# Patient Record
Sex: MALE | Race: WHITE | NOT HISPANIC OR LATINO | URBAN - METROPOLITAN AREA
[De-identification: names, ages, dates, MRNs, and addresses within clinical notes are randomized per-mention and may not be internally consistent; named-entity substitution may affect disease eponyms.]

---

## 2016-10-25 RX ORDER — ACETAMINOPHEN 500 MG
2 TABLET ORAL
Qty: 0 | Refills: 0 | COMMUNITY
Start: 2016-10-25

## 2016-11-30 RX ORDER — WARFARIN SODIUM 2.5 MG/1
1 TABLET ORAL
Qty: 0 | Refills: 0 | COMMUNITY
Start: 2016-11-30

## 2017-01-29 ENCOUNTER — INPATIENT (INPATIENT)
Facility: HOSPITAL | Age: 82
LOS: 3 days | Discharge: HOME | DRG: 190 | End: 2017-02-02
Attending: INTERNAL MEDICINE | Admitting: INTERNAL MEDICINE
Payer: MEDICARE

## 2017-01-29 VITALS
OXYGEN SATURATION: 95 % | TEMPERATURE: 98 F | RESPIRATION RATE: 23 BRPM | SYSTOLIC BLOOD PRESSURE: 120 MMHG | DIASTOLIC BLOOD PRESSURE: 55 MMHG | HEART RATE: 90 BPM

## 2017-01-29 DIAGNOSIS — I48.91 UNSPECIFIED ATRIAL FIBRILLATION: ICD-10-CM

## 2017-01-29 DIAGNOSIS — B97.4 RESPIRATORY SYNCYTIAL VIRUS AS THE CAUSE OF DISEASES CLASSIFIED ELSEWHERE: ICD-10-CM

## 2017-01-29 DIAGNOSIS — E11.8 TYPE 2 DIABETES MELLITUS WITH UNSPECIFIED COMPLICATIONS: ICD-10-CM

## 2017-01-29 DIAGNOSIS — J44.1 CHRONIC OBSTRUCTIVE PULMONARY DISEASE WITH (ACUTE) EXACERBATION: ICD-10-CM

## 2017-01-29 DIAGNOSIS — Z96.641 PRESENCE OF RIGHT ARTIFICIAL HIP JOINT: Chronic | ICD-10-CM

## 2017-01-29 LAB
ALBUMIN SERPL ELPH-MCNC: 3.9 G/DL — SIGNIFICANT CHANGE UP (ref 3.3–5)
ALP SERPL-CCNC: 123 U/L — HIGH (ref 40–120)
ALT FLD-CCNC: 30 U/L RC — SIGNIFICANT CHANGE UP (ref 10–45)
ANION GAP SERPL CALC-SCNC: 19 MMOL/L — HIGH (ref 5–17)
APPEARANCE UR: ABNORMAL
APTT BLD: 64.1 SEC — HIGH (ref 27.5–37.4)
AST SERPL-CCNC: 55 U/L — HIGH (ref 10–40)
BASE EXCESS BLDA CALC-SCNC: 4.4 MMOL/L — HIGH (ref -2–2)
BASOPHILS # BLD AUTO: 0 K/UL — SIGNIFICANT CHANGE UP (ref 0–0.2)
BASOPHILS NFR BLD AUTO: 0 % — SIGNIFICANT CHANGE UP (ref 0–2)
BILIRUB SERPL-MCNC: 0.1 MG/DL — LOW (ref 0.2–1.2)
BILIRUB UR-MCNC: NEGATIVE — SIGNIFICANT CHANGE UP
BUN SERPL-MCNC: 25 MG/DL — HIGH (ref 7–23)
CALCIUM SERPL-MCNC: 9.8 MG/DL — SIGNIFICANT CHANGE UP (ref 8.4–10.5)
CHLORIDE SERPL-SCNC: 97 MMOL/L — SIGNIFICANT CHANGE UP (ref 96–108)
CK MB CFR SERPL CALC: 2.3 NG/ML — SIGNIFICANT CHANGE UP (ref 0–6.7)
CK SERPL-CCNC: 43 U/L — SIGNIFICANT CHANGE UP (ref 30–200)
CO2 BLDA-SCNC: 30 MMOL/L — SIGNIFICANT CHANGE UP (ref 22–30)
CO2 SERPL-SCNC: 22 MMOL/L — SIGNIFICANT CHANGE UP (ref 22–31)
COLOR SPEC: YELLOW — SIGNIFICANT CHANGE UP
CREAT SERPL-MCNC: 1.14 MG/DL — SIGNIFICANT CHANGE UP (ref 0.5–1.3)
DIFF PNL FLD: NEGATIVE — SIGNIFICANT CHANGE UP
EOSINOPHIL # BLD AUTO: 0 K/UL — SIGNIFICANT CHANGE UP (ref 0–0.5)
EOSINOPHIL NFR BLD AUTO: 0 % — SIGNIFICANT CHANGE UP (ref 0–6)
GAS PNL BLDA: SIGNIFICANT CHANGE UP
GAS PNL BLDV: SIGNIFICANT CHANGE UP
GLUCOSE SERPL-MCNC: 202 MG/DL — HIGH (ref 70–99)
GLUCOSE UR QL: 150
HCO3 BLDA-SCNC: 29 MMOL/L — SIGNIFICANT CHANGE UP (ref 21–29)
HCT VFR BLD CALC: 35.7 % — LOW (ref 39–50)
HGB BLD-MCNC: 11.5 G/DL — LOW (ref 13–17)
HOROWITZ INDEX BLDA+IHG-RTO: SIGNIFICANT CHANGE UP
INR BLD: 2.4 RATIO — HIGH (ref 0.88–1.16)
KETONES UR-MCNC: NEGATIVE — SIGNIFICANT CHANGE UP
LEUKOCYTE ESTERASE UR-ACNC: NEGATIVE — SIGNIFICANT CHANGE UP
LYMPHOCYTES # BLD AUTO: 0.7 K/UL — LOW (ref 1–3.3)
LYMPHOCYTES # BLD AUTO: 5.7 % — LOW (ref 13–44)
MCHC RBC-ENTMCNC: 29.9 PG — SIGNIFICANT CHANGE UP (ref 27–34)
MCHC RBC-ENTMCNC: 32.2 GM/DL — SIGNIFICANT CHANGE UP (ref 32–36)
MCV RBC AUTO: 92.8 FL — SIGNIFICANT CHANGE UP (ref 80–100)
MONOCYTES # BLD AUTO: 1 K/UL — HIGH (ref 0–0.9)
MONOCYTES NFR BLD AUTO: 8.1 % — SIGNIFICANT CHANGE UP (ref 2–14)
NEUTROPHILS # BLD AUTO: 10.6 K/UL — HIGH (ref 1.8–7.4)
NEUTROPHILS NFR BLD AUTO: 86.2 % — HIGH (ref 43–77)
NITRITE UR-MCNC: NEGATIVE — SIGNIFICANT CHANGE UP
PCO2 BLDA: 44 MMHG — SIGNIFICANT CHANGE UP (ref 32–46)
PH BLDA: 7.43 — SIGNIFICANT CHANGE UP (ref 7.35–7.45)
PH UR: 6 — SIGNIFICANT CHANGE UP (ref 4.8–8)
PLATELET # BLD AUTO: 450 K/UL — HIGH (ref 150–400)
PO2 BLDA: 91 MMHG — SIGNIFICANT CHANGE UP (ref 74–108)
POTASSIUM SERPL-MCNC: 4.7 MMOL/L — SIGNIFICANT CHANGE UP (ref 3.5–5.3)
POTASSIUM SERPL-MCNC: 6.8 MMOL/L — CRITICAL HIGH (ref 3.5–5.3)
POTASSIUM SERPL-SCNC: 4.7 MMOL/L — SIGNIFICANT CHANGE UP (ref 3.5–5.3)
POTASSIUM SERPL-SCNC: 6.8 MMOL/L — CRITICAL HIGH (ref 3.5–5.3)
PROT SERPL-MCNC: 8.3 G/DL — SIGNIFICANT CHANGE UP (ref 6–8.3)
PROT UR-MCNC: 300 MG/DL
PROTHROM AB SERPL-ACNC: 26.1 SEC — HIGH (ref 10–13.1)
RAPID RVP RESULT: DETECTED
RBC # BLD: 3.84 M/UL — LOW (ref 4.2–5.8)
RBC # FLD: 14.3 % — SIGNIFICANT CHANGE UP (ref 10.3–14.5)
RSV RNA SPEC QL NAA+PROBE: DETECTED
SAO2 % BLDA: 97 % — HIGH (ref 92–96)
SODIUM SERPL-SCNC: 138 MMOL/L — SIGNIFICANT CHANGE UP (ref 135–145)
SP GR SPEC: 1.02 — SIGNIFICANT CHANGE UP (ref 1.01–1.02)
TROPONIN T SERPL-MCNC: <0.01 NG/ML — SIGNIFICANT CHANGE UP (ref 0–0.06)
UROBILINOGEN FLD QL: NEGATIVE — SIGNIFICANT CHANGE UP
WBC # BLD: 12.3 K/UL — HIGH (ref 3.8–10.5)
WBC # FLD AUTO: 12.3 K/UL — HIGH (ref 3.8–10.5)

## 2017-01-29 PROCEDURE — 99285 EMERGENCY DEPT VISIT HI MDM: CPT | Mod: 25,GC

## 2017-01-29 PROCEDURE — 93010 ELECTROCARDIOGRAM REPORT: CPT | Mod: GC

## 2017-01-29 PROCEDURE — 71010: CPT | Mod: 26

## 2017-01-29 RX ORDER — POLYETHYLENE GLYCOL 3350 17 G/17G
17 POWDER, FOR SOLUTION ORAL DAILY
Qty: 0 | Refills: 0 | Status: DISCONTINUED | OUTPATIENT
Start: 2017-01-29 | End: 2017-02-02

## 2017-01-29 RX ORDER — DEXTROSE 50 % IN WATER 50 %
25 SYRINGE (ML) INTRAVENOUS ONCE
Qty: 0 | Refills: 0 | Status: DISCONTINUED | OUTPATIENT
Start: 2017-01-29 | End: 2017-02-02

## 2017-01-29 RX ORDER — CEFTRIAXONE 500 MG/1
1 INJECTION, POWDER, FOR SOLUTION INTRAMUSCULAR; INTRAVENOUS EVERY 24 HOURS
Qty: 0 | Refills: 0 | Status: DISCONTINUED | OUTPATIENT
Start: 2017-01-29 | End: 2017-01-30

## 2017-01-29 RX ORDER — DILTIAZEM HCL 120 MG
180 CAPSULE, EXT RELEASE 24 HR ORAL DAILY
Qty: 0 | Refills: 0 | Status: DISCONTINUED | OUTPATIENT
Start: 2017-01-29 | End: 2017-02-02

## 2017-01-29 RX ORDER — DEXTROSE 50 % IN WATER 50 %
1 SYRINGE (ML) INTRAVENOUS ONCE
Qty: 0 | Refills: 0 | Status: DISCONTINUED | OUTPATIENT
Start: 2017-01-29 | End: 2017-02-02

## 2017-01-29 RX ORDER — ACETAMINOPHEN 500 MG
650 TABLET ORAL EVERY 6 HOURS
Qty: 0 | Refills: 0 | Status: DISCONTINUED | OUTPATIENT
Start: 2017-01-29 | End: 2017-02-02

## 2017-01-29 RX ORDER — DOCUSATE SODIUM 100 MG
100 CAPSULE ORAL THREE TIMES A DAY
Qty: 0 | Refills: 0 | Status: DISCONTINUED | OUTPATIENT
Start: 2017-01-29 | End: 2017-02-02

## 2017-01-29 RX ORDER — IPRATROPIUM/ALBUTEROL SULFATE 18-103MCG
3 AEROSOL WITH ADAPTER (GRAM) INHALATION ONCE
Qty: 0 | Refills: 0 | Status: COMPLETED | OUTPATIENT
Start: 2017-01-29 | End: 2017-01-29

## 2017-01-29 RX ORDER — SODIUM CHLORIDE 9 MG/ML
3 INJECTION INTRAMUSCULAR; INTRAVENOUS; SUBCUTANEOUS ONCE
Qty: 0 | Refills: 0 | Status: COMPLETED | OUTPATIENT
Start: 2017-01-29 | End: 2017-01-29

## 2017-01-29 RX ORDER — FUROSEMIDE 40 MG
20 TABLET ORAL ONCE
Qty: 0 | Refills: 0 | Status: COMPLETED | OUTPATIENT
Start: 2017-01-29 | End: 2017-01-29

## 2017-01-29 RX ORDER — ALBUTEROL 90 UG/1
2 AEROSOL, METERED ORAL EVERY 6 HOURS
Qty: 0 | Refills: 0 | Status: DISCONTINUED | OUTPATIENT
Start: 2017-01-29 | End: 2017-02-01

## 2017-01-29 RX ORDER — SUCRALFATE 1 G
1 TABLET ORAL EVERY 6 HOURS
Qty: 0 | Refills: 0 | Status: DISCONTINUED | OUTPATIENT
Start: 2017-01-29 | End: 2017-02-02

## 2017-01-29 RX ORDER — PANTOPRAZOLE SODIUM 20 MG/1
40 TABLET, DELAYED RELEASE ORAL
Qty: 0 | Refills: 0 | Status: DISCONTINUED | OUTPATIENT
Start: 2017-01-29 | End: 2017-02-02

## 2017-01-29 RX ORDER — ATORVASTATIN CALCIUM 80 MG/1
20 TABLET, FILM COATED ORAL AT BEDTIME
Qty: 0 | Refills: 0 | Status: DISCONTINUED | OUTPATIENT
Start: 2017-01-29 | End: 2017-02-02

## 2017-01-29 RX ORDER — GLUCAGON INJECTION, SOLUTION 0.5 MG/.1ML
1 INJECTION, SOLUTION SUBCUTANEOUS ONCE
Qty: 0 | Refills: 0 | Status: DISCONTINUED | OUTPATIENT
Start: 2017-01-29 | End: 2017-02-02

## 2017-01-29 RX ORDER — FERROUS SULFATE 325(65) MG
325 TABLET ORAL DAILY
Qty: 0 | Refills: 0 | Status: DISCONTINUED | OUTPATIENT
Start: 2017-01-29 | End: 2017-02-02

## 2017-01-29 RX ORDER — LAMOTRIGINE 25 MG/1
25 TABLET, ORALLY DISINTEGRATING ORAL DAILY
Qty: 0 | Refills: 0 | Status: DISCONTINUED | OUTPATIENT
Start: 2017-01-29 | End: 2017-02-02

## 2017-01-29 RX ORDER — OXYCODONE HYDROCHLORIDE 5 MG/1
5 TABLET ORAL EVERY 4 HOURS
Qty: 0 | Refills: 0 | Status: DISCONTINUED | OUTPATIENT
Start: 2017-01-29 | End: 2017-02-02

## 2017-01-29 RX ORDER — DEXTROSE 50 % IN WATER 50 %
12.5 SYRINGE (ML) INTRAVENOUS ONCE
Qty: 0 | Refills: 0 | Status: DISCONTINUED | OUTPATIENT
Start: 2017-01-29 | End: 2017-02-02

## 2017-01-29 RX ORDER — INSULIN LISPRO 100/ML
VIAL (ML) SUBCUTANEOUS
Qty: 0 | Refills: 0 | Status: DISCONTINUED | OUTPATIENT
Start: 2017-01-29 | End: 2017-02-02

## 2017-01-29 RX ORDER — ALPRAZOLAM 0.25 MG
0.5 TABLET ORAL
Qty: 0 | Refills: 0 | Status: DISCONTINUED | OUTPATIENT
Start: 2017-01-29 | End: 2017-02-02

## 2017-01-29 RX ORDER — METOPROLOL TARTRATE 50 MG
25 TABLET ORAL
Qty: 0 | Refills: 0 | Status: DISCONTINUED | OUTPATIENT
Start: 2017-01-29 | End: 2017-02-02

## 2017-01-29 RX ORDER — ERGOCALCIFEROL 1.25 MG/1
50000 CAPSULE ORAL
Qty: 0 | Refills: 0 | Status: DISCONTINUED | OUTPATIENT
Start: 2017-01-29 | End: 2017-02-02

## 2017-01-29 RX ORDER — SODIUM CHLORIDE 9 MG/ML
500 INJECTION INTRAMUSCULAR; INTRAVENOUS; SUBCUTANEOUS ONCE
Qty: 0 | Refills: 0 | Status: COMPLETED | OUTPATIENT
Start: 2017-01-29 | End: 2017-01-29

## 2017-01-29 RX ORDER — SODIUM CHLORIDE 9 MG/ML
1000 INJECTION, SOLUTION INTRAVENOUS
Qty: 0 | Refills: 0 | Status: DISCONTINUED | OUTPATIENT
Start: 2017-01-29 | End: 2017-02-02

## 2017-01-29 RX ORDER — TIOTROPIUM BROMIDE 18 UG/1
1 CAPSULE ORAL; RESPIRATORY (INHALATION) DAILY
Qty: 0 | Refills: 0 | Status: DISCONTINUED | OUTPATIENT
Start: 2017-01-29 | End: 2017-02-02

## 2017-01-29 RX ORDER — TRAMADOL HYDROCHLORIDE 50 MG/1
50 TABLET ORAL EVERY 6 HOURS
Qty: 0 | Refills: 0 | Status: DISCONTINUED | OUTPATIENT
Start: 2017-01-29 | End: 2017-02-02

## 2017-01-29 RX ADMIN — Medication 0.5 MILLIGRAM(S): at 22:51

## 2017-01-29 RX ADMIN — SODIUM CHLORIDE 500 MILLILITER(S): 9 INJECTION INTRAMUSCULAR; INTRAVENOUS; SUBCUTANEOUS at 17:54

## 2017-01-29 RX ADMIN — Medication 3 MILLILITER(S): at 16:52

## 2017-01-29 RX ADMIN — Medication 20 MILLIGRAM(S): at 22:06

## 2017-01-29 RX ADMIN — Medication 100 MILLIGRAM(S): at 22:51

## 2017-01-29 RX ADMIN — Medication 25 MILLIGRAM(S): at 22:51

## 2017-01-29 RX ADMIN — Medication 3 MILLILITER(S): at 17:54

## 2017-01-29 RX ADMIN — SODIUM CHLORIDE 3 MILLILITER(S): 9 INJECTION INTRAMUSCULAR; INTRAVENOUS; SUBCUTANEOUS at 16:37

## 2017-01-29 RX ADMIN — ATORVASTATIN CALCIUM 20 MILLIGRAM(S): 80 TABLET, FILM COATED ORAL at 22:51

## 2017-01-29 NOTE — ED PROVIDER NOTE - ATTENDING CONTRIBUTION TO CARE
Patient with positive sick contact in one [facility] who saw his pulmonologist this week on the 26th was diagnosed with bronchitis, stated it was viral, given medrol dose pack and no antibiotics. Patient was not sleeping well or feeling well today and reported to urgent care who forwarded patient to ED today. Patient has been mildly shortness of breath. Patient coughing wet cough but not producing much sputum. Patient with positive sick contact in one [facility] who saw his pulmonologist this week on the 26th was diagnosed with bronchitis, stated it was viral, given medrol dose pack and no antibiotics. Patient was not sleeping well or feeling well today and reported to urgent care who forwarded patient to ED today. Patient has been mildly shortness of breath. Patient coughing wet cough but not producing much sputum.  mild distress secondary to shortness of breath, left rhonchi and rales, + wheezing throughout, no edema, non-tachycardic, mildly tachypneic, soft, ntnd, no rebound/guarding, no rashes  will get iv, labs, 30cc/kg, blood culture x 2, ua, urine culture, will treat with albuterol nebs, will reassess and likely antibiotics and admit for COPD exacerbation.

## 2017-01-29 NOTE — ED PROVIDER NOTE - OBJECTIVE STATEMENT
88y Male PMH COPD, Afib, DM2 complaining of shortness of breath. Was here 6 months ago for orthopedic surgery. Started having a cough about one week ago, may have gotten it from a caretaker. Had increased work of breathing. Went to pulmonologist, started prednisone. Went to urgent care, for persistent symptoms, referred here for more thorough evaluation. 88y Male PMH COPD, Afib, DM2 complaining of shortness of breath. Was here 6 months ago for orthopedic surgery. Started having a cough about one week ago,, wet but nonproductive, may have gotten it from a caretaker. Had increased work of breathing. Went to pulmonologist, started prednisone. Went to urgent care, for persistent symptoms, referred here for more thorough evaluation. Denies fevers, chills, nausea, vomiting, diarrhea, constipation, chest pain.     PCP: Dr. Virgil Falk 88y Male PMH COPD, Afib, DM2 complaining of shortness of breath. Was here 6 months ago for orthopedic surgery. Started having a cough about one week ago, wet but nonproductive, may have gotten it from a caretaker. Had increased work of breathing. Went to pulmonologist, started prednisone. Went to urgent care, for persistent symptoms, referred here for more thorough evaluation. Denies fevers, chills, nausea, vomiting, diarrhea, constipation, chest pain.     PCP: Dr. Virgil Falk

## 2017-01-29 NOTE — H&P ADULT. - PROBLEM SELECTOR PLAN 2
will start a course of abx   recently hemodialysis a course of steroids will hold further steroid for now  continue nebs

## 2017-01-29 NOTE — H&P ADULT. - HISTORY OF PRESENT ILLNESS
Patient is an 87 yo male present to ED with complaint of SOB. As per Patient his home health aid had a cough last week. about 4-5 days agog Patient begai  to experience SOB and a mild cough. was seen by his pulmonologist and started on a course of steroids. Patient syttates he had no improvement with the steroid and was seen my a first med doc today who suggested thje Patient go to the ED for further eval. Patient seen now resting comfortably with O2 on.

## 2017-01-29 NOTE — ED PROVIDER NOTE - PHYSICAL EXAMINATION
ROS: GENERAL: no fevers, no chills HEENT: no epistaxis, no eye pain, no ear, no throat pain CARDIAC: no chest pain, + shortness of breath PULM: + cough, + shortness of breath GI: no nausea, no vomiting, no diarrhea, no abdominal pain, no hematemesis, no bright red blood per rectum : no dysuria, no hematuria EXTREMITIES: no arm pain, no leg pain, no back pain SKIN: no purpura, no petechiae NEURO: no headache, no neck pain, no loss of strength/sensation HEME: no easy bruising, no easy bleeding     PE: CONSTITUTIONAL: Well appearing, well nourished, in no apparent distress. ENMT: Airway patent, nasal mucosa clear, mouth with normal mucosa. HEAD: NCAT EYES: PERRL, EOMI CARDIAC: RRR, no m/r/g, no pedal edema RESPIRATORY: Diffuse expiratory wheezing bilaterally with rhonchi GI: Abdomen non-distended, non-tender MSK: Spine appears normal, range of motion is not limited, no muscle/joint tenderness NEURO: CNII-XII grossly intact, 5/5 strength, full sensation all extremities, gait intact SKIN: Skin tone normal in color, warm and dry. No evidence of rash.

## 2017-01-29 NOTE — ED PROVIDER NOTE - CARE PLAN
Principal Discharge DX:	COPD exacerbation Principal Discharge DX:	COPD exacerbation  Secondary Diagnosis:	RSV infection

## 2017-01-29 NOTE — ED ADULT NURSE NOTE - OBJECTIVE STATEMENT
87 Y/O M via walkin presenting with sob, labored breathing, hypoxia and unproductive cough as per pt. Pt states he has been feeling unwell for the past few days. He states he has been diagnosed with bronchitis in which they had prescribed him steroids. Pt states his cough is unproductive and wet. Pt states he feels better when he has oxygen on. Pt denies fever, chills, n,v, abd pain, dizziness, chest pain, palpitations. Pt is aaox4. Gross neuro intact. Lungs have wheezes with rhonci bilat. Pt is not in resp distress. Pt is able to speak in full sentences without difficulty. O2 sat is 97 ra. Pt placed on 2 L nc for comfort measures. Pt states he feels better with oxygen. S1 and S2 heard. Abd soft, nontender, nondistended. + bs in all 4 quadrants. Denies urinary s&s. Skin w.d.i Pt has flaky skin. Safety and comfort measures maintained. Family at bedside.

## 2017-01-29 NOTE — ED PROVIDER NOTE - PROGRESS NOTE DETAILS
Patient stable, but likely benefit from admission for continued wheezing, O2 requirement, and RSV infection. Patient agreed to admission.  - Yo Chavis MD

## 2017-01-30 LAB
ANION GAP SERPL CALC-SCNC: 15 MMOL/L — SIGNIFICANT CHANGE UP (ref 5–17)
BUN SERPL-MCNC: 23 MG/DL — SIGNIFICANT CHANGE UP (ref 7–23)
CALCIUM SERPL-MCNC: 9.4 MG/DL — SIGNIFICANT CHANGE UP (ref 8.4–10.5)
CHLORIDE SERPL-SCNC: 98 MMOL/L — SIGNIFICANT CHANGE UP (ref 96–108)
CO2 SERPL-SCNC: 26 MMOL/L — SIGNIFICANT CHANGE UP (ref 22–31)
CREAT SERPL-MCNC: 1.01 MG/DL — SIGNIFICANT CHANGE UP (ref 0.5–1.3)
CULTURE RESULTS: NO GROWTH — SIGNIFICANT CHANGE UP
GLUCOSE SERPL-MCNC: 175 MG/DL — HIGH (ref 70–99)
HCT VFR BLD CALC: 30.7 % — LOW (ref 39–50)
HGB BLD-MCNC: 9.5 G/DL — LOW (ref 13–17)
INR BLD: 2.49 RATIO — HIGH (ref 0.88–1.16)
MCHC RBC-ENTMCNC: 28.4 PG — SIGNIFICANT CHANGE UP (ref 27–34)
MCHC RBC-ENTMCNC: 30.9 GM/DL — LOW (ref 32–36)
MCV RBC AUTO: 91.9 FL — SIGNIFICANT CHANGE UP (ref 80–100)
NT-PROBNP SERPL-SCNC: 741 PG/ML — HIGH (ref 0–300)
PLATELET # BLD AUTO: 438 K/UL — HIGH (ref 150–400)
POTASSIUM SERPL-MCNC: 4.1 MMOL/L — SIGNIFICANT CHANGE UP (ref 3.5–5.3)
POTASSIUM SERPL-SCNC: 4.1 MMOL/L — SIGNIFICANT CHANGE UP (ref 3.5–5.3)
PROTHROM AB SERPL-ACNC: 28.4 SEC — HIGH (ref 10–13.1)
RBC # BLD: 3.34 M/UL — LOW (ref 4.2–5.8)
RBC # FLD: 14.7 % — HIGH (ref 10.3–14.5)
SODIUM SERPL-SCNC: 139 MMOL/L — SIGNIFICANT CHANGE UP (ref 135–145)
SPECIMEN SOURCE: SIGNIFICANT CHANGE UP
WBC # BLD: 13.11 K/UL — HIGH (ref 3.8–10.5)
WBC # FLD AUTO: 13.11 K/UL — HIGH (ref 3.8–10.5)

## 2017-01-30 RX ORDER — IPRATROPIUM/ALBUTEROL SULFATE 18-103MCG
3 AEROSOL WITH ADAPTER (GRAM) INHALATION EVERY 6 HOURS
Qty: 0 | Refills: 0 | Status: DISCONTINUED | OUTPATIENT
Start: 2017-01-30 | End: 2017-02-02

## 2017-01-30 RX ORDER — WARFARIN SODIUM 2.5 MG/1
1 TABLET ORAL ONCE
Qty: 0 | Refills: 0 | Status: COMPLETED | OUTPATIENT
Start: 2017-01-30 | End: 2017-01-30

## 2017-01-30 RX ORDER — SODIUM CHLORIDE 9 MG/ML
1000 INJECTION INTRAMUSCULAR; INTRAVENOUS; SUBCUTANEOUS
Qty: 0 | Refills: 0 | Status: DISCONTINUED | OUTPATIENT
Start: 2017-01-30 | End: 2017-02-02

## 2017-01-30 RX ADMIN — Medication: at 09:06

## 2017-01-30 RX ADMIN — Medication 1 GRAM(S): at 01:38

## 2017-01-30 RX ADMIN — Medication 40 MILLIGRAM(S): at 11:49

## 2017-01-30 RX ADMIN — Medication 1 TABLET(S): at 11:50

## 2017-01-30 RX ADMIN — ALBUTEROL 2 PUFF(S): 90 AEROSOL, METERED ORAL at 01:38

## 2017-01-30 RX ADMIN — PANTOPRAZOLE SODIUM 40 MILLIGRAM(S): 20 TABLET, DELAYED RELEASE ORAL at 06:23

## 2017-01-30 RX ADMIN — Medication 2: at 14:05

## 2017-01-30 RX ADMIN — CEFTRIAXONE 100 GRAM(S): 500 INJECTION, POWDER, FOR SOLUTION INTRAMUSCULAR; INTRAVENOUS at 01:38

## 2017-01-30 RX ADMIN — Medication 3 MILLILITER(S): at 23:36

## 2017-01-30 RX ADMIN — Medication 325 MILLIGRAM(S): at 11:49

## 2017-01-30 RX ADMIN — Medication 0.5 MILLIGRAM(S): at 11:49

## 2017-01-30 RX ADMIN — Medication 25 MILLIGRAM(S): at 18:18

## 2017-01-30 RX ADMIN — Medication 180 MILLIGRAM(S): at 06:23

## 2017-01-30 RX ADMIN — TIOTROPIUM BROMIDE 1 CAPSULE(S): 18 CAPSULE ORAL; RESPIRATORY (INHALATION) at 11:50

## 2017-01-30 RX ADMIN — POLYETHYLENE GLYCOL 3350 17 GRAM(S): 17 POWDER, FOR SOLUTION ORAL at 11:51

## 2017-01-30 RX ADMIN — Medication 1 GRAM(S): at 14:06

## 2017-01-30 RX ADMIN — ALBUTEROL 2 PUFF(S): 90 AEROSOL, METERED ORAL at 06:24

## 2017-01-30 RX ADMIN — LAMOTRIGINE 25 MILLIGRAM(S): 25 TABLET, ORALLY DISINTEGRATING ORAL at 11:50

## 2017-01-30 RX ADMIN — WARFARIN SODIUM 1 MILLIGRAM(S): 2.5 TABLET ORAL at 21:53

## 2017-01-30 RX ADMIN — ALBUTEROL 2 PUFF(S): 90 AEROSOL, METERED ORAL at 18:19

## 2017-01-30 RX ADMIN — Medication 1 GRAM(S): at 18:19

## 2017-01-30 RX ADMIN — Medication 3 MILLILITER(S): at 18:21

## 2017-01-30 RX ADMIN — Medication 100 MILLIGRAM(S): at 21:53

## 2017-01-30 RX ADMIN — ATORVASTATIN CALCIUM 20 MILLIGRAM(S): 80 TABLET, FILM COATED ORAL at 21:59

## 2017-01-30 RX ADMIN — ALBUTEROL 2 PUFF(S): 90 AEROSOL, METERED ORAL at 11:50

## 2017-01-30 RX ADMIN — Medication 0.5 MILLIGRAM(S): at 03:42

## 2017-01-30 RX ADMIN — ALBUTEROL 2 PUFF(S): 90 AEROSOL, METERED ORAL at 23:34

## 2017-01-30 RX ADMIN — Medication 1 GRAM(S): at 23:34

## 2017-01-30 RX ADMIN — SODIUM CHLORIDE 50 MILLILITER(S): 9 INJECTION INTRAMUSCULAR; INTRAVENOUS; SUBCUTANEOUS at 16:15

## 2017-01-30 RX ADMIN — Medication 100 MILLIGRAM(S): at 11:50

## 2017-01-30 RX ADMIN — Medication 100 MILLIGRAM(S): at 06:23

## 2017-01-30 RX ADMIN — Medication 1 GRAM(S): at 06:23

## 2017-01-30 RX ADMIN — Medication 25 MILLIGRAM(S): at 06:23

## 2017-01-31 ENCOUNTER — TRANSCRIPTION ENCOUNTER (OUTPATIENT)
Age: 82
End: 2017-01-31

## 2017-01-31 LAB
HCT VFR BLD CALC: 31.8 % — LOW (ref 39–50)
HGB BLD-MCNC: 10 G/DL — LOW (ref 13–17)
INR BLD: 2.29 RATIO — HIGH (ref 0.88–1.16)
MCHC RBC-ENTMCNC: 28.8 PG — SIGNIFICANT CHANGE UP (ref 27–34)
MCHC RBC-ENTMCNC: 31.4 GM/DL — LOW (ref 32–36)
MCV RBC AUTO: 91.6 FL — SIGNIFICANT CHANGE UP (ref 80–100)
PLATELET # BLD AUTO: 428 K/UL — HIGH (ref 150–400)
PROTHROM AB SERPL-ACNC: 26.1 SEC — HIGH (ref 10–13.1)
RBC # BLD: 3.47 M/UL — LOW (ref 4.2–5.8)
RBC # FLD: 14.6 % — HIGH (ref 10.3–14.5)
WBC # BLD: 11.88 K/UL — HIGH (ref 3.8–10.5)
WBC # FLD AUTO: 11.88 K/UL — HIGH (ref 3.8–10.5)

## 2017-01-31 RX ORDER — WARFARIN SODIUM 2.5 MG/1
1 TABLET ORAL ONCE
Qty: 0 | Refills: 0 | Status: COMPLETED | OUTPATIENT
Start: 2017-01-31 | End: 2017-01-31

## 2017-01-31 RX ADMIN — TIOTROPIUM BROMIDE 1 CAPSULE(S): 18 CAPSULE ORAL; RESPIRATORY (INHALATION) at 12:19

## 2017-01-31 RX ADMIN — Medication 180 MILLIGRAM(S): at 06:51

## 2017-01-31 RX ADMIN — Medication 1 GRAM(S): at 12:21

## 2017-01-31 RX ADMIN — Medication 100 MILLIGRAM(S): at 20:31

## 2017-01-31 RX ADMIN — Medication 325 MILLIGRAM(S): at 12:19

## 2017-01-31 RX ADMIN — ATORVASTATIN CALCIUM 20 MILLIGRAM(S): 80 TABLET, FILM COATED ORAL at 20:30

## 2017-01-31 RX ADMIN — PANTOPRAZOLE SODIUM 40 MILLIGRAM(S): 20 TABLET, DELAYED RELEASE ORAL at 06:51

## 2017-01-31 RX ADMIN — ALBUTEROL 2 PUFF(S): 90 AEROSOL, METERED ORAL at 16:52

## 2017-01-31 RX ADMIN — ALBUTEROL 2 PUFF(S): 90 AEROSOL, METERED ORAL at 06:52

## 2017-01-31 RX ADMIN — Medication 25 MILLIGRAM(S): at 16:51

## 2017-01-31 RX ADMIN — Medication 3 MILLILITER(S): at 06:52

## 2017-01-31 RX ADMIN — Medication 40 MILLIGRAM(S): at 12:19

## 2017-01-31 RX ADMIN — LAMOTRIGINE 25 MILLIGRAM(S): 25 TABLET, ORALLY DISINTEGRATING ORAL at 12:19

## 2017-01-31 RX ADMIN — Medication 100 MILLIGRAM(S): at 12:19

## 2017-01-31 RX ADMIN — WARFARIN SODIUM 1 MILLIGRAM(S): 2.5 TABLET ORAL at 20:30

## 2017-01-31 RX ADMIN — ALBUTEROL 2 PUFF(S): 90 AEROSOL, METERED ORAL at 12:20

## 2017-01-31 RX ADMIN — Medication 100 MILLIGRAM(S): at 06:52

## 2017-01-31 RX ADMIN — Medication 10 MILLIGRAM(S): at 09:58

## 2017-01-31 RX ADMIN — POLYETHYLENE GLYCOL 3350 17 GRAM(S): 17 POWDER, FOR SOLUTION ORAL at 12:20

## 2017-01-31 RX ADMIN — Medication 1 TABLET(S): at 12:20

## 2017-01-31 RX ADMIN — Medication 2: at 12:26

## 2017-01-31 RX ADMIN — Medication 25 MILLIGRAM(S): at 06:52

## 2017-01-31 RX ADMIN — Medication 1 GRAM(S): at 16:51

## 2017-01-31 RX ADMIN — Medication 1 GRAM(S): at 06:51

## 2017-01-31 RX ADMIN — Medication 3 MILLILITER(S): at 16:52

## 2017-01-31 RX ADMIN — Medication 3 MILLILITER(S): at 12:20

## 2017-01-31 NOTE — DISCHARGE NOTE ADULT - CARE PLAN
Principal Discharge DX:	COPD exacerbation  Goal:	Resolution.  Instructions for follow-up, activity and diet:	Likely secondary to RSV infection  Secondary Diagnosis:	RSV infection  Secondary Diagnosis:	Atrial fibrillation, unspecified type  Instructions for follow-up, activity and diet:	Atrial fibrillation is the most common heart rhythm problem & has the risk of stroke & heart attack  It helps if you control your blood pressure, not drink more than 1-2 alcohol drinks per day, cut down on caffeine, getting treatment for over active thyroid gland, & getting exercise  Call your doctor if you feel your heart racing or beating unusually, chest tightness or pain, lightheaded, faint, shortness of breath especially with exercise  It is important to take your heart medication as prescribed  You may be on anticoagulation which is very important to take as directed - you may need blood work to monitor drug levels  Secondary Diagnosis:	Diabetes type 2, controlled  Instructions for follow-up, activity and diet:	HgA1C this admission.  Make sure you get your HgA1c checked every three months.  If you take oral diabetes medications, check your blood glucose two times a day.  If you take insulin, check your blood glucose before meals and at bedtime.  It's important not to skip any meals.  Keep a log of your blood glucose results and always take it with you to your doctor appointments.  Keep a list of your current medications including injectables and over the counter medications and bring this medication list with you to all your doctor appointments.  If you have not seen your ophthalmologist this year call for appointment.  Check your feet daily for redness, sores, or openings. Do not self treat. If no improvement in two days call your primary care physician for an appointment.  Low blood sugar (hypoglycemia) is a blood sugar below 70mg/dl. Check your blood sugar if you feel signs/symptoms of hypoglycemia. If your blood sugar is below 70 take 15 grams of carbohydrates (ex 4 oz of apple juice, 3-4 glucose tablets, or 4-6 oz of regular soda) wait 15 minutes and repeat blood sugar to make sure it comes up above 70.  If your blood sugar is above 70 and you are due for a meal, have a meal.  If you are not due for a meal have a snack.  This snack helps keeps your blood sugar at a safe range. Principal Discharge DX:	COPD exacerbation  Goal:	Resolution.  Instructions for follow-up, activity and diet:	Likely secondary to RSV infection.  Call your Health Care provider upon arrival home to make a follow up appointment within one week.  Take all inhalers as prescribed by your Health Care Provider.  Take steroids as prescribed by your Health Care Provider.  If your cough increases infrequency and severity and/or you have shortness of breath or increased shortness of breath call your Health Care Provider.  If you develop fever, chills, night sweats, malaise, and/or change in mental status call your Health care Provider.  Nutrition is very important.  Eat small frequent meals.  Increase your activity as tolerated.  Do not stay in bed all day  DC home on home O2  Secondary Diagnosis:	RSV infection  Instructions for follow-up, activity and diet:	Respiratory status improved. Continue Nebulizer treatment  Secondary Diagnosis:	Atrial fibrillation, unspecified type  Instructions for follow-up, activity and diet:	Atrial fibrillation is the most common heart rhythm problem & has the risk of stroke & heart attack  It helps if you control your blood pressure, not drink more than 1-2 alcohol drinks per day, cut down on caffeine, getting treatment for over active thyroid gland, & getting exercise  Call your doctor if you feel your heart racing or beating unusually, chest tightness or pain, lightheaded, faint, shortness of breath especially with exercise  It is important to take your heart medication as prescribed  You may be on anticoagulation which is very important to take as directed - you may need blood work to monitor drug levels  Secondary Diagnosis:	Diabetes type 2, controlled  Instructions for follow-up, activity and diet:	HgA1C this admission 6.6  Make sure you get your HgA1c checked every three months.  If you take oral diabetes medications, check your blood glucose two times a day.  If you take insulin, check your blood glucose before meals and at bedtime.  It's important not to skip any meals.  Keep a log of your blood glucose results and always take it with you to your doctor appointments.  Keep a list of your current medications including injectables and over the counter medications and bring this medication list with you to all your doctor appointments.  If you have not seen your ophthalmologist this year call for appointment.  Check your feet daily for redness, sores, or openings. Do not self treat. If no improvement in two days call your primary care physician for an appointment.  Low blood sugar (hypoglycemia) is a blood sugar below 70mg/dl. Check your blood sugar if you feel signs/symptoms of hypoglycemia. If your blood sugar is below 70 take 15 grams of carbohydrates (ex 4 oz of apple juice, 3-4 glucose tablets, or 4-6 oz of regular soda) wait 15 minutes and repeat blood sugar to make sure it comes up above 70.  If your blood sugar is above 70 and you are due for a meal, have a meal.  If you are not due for a meal have a snack.  This snack helps keeps your blood sugar at a safe range.

## 2017-01-31 NOTE — DISCHARGE NOTE ADULT - PLAN OF CARE
Resolution. Likely secondary to RSV infection Atrial fibrillation is the most common heart rhythm problem & has the risk of stroke & heart attack  It helps if you control your blood pressure, not drink more than 1-2 alcohol drinks per day, cut down on caffeine, getting treatment for over active thyroid gland, & getting exercise  Call your doctor if you feel your heart racing or beating unusually, chest tightness or pain, lightheaded, faint, shortness of breath especially with exercise  It is important to take your heart medication as prescribed  You may be on anticoagulation which is very important to take as directed - you may need blood work to monitor drug levels HgA1C this admission.  Make sure you get your HgA1c checked every three months.  If you take oral diabetes medications, check your blood glucose two times a day.  If you take insulin, check your blood glucose before meals and at bedtime.  It's important not to skip any meals.  Keep a log of your blood glucose results and always take it with you to your doctor appointments.  Keep a list of your current medications including injectables and over the counter medications and bring this medication list with you to all your doctor appointments.  If you have not seen your ophthalmologist this year call for appointment.  Check your feet daily for redness, sores, or openings. Do not self treat. If no improvement in two days call your primary care physician for an appointment.  Low blood sugar (hypoglycemia) is a blood sugar below 70mg/dl. Check your blood sugar if you feel signs/symptoms of hypoglycemia. If your blood sugar is below 70 take 15 grams of carbohydrates (ex 4 oz of apple juice, 3-4 glucose tablets, or 4-6 oz of regular soda) wait 15 minutes and repeat blood sugar to make sure it comes up above 70.  If your blood sugar is above 70 and you are due for a meal, have a meal.  If you are not due for a meal have a snack.  This snack helps keeps your blood sugar at a safe range. Respiratory status improved. Continue Nebulizer treatment Likely secondary to RSV infection.  Call your Health Care provider upon arrival home to make a follow up appointment within one week.  Take all inhalers as prescribed by your Health Care Provider.  Take steroids as prescribed by your Health Care Provider.  If your cough increases infrequency and severity and/or you have shortness of breath or increased shortness of breath call your Health Care Provider.  If you develop fever, chills, night sweats, malaise, and/or change in mental status call your Health care Provider.  Nutrition is very important.  Eat small frequent meals.  Increase your activity as tolerated.  Do not stay in bed all day  DC home on home O2 HgA1C this admission 6.6  Make sure you get your HgA1c checked every three months.  If you take oral diabetes medications, check your blood glucose two times a day.  If you take insulin, check your blood glucose before meals and at bedtime.  It's important not to skip any meals.  Keep a log of your blood glucose results and always take it with you to your doctor appointments.  Keep a list of your current medications including injectables and over the counter medications and bring this medication list with you to all your doctor appointments.  If you have not seen your ophthalmologist this year call for appointment.  Check your feet daily for redness, sores, or openings. Do not self treat. If no improvement in two days call your primary care physician for an appointment.  Low blood sugar (hypoglycemia) is a blood sugar below 70mg/dl. Check your blood sugar if you feel signs/symptoms of hypoglycemia. If your blood sugar is below 70 take 15 grams of carbohydrates (ex 4 oz of apple juice, 3-4 glucose tablets, or 4-6 oz of regular soda) wait 15 minutes and repeat blood sugar to make sure it comes up above 70.  If your blood sugar is above 70 and you are due for a meal, have a meal.  If you are not due for a meal have a snack.  This snack helps keeps your blood sugar at a safe range.

## 2017-01-31 NOTE — DISCHARGE NOTE ADULT - MEDICATION SUMMARY - MEDICATIONS TO TAKE
I will START or STAY ON the medications listed below when I get home from the hospital:    acetaminophen 325 mg oral tablet  -- 3 tab(s) by mouth every 8 hours, As needed, Mild Pain (1 - 3)  -- Indication: For pain    oxyCODONE 5 mg oral tablet  -- 1 tab(s) by mouth every 4 hours, As needed, Severe Pain (7 - 10)  -- Indication: For pain    traMADol 50 mg oral tablet  -- 0.5 tab(s) by mouth every 6 hours, As needed, Moderate Pain (4 - 6)  -- Indication: For pain    aluminum hydroxide-magnesium hydroxide 200 mg-200 mg/5 mL oral suspension  -- 30 milliliter(s) by mouth every 6 hours, As needed, Dyspepsia  -- Indication: For stomach    diltiaZEM 180 mg/24 hours oral tablet, extended release  -- 1 tab(s) by mouth once a day  -- Indication: For blood pressure    warfarin 1 mg oral tablet  -- 1 milligram(s) by mouth once a day (at bedtime)    -- Indication: For Atrial fibrillation, unspecified type    lamoTRIgine 25 mg oral tablet  -- 1 tab(s) by mouth once a day  -- Indication: For bipolar    PARoxetine 40 mg oral tablet  -- 1 tab(s) by mouth once a day  -- Indication: For Depression    metFORMIN 500 mg oral tablet  -- 1 tab(s) by mouth 2 times a day  -- Indication: For Diabetes type 2, controlled    glimepiride 1 mg oral tablet  -- 1 tab(s) by mouth once a day  -- Indication: For Controlled diabetes mellitus type 2 with complications, unspecified long term insulin use status    rosuvastatin 10 mg oral tablet  -- 1 tab(s) by mouth once a day (at bedtime)  -- Indication: For Cholesterol    methIMAzole 5 mg oral tablet  -- 0.5 tab(s) by mouth once a day  -- Indication: For hyperthyroid    ALPRAZolam 0.5 mg oral tablet  -- 1 tab(s) by mouth 2 times a day, As Needed  -- Indication: For Anxiety    metoprolol tartrate 25 mg oral tablet  -- 1 tab(s) by mouth 2 times a day  -- Indication: For blood pressure    tiotropium 18 mcg inhalation capsule  -- 1 cap(s) inhaled once a day  -- Indication: For COPD exacerbation    DuoNeb 0.5 mg-2.5 mg/3 mL inhalation solution  -- 3 milliliter(s) inhaled every 6 hours  -- Indication: For COPD exacerbation    ferrous sulfate 324 mg (65 mg elemental iron) oral tablet  -- 1 tab(s) by mouth once a day  -- Indication: For Anemia    docusate sodium 100 mg oral capsule  -- 1 cap(s) by mouth 3 times a day  -- Indication: For Constipation    polyethylene glycol 3350 oral powder for reconstitution  -- 17 gram(s) by mouth once a day  -- Indication: For Constipation    sucralfate 1 g oral tablet  -- 1 tab(s) by mouth every 6 hours  -- Indication: For stomach    pantoprazole 40 mg oral delayed release tablet  -- 1 tab(s) by mouth once a day (before a meal)  -- Indication: For stomach    Multiple Vitamins oral tablet  -- 1 tab(s) by mouth once a day  -- Indication: For supplement    ergocalciferol 50,000 intl units (1.25 mg) oral capsule  -- 1 cap(s) by mouth once a week  -- Indication: For supplement

## 2017-01-31 NOTE — DISCHARGE NOTE ADULT - CARE PROVIDER_API CALL
Virgil Falk (DO), Medicine  [address] [place] [id number]  Phone: [phone]  Fax: [phone]

## 2017-01-31 NOTE — DISCHARGE NOTE ADULT - NS AS DC VTE INSTRUCTION
Coumadin/Warfarin - Dietary Advice.../Coumadin/Warfarin - Follow-up monitoring.../Coumadin/Warfarin - Compliance.../Coumadin/Warfarin - Potential for adverse drug reactions and interactions

## 2017-01-31 NOTE — DISCHARGE NOTE ADULT - HOME CARE AGENCY
[facility]  [phone] for visiting nurse with start of care 2/3/2017.   The nurse will call before coming.

## 2017-01-31 NOTE — DISCHARGE NOTE ADULT - HOSPITAL COURSE
present with cough and shortness of breath  found to be positive for RSV  continue home meds  activity as tolerated   follow up in office  continue current diet  Patient and daughter aware of plan

## 2017-01-31 NOTE — DISCHARGE NOTE ADULT - PATIENT PORTAL LINK FT
“You can access the [facility] Patient Portal, offered by [facility], by registering with the following website: [email or web address]”

## 2017-02-01 LAB
HCT VFR BLD CALC: 32.8 % — LOW (ref 39–50)
HGB BLD-MCNC: 10.7 G/DL — LOW (ref 13–17)
INR BLD: 2.51 RATIO — HIGH (ref 0.88–1.16)
MCHC RBC-ENTMCNC: 30 PG — SIGNIFICANT CHANGE UP (ref 27–34)
MCHC RBC-ENTMCNC: 32.6 GM/DL — SIGNIFICANT CHANGE UP (ref 32–36)
MCV RBC AUTO: 92 FL — SIGNIFICANT CHANGE UP (ref 80–100)
PLATELET # BLD AUTO: 456 K/UL — HIGH (ref 150–400)
PROTHROM AB SERPL-ACNC: 27.4 SEC — HIGH (ref 10–13.1)
RBC # BLD: 3.56 M/UL — LOW (ref 4.2–5.8)
RBC # FLD: 13.7 % — SIGNIFICANT CHANGE UP (ref 10.3–14.5)
WBC # BLD: 10.1 K/UL — SIGNIFICANT CHANGE UP (ref 3.8–10.5)
WBC # FLD AUTO: 10.1 K/UL — SIGNIFICANT CHANGE UP (ref 3.8–10.5)

## 2017-02-01 RX ORDER — WARFARIN SODIUM 2.5 MG/1
1 TABLET ORAL ONCE
Qty: 0 | Refills: 0 | Status: COMPLETED | OUTPATIENT
Start: 2017-02-01 | End: 2017-02-01

## 2017-02-01 RX ADMIN — Medication 180 MILLIGRAM(S): at 06:31

## 2017-02-01 RX ADMIN — PANTOPRAZOLE SODIUM 40 MILLIGRAM(S): 20 TABLET, DELAYED RELEASE ORAL at 06:31

## 2017-02-01 RX ADMIN — Medication 1 GRAM(S): at 18:41

## 2017-02-01 RX ADMIN — Medication 100 MILLIGRAM(S): at 12:34

## 2017-02-01 RX ADMIN — ATORVASTATIN CALCIUM 20 MILLIGRAM(S): 80 TABLET, FILM COATED ORAL at 20:16

## 2017-02-01 RX ADMIN — Medication 1 TABLET(S): at 12:33

## 2017-02-01 RX ADMIN — Medication 40 MILLIGRAM(S): at 12:33

## 2017-02-01 RX ADMIN — Medication 1: at 08:28

## 2017-02-01 RX ADMIN — Medication 1 GRAM(S): at 06:32

## 2017-02-01 RX ADMIN — Medication 2: at 12:34

## 2017-02-01 RX ADMIN — Medication 0.5 MILLIGRAM(S): at 20:16

## 2017-02-01 RX ADMIN — Medication 1 GRAM(S): at 12:33

## 2017-02-01 RX ADMIN — Medication 100 MILLIGRAM(S): at 20:16

## 2017-02-01 RX ADMIN — ALBUTEROL 2 PUFF(S): 90 AEROSOL, METERED ORAL at 06:32

## 2017-02-01 RX ADMIN — ALBUTEROL 2 PUFF(S): 90 AEROSOL, METERED ORAL at 12:33

## 2017-02-01 RX ADMIN — POLYETHYLENE GLYCOL 3350 17 GRAM(S): 17 POWDER, FOR SOLUTION ORAL at 12:33

## 2017-02-01 RX ADMIN — Medication 3 MILLILITER(S): at 06:31

## 2017-02-01 RX ADMIN — WARFARIN SODIUM 1 MILLIGRAM(S): 2.5 TABLET ORAL at 20:16

## 2017-02-01 RX ADMIN — Medication 100 MILLIGRAM(S): at 06:31

## 2017-02-01 RX ADMIN — LAMOTRIGINE 25 MILLIGRAM(S): 25 TABLET, ORALLY DISINTEGRATING ORAL at 12:33

## 2017-02-01 RX ADMIN — Medication 650 MILLIGRAM(S): at 18:41

## 2017-02-01 RX ADMIN — Medication 3 MILLILITER(S): at 18:41

## 2017-02-01 RX ADMIN — Medication 25 MILLIGRAM(S): at 06:31

## 2017-02-01 RX ADMIN — Medication 25 MILLIGRAM(S): at 18:41

## 2017-02-01 RX ADMIN — TIOTROPIUM BROMIDE 1 CAPSULE(S): 18 CAPSULE ORAL; RESPIRATORY (INHALATION) at 12:33

## 2017-02-01 RX ADMIN — Medication 325 MILLIGRAM(S): at 12:33

## 2017-02-02 VITALS
SYSTOLIC BLOOD PRESSURE: 124 MMHG | RESPIRATION RATE: 18 BRPM | DIASTOLIC BLOOD PRESSURE: 73 MMHG | OXYGEN SATURATION: 94 % | TEMPERATURE: 97 F | HEART RATE: 76 BPM

## 2017-02-02 LAB
INR BLD: 2.18 RATIO — HIGH (ref 0.88–1.16)
PROTHROM AB SERPL-ACNC: 23.7 SEC — HIGH (ref 10–13.1)

## 2017-02-02 PROCEDURE — 83880 ASSAY OF NATRIURETIC PEPTIDE: CPT

## 2017-02-02 PROCEDURE — 99285 EMERGENCY DEPT VISIT HI MDM: CPT | Mod: 25

## 2017-02-02 PROCEDURE — 82435 ASSAY OF BLOOD CHLORIDE: CPT

## 2017-02-02 PROCEDURE — 82947 ASSAY GLUCOSE BLOOD QUANT: CPT

## 2017-02-02 PROCEDURE — 97162 PT EVAL MOD COMPLEX 30 MIN: CPT

## 2017-02-02 PROCEDURE — 82553 CREATINE MB FRACTION: CPT

## 2017-02-02 PROCEDURE — 94640 AIRWAY INHALATION TREATMENT: CPT

## 2017-02-02 PROCEDURE — 80048 BASIC METABOLIC PNL TOTAL CA: CPT

## 2017-02-02 PROCEDURE — 85610 PROTHROMBIN TIME: CPT

## 2017-02-02 PROCEDURE — 84132 ASSAY OF SERUM POTASSIUM: CPT

## 2017-02-02 PROCEDURE — 93005 ELECTROCARDIOGRAM TRACING: CPT

## 2017-02-02 PROCEDURE — 87633 RESP VIRUS 12-25 TARGETS: CPT

## 2017-02-02 PROCEDURE — 81001 URINALYSIS AUTO W/SCOPE: CPT

## 2017-02-02 PROCEDURE — 82330 ASSAY OF CALCIUM: CPT

## 2017-02-02 PROCEDURE — 85014 HEMATOCRIT: CPT

## 2017-02-02 PROCEDURE — 71045 X-RAY EXAM CHEST 1 VIEW: CPT

## 2017-02-02 PROCEDURE — 87086 URINE CULTURE/COLONY COUNT: CPT

## 2017-02-02 PROCEDURE — 80053 COMPREHEN METABOLIC PANEL: CPT

## 2017-02-02 PROCEDURE — 85730 THROMBOPLASTIN TIME PARTIAL: CPT

## 2017-02-02 PROCEDURE — 84484 ASSAY OF TROPONIN QUANT: CPT

## 2017-02-02 PROCEDURE — 85027 COMPLETE CBC AUTOMATED: CPT

## 2017-02-02 PROCEDURE — 87798 DETECT AGENT NOS DNA AMP: CPT

## 2017-02-02 PROCEDURE — 82550 ASSAY OF CK (CPK): CPT

## 2017-02-02 PROCEDURE — 87486 CHLMYD PNEUM DNA AMP PROBE: CPT

## 2017-02-02 PROCEDURE — 87581 M.PNEUMON DNA AMP PROBE: CPT

## 2017-02-02 PROCEDURE — 82803 BLOOD GASES ANY COMBINATION: CPT

## 2017-02-02 PROCEDURE — 87040 BLOOD CULTURE FOR BACTERIA: CPT

## 2017-02-02 PROCEDURE — 84295 ASSAY OF SERUM SODIUM: CPT

## 2017-02-02 PROCEDURE — 83605 ASSAY OF LACTIC ACID: CPT

## 2017-02-02 RX ADMIN — LAMOTRIGINE 25 MILLIGRAM(S): 25 TABLET, ORALLY DISINTEGRATING ORAL at 12:25

## 2017-02-02 RX ADMIN — Medication 1 TABLET(S): at 12:24

## 2017-02-02 RX ADMIN — Medication 2: at 12:39

## 2017-02-02 RX ADMIN — Medication 25 MILLIGRAM(S): at 06:22

## 2017-02-02 RX ADMIN — Medication 40 MILLIGRAM(S): at 12:25

## 2017-02-02 RX ADMIN — Medication 180 MILLIGRAM(S): at 06:21

## 2017-02-02 RX ADMIN — Medication 100 MILLIGRAM(S): at 12:25

## 2017-02-02 RX ADMIN — Medication 325 MILLIGRAM(S): at 12:25

## 2017-02-02 RX ADMIN — Medication 1: at 08:28

## 2017-02-02 RX ADMIN — Medication 1 GRAM(S): at 12:25

## 2017-02-02 RX ADMIN — TIOTROPIUM BROMIDE 1 CAPSULE(S): 18 CAPSULE ORAL; RESPIRATORY (INHALATION) at 12:25

## 2017-02-02 RX ADMIN — POLYETHYLENE GLYCOL 3350 17 GRAM(S): 17 POWDER, FOR SOLUTION ORAL at 12:25

## 2017-02-02 RX ADMIN — PANTOPRAZOLE SODIUM 40 MILLIGRAM(S): 20 TABLET, DELAYED RELEASE ORAL at 06:22

## 2017-02-02 RX ADMIN — Medication 3 MILLILITER(S): at 06:22

## 2017-02-02 RX ADMIN — Medication 100 MILLIGRAM(S): at 06:21

## 2017-02-02 RX ADMIN — Medication 1 GRAM(S): at 06:22

## 2017-02-02 RX ADMIN — Medication 3 MILLILITER(S): at 12:25

## 2017-02-02 NOTE — PHYSICAL THERAPY INITIAL EVALUATION ADULT - PERTINENT HX OF CURRENT PROBLEM, REHAB EVAL
89 yo male present to ED with complaint of SOB. Pt states no improvement with the steroid. +RSV. CT(chest) No pneumonia. Right-sided calcified pleural plaques. Emphysema. Gallstones. Mild L1 compression deformity of indeterminate age.

## 2017-02-02 NOTE — PHYSICAL THERAPY INITIAL EVALUATION ADULT - ADDITIONAL COMMENTS
pt lives in 2nd floor apartment w/ elevator.  Pt report has 24/7 home health aides.  Pt is a community ambulator

## 2017-02-03 LAB
CULTURE RESULTS: SIGNIFICANT CHANGE UP
CULTURE RESULTS: SIGNIFICANT CHANGE UP
SPECIMEN SOURCE: SIGNIFICANT CHANGE UP
SPECIMEN SOURCE: SIGNIFICANT CHANGE UP

## 2017-12-24 ENCOUNTER — INPATIENT (INPATIENT)
Facility: HOSPITAL | Age: 82
LOS: 8 days | Discharge: HOME | DRG: 153 | End: 2018-01-02
Attending: GENERAL ACUTE CARE HOSPITAL | Admitting: GENERAL ACUTE CARE HOSPITAL
Payer: MEDICARE

## 2017-12-24 VITALS
SYSTOLIC BLOOD PRESSURE: 155 MMHG | DIASTOLIC BLOOD PRESSURE: 107 MMHG | TEMPERATURE: 98 F | HEART RATE: 150 BPM | OXYGEN SATURATION: 95 % | RESPIRATION RATE: 36 BRPM | WEIGHT: 143.3 LBS

## 2017-12-24 DIAGNOSIS — J11.1 INFLUENZA DUE TO UNIDENTIFIED INFLUENZA VIRUS WITH OTHER RESPIRATORY MANIFESTATIONS: ICD-10-CM

## 2017-12-24 DIAGNOSIS — Z96.641 PRESENCE OF RIGHT ARTIFICIAL HIP JOINT: Chronic | ICD-10-CM

## 2017-12-24 LAB
ALBUMIN SERPL ELPH-MCNC: 4.3 G/DL — SIGNIFICANT CHANGE UP (ref 3.3–5)
ALP SERPL-CCNC: 96 U/L — SIGNIFICANT CHANGE UP (ref 40–120)
ALT FLD-CCNC: 18 U/L RC — SIGNIFICANT CHANGE UP (ref 10–45)
ANION GAP SERPL CALC-SCNC: 21 MMOL/L — HIGH (ref 5–17)
APPEARANCE UR: ABNORMAL
APTT BLD: 78.9 SEC — HIGH (ref 27.5–37.4)
AST SERPL-CCNC: 32 U/L — SIGNIFICANT CHANGE UP (ref 10–40)
BACTERIA # UR AUTO: ABNORMAL /HPF
BASE EXCESS BLDV CALC-SCNC: -0.2 MMOL/L — SIGNIFICANT CHANGE UP (ref -2–2)
BASOPHILS # BLD AUTO: 0 K/UL — SIGNIFICANT CHANGE UP (ref 0–0.2)
BASOPHILS NFR BLD AUTO: 0 % — SIGNIFICANT CHANGE UP (ref 0–2)
BILIRUB SERPL-MCNC: 0.3 MG/DL — SIGNIFICANT CHANGE UP (ref 0.2–1.2)
BILIRUB UR-MCNC: NEGATIVE — SIGNIFICANT CHANGE UP
BUN SERPL-MCNC: 24 MG/DL — HIGH (ref 7–23)
CA-I SERPL-SCNC: 1.18 MMOL/L — SIGNIFICANT CHANGE UP (ref 1.12–1.3)
CALCIUM SERPL-MCNC: 9.4 MG/DL — SIGNIFICANT CHANGE UP (ref 8.4–10.5)
CHLORIDE BLDV-SCNC: 99 MMOL/L — SIGNIFICANT CHANGE UP (ref 96–108)
CHLORIDE SERPL-SCNC: 94 MMOL/L — LOW (ref 96–108)
CO2 BLDV-SCNC: 27 MMOL/L — SIGNIFICANT CHANGE UP (ref 22–30)
CO2 SERPL-SCNC: 21 MMOL/L — LOW (ref 22–31)
COLOR SPEC: YELLOW — SIGNIFICANT CHANGE UP
COMMENT - URINE: SIGNIFICANT CHANGE UP
CREAT SERPL-MCNC: 1.32 MG/DL — HIGH (ref 0.5–1.3)
DIFF PNL FLD: ABNORMAL
EOSINOPHIL # BLD AUTO: 0 K/UL — SIGNIFICANT CHANGE UP (ref 0–0.5)
EOSINOPHIL NFR BLD AUTO: 0.1 % — SIGNIFICANT CHANGE UP (ref 0–6)
EPI CELLS # UR: SIGNIFICANT CHANGE UP /HPF
FLUAV H1 2009 PAND RNA SPEC QL NAA+PROBE: DETECTED
GAS PNL BLDV: 134 MMOL/L — LOW (ref 136–145)
GAS PNL BLDV: SIGNIFICANT CHANGE UP
GLUCOSE BLDC GLUCOMTR-MCNC: 262 MG/DL — HIGH (ref 70–99)
GLUCOSE BLDV-MCNC: 266 MG/DL — HIGH (ref 70–99)
GLUCOSE SERPL-MCNC: 261 MG/DL — HIGH (ref 70–99)
GLUCOSE UR QL: 50
GRAN CASTS # UR COMP ASSIST: ABNORMAL
HCO3 BLDV-SCNC: 26 MMOL/L — SIGNIFICANT CHANGE UP (ref 21–29)
HCT VFR BLD CALC: 37.1 % — LOW (ref 39–50)
HCT VFR BLDA CALC: 33 % — LOW (ref 39–50)
HGB BLD CALC-MCNC: 10.7 G/DL — LOW (ref 13–17)
HGB BLD-MCNC: 12.1 G/DL — LOW (ref 13–17)
INR BLD: 2.61 RATIO — HIGH (ref 0.88–1.16)
KETONES UR-MCNC: NEGATIVE — SIGNIFICANT CHANGE UP
LACTATE BLDV-MCNC: 2.5 MMOL/L — HIGH (ref 0.7–2)
LEUKOCYTE ESTERASE UR-ACNC: NEGATIVE — SIGNIFICANT CHANGE UP
LYMPHOCYTES # BLD AUTO: 0.5 K/UL — LOW (ref 1–3.3)
LYMPHOCYTES # BLD AUTO: 5.5 % — LOW (ref 13–44)
MCHC RBC-ENTMCNC: 30.4 PG — SIGNIFICANT CHANGE UP (ref 27–34)
MCHC RBC-ENTMCNC: 32.5 GM/DL — SIGNIFICANT CHANGE UP (ref 32–36)
MCV RBC AUTO: 93.6 FL — SIGNIFICANT CHANGE UP (ref 80–100)
MONOCYTES # BLD AUTO: 1 K/UL — HIGH (ref 0–0.9)
MONOCYTES NFR BLD AUTO: 10.9 % — SIGNIFICANT CHANGE UP (ref 2–14)
NEUTROPHILS # BLD AUTO: 7.8 K/UL — HIGH (ref 1.8–7.4)
NEUTROPHILS NFR BLD AUTO: 83.4 % — HIGH (ref 43–77)
NITRITE UR-MCNC: NEGATIVE — SIGNIFICANT CHANGE UP
PCO2 BLDV: 51 MMHG — HIGH (ref 35–50)
PH BLDV: 7.32 — LOW (ref 7.35–7.45)
PH UR: 6 — SIGNIFICANT CHANGE UP (ref 5–8)
PLATELET # BLD AUTO: 436 K/UL — HIGH (ref 150–400)
PO2 BLDV: 38 MMHG — SIGNIFICANT CHANGE UP (ref 25–45)
POTASSIUM BLDV-SCNC: 4.2 MMOL/L — SIGNIFICANT CHANGE UP (ref 3.5–5)
POTASSIUM SERPL-MCNC: 4.5 MMOL/L — SIGNIFICANT CHANGE UP (ref 3.5–5.3)
POTASSIUM SERPL-SCNC: 4.5 MMOL/L — SIGNIFICANT CHANGE UP (ref 3.5–5.3)
PROT SERPL-MCNC: 8.7 G/DL — HIGH (ref 6–8.3)
PROT UR-MCNC: 300 MG/DL
PROTHROM AB SERPL-ACNC: 29 SEC — HIGH (ref 9.8–12.7)
RAPID RVP RESULT: DETECTED
RBC # BLD: 3.97 M/UL — LOW (ref 4.2–5.8)
RBC # FLD: 13.7 % — SIGNIFICANT CHANGE UP (ref 10.3–14.5)
RBC CASTS # UR COMP ASSIST: SIGNIFICANT CHANGE UP /HPF (ref 0–2)
SAO2 % BLDV: 63 % — LOW (ref 67–88)
SODIUM SERPL-SCNC: 136 MMOL/L — SIGNIFICANT CHANGE UP (ref 135–145)
SP GR SPEC: 1.02 — SIGNIFICANT CHANGE UP (ref 1.01–1.02)
UROBILINOGEN FLD QL: NEGATIVE — SIGNIFICANT CHANGE UP
WBC # BLD: 9.4 K/UL — SIGNIFICANT CHANGE UP (ref 3.8–10.5)
WBC # FLD AUTO: 9.4 K/UL — SIGNIFICANT CHANGE UP (ref 3.8–10.5)
WBC UR QL: SIGNIFICANT CHANGE UP /HPF (ref 0–5)

## 2017-12-24 PROCEDURE — 99285 EMERGENCY DEPT VISIT HI MDM: CPT | Mod: 25,GC

## 2017-12-24 PROCEDURE — 93010 ELECTROCARDIOGRAM REPORT: CPT | Mod: GC

## 2017-12-24 PROCEDURE — 71010: CPT | Mod: 26

## 2017-12-24 RX ORDER — DEXTROSE 50 % IN WATER 50 %
25 SYRINGE (ML) INTRAVENOUS ONCE
Qty: 0 | Refills: 0 | Status: DISCONTINUED | OUTPATIENT
Start: 2017-12-24 | End: 2018-01-02

## 2017-12-24 RX ORDER — SODIUM CHLORIDE 9 MG/ML
1000 INJECTION, SOLUTION INTRAVENOUS
Qty: 0 | Refills: 0 | Status: DISCONTINUED | OUTPATIENT
Start: 2017-12-24 | End: 2018-01-02

## 2017-12-24 RX ORDER — INSULIN LISPRO 100/ML
VIAL (ML) SUBCUTANEOUS AT BEDTIME
Qty: 0 | Refills: 0 | Status: DISCONTINUED | OUTPATIENT
Start: 2017-12-24 | End: 2018-01-02

## 2017-12-24 RX ORDER — ATORVASTATIN CALCIUM 80 MG/1
40 TABLET, FILM COATED ORAL AT BEDTIME
Qty: 0 | Refills: 0 | Status: DISCONTINUED | OUTPATIENT
Start: 2017-12-24 | End: 2018-01-02

## 2017-12-24 RX ORDER — SODIUM CHLORIDE 9 MG/ML
3 INJECTION INTRAMUSCULAR; INTRAVENOUS; SUBCUTANEOUS ONCE
Qty: 0 | Refills: 0 | Status: COMPLETED | OUTPATIENT
Start: 2017-12-24 | End: 2017-12-24

## 2017-12-24 RX ORDER — PANTOPRAZOLE SODIUM 20 MG/1
40 TABLET, DELAYED RELEASE ORAL
Qty: 0 | Refills: 0 | Status: DISCONTINUED | OUTPATIENT
Start: 2017-12-24 | End: 2018-01-02

## 2017-12-24 RX ORDER — METOPROLOL TARTRATE 50 MG
25 TABLET ORAL
Qty: 0 | Refills: 0 | Status: DISCONTINUED | OUTPATIENT
Start: 2017-12-24 | End: 2017-12-24

## 2017-12-24 RX ORDER — IPRATROPIUM/ALBUTEROL SULFATE 18-103MCG
3 AEROSOL WITH ADAPTER (GRAM) INHALATION EVERY 6 HOURS
Qty: 0 | Refills: 0 | Status: DISCONTINUED | OUTPATIENT
Start: 2017-12-24 | End: 2017-12-25

## 2017-12-24 RX ORDER — GLUCAGON INJECTION, SOLUTION 0.5 MG/.1ML
1 INJECTION, SOLUTION SUBCUTANEOUS ONCE
Qty: 0 | Refills: 0 | Status: DISCONTINUED | OUTPATIENT
Start: 2017-12-24 | End: 2018-01-02

## 2017-12-24 RX ORDER — SODIUM CHLORIDE 9 MG/ML
500 INJECTION INTRAMUSCULAR; INTRAVENOUS; SUBCUTANEOUS
Qty: 0 | Refills: 0 | Status: COMPLETED | OUTPATIENT
Start: 2017-12-24 | End: 2017-12-24

## 2017-12-24 RX ORDER — DEXTROSE 50 % IN WATER 50 %
1 SYRINGE (ML) INTRAVENOUS ONCE
Qty: 0 | Refills: 0 | Status: DISCONTINUED | OUTPATIENT
Start: 2017-12-24 | End: 2018-01-02

## 2017-12-24 RX ORDER — WARFARIN SODIUM 2.5 MG/1
2 TABLET ORAL ONCE
Qty: 0 | Refills: 0 | Status: COMPLETED | OUTPATIENT
Start: 2017-12-24 | End: 2017-12-24

## 2017-12-24 RX ORDER — INSULIN LISPRO 100/ML
VIAL (ML) SUBCUTANEOUS
Qty: 0 | Refills: 0 | Status: DISCONTINUED | OUTPATIENT
Start: 2017-12-24 | End: 2018-01-02

## 2017-12-24 RX ORDER — LAMOTRIGINE 25 MG/1
25 TABLET, ORALLY DISINTEGRATING ORAL DAILY
Qty: 0 | Refills: 0 | Status: DISCONTINUED | OUTPATIENT
Start: 2017-12-24 | End: 2018-01-02

## 2017-12-24 RX ORDER — DILTIAZEM HCL 120 MG
180 CAPSULE, EXT RELEASE 24 HR ORAL DAILY
Qty: 0 | Refills: 0 | Status: DISCONTINUED | OUTPATIENT
Start: 2017-12-24 | End: 2017-12-28

## 2017-12-24 RX ORDER — ALPRAZOLAM 0.25 MG
0.5 TABLET ORAL DAILY
Qty: 0 | Refills: 0 | Status: DISCONTINUED | OUTPATIENT
Start: 2017-12-24 | End: 2017-12-26

## 2017-12-24 RX ORDER — ACETAMINOPHEN 500 MG
1000 TABLET ORAL ONCE
Qty: 0 | Refills: 0 | Status: COMPLETED | OUTPATIENT
Start: 2017-12-24 | End: 2017-12-24

## 2017-12-24 RX ORDER — DEXTROSE 50 % IN WATER 50 %
12.5 SYRINGE (ML) INTRAVENOUS ONCE
Qty: 0 | Refills: 0 | Status: DISCONTINUED | OUTPATIENT
Start: 2017-12-24 | End: 2018-01-02

## 2017-12-24 RX ADMIN — WARFARIN SODIUM 2 MILLIGRAM(S): 2.5 TABLET ORAL at 22:33

## 2017-12-24 RX ADMIN — SODIUM CHLORIDE 3 MILLILITER(S): 9 INJECTION INTRAMUSCULAR; INTRAVENOUS; SUBCUTANEOUS at 15:43

## 2017-12-24 RX ADMIN — SODIUM CHLORIDE 2000 MILLILITER(S): 9 INJECTION INTRAMUSCULAR; INTRAVENOUS; SUBCUTANEOUS at 15:52

## 2017-12-24 RX ADMIN — Medication 1: at 23:01

## 2017-12-24 RX ADMIN — Medication 25 MILLIGRAM(S): at 15:52

## 2017-12-24 RX ADMIN — Medication 400 MILLIGRAM(S): at 15:17

## 2017-12-24 RX ADMIN — SODIUM CHLORIDE 2000 MILLILITER(S): 9 INJECTION INTRAMUSCULAR; INTRAVENOUS; SUBCUTANEOUS at 16:00

## 2017-12-24 RX ADMIN — SODIUM CHLORIDE 2000 MILLILITER(S): 9 INJECTION INTRAMUSCULAR; INTRAVENOUS; SUBCUTANEOUS at 14:56

## 2017-12-24 RX ADMIN — Medication 1000 MILLIGRAM(S): at 15:10

## 2017-12-24 RX ADMIN — Medication 3 MILLILITER(S): at 18:10

## 2017-12-24 RX ADMIN — Medication 75 MILLIGRAM(S): at 18:29

## 2017-12-24 RX ADMIN — Medication 180 MILLIGRAM(S): at 15:52

## 2017-12-24 RX ADMIN — Medication 125 MILLIGRAM(S): at 18:31

## 2017-12-24 RX ADMIN — SODIUM CHLORIDE 2000 MILLILITER(S): 9 INJECTION INTRAMUSCULAR; INTRAVENOUS; SUBCUTANEOUS at 14:41

## 2017-12-24 RX ADMIN — SODIUM CHLORIDE 2000 MILLILITER(S): 9 INJECTION INTRAMUSCULAR; INTRAVENOUS; SUBCUTANEOUS at 14:26

## 2017-12-24 RX ADMIN — Medication 3 MILLILITER(S): at 23:34

## 2017-12-24 NOTE — ED PROVIDER NOTE - MEDICAL DECISION MAKING DETAILS
Eliz Sunshine MD - Attending Physician: Pt with +Flu today at Urgent care. Sent here for tachycardia. Persistently at 150, Aflutter. Does have prior history. Due to sepsis, will IVF, labs, initially. Will not bolus rate suppression at this time

## 2017-12-24 NOTE — H&P ADULT - HISTORY OF PRESENT ILLNESS
89 year old, copd, atrial fibrillation, diagnosed with influenza A today at urgent care for  symptoms starting 3 days ago. Presenting form urgent care with tachycardia to 150s.   Pt has an element of dementia and is not able to give full hx however reports that he has been feeling weak , and reports palpitations / sob on and off otherwise no CP .  reports cough , no N/V/D   no  sx   no GI complaints   EKG done at urgent pt was seen and examined on   care rendered at that time seems to be sinus tachy  ( background noises noted )

## 2017-12-24 NOTE — ED PROVIDER NOTE - OBJECTIVE STATEMENT
89 year old, copd, atrial fibrillation, diagnosed with influenza A today at urgent care with symptoms starting 3 days ago. Presenting form urgent care with tachycardia to 150s. symptoms include cough, fever. Cardiac meds include cardizem and metoprolol.     PMD: Rissa Link 89 year old, copd, atrial fibrillation, diagnosed with influenza A today at urgent care with symptoms starting 3 days ago. Presenting form urgent care with tachycardia to 150s. symptoms include cough, fever. Cardiac meds include cardizem and metoprolol - did not take these this morning.     PMD: Rissa Link 89 year old, copd, atrial fibrillation, diagnosed with influenza A today at urgent care with symptoms starting 3 days ago. Presenting form urgent care with tachycardia to 150s. symptoms include cough, fever. Cardiac meds include cardizem and metoprolol - did not take these this morning. No formal history of dementia but family stating his mental status has been waxing and waning for the past few days. denies falls. Patient has nursing assistance at home of daytime hours and is alone at night.     PMD: Rissa Link

## 2017-12-24 NOTE — ED PROVIDER NOTE - PHYSICAL EXAMINATION
Vargas: A & O x 3, NAD, HEENT with poor dentition, and no facial asymmetry; lungs with expiratory rhonchi diffusely, heart with tachycardic irreg rhythm; abdomen soft NTND; extremities with no edema; skin with no rashes, neuro exam non focal with no motor or sensory deficits

## 2017-12-24 NOTE — ED PROVIDER NOTE - ATTENDING CONTRIBUTION TO CARE
Eliz Sunshine MD - Attending Physician: I have personally seen and examined this patient with the resident/fellow.  I have fully participated in the care of this patient. I have reviewed all pertinent clinical information, including history, physical exam, plan and the Resident/Fellow’s note and agree except as noted. See MDM

## 2017-12-24 NOTE — H&P ADULT - ASSESSMENT
89 year old, copd, atrial fibrillation, diagnosed with influenza A today at urgent care for  symptoms starting 3 days ago. Presenting form urgent care with tachycardia to 150s.   Pt has an element of dementia and is not able to give full hx however reports that he has been feeling weak , and reports palpitations / sob on and off otherwise no CP .  reports cough , no N/V/D   no  sx   no GI complaints   EKG done at urgent pt was seen and examined on   care rendered at that time seems to be sinus tachy  ( background noises noted )   1- SOB : sec to COPD exacerbation in setting of fluy ..  start nebs and steroids   add tamiflu   frequent suctioning   check pro bnp   2- Hx of afib: not in afib now .. cont rate control and AC   3- HTN cont meds   4- DM: hold po meds and monitor FS   5- HARJEET /met acidosis: likley sec to decreased po /prerenal   .. monitor for now   consider gentle hydration if probnp not elevated

## 2017-12-24 NOTE — ED ADULT NURSE NOTE - OBJECTIVE STATEMENT
88 yo m AAOX4 arrived from triage. c/.o of Tachycardia, SOB Fever, cough, PMH afibb on coumadin. HR at urgent care 150. sent by PCP to ED.  on arrival to ED. reporting cough x1 wk with "white" phlegm. +flu shot this yr. Reports subjective fever and chills. Rectal temp 101.3. Labs and cultures drawn. EKG completed. MD at bedside.

## 2017-12-25 DIAGNOSIS — E05.20 THYROTOXICOSIS WITH TOXIC MULTINODULAR GOITER WITHOUT THYROTOXIC CRISIS OR STORM: ICD-10-CM

## 2017-12-25 DIAGNOSIS — E05.90 THYROTOXICOSIS, UNSPECIFIED WITHOUT THYROTOXIC CRISIS OR STORM: ICD-10-CM

## 2017-12-25 DIAGNOSIS — J44.9 CHRONIC OBSTRUCTIVE PULMONARY DISEASE, UNSPECIFIED: ICD-10-CM

## 2017-12-25 DIAGNOSIS — E11.9 TYPE 2 DIABETES MELLITUS WITHOUT COMPLICATIONS: ICD-10-CM

## 2017-12-25 DIAGNOSIS — J11.1 INFLUENZA DUE TO UNIDENTIFIED INFLUENZA VIRUS WITH OTHER RESPIRATORY MANIFESTATIONS: ICD-10-CM

## 2017-12-25 DIAGNOSIS — I48.91 UNSPECIFIED ATRIAL FIBRILLATION: ICD-10-CM

## 2017-12-25 DIAGNOSIS — E11.65 TYPE 2 DIABETES MELLITUS WITH HYPERGLYCEMIA: ICD-10-CM

## 2017-12-25 LAB
ALBUMIN SERPL ELPH-MCNC: 3.4 G/DL — SIGNIFICANT CHANGE UP (ref 3.3–5)
ALP SERPL-CCNC: 79 U/L — SIGNIFICANT CHANGE UP (ref 40–120)
ALT FLD-CCNC: 15 U/L — SIGNIFICANT CHANGE UP (ref 10–45)
ANION GAP SERPL CALC-SCNC: 14 MMOL/L — SIGNIFICANT CHANGE UP (ref 5–17)
ANION GAP SERPL CALC-SCNC: 14 MMOL/L — SIGNIFICANT CHANGE UP (ref 5–17)
APTT BLD: 66.2 SEC — HIGH (ref 27.5–37.4)
AST SERPL-CCNC: 29 U/L — SIGNIFICANT CHANGE UP (ref 10–40)
BASOPHILS # BLD AUTO: 0 K/UL — SIGNIFICANT CHANGE UP (ref 0–0.2)
BASOPHILS NFR BLD AUTO: 0 % — SIGNIFICANT CHANGE UP (ref 0–2)
BILIRUB SERPL-MCNC: 0.2 MG/DL — SIGNIFICANT CHANGE UP (ref 0.2–1.2)
BUN SERPL-MCNC: 27 MG/DL — HIGH (ref 7–23)
BUN SERPL-MCNC: 33 MG/DL — HIGH (ref 7–23)
CALCIUM SERPL-MCNC: 8.9 MG/DL — SIGNIFICANT CHANGE UP (ref 8.4–10.5)
CALCIUM SERPL-MCNC: 9.2 MG/DL — SIGNIFICANT CHANGE UP (ref 8.4–10.5)
CHLORIDE SERPL-SCNC: 102 MMOL/L — SIGNIFICANT CHANGE UP (ref 96–108)
CHLORIDE SERPL-SCNC: 98 MMOL/L — SIGNIFICANT CHANGE UP (ref 96–108)
CO2 SERPL-SCNC: 24 MMOL/L — SIGNIFICANT CHANGE UP (ref 22–31)
CO2 SERPL-SCNC: 24 MMOL/L — SIGNIFICANT CHANGE UP (ref 22–31)
CREAT SERPL-MCNC: 1.19 MG/DL — SIGNIFICANT CHANGE UP (ref 0.5–1.3)
CREAT SERPL-MCNC: 1.24 MG/DL — SIGNIFICANT CHANGE UP (ref 0.5–1.3)
CULTURE RESULTS: NO GROWTH — SIGNIFICANT CHANGE UP
EOSINOPHIL # BLD AUTO: 0 K/UL — SIGNIFICANT CHANGE UP (ref 0–0.5)
EOSINOPHIL NFR BLD AUTO: 0 % — SIGNIFICANT CHANGE UP (ref 0–6)
GLUCOSE BLDC GLUCOMTR-MCNC: 213 MG/DL — HIGH (ref 70–99)
GLUCOSE BLDC GLUCOMTR-MCNC: 228 MG/DL — HIGH (ref 70–99)
GLUCOSE BLDC GLUCOMTR-MCNC: 355 MG/DL — HIGH (ref 70–99)
GLUCOSE BLDC GLUCOMTR-MCNC: 368 MG/DL — HIGH (ref 70–99)
GLUCOSE SERPL-MCNC: 220 MG/DL — HIGH (ref 70–99)
GLUCOSE SERPL-MCNC: 226 MG/DL — HIGH (ref 70–99)
HBA1C BLD-MCNC: 6.5 % — HIGH (ref 4–5.6)
HCT VFR BLD CALC: 31.5 % — LOW (ref 39–50)
HGB BLD-MCNC: 10 G/DL — LOW (ref 13–17)
IMM GRANULOCYTES NFR BLD AUTO: 0 % — SIGNIFICANT CHANGE UP (ref 0–1.5)
INR BLD: 2.71 RATIO — HIGH (ref 0.88–1.16)
LYMPHOCYTES # BLD AUTO: 0.35 K/UL — LOW (ref 1–3.3)
LYMPHOCYTES # BLD AUTO: 8.3 % — LOW (ref 13–44)
MAGNESIUM SERPL-MCNC: 2 MG/DL — SIGNIFICANT CHANGE UP (ref 1.6–2.6)
MCHC RBC-ENTMCNC: 28.9 PG — SIGNIFICANT CHANGE UP (ref 27–34)
MCHC RBC-ENTMCNC: 31.7 GM/DL — LOW (ref 32–36)
MCV RBC AUTO: 91 FL — SIGNIFICANT CHANGE UP (ref 80–100)
MONOCYTES # BLD AUTO: 0.2 K/UL — SIGNIFICANT CHANGE UP (ref 0–0.9)
MONOCYTES NFR BLD AUTO: 4.8 % — SIGNIFICANT CHANGE UP (ref 2–14)
NEUTROPHILS # BLD AUTO: 3.66 K/UL — SIGNIFICANT CHANGE UP (ref 1.8–7.4)
NEUTROPHILS NFR BLD AUTO: 86.9 % — HIGH (ref 43–77)
PHOSPHATE SERPL-MCNC: 2.6 MG/DL — SIGNIFICANT CHANGE UP (ref 2.5–4.5)
PLATELET # BLD AUTO: 368 K/UL — SIGNIFICANT CHANGE UP (ref 150–400)
POTASSIUM SERPL-MCNC: 4.3 MMOL/L — SIGNIFICANT CHANGE UP (ref 3.5–5.3)
POTASSIUM SERPL-MCNC: 4.4 MMOL/L — SIGNIFICANT CHANGE UP (ref 3.5–5.3)
POTASSIUM SERPL-SCNC: 4.3 MMOL/L — SIGNIFICANT CHANGE UP (ref 3.5–5.3)
POTASSIUM SERPL-SCNC: 4.4 MMOL/L — SIGNIFICANT CHANGE UP (ref 3.5–5.3)
PROT SERPL-MCNC: 7.2 G/DL — SIGNIFICANT CHANGE UP (ref 6–8.3)
PROTHROM AB SERPL-ACNC: 31.3 SEC — HIGH (ref 10–13.1)
RBC # BLD: 3.46 M/UL — LOW (ref 4.2–5.8)
RBC # FLD: 15.1 % — HIGH (ref 10.3–14.5)
SODIUM SERPL-SCNC: 136 MMOL/L — SIGNIFICANT CHANGE UP (ref 135–145)
SODIUM SERPL-SCNC: 140 MMOL/L — SIGNIFICANT CHANGE UP (ref 135–145)
SPECIMEN SOURCE: SIGNIFICANT CHANGE UP
T4 FREE SERPL-MCNC: 1 NG/DL — SIGNIFICANT CHANGE UP (ref 0.9–1.8)
TSH SERPL-MCNC: 0.69 UIU/ML — SIGNIFICANT CHANGE UP (ref 0.27–4.2)
WBC # BLD: 4.21 K/UL — SIGNIFICANT CHANGE UP (ref 3.8–10.5)
WBC # FLD AUTO: 4.21 K/UL — SIGNIFICANT CHANGE UP (ref 3.8–10.5)

## 2017-12-25 PROCEDURE — 93010 ELECTROCARDIOGRAM REPORT: CPT

## 2017-12-25 RX ORDER — WARFARIN SODIUM 2.5 MG/1
2 TABLET ORAL ONCE
Qty: 0 | Refills: 0 | Status: COMPLETED | OUTPATIENT
Start: 2017-12-25 | End: 2017-12-25

## 2017-12-25 RX ORDER — LEVALBUTEROL 1.25 MG/.5ML
0.63 SOLUTION, CONCENTRATE RESPIRATORY (INHALATION) EVERY 6 HOURS
Qty: 0 | Refills: 0 | Status: DISCONTINUED | OUTPATIENT
Start: 2017-12-25 | End: 2018-01-01

## 2017-12-25 RX ORDER — INSULIN LISPRO 100/ML
5 VIAL (ML) SUBCUTANEOUS
Qty: 0 | Refills: 0 | Status: DISCONTINUED | OUTPATIENT
Start: 2017-12-25 | End: 2017-12-26

## 2017-12-25 RX ORDER — SENNA PLUS 8.6 MG/1
2 TABLET ORAL AT BEDTIME
Qty: 0 | Refills: 0 | Status: DISCONTINUED | OUTPATIENT
Start: 2017-12-25 | End: 2018-01-02

## 2017-12-25 RX ORDER — AMIODARONE HYDROCHLORIDE 400 MG/1
150 TABLET ORAL ONCE
Qty: 0 | Refills: 0 | Status: DISCONTINUED | OUTPATIENT
Start: 2017-12-25 | End: 2017-12-25

## 2017-12-25 RX ORDER — IPRATROPIUM BROMIDE 0.2 MG/ML
500 SOLUTION, NON-ORAL INHALATION EVERY 6 HOURS
Qty: 0 | Refills: 0 | Status: DISCONTINUED | OUTPATIENT
Start: 2017-12-25 | End: 2018-01-01

## 2017-12-25 RX ORDER — DOCUSATE SODIUM 100 MG
100 CAPSULE ORAL
Qty: 0 | Refills: 0 | Status: DISCONTINUED | OUTPATIENT
Start: 2017-12-25 | End: 2018-01-02

## 2017-12-25 RX ORDER — INSULIN GLARGINE 100 [IU]/ML
15 INJECTION, SOLUTION SUBCUTANEOUS AT BEDTIME
Qty: 0 | Refills: 0 | Status: DISCONTINUED | OUTPATIENT
Start: 2017-12-25 | End: 2017-12-26

## 2017-12-25 RX ADMIN — Medication 500 MICROGRAM(S): at 17:03

## 2017-12-25 RX ADMIN — Medication 3 MILLILITER(S): at 05:12

## 2017-12-25 RX ADMIN — Medication 500 MICROGRAM(S): at 13:47

## 2017-12-25 RX ADMIN — Medication 3 MILLILITER(S): at 10:50

## 2017-12-25 RX ADMIN — INSULIN GLARGINE 15 UNIT(S): 100 INJECTION, SOLUTION SUBCUTANEOUS at 22:19

## 2017-12-25 RX ADMIN — Medication 2: at 08:45

## 2017-12-25 RX ADMIN — Medication 5: at 13:39

## 2017-12-25 RX ADMIN — Medication 180 MILLIGRAM(S): at 05:12

## 2017-12-25 RX ADMIN — Medication 30 MILLIGRAM(S): at 05:13

## 2017-12-25 RX ADMIN — Medication 40 MILLIGRAM(S): at 05:13

## 2017-12-25 RX ADMIN — Medication 5: at 17:03

## 2017-12-25 RX ADMIN — Medication 30 MILLIGRAM(S): at 17:03

## 2017-12-25 RX ADMIN — Medication 40 MILLIGRAM(S): at 22:19

## 2017-12-25 RX ADMIN — ATORVASTATIN CALCIUM 40 MILLIGRAM(S): 80 TABLET, FILM COATED ORAL at 22:12

## 2017-12-25 RX ADMIN — Medication 500 MICROGRAM(S): at 23:45

## 2017-12-25 RX ADMIN — WARFARIN SODIUM 2 MILLIGRAM(S): 2.5 TABLET ORAL at 22:12

## 2017-12-25 RX ADMIN — LAMOTRIGINE 25 MILLIGRAM(S): 25 TABLET, ORALLY DISINTEGRATING ORAL at 13:40

## 2017-12-25 RX ADMIN — Medication 40 MILLIGRAM(S): at 13:40

## 2017-12-25 RX ADMIN — PANTOPRAZOLE SODIUM 40 MILLIGRAM(S): 20 TABLET, DELAYED RELEASE ORAL at 07:02

## 2017-12-25 RX ADMIN — LEVALBUTEROL 0.63 MILLIGRAM(S): 1.25 SOLUTION, CONCENTRATE RESPIRATORY (INHALATION) at 18:59

## 2017-12-25 RX ADMIN — Medication 100 MILLIGRAM(S): at 17:03

## 2017-12-25 NOTE — CONSULT NOTE ADULT - SUBJECTIVE AND OBJECTIVE BOX
HPI:  89 year old, copd, atrial fibrillation, diagnosed with influenza A today at urgent care for  symptoms starting 3 days ago. Presenting form urgent care with tachycardia to 150s.   Pt has an element of dementia and is not able to give full hx however reports that he has been feeling weak , and reports palpitations / sob on and off otherwise no CP .  reports cough , no N/V/D   no  sx   no GI complaints   EKG done at urgent pt was seen and examined on   care rendered at that time seems to be sinus tachy  ( background noises noted ) (24 Dec 2017 22:19)      PAST MEDICAL & SURGICAL HISTORY:  Diabetes type 2, controlled  COPD (chronic obstructive pulmonary disease)  Atrial fibrillation  History of total hip arthroplasty, right      FAMILY HISTORY:  No pertinent family history in first degree relatives      Social History:    Marital Status:  (   )    (   ) Single    (   )    (  )   Occupation:   Lives with: (  ) alone  (  ) children   (  ) spouse   (  ) parents  (  ) other    Substance Use (street drugs): (  ) never used  (  ) other:  Tobacco Usage:  (   ) never smoked   (   ) former smoker   (   ) current smoker  (     ) pack year  (        ) last cigarette date  Alcohol Usage:      Allergies    No Known Allergies    Intolerances        MEDICATIONS  (STANDING):  atorvastatin 40 milliGRAM(s) Oral at bedtime  dextrose 5%. 1000 milliLiter(s) (50 mL/Hr) IV Continuous <Continuous>  dextrose 50% Injectable 12.5 Gram(s) IV Push once  dextrose 50% Injectable 25 Gram(s) IV Push once  dextrose 50% Injectable 25 Gram(s) IV Push once  diltiazem    milliGRAM(s) Oral daily  docusate sodium 100 milliGRAM(s) Oral two times a day  insulin lispro (HumaLOG) corrective regimen sliding scale   SubCutaneous three times a day before meals  insulin lispro (HumaLOG) corrective regimen sliding scale   SubCutaneous at bedtime  ipratropium    for Nebulization 500 MICROGram(s) Nebulizer every 6 hours  lamoTRIgine 25 milliGRAM(s) Oral daily  levalbuterol Inhalation 0.63 milliGRAM(s) Inhalation every 6 hours  methimazole 5 milliGRAM(s) Oral daily  methylPREDNISolone sodium succinate Injectable 40 milliGRAM(s) IV Push every 8 hours  oseltamivir 30 milliGRAM(s) Oral two times a day  pantoprazole    Tablet 40 milliGRAM(s) Oral before breakfast  PARoxetine 40 milliGRAM(s) Oral daily  senna 2 Tablet(s) Oral at bedtime  warfarin 2 milliGRAM(s) Oral once    MEDICATIONS  (PRN):  ALPRAZolam 0.5 milliGRAM(s) Oral daily PRN anxiety  dextrose Gel 1 Dose(s) Oral once PRN Blood Glucose LESS THAN 70 milliGRAM(s)/deciliter  glucagon  Injectable 1 milliGRAM(s) IntraMuscular once PRN Glucose LESS THAN 70 milligrams/deciliter        LABS:    CBC Full  -  ( 25 Dec 2017 08:22 )  WBC Count : 4.21 K/uL  Hemoglobin : 10.0 g/dL  Hematocrit : 31.5 %  Platelet Count - Automated : 368 K/uL  Mean Cell Volume : 91.0 fl  Mean Cell Hemoglobin : 28.9 pg  Mean Cell Hemoglobin Concentration : 31.7 gm/dL  Auto Neutrophil # : 3.66 K/uL  Auto Lymphocyte # : 0.35 K/uL  Auto Monocyte # : 0.20 K/uL  Auto Eosinophil # : 0.00 K/uL  Auto Basophil # : 0.00 K/uL  Auto Neutrophil % : 86.9 %  Auto Lymphocyte % : 8.3 %  Auto Monocyte % : 4.8 %  Auto Eosinophil % : 0.0 %  Auto Basophil % : 0.0 %        140  |  102  |  27<H>  ----------------------------<  220<H>  4.4   |  24  |  1.24    Ca    8.9      25 Dec 2017 08:16    TPro  7.2  /  Alb  3.4  /  TBili  0.2  /  DBili  x   /  AST  29  /  ALT  15  /  AlkPhos  79  12    PT/INR - ( 25 Dec 2017 08:23 )   PT: 31.3 sec;   INR: 2.71 ratio         PTT - ( 25 Dec 2017 08:23 )  PTT:66.2 sec    Urinalysis Basic - ( 24 Dec 2017 19:55 )    Color: Yellow / Appearance: SL Turbid / S.023 / pH: x  Gluc: x / Ketone: Negative  / Bili: Negative / Urobili: Negative   Blood: x / Protein: 300 mg/dL / Nitrite: Negative   Leuk Esterase: Negative / RBC: 3-5 /HPF / WBC 3-5 /HPF   Sq Epi: x / Non Sq Epi: OCC /HPF / Bacteria: Few /HPF      Blood Gas Venous - Lactate: 2.5 mmoL/L ( @ 14:42)              Cultures:  RECENT CULTURES:          Imaging Studies:    Vital Signs:   Vital Signs Last 24 Hrs  T(C): 36.9 (17 @ 14:44), Max: 36.9 (17 @ 14:44)  T(F): 98.4 (17 @ 14:44), Max: 98.4 (17 @ 14:44)  HR: 82 (17 @ 14:44) (70 - 82)  BP: 145/60 (17 @ 14:44) (122/76 - 152/71)  BP(mean): --  RR: 16 (17 @ 14:44) (16 - 22)  SpO2: 96% (17 @ 14:44) (94% - 100%) HPI:  89 year old male with copd, atrial fibrillation, diagnosed with influenza A at urgent care for  symptoms starting 3 days ago. Presenting form urgent care with tachycardia to 150s.   Pt has mild dementia and is not able to give full hx however reports that he has been feeling weak , and reports palpitations, sob on and off, no CP .  Reports cough , no N/V/D   RVP confirmed Influenza A.  with lactate 2.5.  No PNA on CXR. Given Tamiflu.   BC, UC NTD.      PAST MEDICAL & SURGICAL HISTORY:  Diabetes type 2, controlled  COPD (chronic obstructive pulmonary disease)  Atrial fibrillation  History of total hip arthroplasty, right      Family Hx: Non-contributory review     Social Hx: No smoking, no ETOH, from home.      Allergies    No Known Allergies      MEDICATIONS  (STANDING):  atorvastatin 40 milliGRAM(s) Oral at bedtime  dextrose 5%. 1000 milliLiter(s) (50 mL/Hr) IV Continuous <Continuous>  dextrose 50% Injectable 12.5 Gram(s) IV Push once  dextrose 50% Injectable 25 Gram(s) IV Push once  dextrose 50% Injectable 25 Gram(s) IV Push once  diltiazem    milliGRAM(s) Oral daily  docusate sodium 100 milliGRAM(s) Oral two times a day  insulin lispro (HumaLOG) corrective regimen sliding scale   SubCutaneous three times a day before meals  insulin lispro (HumaLOG) corrective regimen sliding scale   SubCutaneous at bedtime  ipratropium    for Nebulization 500 MICROGram(s) Nebulizer every 6 hours  lamoTRIgine 25 milliGRAM(s) Oral daily  levalbuterol Inhalation 0.63 milliGRAM(s) Inhalation every 6 hours  methimazole 5 milliGRAM(s) Oral daily  methylPREDNISolone sodium succinate Injectable 40 milliGRAM(s) IV Push every 8 hours  oseltamivir 30 milliGRAM(s) Oral two times a day  pantoprazole    Tablet 40 milliGRAM(s) Oral before breakfast  PARoxetine 40 milliGRAM(s) Oral daily  senna 2 Tablet(s) Oral at bedtime  warfarin 2 milliGRAM(s) Oral once    MEDICATIONS  (PRN):  ALPRAZolam 0.5 milliGRAM(s) Oral daily PRN anxiety  dextrose Gel 1 Dose(s) Oral once PRN Blood Glucose LESS THAN 70 milliGRAM(s)/deciliter  glucagon  Injectable 1 milliGRAM(s) IntraMuscular once PRN Glucose LESS THAN 70 milligrams/deciliter        LABS:    CBC Full  -  ( 25 Dec 2017 08:22 )  WBC Count : 4.21 K/uL  Hemoglobin : 10.0 g/dL  Hematocrit : 31.5 %  Platelet Count - Automated : 368 K/uL  Mean Cell Volume : 91.0 fl  Mean Cell Hemoglobin : 28.9 pg  Mean Cell Hemoglobin Concentration : 31.7 gm/dL  Auto Neutrophil # : 3.66 K/uL  Auto Lymphocyte # : 0.35 K/uL  Auto Monocyte # : 0.20 K/uL  Auto Eosinophil # : 0.00 K/uL  Auto Basophil # : 0.00 K/uL  Auto Neutrophil % : 86.9 %  Auto Lymphocyte % : 8.3 %  Auto Monocyte % : 4.8 %  Auto Eosinophil % : 0.0 %  Auto Basophil % : 0.0 %        140  |  102  |  27<H>  ----------------------------<  220<H>  4.4   |  24  |  1.24    Ca    8.9      25 Dec 2017 08:16    TPro  7.2  /  Alb  3.4  /  TBili  0.2  /  DBili  x   /  AST  29  /  ALT  15  /  AlkPhos  79      PT/INR - ( 25 Dec 2017 08:23 )   PT: 31.3 sec;   INR: 2.71 ratio         PTT - ( 25 Dec 2017 08:23 )  PTT:66.2 sec    Urinalysis Basic - ( 24 Dec 2017 19:55 )    Color: Yellow / Appearance: SL Turbid / S.023 / pH: x  Gluc: x / Ketone: Negative  / Bili: Negative / Urobili: Negative   Blood: x / Protein: 300 mg/dL / Nitrite: Negative   Leuk Esterase: Negative / RBC: 3-5 /HPF / WBC 3-5 /HPF   Sq Epi: x / Non Sq Epi: OCC /HPF / Bacteria: Few /HPF    BC, UC NTD.   Blood Gas Venous - Lactate: 2.5 mmoL/L ( @ 14:42)    Imaging Studies: CXR:   No focal consolidations. Trace right pleural effusion.  COPD.    Vital Signs:   Vital Signs Last 24 Hrs  T(C): 36.9 (17 @ 14:44), Max: 36.9 (17 @ 14:44)  T(F): 98.4 (12-25-17 @ 14:44), Max: 98.4 (12-25-17 @ 14:44)  HR: 82 (12-25-17 @ 14:44) (70 - 82)  BP: 145/60 (12-25-17 @ 14:44) (122/76 - 152/71)  RR: 16 (-25-17 @ 14:44) (16 - 22)  SpO2: 96% (-25-17 @ 14:44) (94% - 100%)

## 2017-12-25 NOTE — CONSULT NOTE ADULT - SUBJECTIVE AND OBJECTIVE BOX
Chart reviewed and patient seen by undersigned    Asked to consult for hallucinations    Historians include chart, the pt. (a poor historian), and the pt.'s daughter Jackei ([phone])    History of Present Illness  The patient is a 89 year old WM with history of Bipolar Disorder and Anxiety NOS. He was admitted here yesterday with the flu, dyspnea due to flu and COPD, tachycardia (heart rate in 150s).  Two days ago he had a change of mental status ("hallucinated" per the daughter) in which he felt one third of the bed was missing. He has insight into this now. No further MS change. Found to have HARJEET here. Daughter feels his memory is "slipping a little bit recently." He has aid at home and needs assistance with bill paying.     Past Psychiatric History  Bipolar Disorder. On Paxil 40mg/day, Lamictal 25mg/day, and Xanax 0.50mg prn (not every day) for years. Sees a psychiatrist at the [facility] but pt. and daughter cannot recall the name of that psychiatrist. He has had suicidal thoughts in the past but no attempts. Chronic anxiety. Has a history of delirium after hip surgery and UTI.    Psychosocial History  Retired . Has aid 7 days a week. No cigarettes/ETOH/illicit drugs.     PAST MEDICAL & SURGICAL HISTORY:  Diabetes type 2, controlled  COPD (chronic obstructive pulmonary disease)  Atrial fibrillation  History of total hip arthroplasty, right      FAMILY HISTORY:  No pertinent family history in first degree relatives      Vital Signs Last 24 Hrs  T(C): 36.6 (25 Dec 2017 11:18), Max: 38.5 (24 Dec 2017 13:52)  T(F): 97.9 (25 Dec 2017 11:18), Max: 101.3 (24 Dec 2017 13:52)  HR: 74 (25 Dec 2017 11:18) (70 - 150)  BP: 152/71 (25 Dec 2017 11:18) (113/79 - 155/107)  BP(mean): --  RR: 18 (25 Dec 2017 11:18) (18 - 36)  SpO2: 97% (25 Dec 2017 11:18) (94% - 100%)                          10.0   4.21  )-----------( 368      ( 25 Dec 2017 08:22 )             31.5       12-25    140  |  102  |  27<H>  ----------------------------<  220<H>  4.4   |  24  |  1.24    Ca    8.9      25 Dec 2017 08:16    TPro  7.2  /  Alb  3.4  /  TBili  0.2  /  DBili  x   /  AST  29  /  ALT  15  /  AlkPhos  79  12-25      Urinalysis + Microscopic Examination (12.24.17 @ 19:55)    Granular Cast: 2-5    Glucose Qualitative, Urine: 50    Blood, Urine: Trace    Urine Appearance: SL Turbid    Bilirubin: Negative    Color: Yellow    Urobilinogen: Negative    Specific Gravity: 1.023    Protein, Urine: 300 mg/dL    pH Urine: 6.0    Leukocyte Esterase Concentration: Negative    Nitrite: Negative    Ketone - Urine: Negative    Red Blood Cell - Urine: 3-5 /HPF    White Blood Cell - Urine: 3-5 /HPF    Epithelial Cells: OCC /HPF    Bacteria: Few /HPF    Comment - Urine: FEW MUCOUS THREADS          MEDICATIONS  (STANDING):  atorvastatin 40 milliGRAM(s) Oral at bedtime  dextrose 5%. 1000 milliLiter(s) (50 mL/Hr) IV Continuous <Continuous>  dextrose 50% Injectable 12.5 Gram(s) IV Push once  dextrose 50% Injectable 25 Gram(s) IV Push once  dextrose 50% Injectable 25 Gram(s) IV Push once  diltiazem    milliGRAM(s) Oral daily  docusate sodium 100 milliGRAM(s) Oral two times a day  insulin lispro (HumaLOG) corrective regimen sliding scale   SubCutaneous three times a day before meals  insulin lispro (HumaLOG) corrective regimen sliding scale   SubCutaneous at bedtime  ipratropium    for Nebulization 500 MICROGram(s) Nebulizer every 6 hours  lamoTRIgine 25 milliGRAM(s) Oral daily  levalbuterol Inhalation 0.63 milliGRAM(s) Inhalation every 6 hours  methimazole 5 milliGRAM(s) Oral daily  methylPREDNISolone sodium succinate Injectable 40 milliGRAM(s) IV Push every 8 hours  oseltamivir 30 milliGRAM(s) Oral two times a day  pantoprazole    Tablet 40 milliGRAM(s) Oral before breakfast  PARoxetine 40 milliGRAM(s) Oral daily  senna 2 Tablet(s) Oral at bedtime  warfarin 2 milliGRAM(s) Oral once    MEDICATIONS  (PRN):  ALPRAZolam 0.5 milliGRAM(s) Oral daily PRN anxiety  dextrose Gel 1 Dose(s) Oral once PRN Blood Glucose LESS THAN 70 milliGRAM(s)/deciliter  glucagon  Injectable 1 milliGRAM(s) IntraMuscular once PRN Glucose LESS THAN 70 milligrams/deciliter      Elderly WM in bed, calm, cooperative, alert and oriented x 2-3 (not to hospital name) .  No psychomotor abnormalities. Insight and judgment are fair. Speech is vague at times with normal rate and volume. No hallucinations nor delusions. The patient denied suicidal and homicidal ideation and plan. Mood is euthymic and affect constricted. Attention and concentration wnl but reduced short term memory and long term memory. He cannot recall his psychiatrist's name, the reason for taking Lamotrigine, nor his hip surgery. He can name the President of the [place].     Suicidal risk assessment  Risk factors include being an elderly WM with confusion and mood disorder  Protective factors include no plan, no past attempts, no guns, no substance abuse, good social supports

## 2017-12-25 NOTE — CONSULT NOTE ADULT - SUBJECTIVE AND OBJECTIVE BOX
HPI:  89 year old, copd, atrial fibrillation, diagnosed with influenza A today at urgent care for  symptoms starting 3 days ago. Presenting form urgent care with tachycardia to 150s.   Pt has an element of dementia and is not able to give full hx however reports that he has been feeling weak , and reports palpitations / sob on and off otherwise no CP .  reports cough , no N/V/D   no  sx   no GI complaints   EKG done at urgent pt was seen and examined on   care rendered at that time seems to be sinus tachy  ( background noises noted ) (24 Dec 2017 22:19)      Admit Diagnosis  Influenza due to unidentified influenza virus with other respiratory manifestation      ENDOCRINE HPI: 88 y/o Type 2 diabetes COPD admitted with Influenza A, started on high dose steroids.  Type 2 diabetes treated with glimepiride 1 mg daily. FS randomly at home, however HbA1c 6.7% on admit.  Patient started on high dose steroids with hyperglycemia.  Patient afebrile, good PO diet.    Patient treated with tapazol 5 mg daily for hyperthyroidism Compliant with treatment, but complaints that he could not gain weight after hip replacement last year. No palpitations or tremor. No anxiety.    PAST MEDICAL & SURGICAL HISTORY:  Diabetes type 2, controlled  COPD (chronic obstructive pulmonary disease)  Atrial fibrillation  History of total hip arthroplasty, right      FAMILY HISTORY:  No pertinent family history in first degree relatives      Social History:    Outpatient Medications:    MEDICATIONS  (STANDING):  atorvastatin 40 milliGRAM(s) Oral at bedtime  dextrose 5%. 1000 milliLiter(s) (50 mL/Hr) IV Continuous <Continuous>  dextrose 50% Injectable 12.5 Gram(s) IV Push once  dextrose 50% Injectable 25 Gram(s) IV Push once  dextrose 50% Injectable 25 Gram(s) IV Push once  diltiazem    milliGRAM(s) Oral daily  docusate sodium 100 milliGRAM(s) Oral two times a day  insulin lispro (HumaLOG) corrective regimen sliding scale   SubCutaneous three times a day before meals  insulin lispro (HumaLOG) corrective regimen sliding scale   SubCutaneous at bedtime  ipratropium    for Nebulization 500 MICROGram(s) Nebulizer every 6 hours  lamoTRIgine 25 milliGRAM(s) Oral daily  levalbuterol Inhalation 0.63 milliGRAM(s) Inhalation every 6 hours  methimazole 5 milliGRAM(s) Oral daily  methylPREDNISolone sodium succinate Injectable 40 milliGRAM(s) IV Push every 8 hours  oseltamivir 30 milliGRAM(s) Oral two times a day  pantoprazole    Tablet 40 milliGRAM(s) Oral before breakfast  PARoxetine 40 milliGRAM(s) Oral daily  senna 2 Tablet(s) Oral at bedtime  warfarin 2 milliGRAM(s) Oral once    MEDICATIONS  (PRN):  ALPRAZolam 0.5 milliGRAM(s) Oral daily PRN anxiety  dextrose Gel 1 Dose(s) Oral once PRN Blood Glucose LESS THAN 70 milliGRAM(s)/deciliter  glucagon  Injectable 1 milliGRAM(s) IntraMuscular once PRN Glucose LESS THAN 70 milligrams/deciliter      Allergies    No Known Allergies    Intolerances        Review of Systems:  Constitutional: fever, no chills, SOB  Eyes: No blurry vision  Neuro: No tremors  HEENT: No pain  Cardiovascular: No chest pain, palpitations  Respiratory: SOB, cough, wheezing  GI: No nausea, vomiting, abdominal pain  : No dysuria  Skin: no rash  Psych: no depression  Endocrine: no tremor, no anxiety    ALL OTHER SYSTEMS REVIEWED AND NEGATIVE    PHYSICAL EXAM:  VITALS: T(C): 36.7 (12-25-17 @ 19:31)  T(F): 98 (12-25-17 @ 19:31), Max: 98.4 (12-25-17 @ 14:44)  HR: 104 (12-25-17 @ 19:31) (70 - 104)  BP: 144/62 (12-25-17 @ 19:31) (122/76 - 152/71)  RR:  (16 - 19)  SpO2:  (94% - 100%)  Wt(lbs): --150  GENERAL: NAD, well-developed  EYES: No proptosis, no lid lag, anicteric  HEENT:  Atraumatic, Normocephalic, moist mucous membranes  THYROID: bilateral nodules, no cervical adenopathy.  RESPIRATORY: Clear to auscultation bilaterally; wheezing, mild rales  CARDIOVASCULAR: Regular rate and rhythm; No murmurs; no peripheral edema  GI: Soft, nontender, non distended, normal bowel sounds  SKIN: Dry, intact, No rashes or lesions  MUSCULOSKELETAL: Full range of motion, normal strength  NEURO: sensation intact, extraocular movements intact, no tremor  PSYCH: Alert and oriented x 3, normal affect, normal mood    POCT Blood Glucose.: 368 mg/dL (12-25-17 @ 16:34)  POCT Blood Glucose.: 355 mg/dL (12-25-17 @ 12:25)  POCT Blood Glucose.: 228 mg/dL (12-25-17 @ 08:01)  POCT Blood Glucose.: 262 mg/dL (12-24-17 @ 22:51)                            10.0   4.21  )-----------( 368      ( 25 Dec 2017 08:22 )             31.5       12-25    140  |  102  |  27<H>  ----------------------------<  220<H>  4.4   |  24  |  1.24    Ca    8.9      25 Dec 2017 08:16    TPro  7.2  /  Alb  3.4  /  TBili  0.2  /  DBili  x   /  AST  29  /  ALT  15  /  AlkPhos  79  12-25      Thyroid Function Tests:  12-25 @ 08:16 TSH 0.69 FreeT4 1.0 T3 -- Anti TPO -- Anti Thyroglobulin Ab -- TSI --      Hemoglobin A1C, Whole Blood: 6.5 % <H> [4.0 - 5.6] (12-25-17 @ 08:22)          Radiology:

## 2017-12-25 NOTE — CONSULT NOTE ADULT - SUBJECTIVE AND OBJECTIVE BOX
CHIEF COMPLAINT:    HISTORY OF PRESENT ILLNESS:    PAST MEDICAL & SURGICAL HISTORY:  Diabetes type 2, controlled  COPD (chronic obstructive pulmonary disease)  Atrial fibrillation  History of total hip arthroplasty, right          MEDICATIONS:  diltiazem    milliGRAM(s) Oral daily    oseltamivir 30 milliGRAM(s) Oral two times a day    ALBUTerol/ipratropium for Nebulization 3 milliLiter(s) Nebulizer every 6 hours    ALPRAZolam 0.5 milliGRAM(s) Oral daily PRN  lamoTRIgine 25 milliGRAM(s) Oral daily  PARoxetine 40 milliGRAM(s) Oral daily    docusate sodium 100 milliGRAM(s) Oral two times a day  pantoprazole    Tablet 40 milliGRAM(s) Oral before breakfast  senna 2 Tablet(s) Oral at bedtime    atorvastatin 40 milliGRAM(s) Oral at bedtime  dextrose 50% Injectable 12.5 Gram(s) IV Push once  dextrose 50% Injectable 25 Gram(s) IV Push once  dextrose 50% Injectable 25 Gram(s) IV Push once  dextrose Gel 1 Dose(s) Oral once PRN  glucagon  Injectable 1 milliGRAM(s) IntraMuscular once PRN  insulin lispro (HumaLOG) corrective regimen sliding scale   SubCutaneous three times a day before meals  insulin lispro (HumaLOG) corrective regimen sliding scale   SubCutaneous at bedtime  methimazole 5 milliGRAM(s) Oral daily  methylPREDNISolone sodium succinate Injectable 40 milliGRAM(s) IV Push every 8 hours    dextrose 5%. 1000 milliLiter(s) IV Continuous <Continuous>      FAMILY HISTORY:  No pertinent family history in first degree relatives      SOCIAL HISTORY:    [ ] Non-smoker  [ ] Smoker  [ ] Alcohol    Allergies    No Known Allergies    Intolerances    	    REVIEW OF SYSTEMS:  CONSTITUTIONAL: No fever, weight loss, or fatigue  EYES: No eye pain, visual disturbances, or discharge  ENMT:  No difficulty hearing, tinnitus, vertigo; No sinus or throat pain  NECK: No pain or stiffness  RESPIRATORY: No cough, wheezing, chills or hemoptysis; No Shortness of Breath  CARDIOVASCULAR: No chest pain, palpitations, passing out, dizziness, or leg swelling  GASTROINTESTINAL: No abdominal or epigastric pain. No nausea, vomiting, or hematemesis; No diarrhea or constipation. No melena or hematochezia.  GENITOURINARY: No dysuria, frequency, hematuria, or incontinence  NEUROLOGICAL: No headaches, memory loss, loss of strength, numbness, or tremors  SKIN: No itching, burning, rashes, or lesions   LYMPH Nodes: No enlarged glands  ENDOCRINE: No heat or cold intolerance; No hair loss  MUSCULOSKELETAL: No joint pain or swelling; No muscle, back, or extremity pain  PSYCHIATRIC: No depression, anxiety, mood swings, or difficulty sleeping  HEME/LYMPH: No easy bruising, or bleeding gums  ALLERY AND IMMUNOLOGIC: No hives or eczema	    [ ] All others negative	  [ ] Unable to obtain    PHYSICAL EXAM:  T(C): 36.4 (12-25-17 @ 05:22), Max: 38.5 (12-24-17 @ 13:52)  HR: 72 (12-25-17 @ 05:22) (70 - 150)  BP: 142/84 (12-25-17 @ 05:22) (113/79 - 155/107)  RR: 19 (12-25-17 @ 05:22) (18 - 36)  SpO2: 94% (12-25-17 @ 05:22) (94% - 100%)  Wt(kg): --  I&O's Summary    24 Dec 2017 07:01  -  25 Dec 2017 07:00  --------------------------------------------------------  IN: 50 mL / OUT: 200 mL / NET: -150 mL        Appearance: Normal	  HEENT:   Normal oral mucosa, PERRL, EOMI	  Lymphatic: No lymphadenopathy  Cardiovascular: Normal S1 S2, No JVD, No murmurs, No edema  Respiratory: Lungs clear to auscultation	  Psychiatry: A & O x 3, Mood & affect appropriate  Gastrointestinal:  Soft, Non-tender, + BS	  Skin: No rashes, No ecchymoses, No cyanosis	  Neurologic: Non-focal  Extremities: Normal range of motion, No clubbing, cyanosis or edema  Vascular: Peripheral pulses palpable 2+ bilaterally    TELEMETRY: 	    ECG:  	  RADIOLOGY:  OTHER: 	  	  LABS:	 	    CARDIAC MARKERS:                                  10.0   4.21  )-----------( 368      ( 25 Dec 2017 08:22 )             31.5     12-25    140  |  102  |  27<H>  ----------------------------<  220<H>  4.4   |  24  |  1.24    Ca    8.9      25 Dec 2017 08:16    TPro  7.2  /  Alb  3.4  /  TBili  0.2  /  DBili  x   /  AST  29  /  ALT  15  /  AlkPhos  79  12-25    proBNP:   Lipid Profile:   HgA1c:   TSH: Thyroid Stimulating Hormone, Serum: 0.69 uIU/mL (12-25 @ 08:16) CHIEF COMPLAINT:  Tachycardia     HISTORY OF PRESENT ILLNESS:   89 year old male diagnosed with influenza A today at urgent care for  symptoms starting 3 days ago. Presenting form urgent care with tachycardia to 150s.   Pt has an element of dementia and is not able to give full hx however reports that he has been feeling weak , and reports palpitations / sob on and off. Also reports episodes of substernal chest tightness with no exacerbating factors.   No recent stress chest.   Daughter at bedside confirms the above information.           PAST MEDICAL & SURGICAL HISTORY:  Diabetes type 2, controlled  COPD (chronic obstructive pulmonary disease)  Atrial fibrillation  History of total hip arthroplasty, right          MEDICATIONS:  diltiazem    milliGRAM(s) Oral daily    oseltamivir 30 milliGRAM(s) Oral two times a day    ALBUTerol/ipratropium for Nebulization 3 milliLiter(s) Nebulizer every 6 hours    ALPRAZolam 0.5 milliGRAM(s) Oral daily PRN  lamoTRIgine 25 milliGRAM(s) Oral daily  PARoxetine 40 milliGRAM(s) Oral daily    docusate sodium 100 milliGRAM(s) Oral two times a day  pantoprazole    Tablet 40 milliGRAM(s) Oral before breakfast  senna 2 Tablet(s) Oral at bedtime    atorvastatin 40 milliGRAM(s) Oral at bedtime  dextrose 50% Injectable 12.5 Gram(s) IV Push once  dextrose 50% Injectable 25 Gram(s) IV Push once  dextrose 50% Injectable 25 Gram(s) IV Push once  dextrose Gel 1 Dose(s) Oral once PRN  glucagon  Injectable 1 milliGRAM(s) IntraMuscular once PRN  insulin lispro (HumaLOG) corrective regimen sliding scale   SubCutaneous three times a day before meals  insulin lispro (HumaLOG) corrective regimen sliding scale   SubCutaneous at bedtime  methimazole 5 milliGRAM(s) Oral daily  methylPREDNISolone sodium succinate Injectable 40 milliGRAM(s) IV Push every 8 hours    dextrose 5%. 1000 milliLiter(s) IV Continuous <Continuous>      FAMILY HISTORY:  No pertinent family history in first degree relatives      SOCIAL HISTORY:    [ ] Non-smoker  [ ] Smoker  [ ] Alcohol    Allergies    No Known Allergies    Intolerances    	    REVIEW OF SYSTEMS:  CONSTITUTIONAL: No fever, weight loss, + fatigue  EYES: No eye pain, visual disturbances, or discharge  ENMT:  No difficulty hearing, tinnitus, vertigo; No sinus or throat pain  NECK: No pain or stiffness  RESPIRATORY: +cough, wheezing, no chills or hemoptysis; + Shortness of Breath  CARDIOVASCULAR: +chest pain, palpitations, no passing out, dizziness, or leg swelling  GASTROINTESTINAL: No abdominal or epigastric pain. No nausea, vomiting, or hematemesis; No diarrhea or constipation. No melena or hematochezia.  GENITOURINARY: No dysuria, frequency, hematuria, or incontinence  NEUROLOGICAL: No headaches, memory loss, loss of strength, numbness, or tremors  SKIN: No itching, burning, rashes, or lesions   LYMPH Nodes: No enlarged glands  ENDOCRINE: No heat or cold intolerance; No hair loss  MUSCULOSKELETAL: No joint pain or swelling; No muscle, back, or extremity pain  PSYCHIATRIC: No depression, anxiety, mood swings, or difficulty sleeping  HEME/LYMPH: No easy bruising, or bleeding gums  ALLERY AND IMMUNOLOGIC: No hives or eczema	    [ ] All others negative	  [ ] Unable to obtain    PHYSICAL EXAM:  T(C): 36.4 (12-25-17 @ 05:22), Max: 38.5 (12-24-17 @ 13:52)  HR: 72 (12-25-17 @ 05:22) (70 - 150)  BP: 142/84 (12-25-17 @ 05:22) (113/79 - 155/107)  RR: 19 (12-25-17 @ 05:22) (18 - 36)  SpO2: 94% (12-25-17 @ 05:22) (94% - 100%)  Wt(kg): --  I&O's Summary    24 Dec 2017 07:01  -  25 Dec 2017 07:00  --------------------------------------------------------  IN: 50 mL / OUT: 200 mL / NET: -150 mL        Appearance: Normal	  HEENT:   Normal oral mucosa, PERRL, EOMI	  Lymphatic: No lymphadenopathy  Cardiovascular: tachycardic  S1 S2, No JVD, No murmurs, No edema  Respiratory: +bronchospasm   Psychiatry: A & O x 3, Mood & affect appropriate  Gastrointestinal:  Soft, Non-tender, + BS	  Skin: No rashes, No ecchymoses, No cyanosis	  Neurologic: Non-focal  Extremities: Normal range of motion, No clubbing, cyanosis or edema  Vascular: Peripheral pulses palpable 2+ bilaterally    TELEMETRY: 	    ECG:  	  RADIOLOGY:  < from: Xray Chest 1 View AP/PA (12.24.17 @ 15:50) >          INTERPRETATION:  CLINICAL INFORMATION: Fever.    TECHNIQUE: AP view of the chest.    COMPARISON: Chest radiograph January 29, 2017. CT chest November 18,2016.    FINDINGS:     Emphysematous lungs with flattening of the diaphragms. No focal   consolidations. Trace right pleural effusions. Redemonstration of the   right pleural plaque which is unchanged.    The cardiomediastinal silhouette is unremarkable. Atherosclerotic changes   of the aortic arch.    Degenerative changes of the visualized spine.    IMPRESSION:    No focal consolidations. Trace right pleural effusion.    COPD.      < end of copied text >    OTHER: 	  	  LABS:	 	    CARDIAC MARKERS:                                  10.0   4.21  )-----------( 368      ( 25 Dec 2017 08:22 )             31.5     12-25    140  |  102  |  27<H>  ----------------------------<  220<H>  4.4   |  24  |  1.24    Ca    8.9      25 Dec 2017 08:16    TPro  7.2  /  Alb  3.4  /  TBili  0.2  /  DBili  x   /  AST  29  /  ALT  15  /  AlkPhos  79  12-25    proBNP:   Lipid Profile:   HgA1c:   TSH: Thyroid Stimulating Hormone, Serum: 0.69 uIU/mL (12-25 @ 08:16)

## 2017-12-25 NOTE — PROGRESS NOTE ADULT - SUBJECTIVE AND OBJECTIVE BOX
Patient is a 89y old  Male who presents with a chief complaint of sent for tachycardia (24 Dec 2017 22:19)                                                                 REVIEW OF SYSTEMS:     CONSTITUTIONAL: No weakness, fevers or chills  RESPIRATORY: still with cough, imprvoing  shortness of breath  CARDIOVASCULAR: No chest pain or palpitations  GASTROINTESTINAL: No abdominal pain  . No nausea, vomiting  GENITOURINARY: No dysuria, frequency  NEUROLOGICAL: No numbness or weakness                                                                                                                                                                                                                                                                               Medications:  MEDICATIONS  (STANDING):  atorvastatin 40 milliGRAM(s) Oral at bedtime  dextrose 5%. 1000 milliLiter(s) (50 mL/Hr) IV Continuous <Continuous>  dextrose 50% Injectable 12.5 Gram(s) IV Push once  dextrose 50% Injectable 25 Gram(s) IV Push once  dextrose 50% Injectable 25 Gram(s) IV Push once  diltiazem    milliGRAM(s) Oral daily  docusate sodium 100 milliGRAM(s) Oral two times a day  insulin lispro (HumaLOG) corrective regimen sliding scale   SubCutaneous three times a day before meals  insulin lispro (HumaLOG) corrective regimen sliding scale   SubCutaneous at bedtime  ipratropium    for Nebulization 500 MICROGram(s) Nebulizer every 6 hours  lamoTRIgine 25 milliGRAM(s) Oral daily  levalbuterol Inhalation 0.63 milliGRAM(s) Inhalation every 6 hours  methimazole 5 milliGRAM(s) Oral daily  methylPREDNISolone sodium succinate Injectable 40 milliGRAM(s) IV Push every 8 hours  oseltamivir 30 milliGRAM(s) Oral two times a day  pantoprazole    Tablet 40 milliGRAM(s) Oral before breakfast  PARoxetine 40 milliGRAM(s) Oral daily  senna 2 Tablet(s) Oral at bedtime  warfarin 2 milliGRAM(s) Oral once    MEDICATIONS  (PRN):  ALPRAZolam 0.5 milliGRAM(s) Oral daily PRN anxiety  dextrose Gel 1 Dose(s) Oral once PRN Blood Glucose LESS THAN 70 milliGRAM(s)/deciliter  glucagon  Injectable 1 milliGRAM(s) IntraMuscular once PRN Glucose LESS THAN 70 milligrams/deciliter       Allergies    No Known Allergies    Intolerances      Vital Signs Last 24 Hrs  T(C): 36.9 (25 Dec 2017 14:44), Max: 36.9 (25 Dec 2017 14:44)  T(F): 98.4 (25 Dec 2017 14:44), Max: 98.4 (25 Dec 2017 14:44)  HR: 82 (25 Dec 2017 14:44) (70 - 82)  BP: 145/60 (25 Dec 2017 14:44) (122/76 - 152/71)  BP(mean): --  RR: 16 (25 Dec 2017 14:44) (16 - 22)  SpO2: 96% (25 Dec 2017 14:44) (94% - 100%)  CAPILLARY BLOOD GLUCOSE      POCT Blood Glucose.: 368 mg/dL (25 Dec 2017 16:34)  POCT Blood Glucose.: 355 mg/dL (25 Dec 2017 12:25)  POCT Blood Glucose.: 228 mg/dL (25 Dec 2017 08:01)  POCT Blood Glucose.: 262 mg/dL (24 Dec 2017 22:51)      12-24 @ 07:01  -  12-25 @ 07:00  --------------------------------------------------------  IN: 50 mL / OUT: 200 mL / NET: -150 mL    12-25 @ 07:01  -  12-25 @ 18:18  --------------------------------------------------------  IN: 680 mL / OUT: 300 mL / NET: 380 mL      Physical Exam:    Daily Height in cm: 182.88 (24 Dec 2017 21:49)    General:  Elderly NAD   HEENT:  Nonicteric, PERRLA  CV:  irreg irreg S1S2   Lungs:  B ronchi /wheezing   Abdomen:  Soft, non-tender, no distended, positive BS  Extremities:  2+ pulses, no c/c, no edema  Skin:  Warm and dry, no rashes  :  No greene  Neuro:  AAOx3, non-focal, grossly intact                                                                                                                                                                                                                                                                                                LABS:                               10.0   4.21  )-----------( 368      ( 25 Dec 2017 08:22 )             31.5                      12-25    140  |  102  |  27<H>  ----------------------------<  220<H>  4.4   |  24  |  1.24    Ca    8.9      25 Dec 2017 08:16    TPro  7.2  /  Alb  3.4  /  TBili  0.2  /  DBili  x   /  AST  29  /  ALT  15  /  AlkPhos  79  12-25

## 2017-12-25 NOTE — PROGRESS NOTE ADULT - ASSESSMENT
89 year old, copd, atrial fibrillation, diagnosed with influenza A today at urgent care for  symptoms starting 3 days ago. Presenting form urgent care with tachycardia to 150s.   Pt has an element of dementia and is not able to give full hx however reports that he has been feeling weak , and reports palpitations / sob on and off otherwise no CP .  reports cough , no N/V/D   no  sx   no GI complaints   EKG done at urgent pt was seen and examined on   care rendered at that time seems to be sinus tachy  ( background noises noted )   1- SOB : sec to COPD exacerbation in setting of flu ..  start nebs and steroids   add tamiflu   frequent suctioning   check pro bnp/procalcitonin    2- Hx of afib: not in afib now .. cont rate control and AC ..d/w   .. awaiting transfer to [facility]   3- HTN cont meds   4- DM: hold po meds and monitor FS   5- HARJEET /met acidosis: likley sec to decreased po /prerenal   .. monitor for now   consider gentle hydration if probnp not elevated   6- anemia : at baseline

## 2017-12-26 LAB
ANION GAP SERPL CALC-SCNC: 15 MMOL/L — SIGNIFICANT CHANGE UP (ref 5–17)
BUN SERPL-MCNC: 28 MG/DL — HIGH (ref 7–23)
CALCIUM SERPL-MCNC: 9.4 MG/DL — SIGNIFICANT CHANGE UP (ref 8.4–10.5)
CHLORIDE SERPL-SCNC: 101 MMOL/L — SIGNIFICANT CHANGE UP (ref 96–108)
CO2 SERPL-SCNC: 21 MMOL/L — LOW (ref 22–31)
CREAT SERPL-MCNC: 1.08 MG/DL — SIGNIFICANT CHANGE UP (ref 0.5–1.3)
GLUCOSE BLDC GLUCOMTR-MCNC: 159 MG/DL — HIGH (ref 70–99)
GLUCOSE BLDC GLUCOMTR-MCNC: 185 MG/DL — HIGH (ref 70–99)
GLUCOSE BLDC GLUCOMTR-MCNC: 221 MG/DL — HIGH (ref 70–99)
GLUCOSE BLDC GLUCOMTR-MCNC: 248 MG/DL — HIGH (ref 70–99)
GLUCOSE SERPL-MCNC: 223 MG/DL — HIGH (ref 70–99)
HCT VFR BLD CALC: 33.7 % — LOW (ref 39–50)
HGB BLD-MCNC: 10.8 G/DL — LOW (ref 13–17)
INR BLD: 3.63 RATIO — HIGH (ref 0.88–1.16)
MCHC RBC-ENTMCNC: 28.9 PG — SIGNIFICANT CHANGE UP (ref 27–34)
MCHC RBC-ENTMCNC: 32 GM/DL — SIGNIFICANT CHANGE UP (ref 32–36)
MCV RBC AUTO: 90.1 FL — SIGNIFICANT CHANGE UP (ref 80–100)
NT-PROBNP SERPL-SCNC: 2341 PG/ML — HIGH (ref 0–300)
PLATELET # BLD AUTO: 360 K/UL — SIGNIFICANT CHANGE UP (ref 150–400)
POTASSIUM SERPL-MCNC: 4.7 MMOL/L — SIGNIFICANT CHANGE UP (ref 3.5–5.3)
POTASSIUM SERPL-SCNC: 4.7 MMOL/L — SIGNIFICANT CHANGE UP (ref 3.5–5.3)
PROCALCITONIN SERPL-MCNC: 0.19 NG/ML — HIGH (ref 0–0.04)
PROTHROM AB SERPL-ACNC: 42.1 SEC — HIGH (ref 10–13.1)
RBC # BLD: 3.74 M/UL — LOW (ref 4.2–5.8)
RBC # FLD: 14.8 % — HIGH (ref 10.3–14.5)
SODIUM SERPL-SCNC: 137 MMOL/L — SIGNIFICANT CHANGE UP (ref 135–145)
T3 SERPL-MCNC: 46 NG/DL — LOW (ref 80–200)
WBC # BLD: 12.32 K/UL — HIGH (ref 3.8–10.5)
WBC # FLD AUTO: 12.32 K/UL — HIGH (ref 3.8–10.5)

## 2017-12-26 RX ORDER — ALPRAZOLAM 0.25 MG
0.5 TABLET ORAL AT BEDTIME
Qty: 0 | Refills: 0 | Status: DISCONTINUED | OUTPATIENT
Start: 2017-12-26 | End: 2017-12-26

## 2017-12-26 RX ORDER — AMIODARONE HYDROCHLORIDE 400 MG/1
400 TABLET ORAL EVERY 8 HOURS
Qty: 0 | Refills: 0 | Status: DISCONTINUED | OUTPATIENT
Start: 2017-12-26 | End: 2017-12-28

## 2017-12-26 RX ORDER — INSULIN LISPRO 100/ML
7 VIAL (ML) SUBCUTANEOUS
Qty: 0 | Refills: 0 | Status: DISCONTINUED | OUTPATIENT
Start: 2017-12-26 | End: 2017-12-27

## 2017-12-26 RX ORDER — ALBUTEROL 90 UG/1
2.5 AEROSOL, METERED ORAL ONCE
Qty: 0 | Refills: 0 | Status: DISCONTINUED | OUTPATIENT
Start: 2017-12-26 | End: 2017-12-26

## 2017-12-26 RX ORDER — ALPRAZOLAM 0.25 MG
0.5 TABLET ORAL AT BEDTIME
Qty: 0 | Refills: 0 | Status: COMPLETED | OUTPATIENT
Start: 2017-12-26 | End: 2018-01-02

## 2017-12-26 RX ORDER — AMIODARONE HYDROCHLORIDE 400 MG/1
150 TABLET ORAL ONCE
Qty: 0 | Refills: 0 | Status: COMPLETED | OUTPATIENT
Start: 2017-12-26 | End: 2017-12-26

## 2017-12-26 RX ORDER — ALPRAZOLAM 0.25 MG
0.5 TABLET ORAL EVERY 12 HOURS
Qty: 0 | Refills: 0 | Status: DISCONTINUED | OUTPATIENT
Start: 2017-12-26 | End: 2017-12-26

## 2017-12-26 RX ORDER — INSULIN GLARGINE 100 [IU]/ML
18 INJECTION, SOLUTION SUBCUTANEOUS AT BEDTIME
Qty: 0 | Refills: 0 | Status: DISCONTINUED | OUTPATIENT
Start: 2017-12-26 | End: 2017-12-27

## 2017-12-26 RX ADMIN — Medication 40 MILLIGRAM(S): at 14:06

## 2017-12-26 RX ADMIN — AMIODARONE HYDROCHLORIDE 600 MILLIGRAM(S): 400 TABLET ORAL at 06:23

## 2017-12-26 RX ADMIN — Medication 500 MICROGRAM(S): at 12:11

## 2017-12-26 RX ADMIN — Medication 2: at 08:32

## 2017-12-26 RX ADMIN — LEVALBUTEROL 0.63 MILLIGRAM(S): 1.25 SOLUTION, CONCENTRATE RESPIRATORY (INHALATION) at 00:10

## 2017-12-26 RX ADMIN — Medication 30 MILLIGRAM(S): at 05:10

## 2017-12-26 RX ADMIN — Medication 500 MICROGRAM(S): at 17:45

## 2017-12-26 RX ADMIN — INSULIN GLARGINE 18 UNIT(S): 100 INJECTION, SOLUTION SUBCUTANEOUS at 23:27

## 2017-12-26 RX ADMIN — LEVALBUTEROL 0.63 MILLIGRAM(S): 1.25 SOLUTION, CONCENTRATE RESPIRATORY (INHALATION) at 12:10

## 2017-12-26 RX ADMIN — Medication 5 UNIT(S): at 17:45

## 2017-12-26 RX ADMIN — LEVALBUTEROL 0.63 MILLIGRAM(S): 1.25 SOLUTION, CONCENTRATE RESPIRATORY (INHALATION) at 05:10

## 2017-12-26 RX ADMIN — Medication 1: at 12:08

## 2017-12-26 RX ADMIN — Medication 100 MILLIGRAM(S): at 17:42

## 2017-12-26 RX ADMIN — Medication 40 MILLIGRAM(S): at 12:11

## 2017-12-26 RX ADMIN — Medication 30 MILLIGRAM(S): at 17:42

## 2017-12-26 RX ADMIN — Medication 2: at 17:45

## 2017-12-26 RX ADMIN — LEVALBUTEROL 0.63 MILLIGRAM(S): 1.25 SOLUTION, CONCENTRATE RESPIRATORY (INHALATION) at 23:04

## 2017-12-26 RX ADMIN — LEVALBUTEROL 0.63 MILLIGRAM(S): 1.25 SOLUTION, CONCENTRATE RESPIRATORY (INHALATION) at 17:47

## 2017-12-26 RX ADMIN — PANTOPRAZOLE SODIUM 40 MILLIGRAM(S): 20 TABLET, DELAYED RELEASE ORAL at 05:10

## 2017-12-26 RX ADMIN — LAMOTRIGINE 25 MILLIGRAM(S): 25 TABLET, ORALLY DISINTEGRATING ORAL at 12:11

## 2017-12-26 RX ADMIN — Medication 0.5 MILLIGRAM(S): at 22:08

## 2017-12-26 RX ADMIN — Medication 40 MILLIGRAM(S): at 05:09

## 2017-12-26 RX ADMIN — AMIODARONE HYDROCHLORIDE 400 MILLIGRAM(S): 400 TABLET ORAL at 14:06

## 2017-12-26 RX ADMIN — ATORVASTATIN CALCIUM 40 MILLIGRAM(S): 80 TABLET, FILM COATED ORAL at 22:08

## 2017-12-26 RX ADMIN — Medication 180 MILLIGRAM(S): at 05:10

## 2017-12-26 RX ADMIN — Medication 5 UNIT(S): at 12:08

## 2017-12-26 RX ADMIN — Medication 5 UNIT(S): at 08:32

## 2017-12-26 RX ADMIN — SENNA PLUS 2 TABLET(S): 8.6 TABLET ORAL at 22:08

## 2017-12-26 RX ADMIN — Medication 500 MICROGRAM(S): at 23:04

## 2017-12-26 RX ADMIN — Medication 100 MILLIGRAM(S): at 05:10

## 2017-12-26 RX ADMIN — AMIODARONE HYDROCHLORIDE 400 MILLIGRAM(S): 400 TABLET ORAL at 22:08

## 2017-12-26 RX ADMIN — Medication 500 MICROGRAM(S): at 06:35

## 2017-12-26 RX ADMIN — Medication 40 MILLIGRAM(S): at 22:08

## 2017-12-26 NOTE — PROGRESS NOTE ADULT - PROBLEM SELECTOR PLAN 1
now in sinus s/p amiodarone last night   Check TTE   on coumadin  will need inpatient vs. outpatient stress test

## 2017-12-26 NOTE — PROGRESS NOTE ADULT - SUBJECTIVE AND OBJECTIVE BOX
Patient is a 89y old  Male who presents with a chief complaint of sent for tachycardia (24 Dec 2017 22:19)                                                               INTERVAL HPI/OVERNIGHT EVENTS:    REVIEW OF SYSTEMS:     CONSTITUTIONAL: No weakness, fevers or chills  RESPIRATORY: improving  cough, wheezing,  improved  shortness of breath  CARDIOVASCULAR: No chest pain or palpitations  GASTROINTESTINAL: No abdominal pain  . No nausea, vomiting, or hematemesis; No diarrhea or constipation. No melena or hematochezia.  GENITOURINARY: No dysuria, frequency or hematuria  NEUROLOGICAL: No numbness or weakness                                                                                                                                                                                                                                                                                Medications:  MEDICATIONS  (STANDING):  ALPRAZolam 0.5 milliGRAM(s) Oral at bedtime  amiodarone    Tablet 400 milliGRAM(s) Oral every 8 hours  atorvastatin 40 milliGRAM(s) Oral at bedtime  dextrose 5%. 1000 milliLiter(s) (50 mL/Hr) IV Continuous <Continuous>  dextrose 50% Injectable 12.5 Gram(s) IV Push once  dextrose 50% Injectable 25 Gram(s) IV Push once  dextrose 50% Injectable 25 Gram(s) IV Push once  diltiazem    milliGRAM(s) Oral daily  docusate sodium 100 milliGRAM(s) Oral two times a day  insulin glargine Injectable (LANTUS) 18 Unit(s) SubCutaneous at bedtime  insulin lispro (HumaLOG) corrective regimen sliding scale   SubCutaneous three times a day before meals  insulin lispro (HumaLOG) corrective regimen sliding scale   SubCutaneous at bedtime  insulin lispro Injectable (HumaLOG) 7 Unit(s) SubCutaneous three times a day with meals  ipratropium    for Nebulization 500 MICROGram(s) Nebulizer every 6 hours  lamoTRIgine 25 milliGRAM(s) Oral daily  levalbuterol Inhalation 0.63 milliGRAM(s) Inhalation every 6 hours  methimazole 5 milliGRAM(s) Oral daily  methylPREDNISolone sodium succinate Injectable 40 milliGRAM(s) IV Push every 8 hours  oseltamivir 30 milliGRAM(s) Oral two times a day  pantoprazole    Tablet 40 milliGRAM(s) Oral before breakfast  PARoxetine 40 milliGRAM(s) Oral daily  senna 2 Tablet(s) Oral at bedtime    MEDICATIONS  (PRN):  ALPRAZolam 0.5 milliGRAM(s) Oral every 12 hours PRN Anxiety  dextrose Gel 1 Dose(s) Oral once PRN Blood Glucose LESS THAN 70 milliGRAM(s)/deciliter  glucagon  Injectable 1 milliGRAM(s) IntraMuscular once PRN Glucose LESS THAN 70 milligrams/deciliter       Allergies    No Known Allergies    Intolerances      Vital Signs Last 24 Hrs  T(C): 36.8 (26 Dec 2017 20:31), Max: 36.8 (26 Dec 2017 20:31)  T(F): 98.2 (26 Dec 2017 20:31), Max: 98.2 (26 Dec 2017 20:31)  HR: 88 (26 Dec 2017 20:31) (8 - 175)  BP: 136/71 (26 Dec 2017 20:31) (120/73 - 175/76)  BP(mean): --  RR: 18 (26 Dec 2017 20:31) (18 - 18)  SpO2: 95% (26 Dec 2017 20:31) (95% - 99%)  CAPILLARY BLOOD GLUCOSE      POCT Blood Glucose.: 159 mg/dL (26 Dec 2017 22:07)  POCT Blood Glucose.: 221 mg/dL (26 Dec 2017 16:35)  POCT Blood Glucose.: 185 mg/dL (26 Dec 2017 11:55)  POCT Blood Glucose.: 248 mg/dL (26 Dec 2017 07:35)      12-25 @ 07:01  -  12-26 @ 07:00  --------------------------------------------------------  IN: 800 mL / OUT: 650 mL / NET: 150 mL    12-26 @ 07:01  -  12-26 @ 23:02  --------------------------------------------------------  IN: 240 mL / OUT: 100 mL / NET: 140 mL      Physical Exam:    General:  Well appearing, NAD, not cachetic  HEENT:  Nonicteric, PERRLA  CV:  irreg irreg  S1S2   Lungs:  B ronchi /wheezing   Abdomen:  Soft, non-tender, no distended, positive BS  Extremities:  2+ pulses, no c/c, no edema  Skin:  Warm and dry, no rashes  :  No greene  Neuro:  AAOx3, non-focal, grossly intact                                                                                                                                                                                                                                                                                                LABS:                               10.8   12.32 )-----------( 360      ( 26 Dec 2017 07:35 )             33.7                      12-26    137  |  101  |  28<H>  ----------------------------<  223<H>  4.7   |  21<L>  |  1.08    Ca    9.4      26 Dec 2017 07:27  Phos  2.6     12-25  Mg     2.0     12-25    TPro  7.2  /  Alb  3.4  /  TBili  0.2  /  DBili  x   /  AST  29  /  ALT  15  /  AlkPhos  79  12-25

## 2017-12-26 NOTE — PROGRESS NOTE ADULT - SUBJECTIVE AND OBJECTIVE BOX
Events noted. Pt. offers no complaints. No psychosis over the past 24 hours. Sleep and appetite wnl. .       Vital Signs Last 24 Hrs  T(C): 36.6 (26 Dec 2017 11:54), Max: 36.9 (25 Dec 2017 21:31)  T(F): 97.8 (26 Dec 2017 11:54), Max: 98.5 (25 Dec 2017 21:31)  HR: 8 (26 Dec 2017 11:54) (8 - 175)  BP: 148/72 (26 Dec 2017 11:54) (120/73 - 168/76)  BP(mean): --  RR: 18 (26 Dec 2017 11:54) (17 - 18)  SpO2: 95% (26 Dec 2017 11:54) (94% - 99%)                          10.8   12.32 )-----------( 360      ( 26 Dec 2017 07:35 )             33.7       12-26    137  |  101  |  28<H>  ----------------------------<  223<H>  4.7   |  21<L>  |  1.08    Ca    9.4      26 Dec 2017 07:27  Phos  2.6     12-25  Mg     2.0     12-25    TPro  7.2  /  Alb  3.4  /  TBili  0.2  /  DBili  x   /  AST  29  /  ALT  15  /  AlkPhos  79  12-25              MEDICATIONS  (STANDING):  amiodarone    Tablet 400 milliGRAM(s) Oral every 8 hours  atorvastatin 40 milliGRAM(s) Oral at bedtime  dextrose 5%. 1000 milliLiter(s) (50 mL/Hr) IV Continuous <Continuous>  dextrose 50% Injectable 12.5 Gram(s) IV Push once  dextrose 50% Injectable 25 Gram(s) IV Push once  dextrose 50% Injectable 25 Gram(s) IV Push once  diltiazem    milliGRAM(s) Oral daily  docusate sodium 100 milliGRAM(s) Oral two times a day  insulin glargine Injectable (LANTUS) 15 Unit(s) SubCutaneous at bedtime  insulin lispro (HumaLOG) corrective regimen sliding scale   SubCutaneous three times a day before meals  insulin lispro (HumaLOG) corrective regimen sliding scale   SubCutaneous at bedtime  insulin lispro Injectable (HumaLOG) 5 Unit(s) SubCutaneous three times a day with meals  ipratropium    for Nebulization 500 MICROGram(s) Nebulizer every 6 hours  lamoTRIgine 25 milliGRAM(s) Oral daily  levalbuterol Inhalation 0.63 milliGRAM(s) Inhalation every 6 hours  methimazole 5 milliGRAM(s) Oral daily  methylPREDNISolone sodium succinate Injectable 40 milliGRAM(s) IV Push every 8 hours  oseltamivir 30 milliGRAM(s) Oral two times a day  pantoprazole    Tablet 40 milliGRAM(s) Oral before breakfast  PARoxetine 40 milliGRAM(s) Oral daily  senna 2 Tablet(s) Oral at bedtime    MEDICATIONS  (PRN):  ALPRAZolam 0.5 milliGRAM(s) Oral daily PRN anxiety  dextrose Gel 1 Dose(s) Oral once PRN Blood Glucose LESS THAN 70 milliGRAM(s)/deciliter  glucagon  Injectable 1 milliGRAM(s) IntraMuscular once PRN Glucose LESS THAN 70 milligrams/deciliter      Elderly WM in bed, calm, cooperative, alert and oriented x 2-3 (cannot recall the name of the hospital) .  No psychomotor abnormalities. No tremors nor diaphoresis.  Insight and judgment are fair. Speech is coherent with normal rate and volume. No hallucinations nor delusions. The patient denied suicidal and homicidal ideation and plan. Mood is euthymic and affect full range and appropriate. Attention and concentration, short term memory, and long term memory within normal limits.

## 2017-12-26 NOTE — PROVIDER CONTACT NOTE (OTHER) - ACTION/TREATMENT ORDERED:
Cardizem 10mg ivp x1 dose given. Telemetry reported patient in NSR [id number]'s-9100's.  /66, pulse ox 97%. Will continue to monitor.

## 2017-12-26 NOTE — PROGRESS NOTE ADULT - ASSESSMENT
89 year old, copd, atrial fibrillation, diagnosed with influenza A today at urgent care for  symptoms starting 3 days ago. Presenting form urgent care with tachycardia to 150s.   Pt has an element of dementia and is not able to give full hx however reports that he has been feeling weak , and reports palpitations / sob on and off otherwise no CP .  reports cough , no N/V/D   no  sx   no GI complaints   EKG done at urgent pt was seen and examined on   care rendered at that time seems to be sinus tachy  ( background noises noted )   1- SOB : sec to COPD exacerbation in setting of flu ..  start nebs and cont steroids   cont  tamiflu   frequent suctioning   elevted  pro bnp..conisder small dose of lasix     check o2 sat on ra /procalcitonin  mild elevation   2- Hx of afib:  was in afib then in sinus and had a run of SVT    cont rate control and AC ..d/w   ..: will limit amio   consider digoxin instead   3- HTN cont meds   4- DM: hold po meds and monitor FS ..cont insulin per    5- HARJEET /met acidosis: likley sec to decreased po /prerenal   .. monitor for now   improving CR  6- anemia : at baseline    7- multinodular goiter : cont tapazole and f/u with

## 2017-12-26 NOTE — PROGRESS NOTE ADULT - SUBJECTIVE AND OBJECTIVE BOX
Subjective: Patient seen and examined. No new events except as noted.   no cp or palpitations   breathing stable   REVIEW OF SYSTEMS:    CONSTITUTIONAL: + weakness, no fevers or chills  EYES/ENT: No visual changes;  No vertigo or throat pain   NECK: No pain or stiffness  RESPIRATORY: No cough, wheezing, hemoptysis; No shortness of breath  CARDIOVASCULAR: No chest pain or palpitations  GASTROINTESTINAL: No abdominal or epigastric pain. No nausea, vomiting, or hematemesis; No diarrhea or constipation. No melena or hematochezia.  GENITOURINARY: No dysuria, frequency or hematuria  NEUROLOGICAL: No numbness or weakness  SKIN: No itching, burning, rashes, or lesions   All other review of systems is negative unless indicated above.    MEDICATIONS:  MEDICATIONS  (STANDING):  atorvastatin 40 milliGRAM(s) Oral at bedtime  dextrose 5%. 1000 milliLiter(s) (50 mL/Hr) IV Continuous <Continuous>  dextrose 50% Injectable 12.5 Gram(s) IV Push once  dextrose 50% Injectable 25 Gram(s) IV Push once  dextrose 50% Injectable 25 Gram(s) IV Push once  diltiazem    milliGRAM(s) Oral daily  docusate sodium 100 milliGRAM(s) Oral two times a day  insulin glargine Injectable (LANTUS) 15 Unit(s) SubCutaneous at bedtime  insulin lispro (HumaLOG) corrective regimen sliding scale   SubCutaneous three times a day before meals  insulin lispro (HumaLOG) corrective regimen sliding scale   SubCutaneous at bedtime  insulin lispro Injectable (HumaLOG) 5 Unit(s) SubCutaneous three times a day with meals  ipratropium    for Nebulization 500 MICROGram(s) Nebulizer every 6 hours  lamoTRIgine 25 milliGRAM(s) Oral daily  levalbuterol Inhalation 0.63 milliGRAM(s) Inhalation every 6 hours  methimazole 5 milliGRAM(s) Oral daily  methylPREDNISolone sodium succinate Injectable 40 milliGRAM(s) IV Push every 8 hours  oseltamivir 30 milliGRAM(s) Oral two times a day  pantoprazole    Tablet 40 milliGRAM(s) Oral before breakfast  PARoxetine 40 milliGRAM(s) Oral daily  senna 2 Tablet(s) Oral at bedtime      PHYSICAL EXAM:  T(C): 36.7 (12-26-17 @ 05:45), Max: 36.9 (12-25-17 @ 14:44)  HR: 175 (12-26-17 @ 05:45) (74 - 175)  BP: 120/73 (12-26-17 @ 05:45) (120/73 - 168/76)  RR: 18 (12-26-17 @ 05:45) (16 - 18)  SpO2: 99% (12-26-17 @ 05:45) (94% - 99%)  Wt(kg): --  I&O's Summary    25 Dec 2017 07:01  -  26 Dec 2017 07:00  --------------------------------------------------------  IN: 800 mL / OUT: 650 mL / NET: 150 mL          Appearance: Normal	  HEENT:   Normal oral mucosa, PERRL, EOMI	  Lymphatic: No lymphadenopathy , no edema  Cardiovascular: Normal S1 S2, No JVD, No murmurs , Peripheral pulses palpable 2+ bilaterally  Respiratory: Lungs clear to auscultation, normal effort 	  Gastrointestinal:  Soft, Non-tender, + BS	  Skin: No rashes, No ecchymoses, No cyanosis, warm to touch  Musculoskeletal: Normal range of motion, normal strength  Psychiatry:  Mood & affect appropriate  Ext: No edema      LABS:    CARDIAC MARKERS:                                10.8   12.32 )-----------( 360      ( 26 Dec 2017 07:35 )             33.7     12-26    137  |  101  |  28<H>  ----------------------------<  223<H>  4.7   |  21<L>  |  1.08    Ca    9.4      26 Dec 2017 07:27  Phos  2.6     12-25  Mg     2.0     12-25    TPro  7.2  /  Alb  3.4  /  TBili  0.2  /  DBili  x   /  AST  29  /  ALT  15  /  AlkPhos  79  12-25    proBNP: Serum Pro-Brain Natriuretic Peptide: 2341 pg/mL (12-26 @ 05:26)    Lipid Profile:   HgA1c:   TSH:     Negative          TELEMETRY: 	AF/SR with PACs     ECG:  	  RADIOLOGY:     DIAGNOSTIC TESTING:  [ ] Echocardiogram:  [ ]  Catheterization:  [ ] Stress Test:    OTHER:

## 2017-12-26 NOTE — PROGRESS NOTE ADULT - SUBJECTIVE AND OBJECTIVE BOX
Chief Complaint: 88 y/o Type 2 diabetes COPD admitted with Influenza A, started on high dose steroids.    Patient still on high dose steroids, FS better on basal/bolus insulin regiment, but still high.  Amiodarone started for paroxismal AF  Afebrile PO intake good.    MEDICATIONS  (STANDING):  ALPRAZolam 0.5 milliGRAM(s) Oral at bedtime  amiodarone    Tablet 400 milliGRAM(s) Oral every 8 hours  atorvastatin 40 milliGRAM(s) Oral at bedtime  dextrose 5%. 1000 milliLiter(s) (50 mL/Hr) IV Continuous <Continuous>  dextrose 50% Injectable 12.5 Gram(s) IV Push once  dextrose 50% Injectable 25 Gram(s) IV Push once  dextrose 50% Injectable 25 Gram(s) IV Push once  diltiazem    milliGRAM(s) Oral daily  docusate sodium 100 milliGRAM(s) Oral two times a day  insulin glargine Injectable (LANTUS) 15 Unit(s) SubCutaneous at bedtime  insulin lispro (HumaLOG) corrective regimen sliding scale   SubCutaneous three times a day before meals  insulin lispro (HumaLOG) corrective regimen sliding scale   SubCutaneous at bedtime  insulin lispro Injectable (HumaLOG) 5 Unit(s) SubCutaneous three times a day with meals  ipratropium    for Nebulization 500 MICROGram(s) Nebulizer every 6 hours  lamoTRIgine 25 milliGRAM(s) Oral daily  levalbuterol Inhalation 0.63 milliGRAM(s) Inhalation every 6 hours  methimazole 5 milliGRAM(s) Oral daily  methylPREDNISolone sodium succinate Injectable 40 milliGRAM(s) IV Push every 8 hours  oseltamivir 30 milliGRAM(s) Oral two times a day  pantoprazole    Tablet 40 milliGRAM(s) Oral before breakfast  PARoxetine 40 milliGRAM(s) Oral daily  senna 2 Tablet(s) Oral at bedtime    MEDICATIONS  (PRN):  ALPRAZolam 0.5 milliGRAM(s) Oral every 12 hours PRN Anxiety  dextrose Gel 1 Dose(s) Oral once PRN Blood Glucose LESS THAN 70 milliGRAM(s)/deciliter  glucagon  Injectable 1 milliGRAM(s) IntraMuscular once PRN Glucose LESS THAN 70 milligrams/deciliter      Allergies    No Known Allergies    Intolerances        PHYSICAL EXAM:  VITALS: T(C): 36.6 (12-26-17 @ 11:54)  T(F): 97.8 (12-26-17 @ 11:54), Max: 98.5 (12-25-17 @ 21:31)  HR: 95 (12-26-17 @ 17:48) (8 - 175)  BP: 175/76 (12-26-17 @ 17:48) (120/73 - 175/76)  RR:  (18 - 18)  SpO2:  (95% - 99%)  GENERAL: NAD,   HEENT:  Atraumatic, Normocephalic,   THYROID: bilateral nodular goiter  RESPIRATORY: Clear to auscultation mild wheezing bilaterally  CARDIOVASCULAR: Regular rhythm; No murmurs; no peripheral edema  GI: Soft, nontender, non distended, normal bowel sounds  SKIN: Dry, intact, No rashes or lesions  MUSCULOSKELETAL: normal strength  NEURO: extraocular movements intact, no tremor, normal reflexes  PSYCH: Alert and oriented x 3, normal affect, normal mood      POCT Blood Glucose.: 221 mg/dL (12-26-17 @ 16:35)  POCT Blood Glucose.: 185 mg/dL (12-26-17 @ 11:55)  POCT Blood Glucose.: 248 mg/dL (12-26-17 @ 07:35)  POCT Blood Glucose.: 213 mg/dL (12-25-17 @ 20:39)  POCT Blood Glucose.: 368 mg/dL (12-25-17 @ 16:34)  POCT Blood Glucose.: 355 mg/dL (12-25-17 @ 12:25)  POCT Blood Glucose.: 228 mg/dL (12-25-17 @ 08:01)  POCT Blood Glucose.: 262 mg/dL (12-24-17 @ 22:51)                            10.8   12.32 )-----------( 360      ( 26 Dec 2017 07:35 )             33.7       12-26    137  |  101  |  28<H>  ----------------------------<  223<H>  4.7   |  21<L>  |  1.08    EGFR if : 70  EGFR if non : 61    Ca    9.4      12-26  Mg     2.0     12-25  Phos  2.6     12-25    TPro  7.2  /  Alb  3.4  /  TBili  0.2  /  DBili  x   /  AST  29  /  ALT  15  /  AlkPhos  79  12-25      Thyroid Function Tests:  12-26 @ 07:27 TSH -- FreeT4 -- T3 46 Anti TPO -- Anti Thyroglobulin Ab -- TSI --  12-25 @ 08:16 TSH 0.69 FreeT4 1.0 T3 -- Anti TPO -- Anti Thyroglobulin Ab -- TSI --  Hemoglobin A1C, Whole Blood: 6.5 % <H> [4.0 - 5.6] (12-25-17 @ 08:22)

## 2017-12-26 NOTE — PROGRESS NOTE ADULT - ASSESSMENT
89 y/o Type 2 diabetes COPD admitted with Influenza A, started on high dose steroids.  Tapazol treatment for hyperthyroidism.    Diabetes oral controlled at home, started on high dose steroid, FS high.   Started basal/bolus insulin, no steroid taper.  FS still high set- will increase insulin dose.    Hyperthyroidism- with MNG on exam. 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   T3-46, Free T4-1.1 both mid to low range,     *** However as he has autonomous thyroid activity treatment with the iodine rich amiodarone places him at risk of hyperthyroidism in the future, which will even increase risk of tachiarrhithmias- please reconsider amiodarone. ***

## 2017-12-26 NOTE — CHART NOTE - NSCHARTNOTEFT_GEN_A_CORE
Notified by RN for pt with SVT, pt seen and examined, he denies complaints lasted aprox 6 min, gave Cardizem 10 mg IV x 1, rate now controlled. D/W Card Dr Ying, will add amiodarone load 400 mg po tid X 12 doses. Continue to monitor

## 2017-12-26 NOTE — PHYSICAL THERAPY INITIAL EVALUATION ADULT - PERTINENT HX OF CURRENT PROBLEM, REHAB EVAL
89 year old, copd, atrial fibrillation, diagnosed with influenza A today at urgent care for  symptoms starting 3 days ago. Presenting form urgent care with tachycardia to 150s.Pt has an element of dementia and is not able to give full hx however reports that he has been feeling weak , and reports palpitations / sob on and off otherwise no CP .

## 2017-12-26 NOTE — PROGRESS NOTE ADULT - ASSESSMENT
Baseline MCI or early SDAT. Delirium. Does not take Xanax daily at home so doubt withdrawal.     Recommend  Xanax 0.50mg p.o. qhs and hold if sedated.   Xanax 0.50mg bid prn.  Continue Paxil and Lamictal.  Following.     Dimitri Vazquez M.D.  Psychiatry  [phone]

## 2017-12-26 NOTE — PHYSICAL THERAPY INITIAL EVALUATION ADULT - CRITERIA FOR SKILLED THERAPEUTIC INTERVENTIONS
impairments found/therapy frequency/rehab potential/functional limitations in following categories/risk reduction/prevention/anticipated discharge recommendation/predicted duration of therapy intervention/anticipated equipment needs at discharge

## 2017-12-26 NOTE — CHART NOTE - NSCHARTNOTEFT_GEN_A_CORE
Called by RN that pt  converted from Sinus Rhythm to A. Flutter 150-180's  for 6 minutes. c/o Palpitations.  Denies CP/ SOB/ Diaphoresis, HA/ Dizziness/ Blurry vision/ syncope, fever/ chills, Abdominal Pain/ N/V//D/C. orthopnea /PND.     Vital Signs Last 24 Hrs  T(C): 36.7 (26 Dec 2017 05:45), Max: 36.9 (25 Dec 2017 14:44)  T(F): 98 (26 Dec 2017 05:45), Max: 98.5 (25 Dec 2017 21:31)  HR: 175 (26 Dec 2017 05:45) (74 - 175)  BP: 120/73 (26 Dec 2017 05:45) (120/73 - 168/76)  BP(mean): --  RR: 18 (26 Dec 2017 05:45) (16 - 18)  SpO2: 99% (26 Dec 2017 05:45) (94% - 99%)                          10.0   4.21  )-----------( 368      ( 25 Dec 2017 08:22 )             31.5   12-25    136  |  98  |  33<H>  ----------------------------<  226<H>  4.3   |  24  |  1.19    Ca    9.2      25 Dec 2017 20:28  Phos  2.6     12-25  Mg     2.0     12-25    TPro  7.2  /  Alb  3.4  /  TBili  0.2  /  DBili  x   /  AST  29  /  ALT  15  /  AlkPhos  79  12-25    A/P:   89 year old, copd, atrial fibrillation, diagnosed with influenza A today at urgent care for  symptoms starting 3 days ago. Presenting form urgent care with tachycardia to 150s was noted to be A. Flutter upto 180's on Tele.     A. Flutter with RVR.   - Monitor on Tele.   - Keep K/Mg>4/2.   - HR control: Cardizem 180mg po qd.   - AC: Warfarin.   - K/Mg/PO3: 4./2.0/2.6.   - Spoke to Dr. Ying and aware.   - Recommends Amiodarone 150mg IVPB .   - TSH: WNL.   - f/u Cardio recommendations.   - Will endorse to primary team in am.     LUISA. NIURKA WREN.   # [phone]  Medicine PA.

## 2017-12-26 NOTE — PHYSICAL THERAPY INITIAL EVALUATION ADULT - ADDITIONAL COMMENTS
this is a 89 year old pt who lives alone in an elevator assist apartment, ambulates with a cane and  has a walker also, and needs assistance with ADL. Patient has 10hrs x 7 days, indpendent with all functional mobility requires most assist from IADLs. was going to [facility] outpt PT prior.

## 2017-12-27 LAB
ANION GAP SERPL CALC-SCNC: 13 MMOL/L — SIGNIFICANT CHANGE UP (ref 5–17)
BUN SERPL-MCNC: 29 MG/DL — HIGH (ref 7–23)
CALCIUM SERPL-MCNC: 8.6 MG/DL — SIGNIFICANT CHANGE UP (ref 8.4–10.5)
CHLORIDE SERPL-SCNC: 102 MMOL/L — SIGNIFICANT CHANGE UP (ref 96–108)
CO2 SERPL-SCNC: 27 MMOL/L — SIGNIFICANT CHANGE UP (ref 22–31)
CREAT SERPL-MCNC: 1.07 MG/DL — SIGNIFICANT CHANGE UP (ref 0.5–1.3)
GLUCOSE BLDC GLUCOMTR-MCNC: 165 MG/DL — HIGH (ref 70–99)
GLUCOSE BLDC GLUCOMTR-MCNC: 221 MG/DL — HIGH (ref 70–99)
GLUCOSE BLDC GLUCOMTR-MCNC: 312 MG/DL — HIGH (ref 70–99)
GLUCOSE BLDC GLUCOMTR-MCNC: 340 MG/DL — HIGH (ref 70–99)
GLUCOSE SERPL-MCNC: 217 MG/DL — HIGH (ref 70–99)
HCT VFR BLD CALC: 29.9 % — LOW (ref 39–50)
HGB BLD-MCNC: 9.7 G/DL — LOW (ref 13–17)
INR BLD: 4.5 RATIO — HIGH (ref 0.88–1.16)
MCHC RBC-ENTMCNC: 29 PG — SIGNIFICANT CHANGE UP (ref 27–34)
MCHC RBC-ENTMCNC: 32.4 GM/DL — SIGNIFICANT CHANGE UP (ref 32–36)
MCV RBC AUTO: 89.3 FL — SIGNIFICANT CHANGE UP (ref 80–100)
PLATELET # BLD AUTO: 311 K/UL — SIGNIFICANT CHANGE UP (ref 150–400)
POTASSIUM SERPL-MCNC: 4.2 MMOL/L — SIGNIFICANT CHANGE UP (ref 3.5–5.3)
POTASSIUM SERPL-SCNC: 4.2 MMOL/L — SIGNIFICANT CHANGE UP (ref 3.5–5.3)
PROTHROM AB SERPL-ACNC: 52.4 SEC — HIGH (ref 10–13.1)
RBC # BLD: 3.35 M/UL — LOW (ref 4.2–5.8)
RBC # FLD: 14.8 % — HIGH (ref 10.3–14.5)
SODIUM SERPL-SCNC: 142 MMOL/L — SIGNIFICANT CHANGE UP (ref 135–145)
WBC # BLD: 11.4 K/UL — HIGH (ref 3.8–10.5)
WBC # FLD AUTO: 11.4 K/UL — HIGH (ref 3.8–10.5)

## 2017-12-27 RX ORDER — INSULIN LISPRO 100/ML
8 VIAL (ML) SUBCUTANEOUS
Qty: 0 | Refills: 0 | Status: DISCONTINUED | OUTPATIENT
Start: 2017-12-27 | End: 2017-12-29

## 2017-12-27 RX ORDER — INSULIN GLARGINE 100 [IU]/ML
22 INJECTION, SOLUTION SUBCUTANEOUS AT BEDTIME
Qty: 0 | Refills: 0 | Status: DISCONTINUED | OUTPATIENT
Start: 2017-12-27 | End: 2017-12-29

## 2017-12-27 RX ORDER — BENZOCAINE AND MENTHOL 5; 1 G/100ML; G/100ML
1 LIQUID ORAL THREE TIMES A DAY
Qty: 0 | Refills: 0 | Status: DISCONTINUED | OUTPATIENT
Start: 2017-12-27 | End: 2018-01-02

## 2017-12-27 RX ADMIN — LEVALBUTEROL 0.63 MILLIGRAM(S): 1.25 SOLUTION, CONCENTRATE RESPIRATORY (INHALATION) at 06:07

## 2017-12-27 RX ADMIN — Medication 500 MICROGRAM(S): at 11:40

## 2017-12-27 RX ADMIN — Medication 30 MILLIGRAM(S): at 15:28

## 2017-12-27 RX ADMIN — LEVALBUTEROL 0.63 MILLIGRAM(S): 1.25 SOLUTION, CONCENTRATE RESPIRATORY (INHALATION) at 18:17

## 2017-12-27 RX ADMIN — Medication 100 MILLIGRAM(S): at 06:07

## 2017-12-27 RX ADMIN — Medication 30 MILLIGRAM(S): at 22:45

## 2017-12-27 RX ADMIN — Medication 1: at 18:18

## 2017-12-27 RX ADMIN — PANTOPRAZOLE SODIUM 40 MILLIGRAM(S): 20 TABLET, DELAYED RELEASE ORAL at 06:07

## 2017-12-27 RX ADMIN — Medication 30 MILLIGRAM(S): at 18:17

## 2017-12-27 RX ADMIN — Medication 500 MICROGRAM(S): at 18:17

## 2017-12-27 RX ADMIN — LEVALBUTEROL 0.63 MILLIGRAM(S): 1.25 SOLUTION, CONCENTRATE RESPIRATORY (INHALATION) at 11:40

## 2017-12-27 RX ADMIN — Medication 40 MILLIGRAM(S): at 22:40

## 2017-12-27 RX ADMIN — Medication 500 MICROGRAM(S): at 23:10

## 2017-12-27 RX ADMIN — Medication 4: at 11:35

## 2017-12-27 RX ADMIN — Medication 2: at 22:46

## 2017-12-27 RX ADMIN — Medication 30 MILLIGRAM(S): at 06:07

## 2017-12-27 RX ADMIN — AMIODARONE HYDROCHLORIDE 400 MILLIGRAM(S): 400 TABLET ORAL at 06:07

## 2017-12-27 RX ADMIN — INSULIN GLARGINE 22 UNIT(S): 100 INJECTION, SOLUTION SUBCUTANEOUS at 22:30

## 2017-12-27 RX ADMIN — Medication 500 MICROGRAM(S): at 06:06

## 2017-12-27 RX ADMIN — AMIODARONE HYDROCHLORIDE 400 MILLIGRAM(S): 400 TABLET ORAL at 22:40

## 2017-12-27 RX ADMIN — Medication 180 MILLIGRAM(S): at 06:10

## 2017-12-27 RX ADMIN — Medication 0.5 MILLIGRAM(S): at 22:40

## 2017-12-27 RX ADMIN — AMIODARONE HYDROCHLORIDE 400 MILLIGRAM(S): 400 TABLET ORAL at 15:27

## 2017-12-27 RX ADMIN — Medication 30 MILLIGRAM(S): at 06:06

## 2017-12-27 RX ADMIN — Medication 2: at 08:40

## 2017-12-27 RX ADMIN — Medication 7 UNIT(S): at 08:41

## 2017-12-27 RX ADMIN — Medication 7 UNIT(S): at 11:34

## 2017-12-27 RX ADMIN — Medication 100 MILLIGRAM(S): at 18:17

## 2017-12-27 RX ADMIN — LAMOTRIGINE 25 MILLIGRAM(S): 25 TABLET, ORALLY DISINTEGRATING ORAL at 11:37

## 2017-12-27 RX ADMIN — LEVALBUTEROL 0.63 MILLIGRAM(S): 1.25 SOLUTION, CONCENTRATE RESPIRATORY (INHALATION) at 22:42

## 2017-12-27 RX ADMIN — ATORVASTATIN CALCIUM 40 MILLIGRAM(S): 80 TABLET, FILM COATED ORAL at 22:40

## 2017-12-27 RX ADMIN — BENZOCAINE AND MENTHOL 1 LOZENGE: 5; 1 LIQUID ORAL at 22:40

## 2017-12-27 RX ADMIN — Medication 7 UNIT(S): at 18:17

## 2017-12-27 RX ADMIN — SENNA PLUS 2 TABLET(S): 8.6 TABLET ORAL at 22:40

## 2017-12-27 NOTE — PROGRESS NOTE ADULT - PROBLEM SELECTOR PLAN 1
Started Amiodarone for short term  Check TTE   on coumadin  will need inpatient vs. outpatient stress test

## 2017-12-27 NOTE — PROGRESS NOTE ADULT - ASSESSMENT
88 y/o Type 2 diabetes COPD admitted with Influenza A, started on high dose steroids.  Tapazol treatment for hyperthyroidism.    Diabetes oral controlled at home, started on high dose steroid, FS high.   Started basal/bolus insulin, mild steroid taper today.  FS still high set- will increase insulin dose.    Hyperthyroidism- with MNG on exam. 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   T3-46, Free T4-1.1 both mid to low range,   Still on amiodarone, rate controlled. Plans for very short term use.

## 2017-12-27 NOTE — PROGRESS NOTE ADULT - ASSESSMENT
89 year old, copd, atrial fibrillation, diagnosed with influenza A today at urgent care for  symptoms starting 3 days ago. Presenting form urgent care with tachycardia to 150s.   Pt has an element of dementia and is not able to give full hx however reports that he has been feeling weak , and reports palpitations / sob on and off otherwise no CP .  reports cough , no N/V/D   no  sx   no GI complaints   EKG done at urgent pt was seen and examined on   care rendered at that time seems to be sinus tachy  ( background noises noted )   1- SOB : sec to COPD exacerbation in setting of flu ..  cont nebs and cont steroids .. will start taper in 24 hours iif cont to improve   cont  tamiflu ..5 day course   frequent suctioning   elevted  pro bnp..conisder small dose of lasix     check o2 sat on ra /procalcitonin  mild elevation   2- Hx of afib:  was in afib then in sinus and had a run of SVT    cont rate control and AC ..d/w   ..: will limit amio   consider digoxin instead   3- HTN cont meds   4- DM: hold po meds and monitor FS ..cont insulin per    5- HARJEET /met acidosis: likley sec to decreased po /prerenal   ..imprvoed   6- anemia : at baseline    7- multinodular goiter : cont tapazole and f/u with

## 2017-12-27 NOTE — PROGRESS NOTE ADULT - SUBJECTIVE AND OBJECTIVE BOX
Subjective: Patient seen and examined. No new events except as noted.   feels ok   no cp or sob   cough improved     REVIEW OF SYSTEMS:    CONSTITUTIONAL: + weakness, no fevers or chills  EYES/ENT: No visual changes;  No vertigo or throat pain   NECK: No pain or stiffness  RESPIRATORY: No cough, wheezing, hemoptysis; No shortness of breath  CARDIOVASCULAR: No chest pain or palpitations  GASTROINTESTINAL: No abdominal or epigastric pain. No nausea, vomiting, or hematemesis; No diarrhea or constipation. No melena or hematochezia.  GENITOURINARY: No dysuria, frequency or hematuria  NEUROLOGICAL: No numbness or weakness  SKIN: No itching, burning, rashes, or lesions   All other review of systems is negative unless indicated above.    MEDICATIONS:  MEDICATIONS  (STANDING):  ALPRAZolam 0.5 milliGRAM(s) Oral at bedtime  amiodarone    Tablet 400 milliGRAM(s) Oral every 8 hours  atorvastatin 40 milliGRAM(s) Oral at bedtime  dextrose 5%. 1000 milliLiter(s) (50 mL/Hr) IV Continuous <Continuous>  dextrose 50% Injectable 12.5 Gram(s) IV Push once  dextrose 50% Injectable 25 Gram(s) IV Push once  dextrose 50% Injectable 25 Gram(s) IV Push once  diltiazem    milliGRAM(s) Oral daily  docusate sodium 100 milliGRAM(s) Oral two times a day  insulin glargine Injectable (LANTUS) 18 Unit(s) SubCutaneous at bedtime  insulin lispro (HumaLOG) corrective regimen sliding scale   SubCutaneous three times a day before meals  insulin lispro (HumaLOG) corrective regimen sliding scale   SubCutaneous at bedtime  insulin lispro Injectable (HumaLOG) 7 Unit(s) SubCutaneous three times a day with meals  ipratropium    for Nebulization 500 MICROGram(s) Nebulizer every 6 hours  lamoTRIgine 25 milliGRAM(s) Oral daily  levalbuterol Inhalation 0.63 milliGRAM(s) Inhalation every 6 hours  methimazole 5 milliGRAM(s) Oral daily  methylPREDNISolone sodium succinate Injectable 30 milliGRAM(s) IV Push every 8 hours  oseltamivir 30 milliGRAM(s) Oral two times a day  pantoprazole    Tablet 40 milliGRAM(s) Oral before breakfast  PARoxetine 40 milliGRAM(s) Oral daily  senna 2 Tablet(s) Oral at bedtime      PHYSICAL EXAM:  T(C): 36.7 (12-27-17 @ 11:30), Max: 36.8 (12-26-17 @ 20:31)  HR: 91 (12-27-17 @ 11:30) (8 - 95)  BP: 130/75 (12-27-17 @ 11:30) (130/75 - 175/76)  RR: 174 (12-27-17 @ 11:30) (18 - 174)  SpO2: 99% (12-27-17 @ 11:30) (95% - 99%)  Wt(kg): --  I&O's Summary    26 Dec 2017 07:01  -  27 Dec 2017 07:00  --------------------------------------------------------  IN: 240 mL / OUT: 100 mL / NET: 140 mL    27 Dec 2017 07:01  -  27 Dec 2017 11:35  --------------------------------------------------------  IN: 360 mL / OUT: 0 mL / NET: 360 mL          Appearance: Normal	  HEENT:   Normal oral mucosa, PERRL, EOMI	  Lymphatic: No lymphadenopathy , no edema  Cardiovascular: Normal S1 S2, No JVD, No murmurs , Peripheral pulses palpable 2+ bilaterally  Respiratory: Lungs clear to auscultation, normal effort 	  Gastrointestinal:  Soft, Non-tender, + BS	  Skin: No rashes, No ecchymoses, No cyanosis, warm to touch  Musculoskeletal: Normal range of motion, normal strength  Psychiatry:  Mood & affect appropriate  Ext: No edema      LABS:    CARDIAC MARKERS:                                9.7    11.40 )-----------( 311      ( 27 Dec 2017 07:20 )             29.9     12-27    142  |  102  |  29<H>  ----------------------------<  217<H>  4.2   |  27  |  1.07    Ca    8.6      27 Dec 2017 07:23  Phos  2.6     12-25  Mg     2.0     12-25      proBNP:   Lipid Profile:   HgA1c:   TSH:     Negative          TELEMETRY: 	SR, PAT     ECG:  	  RADIOLOGY:   DIAGNOSTIC TESTING:  [ ] Echocardiogram:  [ ]  Catheterization:  [ ] Stress Test:    OTHER:

## 2017-12-27 NOTE — PROGRESS NOTE ADULT - SUBJECTIVE AND OBJECTIVE BOX
Patient is a 89y old  Male who presents with a chief complaint of sent for tachycardia (24 Dec 2017 22:19)                                                               INTERVAL HPI/OVERNIGHT EVENTS:    REVIEW OF SYSTEMS:     CONSTITUTIONAL:overall improved   RESPIRATORY: improving  cough and  shortness of breath  CARDIOVASCULAR: No chest pain or palpitations  GASTROINTESTINAL: No abdominal pain  . No nausea, vomiting, or hematemesis  GENITOURINARY: No dysuria, frequency   NEUROLOGICAL: No numbness or weakness                                                                                                                                                                                                                                                                                  Medications:  MEDICATIONS  (STANDING):  ALPRAZolam 0.5 milliGRAM(s) Oral at bedtime  amiodarone    Tablet 400 milliGRAM(s) Oral every 8 hours  atorvastatin 40 milliGRAM(s) Oral at bedtime  dextrose 5%. 1000 milliLiter(s) (50 mL/Hr) IV Continuous <Continuous>  dextrose 50% Injectable 12.5 Gram(s) IV Push once  dextrose 50% Injectable 25 Gram(s) IV Push once  dextrose 50% Injectable 25 Gram(s) IV Push once  diltiazem    milliGRAM(s) Oral daily  docusate sodium 100 milliGRAM(s) Oral two times a day  insulin glargine Injectable (LANTUS) 22 Unit(s) SubCutaneous at bedtime  insulin lispro (HumaLOG) corrective regimen sliding scale   SubCutaneous three times a day before meals  insulin lispro (HumaLOG) corrective regimen sliding scale   SubCutaneous at bedtime  insulin lispro Injectable (HumaLOG) 8 Unit(s) SubCutaneous three times a day with meals  ipratropium    for Nebulization 500 MICROGram(s) Nebulizer every 6 hours  lamoTRIgine 25 milliGRAM(s) Oral daily  levalbuterol Inhalation 0.63 milliGRAM(s) Inhalation every 6 hours  methimazole 5 milliGRAM(s) Oral daily  methylPREDNISolone sodium succinate Injectable 30 milliGRAM(s) IV Push every 8 hours  oseltamivir 30 milliGRAM(s) Oral two times a day  pantoprazole    Tablet 40 milliGRAM(s) Oral before breakfast  PARoxetine 40 milliGRAM(s) Oral daily  senna 2 Tablet(s) Oral at bedtime    MEDICATIONS  (PRN):  ALPRAZolam 0.5 milliGRAM(s) Oral every 12 hours PRN Anxiety  benzocaine 15 mG/menthol 3.6 mG Lozenge 1 Lozenge Oral three times a day PRN Sore Throat  dextrose Gel 1 Dose(s) Oral once PRN Blood Glucose LESS THAN 70 milliGRAM(s)/deciliter  glucagon  Injectable 1 milliGRAM(s) IntraMuscular once PRN Glucose LESS THAN 70 milligrams/deciliter       Allergies    No Known Allergies    Intolerances  Vitals :  afeb ,  130/75   HR  now in sinus     90s      sat 98%        Physical Exam:      Daily Weight in k.5 (27 Dec 2017 07:03)  General:  elderly in NAD   HEENT:  Nonicteric, PERRLA  CV:  RRR, S1S2   Lungs:  ronchi /wheezing B ( decreawssed compared to yesterday)  Abdomen:  Soft, non-tender, no distended, positive BS  Extremities:  2+ pulses, no c/c, no edema  Neuro:  AAOx3, non-focal, grossly intact                                                                                                                                                                                                                                                                                                LABS:                               9.7    11.40 )-----------( 311      ( 27 Dec 2017 07:20 )             29.9                      12-    142  |  102  |  29<H>  ----------------------------<  217<H>  4.2   |  27  |  1.07    Ca    8.6      27 Dec 2017 07:23

## 2017-12-28 LAB
ANION GAP SERPL CALC-SCNC: 12 MMOL/L — SIGNIFICANT CHANGE UP (ref 5–17)
BUN SERPL-MCNC: 30 MG/DL — HIGH (ref 7–23)
CALCIUM SERPL-MCNC: 8.9 MG/DL — SIGNIFICANT CHANGE UP (ref 8.4–10.5)
CHLORIDE SERPL-SCNC: 98 MMOL/L — SIGNIFICANT CHANGE UP (ref 96–108)
CO2 SERPL-SCNC: 28 MMOL/L — SIGNIFICANT CHANGE UP (ref 22–31)
CREAT SERPL-MCNC: 1.05 MG/DL — SIGNIFICANT CHANGE UP (ref 0.5–1.3)
GLUCOSE BLDC GLUCOMTR-MCNC: 126 MG/DL — HIGH (ref 70–99)
GLUCOSE BLDC GLUCOMTR-MCNC: 153 MG/DL — HIGH (ref 70–99)
GLUCOSE BLDC GLUCOMTR-MCNC: 204 MG/DL — HIGH (ref 70–99)
GLUCOSE BLDC GLUCOMTR-MCNC: 224 MG/DL — HIGH (ref 70–99)
GLUCOSE SERPL-MCNC: 259 MG/DL — HIGH (ref 70–99)
HCT VFR BLD CALC: 32.6 % — LOW (ref 39–50)
HGB BLD-MCNC: 10.4 G/DL — LOW (ref 13–17)
INR BLD: 4.68 RATIO — HIGH (ref 0.88–1.16)
MCHC RBC-ENTMCNC: 28.7 PG — SIGNIFICANT CHANGE UP (ref 27–34)
MCHC RBC-ENTMCNC: 31.9 GM/DL — LOW (ref 32–36)
MCV RBC AUTO: 89.8 FL — SIGNIFICANT CHANGE UP (ref 80–100)
PLATELET # BLD AUTO: 340 K/UL — SIGNIFICANT CHANGE UP (ref 150–400)
POTASSIUM SERPL-MCNC: 4.2 MMOL/L — SIGNIFICANT CHANGE UP (ref 3.5–5.3)
POTASSIUM SERPL-SCNC: 4.2 MMOL/L — SIGNIFICANT CHANGE UP (ref 3.5–5.3)
PROTHROM AB SERPL-ACNC: 52.6 SEC — HIGH (ref 9.8–12.7)
RBC # BLD: 3.63 M/UL — LOW (ref 4.2–5.8)
RBC # FLD: 14.9 % — HIGH (ref 10.3–14.5)
SODIUM SERPL-SCNC: 138 MMOL/L — SIGNIFICANT CHANGE UP (ref 135–145)
WBC # BLD: 12.86 K/UL — HIGH (ref 3.8–10.5)
WBC # FLD AUTO: 12.86 K/UL — HIGH (ref 3.8–10.5)

## 2017-12-28 RX ORDER — BUDESONIDE AND FORMOTEROL FUMARATE DIHYDRATE 160; 4.5 UG/1; UG/1
2 AEROSOL RESPIRATORY (INHALATION)
Qty: 0 | Refills: 0 | Status: DISCONTINUED | OUTPATIENT
Start: 2017-12-28 | End: 2018-01-01

## 2017-12-28 RX ORDER — DILTIAZEM HCL 120 MG
240 CAPSULE, EXT RELEASE 24 HR ORAL DAILY
Qty: 0 | Refills: 0 | Status: DISCONTINUED | OUTPATIENT
Start: 2017-12-28 | End: 2018-01-02

## 2017-12-28 RX ADMIN — Medication 2: at 12:55

## 2017-12-28 RX ADMIN — Medication 8 UNIT(S): at 08:28

## 2017-12-28 RX ADMIN — Medication 500 MICROGRAM(S): at 17:25

## 2017-12-28 RX ADMIN — Medication 500 MICROGRAM(S): at 12:53

## 2017-12-28 RX ADMIN — BUDESONIDE AND FORMOTEROL FUMARATE DIHYDRATE 2 PUFF(S): 160; 4.5 AEROSOL RESPIRATORY (INHALATION) at 22:07

## 2017-12-28 RX ADMIN — Medication 30 MILLIGRAM(S): at 05:49

## 2017-12-28 RX ADMIN — Medication 40 MILLIGRAM(S): at 12:53

## 2017-12-28 RX ADMIN — LEVALBUTEROL 0.63 MILLIGRAM(S): 1.25 SOLUTION, CONCENTRATE RESPIRATORY (INHALATION) at 05:49

## 2017-12-28 RX ADMIN — Medication 500 MICROGRAM(S): at 23:36

## 2017-12-28 RX ADMIN — Medication 500 MICROGRAM(S): at 05:49

## 2017-12-28 RX ADMIN — Medication 30 MILLIGRAM(S): at 17:26

## 2017-12-28 RX ADMIN — PANTOPRAZOLE SODIUM 40 MILLIGRAM(S): 20 TABLET, DELAYED RELEASE ORAL at 05:49

## 2017-12-28 RX ADMIN — Medication 8 UNIT(S): at 12:54

## 2017-12-28 RX ADMIN — LAMOTRIGINE 25 MILLIGRAM(S): 25 TABLET, ORALLY DISINTEGRATING ORAL at 12:53

## 2017-12-28 RX ADMIN — INSULIN GLARGINE 22 UNIT(S): 100 INJECTION, SOLUTION SUBCUTANEOUS at 22:07

## 2017-12-28 RX ADMIN — Medication 8 UNIT(S): at 17:25

## 2017-12-28 RX ADMIN — Medication 30 MILLIGRAM(S): at 13:41

## 2017-12-28 RX ADMIN — Medication 180 MILLIGRAM(S): at 05:49

## 2017-12-28 RX ADMIN — LEVALBUTEROL 0.63 MILLIGRAM(S): 1.25 SOLUTION, CONCENTRATE RESPIRATORY (INHALATION) at 17:25

## 2017-12-28 RX ADMIN — Medication 100 MILLIGRAM(S): at 17:26

## 2017-12-28 RX ADMIN — LEVALBUTEROL 0.63 MILLIGRAM(S): 1.25 SOLUTION, CONCENTRATE RESPIRATORY (INHALATION) at 23:36

## 2017-12-28 RX ADMIN — Medication 100 MILLIGRAM(S): at 05:49

## 2017-12-28 RX ADMIN — LEVALBUTEROL 0.63 MILLIGRAM(S): 1.25 SOLUTION, CONCENTRATE RESPIRATORY (INHALATION) at 12:53

## 2017-12-28 RX ADMIN — AMIODARONE HYDROCHLORIDE 400 MILLIGRAM(S): 400 TABLET ORAL at 05:49

## 2017-12-28 RX ADMIN — Medication 240 MILLIGRAM(S): at 12:53

## 2017-12-28 RX ADMIN — Medication 2: at 08:28

## 2017-12-28 RX ADMIN — ATORVASTATIN CALCIUM 40 MILLIGRAM(S): 80 TABLET, FILM COATED ORAL at 22:06

## 2017-12-28 RX ADMIN — Medication 0.5 MILLIGRAM(S): at 22:06

## 2017-12-28 RX ADMIN — Medication 30 MILLIGRAM(S): at 22:06

## 2017-12-28 NOTE — PROGRESS NOTE ADULT - ASSESSMENT
A/P: 89 year old male with copd, atrial fibrillation, diagnosed with influenza A at urgent care for  symptoms starting 3 days ago. Presenting form urgent care with tachycardia to 150s.   Pt has mild dementia and is not able to give full hx however reports that he has been feeling weak , and reports palpitations, sob on and off, no CP .  Reports cough , no N/V/D   RVP confirmed Influenza A.  with lactate 2.5.  No PNA on CXR. Given Tamiflu.   BC, UC NTD.      -Continue Tamiflu for influenza (renally dosed) x 5 day course     - No need for broad spectrum Abs at present time.  Chest xray without consolidations.  Pt afebrile.    Almita Lozai  [phone]    (Covering Dr. Roth)

## 2017-12-28 NOTE — PROGRESS NOTE ADULT - SUBJECTIVE AND OBJECTIVE BOX
Infectious Diseases progress note:    Subjective: No fevers, sob, cp.  NAD    ROS:  CONSTITUTIONAL:  No fever, chills, rigors  CARDIOVASCULAR:  No chest pain or palpitations  RESPIRATORY:   No SOB, cough, dyspnea on exertion.  No wheezing  GASTROINTESTINAL:  No abd pain, N/V, diarrhea/constipation  EXTREMITIES:  No swelling or joint pain  GENITOURINARY:  No burning on urination, increased frequency or urgency.  No flank pain  NEUROLOGIC:  No HA, visual disturbances  SKIN: No rashes    Allergies    No Known Allergies    Intolerances        ANTIBIOTICS/RELEVANT:  antimicrobials  oseltamivir 30 milliGRAM(s) Oral two times a day    immunologic:    OTHER:  ALPRAZolam 0.5 milliGRAM(s) Oral at bedtime  ALPRAZolam 0.5 milliGRAM(s) Oral every 12 hours PRN  atorvastatin 40 milliGRAM(s) Oral at bedtime  benzocaine 15 mG/menthol 3.6 mG Lozenge 1 Lozenge Oral three times a day PRN  buDESOnide 160 MICROgram(s)/formoterol 4.5 MICROgram(s) Inhaler 2 Puff(s) Inhalation two times a day  dextrose 5%. 1000 milliLiter(s) IV Continuous <Continuous>  dextrose 50% Injectable 12.5 Gram(s) IV Push once  dextrose 50% Injectable 25 Gram(s) IV Push once  dextrose 50% Injectable 25 Gram(s) IV Push once  dextrose Gel 1 Dose(s) Oral once PRN  diltiazem    milliGRAM(s) Oral daily  docusate sodium 100 milliGRAM(s) Oral two times a day  glucagon  Injectable 1 milliGRAM(s) IntraMuscular once PRN  insulin glargine Injectable (LANTUS) 22 Unit(s) SubCutaneous at bedtime  insulin lispro (HumaLOG) corrective regimen sliding scale   SubCutaneous three times a day before meals  insulin lispro (HumaLOG) corrective regimen sliding scale   SubCutaneous at bedtime  insulin lispro Injectable (HumaLOG) 8 Unit(s) SubCutaneous three times a day with meals  ipratropium    for Nebulization 500 MICROGram(s) Nebulizer every 6 hours  lamoTRIgine 25 milliGRAM(s) Oral daily  levalbuterol Inhalation 0.63 milliGRAM(s) Inhalation every 6 hours  methimazole 5 milliGRAM(s) Oral daily  methylPREDNISolone sodium succinate Injectable 30 milliGRAM(s) IV Push every 8 hours  pantoprazole    Tablet 40 milliGRAM(s) Oral before breakfast  PARoxetine 40 milliGRAM(s) Oral daily  senna 2 Tablet(s) Oral at bedtime      Objective:  Vital Signs Last 24 Hrs  T(C): 36.4 (28 Dec 2017 11:42), Max: 37.2 (27 Dec 2017 21:52)  T(F): 97.5 (28 Dec 2017 11:42), Max: 98.9 (27 Dec 2017 21:52)  HR: 77 (28 Dec 2017 11:42) (76 - 78)  BP: 145/69 (28 Dec 2017 11:42) (127/54 - 147/72)  BP(mean): --  RR: 17 (28 Dec 2017 11:42) (17 - 18)  SpO2: 97% (28 Dec 2017 11:42) (94% - 97%)    PHYSICAL EXAM:  Constitutional:NAD  Eyes:ROJELIO, EOMI  Ear/Nose/Throat: no thrush, mucositis.  Moist mucous membranes	  Neck:no JVD, no lymphadenopathy, supple  Respiratory: CTA wilfredo  Cardiovascular: S1S2 RRR, no murmurs  Gastrointestinal:soft, nontender,  nondistended (+) BS  Extremities:no e/e/c  Skin:  no rashes, open wounds or ulcerations        LABS:                        10.4   12.86 )-----------( 340      ( 28 Dec 2017 07:36 )             32.6     12-28    138  |  98  |  30<H>  ----------------------------<  259<H>  4.2   |  28  |  1.05    Ca    8.9      28 Dec 2017 07:37      PT/INR - ( 28 Dec 2017 18:42 )   PT: 52.6 sec;   INR: 4.68 ratio               Procalcitonin, Serum: 0.19 (12-26 @ 05:26)            Rapid RVP Result: Detected          MICROBIOLOGY:    Culture - Urine (12.24.17 @ 22:13)    Specimen Source: .Urine Clean Catch (Midstream)    Culture Results:   No growth    Culture - Blood (12.24.17 @ 18:15)    Specimen Source: .Blood Blood-Peripheral    Culture Results:   No growth to date.    Culture - Blood (12.24.17 @ 18:15)    Specimen Source: .Blood Blood-Peripheral    Culture Results:   No growth to date.          RADIOLOGY & ADDITIONAL STUDIES:    < from: Xray Chest 1 View AP/PA (12.24.17 @ 15:50) >  FINDINGS:     Emphysematous lungs with flattening of the diaphragms. No focal   consolidations. Trace right pleural effusions. Redemonstration of the   right pleural plaque which is unchanged.    The cardiomediastinal silhouette is unremarkable. Atherosclerotic changes   of the aortic arch.    Degenerative changes of the visualized spine.    IMPRESSION:    No focal consolidations. Trace right pleural effusion.    COPD.    < end of copied text >

## 2017-12-28 NOTE — PROGRESS NOTE ADULT - PROBLEM SELECTOR PLAN 1
Maintaining sinus   DC amio now and increase Cardizem 240 mg daily  Check TTE   on coumadin  will need inpatient vs. outpatient stress test

## 2017-12-28 NOTE — PROGRESS NOTE ADULT - SUBJECTIVE AND OBJECTIVE BOX
Subjective: Patient seen and examined. No new events except as noted.   resting comfortably in bed   no cp or sob     REVIEW OF SYSTEMS:    CONSTITUTIONAL: + weakness, no  fevers or chills  EYES/ENT: No visual changes;  No vertigo or throat pain   NECK: No pain or stiffness  RESPIRATORY: No cough, wheezing, hemoptysis; No shortness of breath  CARDIOVASCULAR: No chest pain or palpitations  GASTROINTESTINAL: No abdominal or epigastric pain. No nausea, vomiting, or hematemesis; No diarrhea or constipation. No melena or hematochezia.  GENITOURINARY: No dysuria, frequency or hematuria  NEUROLOGICAL: No numbness or weakness  SKIN: No itching, burning, rashes, or lesions   All other review of systems is negative unless indicated above.    MEDICATIONS:  MEDICATIONS  (STANDING):  ALPRAZolam 0.5 milliGRAM(s) Oral at bedtime  atorvastatin 40 milliGRAM(s) Oral at bedtime  dextrose 5%. 1000 milliLiter(s) (50 mL/Hr) IV Continuous <Continuous>  dextrose 50% Injectable 12.5 Gram(s) IV Push once  dextrose 50% Injectable 25 Gram(s) IV Push once  dextrose 50% Injectable 25 Gram(s) IV Push once  diltiazem    milliGRAM(s) Oral daily  docusate sodium 100 milliGRAM(s) Oral two times a day  insulin glargine Injectable (LANTUS) 22 Unit(s) SubCutaneous at bedtime  insulin lispro (HumaLOG) corrective regimen sliding scale   SubCutaneous three times a day before meals  insulin lispro (HumaLOG) corrective regimen sliding scale   SubCutaneous at bedtime  insulin lispro Injectable (HumaLOG) 8 Unit(s) SubCutaneous three times a day with meals  ipratropium    for Nebulization 500 MICROGram(s) Nebulizer every 6 hours  lamoTRIgine 25 milliGRAM(s) Oral daily  levalbuterol Inhalation 0.63 milliGRAM(s) Inhalation every 6 hours  methimazole 5 milliGRAM(s) Oral daily  methylPREDNISolone sodium succinate Injectable 30 milliGRAM(s) IV Push every 8 hours  oseltamivir 30 milliGRAM(s) Oral two times a day  pantoprazole    Tablet 40 milliGRAM(s) Oral before breakfast  PARoxetine 40 milliGRAM(s) Oral daily  senna 2 Tablet(s) Oral at bedtime      PHYSICAL EXAM:  T(C): 36.4 (12-28-17 @ 04:20), Max: 37.2 (12-27-17 @ 21:52)  HR: 78 (12-28-17 @ 04:20) (76 - 91)  BP: 147/72 (12-28-17 @ 04:20) (127/54 - 147/72)  RR: 18 (12-28-17 @ 09:18) (18 - 174)  SpO2: 96% (12-28-17 @ 09:18) (94% - 99%)  Wt(kg): --  I&O's Summary    27 Dec 2017 07:01  -  28 Dec 2017 07:00  --------------------------------------------------------  IN: 660 mL / OUT: 300 mL / NET: 360 mL    28 Dec 2017 07:01  -  28 Dec 2017 11:02  --------------------------------------------------------  IN: 300 mL / OUT: 200 mL / NET: 100 mL          Appearance: Normal	  HEENT:   Normal oral mucosa, PERRL, EOMI	  Lymphatic: No lymphadenopathy , no edema  Cardiovascular: Normal S1 S2, No JVD, No murmurs , Peripheral pulses palpable 2+ bilaterally  Respiratory: Lungs clear to auscultation, normal effort 	  Gastrointestinal:  Soft, Non-tender, + BS	  Skin: No rashes, No ecchymoses, No cyanosis, warm to touch  Musculoskeletal: Normal range of motion, normal strength  Psychiatry:  Mood & affect appropriate  Ext: No edema      LABS:    CARDIAC MARKERS:                                10.4   12.86 )-----------( 340      ( 28 Dec 2017 07:36 )             32.6     12-28    138  |  98  |  30<H>  ----------------------------<  259<H>  4.2   |  28  |  1.05    Ca    8.9      28 Dec 2017 07:37      proBNP:   Lipid Profile:   HgA1c:   TSH:             TELEMETRY: 	SR    ECG:  	  RADIOLOGY:   DIAGNOSTIC TESTING:  [ ] Echocardiogram:    [ ]  Catheterization:  [ ] Stress Test:    OTHER:

## 2017-12-28 NOTE — PROGRESS NOTE ADULT - ASSESSMENT
89 year old, copd, atrial fibrillation, diagnosed with influenza A today at urgent care for  symptoms starting 3 days ago. Presenting form urgent care with tachycardia to 150s.   Pt has an element of dementia and is not able to give full hx however reports that he has been feeling weak , and reports palpitations / sob on and off otherwise no CP .  reports cough , no N/V/D   no  sx   no GI complaints   EKG done at urgent pt was seen and examined on   care rendered at that time seems to be sinus tachy  ( background noises noted )   1- SOB : sec to COPD exacerbation in setting of flu ..  cont nebs and cont steroids ..overall improving but still with significant wheezing .. will cont same steroids    cont  tamiflu ..5 day course   frequent suctioning   elevted  pro bnp..conisder small dose of lasix     check o2 sat on ra /procalcitonin  mild elevation   2- Hx of afib:  was in afib then in sinus and had a run of SVT    cont rate control and AC ..  d/w  : pt in sinus now and afib likely /percipitated worsened by acute illness.. will d/c amio     consider digoxin instead   hold coumadin for supratheraputic INR   3- HTN cont meds   4- DM: hold po meds and monitor FS ..cont insulin per    5- HARJEET /met acidosis: likley sec to decreased po /prerenal   ..imprvoed   6- anemia : at baseline    7- multinodular goiter : cont tapazole and f/u with

## 2017-12-28 NOTE — PROGRESS NOTE ADULT - SUBJECTIVE AND OBJECTIVE BOX
Patient is a 89y old  Male who presents with a chief complaint of sent for tachycardia (24 Dec 2017 22:19)                                                               INTERVAL HPI/OVERNIGHT EVENTS:    REVIEW OF SYSTEMS:     CONSTITUTIONAL: No weakness, fevers or chills  RESPIRATORY: still with  cough, improving  shortness of breath  CARDIOVASCULAR: No chest pain or palpitations  GASTROINTESTINAL: No abdominal pain  . No nausea, vomiting  GENITOURINARY: No dysuria, frequency   NEUROLOGICAL: No numbness or weakness                                                                                                                                                                                                                                                                                  Medications:  MEDICATIONS  (STANDING):  ALPRAZolam 0.5 milliGRAM(s) Oral at bedtime  atorvastatin 40 milliGRAM(s) Oral at bedtime  dextrose 5%. 1000 milliLiter(s) (50 mL/Hr) IV Continuous <Continuous>  dextrose 50% Injectable 12.5 Gram(s) IV Push once  dextrose 50% Injectable 25 Gram(s) IV Push once  dextrose 50% Injectable 25 Gram(s) IV Push once  diltiazem    milliGRAM(s) Oral daily  docusate sodium 100 milliGRAM(s) Oral two times a day  insulin glargine Injectable (LANTUS) 22 Unit(s) SubCutaneous at bedtime  insulin lispro (HumaLOG) corrective regimen sliding scale   SubCutaneous three times a day before meals  insulin lispro (HumaLOG) corrective regimen sliding scale   SubCutaneous at bedtime  insulin lispro Injectable (HumaLOG) 8 Unit(s) SubCutaneous three times a day with meals  ipratropium    for Nebulization 500 MICROGram(s) Nebulizer every 6 hours  lamoTRIgine 25 milliGRAM(s) Oral daily  levalbuterol Inhalation 0.63 milliGRAM(s) Inhalation every 6 hours  methimazole 5 milliGRAM(s) Oral daily  methylPREDNISolone sodium succinate Injectable 30 milliGRAM(s) IV Push every 8 hours  oseltamivir 30 milliGRAM(s) Oral two times a day  pantoprazole    Tablet 40 milliGRAM(s) Oral before breakfast  PARoxetine 40 milliGRAM(s) Oral daily  senna 2 Tablet(s) Oral at bedtime    MEDICATIONS  (PRN):  ALPRAZolam 0.5 milliGRAM(s) Oral every 12 hours PRN Anxiety  benzocaine 15 mG/menthol 3.6 mG Lozenge 1 Lozenge Oral three times a day PRN Sore Throat  dextrose Gel 1 Dose(s) Oral once PRN Blood Glucose LESS THAN 70 milliGRAM(s)/deciliter  glucagon  Injectable 1 milliGRAM(s) IntraMuscular once PRN Glucose LESS THAN 70 milligrams/deciliter       Allergies    No Known Allergies    Intolerances      Vital Signs Last 24 Hrs  T(C): 36.4 (28 Dec 2017 11:42), Max: 37.2 (27 Dec 2017 21:52)  T(F): 97.5 (28 Dec 2017 11:42), Max: 98.9 (27 Dec 2017 21:52)  HR: 77 (28 Dec 2017 11:42) (76 - 78)  BP: 145/69 (28 Dec 2017 11:42) (127/54 - 147/72)  BP(mean): --  RR: 17 (28 Dec 2017 11:42) (17 - 18)  SpO2: 97% (28 Dec 2017 11:42) (94% - 97%)  CAPILLARY BLOOD GLUCOSE      POCT Blood Glucose.: 126 mg/dL (28 Dec 2017 17:00)  POCT Blood Glucose.: 204 mg/dL (28 Dec 2017 11:41)  POCT Blood Glucose.: 224 mg/dL (28 Dec 2017 07:47)  POCT Blood Glucose.: 340 mg/dL (27 Dec 2017 21:55)       @ 07: @ 07:00  --------------------------------------------------------  IN: 660 mL / OUT: 300 mL / NET: 360 mL     @ 07: @ 20:39  --------------------------------------------------------  IN: 900 mL / OUT: 200 mL / NET: 700 mL      Physical Exam:      Daily Weight in k.8 (28 Dec 2017 07:10)  General:  elderly inNAD   HEENT:  Nonicteric, PERRLA  CV:  RRR, S1S2   Lungs: wheezing B /ronchi   Abdomen:  Soft, non-tender, no distended, positive BS  Extremities:  no c/c, no edema  Skin:  Warm and dry, no rashes  :  No greene  Neuro:  AAOx3, non-focal, grossly intact                                                                                                                                                                                                                                                                                                LABS:                               10.4   12.86 )-----------( 340      ( 28 Dec 2017 07:36 )             32.6                      12-    138  |  98  |  30<H>  ----------------------------<  259<H>  4.2   |  28  |  1.05    Ca    8.9      28 Dec 2017 07:37

## 2017-12-29 LAB
ANION GAP SERPL CALC-SCNC: 12 MMOL/L — SIGNIFICANT CHANGE UP (ref 5–17)
BUN SERPL-MCNC: 25 MG/DL — HIGH (ref 7–23)
CALCIUM SERPL-MCNC: 8.6 MG/DL — SIGNIFICANT CHANGE UP (ref 8.4–10.5)
CHLORIDE SERPL-SCNC: 102 MMOL/L — SIGNIFICANT CHANGE UP (ref 96–108)
CO2 SERPL-SCNC: 29 MMOL/L — SIGNIFICANT CHANGE UP (ref 22–31)
CREAT SERPL-MCNC: 0.98 MG/DL — SIGNIFICANT CHANGE UP (ref 0.5–1.3)
CULTURE RESULTS: SIGNIFICANT CHANGE UP
CULTURE RESULTS: SIGNIFICANT CHANGE UP
GLUCOSE BLDC GLUCOMTR-MCNC: 143 MG/DL — HIGH (ref 70–99)
GLUCOSE BLDC GLUCOMTR-MCNC: 197 MG/DL — HIGH (ref 70–99)
GLUCOSE BLDC GLUCOMTR-MCNC: 266 MG/DL — HIGH (ref 70–99)
GLUCOSE BLDC GLUCOMTR-MCNC: 275 MG/DL — HIGH (ref 70–99)
GLUCOSE SERPL-MCNC: 110 MG/DL — HIGH (ref 70–99)
HCT VFR BLD CALC: 32.7 % — LOW (ref 39–50)
HGB BLD-MCNC: 10.4 G/DL — LOW (ref 13–17)
INR BLD: 3.77 RATIO — HIGH (ref 0.88–1.16)
MCHC RBC-ENTMCNC: 28.9 PG — SIGNIFICANT CHANGE UP (ref 27–34)
MCHC RBC-ENTMCNC: 31.8 GM/DL — LOW (ref 32–36)
MCV RBC AUTO: 90.8 FL — SIGNIFICANT CHANGE UP (ref 80–100)
PLATELET # BLD AUTO: 358 K/UL — SIGNIFICANT CHANGE UP (ref 150–400)
POTASSIUM SERPL-MCNC: 4.3 MMOL/L — SIGNIFICANT CHANGE UP (ref 3.5–5.3)
POTASSIUM SERPL-SCNC: 4.3 MMOL/L — SIGNIFICANT CHANGE UP (ref 3.5–5.3)
PROTHROM AB SERPL-ACNC: 43.8 SEC — HIGH (ref 10–13.1)
RBC # BLD: 3.6 M/UL — LOW (ref 4.2–5.8)
RBC # FLD: 14.6 % — HIGH (ref 10.3–14.5)
SODIUM SERPL-SCNC: 143 MMOL/L — SIGNIFICANT CHANGE UP (ref 135–145)
SPECIMEN SOURCE: SIGNIFICANT CHANGE UP
SPECIMEN SOURCE: SIGNIFICANT CHANGE UP
WBC # BLD: 15.34 K/UL — HIGH (ref 3.8–10.5)
WBC # FLD AUTO: 15.34 K/UL — HIGH (ref 3.8–10.5)

## 2017-12-29 PROCEDURE — 93306 TTE W/DOPPLER COMPLETE: CPT | Mod: 26

## 2017-12-29 RX ORDER — INSULIN LISPRO 100/ML
6 VIAL (ML) SUBCUTANEOUS
Qty: 0 | Refills: 0 | Status: DISCONTINUED | OUTPATIENT
Start: 2017-12-29 | End: 2017-12-31

## 2017-12-29 RX ORDER — AZITHROMYCIN 500 MG/1
500 TABLET, FILM COATED ORAL DAILY
Qty: 0 | Refills: 0 | Status: DISCONTINUED | OUTPATIENT
Start: 2017-12-29 | End: 2018-01-02

## 2017-12-29 RX ORDER — INSULIN GLARGINE 100 [IU]/ML
18 INJECTION, SOLUTION SUBCUTANEOUS AT BEDTIME
Qty: 0 | Refills: 0 | Status: DISCONTINUED | OUTPATIENT
Start: 2017-12-29 | End: 2017-12-31

## 2017-12-29 RX ADMIN — INSULIN GLARGINE 18 UNIT(S): 100 INJECTION, SOLUTION SUBCUTANEOUS at 22:15

## 2017-12-29 RX ADMIN — Medication 100 MILLIGRAM(S): at 13:10

## 2017-12-29 RX ADMIN — Medication 500 MICROGRAM(S): at 17:11

## 2017-12-29 RX ADMIN — BUDESONIDE AND FORMOTEROL FUMARATE DIHYDRATE 2 PUFF(S): 160; 4.5 AEROSOL RESPIRATORY (INHALATION) at 09:26

## 2017-12-29 RX ADMIN — Medication 100 MILLIGRAM(S): at 06:06

## 2017-12-29 RX ADMIN — Medication 0.5 MILLIGRAM(S): at 22:14

## 2017-12-29 RX ADMIN — Medication 500 MICROGRAM(S): at 12:28

## 2017-12-29 RX ADMIN — Medication 1: at 22:15

## 2017-12-29 RX ADMIN — BUDESONIDE AND FORMOTEROL FUMARATE DIHYDRATE 2 PUFF(S): 160; 4.5 AEROSOL RESPIRATORY (INHALATION) at 22:15

## 2017-12-29 RX ADMIN — Medication 240 MILLIGRAM(S): at 06:06

## 2017-12-29 RX ADMIN — Medication 8 UNIT(S): at 17:11

## 2017-12-29 RX ADMIN — Medication 20 MILLIGRAM(S): at 13:10

## 2017-12-29 RX ADMIN — LEVALBUTEROL 0.63 MILLIGRAM(S): 1.25 SOLUTION, CONCENTRATE RESPIRATORY (INHALATION) at 17:11

## 2017-12-29 RX ADMIN — Medication 30 MILLIGRAM(S): at 06:06

## 2017-12-29 RX ADMIN — Medication 3: at 12:28

## 2017-12-29 RX ADMIN — Medication 100 MILLIGRAM(S): at 17:11

## 2017-12-29 RX ADMIN — Medication 8 UNIT(S): at 08:34

## 2017-12-29 RX ADMIN — Medication 500 MICROGRAM(S): at 06:07

## 2017-12-29 RX ADMIN — LEVALBUTEROL 0.63 MILLIGRAM(S): 1.25 SOLUTION, CONCENTRATE RESPIRATORY (INHALATION) at 12:28

## 2017-12-29 RX ADMIN — LAMOTRIGINE 25 MILLIGRAM(S): 25 TABLET, ORALLY DISINTEGRATING ORAL at 12:28

## 2017-12-29 RX ADMIN — Medication 8 UNIT(S): at 12:28

## 2017-12-29 RX ADMIN — Medication 100 MILLIGRAM(S): at 22:14

## 2017-12-29 RX ADMIN — Medication 40 MILLIGRAM(S): at 12:28

## 2017-12-29 RX ADMIN — ATORVASTATIN CALCIUM 40 MILLIGRAM(S): 80 TABLET, FILM COATED ORAL at 22:14

## 2017-12-29 RX ADMIN — Medication 30 MILLIGRAM(S): at 17:11

## 2017-12-29 RX ADMIN — LEVALBUTEROL 0.63 MILLIGRAM(S): 1.25 SOLUTION, CONCENTRATE RESPIRATORY (INHALATION) at 06:06

## 2017-12-29 RX ADMIN — Medication 20 MILLIGRAM(S): at 22:14

## 2017-12-29 RX ADMIN — PANTOPRAZOLE SODIUM 40 MILLIGRAM(S): 20 TABLET, DELAYED RELEASE ORAL at 06:06

## 2017-12-29 RX ADMIN — AZITHROMYCIN 500 MILLIGRAM(S): 500 TABLET, FILM COATED ORAL at 17:11

## 2017-12-29 RX ADMIN — Medication 1: at 17:11

## 2017-12-29 NOTE — PROGRESS NOTE ADULT - ASSESSMENT
No sx. of benzodiazepine withdrawal. Improved HR. No psychosis.     Recommend  Continue current Paroxetine and alprazolam.    Dimitri Vazquez M.D.  Psychiatry  [phone]

## 2017-12-29 NOTE — PROGRESS NOTE ADULT - SUBJECTIVE AND OBJECTIVE BOX
Patient is a 89y old  Male who presents with a chief complaint of sent for tachycardia (24 Dec 2017 22:19)                                                               INTERVAL HPI/OVERNIGHT EVENTS:    REVIEW OF SYSTEMS:     CONSTITUTIONAL: No weakness, fevers or chills  RESPIRATORY: No cough, wheezing,  No shortness of breath  CARDIOVASCULAR: No chest pain or palpitations  GASTROINTESTINAL: No abdominal pain  . No nausea, vomiting,  GENITOURINARY: No dysuria, frequency or hematuria  NEUROLOGICAL: No numbness or weakness                                                                                                                                                                                                                                                                                   Medications:  MEDICATIONS  (STANDING):  ALPRAZolam 0.5 milliGRAM(s) Oral at bedtime  atorvastatin 40 milliGRAM(s) Oral at bedtime  benzonatate 100 milliGRAM(s) Oral three times a day  buDESOnide 160 MICROgram(s)/formoterol 4.5 MICROgram(s) Inhaler 2 Puff(s) Inhalation two times a day  dextrose 5%. 1000 milliLiter(s) (50 mL/Hr) IV Continuous <Continuous>  dextrose 50% Injectable 12.5 Gram(s) IV Push once  dextrose 50% Injectable 25 Gram(s) IV Push once  dextrose 50% Injectable 25 Gram(s) IV Push once  diltiazem    milliGRAM(s) Oral daily  docusate sodium 100 milliGRAM(s) Oral two times a day  insulin glargine Injectable (LANTUS) 22 Unit(s) SubCutaneous at bedtime  insulin lispro (HumaLOG) corrective regimen sliding scale   SubCutaneous three times a day before meals  insulin lispro (HumaLOG) corrective regimen sliding scale   SubCutaneous at bedtime  insulin lispro Injectable (HumaLOG) 8 Unit(s) SubCutaneous three times a day with meals  ipratropium    for Nebulization 500 MICROGram(s) Nebulizer every 6 hours  lamoTRIgine 25 milliGRAM(s) Oral daily  levalbuterol Inhalation 0.63 milliGRAM(s) Inhalation every 6 hours  methimazole 5 milliGRAM(s) Oral daily  methylPREDNISolone sodium succinate Injectable 20 milliGRAM(s) IV Push every 8 hours  oseltamivir 30 milliGRAM(s) Oral two times a day  pantoprazole    Tablet 40 milliGRAM(s) Oral before breakfast  PARoxetine 40 milliGRAM(s) Oral daily  senna 2 Tablet(s) Oral at bedtime    MEDICATIONS  (PRN):  ALPRAZolam 0.5 milliGRAM(s) Oral every 12 hours PRN Anxiety  benzocaine 15 mG/menthol 3.6 mG Lozenge 1 Lozenge Oral three times a day PRN Sore Throat  dextrose Gel 1 Dose(s) Oral once PRN Blood Glucose LESS THAN 70 milliGRAM(s)/deciliter  glucagon  Injectable 1 milliGRAM(s) IntraMuscular once PRN Glucose LESS THAN 70 milligrams/deciliter       Allergies    No Known Allergies    Intolerances      Vital Signs Last 24 Hrs  T(C): 36.4 (29 Dec 2017 11:23), Max: 36.6 (28 Dec 2017 22:07)  T(F): 97.5 (29 Dec 2017 11:23), Max: 97.8 (28 Dec 2017 22:07)  HR: 68 (29 Dec 2017 11:23) (68 - 74)  BP: 124/54 (29 Dec 2017 11:23) (117/63 - 125/71)  BP(mean): --  RR: 17 (29 Dec 2017 11:23) (17 - 18)  SpO2: 95% (29 Dec 2017 11:23) (81% - 97%)  CAPILLARY BLOOD GLUCOSE      POCT Blood Glucose.: 275 mg/dL (29 Dec 2017 11:22)  POCT Blood Glucose.: 143 mg/dL (29 Dec 2017 07:25)  POCT Blood Glucose.: 153 mg/dL (28 Dec 2017 22:06)  POCT Blood Glucose.: 126 mg/dL (28 Dec 2017 17:00)       @ 07:01  -   @ 07:00  --------------------------------------------------------  IN: 900 mL / OUT: 600 mL / NET: 300 mL      Physical Exam:    Daily Weight in k.6 (29 Dec 2017 07:26)  General: NAD   HEENT:  Nonicteric, PERRLA  CV:  RRR, S1S2   Lungs:B wheezing   ronchi    improving   Abdomen:  Soft, non-tender, no distended, positive BS  Extremities:  no edema  Skin:  Warm and dry, no rashes  :  No greene  Neuro:  AAOx3, non-focal, grossly intact                                                                                                                                                                                                                                                                                                LABS:                               10.4   15.34 )-----------( 358      ( 29 Dec 2017 07:19 )             32.7                      12-    143  |  102  |  25<H>  ----------------------------<  110<H>  4.3   |  29  |  0.98    Ca    8.6      29 Dec 2017 07:28

## 2017-12-29 NOTE — PROGRESS NOTE ADULT - SUBJECTIVE AND OBJECTIVE BOX
Chief Complaint: 88 y/o Type 2 diabetes COPD admitted with Influenza A, started on high dose steroids.    Steroids on taper, solumedrol to 20 mg Q8H.  PO intake moderate.  Amiodarone D/Avelino (thank You).  FS better except lunch.    MEDICATIONS  (STANDING):  ALPRAZolam 0.5 milliGRAM(s) Oral at bedtime  atorvastatin 40 milliGRAM(s) Oral at bedtime  azithromycin   Tablet 500 milliGRAM(s) Oral daily  benzonatate 100 milliGRAM(s) Oral three times a day  buDESOnide 160 MICROgram(s)/formoterol 4.5 MICROgram(s) Inhaler 2 Puff(s) Inhalation two times a day  dextrose 5%. 1000 milliLiter(s) (50 mL/Hr) IV Continuous <Continuous>  dextrose 50% Injectable 12.5 Gram(s) IV Push once  dextrose 50% Injectable 25 Gram(s) IV Push once  dextrose 50% Injectable 25 Gram(s) IV Push once  diltiazem    milliGRAM(s) Oral daily  docusate sodium 100 milliGRAM(s) Oral two times a day  insulin glargine Injectable (LANTUS) 22 Unit(s) SubCutaneous at bedtime  insulin lispro (HumaLOG) corrective regimen sliding scale   SubCutaneous three times a day before meals  insulin lispro (HumaLOG) corrective regimen sliding scale   SubCutaneous at bedtime  insulin lispro Injectable (HumaLOG) 8 Unit(s) SubCutaneous three times a day with meals  ipratropium    for Nebulization 500 MICROGram(s) Nebulizer every 6 hours  lamoTRIgine 25 milliGRAM(s) Oral daily  levalbuterol Inhalation 0.63 milliGRAM(s) Inhalation every 6 hours  methimazole 5 milliGRAM(s) Oral daily  methylPREDNISolone sodium succinate Injectable 20 milliGRAM(s) IV Push every 8 hours  oseltamivir 30 milliGRAM(s) Oral two times a day  pantoprazole    Tablet 40 milliGRAM(s) Oral before breakfast  PARoxetine 40 milliGRAM(s) Oral daily  senna 2 Tablet(s) Oral at bedtime    MEDICATIONS  (PRN):  ALPRAZolam 0.5 milliGRAM(s) Oral every 12 hours PRN Anxiety  benzocaine 15 mG/menthol 3.6 mG Lozenge 1 Lozenge Oral three times a day PRN Sore Throat  dextrose Gel 1 Dose(s) Oral once PRN Blood Glucose LESS THAN 70 milliGRAM(s)/deciliter  glucagon  Injectable 1 milliGRAM(s) IntraMuscular once PRN Glucose LESS THAN 70 milligrams/deciliter      Allergies    No Known Allergies    Intolerances        PHYSICAL EXAM:  VITALS: T(C): 36.4 (12-29-17 @ 11:23)  T(F): 97.5 (12-29-17 @ 11:23), Max: 97.8 (12-28-17 @ 22:07)  HR: 68 (12-29-17 @ 11:23) (68 - 74)  BP: 124/54 (12-29-17 @ 11:23) (117/63 - 125/71)  RR:  (17 - 18)  SpO2:  (81% - 97%)  GENERAL: NAD,   HEENT:  Atraumatic, Normocephalic,   THYROID: Bilateral Nodular goiter.  RESPIRATORY: Bilateral wheezing, better.  CARDIOVASCULAR: Regular rhythm; No murmurs; no peripheral edema  GI: Soft, nontender, non distended, normal bowel sounds  SKIN: Dry, intact, No rashes or lesions  MUSCULOSKELETAL: normal strength  NEURO: extraocular movements intact, no tremor, normal reflexes  PSYCH: Alert and oriented but confused, normal affect, normal mood      POCT Blood Glucose.: 197 mg/dL (12-29-17 @ 17:02)  POCT Blood Glucose.: 275 mg/dL (12-29-17 @ 11:22)  POCT Blood Glucose.: 143 mg/dL (12-29-17 @ 07:25)  POCT Blood Glucose.: 153 mg/dL (12-28-17 @ 22:06)  POCT Blood Glucose.: 126 mg/dL (12-28-17 @ 17:00)  POCT Blood Glucose.: 204 mg/dL (12-28-17 @ 11:41)  POCT Blood Glucose.: 224 mg/dL (12-28-17 @ 07:47)  POCT Blood Glucose.: 340 mg/dL (12-27-17 @ 21:55)  POCT Blood Glucose.: 165 mg/dL (12-27-17 @ 16:35)  POCT Blood Glucose.: 312 mg/dL (12-27-17 @ 11:29)  POCT Blood Glucose.: 221 mg/dL (12-27-17 @ 07:40)  POCT Blood Glucose.: 159 mg/dL (12-26-17 @ 22:07)                            10.4   15.34 )-----------( 358      ( 29 Dec 2017 07:19 )             32.7       12-29    143  |  102  |  25<H>  ----------------------------<  110<H>  4.3   |  29  |  0.98    EGFR if : 79  EGFR if non : 68    Ca    8.6      12-29        Thyroid Function Tests:  12-26 @ 07:27 TSH -- FreeT4 -- T3 46 Anti TPO -- Anti Thyroglobulin Ab -- TSI --  12-25 @ 08:16 TSH 0.69 FreeT4 1.0 T3 -- Anti TPO -- Anti Thyroglobulin Ab -- TSI --      Hemoglobin A1C, Whole Blood: 6.5 % <H> [4.0 - 5.6] (12-25-17 @ 08:22)

## 2017-12-29 NOTE — CONSULT NOTE ADULT - PROBLEM SELECTOR RECOMMENDATION 2
Check t3  Continue tapazol 5 mg/d.
has COPD exacerbation: on steroids and started on empiric Azithromycin for copd exacerbation: seems to have productive cough!!
On Tamiflu

## 2017-12-29 NOTE — CONSULT NOTE ADULT - PROBLEM SELECTOR PROBLEM 1
Controlled type 2 diabetes mellitus with hyperglycemia, without long-term current use of insulin
Influenza
Atrial fibrillation

## 2017-12-29 NOTE — CONSULT NOTE ADULT - PROBLEM SELECTOR PROBLEM 3
Controlled type 2 diabetes mellitus with hyperglycemia, without long-term current use of insulin
Diabetes type 2, controlled

## 2017-12-29 NOTE — CONSULT NOTE ADULT - PROBLEM SELECTOR PROBLEM 2
Adenomatous goiter, toxic or with hyperthyroidism
COPD (chronic obstructive pulmonary disease)
Influenza

## 2017-12-29 NOTE — PROGRESS NOTE ADULT - SUBJECTIVE AND OBJECTIVE BOX
Events noted. Offers no complaints. Appetite good. No recent psychosis. HR better. On Xanax 0.50mg qhs.       Vital Signs Last 24 Hrs  T(C): 36.4 (29 Dec 2017 11:23), Max: 36.6 (28 Dec 2017 22:07)  T(F): 97.5 (29 Dec 2017 11:23), Max: 97.8 (28 Dec 2017 22:07)  HR: 68 (29 Dec 2017 11:23) (68 - 74)  BP: 124/54 (29 Dec 2017 11:23) (117/63 - 125/71)  BP(mean): --  RR: 17 (29 Dec 2017 11:23) (17 - 18)  SpO2: 95% (29 Dec 2017 11:23) (81% - 97%)                          10.4   15.34 )-----------( 358      ( 29 Dec 2017 07:19 )             32.7       12-29    143  |  102  |  25<H>  ----------------------------<  110<H>  4.3   |  29  |  0.98    Ca    8.6      29 Dec 2017 07:28                MEDICATIONS  (STANDING):  ALPRAZolam 0.5 milliGRAM(s) Oral at bedtime  atorvastatin 40 milliGRAM(s) Oral at bedtime  azithromycin   Tablet 500 milliGRAM(s) Oral daily  benzonatate 100 milliGRAM(s) Oral three times a day  buDESOnide 160 MICROgram(s)/formoterol 4.5 MICROgram(s) Inhaler 2 Puff(s) Inhalation two times a day  dextrose 5%. 1000 milliLiter(s) (50 mL/Hr) IV Continuous <Continuous>  dextrose 50% Injectable 12.5 Gram(s) IV Push once  dextrose 50% Injectable 25 Gram(s) IV Push once  dextrose 50% Injectable 25 Gram(s) IV Push once  diltiazem    milliGRAM(s) Oral daily  docusate sodium 100 milliGRAM(s) Oral two times a day  insulin glargine Injectable (LANTUS) 22 Unit(s) SubCutaneous at bedtime  insulin lispro (HumaLOG) corrective regimen sliding scale   SubCutaneous three times a day before meals  insulin lispro (HumaLOG) corrective regimen sliding scale   SubCutaneous at bedtime  insulin lispro Injectable (HumaLOG) 8 Unit(s) SubCutaneous three times a day with meals  ipratropium    for Nebulization 500 MICROGram(s) Nebulizer every 6 hours  lamoTRIgine 25 milliGRAM(s) Oral daily  levalbuterol Inhalation 0.63 milliGRAM(s) Inhalation every 6 hours  methimazole 5 milliGRAM(s) Oral daily  methylPREDNISolone sodium succinate Injectable 20 milliGRAM(s) IV Push every 8 hours  oseltamivir 30 milliGRAM(s) Oral two times a day  pantoprazole    Tablet 40 milliGRAM(s) Oral before breakfast  PARoxetine 40 milliGRAM(s) Oral daily  senna 2 Tablet(s) Oral at bedtime    MEDICATIONS  (PRN):  ALPRAZolam 0.5 milliGRAM(s) Oral every 12 hours PRN Anxiety  benzocaine 15 mG/menthol 3.6 mG Lozenge 1 Lozenge Oral three times a day PRN Sore Throat  dextrose Gel 1 Dose(s) Oral once PRN Blood Glucose LESS THAN 70 milliGRAM(s)/deciliter  glucagon  Injectable 1 milliGRAM(s) IntraMuscular once PRN Glucose LESS THAN 70 milligrams/deciliter      Elderly WM in bed, calm, cooperative, alert and oriented x 3 . No tremors nor diaphoresis.  No psychomotor abnormalities. Insight and judgment are fair. Speech is coherent with normal rate and volume. No hallucinations nor delusions. The patient denied suicidal and homicidal ideation and plan. Mood is euthymic and affect full range and appropriate. Attention and concentration, short term memory, and long term memory within normal limits.

## 2017-12-29 NOTE — PROGRESS NOTE ADULT - PROBLEM SELECTOR PLAN 1
Maintaining sinus   Off  amio now and  Cardizem 240 mg daily. HR stable   Awaiting  TTE   on coumadin  will need inpatient vs. outpatient stress test

## 2017-12-29 NOTE — CONSULT NOTE ADULT - ASSESSMENT
88 y/o Type 2 diabetes COPD admitted with Influenza A, started on high dose steroids.  Tapazol treatment for hyperthyroidism.    Diabetes oral controlled at home, started on high dose steroid, FS high.   Will start basal/bolus insulin.    Hyperthyroidism- with MNG on exam. 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   Will get full TFT's. Continue tapazol 5 daily for now (WBC low but with high ANC, LFT's normal).
89 year old, copd, atrial fibrillation, diagnosed with influenza A today at urgent care for  symptoms starting 3 days ago. Presenting form urgent care with tachycardia to 150s.   Pt has an element of dementia and is not able to give full hx however reports that he has been feeling weak , and reports palpitations / sob on and off otherwise no CP .  reports cough , no N/V/D
Delirium (unclear etiology: HARJEET?). He is at risk for MS change from Solumedrol and Tamiflu (case reports of psychosis with latter).  Suspect baseline mild dementia.    Recommend  Continue with Paxil 40mg p.o. qd, Lamotrigine 25mg qd (hold if any sign of a rash), and Xanax 0.50mg qd prn severe anxiety. Would avoid discontinuing these meds as he has taken them for years and could undergo a withdrawal reaction.  Check QTc and if under 500ms start Haldol 0.25mg IV q12h prn agitation or psychosis  Check B12, folate, head CT  Will follow with you here. [facility] upon [facility]. Thank you.    Dimitri Vazquez M.D.  Psychiatry  [phone]
88 yo male admitted with Flu and tachycardia
A/P: 89 year old male with copd, atrial fibrillation, diagnosed with influenza A at urgent care for  symptoms starting 3 days ago. Presenting form urgent care with tachycardia to 150s.   Pt has mild dementia and is not able to give full hx however reports that he has been feeling weak , and reports palpitations, sob on and off, no CP .  Reports cough , no N/V/D   RVP confirmed Influenza A.  with lactate 2.5.  No PNA on CXR. Given Tamiflu.   BC, UC NTD.  Continue Tamiflu for influenza.   No need for broad spectrum Abs at present time.

## 2017-12-29 NOTE — PROGRESS NOTE ADULT - SUBJECTIVE AND OBJECTIVE BOX
Subjective: Patient seen and examined. No new events except as noted.   no cp or sob     REVIEW OF SYSTEMS:    CONSTITUTIONAL: + weakness, no fevers or chills  EYES/ENT: No visual changes;  No vertigo or throat pain   NECK: No pain or stiffness  RESPIRATORY: No cough, wheezing, hemoptysis; No shortness of breath  CARDIOVASCULAR: No chest pain or palpitations  GASTROINTESTINAL: No abdominal or epigastric pain. No nausea, vomiting, or hematemesis; No diarrhea or constipation. No melena or hematochezia.  GENITOURINARY: No dysuria, frequency or hematuria  NEUROLOGICAL: No numbness or weakness  SKIN: No itching, burning, rashes, or lesions   All other review of systems is negative unless indicated above.    MEDICATIONS:  MEDICATIONS  (STANDING):  ALPRAZolam 0.5 milliGRAM(s) Oral at bedtime  atorvastatin 40 milliGRAM(s) Oral at bedtime  benzonatate 100 milliGRAM(s) Oral three times a day  buDESOnide 160 MICROgram(s)/formoterol 4.5 MICROgram(s) Inhaler 2 Puff(s) Inhalation two times a day  dextrose 5%. 1000 milliLiter(s) (50 mL/Hr) IV Continuous <Continuous>  dextrose 50% Injectable 12.5 Gram(s) IV Push once  dextrose 50% Injectable 25 Gram(s) IV Push once  dextrose 50% Injectable 25 Gram(s) IV Push once  diltiazem    milliGRAM(s) Oral daily  docusate sodium 100 milliGRAM(s) Oral two times a day  insulin glargine Injectable (LANTUS) 22 Unit(s) SubCutaneous at bedtime  insulin lispro (HumaLOG) corrective regimen sliding scale   SubCutaneous three times a day before meals  insulin lispro (HumaLOG) corrective regimen sliding scale   SubCutaneous at bedtime  insulin lispro Injectable (HumaLOG) 8 Unit(s) SubCutaneous three times a day with meals  ipratropium    for Nebulization 500 MICROGram(s) Nebulizer every 6 hours  lamoTRIgine 25 milliGRAM(s) Oral daily  levalbuterol Inhalation 0.63 milliGRAM(s) Inhalation every 6 hours  methimazole 5 milliGRAM(s) Oral daily  methylPREDNISolone sodium succinate Injectable 20 milliGRAM(s) IV Push every 8 hours  oseltamivir 30 milliGRAM(s) Oral two times a day  pantoprazole    Tablet 40 milliGRAM(s) Oral before breakfast  PARoxetine 40 milliGRAM(s) Oral daily  senna 2 Tablet(s) Oral at bedtime      PHYSICAL EXAM:  T(C): 36.4 (12-29-17 @ 04:09), Max: 36.6 (12-28-17 @ 22:07)  HR: 72 (12-29-17 @ 04:09) (72 - 77)  BP: 117/63 (12-29-17 @ 04:09) (117/63 - 145/69)  RR: 18 (12-29-17 @ 09:35) (17 - 18)  SpO2: 81% (12-29-17 @ 09:36) (81% - 97%)  Wt(kg): --  I&O's Summary    28 Dec 2017 07:01  -  29 Dec 2017 07:00  --------------------------------------------------------  IN: 900 mL / OUT: 600 mL / NET: 300 mL          Appearance: Normal	  HEENT:   Normal oral mucosa, PERRL, EOMI	  Lymphatic: No lymphadenopathy , no edema  Cardiovascular: Normal S1 S2, No JVD, No murmurs , Peripheral pulses palpable 2+ bilaterally  Respiratory: Lungs clear to auscultation, normal effort 	  Gastrointestinal:  Soft, Non-tender, + BS	  Skin: No rashes, No ecchymoses, No cyanosis, warm to touch  Musculoskeletal: decreased  range of motion and strength  Psychiatry:  Mood & affect appropriate  Ext: No edema      LABS:    CARDIAC MARKERS:                                10.4   15.34 )-----------( 358      ( 29 Dec 2017 07:19 )             32.7     12-29    143  |  102  |  25<H>  ----------------------------<  110<H>  4.3   |  29  |  0.98    Ca    8.6      29 Dec 2017 07:28      proBNP:   Lipid Profile:   HgA1c:   TSH:             TELEMETRY: 	 SR   ECG:  	  RADIOLOGY:   DIAGNOSTIC TESTING:  [ ] Echocardiogram:  [ ]  Catheterization:  [ ] Stress Test:    OTHER:

## 2017-12-29 NOTE — CONSULT NOTE ADULT - SUBJECTIVE AND OBJECTIVE BOX
I have seen and examined the patient and reviewed the history. Pt has hx of copd and used to smoke , has had 60 pk years history of smoking: Currently he says he has been wheezing and have been coughing!  Patient is a 89y old  Male who presents with a chief complaint of sent for tachycardia (24 Dec 2017 22:19)      HPI:  89 year old, copd, atrial fibrillation, diagnosed with influenza A today at urgent care for  symptoms starting 3 days ago. Presenting form urgent care with tachycardia to 150s.   Pt has an element of dementia and is not able to give full hx however reports that he has been feeling weak , and reports palpitations / sob on and off otherwise no CP .  reports cough , no N/V/D   no  sx   no GI complaints   EKG done at urgent pt was seen and examined on   care rendered at that time seems to be sinus tachy  ( background noises noted ) (24 Dec 2017 22:19)      ?FOLLOWING PRESENT  [x ] Hx of PE/DVT, [y ] Hx COPD, [x ] Hx of Asthma, [x ] Hx of Hospitalization, [x ]  Hx of BiPAP/CPAP use, [x ] Hx of TOMMY    Allergies    No Known Allergies    Intolerances        PAST MEDICAL & SURGICAL HISTORY:  Diabetes type 2, controlled  COPD (chronic obstructive pulmonary disease)  Atrial fibrillation  History of total hip arthroplasty, right      FAMILY HISTORY:  No pertinent family history in first degree relatives      Social History: [  60 pk years] TOBACCO                  [  x] ETOH                                 [x  ] IVDA/DRUGS    REVIEW OF SYSTEMS      General:	x    Skin/Breast:x  	  Ophthalmologic:x  	  ENMT:	x    Respiratory and Thorax: cough , phlegm DEAN   	  Cardiovascular:	x    Gastrointestinal:	x    Genitourinary:	x    Musculoskeletal:	x    Neurological:	x    Psychiatric:	x    Hematology/Lymphatics:	x    Endocrine:	x    Allergic/Immunologic:	x    MEDICATIONS  (STANDING):  ALPRAZolam 0.5 milliGRAM(s) Oral at bedtime  atorvastatin 40 milliGRAM(s) Oral at bedtime  benzonatate 100 milliGRAM(s) Oral three times a day  buDESOnide 160 MICROgram(s)/formoterol 4.5 MICROgram(s) Inhaler 2 Puff(s) Inhalation two times a day  dextrose 5%. 1000 milliLiter(s) (50 mL/Hr) IV Continuous <Continuous>  dextrose 50% Injectable 12.5 Gram(s) IV Push once  dextrose 50% Injectable 25 Gram(s) IV Push once  dextrose 50% Injectable 25 Gram(s) IV Push once  diltiazem    milliGRAM(s) Oral daily  docusate sodium 100 milliGRAM(s) Oral two times a day  insulin glargine Injectable (LANTUS) 22 Unit(s) SubCutaneous at bedtime  insulin lispro (HumaLOG) corrective regimen sliding scale   SubCutaneous three times a day before meals  insulin lispro (HumaLOG) corrective regimen sliding scale   SubCutaneous at bedtime  insulin lispro Injectable (HumaLOG) 8 Unit(s) SubCutaneous three times a day with meals  ipratropium    for Nebulization 500 MICROGram(s) Nebulizer every 6 hours  lamoTRIgine 25 milliGRAM(s) Oral daily  levalbuterol Inhalation 0.63 milliGRAM(s) Inhalation every 6 hours  methimazole 5 milliGRAM(s) Oral daily  methylPREDNISolone sodium succinate Injectable 20 milliGRAM(s) IV Push every 8 hours  oseltamivir 30 milliGRAM(s) Oral two times a day  pantoprazole    Tablet 40 milliGRAM(s) Oral before breakfast  PARoxetine 40 milliGRAM(s) Oral daily  senna 2 Tablet(s) Oral at bedtime    MEDICATIONS  (PRN):  ALPRAZolam 0.5 milliGRAM(s) Oral every 12 hours PRN Anxiety  benzocaine 15 mG/menthol 3.6 mG Lozenge 1 Lozenge Oral three times a day PRN Sore Throat  dextrose Gel 1 Dose(s) Oral once PRN Blood Glucose LESS THAN 70 milliGRAM(s)/deciliter  glucagon  Injectable 1 milliGRAM(s) IntraMuscular once PRN Glucose LESS THAN 70 milligrams/deciliter       Vital Signs Last 24 Hrs  T(C): 36.4 (29 Dec 2017 11:23), Max: 36.6 (28 Dec 2017 22:07)  T(F): 97.5 (29 Dec 2017 11:23), Max: 97.8 (28 Dec 2017 22:07)  HR: 68 (29 Dec 2017 11:23) (68 - 74)  BP: 124/54 (29 Dec 2017 11:23) (117/63 - 125/71)  BP(mean): --  RR: 17 (29 Dec 2017 11:23) (17 - 18)  SpO2: 95% (29 Dec 2017 11:23) (81% - 97%)        I&O's Summary    28 Dec 2017 07:01  -  29 Dec 2017 07:00  --------------------------------------------------------  IN: 900 mL / OUT: 600 mL / NET: 300 mL    29 Dec 2017 07:01  -  29 Dec 2017 14:18  --------------------------------------------------------  IN: 360 mL / OUT: 0 mL / NET: 360 mL        Physical Exam:   GENERAL: NAD, well-groomed, well-developed  HEENT: ROJELIO/   Atraumatic, Normocephalic  ENMT: No tonsillar erythema, exudates, or enlargement; Moist mucous membranes, Good dentition, No lesions  NECK: Supple, No JVD, Normal thyroid  CHEST/LUNG: Coarse rhonchi with coarse crackles and with wheezing::   CVS: Regular rate and rhythm; No murmurs, rubs, or gallops  GI: : Soft, Nontender, Nondistended; Bowel sounds present  NERVOUS SYSTEM:  Alert & Oriented X3  EXTREMITIES:  2+ Peripheral Pulses, No clubbing, cyanosis, or edema  LYMPH: No lymphadenopathy noted  SKIN: No rashes or lesions  ENDOCRINOLOGY: No Thyromegaly  PSYCH: Appropriate    Labs:  -0.2<51<4>>38<<7.325>>-0.2<<3><<4><<5<<389>>                            10.4   15.34 )-----------( 358      ( 29 Dec 2017 07:19 )             32.7                         10.4   12.86 )-----------( 340      ( 28 Dec 2017 07:36 )             32.6                         9.7    11.40 )-----------( 311      ( 27 Dec 2017 07:20 )             29.9                         10.8   12.32 )-----------( 360      ( 26 Dec 2017 07:35 )             33.7     12-29    143  |  102  |  25<H>  ----------------------------<  110<H>  4.3   |  29  |  0.98  12-28    138  |  98  |  30<H>  ----------------------------<  259<H>  4.2   |  28  |  1.05  12-27    142  |  102  |  29<H>  ----------------------------<  217<H>  4.2   |  27  |  1.07  12-26    137  |  101  |  28<H>  ----------------------------<  223<H>  4.7   |  21<L>  |  1.08  12-25    136  |  98  |  33<H>  ----------------------------<  226<H>  4.3   |  24  |  1.19    Ca    8.6      29 Dec 2017 07:28  Ca    8.9      28 Dec 2017 07:37      CAPILLARY BLOOD GLUCOSE      POCT Blood Glucose.: 275 mg/dL (29 Dec 2017 11:22)  POCT Blood Glucose.: 143 mg/dL (29 Dec 2017 07:25)  POCT Blood Glucose.: 153 mg/dL (28 Dec 2017 22:06)  POCT Blood Glucose.: 126 mg/dL (28 Dec 2017 17:00)      PT/INR - ( 29 Dec 2017 07:19 )   PT: 43.8 sec;   INR: 3.77 ratio             D DImer  Procalcitonin, Serum: 0.19 ng/mL (12-26 @ 05:26)    Cultures:               Rapid Respiratory Viral Panel Result        12-24 @ 15:51  Rapid RVP Detected  Coronovirus --  Adenovirus --  Bordetella Pertussis --  Chlamydia Pneumonia --  Entero/Rhinovirus--  HKU1 Coronovirus --  HMPV Coronovirus --  Influenza A --  Influenza AH1 --  Influenza AH1 2009 --  Influenza AH3 Detected  Influenza B --  Mycoplasma Pneumoniae --  NL63 Coronovirus --  OC43 Coronovirus --  Parainfluenza 1 --  Parainfluenza 2 --  Parainfluenza 3 --  Parainfluenza 4 --  Resp Syncytial Virus --        Studies  Chest X-RAY  CT SCAN Chest   CT Abdomen  Venous Dopplers: LE:   Others  < from: Xray Chest 1 View AP/PA (12.24.17 @ 15:50) >  RETATION:  CLINICAL INFORMATION: Fever.    TECHNIQUE: AP view of the chest.    COMPARISON: Chest radiograph January 29, 2017. CT chest November 18,2016.    FINDINGS:     Emphysematous lungs with flattening of the diaphragms. No focal   consolidations. Trace right pleural effusions. Redemonstration of the   right pleural plaque which is unchanged.    The cardiomediastinal silhouette is unremarkable. Atherosclerotic changes   of the aortic arch.    Degenerative changes of the visualized spine.    IMPRESSION:    No focal consolidations. Trace right pleural effusion.    COPD.                LUIS MARTINEZ M.D., RADIOLOGY RESIDENT  This document has been electronically signed.  REDDY ISSA M.D., ATTENDING RADIOLOGIST  This document has been electronically signed. Dec 25 2017  8:14AM        < end of copied text >

## 2017-12-29 NOTE — PROGRESS NOTE ADULT - ASSESSMENT
89 y/o Type 2 diabetes COPD admitted with Influenza A, started on high dose steroids.  Tapazol treatment for hyperthyroidism.    Diabetes oral controlled at home, started on high dose steroid, FS high.   Started basal/bolus insulin, further steroid taper today.  FS controled. Will start to decrease dose.    Hyperthyroidism- with MNG on exam. 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   T3-46, Free T4-1.1 both mid to low range,   Amiodarone D/Avelino. Will F/U TFT's when steroid dose reduced.

## 2017-12-29 NOTE — PROGRESS NOTE ADULT - ASSESSMENT
89 year old, copd, atrial fibrillation, diagnosed with influenza A today at urgent care for  symptoms starting 3 days ago. Presenting form urgent care with tachycardia to 150s.   Pt has an element of dementia and is not able to give full hx however reports that he has been feeling weak , and reports palpitations / sob on and off otherwise no CP .  reports cough , no N/V/D   no  sx   no GI complaints   EKG done at urgent pt was seen and examined on   care rendered at that time seems to be sinus tachy  ( background noises noted )   1- SOB : sec to COPD exacerbation in setting of flu .. slowly improving cont nebs and start tapering steroids .   complete  tamiflu  today    vest/ resp assisst device   elevted  pro bnp..conisder small dose of lasix     check o2 sat on ra /procalcitonin  mild elevation   2- Hx of afib:  was in afib then in sinus and had a run of SVT    cont rate control and AC ..  d/w  : pt in sinus now and afib likely precipitated worsened by acute illness..off amio     consider digoxin instead   hold coumadin for supratheraputic INR   3- HTN cont meds   4- DM: hold po meds and monitor FS ..cont insulin per    5- HARJEET /met acidosis: resolved   6- anemia : at baseline    7- multinodular goiter : cont tapazole and f/u with

## 2017-12-29 NOTE — CONSULT NOTE ADULT - PROBLEM SELECTOR RECOMMENDATION 9
Start lantus 16 units at HS  Homalog 5 units per meal.  Scale Humalog
on tamiflu
Check EKG and Thyroid panel   on exam he continues to be tachycardic  Cardizem 10 mg IV x 1

## 2017-12-30 LAB
ANION GAP SERPL CALC-SCNC: 12 MMOL/L — SIGNIFICANT CHANGE UP (ref 5–17)
BUN SERPL-MCNC: 24 MG/DL — HIGH (ref 7–23)
CALCIUM SERPL-MCNC: 9.2 MG/DL — SIGNIFICANT CHANGE UP (ref 8.4–10.5)
CHLORIDE SERPL-SCNC: 100 MMOL/L — SIGNIFICANT CHANGE UP (ref 96–108)
CO2 SERPL-SCNC: 29 MMOL/L — SIGNIFICANT CHANGE UP (ref 22–31)
CREAT SERPL-MCNC: 0.97 MG/DL — SIGNIFICANT CHANGE UP (ref 0.5–1.3)
GLUCOSE BLDC GLUCOMTR-MCNC: 186 MG/DL — HIGH (ref 70–99)
GLUCOSE BLDC GLUCOMTR-MCNC: 191 MG/DL — HIGH (ref 70–99)
GLUCOSE BLDC GLUCOMTR-MCNC: 242 MG/DL — HIGH (ref 70–99)
GLUCOSE BLDC GLUCOMTR-MCNC: 266 MG/DL — HIGH (ref 70–99)
GLUCOSE SERPL-MCNC: 192 MG/DL — HIGH (ref 70–99)
HCT VFR BLD CALC: 32.8 % — LOW (ref 39–50)
HGB BLD-MCNC: 10.5 G/DL — LOW (ref 13–17)
INR BLD: 3.05 RATIO — HIGH (ref 0.88–1.16)
MCHC RBC-ENTMCNC: 29 PG — SIGNIFICANT CHANGE UP (ref 27–34)
MCHC RBC-ENTMCNC: 32 GM/DL — SIGNIFICANT CHANGE UP (ref 32–36)
MCV RBC AUTO: 90.6 FL — SIGNIFICANT CHANGE UP (ref 80–100)
PLATELET # BLD AUTO: 374 K/UL — SIGNIFICANT CHANGE UP (ref 150–400)
POTASSIUM SERPL-MCNC: 4.3 MMOL/L — SIGNIFICANT CHANGE UP (ref 3.5–5.3)
POTASSIUM SERPL-SCNC: 4.3 MMOL/L — SIGNIFICANT CHANGE UP (ref 3.5–5.3)
PROTHROM AB SERPL-ACNC: 35.2 SEC — HIGH (ref 10–13.1)
RBC # BLD: 3.62 M/UL — LOW (ref 4.2–5.8)
RBC # FLD: 14.8 % — HIGH (ref 10.3–14.5)
SODIUM SERPL-SCNC: 141 MMOL/L — SIGNIFICANT CHANGE UP (ref 135–145)
WBC # BLD: 17.2 K/UL — HIGH (ref 3.8–10.5)
WBC # FLD AUTO: 17.2 K/UL — HIGH (ref 3.8–10.5)

## 2017-12-30 RX ORDER — WARFARIN SODIUM 2.5 MG/1
1 TABLET ORAL ONCE
Qty: 0 | Refills: 0 | Status: COMPLETED | OUTPATIENT
Start: 2017-12-30 | End: 2017-12-30

## 2017-12-30 RX ADMIN — LEVALBUTEROL 0.63 MILLIGRAM(S): 1.25 SOLUTION, CONCENTRATE RESPIRATORY (INHALATION) at 23:43

## 2017-12-30 RX ADMIN — BUDESONIDE AND FORMOTEROL FUMARATE DIHYDRATE 2 PUFF(S): 160; 4.5 AEROSOL RESPIRATORY (INHALATION) at 22:13

## 2017-12-30 RX ADMIN — Medication 20 MILLIGRAM(S): at 05:58

## 2017-12-30 RX ADMIN — Medication: at 09:57

## 2017-12-30 RX ADMIN — Medication 100 MILLIGRAM(S): at 05:58

## 2017-12-30 RX ADMIN — LEVALBUTEROL 0.63 MILLIGRAM(S): 1.25 SOLUTION, CONCENTRATE RESPIRATORY (INHALATION) at 12:56

## 2017-12-30 RX ADMIN — Medication 40 MILLIGRAM(S): at 13:21

## 2017-12-30 RX ADMIN — Medication 0.5 MILLIGRAM(S): at 22:13

## 2017-12-30 RX ADMIN — SENNA PLUS 2 TABLET(S): 8.6 TABLET ORAL at 22:13

## 2017-12-30 RX ADMIN — Medication 2: at 12:55

## 2017-12-30 RX ADMIN — LEVALBUTEROL 0.63 MILLIGRAM(S): 1.25 SOLUTION, CONCENTRATE RESPIRATORY (INHALATION) at 00:29

## 2017-12-30 RX ADMIN — LEVALBUTEROL 0.63 MILLIGRAM(S): 1.25 SOLUTION, CONCENTRATE RESPIRATORY (INHALATION) at 05:58

## 2017-12-30 RX ADMIN — Medication 6 UNIT(S): at 12:54

## 2017-12-30 RX ADMIN — Medication 6 UNIT(S): at 08:57

## 2017-12-30 RX ADMIN — Medication 500 MICROGRAM(S): at 13:57

## 2017-12-30 RX ADMIN — BUDESONIDE AND FORMOTEROL FUMARATE DIHYDRATE 2 PUFF(S): 160; 4.5 AEROSOL RESPIRATORY (INHALATION) at 10:57

## 2017-12-30 RX ADMIN — Medication 500 MICROGRAM(S): at 23:43

## 2017-12-30 RX ADMIN — Medication 20 MILLIGRAM(S): at 22:14

## 2017-12-30 RX ADMIN — Medication 20 MILLIGRAM(S): at 13:57

## 2017-12-30 RX ADMIN — Medication 500 MICROGRAM(S): at 00:28

## 2017-12-30 RX ADMIN — ATORVASTATIN CALCIUM 40 MILLIGRAM(S): 80 TABLET, FILM COATED ORAL at 22:13

## 2017-12-30 RX ADMIN — Medication 500 MICROGRAM(S): at 17:44

## 2017-12-30 RX ADMIN — Medication 500 MICROGRAM(S): at 05:58

## 2017-12-30 RX ADMIN — Medication 3: at 17:51

## 2017-12-30 RX ADMIN — WARFARIN SODIUM 1 MILLIGRAM(S): 2.5 TABLET ORAL at 22:15

## 2017-12-30 RX ADMIN — AZITHROMYCIN 500 MILLIGRAM(S): 500 TABLET, FILM COATED ORAL at 12:56

## 2017-12-30 RX ADMIN — INSULIN GLARGINE 18 UNIT(S): 100 INJECTION, SOLUTION SUBCUTANEOUS at 22:14

## 2017-12-30 RX ADMIN — Medication 100 MILLIGRAM(S): at 22:13

## 2017-12-30 RX ADMIN — LEVALBUTEROL 0.63 MILLIGRAM(S): 1.25 SOLUTION, CONCENTRATE RESPIRATORY (INHALATION) at 17:52

## 2017-12-30 RX ADMIN — LAMOTRIGINE 25 MILLIGRAM(S): 25 TABLET, ORALLY DISINTEGRATING ORAL at 12:57

## 2017-12-30 RX ADMIN — Medication 100 MILLIGRAM(S): at 17:52

## 2017-12-30 RX ADMIN — PANTOPRAZOLE SODIUM 40 MILLIGRAM(S): 20 TABLET, DELAYED RELEASE ORAL at 05:58

## 2017-12-30 RX ADMIN — Medication 6 UNIT(S): at 17:51

## 2017-12-30 RX ADMIN — Medication 100 MILLIGRAM(S): at 13:21

## 2017-12-30 RX ADMIN — Medication 240 MILLIGRAM(S): at 05:58

## 2017-12-30 NOTE — PROGRESS NOTE ADULT - ASSESSMENT
89 year old, copd, atrial fibrillation, diagnosed with influenza A today at urgent care for  symptoms starting 3 days ago. Presenting form urgent care with tachycardia to 150s.   Pt has an element of dementia and is not able to give full hx however reports that he has been feeling weak , and reports palpitations / sob on and off otherwise no CP .  reports cough , no N/V/D

## 2017-12-30 NOTE — PROGRESS NOTE ADULT - SUBJECTIVE AND OBJECTIVE BOX
Patient is a 89y old  Male who presents with a chief complaint of sent for tachycardia (24 Dec 2017 22:19)                                                               INTERVAL HPI/OVERNIGHT EVENTS:    REVIEW OF SYSTEMS:     CONSTITUTIONAL: No weakness, fevers or chills  RESPIRATORY: improving cough,  No shortness of breath  CARDIOVASCULAR: No chest pain or palpitations  GASTROINTESTINAL: No abdominal pain  . No nausea, vomiting  GENITOURINARY: No dysuria, frequency   NEUROLOGICAL: No numbness or weakness                                                                                                                                                                                                                                                                        Medications:  MEDICATIONS  (STANDING):  ALPRAZolam 0.5 milliGRAM(s) Oral at bedtime  atorvastatin 40 milliGRAM(s) Oral at bedtime  azithromycin   Tablet 500 milliGRAM(s) Oral daily  benzonatate 100 milliGRAM(s) Oral three times a day  buDESOnide 160 MICROgram(s)/formoterol 4.5 MICROgram(s) Inhaler 2 Puff(s) Inhalation two times a day  dextrose 5%. 1000 milliLiter(s) (50 mL/Hr) IV Continuous <Continuous>  dextrose 50% Injectable 12.5 Gram(s) IV Push once  dextrose 50% Injectable 25 Gram(s) IV Push once  dextrose 50% Injectable 25 Gram(s) IV Push once  diltiazem    milliGRAM(s) Oral daily  docusate sodium 100 milliGRAM(s) Oral two times a day  insulin glargine Injectable (LANTUS) 18 Unit(s) SubCutaneous at bedtime  insulin lispro (HumaLOG) corrective regimen sliding scale   SubCutaneous three times a day before meals  insulin lispro (HumaLOG) corrective regimen sliding scale   SubCutaneous at bedtime  insulin lispro Injectable (HumaLOG) 6 Unit(s) SubCutaneous three times a day with meals  ipratropium    for Nebulization 500 MICROGram(s) Nebulizer every 6 hours  lamoTRIgine 25 milliGRAM(s) Oral daily  levalbuterol Inhalation 0.63 milliGRAM(s) Inhalation every 6 hours  methimazole 5 milliGRAM(s) Oral daily  methylPREDNISolone sodium succinate Injectable 20 milliGRAM(s) IV Push every 8 hours  pantoprazole    Tablet 40 milliGRAM(s) Oral before breakfast  PARoxetine 40 milliGRAM(s) Oral daily  senna 2 Tablet(s) Oral at bedtime    MEDICATIONS  (PRN):  ALPRAZolam 0.5 milliGRAM(s) Oral every 12 hours PRN Anxiety  benzocaine 15 mG/menthol 3.6 mG Lozenge 1 Lozenge Oral three times a day PRN Sore Throat  dextrose Gel 1 Dose(s) Oral once PRN Blood Glucose LESS THAN 70 milliGRAM(s)/deciliter  glucagon  Injectable 1 milliGRAM(s) IntraMuscular once PRN Glucose LESS THAN 70 milligrams/deciliter       Allergies    No Known Allergies    Intolerances      Vital Signs Last 24 Hrs  T(C): 36.4 (30 Dec 2017 21:29), Max: 37.1 (30 Dec 2017 12:19)  T(F): 97.6 (30 Dec 2017 21:29), Max: 98.8 (30 Dec 2017 12:19)  HR: 83 (30 Dec 2017 21:29) (72 - 83)  BP: 154/76 (30 Dec 2017 21:29) (154/76 - 159/69)  BP(mean): --  RR: 18 (30 Dec 2017 21:29) (18 - 18)  SpO2: 98% (30 Dec 2017 21:29) (96% - 98%)  CAPILLARY BLOOD GLUCOSE      POCT Blood Glucose.: 191 mg/dL (30 Dec 2017 21:26)  POCT Blood Glucose.: 266 mg/dL (30 Dec 2017 16:35)  POCT Blood Glucose.: 242 mg/dL (30 Dec 2017 11:47)  POCT Blood Glucose.: 186 mg/dL (30 Dec 2017 07:58)      12-29 @ 07:01  -  12-30 @ 07:00  --------------------------------------------------------  IN: 480 mL / OUT: 500 mL / NET: -20 mL    12-30 @ 07:01  -  12-30 @ 23:17  --------------------------------------------------------  IN: 0 mL / OUT: 600 mL / NET: -600 mL      Physical Exam:   General: elderly NAD   HEENT:  Nonicteric, PERRLA  CV:  RRR, S1S2   Lungs: ronchi and wheezing improved compared to the last 24 hr s  Abdomen:  Soft, non-tender, no distended, positive BS  Extremities:  2+ pulses, no c/c, no edema  Skin:  Warm and dry, no rashes  :  No greene  Neuro:  AAOx3, non-focal, grossly intact                                                                                                                                                                                                                                                                                                LABS:                               10.5   17.20 )-----------( 374      ( 30 Dec 2017 08:12 )             32.8                      12-30    141  |  100  |  24<H>  ----------------------------<  192<H>  4.3   |  29  |  0.97    Ca    9.2      30 Dec 2017 08:13

## 2017-12-30 NOTE — PROGRESS NOTE ADULT - ASSESSMENT
89 year old, copd, atrial fibrillation, diagnosed with influenza A today at urgent care for  symptoms starting 3 days ago. Presenting form urgent care with tachycardia to 150s.   Pt has an element of dementia and is not able to give full hx however reports that he has been feeling weak , and reports palpitations / sob on and off otherwise no CP .  reports cough , no N/V/D   no  sx   no GI complaints   EKG done at urgent pt was seen and examined on   care rendered at that time seems to be sinus tachy  ( background noises noted )   1- SOB : sec to COPD exacerbation in setting of flu .. slowly improving cont nebs and will change steroids to po and observe   completed  tamiflu    vest/ resp assisst device   elevted  pro bnp..conisder small dose of lasix     check o2 sat on ra /procalcitonin  mild elevation   2- Hx of afib:  was in afib then in sinus and had a run of SVT    cont rate control and AC ..  would opt for o/p stress test when pul status improves  restart coumadin at lower dose   3- HTN cont meds   4- DM: hold po meds and monitor FS ..cont insulin per  .. will f/u re : recommendations upon dc on steroids   5- HARJEET /met acidosis: resolved   6- anemia : at baseline    7- multinodular goiter : cont tapazole and f/u with  ..repeat TFT in one months

## 2017-12-30 NOTE — PROGRESS NOTE ADULT - SUBJECTIVE AND OBJECTIVE BOX
Patient is a 89y old  Male who presents with a chief complaint of sent for tachycardia (24 Dec 2017 22:19)    Pertinent ROS: resting without acute distress. cough (+), no sputum, no SOB while resting     MEDICATIONS  (STANDING):  ALPRAZolam 0.5 milliGRAM(s) Oral at bedtime  atorvastatin 40 milliGRAM(s) Oral at bedtime  azithromycin   Tablet 500 milliGRAM(s) Oral daily  benzonatate 100 milliGRAM(s) Oral three times a day  buDESOnide 160 MICROgram(s)/formoterol 4.5 MICROgram(s) Inhaler 2 Puff(s) Inhalation two times a day  dextrose 5%. 1000 milliLiter(s) (50 mL/Hr) IV Continuous <Continuous>  dextrose 50% Injectable 12.5 Gram(s) IV Push once  dextrose 50% Injectable 25 Gram(s) IV Push once  dextrose 50% Injectable 25 Gram(s) IV Push once  diltiazem    milliGRAM(s) Oral daily  docusate sodium 100 milliGRAM(s) Oral two times a day  insulin glargine Injectable (LANTUS) 18 Unit(s) SubCutaneous at bedtime  insulin lispro (HumaLOG) corrective regimen sliding scale   SubCutaneous three times a day before meals  insulin lispro (HumaLOG) corrective regimen sliding scale   SubCutaneous at bedtime  insulin lispro Injectable (HumaLOG) 6 Unit(s) SubCutaneous three times a day with meals  ipratropium    for Nebulization 500 MICROGram(s) Nebulizer every 6 hours  lamoTRIgine 25 milliGRAM(s) Oral daily  levalbuterol Inhalation 0.63 milliGRAM(s) Inhalation every 6 hours  methimazole 5 milliGRAM(s) Oral daily  methylPREDNISolone sodium succinate Injectable 20 milliGRAM(s) IV Push every 8 hours  pantoprazole    Tablet 40 milliGRAM(s) Oral before breakfast  PARoxetine 40 milliGRAM(s) Oral daily  senna 2 Tablet(s) Oral at bedtime    MEDICATIONS  (PRN):  ALPRAZolam 0.5 milliGRAM(s) Oral every 12 hours PRN Anxiety  benzocaine 15 mG/menthol 3.6 mG Lozenge 1 Lozenge Oral three times a day PRN Sore Throat  dextrose Gel 1 Dose(s) Oral once PRN Blood Glucose LESS THAN 70 milliGRAM(s)/deciliter  glucagon  Injectable 1 milliGRAM(s) IntraMuscular once PRN Glucose LESS THAN 70 milligrams/deciliter    Vital Signs Last 24 Hrs  T(C): 36.3 (30 Dec 2017 04:20), Max: 36.4 (29 Dec 2017 11:23)  T(F): 97.4 (30 Dec 2017 04:20), Max: 97.6 (29 Dec 2017 21:32)  HR: 76 (30 Dec 2017 04:20) (68 - 80)  BP: 159/69 (30 Dec 2017 04:20) (124/54 - 159/69)  BP(mean): --  RR: 18 (30 Dec 2017 04:20) (17 - 18)  SpO2: 96% (30 Dec 2017 04:20) (81% - 97%)    I&O's Summary    29 Dec 2017 07:01  -  30 Dec 2017 07:00  --------------------------------------------------------  IN: 480 mL / OUT: 500 mL / NET: -20 mL      Physical Exam:   GENERAL: NAD, well-groomed, well-developed  HEENT: ROJELIO, Atraumatic, Normocephalic  NECK: Supple, No JVD, Normal thyroid  CHEST/LUNG: Coarse crackles   CVS: Regular rate and rhythm, no murmur  GI: : Soft, Nontender, Nondistended; Bowel sounds present  NERVOUS SYSTEM:  Alert & Oriented X3  EXTREMITIES:  2+ Peripheral Pulses, No clubbing, cyanosis, or edema  SKIN: Normal color, normal turgor, dry  ENDOCRINOLOGY: No Thyromegaly  PSYCH: Appropriate  Labs:                              10.5   17.20 )-----------( 374      ( 30 Dec 2017 08:12 )             32.8     12-30    141  |  100  |  24<H>  ----------------------------<  192<H>  4.3   |  29  |  0.97    Ca    9.2      30 Dec 2017 08:13      CAPILLARY BLOOD GLUCOSE      POCT Blood Glucose.: 186 mg/dL (30 Dec 2017 07:58)  POCT Blood Glucose.: 266 mg/dL (29 Dec 2017 21:35)  POCT Blood Glucose.: 197 mg/dL (29 Dec 2017 17:02)  POCT Blood Glucose.: 275 mg/dL (29 Dec 2017 11:22)      PT/INR - ( 30 Dec 2017 08:12 )   PT: 35.2 sec;   INR: 3.05 ratio             D DImer  Cultures:           Wound culture:                12-24 @ 22:13  Organism --  Culture w/ gram stain --  Specimen Source .Urine Clean Catch (Midstream)    Wound culture:                12-24 @ 18:15  Organism --  Culture w/ gram stain --  Specimen Source .Blood Blood-Peripheral      Abscess culture:             12-24 @ 22:13  Organism --  Gram Stain --  Specimen Source .Urine Clean Catch (Midstream)    Abscess culture:             12-24 @ 18:15  Organism --  Gram Stain --  Specimen Source .Blood Blood-Peripheral        Tissue culture:           12-24 @ 22:13  Organism --  Gram Stain --  Specimen Source .Urine Clean Catch (Midstream)    Tissue culture:           12-24 @ 18:15  Organism --  Gram Stain --  Specimen Source .Blood Blood-Peripheral      Body Fluid Smear & Culture:                        12-24 @ 22:13  AFB Smear  --  Culture Acid Fast Body Fluid w/ Smear  --  Culture Acid Fast Smear Concentrated   --    Culture Results:       No growth  Specimen Source .Urine Clean Catch (Midstream)    Body Fluid Smear & Culture:                        12-24 @ 18:15  AFB Smear  --  Culture Acid Fast Body Fluid w/ Smear  --  Culture Acid Fast Smear Concentrated   --    Culture Results:       No growth at 5 days.  Specimen Source .Blood Blood-Peripheral        Rapid Respiratory Viral Panel Result        12-24 @ 15:51  Rapid RVP Detected  Coronovirus --  Adenovirus --  Bordetella Pertussis --  Chlamydia Pneumonia --  Entero/Rhinovirus--  HKU1 Coronovirus --  HMPV Coronovirus --  Influenza A --  Influenza AH1 --  Influenza AH1 2009 --  Influenza AH3 Detected  Influenza B --  Mycoplasma Pneumoniae --  NL63 Coronovirus --  OC43 Coronovirus --  Parainfluenza 1 --  Parainfluenza 2 --  Parainfluenza 3 --  Parainfluenza 4 --  Resp Syncytial Virus --        Studies  Chest X-RAY < from: Xray Chest 1 View AP/PA (12.24.17 @ 15:50) >  EXAM:  CHEST SINGLE AP OR PA                            PROCEDURE DATE:  12/24/2017            INTERPRETATION:  CLINICAL INFORMATION: Fever.    TECHNIQUE: AP view of the chest.    COMPARISON: Chest radiograph January 29, 2017. CT chest November 18,2016.    FINDINGS:     Emphysematous lungs with flattening of the diaphragms. No focal   consolidations. Trace right pleural effusions. Redemonstration of the   right pleural plaque which is unchanged.    The cardiomediastinal silhouette is unremarkable. Atherosclerotic changes   of the aortic arch.    Degenerative changes of the visualized spine.    IMPRESSION:    No focal consolidations. Trace right pleural effusion.    COPD.    < end of copied text >    CT SCAN Chest < from: CT Chest No Cont (11.18.16 @ 18:01) >    EXAM:  CT CHEST                            PROCEDURE DATE:  11/18/2016        INTERPRETATION:  CLINICAL INFORMATION: Short of breath. History of COPD.   Evaluate for pneumonia.    COMPARISON: Correlated with the chest plain film from earlier the same   day.    PROCEDURE:   CT of the Chest was performed without intravenous contrast.  Sagittal and coronal reformats were performed.      FINDINGS:    CHEST:     LUNGS AND LARGE AIRWAYS: Patent central airways. No pulmonary nodules.   Moderate emphysema. Mild right base subsegmental atelectasis.  PLEURA: Right-sided calcified pleural plaques.  VESSELS: Coronary artery calcifications.  HEART: Heart size is normal.Trace pericardial effusion.  MEDIASTINUM AND ALFRED: No lymphadenopathy.  CHEST WALL AND LOWER NECK: Within normal limits.  VISUALIZED UPPER ABDOMEN: Gallstones.  BONES: Mild L1 compression deformity of indeterminate age.    IMPRESSION: No pneumonia. Right-sided calcified pleural plaques.   Emphysema. Gallstones. Mild L1 compression deformity of indeterminate age.    < end of copied text >    CT Abdomen  Venous Dopplers: LE:   Others  < from: Transthoracic Echocardiogram (12.29.17 @ 15:01) >  Patient name: BASHIR BECKER  YOB: 1928   Age: 89 (M)   MR#: [id number]  Study Date: 12/29/2017  Location: [address] [place], [place] [id number]: Laly Portillo RDCS  Study quality: Technically difficult  Referring Physician: Delilah Katz MD  Blood Pressure: 124/54 mmHg  Height: 183 cm  Weight: 70 kg  BSA: 1.9 m2  ------------------------------------------------------------------------  PROCEDURE: Transthoracic echocardiogram with 2-D, M-Mode  and complete spectral and color flow Doppler.  INDICATION: Unspecified atrial fibrillation (I48.91)  ------------------------------------------------------------------------  Dimensions:    Normal Values:  LA:     3.9    2.0 - 4.0 cm  Ao:     3.6    2.0 - 3.8 cm  SEPTUM: 1.1    0.6 - 1.2 cm  PWT:    1.0   0.6 - 1.1 cm  LVIDd:  4.7    3.0 - 5.6 cm  LVIDs:  3.1    1.8 - 4.0 cm  Derived variables:  LVMI: 93 g/m2  RWT: 0.42  Fractional short: 34 %  EF (Teicholtz): 75 %  Doppler Peak Velocity (m/sec): AoV=2.0  ------------------------------------------------------------------------  Observations:  Mitral Valve: Mitral annular calcification and calcified  mitral leaflets with normal diastolic opening.  Mild-moderate mitral regurgitation.  Aortic Valve/Aorta: Calcified trileaflet aortic valve with  normal opening. Peak transaortic valve gradient equals 17  mm Hg, mean transaortic valve gradient equals 9 mm Hg,  estimated aortic valve area equals 1.6 sqcm (by continuity  equation), aortic valve velocity time integral equals 40  cm, consistent with mild aortic stenosis. Peak left  ventricular outflow tract gradient equals 4 mm Hg, mean  gradient is equal to 2 mm Hg, LVOT velocity time integral  equals 21 cm.  Aortic Root: 3.6 cm.  Left Atrium: Mildly dilated left atrium.  LA volume index =  36 cc/m2.  Left Ventricle: Hyperdynamic left ventricular systolic  function. Increased relative wall thickness with normal  left ventricular mass index, consistent with concentric  left ventricular remodeling. Indeterminate diastolic  function.  Right Heart: Normal right atrium. Normal right ventricular  size and function. Normal tricuspid valve. Mild tricuspid  regurgitation. Normal pulmonic valve.  Pericardium/Pleura: Normal pericardium with trace  pericardial effusion.  Hemodynamic: Estimated rightatrial pressure is 8 mm Hg.  Estimated right ventricular systolic pressure equals 38 mm  Hg, assuming right atrial pressure equals 8 mm Hg,  consistent with borderline pulmonary hypertension.  ------------------------------------------------------------------------  Conclusions:  1. Mitral annular calcification and calcified mitral  leaflets with normal diastolic opening.  2. Calcified trileaflet aortic valve with normal opening.  Peak transaortic valve gradient equals 17 mm Hg, mean  transaorticvalve gradient equals 9 mm Hg, estimated aortic  valve area equals 1.6 sqcm (by continuity equation), aortic  valve velocity time integral equals 40 cm, consistent with  mild aortic stenosis.  3. Mildly dilated left atrium.  LA volume index = 36 cc/m2.  4. Increased relative wall thickness with normal left  ventricular mass index, consistent with concentric left  ventricular remodeling.  5. Hyperdynamic left ventricular systolic function.  6. Normal right ventricular size and function.  7. Estimated right ventricular systolic pressure equals 38  mm Hg, assuming right atrial pressure equals 8 mm Hg,  consistent with borderline pulmonary hypertension.  ------------------------------------------------------------------------    < end of copied text >

## 2017-12-30 NOTE — PROGRESS NOTE ADULT - SUBJECTIVE AND OBJECTIVE BOX
Subjective: Patient seen and examined. No new events except as noted.     REVIEW OF SYSTEMS:    CONSTITUTIONAL: No weakness, fevers or chills  EYES/ENT: No visual changes;  No vertigo or throat pain   NECK: No pain or stiffness  RESPIRATORY: No cough, wheezing, hemoptysis; No shortness of breath  CARDIOVASCULAR: No chest pain or palpitations  GASTROINTESTINAL: No abdominal or epigastric pain. No nausea, vomiting, or hematemesis; No diarrhea or constipation. No melena or hematochezia.  GENITOURINARY: No dysuria, frequency or hematuria  NEUROLOGICAL: No numbness or weakness  SKIN: No itching, burning, rashes, or lesions   All other review of systems is negative unless indicated above.    MEDICATIONS:  MEDICATIONS  (STANDING):  ALPRAZolam 0.5 milliGRAM(s) Oral at bedtime  atorvastatin 40 milliGRAM(s) Oral at bedtime  azithromycin   Tablet 500 milliGRAM(s) Oral daily  benzonatate 100 milliGRAM(s) Oral three times a day  buDESOnide 160 MICROgram(s)/formoterol 4.5 MICROgram(s) Inhaler 2 Puff(s) Inhalation two times a day  dextrose 5%. 1000 milliLiter(s) (50 mL/Hr) IV Continuous <Continuous>  dextrose 50% Injectable 12.5 Gram(s) IV Push once  dextrose 50% Injectable 25 Gram(s) IV Push once  dextrose 50% Injectable 25 Gram(s) IV Push once  diltiazem    milliGRAM(s) Oral daily  docusate sodium 100 milliGRAM(s) Oral two times a day  insulin glargine Injectable (LANTUS) 18 Unit(s) SubCutaneous at bedtime  insulin lispro (HumaLOG) corrective regimen sliding scale   SubCutaneous three times a day before meals  insulin lispro (HumaLOG) corrective regimen sliding scale   SubCutaneous at bedtime  insulin lispro Injectable (HumaLOG) 6 Unit(s) SubCutaneous three times a day with meals  ipratropium    for Nebulization 500 MICROGram(s) Nebulizer every 6 hours  lamoTRIgine 25 milliGRAM(s) Oral daily  levalbuterol Inhalation 0.63 milliGRAM(s) Inhalation every 6 hours  methimazole 5 milliGRAM(s) Oral daily  methylPREDNISolone sodium succinate Injectable 20 milliGRAM(s) IV Push every 8 hours  pantoprazole    Tablet 40 milliGRAM(s) Oral before breakfast  PARoxetine 40 milliGRAM(s) Oral daily  senna 2 Tablet(s) Oral at bedtime      PHYSICAL EXAM:  T(C): 37.1 (12-30-17 @ 12:19), Max: 37.1 (12-30-17 @ 12:19)  HR: 72 (12-30-17 @ 12:19) (72 - 80)  BP: 157/64 (12-30-17 @ 12:19) (147/56 - 159/69)  RR: 18 (12-30-17 @ 12:19) (18 - 18)  SpO2: 97% (12-30-17 @ 12:19) (94% - 97%)  Wt(kg): --  I&O's Summary    29 Dec 2017 07:01  -  30 Dec 2017 07:00  --------------------------------------------------------  IN: 480 mL / OUT: 500 mL / NET: -20 mL    30 Dec 2017 07:01  -  30 Dec 2017 19:46  --------------------------------------------------------  IN: 0 mL / OUT: 0 mL / NET: 0 mL          Appearance: Normal	  HEENT:   Normal oral mucosa, PERRL, EOMI	  Lymphatic: No lymphadenopathy , no edema  Cardiovascular: Normal S1 S2, No JVD, No murmurs , Peripheral pulses palpable 2+ bilaterally  Respiratory: Lungs clear to auscultation, normal effort 	  Gastrointestinal:  Soft, Non-tender, + BS	  Skin: No rashes, No ecchymoses, No cyanosis, warm to touch  Musculoskeletal: Normal range of motion, normal strength  Psychiatry:  Mood & affect appropriate  Ext: No edema      LABS:    CARDIAC MARKERS:                                10.5   17.20 )-----------( 374      ( 30 Dec 2017 08:12 )             32.8     12-30    141  |  100  |  24<H>  ----------------------------<  192<H>  4.3   |  29  |  0.97    Ca    9.2      30 Dec 2017 08:13      proBNP:   Lipid Profile:   HgA1c:   TSH:             TELEMETRY: 	SR    ECG:  	  RADIOLOGY:   DIAGNOSTIC TESTING:  [ ] Echocardiogram:  < from: Transthoracic Echocardiogram (12.29.17 @ 15:01) >    Patient name: BASHIR BECKER  YOB: 1928   Age: 89 (M)   MR#: [id number]  Study Date: 12/29/2017  Location: [address] [place], [place] [id number]: [facility]  Study quality: Technically difficult  Referring Physician: Delilah Katz MD  Blood Pressure: 124/54 mmHg  Height: 183 cm  Weight: 70 kg  BSA: 1.9 m2  ------------------------------------------------------------------------  PROCEDURE: Transthoracic echocardiogram with 2-D, M-Mode  and complete spectral and color flow Doppler.  INDICATION: Unspecified atrial fibrillation (I48.91)  ------------------------------------------------------------------------  Dimensions:    Normal Values:  LA:     3.9    2.0 - 4.0 cm  Ao:     3.6    2.0 - 3.8 cm  SEPTUM: 1.1    0.6 - 1.2 cm  PWT:    1.0   0.6 - 1.1 cm  LVIDd:  4.7    3.0 - 5.6 cm  LVIDs:  3.1    1.8 - 4.0 cm  Derived variables:  LVMI: 93 g/m2  RWT: 0.42  Fractional short: 34 %  EF (Teicholtz): 75 %  Doppler Peak Velocity (m/sec): AoV=2.0  ------------------------------------------------------------------------  Observations:  Mitral Valve: Mitral annular calcification and calcified  mitral leaflets with normal diastolic opening.  Mild-moderate mitral regurgitation.  Aortic Valve/Aorta: Calcified trileaflet aortic valve with  normal opening. Peak transaortic valve gradient equals 17  mm Hg, mean transaortic valve gradient equals 9 mm Hg,  estimated aortic valve area equals 1.6 sqcm (by continuity  equation), aortic valve velocity time integral equals 40  cm, consistent with mild aortic stenosis. Peak left  ventricular outflow tract gradient equals 4 mm Hg, mean  gradient is equal to 2 mm Hg, LVOT velocity time integral  equals 21 cm.  Aortic Root: 3.6 cm.  Left Atrium: Mildly dilated left atrium.  LA volume index =  36 cc/m2.  Left Ventricle: Hyperdynamic left ventricular systolic  function. Increased relative wall thickness with normal  left ventricular mass index, consistent with concentric  left ventricular remodeling. Indeterminate diastolic  function.  Right Heart: Normal right atrium. Normal right ventricular  size and function. Normal tricuspid valve. Mild tricuspid  regurgitation. Normal pulmonic valve.  Pericardium/Pleura: Normal pericardium with trace  pericardial effusion.  Hemodynamic: Estimated rightatrial pressure is 8 mm Hg.  Estimated right ventricular systolic pressure equals 38 mm  Hg, assuming right atrial pressure equals 8 mm Hg,  consistent with borderline pulmonary hypertension.  ------------------------------------------------------------------------  Conclusions:  1. Mitral annular calcification and calcified mitral  leaflets with normal diastolic opening.  2. Calcified trileaflet aortic valve with normal opening.  Peak transaortic valve gradient equals 17 mm Hg, mean  transaorticvalve gradient equals 9 mm Hg, estimated aortic  valve area equals 1.6 sqcm (by continuity equation), aortic  valve velocity time integral equals 40 cm, consistent with  mild aortic stenosis.  3. Mildly dilated left atrium.  LA volume index = 36 cc/m2.  4. Increased relative wall thickness with normal left  ventricular mass index, consistent with concentric left  ventricular remodeling.  5. Hyperdynamic left ventricular systolic function.  6. Normal right ventricular size and function.  7. Estimated right ventricular systolic pressure equals 38  mm Hg, assuming right atrial pressure equals 8 mm Hg,  consistent with borderline pulmonary hypertension.  ------------------------------------------------------------------------  Confirmed on  12/29/2017 - 16:56:34 by Gunjan Perez M.D.        [ ]  Catheterization:  [ ] Stress Test:    OTHER:

## 2017-12-31 LAB
ANION GAP SERPL CALC-SCNC: 8 MMOL/L — SIGNIFICANT CHANGE UP (ref 5–17)
BUN SERPL-MCNC: 18 MG/DL — SIGNIFICANT CHANGE UP (ref 7–23)
CALCIUM SERPL-MCNC: 8.8 MG/DL — SIGNIFICANT CHANGE UP (ref 8.4–10.5)
CHLORIDE SERPL-SCNC: 100 MMOL/L — SIGNIFICANT CHANGE UP (ref 96–108)
CO2 SERPL-SCNC: 33 MMOL/L — HIGH (ref 22–31)
CREAT SERPL-MCNC: 0.87 MG/DL — SIGNIFICANT CHANGE UP (ref 0.5–1.3)
GLUCOSE BLDC GLUCOMTR-MCNC: 101 MG/DL — HIGH (ref 70–99)
GLUCOSE BLDC GLUCOMTR-MCNC: 168 MG/DL — HIGH (ref 70–99)
GLUCOSE BLDC GLUCOMTR-MCNC: 192 MG/DL — HIGH (ref 70–99)
GLUCOSE BLDC GLUCOMTR-MCNC: 296 MG/DL — HIGH (ref 70–99)
GLUCOSE SERPL-MCNC: 93 MG/DL — SIGNIFICANT CHANGE UP (ref 70–99)
HCT VFR BLD CALC: 35.6 % — LOW (ref 39–50)
HGB BLD-MCNC: 11.5 G/DL — LOW (ref 13–17)
INR BLD: 2.65 RATIO — HIGH (ref 0.88–1.16)
MCHC RBC-ENTMCNC: 30.5 PG — SIGNIFICANT CHANGE UP (ref 27–34)
MCHC RBC-ENTMCNC: 32.4 GM/DL — SIGNIFICANT CHANGE UP (ref 32–36)
MCV RBC AUTO: 94.2 FL — SIGNIFICANT CHANGE UP (ref 80–100)
PLATELET # BLD AUTO: 485 K/UL — HIGH (ref 150–400)
POTASSIUM SERPL-MCNC: 3.5 MMOL/L — SIGNIFICANT CHANGE UP (ref 3.5–5.3)
POTASSIUM SERPL-SCNC: 3.5 MMOL/L — SIGNIFICANT CHANGE UP (ref 3.5–5.3)
PROTHROM AB SERPL-ACNC: 29.2 SEC — HIGH (ref 9.8–12.7)
RBC # BLD: 3.78 M/UL — LOW (ref 4.2–5.8)
RBC # FLD: 13.4 % — SIGNIFICANT CHANGE UP (ref 10.3–14.5)
SODIUM SERPL-SCNC: 141 MMOL/L — SIGNIFICANT CHANGE UP (ref 135–145)
WBC # BLD: 19 K/UL — HIGH (ref 3.8–10.5)
WBC # FLD AUTO: 19 K/UL — HIGH (ref 3.8–10.5)

## 2017-12-31 RX ORDER — INSULIN GLARGINE 100 [IU]/ML
14 INJECTION, SOLUTION SUBCUTANEOUS AT BEDTIME
Qty: 0 | Refills: 0 | Status: DISCONTINUED | OUTPATIENT
Start: 2017-12-31 | End: 2018-01-02

## 2017-12-31 RX ORDER — WARFARIN SODIUM 2.5 MG/1
1 TABLET ORAL ONCE
Qty: 0 | Refills: 0 | Status: COMPLETED | OUTPATIENT
Start: 2017-12-31 | End: 2017-12-31

## 2017-12-31 RX ORDER — INSULIN LISPRO 100/ML
7 VIAL (ML) SUBCUTANEOUS
Qty: 0 | Refills: 0 | Status: DISCONTINUED | OUTPATIENT
Start: 2017-12-31 | End: 2018-01-01

## 2017-12-31 RX ORDER — POLYETHYLENE GLYCOL 3350 17 G/17G
17 POWDER, FOR SOLUTION ORAL DAILY
Qty: 0 | Refills: 0 | Status: DISCONTINUED | OUTPATIENT
Start: 2017-12-31 | End: 2018-01-02

## 2017-12-31 RX ADMIN — SENNA PLUS 2 TABLET(S): 8.6 TABLET ORAL at 22:07

## 2017-12-31 RX ADMIN — LEVALBUTEROL 0.63 MILLIGRAM(S): 1.25 SOLUTION, CONCENTRATE RESPIRATORY (INHALATION) at 06:49

## 2017-12-31 RX ADMIN — BUDESONIDE AND FORMOTEROL FUMARATE DIHYDRATE 2 PUFF(S): 160; 4.5 AEROSOL RESPIRATORY (INHALATION) at 08:36

## 2017-12-31 RX ADMIN — LEVALBUTEROL 0.63 MILLIGRAM(S): 1.25 SOLUTION, CONCENTRATE RESPIRATORY (INHALATION) at 17:33

## 2017-12-31 RX ADMIN — AZITHROMYCIN 500 MILLIGRAM(S): 500 TABLET, FILM COATED ORAL at 13:38

## 2017-12-31 RX ADMIN — Medication 240 MILLIGRAM(S): at 06:49

## 2017-12-31 RX ADMIN — LAMOTRIGINE 25 MILLIGRAM(S): 25 TABLET, ORALLY DISINTEGRATING ORAL at 13:38

## 2017-12-31 RX ADMIN — INSULIN GLARGINE 14 UNIT(S): 100 INJECTION, SOLUTION SUBCUTANEOUS at 22:12

## 2017-12-31 RX ADMIN — Medication 500 MICROGRAM(S): at 13:38

## 2017-12-31 RX ADMIN — Medication 3: at 17:33

## 2017-12-31 RX ADMIN — LEVALBUTEROL 0.63 MILLIGRAM(S): 1.25 SOLUTION, CONCENTRATE RESPIRATORY (INHALATION) at 13:37

## 2017-12-31 RX ADMIN — Medication 30 MILLIGRAM(S): at 17:33

## 2017-12-31 RX ADMIN — ATORVASTATIN CALCIUM 40 MILLIGRAM(S): 80 TABLET, FILM COATED ORAL at 22:07

## 2017-12-31 RX ADMIN — Medication 100 MILLIGRAM(S): at 06:49

## 2017-12-31 RX ADMIN — POLYETHYLENE GLYCOL 3350 17 GRAM(S): 17 POWDER, FOR SOLUTION ORAL at 13:38

## 2017-12-31 RX ADMIN — Medication 7 UNIT(S): at 17:33

## 2017-12-31 RX ADMIN — Medication 6 UNIT(S): at 08:40

## 2017-12-31 RX ADMIN — Medication 1: at 13:38

## 2017-12-31 RX ADMIN — Medication 100 MILLIGRAM(S): at 17:33

## 2017-12-31 RX ADMIN — BUDESONIDE AND FORMOTEROL FUMARATE DIHYDRATE 2 PUFF(S): 160; 4.5 AEROSOL RESPIRATORY (INHALATION) at 22:07

## 2017-12-31 RX ADMIN — Medication 500 MICROGRAM(S): at 06:49

## 2017-12-31 RX ADMIN — PANTOPRAZOLE SODIUM 40 MILLIGRAM(S): 20 TABLET, DELAYED RELEASE ORAL at 06:49

## 2017-12-31 RX ADMIN — Medication 6 UNIT(S): at 13:38

## 2017-12-31 RX ADMIN — Medication 500 MICROGRAM(S): at 17:34

## 2017-12-31 RX ADMIN — Medication 30 MILLIGRAM(S): at 06:48

## 2017-12-31 RX ADMIN — Medication 100 MILLIGRAM(S): at 22:07

## 2017-12-31 RX ADMIN — Medication 40 MILLIGRAM(S): at 13:38

## 2017-12-31 RX ADMIN — Medication 0.5 MILLIGRAM(S): at 22:12

## 2017-12-31 RX ADMIN — WARFARIN SODIUM 1 MILLIGRAM(S): 2.5 TABLET ORAL at 22:07

## 2017-12-31 RX ADMIN — Medication 100 MILLIGRAM(S): at 13:38

## 2017-12-31 NOTE — PROGRESS NOTE ADULT - SUBJECTIVE AND OBJECTIVE BOX
Subjective: Patient seen and examined. No new events except as noted.   no cp or sob     REVIEW OF SYSTEMS:    CONSTITUTIONAL: + weakness, no fevers or chills  EYES/ENT: No visual changes;  No vertigo or throat pain   NECK: No pain or stiffness  RESPIRATORY: No cough, wheezing, hemoptysis; No shortness of breath  CARDIOVASCULAR: No chest pain or palpitations  GASTROINTESTINAL: No abdominal or epigastric pain. No nausea, vomiting, or hematemesis; No diarrhea or constipation. No melena or hematochezia.  GENITOURINARY: No dysuria, frequency or hematuria  NEUROLOGICAL: No numbness or weakness  SKIN: No itching, burning, rashes, or lesions   All other review of systems is negative unless indicated above.    MEDICATIONS:  MEDICATIONS  (STANDING):  ALPRAZolam 0.5 milliGRAM(s) Oral at bedtime  atorvastatin 40 milliGRAM(s) Oral at bedtime  azithromycin   Tablet 500 milliGRAM(s) Oral daily  benzonatate 100 milliGRAM(s) Oral three times a day  buDESOnide 160 MICROgram(s)/formoterol 4.5 MICROgram(s) Inhaler 2 Puff(s) Inhalation two times a day  dextrose 5%. 1000 milliLiter(s) (50 mL/Hr) IV Continuous <Continuous>  dextrose 50% Injectable 12.5 Gram(s) IV Push once  dextrose 50% Injectable 25 Gram(s) IV Push once  dextrose 50% Injectable 25 Gram(s) IV Push once  diltiazem    milliGRAM(s) Oral daily  docusate sodium 100 milliGRAM(s) Oral two times a day  insulin glargine Injectable (LANTUS) 18 Unit(s) SubCutaneous at bedtime  insulin lispro (HumaLOG) corrective regimen sliding scale   SubCutaneous three times a day before meals  insulin lispro (HumaLOG) corrective regimen sliding scale   SubCutaneous at bedtime  insulin lispro Injectable (HumaLOG) 6 Unit(s) SubCutaneous three times a day with meals  ipratropium    for Nebulization 500 MICROGram(s) Nebulizer every 6 hours  lamoTRIgine 25 milliGRAM(s) Oral daily  levalbuterol Inhalation 0.63 milliGRAM(s) Inhalation every 6 hours  methimazole 5 milliGRAM(s) Oral daily  pantoprazole    Tablet 40 milliGRAM(s) Oral before breakfast  PARoxetine 40 milliGRAM(s) Oral daily  predniSONE   Tablet 30 milliGRAM(s) Oral two times a day  senna 2 Tablet(s) Oral at bedtime      PHYSICAL EXAM:  T(C): 36.8 (12-31-17 @ 03:49), Max: 37.1 (12-30-17 @ 12:19)  HR: 90 (12-31-17 @ 03:49) (72 - 90)  BP: 141/69 (12-31-17 @ 03:49) (141/69 - 157/64)  RR: 18 (12-31-17 @ 03:49) (18 - 18)  SpO2: 93% (12-31-17 @ 03:49) (93% - 98%)  Wt(kg): --  I&O's Summary    30 Dec 2017 07:01  -  31 Dec 2017 07:00  --------------------------------------------------------  IN: 150 mL / OUT: 900 mL / NET: -750 mL          Appearance: lethargic 	  HEENT:   Normal oral mucosa, PERRL, EOMI	  Lymphatic: No lymphadenopathy , no edema  Cardiovascular: Normal S1 S2, No JVD, 3/6 systolic ejection murmurs , Peripheral pulses palpable 2+ bilaterally  Respiratory: Lungs clear to auscultation, normal effort 	  Gastrointestinal:  Soft, Non-tender, + BS	  Skin: No rashes, No ecchymoses, No cyanosis, warm to touch  Musculoskeletal: Normal range of motion, normal strength  Psychiatry:  Mood & affect appropriate  Ext: No edema      LABS:    CARDIAC MARKERS:                                11.5   19.0  )-----------( 485      ( 31 Dec 2017 08:53 )             35.6     12-31    141  |  100  |  18  ----------------------------<  93  3.5   |  33<H>  |  0.87    Ca    8.8      31 Dec 2017 08:53      proBNP:   Lipid Profile:   HgA1c:   TSH:             TELEMETRY: 	SR (70-90s)    ECG:  	  RADIOLOGY:   DIAGNOSTIC TESTING:    [ ] Echocardiogram:   [ ]  Catheterization:  [ ] Stress Test:    OTHER:

## 2017-12-31 NOTE — PROGRESS NOTE ADULT - PROBLEM SELECTOR PLAN 1
Maintaining sinus   Cardizem 240 mg daily. HR stable   TTE reviewed  and stable   on coumadin  will need inpatient vs. outpatient stress test

## 2017-12-31 NOTE — PROGRESS NOTE ADULT - PROBLEM SELECTOR PLAN 1
Teach patient to use insulin.   Decrease lantus 14 units daily.  Increase Humalog 7 units per meal.  Low scale humalog.

## 2017-12-31 NOTE — PROGRESS NOTE ADULT - ASSESSMENT
89 year old, copd, atrial fibrillation, diagnosed with influenza A today at urgent care for  symptoms starting 3 days ago. Presenting form urgent care with tachycardia to 150s.   Pt has an element of dementia and is not able to give full hx however reports that he has been feeling weak , and reports palpitations / sob on and off otherwise no CP .  reports cough , no N/V/D   no  sx   no GI complaints   EKG done at urgent pt was seen and examined on   care rendered at that time seems to be sinus tachy  ( background noises noted )   1- SOB : sec to COPD exacerbation in setting of flu .. slowly improving cont nebs and will change steroids to po and observe   completed  tamiflu    vest/ resp assisst device   elevted  pro bnp..conisder small dose of lasix     check o2 sat on ra /procalcitonin  mild elevation   2- Hx of afib:  was in afib then in sinus and had a run of SVT    cont rate control and AC ..  would opt for o/p stress test when pul status improves  restart coumadin at lower dose   3- HTN cont meds   4- DM: hold po meds and monitor FS ..cont insulin per  .. will f/u re : recommendations upon dc on steroids   5- HARJEET /met acidosis: resolved   6- anemia : at baseline    7- multinodular goiter : cont tapazole and f/u with  ..repeat TFT in one months 89 year old, copd, atrial fibrillation, diagnosed with influenza A today at urgent care for  symptoms starting 3 days ago. Presenting form urgent care with tachycardia to 150s.   Pt has an element of dementia and is not able to give full hx however reports that he has been feeling weak , and reports palpitations / sob on and off otherwise no CP .  reports cough , no N/V/D   no  sx   no GI complaints   EKG done at urgent pt was seen and examined on   care rendered at that time seems to be sinus tachy  ( background noises noted )   1- SOB : sec to COPD exacerbation in setting of flu .. slowly improving cont nebs and cont  steroids to po ..discussed with    completed  tamiflu    vest/ resp assisst device   check o2 sat on ra /procalcitonin  mild elevation   2- Hx of afib:  was in afib then in sinus and had a run of SVT    cont rate control and AC ..  would opt for o/p stress test when pul status improves  restart coumadin at lower dose   3- HTN cont meds   4- DM: hold po meds and monitor FS .d/w Ross : plan for dc on insulin given elevated FS   5- HARJEET /met acidosis: resolved   6- anemia : at baseline    7- multinodular goiter : cont tapazole and f/u with  ..repeat TFT in one months

## 2017-12-31 NOTE — PROGRESS NOTE ADULT - SUBJECTIVE AND OBJECTIVE BOX
Follow-up Pulm Progress Note  Kai Onofre MD  [phone]    No new respiratory events overnight.  Denies SOB/CP.   Afebrile on azithromycin  O2 sat 92% on RA    Medications:  Vital Signs Last 24 Hrs  T(C): 36.8 (31 Dec 2017 03:49), Max: 36.8 (31 Dec 2017 03:49)  T(F): 98.2 (31 Dec 2017 03:49), Max: 98.2 (31 Dec 2017 03:49)  HR: 90 (31 Dec 2017 03:49) (83 - 90)  BP: 141/69 (31 Dec 2017 03:49) (141/69 - 154/76)  BP(mean): --  RR: 18 (31 Dec 2017 03:49) (18 - 18)  SpO2: 93% (31 Dec 2017 03:49) (93% - 98%)      12-30 @ 07:01  -  12-31 @ 07:00  --------------------------------------------------------  IN: 150 mL / OUT: 900 mL / NET: -750 mL    LABS:                        11.5   19.0  )-----------( 485      ( 31 Dec 2017 08:53 )             35.6     12-31    141  |  100  |  18  ----------------------------<  93  3.5   |  33<H>  |  0.87    Ca    8.8      31 Dec 2017 08:53        PT/INR - ( 31 Dec 2017 08:53 )   PT: 29.2 sec;   INR: 2.65 ratio    CULTURES:  Culture Results:   No growth (12-24 @ 22:13)  Culture Results:   No growth at 5 days. (12-24 @ 18:15)  Culture Results:   No growth at 5 days. (12-24 @ 18:15)        Physical Examination:  PULM: scattered exp rhonchi, rare wheeze  CVS: Regular rate and rhythm, no murmurs, rubs, or gallops  ABD: Soft, non-tender  EXT:  No clubbing, cyanosis, or edema    RADIOLOGY REVIEWED  CXR:    CT chest:    TTE:

## 2017-12-31 NOTE — PROGRESS NOTE ADULT - ASSESSMENT
88 y/o Type 2 diabetes COPD admitted with Influenza A, started on high dose steroids.  Tapazol treatment for hyperthyroidism.    Diabetes oral controlled at home, started on high dose steroid, FS high.   Started basal/bolus insulin, further steroid taper today.  IV steroids changed to PO prednisone, still high amounts with slow taper, will need insulin treatment for discharge!    Hyperthyroidism- with MNG on exam. 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   T3-46, Free T4-1.1 both mid to low range,   Amiodarone D/Avelino. Will F/U TFT's as out patient once steroid dose reduced (high dose steroids suppress TFT's).

## 2017-12-31 NOTE — PROGRESS NOTE ADULT - ASSESSMENT
COPD exacerbation  Influenza A viral infection  Atrial Fibrillation  DM    REC    Continue pred 30 bid - begin taper 1/1/18  Continue current bronchodilator regimen  Complete 5 days azithromycin  DC planning 1-2 days per primary team

## 2017-12-31 NOTE — PROGRESS NOTE ADULT - SUBJECTIVE AND OBJECTIVE BOX
Chief Complaint: 89 y/o Type 2 diabetes COPD admitted with Influenza A, started on high dose steroids.    No complaints, IV steroids tapered to PO prednisone still high dose- 60 mg BID  PO intake good.  FS trending low at am, high after meals.    MEDICATIONS  (STANDING):  ALPRAZolam 0.5 milliGRAM(s) Oral at bedtime  atorvastatin 40 milliGRAM(s) Oral at bedtime  azithromycin   Tablet 500 milliGRAM(s) Oral daily  benzonatate 100 milliGRAM(s) Oral three times a day  buDESOnide 160 MICROgram(s)/formoterol 4.5 MICROgram(s) Inhaler 2 Puff(s) Inhalation two times a day  dextrose 5%. 1000 milliLiter(s) (50 mL/Hr) IV Continuous <Continuous>  dextrose 50% Injectable 12.5 Gram(s) IV Push once  dextrose 50% Injectable 25 Gram(s) IV Push once  dextrose 50% Injectable 25 Gram(s) IV Push once  diltiazem    milliGRAM(s) Oral daily  docusate sodium 100 milliGRAM(s) Oral two times a day  insulin glargine Injectable (LANTUS) 18 Unit(s) SubCutaneous at bedtime  insulin lispro (HumaLOG) corrective regimen sliding scale   SubCutaneous three times a day before meals  insulin lispro (HumaLOG) corrective regimen sliding scale   SubCutaneous at bedtime  insulin lispro Injectable (HumaLOG) 6 Unit(s) SubCutaneous three times a day with meals  ipratropium    for Nebulization 500 MICROGram(s) Nebulizer every 6 hours  lamoTRIgine 25 milliGRAM(s) Oral daily  levalbuterol Inhalation 0.63 milliGRAM(s) Inhalation every 6 hours  methimazole 5 milliGRAM(s) Oral daily  pantoprazole    Tablet 40 milliGRAM(s) Oral before breakfast  PARoxetine 40 milliGRAM(s) Oral daily  predniSONE   Tablet 30 milliGRAM(s) Oral two times a day  senna 2 Tablet(s) Oral at bedtime    MEDICATIONS  (PRN):  ALPRAZolam 0.5 milliGRAM(s) Oral every 12 hours PRN Anxiety  benzocaine 15 mG/menthol 3.6 mG Lozenge 1 Lozenge Oral three times a day PRN Sore Throat  dextrose Gel 1 Dose(s) Oral once PRN Blood Glucose LESS THAN 70 milliGRAM(s)/deciliter  glucagon  Injectable 1 milliGRAM(s) IntraMuscular once PRN Glucose LESS THAN 70 milligrams/deciliter  polyethylene glycol 3350 17 Gram(s) Oral daily PRN Constipation      Allergies    No Known Allergies    Intolerances        PHYSICAL EXAM:  VITALS: T(C): 36.8 (12-31-17 @ 03:49)  T(F): 98.2 (12-31-17 @ 03:49), Max: 98.2 (12-31-17 @ 03:49)  HR: 90 (12-31-17 @ 03:49) (83 - 90)  BP: 141/69 (12-31-17 @ 03:49) (141/69 - 154/76)  RR:  (18 - 18)  SpO2:  (93% - 98%)  GENERAL: NAD,   HEENT:  Atraumatic, Normocephalic,   THYROID: bilateral nodular goiter, no LN.  RESPIRATORY: Bilateral wheezing.  CARDIOVASCULAR: Regular rhythm; No murmurs; no peripheral edema  GI: Soft, nontender, non distended, normal bowel sounds  SKIN: Dry, intact, No rashes or lesions  MUSCULOSKELETAL: normal strength  NEURO: extraocular movements intact, no tremor, normal reflexes  PSYCH: Alert and oriented x 3, normal affect, normal mood      POCT Blood Glucose.: 168 mg/dL (12-31-17 @ 12:54)  POCT Blood Glucose.: 101 mg/dL (12-31-17 @ 07:28)  POCT Blood Glucose.: 191 mg/dL (12-30-17 @ 21:26)  POCT Blood Glucose.: 266 mg/dL (12-30-17 @ 16:35)  POCT Blood Glucose.: 242 mg/dL (12-30-17 @ 11:47)  POCT Blood Glucose.: 186 mg/dL (12-30-17 @ 07:58)  POCT Blood Glucose.: 266 mg/dL (12-29-17 @ 21:35)  POCT Blood Glucose.: 197 mg/dL (12-29-17 @ 17:02)  POCT Blood Glucose.: 275 mg/dL (12-29-17 @ 11:22)  POCT Blood Glucose.: 143 mg/dL (12-29-17 @ 07:25)  POCT Blood Glucose.: 153 mg/dL (12-28-17 @ 22:06)  POCT Blood Glucose.: 126 mg/dL (12-28-17 @ 17:00)                            11.5   19.0  )-----------( 485      ( 31 Dec 2017 08:53 )             35.6       12-31    141  |  100  |  18  ----------------------------<  93  3.5   |  33<H>  |  0.87    EGFR if : 89  EGFR if non : 77    Ca    8.8      12-31        Thyroid Function Tests:  12-26 @ 07:27 TSH -- FreeT4 -- T3 46 Anti TPO -- Anti Thyroglobulin Ab -- TSI --  12-25 @ 08:16 TSH 0.69 FreeT4 1.0 T3 -- Anti TPO -- Anti Thyroglobulin Ab -- TSI --      Hemoglobin A1C, Whole Blood: 6.5 % <H> [4.0 - 5.6] (12-25-17 @ 08:22)

## 2017-12-31 NOTE — PROGRESS NOTE ADULT - SUBJECTIVE AND OBJECTIVE BOX
Patient is a 89y old  Male who presents with a chief complaint of sent for tachycardia (24 Dec 2017 22:19)                                                               INTERVAL HPI/OVERNIGHT EVENTS:    REVIEW OF SYSTEMS:     CONSTITUTIONAL: No weakness, fevers or chills  EYES/ENT: No visual changes , no ear ache   NECK: No pain or stiffness  RESPIRATORY: No cough, wheezing,  No shortness of breath  CARDIOVASCULAR: No chest pain or palpitations  GASTROINTESTINAL: No abdominal pain  . No nausea, vomiting, or hematemesis; No diarrhea or constipation. No melena or hematochezia.  GENITOURINARY: No dysuria, frequency or hematuria  NEUROLOGICAL: No numbness or weakness  SKIN: No itching, burning, rashes, or lesions                                                                                                                                                                                                                                                                                 Medications:  MEDICATIONS  (STANDING):  ALPRAZolam 0.5 milliGRAM(s) Oral at bedtime  atorvastatin 40 milliGRAM(s) Oral at bedtime  azithromycin   Tablet 500 milliGRAM(s) Oral daily  benzonatate 100 milliGRAM(s) Oral three times a day  buDESOnide 160 MICROgram(s)/formoterol 4.5 MICROgram(s) Inhaler 2 Puff(s) Inhalation two times a day  dextrose 5%. 1000 milliLiter(s) (50 mL/Hr) IV Continuous <Continuous>  dextrose 50% Injectable 12.5 Gram(s) IV Push once  dextrose 50% Injectable 25 Gram(s) IV Push once  dextrose 50% Injectable 25 Gram(s) IV Push once  diltiazem    milliGRAM(s) Oral daily  docusate sodium 100 milliGRAM(s) Oral two times a day  insulin glargine Injectable (LANTUS) 14 Unit(s) SubCutaneous at bedtime  insulin lispro (HumaLOG) corrective regimen sliding scale   SubCutaneous three times a day before meals  insulin lispro (HumaLOG) corrective regimen sliding scale   SubCutaneous at bedtime  insulin lispro Injectable (HumaLOG) 7 Unit(s) SubCutaneous three times a day with meals  ipratropium    for Nebulization 500 MICROGram(s) Nebulizer every 6 hours  lamoTRIgine 25 milliGRAM(s) Oral daily  levalbuterol Inhalation 0.63 milliGRAM(s) Inhalation every 6 hours  methimazole 5 milliGRAM(s) Oral daily  pantoprazole    Tablet 40 milliGRAM(s) Oral before breakfast  PARoxetine 40 milliGRAM(s) Oral daily  predniSONE   Tablet 30 milliGRAM(s) Oral two times a day  senna 2 Tablet(s) Oral at bedtime    MEDICATIONS  (PRN):  ALPRAZolam 0.5 milliGRAM(s) Oral every 12 hours PRN Anxiety  benzocaine 15 mG/menthol 3.6 mG Lozenge 1 Lozenge Oral three times a day PRN Sore Throat  dextrose Gel 1 Dose(s) Oral once PRN Blood Glucose LESS THAN 70 milliGRAM(s)/deciliter  glucagon  Injectable 1 milliGRAM(s) IntraMuscular once PRN Glucose LESS THAN 70 milligrams/deciliter  polyethylene glycol 3350 17 Gram(s) Oral daily PRN Constipation       Allergies    No Known Allergies    Intolerances      Vital Signs Last 24 Hrs  T(C): 36.9 (31 Dec 2017 21:26), Max: 36.9 (31 Dec 2017 21:26)  T(F): 98.4 (31 Dec 2017 21:26), Max: 98.4 (31 Dec 2017 21:26)  HR: 75 (31 Dec 2017 21:) (75 - 90)  BP: 150/67 (31 Dec 2017 21:26) (132/66 - 150/67)  BP(mean): --  RR: 18 (31 Dec 2017 21:26) (18 - 19)  SpO2: 95% (31 Dec 2017 21:26) (92% - 95%)  CAPILLARY BLOOD GLUCOSE      POCT Blood Glucose.: 192 mg/dL (31 Dec 2017 21:28)  POCT Blood Glucose.: 296 mg/dL (31 Dec 2017 17:00)  POCT Blood Glucose.: 168 mg/dL (31 Dec 2017 12:54)  POCT Blood Glucose.: 101 mg/dL (31 Dec 2017 07:28)       @ : @ 07:00  --------------------------------------------------------  IN: 150 mL / OUT: 900 mL / NET: -750 mL     @ 07: @ 23:39  --------------------------------------------------------  IN: 300 mL / OUT: 250 mL / NET: 50 mL      Physical Exam:    Daily     Daily Weight in k (31 Dec 2017 07:45)  General:  Well appearing, NAD, not cachetic  HEENT:  Nonicteric, PERRLA  CV:  RRR, S1S2   Lungs:  CTA B/L, no wheezes, rales, rhonchi  Abdomen:  Soft, non-tender, no distended, positive BS  Extremities:  2+ pulses, no c/c, no edema  Skin:  Warm and dry, no rashes  :  No greene  Neuro:  AAOx3, non-focal, grossly intact                                                                                                                                                                                                                                                                                                LABS:                               11.5   19.0  )-----------( 485      ( 31 Dec 2017 08:53 )             35.6                      12-    141  |  100  |  18  ----------------------------<  93  3.5   |  33<H>  |  0.87    Ca    8.8      31 Dec 2017 08:53                         RADIOLOGY & ADDITIONAL TESTS         I personally reviewed: [  ]EKG   [  ]CXR    [  ] CT      A/P:         Discussed with :     Katerina consultants' Notes   Time spent : Patient is a 89y old  Male who presents with a chief complaint of sent for tachycardia (24 Dec 2017 22:19)                                                               INTERVAL HPI/OVERNIGHT EVENTS:    REVIEW OF SYSTEMS:     CONSTITUTIONAL: No weakness, fevers or chills  RESPIRATORY: No cough, wheezing,  No shortness of breath  CARDIOVASCULAR: No chest pain or palpitations  GASTROINTESTINAL: No abdominal pain  . No nausea, vomiting  GENITOURINARY: No dysuria, frequency or hematuria  NEUROLOGICAL: No numbness or weakness                                                                                                                                                                                                                                                                                Medications:  MEDICATIONS  (STANDING):  ALPRAZolam 0.5 milliGRAM(s) Oral at bedtime  atorvastatin 40 milliGRAM(s) Oral at bedtime  azithromycin   Tablet 500 milliGRAM(s) Oral daily  benzonatate 100 milliGRAM(s) Oral three times a day  buDESOnide 160 MICROgram(s)/formoterol 4.5 MICROgram(s) Inhaler 2 Puff(s) Inhalation two times a day  dextrose 5%. 1000 milliLiter(s) (50 mL/Hr) IV Continuous <Continuous>  dextrose 50% Injectable 12.5 Gram(s) IV Push once  dextrose 50% Injectable 25 Gram(s) IV Push once  dextrose 50% Injectable 25 Gram(s) IV Push once  diltiazem    milliGRAM(s) Oral daily  docusate sodium 100 milliGRAM(s) Oral two times a day  insulin glargine Injectable (LANTUS) 14 Unit(s) SubCutaneous at bedtime  insulin lispro (HumaLOG) corrective regimen sliding scale   SubCutaneous three times a day before meals  insulin lispro (HumaLOG) corrective regimen sliding scale   SubCutaneous at bedtime  insulin lispro Injectable (HumaLOG) 7 Unit(s) SubCutaneous three times a day with meals  ipratropium    for Nebulization 500 MICROGram(s) Nebulizer every 6 hours  lamoTRIgine 25 milliGRAM(s) Oral daily  levalbuterol Inhalation 0.63 milliGRAM(s) Inhalation every 6 hours  methimazole 5 milliGRAM(s) Oral daily  pantoprazole    Tablet 40 milliGRAM(s) Oral before breakfast  PARoxetine 40 milliGRAM(s) Oral daily  predniSONE   Tablet 30 milliGRAM(s) Oral two times a day  senna 2 Tablet(s) Oral at bedtime    MEDICATIONS  (PRN):  ALPRAZolam 0.5 milliGRAM(s) Oral every 12 hours PRN Anxiety  benzocaine 15 mG/menthol 3.6 mG Lozenge 1 Lozenge Oral three times a day PRN Sore Throat  dextrose Gel 1 Dose(s) Oral once PRN Blood Glucose LESS THAN 70 milliGRAM(s)/deciliter  glucagon  Injectable 1 milliGRAM(s) IntraMuscular once PRN Glucose LESS THAN 70 milligrams/deciliter  polyethylene glycol 3350 17 Gram(s) Oral daily PRN Constipation       Allergies    No Known Allergies    Intolerances      Vital Signs Last 24 Hrs  T(C): 36.9 (31 Dec 2017 21:26), Max: 36.9 (31 Dec 2017 21:26)  T(F): 98.4 (31 Dec 2017 21:26), Max: 98.4 (31 Dec 2017 21:26)  HR: 75 (31 Dec 2017 21:) (75 - 90)  BP: 150/67 (31 Dec 2017 21:26) (132/66 - 150/67)  BP(mean): --  RR: 18 (31 Dec 2017 21:) (18 - 19)  SpO2: 95% (31 Dec 2017 21:) (92% - 95%)  CAPILLARY BLOOD GLUCOSE      POCT Blood Glucose.: 192 mg/dL (31 Dec 2017 21:28)  POCT Blood Glucose.: 296 mg/dL (31 Dec 2017 17:00)  POCT Blood Glucose.: 168 mg/dL (31 Dec 2017 12:54)  POCT Blood Glucose.: 101 mg/dL (31 Dec 2017 07:28)       @ : @ 07:00  --------------------------------------------------------  IN: 150 mL / OUT: 900 mL / NET: -750 mL     @ 07: @ 23:39  --------------------------------------------------------  IN: 300 mL / OUT: 250 mL / NET: 50 mL      Physical Exam:    Daily     Daily Weight in k (31 Dec 2017 07:45)  General:  Well appearing, NAD, not cachetic  HEENT:  Nonicteric, PERRLA  CV:  RRR, S1S2   Lungs:  CTA B/L, no wheezes, rales, rhonchi  Abdomen:  Soft, non-tender, no distended, positive BS  Extremities:  2+ pulses, no c/c, no edema  Skin:  Warm and dry, no rashes  :  No greene  Neuro:  AAOx3, non-focal, grossly intact                                                                                                                                                                                                                                                                                                LABS:                               11.5   19.0  )-----------( 485      ( 31 Dec 2017 08:53 )             35.6                      12-    141  |  100  |  18  ----------------------------<  93  3.5   |  33<H>  |  0.87    Ca    8.8      31 Dec 2017 08:53                         RADIOLOGY & ADDITIONAL TESTS         I personally reviewed: [  ]EKG   [  ]CXR    [  ] CT

## 2018-01-01 LAB
GLUCOSE BLDC GLUCOMTR-MCNC: 198 MG/DL — HIGH (ref 70–99)
GLUCOSE BLDC GLUCOMTR-MCNC: 251 MG/DL — HIGH (ref 70–99)
GLUCOSE BLDC GLUCOMTR-MCNC: 278 MG/DL — HIGH (ref 70–99)
GLUCOSE BLDC GLUCOMTR-MCNC: 281 MG/DL — HIGH (ref 70–99)
GLUCOSE BLDC GLUCOMTR-MCNC: 282 MG/DL — HIGH (ref 70–99)
GLUCOSE BLDC GLUCOMTR-MCNC: 286 MG/DL — HIGH (ref 70–99)
HCT VFR BLD CALC: 34.1 % — LOW (ref 39–50)
HGB BLD-MCNC: 10.8 G/DL — LOW (ref 13–17)
INR BLD: 2.07 RATIO — HIGH (ref 0.88–1.16)
MCHC RBC-ENTMCNC: 28.6 PG — SIGNIFICANT CHANGE UP (ref 27–34)
MCHC RBC-ENTMCNC: 31.7 GM/DL — LOW (ref 32–36)
MCV RBC AUTO: 90.5 FL — SIGNIFICANT CHANGE UP (ref 80–100)
PLATELET # BLD AUTO: 492 K/UL — HIGH (ref 150–400)
PROTHROM AB SERPL-ACNC: 23.7 SEC — HIGH (ref 10–13.1)
RBC # BLD: 3.77 M/UL — LOW (ref 4.2–5.8)
RBC # FLD: 15.3 % — HIGH (ref 10.3–14.5)
WBC # BLD: 15.77 K/UL — HIGH (ref 3.8–10.5)
WBC # FLD AUTO: 15.77 K/UL — HIGH (ref 3.8–10.5)

## 2018-01-01 RX ORDER — WARFARIN SODIUM 2.5 MG/1
2 TABLET ORAL ONCE
Qty: 0 | Refills: 0 | Status: COMPLETED | OUTPATIENT
Start: 2018-01-01 | End: 2018-01-01

## 2018-01-01 RX ORDER — INSULIN LISPRO 100/ML
8 VIAL (ML) SUBCUTANEOUS
Qty: 0 | Refills: 0 | Status: DISCONTINUED | OUTPATIENT
Start: 2018-01-01 | End: 2018-01-02

## 2018-01-01 RX ORDER — TIOTROPIUM BROMIDE 18 UG/1
1 CAPSULE ORAL; RESPIRATORY (INHALATION) DAILY
Qty: 0 | Refills: 0 | Status: DISCONTINUED | OUTPATIENT
Start: 2018-01-01 | End: 2018-01-02

## 2018-01-01 RX ORDER — BUDESONIDE AND FORMOTEROL FUMARATE DIHYDRATE 160; 4.5 UG/1; UG/1
2 AEROSOL RESPIRATORY (INHALATION)
Qty: 0 | Refills: 0 | Status: DISCONTINUED | OUTPATIENT
Start: 2018-01-01 | End: 2018-01-02

## 2018-01-01 RX ADMIN — TIOTROPIUM BROMIDE 1 CAPSULE(S): 18 CAPSULE ORAL; RESPIRATORY (INHALATION) at 18:14

## 2018-01-01 RX ADMIN — Medication 30 MILLIGRAM(S): at 18:09

## 2018-01-01 RX ADMIN — POLYETHYLENE GLYCOL 3350 17 GRAM(S): 17 POWDER, FOR SOLUTION ORAL at 13:00

## 2018-01-01 RX ADMIN — Medication 7 UNIT(S): at 18:12

## 2018-01-01 RX ADMIN — Medication 40 MILLIGRAM(S): at 12:57

## 2018-01-01 RX ADMIN — BUDESONIDE AND FORMOTEROL FUMARATE DIHYDRATE 2 PUFF(S): 160; 4.5 AEROSOL RESPIRATORY (INHALATION) at 21:48

## 2018-01-01 RX ADMIN — SENNA PLUS 2 TABLET(S): 8.6 TABLET ORAL at 21:20

## 2018-01-01 RX ADMIN — Medication 3: at 09:22

## 2018-01-01 RX ADMIN — Medication 3: at 12:57

## 2018-01-01 RX ADMIN — Medication 100 MILLIGRAM(S): at 18:09

## 2018-01-01 RX ADMIN — Medication 0.5 MILLIGRAM(S): at 21:18

## 2018-01-01 RX ADMIN — AZITHROMYCIN 500 MILLIGRAM(S): 500 TABLET, FILM COATED ORAL at 12:57

## 2018-01-01 RX ADMIN — Medication 1: at 21:22

## 2018-01-01 RX ADMIN — WARFARIN SODIUM 2 MILLIGRAM(S): 2.5 TABLET ORAL at 21:19

## 2018-01-01 RX ADMIN — LEVALBUTEROL 0.63 MILLIGRAM(S): 1.25 SOLUTION, CONCENTRATE RESPIRATORY (INHALATION) at 05:41

## 2018-01-01 RX ADMIN — Medication 7 UNIT(S): at 09:22

## 2018-01-01 RX ADMIN — Medication 3: at 18:12

## 2018-01-01 RX ADMIN — BUDESONIDE AND FORMOTEROL FUMARATE DIHYDRATE 2 PUFF(S): 160; 4.5 AEROSOL RESPIRATORY (INHALATION) at 09:25

## 2018-01-01 RX ADMIN — Medication 500 MICROGRAM(S): at 12:57

## 2018-01-01 RX ADMIN — ATORVASTATIN CALCIUM 40 MILLIGRAM(S): 80 TABLET, FILM COATED ORAL at 21:18

## 2018-01-01 RX ADMIN — Medication 500 MICROGRAM(S): at 05:37

## 2018-01-01 RX ADMIN — Medication 100 MILLIGRAM(S): at 05:38

## 2018-01-01 RX ADMIN — Medication 240 MILLIGRAM(S): at 05:38

## 2018-01-01 RX ADMIN — LEVALBUTEROL 0.63 MILLIGRAM(S): 1.25 SOLUTION, CONCENTRATE RESPIRATORY (INHALATION) at 01:14

## 2018-01-01 RX ADMIN — Medication 500 MICROGRAM(S): at 01:14

## 2018-01-01 RX ADMIN — LAMOTRIGINE 25 MILLIGRAM(S): 25 TABLET, ORALLY DISINTEGRATING ORAL at 12:57

## 2018-01-01 RX ADMIN — LEVALBUTEROL 0.63 MILLIGRAM(S): 1.25 SOLUTION, CONCENTRATE RESPIRATORY (INHALATION) at 12:57

## 2018-01-01 RX ADMIN — INSULIN GLARGINE 14 UNIT(S): 100 INJECTION, SOLUTION SUBCUTANEOUS at 21:19

## 2018-01-01 RX ADMIN — PANTOPRAZOLE SODIUM 40 MILLIGRAM(S): 20 TABLET, DELAYED RELEASE ORAL at 08:19

## 2018-01-01 RX ADMIN — Medication 30 MILLIGRAM(S): at 05:38

## 2018-01-01 RX ADMIN — Medication 7 UNIT(S): at 12:56

## 2018-01-01 NOTE — PROGRESS NOTE ADULT - SUBJECTIVE AND OBJECTIVE BOX
Patient is a 89y old  Male who presents with a chief complaint of sent for tachycardia (24 Dec 2017 22:19)                                                               INTERVAL HPI/OVERNIGHT EVENTS:    REVIEW OF SYSTEMS:     CONSTITUTIONAL: No weakness, fevers or chills  RESPIRATORY: No cough, wheezing,  No shortness of breath  CARDIOVASCULAR: No chest pain or palpitations  GASTROINTESTINAL: No abdominal pain  . No nausea, vomiting  GENITOURINARY: No dysuria, frequency   NEUROLOGICAL: No numbness or weakness                                                                                                                                                                                                                                                                                Medications:  MEDICATIONS  (STANDING):  ALPRAZolam 0.5 milliGRAM(s) Oral at bedtime  atorvastatin 40 milliGRAM(s) Oral at bedtime  azithromycin   Tablet 500 milliGRAM(s) Oral daily  buDESOnide  80 MICROgram(s)/formoterol 4.5 MICROgram(s) Inhaler 2 Puff(s) Inhalation two times a day  dextrose 5%. 1000 milliLiter(s) (50 mL/Hr) IV Continuous <Continuous>  dextrose 50% Injectable 12.5 Gram(s) IV Push once  dextrose 50% Injectable 25 Gram(s) IV Push once  dextrose 50% Injectable 25 Gram(s) IV Push once  diltiazem    milliGRAM(s) Oral daily  docusate sodium 100 milliGRAM(s) Oral two times a day  insulin glargine Injectable (LANTUS) 14 Unit(s) SubCutaneous at bedtime  insulin lispro (HumaLOG) corrective regimen sliding scale   SubCutaneous three times a day before meals  insulin lispro (HumaLOG) corrective regimen sliding scale   SubCutaneous at bedtime  insulin lispro Injectable (HumaLOG) 8 Unit(s) SubCutaneous three times a day with meals  lamoTRIgine 25 milliGRAM(s) Oral daily  methimazole 5 milliGRAM(s) Oral daily  pantoprazole    Tablet 40 milliGRAM(s) Oral before breakfast  PARoxetine 40 milliGRAM(s) Oral daily  senna 2 Tablet(s) Oral at bedtime  tiotropium 18 MICROgram(s) Capsule 1 Capsule(s) Inhalation daily  warfarin 2 milliGRAM(s) Oral once    MEDICATIONS  (PRN):  ALPRAZolam 0.5 milliGRAM(s) Oral every 12 hours PRN Anxiety  benzocaine 15 mG/menthol 3.6 mG Lozenge 1 Lozenge Oral three times a day PRN Sore Throat  dextrose Gel 1 Dose(s) Oral once PRN Blood Glucose LESS THAN 70 milliGRAM(s)/deciliter  glucagon  Injectable 1 milliGRAM(s) IntraMuscular once PRN Glucose LESS THAN 70 milligrams/deciliter  polyethylene glycol 3350 17 Gram(s) Oral daily PRN Constipation       Allergies    No Known Allergies    Intolerances      Vital Signs Last 24 Hrs  T(C): 36.3 (01 Jan 2018 11:43), Max: 36.9 (31 Dec 2017 21:26)  T(F): 97.3 (01 Jan 2018 11:43), Max: 98.4 (31 Dec 2017 21:26)  HR: 78 (01 Jan 2018 11:43) (75 - 87)  BP: 140/60 (01 Jan 2018 11:43) (140/60 - 150/67)  BP(mean): --  RR: 18 (01 Jan 2018 11:43) (18 - 18)  SpO2: 96% (01 Jan 2018 11:43) (92% - 96%)  CAPILLARY BLOOD GLUCOSE      POCT Blood Glucose.: 282 mg/dL (01 Jan 2018 16:48)  POCT Blood Glucose.: 251 mg/dL (01 Jan 2018 12:50)  POCT Blood Glucose.: 281 mg/dL (01 Jan 2018 11:41)  POCT Blood Glucose.: 286 mg/dL (01 Jan 2018 09:20)  POCT Blood Glucose.: 198 mg/dL (01 Jan 2018 07:54)  POCT Blood Glucose.: 192 mg/dL (31 Dec 2017 21:28)      12-31 @ 07:01 - 01-01 @ 07:00  --------------------------------------------------------  IN: 420 mL / OUT: 550 mL / NET: -130 mL    01-01 @ 07:01 - 01-01 @ 19:58  --------------------------------------------------------  IN: 240 mL / OUT: 0 mL / NET: 240 mL      Physical Exam:    General:  NAD  HEENT:  Nonicteric, PERRLA  CV:  RRR, S1S2   Lungs:  scattered ronchi /wheezing   Abdomen:  Soft, non-tender, no distended, positive BS  Extremities: no edema  :  No greene  Neuro:  AAOx3, non-focal, grossly intact                                                                                                                                                                                                                                                                                                LABS:                               10.8   15.77 )-----------( 492      ( 01 Jan 2018 08:22 )             34.1                      12-31    141  |  100  |  18  ----------------------------<  93  3.5   |  33<H>  |  0.87    Ca    8.8      31 Dec 2017 08:53     :

## 2018-01-01 NOTE — PROGRESS NOTE ADULT - SUBJECTIVE AND OBJECTIVE BOX
Follow-up Pulm Progress Note  Kai Onofre MD  [phone]    No new respiratory events overnight.  Denies SOB/CP.   Afebrile on azithromycin  O2 sat 94% on RA    Medications:  Vital Signs Last 24 Hrs  T(C): 36.8 (31 Dec 2017 03:49), Max: 36.8 (31 Dec 2017 03:49)  T(F): 98.2 (31 Dec 2017 03:49), Max: 98.2 (31 Dec 2017 03:49)  HR: 90 (31 Dec 2017 03:49) (83 - 90)  BP: 141/69 (31 Dec 2017 03:49) (141/69 - 154/76)  BP(mean): --  RR: 18 (31 Dec 2017 03:49) (18 - 18)  SpO2: 93% (31 Dec 2017 03:49) (93% - 98%)      12-30 @ 07:01  -  12-31 @ 07:00  --------------------------------------------------------  IN: 150 mL / OUT: 900 mL / NET: -750 mL    LABS:                        11.5   19.0  )-----------( 485      ( 31 Dec 2017 08:53 )             35.6     12-31    141  |  100  |  18  ----------------------------<  93  3.5   |  33<H>  |  0.87    Ca    8.8      31 Dec 2017 08:53        PT/INR - ( 31 Dec 2017 08:53 )   PT: 29.2 sec;   INR: 2.65 ratio    CULTURES:  Culture Results:   No growth (12-24 @ 22:13)  Culture Results:   No growth at 5 days. (12-24 @ 18:15)  Culture Results:   No growth at 5 days. (12-24 @ 18:15)        Physical Examination:  PULM: few rhonchi, no wheeze  CVS: Regular rate and rhythm, no murmurs, rubs, or gallops  ABD: Soft, non-tender  EXT:  No clubbing, cyanosis, or edema    RADIOLOGY REVIEWED  CXR:    CT chest:    TTE:

## 2018-01-01 NOTE — PROGRESS NOTE ADULT - PROBLEM SELECTOR PLAN 1
Teach patient to use insulin.   Keep lantus 14 units daily.  Increase Humalog 8 units per meal, as per taper plans.  Low scale humalog.

## 2018-01-01 NOTE — PROGRESS NOTE ADULT - ASSESSMENT
89 year old, copd, atrial fibrillation, diagnosed with influenza A today at urgent care for  symptoms starting 3 days ago. Presenting form urgent care with tachycardia to 150s.   Pt has an element of dementia and is not able to give full hx however reports that he has been feeling weak , and reports palpitations / sob on and off otherwise no CP .  reports cough , no N/V/D   no  sx   no GI complaints   EKG done at urgent pt was seen and examined on   care rendered at that time seems to be sinus tachy  ( background noises noted )   1- SOB : sec to COPD exacerbation in setting of flu .. slowly improving cont nebs and cont  steroids taper per    completed  tamiflu    vest/ resp assisst device   check o2 sat on ra /procalcitonin  mild elevation   2- Hx of afib:  was in afib then in sinus and had a run of SVT    cont rate control and AC ..  would opt for o/p stress test when pul status improves  restart coumadin at lower dose   3- HTN cont meds   4- DM: hold po meds and monitor FS . plan for dc on insulin given elevated FS   5- HARJEET /met acidosis: resolved   6- anemia : at baseline    7- multinodular goiter : cont tapazole and f/u with  ..repeat TFT in one months

## 2018-01-01 NOTE — PROGRESS NOTE ADULT - ASSESSMENT
89 y/o Type 2 diabetes COPD admitted with Influenza A, started on high dose steroids.  Tapazol treatment for hyperthyroidism.    Diabetes oral controlled at home, started on high dose steroid, FS high.   Started basal/bolus insulin, plans for steroid taper noted.  Will most probably require insulin as long as he is on steroids above prednisone 10 mg daily.    Hyperthyroidism- with MNG on exam. 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   T3-46, Free T4-1.1 both mid to low range,   Amiodarone D/Avelino. Will F/U TFT's as out patient once steroid dose reduced (high dose steroids suppress TFT's).

## 2018-01-01 NOTE — PROGRESS NOTE ADULT - SUBJECTIVE AND OBJECTIVE BOX
Subjective: Patient seen and examined. No new events except as noted.     REVIEW OF SYSTEMS:    CONSTITUTIONAL: No weakness, fevers or chills  EYES/ENT: No visual changes;  No vertigo or throat pain   NECK: No pain or stiffness  RESPIRATORY: No cough, wheezing, hemoptysis; No shortness of breath  CARDIOVASCULAR: No chest pain or palpitations  GASTROINTESTINAL: No abdominal or epigastric pain. No nausea, vomiting, or hematemesis; No diarrhea or constipation. No melena or hematochezia.  GENITOURINARY: No dysuria, frequency or hematuria  NEUROLOGICAL: No numbness or weakness  SKIN: No itching, burning, rashes, or lesions   All other review of systems is negative unless indicated above.    MEDICATIONS:  MEDICATIONS  (STANDING):  ALPRAZolam 0.5 milliGRAM(s) Oral at bedtime  atorvastatin 40 milliGRAM(s) Oral at bedtime  azithromycin   Tablet 500 milliGRAM(s) Oral daily  buDESOnide 160 MICROgram(s)/formoterol 4.5 MICROgram(s) Inhaler 2 Puff(s) Inhalation two times a day  dextrose 5%. 1000 milliLiter(s) (50 mL/Hr) IV Continuous <Continuous>  dextrose 50% Injectable 12.5 Gram(s) IV Push once  dextrose 50% Injectable 25 Gram(s) IV Push once  dextrose 50% Injectable 25 Gram(s) IV Push once  diltiazem    milliGRAM(s) Oral daily  docusate sodium 100 milliGRAM(s) Oral two times a day  insulin glargine Injectable (LANTUS) 14 Unit(s) SubCutaneous at bedtime  insulin lispro (HumaLOG) corrective regimen sliding scale   SubCutaneous three times a day before meals  insulin lispro (HumaLOG) corrective regimen sliding scale   SubCutaneous at bedtime  insulin lispro Injectable (HumaLOG) 7 Unit(s) SubCutaneous three times a day with meals  ipratropium    for Nebulization 500 MICROGram(s) Nebulizer every 6 hours  lamoTRIgine 25 milliGRAM(s) Oral daily  levalbuterol Inhalation 0.63 milliGRAM(s) Inhalation every 6 hours  methimazole 5 milliGRAM(s) Oral daily  pantoprazole    Tablet 40 milliGRAM(s) Oral before breakfast  PARoxetine 40 milliGRAM(s) Oral daily  predniSONE   Tablet 30 milliGRAM(s) Oral two times a day  senna 2 Tablet(s) Oral at bedtime      PHYSICAL EXAM:  T(C): 36.4 (01-01-18 @ 04:23), Max: 36.9 (12-31-17 @ 21:26)  HR: 87 (01-01-18 @ 04:23) (75 - 87)  BP: 149/70 (01-01-18 @ 04:23) (132/66 - 150/67)  RR: 18 (01-01-18 @ 04:23) (18 - 19)  SpO2: 92% (01-01-18 @ 04:23) (92% - 95%)  Wt(kg): --  I&O's Summary    30 Dec 2017 07:01  -  31 Dec 2017 07:00  --------------------------------------------------------  IN: 150 mL / OUT: 900 mL / NET: -750 mL    31 Dec 2017 07:01  -  01 Jan 2018 06:36  --------------------------------------------------------  IN: 300 mL / OUT: 550 mL / NET: -250 mL          Appearance: Normal	  HEENT:   Normal oral mucosa, PERRL, EOMI	  Lymphatic: No lymphadenopathy , no edema  Cardiovascular: Normal S1 S2, No JVD,3/6 systolic murmurs , Peripheral pulses palpable 2+ bilaterally  Respiratory: Lungs clear to auscultation, normal effort 	  Gastrointestinal:  Soft, Non-tender, + BS	  Skin: No rashes, No ecchymoses, No cyanosis, warm to touch  Musculoskeletal: Normal range of motion, normal strength  Psychiatry:  Mood & affect appropriate  Ext: No edema      LABS:    CARDIAC MARKERS:                                11.5   19.0  )-----------( 485      ( 31 Dec 2017 08:53 )             35.6     12-31    141  |  100  |  18  ----------------------------<  93  3.5   |  33<H>  |  0.87    Ca    8.8      31 Dec 2017 08:53      proBNP:   Lipid Profile:   HgA1c:   TSH:             TELEMETRY: 	SR    ECG:  	  RADIOLOGY:   DIAGNOSTIC TESTING:  [ ] Echocardiogram:  [ ]  Catheterization:  [ ] Stress Test:    OTHER:

## 2018-01-01 NOTE — PROGRESS NOTE ADULT - SUBJECTIVE AND OBJECTIVE BOX
Infectious Diseases progress note:    Subjective:  Comfortable.  cough improved.  No complaints.  Afebrile    ROS:  CONSTITUTIONAL:  No fever, chills, rigors  CARDIOVASCULAR:  No chest pain or palpitations  RESPIRATORY:   No SOB, cough, dyspnea on exertion.  No wheezing  GASTROINTESTINAL:  No abd pain, N/V, diarrhea/constipation  EXTREMITIES:  No swelling or joint pain  GENITOURINARY:  No burning on urination, increased frequency or urgency.  No flank pain  NEUROLOGIC:  No HA, visual disturbances  SKIN: No rashes    Allergies    No Known Allergies    Intolerances        ANTIBIOTICS/RELEVANT:  antimicrobials  azithromycin   Tablet 500 milliGRAM(s) Oral daily    immunologic:    OTHER:  ALPRAZolam 0.5 milliGRAM(s) Oral at bedtime  ALPRAZolam 0.5 milliGRAM(s) Oral every 12 hours PRN  atorvastatin 40 milliGRAM(s) Oral at bedtime  benzocaine 15 mG/menthol 3.6 mG Lozenge 1 Lozenge Oral three times a day PRN  buDESOnide  80 MICROgram(s)/formoterol 4.5 MICROgram(s) Inhaler 2 Puff(s) Inhalation two times a day  dextrose 5%. 1000 milliLiter(s) IV Continuous <Continuous>  dextrose 50% Injectable 12.5 Gram(s) IV Push once  dextrose 50% Injectable 25 Gram(s) IV Push once  dextrose 50% Injectable 25 Gram(s) IV Push once  dextrose Gel 1 Dose(s) Oral once PRN  diltiazem    milliGRAM(s) Oral daily  docusate sodium 100 milliGRAM(s) Oral two times a day  glucagon  Injectable 1 milliGRAM(s) IntraMuscular once PRN  insulin glargine Injectable (LANTUS) 14 Unit(s) SubCutaneous at bedtime  insulin lispro (HumaLOG) corrective regimen sliding scale   SubCutaneous three times a day before meals  insulin lispro (HumaLOG) corrective regimen sliding scale   SubCutaneous at bedtime  insulin lispro Injectable (HumaLOG) 8 Unit(s) SubCutaneous three times a day with meals  lamoTRIgine 25 milliGRAM(s) Oral daily  methimazole 5 milliGRAM(s) Oral daily  pantoprazole    Tablet 40 milliGRAM(s) Oral before breakfast  PARoxetine 40 milliGRAM(s) Oral daily  polyethylene glycol 3350 17 Gram(s) Oral daily PRN  senna 2 Tablet(s) Oral at bedtime  tiotropium 18 MICROgram(s) Capsule 1 Capsule(s) Inhalation daily  warfarin 2 milliGRAM(s) Oral once      Objective:  Vital Signs Last 24 Hrs  T(C): 36.3 (01 Jan 2018 11:43), Max: 36.9 (31 Dec 2017 21:26)  T(F): 97.3 (01 Jan 2018 11:43), Max: 98.4 (31 Dec 2017 21:26)  HR: 78 (01 Jan 2018 11:43) (75 - 87)  BP: 140/60 (01 Jan 2018 11:43) (140/60 - 150/67)  BP(mean): --  RR: 18 (01 Jan 2018 11:43) (18 - 18)  SpO2: 96% (01 Jan 2018 11:43) (92% - 96%)    PHYSICAL EXAM:  Constitutional:NAD  Eyes:ROJELIO, EOMI  Ear/Nose/Throat: no thrush, mucositis.  Moist mucous membranes	  Neck:no JVD, no lymphadenopathy, supple  Respiratory: CTA wilfredo  Cardiovascular: S1S2 RRR, no murmurs  Gastrointestinal:soft, nontender,  nondistended (+) BS  Extremities:no e/e/c  Skin:  no rashes, open wounds or ulcerations        LABS:                        10.8   15.77 )-----------( 492      ( 01 Jan 2018 08:22 )             34.1     12-31    141  |  100  |  18  ----------------------------<  93  3.5   |  33<H>  |  0.87    Ca    8.8      31 Dec 2017 08:53      PT/INR - ( 01 Jan 2018 08:22 )   PT: 23.7 sec;   INR: 2.07 ratio               Procalcitonin, Serum: 0.19 (12-26 @ 05:26)            Rapid RVP Result: Detected          MICROBIOLOGY:          RADIOLOGY & ADDITIONAL STUDIES:

## 2018-01-01 NOTE — PROGRESS NOTE ADULT - ASSESSMENT
A/P: 89 year old male with copd, atrial fibrillation, diagnosed with influenza A at urgent care for  symptoms starting 3 days ago. Presenting form urgent care with tachycardia to 150s.   Pt has mild dementia and is not able to give full hx however reports that he has been feeling weak , and reports palpitations, sob on and off, no CP .  Reports cough , no N/V/D   RVP confirmed Influenza A.  with lactate 2.5.  No PNA on CXR. Given Tamiflu.   BC, UC NTD.      -Continue Tamiflu for influenza (renally dosed) x 5 day course     - No need for broad spectrum Abs at present time.  Chest xray without consolidations.  Pt afebrile.    - Stable from ID standpoint.  No further workup    Almita Coon  [phone]    (Covering Dr. Roth)

## 2018-01-01 NOTE — PROGRESS NOTE ADULT - ASSESSMENT
COPD exacerbation - clinically resolving  Influenza A viral infection  Atrial Fibrillation  DM    REC    Begin prednisone taper  Covert to symbicort and spiriva  Complete 5 days azithromycin  DC planning 1-2 days per primary team

## 2018-01-01 NOTE — PROGRESS NOTE ADULT - SUBJECTIVE AND OBJECTIVE BOX
Chief Complaint: 88 y/o Type 2 diabetes COPD admitted with Influenza A, started on high dose steroids.    Patient steroid taper clarified, will drop to 40 mg at am.  PO intake good, afebrile.   FS running high set last 24 hours, on the lower pre-meal insulin dose mostly in the 200th.  No palpitations, no recurrance of Rapid AF off amiodarone.    MEDICATIONS  (STANDING):  ALPRAZolam 0.5 milliGRAM(s) Oral at bedtime  atorvastatin 40 milliGRAM(s) Oral at bedtime  azithromycin   Tablet 500 milliGRAM(s) Oral daily  buDESOnide  80 MICROgram(s)/formoterol 4.5 MICROgram(s) Inhaler 2 Puff(s) Inhalation two times a day  dextrose 5%. 1000 milliLiter(s) (50 mL/Hr) IV Continuous <Continuous>  dextrose 50% Injectable 12.5 Gram(s) IV Push once  dextrose 50% Injectable 25 Gram(s) IV Push once  dextrose 50% Injectable 25 Gram(s) IV Push once  diltiazem    milliGRAM(s) Oral daily  docusate sodium 100 milliGRAM(s) Oral two times a day  insulin glargine Injectable (LANTUS) 14 Unit(s) SubCutaneous at bedtime  insulin lispro (HumaLOG) corrective regimen sliding scale   SubCutaneous three times a day before meals  insulin lispro (HumaLOG) corrective regimen sliding scale   SubCutaneous at bedtime  insulin lispro Injectable (HumaLOG) 7 Unit(s) SubCutaneous three times a day with meals  lamoTRIgine 25 milliGRAM(s) Oral daily  methimazole 5 milliGRAM(s) Oral daily  pantoprazole    Tablet 40 milliGRAM(s) Oral before breakfast  PARoxetine 40 milliGRAM(s) Oral daily  senna 2 Tablet(s) Oral at bedtime  tiotropium 18 MICROgram(s) Capsule 1 Capsule(s) Inhalation daily  warfarin 2 milliGRAM(s) Oral once    MEDICATIONS  (PRN):  ALPRAZolam 0.5 milliGRAM(s) Oral every 12 hours PRN Anxiety  benzocaine 15 mG/menthol 3.6 mG Lozenge 1 Lozenge Oral three times a day PRN Sore Throat  dextrose Gel 1 Dose(s) Oral once PRN Blood Glucose LESS THAN 70 milliGRAM(s)/deciliter  glucagon  Injectable 1 milliGRAM(s) IntraMuscular once PRN Glucose LESS THAN 70 milligrams/deciliter  polyethylene glycol 3350 17 Gram(s) Oral daily PRN Constipation      Allergies    No Known Allergies    Intolerances        PHYSICAL EXAM:  VITALS: T(C): 36.3 (01-01-18 @ 11:43)  T(F): 97.3 (01-01-18 @ 11:43), Max: 98.4 (12-31-17 @ 21:26)  HR: 78 (01-01-18 @ 11:43) (75 - 87)  BP: 140/60 (01-01-18 @ 11:43) (140/60 - 150/67)  RR:  (18 - 18)  SpO2:  (92% - 96%)  GENERAL: NAD,   EYES: No proptosis,  HEENT:  Atraumatic, Normocephalic,   THYROID: bilateral nodular goiter  RESPIRATORY: bilateral wheezing, decreased breath sounds bases.  CARDIOVASCULAR: Regular rhythm; No murmurs; no peripheral edema  GI: Soft, nontender, non distended, normal bowel sounds  SKIN: Dry, intact, No rashes or lesions  MUSCULOSKELETAL: decreased strength  NEURO: extraocular movements intact, no tremor, reduced reflexes  PSYCH: Alert and oriented x 3, normal affect, normal mood      POCT Blood Glucose.: 282 mg/dL (01-01-18 @ 16:48)  POCT Blood Glucose.: 251 mg/dL (01-01-18 @ 12:50)  POCT Blood Glucose.: 281 mg/dL (01-01-18 @ 11:41)  POCT Blood Glucose.: 286 mg/dL (01-01-18 @ 09:20)  POCT Blood Glucose.: 198 mg/dL (01-01-18 @ 07:54)  POCT Blood Glucose.: 192 mg/dL (12-31-17 @ 21:28)  POCT Blood Glucose.: 296 mg/dL (12-31-17 @ 17:00)  POCT Blood Glucose.: 168 mg/dL (12-31-17 @ 12:54)  POCT Blood Glucose.: 101 mg/dL (12-31-17 @ 07:28)  POCT Blood Glucose.: 191 mg/dL (12-30-17 @ 21:26)  POCT Blood Glucose.: 266 mg/dL (12-30-17 @ 16:35)  POCT Blood Glucose.: 242 mg/dL (12-30-17 @ 11:47)  POCT Blood Glucose.: 186 mg/dL (12-30-17 @ 07:58)  POCT Blood Glucose.: 266 mg/dL (12-29-17 @ 21:35)                            10.8   15.77 )-----------( 492      ( 01 Jan 2018 08:22 )             34.1       12-31    141  |  100  |  18  ----------------------------<  93  3.5   |  33<H>  |  0.87    EGFR if : 89  EGFR if non : 77    Ca    8.8      12-31        Thyroid Function Tests:  12-26 @ 07:27 TSH -- FreeT4 -- T3 46 Anti TPO -- Anti Thyroglobulin Ab -- TSI --  12-25 @ 08:16 TSH 0.69 FreeT4 1.0 T3 -- Anti TPO -- Anti Thyroglobulin Ab -- TSI --      Hemoglobin A1C, Whole Blood: 6.5 % <H> [4.0 - 5.6] (12-25-17 @ 08:22)

## 2018-01-02 ENCOUNTER — TRANSCRIPTION ENCOUNTER (OUTPATIENT)
Age: 83
End: 2018-01-02

## 2018-01-02 VITALS
RESPIRATION RATE: 17 BRPM | TEMPERATURE: 98 F | OXYGEN SATURATION: 96 % | HEART RATE: 80 BPM | SYSTOLIC BLOOD PRESSURE: 137 MMHG | DIASTOLIC BLOOD PRESSURE: 68 MMHG

## 2018-01-02 LAB
APTT BLD: 57 SEC — HIGH (ref 27.5–37.4)
GLUCOSE BLDC GLUCOMTR-MCNC: 187 MG/DL — HIGH (ref 70–99)
GLUCOSE BLDC GLUCOMTR-MCNC: 216 MG/DL — HIGH (ref 70–99)
GLUCOSE BLDC GLUCOMTR-MCNC: 395 MG/DL — HIGH (ref 70–99)
HCT VFR BLD CALC: 37.6 % — LOW (ref 39–50)
HGB BLD-MCNC: 11.9 G/DL — LOW (ref 13–17)
INR BLD: 2.41 RATIO — HIGH (ref 0.88–1.16)
MCHC RBC-ENTMCNC: 28.8 PG — SIGNIFICANT CHANGE UP (ref 27–34)
MCHC RBC-ENTMCNC: 31.6 GM/DL — LOW (ref 32–36)
MCV RBC AUTO: 91 FL — SIGNIFICANT CHANGE UP (ref 80–100)
PLATELET # BLD AUTO: 480 K/UL — HIGH (ref 150–400)
PROTHROM AB SERPL-ACNC: 26.8 SEC — HIGH (ref 9.8–12.7)
RBC # BLD: 4.13 M/UL — LOW (ref 4.2–5.8)
RBC # FLD: 14.8 % — HIGH (ref 10.3–14.5)
WBC # BLD: 15.02 K/UL — HIGH (ref 3.8–10.5)
WBC # FLD AUTO: 15.02 K/UL — HIGH (ref 3.8–10.5)

## 2018-01-02 PROCEDURE — 80048 BASIC METABOLIC PNL TOTAL CA: CPT

## 2018-01-02 PROCEDURE — 85610 PROTHROMBIN TIME: CPT

## 2018-01-02 PROCEDURE — 97161 PT EVAL LOW COMPLEX 20 MIN: CPT

## 2018-01-02 PROCEDURE — 84132 ASSAY OF SERUM POTASSIUM: CPT

## 2018-01-02 PROCEDURE — 83880 ASSAY OF NATRIURETIC PEPTIDE: CPT

## 2018-01-02 PROCEDURE — 85730 THROMBOPLASTIN TIME PARTIAL: CPT

## 2018-01-02 PROCEDURE — 87040 BLOOD CULTURE FOR BACTERIA: CPT

## 2018-01-02 PROCEDURE — 84480 ASSAY TRIIODOTHYRONINE (T3): CPT

## 2018-01-02 PROCEDURE — 83735 ASSAY OF MAGNESIUM: CPT

## 2018-01-02 PROCEDURE — 85014 HEMATOCRIT: CPT

## 2018-01-02 PROCEDURE — 82009 KETONE BODYS QUAL: CPT

## 2018-01-02 PROCEDURE — 87798 DETECT AGENT NOS DNA AMP: CPT

## 2018-01-02 PROCEDURE — 93306 TTE W/DOPPLER COMPLETE: CPT

## 2018-01-02 PROCEDURE — 84145 PROCALCITONIN (PCT): CPT

## 2018-01-02 PROCEDURE — 83036 HEMOGLOBIN GLYCOSYLATED A1C: CPT

## 2018-01-02 PROCEDURE — 84295 ASSAY OF SERUM SODIUM: CPT

## 2018-01-02 PROCEDURE — 82435 ASSAY OF BLOOD CHLORIDE: CPT

## 2018-01-02 PROCEDURE — 80053 COMPREHEN METABOLIC PANEL: CPT

## 2018-01-02 PROCEDURE — 87633 RESP VIRUS 12-25 TARGETS: CPT

## 2018-01-02 PROCEDURE — 96374 THER/PROPH/DIAG INJ IV PUSH: CPT

## 2018-01-02 PROCEDURE — 82010 KETONE BODYS QUAN: CPT

## 2018-01-02 PROCEDURE — 83605 ASSAY OF LACTIC ACID: CPT

## 2018-01-02 PROCEDURE — 81001 URINALYSIS AUTO W/SCOPE: CPT

## 2018-01-02 PROCEDURE — 93005 ELECTROCARDIOGRAM TRACING: CPT

## 2018-01-02 PROCEDURE — 87086 URINE CULTURE/COLONY COUNT: CPT

## 2018-01-02 PROCEDURE — 84100 ASSAY OF PHOSPHORUS: CPT

## 2018-01-02 PROCEDURE — 82803 BLOOD GASES ANY COMBINATION: CPT

## 2018-01-02 PROCEDURE — 84443 ASSAY THYROID STIM HORMONE: CPT

## 2018-01-02 PROCEDURE — 82962 GLUCOSE BLOOD TEST: CPT

## 2018-01-02 PROCEDURE — 85027 COMPLETE CBC AUTOMATED: CPT

## 2018-01-02 PROCEDURE — 82330 ASSAY OF CALCIUM: CPT

## 2018-01-02 PROCEDURE — 94640 AIRWAY INHALATION TREATMENT: CPT

## 2018-01-02 PROCEDURE — 82947 ASSAY GLUCOSE BLOOD QUANT: CPT

## 2018-01-02 PROCEDURE — 99285 EMERGENCY DEPT VISIT HI MDM: CPT | Mod: 25

## 2018-01-02 PROCEDURE — 87581 M.PNEUMON DNA AMP PROBE: CPT

## 2018-01-02 PROCEDURE — 71045 X-RAY EXAM CHEST 1 VIEW: CPT

## 2018-01-02 PROCEDURE — 84439 ASSAY OF FREE THYROXINE: CPT

## 2018-01-02 PROCEDURE — 87486 CHLMYD PNEUM DNA AMP PROBE: CPT

## 2018-01-02 RX ORDER — METFORMIN HYDROCHLORIDE 850 MG/1
1 TABLET ORAL
Qty: 60 | Refills: 0 | OUTPATIENT
Start: 2018-01-02 | End: 2018-01-31

## 2018-01-02 RX ORDER — GLIMEPIRIDE 1 MG
1 TABLET ORAL
Qty: 60 | Refills: 0 | OUTPATIENT
Start: 2018-01-02 | End: 2018-01-31

## 2018-01-02 RX ORDER — GLIMEPIRIDE 1 MG
1 TABLET ORAL
Qty: 0 | Refills: 0 | COMMUNITY
Start: 2018-01-02 | End: 2018-03-02

## 2018-01-02 RX ORDER — IPRATROPIUM/ALBUTEROL SULFATE 18-103MCG
3 AEROSOL WITH ADAPTER (GRAM) INHALATION
Qty: 0 | Refills: 0 | COMMUNITY

## 2018-01-02 RX ORDER — WARFARIN SODIUM 2.5 MG/1
2 TABLET ORAL ONCE
Qty: 0 | Refills: 0 | Status: DISCONTINUED | OUTPATIENT
Start: 2018-01-02 | End: 2018-01-02

## 2018-01-02 RX ORDER — TIOTROPIUM BROMIDE 18 UG/1
1 CAPSULE ORAL; RESPIRATORY (INHALATION)
Qty: 1 | Refills: 0 | OUTPATIENT
Start: 2018-01-02 | End: 2018-01-31

## 2018-01-02 RX ORDER — WARFARIN SODIUM 2.5 MG/1
1 TABLET ORAL
Qty: 0 | Refills: 0 | COMMUNITY

## 2018-01-02 RX ORDER — WARFARIN SODIUM 2.5 MG/1
1 TABLET ORAL
Qty: 30 | Refills: 0 | OUTPATIENT
Start: 2018-01-02 | End: 2018-01-31

## 2018-01-02 RX ORDER — TIOTROPIUM BROMIDE 18 UG/1
1 CAPSULE ORAL; RESPIRATORY (INHALATION)
Qty: 0 | Refills: 0 | COMMUNITY
Start: 2018-01-02

## 2018-01-02 RX ORDER — GLIMEPIRIDE 1 MG
1 TABLET ORAL
Qty: 120 | Refills: 0 | OUTPATIENT
Start: 2018-01-02 | End: 2018-03-02

## 2018-01-02 RX ORDER — GLIMEPIRIDE 1 MG
1 TABLET ORAL
Qty: 0 | Refills: 0 | COMMUNITY

## 2018-01-02 RX ORDER — DILTIAZEM HCL 120 MG
1 CAPSULE, EXT RELEASE 24 HR ORAL
Qty: 0 | Refills: 0 | COMMUNITY
Start: 2018-01-02

## 2018-01-02 RX ORDER — ISOPROPYL ALCOHOL, BENZOCAINE .7; .06 ML/ML; ML/ML
0 SWAB TOPICAL
Qty: 100 | Refills: 0 | OUTPATIENT
Start: 2018-01-02

## 2018-01-02 RX ORDER — DILTIAZEM HCL 120 MG
1 CAPSULE, EXT RELEASE 24 HR ORAL
Qty: 30 | Refills: 0 | OUTPATIENT
Start: 2018-01-02 | End: 2018-01-31

## 2018-01-02 RX ADMIN — AZITHROMYCIN 500 MILLIGRAM(S): 500 TABLET, FILM COATED ORAL at 12:02

## 2018-01-02 RX ADMIN — Medication 8 UNIT(S): at 17:20

## 2018-01-02 RX ADMIN — Medication 8 UNIT(S): at 08:25

## 2018-01-02 RX ADMIN — Medication 1: at 08:25

## 2018-01-02 RX ADMIN — Medication 5: at 17:20

## 2018-01-02 RX ADMIN — TIOTROPIUM BROMIDE 1 CAPSULE(S): 18 CAPSULE ORAL; RESPIRATORY (INHALATION) at 12:02

## 2018-01-02 RX ADMIN — Medication 100 MILLIGRAM(S): at 06:47

## 2018-01-02 RX ADMIN — Medication 100 MILLIGRAM(S): at 17:21

## 2018-01-02 RX ADMIN — Medication 8 UNIT(S): at 12:03

## 2018-01-02 RX ADMIN — BUDESONIDE AND FORMOTEROL FUMARATE DIHYDRATE 2 PUFF(S): 160; 4.5 AEROSOL RESPIRATORY (INHALATION) at 11:59

## 2018-01-02 RX ADMIN — PANTOPRAZOLE SODIUM 40 MILLIGRAM(S): 20 TABLET, DELAYED RELEASE ORAL at 06:47

## 2018-01-02 RX ADMIN — Medication 40 MILLIGRAM(S): at 06:47

## 2018-01-02 RX ADMIN — LAMOTRIGINE 25 MILLIGRAM(S): 25 TABLET, ORALLY DISINTEGRATING ORAL at 12:02

## 2018-01-02 RX ADMIN — Medication 240 MILLIGRAM(S): at 06:47

## 2018-01-02 RX ADMIN — Medication 2: at 12:03

## 2018-01-02 RX ADMIN — Medication 40 MILLIGRAM(S): at 12:02

## 2018-01-02 NOTE — DISCHARGE NOTE ADULT - CARE PROVIDER_API CALL
Bonilla Kowalski), Cardiovascular Disease; Internal Medicine  [address], [place] [id number]  Phone: [phone]  Fax: [phone]    Virgil Ying), Cardiology; Internal Medicine  [address] [place] [address]  [address]  [place] [address]  Phone: [phone]  Fax: [phone]

## 2018-01-02 NOTE — PROGRESS NOTE ADULT - PROBLEM SELECTOR PLAN 1
Maintaining sinus   Increase Cardizem 360 mg daily.  TTE reviewed  and stable   on coumadin  will need inpatient vs. outpatient stress test Maintaining sinus   Increase Cardizem 300 mg daily.  TTE reviewed  and stable   on coumadin  will need inpatient vs. outpatient stress test

## 2018-01-02 NOTE — PROGRESS NOTE ADULT - SUBJECTIVE AND OBJECTIVE BOX
Patient is a 89y old  Male who presents with a chief complaint of sent for tachycardia (24 Dec 2017 22:19)                                                               INTERVAL HPI/OVERNIGHT EVENTS:    REVIEW OF SYSTEMS:     CONSTITUTIONAL: No weakness, fevers or chills  EYES/ENT: No visual changes , no ear ache   NECK: No pain or stiffness  RESPIRATORY: No cough, wheezing,  No shortness of breath  CARDIOVASCULAR: No chest pain or palpitations  GASTROINTESTINAL: No abdominal pain  . No nausea, vomiting, or hematemesis; No diarrhea or constipation. No melena or hematochezia.  GENITOURINARY: No dysuria, frequency or hematuria  NEUROLOGICAL: No numbness or weakness  SKIN: No itching, burning, rashes, or lesions                                                                                                                                                                                                                                                                                 Medications:  MEDICATIONS  (STANDING):  ALPRAZolam 0.5 milliGRAM(s) Oral at bedtime  atorvastatin 40 milliGRAM(s) Oral at bedtime  azithromycin   Tablet 500 milliGRAM(s) Oral daily  buDESOnide  80 MICROgram(s)/formoterol 4.5 MICROgram(s) Inhaler 2 Puff(s) Inhalation two times a day  dextrose 5%. 1000 milliLiter(s) (50 mL/Hr) IV Continuous <Continuous>  dextrose 50% Injectable 12.5 Gram(s) IV Push once  dextrose 50% Injectable 25 Gram(s) IV Push once  dextrose 50% Injectable 25 Gram(s) IV Push once  diltiazem    milliGRAM(s) Oral daily  docusate sodium 100 milliGRAM(s) Oral two times a day  insulin glargine Injectable (LANTUS) 14 Unit(s) SubCutaneous at bedtime  insulin lispro (HumaLOG) corrective regimen sliding scale   SubCutaneous three times a day before meals  insulin lispro (HumaLOG) corrective regimen sliding scale   SubCutaneous at bedtime  insulin lispro Injectable (HumaLOG) 8 Unit(s) SubCutaneous three times a day with meals  lamoTRIgine 25 milliGRAM(s) Oral daily  methimazole 5 milliGRAM(s) Oral daily  pantoprazole    Tablet 40 milliGRAM(s) Oral before breakfast  PARoxetine 40 milliGRAM(s) Oral daily  predniSONE   Tablet 40 milliGRAM(s) Oral daily  senna 2 Tablet(s) Oral at bedtime  tiotropium 18 MICROgram(s) Capsule 1 Capsule(s) Inhalation daily  warfarin 2 milliGRAM(s) Oral once    MEDICATIONS  (PRN):  ALPRAZolam 0.5 milliGRAM(s) Oral every 12 hours PRN Anxiety  benzocaine 15 mG/menthol 3.6 mG Lozenge 1 Lozenge Oral three times a day PRN Sore Throat  dextrose Gel 1 Dose(s) Oral once PRN Blood Glucose LESS THAN 70 milliGRAM(s)/deciliter  glucagon  Injectable 1 milliGRAM(s) IntraMuscular once PRN Glucose LESS THAN 70 milligrams/deciliter  polyethylene glycol 3350 17 Gram(s) Oral daily PRN Constipation       Allergies    No Known Allergies    Intolerances      Vital Signs Last 24 Hrs  T(C): 36.6 (2018 11:30), Max: 36.7 (2018 21:23)  T(F): 97.8 (2018 11:30), Max: 98 (2018 21:23)  HR: 80 (2018 11:30) (80 - 88)  BP: 137/68 (2018 11:30) (137/68 - 160/75)  BP(mean): --  RR: 17 (2018 11:30) (17 - 18)  SpO2: 96% (2018 11:30) (96% - 99%)  CAPILLARY BLOOD GLUCOSE      POCT Blood Glucose.: 216 mg/dL (2018 11:29)  POCT Blood Glucose.: 187 mg/dL (2018 07:49)  POCT Blood Glucose.: 278 mg/dL (2018 21:22)  POCT Blood Glucose.: 282 mg/dL (2018 16:48)       @ 07: @ 07:00  --------------------------------------------------------  IN: 290 mL / OUT: 300 mL / NET: -10 mL     @ 07: @ 15:17  --------------------------------------------------------  IN: 540 mL / OUT: 500 mL / NET: 40 mL      Physical Exam:    Daily     Daily Weight in k.6 (2018 07:26)  General:  Well appearing, NAD, not cachetic  HEENT:  Nonicteric, PERRLA  CV:  RRR, S1S2   Lungs:  CTA B/L, no wheezes, rales, rhonchi  Abdomen:  Soft, non-tender, no distended, positive BS  Extremities:  2+ pulses, no c/c, no edema  Skin:  Warm and dry, no rashes  :  No greene  Neuro:  AAOx3, non-focal, grossly intact                                                                                                                                                                                                                                                                                                LABS:                               11.9   15.02 )-----------( 480      ( 2018 08:23 )             37.6                                               RADIOLOGY & ADDITIONAL TESTS         I personally reviewed: [  ]EKG   [  ]CXR    [  ] CT      A/P:         Discussed with :     Katerina consultants' Notes   Time spent : Patient is a 89y old  Male who presents with a chief complaint of sent for tachycardia (24 Dec 2017 22:19)                                                               INTERVAL HPI/OVERNIGHT EVENTS:    REVIEW OF SYSTEMS:     CONSTITUTIONAL: No weakness, fevers or chills  RESPIRATORY: No cough, wheezing,  No shortness of breath  CARDIOVASCULAR: No chest pain or palpitations  GASTROINTESTINAL: No abdominal pain  . No nausea, vomiting  GENITOURINARY: No dysuria, frequency or hematuria  NEUROLOGICAL: No numbness or weakness                                                                                                                                                                                                                                                                                  Medications:  MEDICATIONS  (STANDING):  ALPRAZolam 0.5 milliGRAM(s) Oral at bedtime  atorvastatin 40 milliGRAM(s) Oral at bedtime  azithromycin   Tablet 500 milliGRAM(s) Oral daily  buDESOnide  80 MICROgram(s)/formoterol 4.5 MICROgram(s) Inhaler 2 Puff(s) Inhalation two times a day  dextrose 5%. 1000 milliLiter(s) (50 mL/Hr) IV Continuous <Continuous>  dextrose 50% Injectable 12.5 Gram(s) IV Push once  dextrose 50% Injectable 25 Gram(s) IV Push once  dextrose 50% Injectable 25 Gram(s) IV Push once  diltiazem    milliGRAM(s) Oral daily  docusate sodium 100 milliGRAM(s) Oral two times a day  insulin glargine Injectable (LANTUS) 14 Unit(s) SubCutaneous at bedtime  insulin lispro (HumaLOG) corrective regimen sliding scale   SubCutaneous three times a day before meals  insulin lispro (HumaLOG) corrective regimen sliding scale   SubCutaneous at bedtime  insulin lispro Injectable (HumaLOG) 8 Unit(s) SubCutaneous three times a day with meals  lamoTRIgine 25 milliGRAM(s) Oral daily  methimazole 5 milliGRAM(s) Oral daily  pantoprazole    Tablet 40 milliGRAM(s) Oral before breakfast  PARoxetine 40 milliGRAM(s) Oral daily  predniSONE   Tablet 40 milliGRAM(s) Oral daily  senna 2 Tablet(s) Oral at bedtime  tiotropium 18 MICROgram(s) Capsule 1 Capsule(s) Inhalation daily  warfarin 2 milliGRAM(s) Oral once    MEDICATIONS  (PRN):  ALPRAZolam 0.5 milliGRAM(s) Oral every 12 hours PRN Anxiety  benzocaine 15 mG/menthol 3.6 mG Lozenge 1 Lozenge Oral three times a day PRN Sore Throat  dextrose Gel 1 Dose(s) Oral once PRN Blood Glucose LESS THAN 70 milliGRAM(s)/deciliter  glucagon  Injectable 1 milliGRAM(s) IntraMuscular once PRN Glucose LESS THAN 70 milligrams/deciliter  polyethylene glycol 3350 17 Gram(s) Oral daily PRN Constipation       Allergies    No Known Allergies    Intolerances      Vital Signs Last 24 Hrs  T(C): 36.6 (2018 11:30), Max: 36.7 (2018 21:23)  T(F): 97.8 (2018 11:30), Max: 98 (2018 21:23)  HR: 80 (2018 11:30) (80 - 88)  BP: 137/68 (2018 11:30) (137/68 - 160/75)  BP(mean): --  RR: 17 (2018 11:30) (17 - 18)  SpO2: 96% (2018 11:30) (96% - 99%)  CAPILLARY BLOOD GLUCOSE      POCT Blood Glucose.: 216 mg/dL (2018 11:29)  POCT Blood Glucose.: 187 mg/dL (2018 07:49)  POCT Blood Glucose.: 278 mg/dL (2018 21:22)  POCT Blood Glucose.: 282 mg/dL (2018 16:48)       @ 07: @ 07:00  --------------------------------------------------------  IN: 290 mL / OUT: 300 mL / NET: -10 mL     @ 07: @ 15:17  --------------------------------------------------------  IN: 540 mL / OUT: 500 mL / NET: 40 mL      Physical Exam:      Daily Weight in k.6 (2018 07:26)  General:  eldery in NAD   HEENT:  Nonicteric, PERRLA  CV:  RRR, S1S2   Lungs: B ronchi and Wheezing   Abdomen:  Soft, non-tender, no distended, positive BS  Extremities:  2+ pulses, no c/c, no edema  Skin:  Warm and dry, no rashes  :  No greene  Neuro:  AAOx3, non-focal, grossly intact                                                                                                                                                                                                                                                                                                LABS:                               11.9   15.02 )-----------( 480      ( 2018 08:23 )             37.6

## 2018-01-02 NOTE — DISCHARGE NOTE ADULT - CARE PLAN
Principal Discharge DX:	Influenza  Goal:	resolved  Instructions for follow-up, activity and diet:	follow up with your physicians  Secondary Diagnosis:	COPD (chronic obstructive pulmonary disease)  Goal:	symptom management  Instructions for follow-up, activity and diet:	Call your Health Care provider upon arrival home to make a follow up appointment within one week.  Take all inhalers as prescribed by your Health Care Provider.  Take steroids as prescribed by your Health Care Provider.  If your cough increases infrequency and severity and/or you have shortness of breath or increased shortness of breath call your Health Care Provider.  If you develop fever, chills, night sweats, malaise, and/or change in mental status call your Health care Provider.  Nutrition is very important.  Eat small frequent meals.  Increase your activity as tolerated.  Do not stay in bed all day  Secondary Diagnosis:	Atrial fibrillation  Goal:	rate control  Instructions for follow-up, activity and diet:	Atrial fibrillation is the most common heart rhythm problem & has the risk of stroke & heart attack  It helps if you control your blood pressure, not drink more than 1-2 alcohol drinks per day, cut down on caffeine, getting treatment for over active thyroid gland, & getting exercise  Call your doctor if you feel your heart racing or beating unusually, chest tightness or pain, lightheaded, faint, shortness of breath especially with exercise  It is important to take your heart medication as prescribed  You may be on anticoagulation which is very important to take as directed - you may need blood work to monitor drug levels  Secondary Diagnosis:	Diabetes type 2, controlled  Goal:	disease management  Instructions for follow-up, activity and diet:	HgA1C this admission.  Make sure you get your HgA1c checked every three months.  If you take oral diabetes medications, check your blood glucose two times a day.  If you take insulin, check your blood glucose before meals and at bedtime.  It's important not to skip any meals.  Keep a log of your blood glucose results and always take it with you to your doctor appointments.  Keep a list of your current medications including injectables and over the counter medications and bring this medication list with you to all your doctor appointments.  If you have not seen your ophthalmologist this year call for appointment.  Check your feet daily for redness, sores, or openings. Do not self treat. If no improvement in two days call your primary care physician for an appointment.  Low blood sugar (hypoglycemia) is a blood sugar below 70mg/dl. Check your blood sugar if you feel signs/symptoms of hypoglycemia. If your blood sugar is below 70 take 15 grams of carbohydrates (ex 4 oz of apple juice, 3-4 glucose tablets, or 4-6 oz of regular soda) wait 15 minutes and repeat blood sugar to make sure it comes up above 70.  If your blood sugar is above 70 and you are due for a meal, have a meal.  If you are not due for a meal have a snack.  This snack helps keeps your blood sugar at a safe range.

## 2018-01-02 NOTE — DISCHARGE NOTE ADULT - COMMUNITY RESOURCES
[facility]  [facility]  [facility] [id number].

## 2018-01-02 NOTE — PROGRESS NOTE ADULT - PROBLEM SELECTOR PLAN 3
blood glucose is under control.  12/30 FS with sliding scale. -275 for last 24 hrs. Most likely due to steroid
as per MD

## 2018-01-02 NOTE — DISCHARGE NOTE ADULT - PATIENT PORTAL LINK FT
“You can access the Follow[facility] Patient Portal, offered by [facility], by registering with the following website: [email or web address]”

## 2018-01-02 NOTE — PROGRESS NOTE ADULT - PROVIDER SPECIALTY LIST ADULT
Cardiology
Endocrinology
Infectious Disease
Internal Medicine
Psychiatry
Psychiatry
Pulmonology
Cardiology
Cardiology
Infectious Disease
Pulmonology
Endocrinology

## 2018-01-02 NOTE — PROGRESS NOTE ADULT - NSHPATTENDINGPLANDISCUSS_GEN_ALL_CORE
NP
pt
pt , 
pt and NP
pt,  and NP
pt, NP , CM
pt, daughter ,  and NP
daughter on two different occasions on phone today  .. NP, , CM and pt
guzman, RENETTA , NP  and Dr.Kaplan Kae

## 2018-01-02 NOTE — PROGRESS NOTE ADULT - PROBLEM SELECTOR PLAN 1
D/W patient need for a true diabetic diet while on steroids.  Increase fluid hydration.  upon discharge:  Metformin 1,000 mg BID  Amaryl 2 mg BID, reduce to 1 mg BID when prednisone 20 mg daily and 1 mg daily, when prednisone under 10 mg daily.  Low scale humalog.

## 2018-01-02 NOTE — DISCHARGE NOTE ADULT - MEDICATION SUMMARY - MEDICATIONS TO TAKE
I will START or STAY ON the medications listed below when I get home from the hospital:    glucometer  -- Indication: For Diabetes type 2    glucometer test strips  -- Indication: For Diabetes type 2    lancets  -- Indication: For Diabetes type 2    alcohol swabs  -- Indication: For Diabetes type 2    predniSONE 10 mg oral tablet  -- 1 dose(s) by mouth once a day  Taper:   40mg (4 tabs) x 3 days then:  30mg (3 tabs) x 3 days then:  20mg (2 tabs) x 2 days then:  10mg (1 tab) x 2 days then discontinue  -- Indication: For COPD (chronic obstructive pulmonary disease)    acetaminophen 325 mg oral tablet  -- 3 tab(s) by mouth every 8 hours, As needed, Mild Pain (1 - 3)  -- Indication: For Discomfort    aluminum hydroxide-magnesium hydroxide 200 mg-200 mg/5 mL oral suspension  -- 30 milliliter(s) by mouth every 6 hours, As needed, Dyspepsia  -- Indication: For Antacid    dilTIAZem 300 mg/24 hours oral capsule, extended release  -- 1 cap(s) by mouth once a day  -- Indication: For Atrial fibrillation    warfarin 2 mg oral tablet  -- 1 tab(s) by mouth once a day  -- Indication: For Atrial fibrillation    lamoTRIgine 25 mg oral tablet  -- 1 tab(s) by mouth once a day  -- Indication: For prophylactic    PARoxetine 40 mg oral tablet  -- 1 tab(s) by mouth once a day  -- Indication: For Depression    glimepiride 1 mg oral tablet  -- 1 tab(s) by mouth 2 times a day for 10 days THEN CHANGE TO 1 TABLET ONCE A DAY (to coincide with Prednisone decreasing to 10mg )  -- Indication: For Diabetes type 2    metFORMIN 500 mg oral tablet  -- 1 tab(s) by mouth 2 times a day  -- Indication: For Diabetes type 2    rosuvastatin 10 mg oral tablet  -- 1 tab(s) by mouth once a day (at bedtime)  -- Indication: For Cholesterol management    methIMAzole 5 mg oral tablet  -- 1 tab(s) by mouth once a day  -- Indication: For Adenomatous goiter, toxic or with hyperthyroidism    ALPRAZolam 0.5 mg oral tablet  -- 1 tab(s) by mouth once a day, As Needed  -- Indication: For Anxiety    tiotropium 18 mcg inhalation capsule  -- 1 cap(s) inhaled once a day  -- Indication: For COPD (chronic obstructive pulmonary disease)    polyethylene glycol 3350 oral powder for reconstitution  -- 17 gram(s) by mouth once a day, As Needed  -- Indication: For laxative I will START or STAY ON the medications listed below when I get home from the hospital:    glucometer  -- Indication: For Diabetes type 2    glucometer test strips  -- Indication: For Diabetes type 2    lancets  -- Indication: For Diabetes type 2    alcohol swabs  -- Indication: For Diabetes type 2    predniSONE 10 mg oral tablet  -- 1 dose(s) by mouth once a day  Taper:   40mg (4 tabs) x 3 days then:  30mg (3 tabs) x 3 days then:  20mg (2 tabs) x 2 days then:  10mg (1 tab) x 2 days then discontinue  -- Indication: For COPD (chronic obstructive pulmonary disease)    acetaminophen 325 mg oral tablet  -- 3 tab(s) by mouth every 8 hours, As needed, Mild Pain (1 - 3)  -- Indication: For Discomfort    aluminum hydroxide-magnesium hydroxide 200 mg-200 mg/5 mL oral suspension  -- 30 milliliter(s) by mouth every 6 hours, As needed, Dyspepsia  -- Indication: For Antacid    dilTIAZem 300 mg/24 hours oral capsule, extended release  -- 1 cap(s) by mouth once a day  -- Indication: For Atrial fibrillation    warfarin 2 mg oral tablet  -- 1 tab(s) by mouth once a day  -- Indication: For Atrial fibrillation    lamoTRIgine 25 mg oral tablet  -- 1 tab(s) by mouth once a day  -- Indication: For prophylactic    PARoxetine 40 mg oral tablet  -- 1 tab(s) by mouth once a day  -- Indication: For Depression    metFORMIN 1000 mg oral tablet  -- 1 tab(s) by mouth 2 times a day  -- Indication: For Diabetes type 2    Amaryl 1 mg oral tablet  -- Take 2 tablets twice a day until   january 8th (when prednisone is 20mg)   THEN take 1 tablet twice a day until january 12th. (when prednisone is 10mg)  THEN take one tablet daily.    follow up with PMD.   -- Indication: For Diabetes type 2    rosuvastatin 10 mg oral tablet  -- 1 tab(s) by mouth once a day (at bedtime)  -- Indication: For Cholesterol management    methIMAzole 5 mg oral tablet  -- 1 tab(s) by mouth once a day  -- Indication: For Adenomatous goiter, toxic or with hyperthyroidism    ALPRAZolam 0.5 mg oral tablet  -- 1 tab(s) by mouth once a day, As Needed  -- Indication: For Anxiety    tiotropium 18 mcg inhalation capsule  -- 1 cap(s) inhaled once a day  -- Indication: For COPD (chronic obstructive pulmonary disease)    polyethylene glycol 3350 oral powder for reconstitution  -- 17 gram(s) by mouth once a day, As Needed  -- Indication: For laxative

## 2018-01-02 NOTE — PROGRESS NOTE ADULT - PROBLEM SELECTOR PLAN 2
Keep tapazol 5 mg/d.  Repeat TFT's once steroids tapered.
Keep tapazol 5 mg/d.  Please reconsider amiodarone !!
Keep tapazol 5 mg/d.  Please reconsider amiodarone !!
Keep tapazol 5 mg/d.  Repeat TFT's next week.
Keep tapazol 5 mg/d.  Repeat TFT's once steroids tapered.
Keep tapazol 5 mg/d.  Repeat TFT's once steroids tapered.
on tamiflu
tamiflu completed
tamiflu completed
on tamiflu
tamiflu completed
has COPD exacerbation: on steroids and started on empiric Azithromycin for copd exacerbation: seems to have productive cough!!  12/30 on IV steroid, Azithromycin, Symbicort, bronchodilator. keep O2 sat greater than 885

## 2018-01-02 NOTE — PROGRESS NOTE ADULT - PROBLEM SELECTOR PLAN 5
thyroid panel stable   on methimazole  Endocrine follow up
thyroid panel stable   on methimazole
thyroid panel stable   on methimazole  Amio is only short term while in hospital to better control frequency of AFIB RVR
thyroid panel stable   on methimazole  Endocrine follow up

## 2018-01-02 NOTE — DISCHARGE NOTE ADULT - MEDICATION SUMMARY - MEDICATIONS TO CHANGE
I will SWITCH the dose or number of times a day I take the medications listed below when I get home from the hospital:    glimepiride 1 mg oral tablet  -- 1 tab(s) by mouth once a day    diltiaZEM 180 mg/24 hours oral tablet, extended release  -- 1 tab(s) by mouth once a day I will SWITCH the dose or number of times a day I take the medications listed below when I get home from the hospital:    diltiaZEM 180 mg/24 hours oral tablet, extended release  -- 1 tab(s) by mouth once a day    metFORMIN 500 mg oral tablet  -- 1 tab(s) by mouth 2 times a day

## 2018-01-02 NOTE — PROGRESS NOTE ADULT - SUBJECTIVE AND OBJECTIVE BOX
Subjective: Patient seen and examined. No new events except as noted.   no cp or sob   REVIEW OF SYSTEMS:    CONSTITUTIONAL: + weakness, no fevers or chills  EYES/ENT: No visual changes;  No vertigo or throat pain   NECK: No pain or stiffness  RESPIRATORY: No cough, wheezing, hemoptysis; No shortness of breath  CARDIOVASCULAR: No chest pain or palpitations  GASTROINTESTINAL: No abdominal or epigastric pain. No nausea, vomiting, or hematemesis; No diarrhea or constipation. No melena or hematochezia.  GENITOURINARY: No dysuria, frequency or hematuria  NEUROLOGICAL: No numbness or weakness  SKIN: No itching, burning, rashes, or lesions   All other review of systems is negative unless indicated above.    MEDICATIONS:  MEDICATIONS  (STANDING):  ALPRAZolam 0.5 milliGRAM(s) Oral at bedtime  atorvastatin 40 milliGRAM(s) Oral at bedtime  azithromycin   Tablet 500 milliGRAM(s) Oral daily  buDESOnide  80 MICROgram(s)/formoterol 4.5 MICROgram(s) Inhaler 2 Puff(s) Inhalation two times a day  dextrose 5%. 1000 milliLiter(s) (50 mL/Hr) IV Continuous <Continuous>  dextrose 50% Injectable 12.5 Gram(s) IV Push once  dextrose 50% Injectable 25 Gram(s) IV Push once  dextrose 50% Injectable 25 Gram(s) IV Push once  diltiazem    milliGRAM(s) Oral daily  docusate sodium 100 milliGRAM(s) Oral two times a day  insulin glargine Injectable (LANTUS) 14 Unit(s) SubCutaneous at bedtime  insulin lispro (HumaLOG) corrective regimen sliding scale   SubCutaneous three times a day before meals  insulin lispro (HumaLOG) corrective regimen sliding scale   SubCutaneous at bedtime  insulin lispro Injectable (HumaLOG) 8 Unit(s) SubCutaneous three times a day with meals  lamoTRIgine 25 milliGRAM(s) Oral daily  methimazole 5 milliGRAM(s) Oral daily  pantoprazole    Tablet 40 milliGRAM(s) Oral before breakfast  PARoxetine 40 milliGRAM(s) Oral daily  predniSONE   Tablet 40 milliGRAM(s) Oral daily  senna 2 Tablet(s) Oral at bedtime  tiotropium 18 MICROgram(s) Capsule 1 Capsule(s) Inhalation daily      PHYSICAL EXAM:  T(C): 36.4 (01-02-18 @ 03:53), Max: 36.7 (01-01-18 @ 21:23)  HR: 80 (01-02-18 @ 03:53) (78 - 88)  BP: 160/75 (01-02-18 @ 03:53) (140/60 - 160/75)  RR: 18 (01-02-18 @ 03:53) (18 - 18)  SpO2: 98% (01-02-18 @ 03:53) (96% - 99%)  Wt(kg): --  I&O's Summary    01 Jan 2018 07:01  -  02 Jan 2018 07:00  --------------------------------------------------------  IN: 290 mL / OUT: 300 mL / NET: -10 mL    02 Jan 2018 07:01  -  02 Jan 2018 10:51  --------------------------------------------------------  IN: 240 mL / OUT: 300 mL / NET: -60 mL          Appearance: Normal	  HEENT:   Normal oral mucosa, PERRL, EOMI	  Lymphatic: No lymphadenopathy , no edema  Cardiovascular: Normal S1 S2, No JVD, 3/6 systolic  murmurs , Peripheral pulses palpable 2+ bilaterally  Respiratory: Lungs clear to auscultation, normal effort 	  Gastrointestinal:  Soft, Non-tender, + BS	  Skin: No rashes, No ecchymoses, No cyanosis, warm to touch  Musculoskeletal: Normal range of motion, normal strength  Psychiatry:  Mood & affect appropriate  Ext: No edema      LABS:    CARDIAC MARKERS:                                11.9   15.02 )-----------( 480      ( 02 Jan 2018 08:23 )             37.6           proBNP:   Lipid Profile:   HgA1c:   TSH:           S  TELEMETRY: SR	    ECG:  	  RADIOLOGY:   DIAGNOSTIC TESTING:  [ ] Echocardiogram:  [ ]  Catheterization:  [ ] Stress Test:    OTHER:

## 2018-01-02 NOTE — PROGRESS NOTE ADULT - ASSESSMENT
89 year old, copd, atrial fibrillation, diagnosed with influenza A today at urgent care for  symptoms starting 3 days ago. Presenting form urgent care with tachycardia to 150s.   Pt has an element of dementia and is not able to give full hx however reports that he has been feeling weak , and reports palpitations / sob on and off otherwise no CP .  reports cough , no N/V/D   no  sx   no GI complaints   EKG done at urgent pt was seen and examined on   care rendered at that time seems to be sinus tachy  ( background noises noted )   1- SOB : sec to COPD exacerbation in setting of flu .. slowly improving cont nebs and cont  steroids taper per    completed  tamiflu    vest/ resp assisst device   check o2 sat on ra /procalcitonin  mild elevation   2- Hx of afib:  was in afib then in sinus and had a run of SVT    cont rate control and AC ..  would opt for o/p stress test when pul status improves  restart coumadin at lower dose   3- HTN cont meds   4- DM: hold po meds and monitor FS . plan for dc on insulin given elevated FS   5- HARJEET /met acidosis: resolved   6- anemia : at baseline    7- multinodular goiter : cont tapazole and f/u with  ..repeat TFT in one months 89 year old, copd, atrial fibrillation, diagnosed with influenza A today at urgent care for  symptoms starting 3 days ago. Presenting form urgent care with tachycardia to 150s.   Pt has an element of dementia and is not able to give full hx however reports that he has been feeling weak , and reports palpitations / sob on and off otherwise no CP .  reports cough , no N/V/D   no  sx   no GI complaints   EKG done at urgent pt was seen and examined on   care rendered at that time seems to be sinus tachy  ( background noises noted )   1- SOB : sec to COPD exacerbation in setting of flu .. slowly improving cont nebs and cont slow  steroids taper per    completed  tamiflu    vest/ resp assisst device    /procalcitonin  mild elevation   2- Hx of afib:  was in afib then in sinus and had a run of SVT    cont rate control and AC ..  would opt for o/p stress test when pul status improves  restart coumadin at lower dose   3- HTN cont meds   4- DM: hold po meds and monitor FS . plan was  for dc on insulin given elevated FS..however pt lives by self with some help however not going to be able to inject himself and will likely have difficulty due to his vision and hx of hand problems  .... d/w  : will increase glimperide to bid while on prednisone great than 10 mg and then decreased to o/p dose when on 10    5- HARJEET /met acidosis: resolved   6- anemia : at baseline    7- multinodular goiter : cont tapazole and f/u with  ..repeat TFT in one months ..f/u with

## 2018-01-02 NOTE — DISCHARGE NOTE ADULT - PLAN OF CARE
resolved follow up with your physicians symptom management Call your Health Care provider upon arrival home to make a follow up appointment within one week.  Take all inhalers as prescribed by your Health Care Provider.  Take steroids as prescribed by your Health Care Provider.  If your cough increases infrequency and severity and/or you have shortness of breath or increased shortness of breath call your Health Care Provider.  If you develop fever, chills, night sweats, malaise, and/or change in mental status call your Health care Provider.  Nutrition is very important.  Eat small frequent meals.  Increase your activity as tolerated.  Do not stay in bed all day rate control Atrial fibrillation is the most common heart rhythm problem & has the risk of stroke & heart attack  It helps if you control your blood pressure, not drink more than 1-2 alcohol drinks per day, cut down on caffeine, getting treatment for over active thyroid gland, & getting exercise  Call your doctor if you feel your heart racing or beating unusually, chest tightness or pain, lightheaded, faint, shortness of breath especially with exercise  It is important to take your heart medication as prescribed  You may be on anticoagulation which is very important to take as directed - you may need blood work to monitor drug levels disease management HgA1C this admission.  Make sure you get your HgA1c checked every three months.  If you take oral diabetes medications, check your blood glucose two times a day.  If you take insulin, check your blood glucose before meals and at bedtime.  It's important not to skip any meals.  Keep a log of your blood glucose results and always take it with you to your doctor appointments.  Keep a list of your current medications including injectables and over the counter medications and bring this medication list with you to all your doctor appointments.  If you have not seen your ophthalmologist this year call for appointment.  Check your feet daily for redness, sores, or openings. Do not self treat. If no improvement in two days call your primary care physician for an appointment.  Low blood sugar (hypoglycemia) is a blood sugar below 70mg/dl. Check your blood sugar if you feel signs/symptoms of hypoglycemia. If your blood sugar is below 70 take 15 grams of carbohydrates (ex 4 oz of apple juice, 3-4 glucose tablets, or 4-6 oz of regular soda) wait 15 minutes and repeat blood sugar to make sure it comes up above 70.  If your blood sugar is above 70 and you are due for a meal, have a meal.  If you are not due for a meal have a snack.  This snack helps keeps your blood sugar at a safe range.

## 2018-01-02 NOTE — PROGRESS NOTE ADULT - PROBLEM SELECTOR PROBLEM 2
Adenomatous goiter, toxic or with hyperthyroidism
Influenza
COPD (chronic obstructive pulmonary disease)

## 2018-01-02 NOTE — DISCHARGE NOTE ADULT - MEDICATION SUMMARY - MEDICATIONS TO STOP TAKING
I will STOP taking the medications listed below when I get home from the hospital:  None I will STOP taking the medications listed below when I get home from the hospital:    glimepiride 1 mg oral tablet  -- 1 tab(s) by mouth once a day

## 2018-01-02 NOTE — DISCHARGE NOTE ADULT - ADDITIONAL INSTRUCTIONS
Discharge home with home care (to resume 1/3/18)  Take Glimepiride 2 times a day UNTIL YOU ARE ALMOST DONE WITH THE PREDNISONE - (when you get to 1 tablet a day) - THEN DECREASE to glimepiride 1 dose a day  Make appointments to follow up with your physicians - bring all discharge paperwork including discharge medication list to follow up appointments

## 2018-01-02 NOTE — PROGRESS NOTE ADULT - SUBJECTIVE AND OBJECTIVE BOX
Follow-up Pulm Progress Note  Kai Onofre MD  [phone]    No new respiratory events overnight.  Denies SOB/CP.   Afebrile day # 5/5 azithromycin  O2 sat 94% on RA    Vital Signs Last 24 Hrs  T(C): 36.6 (02 Jan 2018 11:30), Max: 36.7 (01 Jan 2018 21:23)  T(F): 97.8 (02 Jan 2018 11:30), Max: 98 (01 Jan 2018 21:23)  HR: 80 (02 Jan 2018 11:30) (80 - 88)  BP: 137/68 (02 Jan 2018 11:30) (137/68 - 160/75)  BP(mean): --  RR: 17 (02 Jan 2018 11:30) (17 - 18)  SpO2: 96% (02 Jan 2018 11:30) (96% - 99%)                        11.9   15.02 )-----------( 480      ( 02 Jan 2018 08:23 )             37.6       PT/INR - ( 31 Dec 2017 08:53 )   PT: 29.2 sec;   INR: 2.65 ratio    CULTURES:  Culture Results:   No growth (12-24 @ 22:13)  Culture Results:   No growth at 5 days. (12-24 @ 18:15)  Culture Results:   No growth at 5 days. (12-24 @ 18:15)        Physical Examination:  PULM: few rhonchi, no wheeze  CVS: Regular rate and rhythm, no murmurs, rubs, or gallops  ABD: Soft, non-tender  EXT:  No clubbing, cyanosis, or edema    RADIOLOGY REVIEWED  CXR:    CT chest:    TTE:

## 2018-01-02 NOTE — DISCHARGE NOTE ADULT - FINDINGS/TREATMENT
12/29/17: Transthoracic Echocardiogram: Conclusions:  1. Mitral annular calcification and calcified mitral leaflets with normal diastolic opening.  2. Calcified trileaflet aortic valve with normal opening. Peak transaortic valve gradient equals 17 mm Hg, mean transaortic valve gradient equals 9 mm Hg, estimated aortic valve area equals 1.6 sqcm (by continuity equation), aortic valve velocity time integral equals 40 cm, consistent with mild aortic stenosis.  3. Mildly dilated left atrium.  LA volume index = 36 cc/m2.  4. Increased relative wall thickness with normal left ventricular mass index, consistent with concentric left  ventricular remodeling.  5. Hyperdynamic left ventricular systolic function.  6. Normal right ventricular size and function.  7. Estimated right ventricular systolic pressure equals 38 mm Hg, assuming right atrial pressure equals 8 mm Hg, consistent with borderline pulmonary hypertension.

## 2018-01-02 NOTE — PROGRESS NOTE ADULT - PROBLEM SELECTOR PROBLEM 4
Atrial fibrillation
COPD (chronic obstructive pulmonary disease)

## 2018-01-02 NOTE — PROGRESS NOTE ADULT - ASSESSMENT
89 y/o Type 2 diabetes COPD admitted with Influenza A, started on high dose steroids.  Tapazol treatment for hyperthyroidism.    Diabetes oral controlled at home, started on high dose steroid, FS high.   Started basal/bolus insulin, plans for steroid taper noted.  Patient to be D/Avelino off insulin- can not use meter or insulin pen can not be tought.    Hyperthyroidism- with MNG on exam. 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   T3-46, Free T4-1.1 both mid to low range,   Amiodarone D/Avelino. Will F/U TFT's as out patient once steroid dose reduced (high dose steroids suppress TFT's).

## 2018-01-02 NOTE — CHART NOTE - NSCHARTNOTEFT_GEN_A_CORE
Discharge completed as per Dr. Katz.  Diabetic medication adjusted (after initial discharge medications completed as per Dr Katz's orders)  Diabetic home/discharge medication re-ordered as recommended by Dr Haney (Endocrinology)  Patient's pharmacy contacted and I discussed medication changes with the pharmacist ([facility], [place] [phone])  Unable to "e-prescribe" updated medications - called in directly to pharmacy

## 2018-01-02 NOTE — PROGRESS NOTE ADULT - PROBLEM SELECTOR PLAN 4
HR seems to be controlled:  12/30 rate controlled. on Coumadin with INR of 3.05 today
nebulizers
nebulizers  on Zithromax
nebulizers  on Zithromax
nebulizers
nebulizers  on Zithromax

## 2018-01-02 NOTE — PROGRESS NOTE ADULT - ASSESSMENT
COPD exacerbation - clinically resolving  Influenza A viral infection  Atrial Fibrillation  DM    REC    Begin prednisone taper  Covert to symbicort and spiriva  DC azithromycin  DC planning

## 2018-01-02 NOTE — PROGRESS NOTE ADULT - PROBLEM SELECTOR PROBLEM 1
Controlled type 2 diabetes mellitus with hyperglycemia, without long-term current use of insulin
Atrial fibrillation
Controlled type 2 diabetes mellitus with hyperglycemia, without long-term current use of insulin
Atrial fibrillation
Influenza

## 2018-01-02 NOTE — DISCHARGE NOTE ADULT - HOSPITAL COURSE
89 year old male with copd, atrial fibrillation, diagnosed with influenza A at urgent care for symptoms starting 3 days prior to admission (on12/24/17). Presented form urgent care with tachycardia to 150s.    RVP confirmed Influenza A.  with lactate 2.5. No PNA on CXR. Given Tamiflu.   COPD Exacerbation - placed on Prednisone dose taper - Diabetic regimen adjusted to reflect steroids 89 year old, copd, atrial fibrillation, diagnosed with influenza A today at urgent care for  symptoms starting 3 days ago. Presenting form urgent care with tachycardia to 150s.   Pt has an element of dementia and is not able to give full hx however reports that he has been feeling weak , and reports palpitations / sob on and off otherwise no CP .  reports cough , no N/V/D   no  sx   no GI complaints   EKG done at urgent pt was seen and examined on   care rendered at that time seems to be sinus tachy  ( background noises noted )   1- SOB : sec to COPD exacerbation in setting of flu .. slowly improving cont nebs and cont slow  steroids taper per    completed  tamiflu    vest/ resp assisst device    /procalcitonin  mild elevation   2- Hx of afib:  was in afib recieved amio for few days and  now in sinus .  had a run of SVT  : brief and resolved    cont rate control and AC ..  would opt for o/p stress test when pul status improves  restart coumadin at lower dose   3- HTN cont meds   4- DM: hold po meds and monitor FS . plan was  for dc on insulin given elevated FS..however pt lives by self with some help however not going to be able to inject himself and will likely have difficulty due to his vision and hx of hand problems  .... d/w  : will increase glimperide to bid while on prednisone great than 10 mg and then decreased to o/p dose when on 10    5- HARJEET /met acidosis: resolved   6- anemia : at baseline    7- multinodular goiter : cont tapazole ..repeat TFT in one months ..f/u with

## 2018-01-02 NOTE — PROGRESS NOTE ADULT - SUBJECTIVE AND OBJECTIVE BOX
Chief Complaint: 89 y/o Type 2 diabetes COPD admitted with Influenza A, started on high dose steroids.    Patient to be D/Avelino on steroid taper.  Does not know how to use insulin no meter.  Steroid taper to 10 mg prednisone over 1 week.  Here on prednisone 40 mg -250 mg/dL mostly.  PO intake good, afebrile.    MEDICATIONS  (STANDING):  ALPRAZolam 0.5 milliGRAM(s) Oral at bedtime  atorvastatin 40 milliGRAM(s) Oral at bedtime  azithromycin   Tablet 500 milliGRAM(s) Oral daily  buDESOnide  80 MICROgram(s)/formoterol 4.5 MICROgram(s) Inhaler 2 Puff(s) Inhalation two times a day  dextrose 5%. 1000 milliLiter(s) (50 mL/Hr) IV Continuous <Continuous>  dextrose 50% Injectable 12.5 Gram(s) IV Push once  dextrose 50% Injectable 25 Gram(s) IV Push once  dextrose 50% Injectable 25 Gram(s) IV Push once  diltiazem    milliGRAM(s) Oral daily  docusate sodium 100 milliGRAM(s) Oral two times a day  insulin glargine Injectable (LANTUS) 14 Unit(s) SubCutaneous at bedtime  insulin lispro (HumaLOG) corrective regimen sliding scale   SubCutaneous three times a day before meals  insulin lispro (HumaLOG) corrective regimen sliding scale   SubCutaneous at bedtime  insulin lispro Injectable (HumaLOG) 8 Unit(s) SubCutaneous three times a day with meals  lamoTRIgine 25 milliGRAM(s) Oral daily  methimazole 5 milliGRAM(s) Oral daily  pantoprazole    Tablet 40 milliGRAM(s) Oral before breakfast  PARoxetine 40 milliGRAM(s) Oral daily  predniSONE   Tablet 40 milliGRAM(s) Oral daily  senna 2 Tablet(s) Oral at bedtime  tiotropium 18 MICROgram(s) Capsule 1 Capsule(s) Inhalation daily  warfarin 2 milliGRAM(s) Oral once    MEDICATIONS  (PRN):  ALPRAZolam 0.5 milliGRAM(s) Oral every 12 hours PRN Anxiety  benzocaine 15 mG/menthol 3.6 mG Lozenge 1 Lozenge Oral three times a day PRN Sore Throat  dextrose Gel 1 Dose(s) Oral once PRN Blood Glucose LESS THAN 70 milliGRAM(s)/deciliter  glucagon  Injectable 1 milliGRAM(s) IntraMuscular once PRN Glucose LESS THAN 70 milligrams/deciliter  polyethylene glycol 3350 17 Gram(s) Oral daily PRN Constipation      Allergies    No Known Allergies    Intolerances        PHYSICAL EXAM:  VITALS: T(C): 36.6 (01-02-18 @ 11:30)  T(F): 97.8 (01-02-18 @ 11:30), Max: 98 (01-01-18 @ 21:23)  HR: 80 (01-02-18 @ 11:30) (80 - 88)  BP: 137/68 (01-02-18 @ 11:30) (137/68 - 160/75)  RR:  (17 - 18)  SpO2:  (96% - 99%)  GENERAL: NAD, less SOB  HEENT:  Atraumatic, Normocephalic,   THYROID: bilateral nodules, No cervical LN  RESPIRATORY: Clear to auscultation bilateral wheezing  CARDIOVASCULAR: Regular rhythm; No murmurs; no peripheral edema  GI: Soft, nontender, non distended, normal bowel sounds  SKIN: Dry, intact, No rashes or lesions  MUSCULOSKELETAL: decreased strength  NEURO: extraocular movements intact, no tremor, reduced reflexes  PSYCH: Alert and oriented x 3, normal affect, normal mood      POCT Blood Glucose.: 216 mg/dL (01-02-18 @ 11:29)  POCT Blood Glucose.: 187 mg/dL (01-02-18 @ 07:49)  POCT Blood Glucose.: 278 mg/dL (01-01-18 @ 21:22)  POCT Blood Glucose.: 282 mg/dL (01-01-18 @ 16:48)  POCT Blood Glucose.: 251 mg/dL (01-01-18 @ 12:50)  POCT Blood Glucose.: 281 mg/dL (01-01-18 @ 11:41)  POCT Blood Glucose.: 286 mg/dL (01-01-18 @ 09:20)  POCT Blood Glucose.: 198 mg/dL (01-01-18 @ 07:54)  POCT Blood Glucose.: 192 mg/dL (12-31-17 @ 21:28)  POCT Blood Glucose.: 296 mg/dL (12-31-17 @ 17:00)  POCT Blood Glucose.: 168 mg/dL (12-31-17 @ 12:54)  POCT Blood Glucose.: 101 mg/dL (12-31-17 @ 07:28)  POCT Blood Glucose.: 191 mg/dL (12-30-17 @ 21:26)  POCT Blood Glucose.: 266 mg/dL (12-30-17 @ 16:35)                            11.9   15.02 )-----------( 480      ( 02 Jan 2018 08:23 )             37.6       12-31    141  |  100  |  18  ----------------------------<  93  3.5   |  33<H>  |  0.87    EGFR if : 89  EGFR if non : 77    Ca    8.8      12-31        Thyroid Function Tests:  12-26 @ 07:27 TSH -- FreeT4 -- T3 46 Anti TPO -- Anti Thyroglobulin Ab -- TSI --  12-25 @ 08:16 TSH 0.69 FreeT4 1.0 T3 -- Anti TPO -- Anti Thyroglobulin Ab -- TSI --      Hemoglobin A1C, Whole Blood: 6.5 % <H> [4.0 - 5.6] (12-25-17 @ 08:22)

## 2018-01-13 ENCOUNTER — INPATIENT (INPATIENT)
Facility: HOSPITAL | Age: 83
LOS: 5 days | Discharge: HOME | DRG: 190 | End: 2018-01-19
Attending: GENERAL ACUTE CARE HOSPITAL | Admitting: GENERAL ACUTE CARE HOSPITAL
Payer: MEDICARE

## 2018-01-13 VITALS
TEMPERATURE: 98 F | OXYGEN SATURATION: 95 % | RESPIRATION RATE: 18 BRPM | SYSTOLIC BLOOD PRESSURE: 145 MMHG | DIASTOLIC BLOOD PRESSURE: 60 MMHG | HEART RATE: 79 BPM

## 2018-01-13 DIAGNOSIS — Z96.641 PRESENCE OF RIGHT ARTIFICIAL HIP JOINT: Chronic | ICD-10-CM

## 2018-01-13 DIAGNOSIS — R06.02 SHORTNESS OF BREATH: ICD-10-CM

## 2018-01-13 LAB
ALBUMIN SERPL ELPH-MCNC: 3.8 G/DL — SIGNIFICANT CHANGE UP (ref 3.3–5)
ALP SERPL-CCNC: 101 U/L — SIGNIFICANT CHANGE UP (ref 40–120)
ALT FLD-CCNC: 16 U/L RC — SIGNIFICANT CHANGE UP (ref 10–45)
ANION GAP SERPL CALC-SCNC: 11 MMOL/L — SIGNIFICANT CHANGE UP (ref 5–17)
APPEARANCE UR: CLEAR — SIGNIFICANT CHANGE UP
AST SERPL-CCNC: 15 U/L — SIGNIFICANT CHANGE UP (ref 10–40)
BASOPHILS # BLD AUTO: 0 K/UL — SIGNIFICANT CHANGE UP (ref 0–0.2)
BASOPHILS NFR BLD AUTO: 0.1 % — SIGNIFICANT CHANGE UP (ref 0–2)
BILIRUB SERPL-MCNC: 0.3 MG/DL — SIGNIFICANT CHANGE UP (ref 0.2–1.2)
BILIRUB UR-MCNC: NEGATIVE — SIGNIFICANT CHANGE UP
BUN SERPL-MCNC: 29 MG/DL — HIGH (ref 7–23)
CALCIUM SERPL-MCNC: 9.4 MG/DL — SIGNIFICANT CHANGE UP (ref 8.4–10.5)
CHLORIDE SERPL-SCNC: 96 MMOL/L — SIGNIFICANT CHANGE UP (ref 96–108)
CO2 SERPL-SCNC: 28 MMOL/L — SIGNIFICANT CHANGE UP (ref 22–31)
COLOR SPEC: YELLOW — SIGNIFICANT CHANGE UP
CREAT SERPL-MCNC: 1.28 MG/DL — SIGNIFICANT CHANGE UP (ref 0.5–1.3)
DIFF PNL FLD: NEGATIVE — SIGNIFICANT CHANGE UP
EOSINOPHIL # BLD AUTO: 0 K/UL — SIGNIFICANT CHANGE UP (ref 0–0.5)
EOSINOPHIL NFR BLD AUTO: 0.2 % — SIGNIFICANT CHANGE UP (ref 0–6)
GLUCOSE BLDC GLUCOMTR-MCNC: 406 MG/DL — HIGH (ref 70–99)
GLUCOSE SERPL-MCNC: 319 MG/DL — HIGH (ref 70–99)
GLUCOSE UR QL: >1000 MG/DL
HCT VFR BLD CALC: 32.5 % — LOW (ref 39–50)
HGB BLD-MCNC: 10.7 G/DL — LOW (ref 13–17)
KETONES UR-MCNC: NEGATIVE — SIGNIFICANT CHANGE UP
LEUKOCYTE ESTERASE UR-ACNC: NEGATIVE — SIGNIFICANT CHANGE UP
LYMPHOCYTES # BLD AUTO: 0.5 K/UL — LOW (ref 1–3.3)
LYMPHOCYTES # BLD AUTO: 3 % — LOW (ref 13–44)
MCHC RBC-ENTMCNC: 30.9 PG — SIGNIFICANT CHANGE UP (ref 27–34)
MCHC RBC-ENTMCNC: 33 GM/DL — SIGNIFICANT CHANGE UP (ref 32–36)
MCV RBC AUTO: 93.5 FL — SIGNIFICANT CHANGE UP (ref 80–100)
MONOCYTES # BLD AUTO: 0.8 K/UL — SIGNIFICANT CHANGE UP (ref 0–0.9)
MONOCYTES NFR BLD AUTO: 4.6 % — SIGNIFICANT CHANGE UP (ref 2–14)
NEUTROPHILS # BLD AUTO: 16 K/UL — HIGH (ref 1.8–7.4)
NEUTROPHILS NFR BLD AUTO: 92.1 % — HIGH (ref 43–77)
NITRITE UR-MCNC: NEGATIVE — SIGNIFICANT CHANGE UP
NT-PROBNP SERPL-SCNC: 692 PG/ML — HIGH (ref 0–300)
PH UR: 5.5 — SIGNIFICANT CHANGE UP (ref 5–8)
PLATELET # BLD AUTO: 427 K/UL — HIGH (ref 150–400)
POTASSIUM SERPL-MCNC: 5 MMOL/L — SIGNIFICANT CHANGE UP (ref 3.5–5.3)
POTASSIUM SERPL-SCNC: 5 MMOL/L — SIGNIFICANT CHANGE UP (ref 3.5–5.3)
PROT SERPL-MCNC: 7.6 G/DL — SIGNIFICANT CHANGE UP (ref 6–8.3)
PROT UR-MCNC: 30 MG/DL
RAPID RVP RESULT: SIGNIFICANT CHANGE UP
RBC # BLD: 3.48 M/UL — LOW (ref 4.2–5.8)
RBC # FLD: 14.1 % — SIGNIFICANT CHANGE UP (ref 10.3–14.5)
SODIUM SERPL-SCNC: 135 MMOL/L — SIGNIFICANT CHANGE UP (ref 135–145)
SP GR SPEC: 1.02 — SIGNIFICANT CHANGE UP (ref 1.01–1.02)
UROBILINOGEN FLD QL: NEGATIVE — SIGNIFICANT CHANGE UP
WBC # BLD: 17.4 K/UL — HIGH (ref 3.8–10.5)
WBC # FLD AUTO: 17.4 K/UL — HIGH (ref 3.8–10.5)

## 2018-01-13 PROCEDURE — 99285 EMERGENCY DEPT VISIT HI MDM: CPT | Mod: GC

## 2018-01-13 PROCEDURE — 71046 X-RAY EXAM CHEST 2 VIEWS: CPT | Mod: 26

## 2018-01-13 RX ORDER — IPRATROPIUM/ALBUTEROL SULFATE 18-103MCG
3 AEROSOL WITH ADAPTER (GRAM) INHALATION ONCE
Qty: 0 | Refills: 0 | Status: COMPLETED | OUTPATIENT
Start: 2018-01-13 | End: 2018-01-13

## 2018-01-13 RX ORDER — INSULIN LISPRO 100/ML
VIAL (ML) SUBCUTANEOUS AT BEDTIME
Qty: 0 | Refills: 0 | Status: DISCONTINUED | OUTPATIENT
Start: 2018-01-13 | End: 2018-01-19

## 2018-01-13 RX ORDER — LAMOTRIGINE 25 MG/1
25 TABLET, ORALLY DISINTEGRATING ORAL DAILY
Qty: 0 | Refills: 0 | Status: DISCONTINUED | OUTPATIENT
Start: 2018-01-13 | End: 2018-01-19

## 2018-01-13 RX ORDER — INSULIN GLARGINE 100 [IU]/ML
10 INJECTION, SOLUTION SUBCUTANEOUS AT BEDTIME
Qty: 0 | Refills: 0 | Status: DISCONTINUED | OUTPATIENT
Start: 2018-01-13 | End: 2018-01-14

## 2018-01-13 RX ORDER — DEXTROSE 50 % IN WATER 50 %
12.5 SYRINGE (ML) INTRAVENOUS ONCE
Qty: 0 | Refills: 0 | Status: DISCONTINUED | OUTPATIENT
Start: 2018-01-13 | End: 2018-01-19

## 2018-01-13 RX ORDER — ALPRAZOLAM 0.25 MG
1 TABLET ORAL
Qty: 0 | Refills: 0 | COMMUNITY

## 2018-01-13 RX ORDER — BUDESONIDE, MICRONIZED 100 %
0.5 POWDER (GRAM) MISCELLANEOUS
Qty: 0 | Refills: 0 | Status: DISCONTINUED | OUTPATIENT
Start: 2018-01-13 | End: 2018-01-19

## 2018-01-13 RX ORDER — ATORVASTATIN CALCIUM 80 MG/1
40 TABLET, FILM COATED ORAL AT BEDTIME
Qty: 0 | Refills: 0 | Status: DISCONTINUED | OUTPATIENT
Start: 2018-01-13 | End: 2018-01-19

## 2018-01-13 RX ORDER — INSULIN LISPRO 100/ML
VIAL (ML) SUBCUTANEOUS
Qty: 0 | Refills: 0 | Status: DISCONTINUED | OUTPATIENT
Start: 2018-01-13 | End: 2018-01-19

## 2018-01-13 RX ORDER — DEXTROSE 50 % IN WATER 50 %
25 SYRINGE (ML) INTRAVENOUS ONCE
Qty: 0 | Refills: 0 | Status: DISCONTINUED | OUTPATIENT
Start: 2018-01-13 | End: 2018-01-19

## 2018-01-13 RX ORDER — IPRATROPIUM/ALBUTEROL SULFATE 18-103MCG
3 AEROSOL WITH ADAPTER (GRAM) INHALATION EVERY 6 HOURS
Qty: 0 | Refills: 0 | Status: DISCONTINUED | OUTPATIENT
Start: 2018-01-13 | End: 2018-01-19

## 2018-01-13 RX ORDER — GLUCAGON INJECTION, SOLUTION 0.5 MG/.1ML
1 INJECTION, SOLUTION SUBCUTANEOUS ONCE
Qty: 0 | Refills: 0 | Status: DISCONTINUED | OUTPATIENT
Start: 2018-01-13 | End: 2018-01-19

## 2018-01-13 RX ORDER — SODIUM CHLORIDE 9 MG/ML
1000 INJECTION, SOLUTION INTRAVENOUS
Qty: 0 | Refills: 0 | Status: DISCONTINUED | OUTPATIENT
Start: 2018-01-13 | End: 2018-01-19

## 2018-01-13 RX ORDER — DEXTROSE 50 % IN WATER 50 %
1 SYRINGE (ML) INTRAVENOUS ONCE
Qty: 0 | Refills: 0 | Status: DISCONTINUED | OUTPATIENT
Start: 2018-01-13 | End: 2018-01-19

## 2018-01-13 RX ORDER — WARFARIN SODIUM 2.5 MG/1
2 TABLET ORAL ONCE
Qty: 0 | Refills: 0 | Status: DISCONTINUED | OUTPATIENT
Start: 2018-01-13 | End: 2018-01-14

## 2018-01-13 RX ORDER — DILTIAZEM HCL 120 MG
300 CAPSULE, EXT RELEASE 24 HR ORAL DAILY
Qty: 0 | Refills: 0 | Status: DISCONTINUED | OUTPATIENT
Start: 2018-01-13 | End: 2018-01-19

## 2018-01-13 RX ORDER — HALOPERIDOL DECANOATE 100 MG/ML
0.5 INJECTION INTRAMUSCULAR EVERY 8 HOURS
Qty: 0 | Refills: 0 | Status: DISCONTINUED | OUTPATIENT
Start: 2018-01-13 | End: 2018-01-16

## 2018-01-13 RX ADMIN — Medication 3 MILLILITER(S): at 16:59

## 2018-01-13 RX ADMIN — Medication 3 MILLILITER(S): at 17:00

## 2018-01-13 NOTE — ED PROVIDER NOTE - ATTENDING CONTRIBUTION TO CARE
Attending MD Rios: I personally have seen and examined this patient.  Resident note reviewed and agree on plan of care and except where noted.  See below for details.     89M with PMH/PSH including AFib, COPD, DM2, hyperthyroidism, dementia, previous delirium episodes, bipolar disorder, anxiety ([facility]), R total hip arthroplasty recently admitted to [facility] from 12/24/17-1/2/18 for influenza A presents to the ED with persistent shortness of breath sent in from urgent care where he went earlier today.  Daughter Jackie ([phone]) bedside with patient.  Reports completed abx/steroid course from his most recent admission.  Patient reports that since his discharge has not felt well.  Reports that he feels increasingly unstable with ambulation and has shortness of breath worse with ambulation.  Daughter reports has noted increased shortness of breath of patient since discharge, not at baseline. Reports increased thirst.  HHA, bedside, reports frequently requesting water.  Denies chest pain, palpitations.  Denies abdominal pain, nausea, vomiting, diarrhea, blood in stools. Denies lower extremity edema.  Daughter reports that patient has been reporting visual hallucinations and auditory hallucinations.  Reports thought there was a party outside (there was not), reported to her that he was being asked to sign checks.  Patient admits to seeing/hearing these things.  Acknowledges now that they were not real.  Denies VH/AH at present.  Denies SI/HI at present although reports has had in past (>2 mo ago).  On exam, CN 2-12 grossly intact, head NCAT, PERRL, FROM at neck, no tenderness to palpation or stepoffs along length of spine, lungs +scattered crackles and end expiratory wheezing L>R, cough with good inspiratory effort, +S1S2, no m/r/g, +protruding distal sternum, abdomen soft with +BS, NT, ND, no CVAT, moving all extremities with 5/5 strength bilateral upper and lower extremities, good and equal  strength bilaterally, no lower extremity edema; A/P: 89M with recent admission, reporting shortness of breath, Ddx COPD exacerbation vs UTI vs residual dyspnea from recent influenza, will obtain CXR (unable to open the one from this AM), labs, UA, UrCx, reassess

## 2018-01-13 NOTE — H&P ADULT - ASSESSMENT
89 year old male with hx of DM, anemia, multinodular goiter ,  copd, atrial fibrillation, recetnly discharged from [facility] where he was admitted for SOB sec to afib with RVR, COPD exacerbation in setting of influenza.. sent out on steroid taper which he completed and now returning with SOB and haullicinations.  daughter not at bedside but reports that pt had had sob ( which at this time he denies )   denies any fever or chills  denies cough , GI sx   reports urirnary frequency but no dysuria , bladder scan in  cc   Also reports of visual haukllicinations ( which pt does recall ) but no other complaints      1- SOB : RVP neg  , no pna on CXr but small effusion   will check procalcitonin , leukocytosis likley sec to steroids   exam though with coarse bs and mild wheezing : it is improved since last admit  will give small dose iv streroids and cont nebs   vest/ resp assisst device    /procalcitonin  mild elevation   ? element of aspiration : however will not pursue at this time .. will d/w Daughter   2- Hx of afib:   cont rate control and AC ..  would opt for o/p stress test when pul status improves  restart coumadin   3- HTN cont meds   4- DM: hold PO meds ,  FS uncontrolled   will start insluin inpt and f/u with Dr. Haney   5- anemia : at baseline.monitor     6- multinodular goiter : cont tapazole and f/u with  ..repeat TFT in one months .  7- urinary frequency , no evidence of retention .. UA neg . monitor for now 89 year old male with hx of DM, anemia, multinodular goiter ,  copd, atrial fibrillation, recetnly discharged from [facility] where he was admitted for SOB sec to afib with RVR, COPD exacerbation in setting of influenza.. sent out on steroid taper which he completed and now returning with SOB and haullicinations.  daughter not at bedside but reports that pt had had sob ( which at this time he denies )   denies any fever or chills  denies cough , GI sx   reports urirnary frequency but no dysuria , bladder scan in  cc   Also reports of visual haukllicinations ( which pt does recall ) but no other complaints      1- SOB : RVP neg  , no pna on CXr but small effusion   will check procalcitonin , leukocytosis likley sec to steroids   exam though with coarse bs and mild wheezing : it is improved since last admit.. will clarify if pt completed his steroids course as o/p ( seems to be no medrol at this time )   will give small dose iv streroids and cont nebs   vest/ resp assisst device    /procalcitonin  mild elevation   ? element of aspiration : however will not pursue at this time .. will d/w Daughter   2- Hx of afib:   cont rate control and AC ..  would opt for o/p stress test when pul status improves  restart coumadin   3- HTN cont meds   4- DM: hold PO meds ,  FS uncontrolled   will start insluin inpt and f/u with Dr. Haney   5- anemia : at baseline.monitor     6- multinodular goiter : cont tapazole and f/u with  ..repeat TFT in one months .  7- urinary frequency , no evidence of retention .. UA neg . monitor for now , start flomax   8- hallucinations: ? sec to steroids ...

## 2018-01-13 NOTE — ED ADULT NURSE NOTE - OBJECTIVE STATEMENT
89 y.o M arrived to ED c/o SOB. A&Ox3. was d/c 10 days ago from [facility], was admitted at that time for COPD exacerbation and flu. PMH DM, COPD, a-fib, bipolar. as per pt daughter, pt presented to urgent care this morning and had xray and UA performed, both normal. pt daughter states pt has been having visual hallucinations recently which also happened about 1 year ago when pt was diagnosed with UTI. pt endorses urinary frequency and pain/burning upon urination "sometimes". respirations nonlabored, abdomen soft, neuro intact, moves all extremities. Denies CP, N/V/D ,fevers, chills, HA. Safety/comfort provided/maintained. MD Lopez @ bedside. 89 y.o M arrived to ED c/o SOB. A&Ox3. was d/c 10 days ago from [facility], was admitted at that time for COPD exacerbation and flu. PMH DM, COPD, a-fib, bipolar. as per pt daughter, pt presented to urgent care this morning and had xray and UA performed, both normal. pt daughter states pt has been having visual hallucinations recently which also happened about 1 year ago when pt was diagnosed with UTI. pt endorses urinary frequency and pain/burning upon urination "sometimes". respirations nonlabored, abdomen soft, neuro intact, moves all extremities. Denies CP, N/V/D ,fevers, chills, HA. Safety/comfort provided/maintained. MD Rios @ bedside. 89 y.o M arrived to ED c/o SOB. A&Ox3. was d/c 10 days ago from [facility], was admitted at that time for COPD exacerbation and flu. PMH DM, COPD, a-fib, bipolar. as per pt daughter, pt presented to urgent care this morning and had xray and UA performed, both normal. pt daughter states pt has been having visual hallucinations recently which also happened about 1 year ago when pt was diagnosed with UTI. pt endorses urinary frequency and pain/burning upon urination "sometimes". respirations nonlabored, abdomen soft, neuro intact, moves all extremities. Denies CP, N/V/D ,fevers, chills, HA. Safety/comfort provided/maintained. MD Rios @ bedside. Pt sates he has had SI in the past, more than 3 months ago, never had a plan, does not currently have SI, Denies HI, states he feels safe @ home, MD Rios made aware.

## 2018-01-13 NOTE — H&P ADULT - HISTORY OF PRESENT ILLNESS
89 year old male with hx of DM, anemia, multinodular goiter ,  copd, atrial fibrillation, recetnly discharged from [facility] where he was admitted for SOB sec to afib with RVR, COPD exacerbation in setting of influenza.. sent out on steroid taper which he completed and now returning with SOB and haullicinations.  daughter not at bedside but reports that pt had had sob ( which at this time he denies )   denies any fever or chills  denies cough , GI sx   reports urirnary frequency but no dysuria , bladder scan in  cc   Also reports of visual haukllicinations ( which pt does recall ) but no other complaints

## 2018-01-13 NOTE — ED PROVIDER NOTE - MEDICAL DECISION MAKING DETAILS
Pt with episodic delusions/AMS in the setting of shortness of breath and urinary sx. Pt feels off and uncomfortable walking as well. Also was here with recent admission for COPD exacerbation and influenza. Lungs with rhonchi, exam otherwise normal. Will check labs, cbc/cmp/bnp, ua/cx, reassess. Low threshold for admission with recent admission and comorbidities.   Claudia Mcgrath, PGY-1 EM

## 2018-01-13 NOTE — H&P ADULT - PMH
Atrial fibrillation    COPD (chronic obstructive pulmonary disease)    Diabetes type 2, controlled    Goiter

## 2018-01-13 NOTE — ED PROVIDER NOTE - OBJECTIVE STATEMENT
89 year old M recently d/c from [facility] after admission for influenza in the setting of COPD p/w difficulty breathing and urinary sx. Also has pmh of copd and atrial fibrillation. Pt completed tamiflu 89 year old M recently d/c 10 days ago from [facility] after admission for COPD exacerbation and influenza p/w difficulty breathing and urinary sx. Also has pmh of DM, copd, atrial fibrillation. Pt completed course of steroids but still is having shortness of breath. Per daughter, went to urgent care this morning and had a x-ray done that was normal as well as normal urinalysis. Daughter reports that pt has been having visual hallucinations recently and this has happened last year in the setting of a uti. Pt endorses urinary frequency but no dysuria. Reports he does not feel like himself, that he was originally doing okay upon discharge but that the shortness of breath has since returned. Patient denies ha, loc, cp, back pain, abd pain/n/v/d.

## 2018-01-14 DIAGNOSIS — F05 DELIRIUM DUE TO KNOWN PHYSIOLOGICAL CONDITION: ICD-10-CM

## 2018-01-14 DIAGNOSIS — R06.02 SHORTNESS OF BREATH: ICD-10-CM

## 2018-01-14 DIAGNOSIS — I48.91 UNSPECIFIED ATRIAL FIBRILLATION: ICD-10-CM

## 2018-01-14 DIAGNOSIS — E05.20 THYROTOXICOSIS WITH TOXIC MULTINODULAR GOITER WITHOUT THYROTOXIC CRISIS OR STORM: ICD-10-CM

## 2018-01-14 DIAGNOSIS — E11.9 TYPE 2 DIABETES MELLITUS WITHOUT COMPLICATIONS: ICD-10-CM

## 2018-01-14 DIAGNOSIS — J44.9 CHRONIC OBSTRUCTIVE PULMONARY DISEASE, UNSPECIFIED: ICD-10-CM

## 2018-01-14 DIAGNOSIS — F39 UNSPECIFIED MOOD [AFFECTIVE] DISORDER: ICD-10-CM

## 2018-01-14 DIAGNOSIS — E11.65 TYPE 2 DIABETES MELLITUS WITH HYPERGLYCEMIA: ICD-10-CM

## 2018-01-14 LAB
ALBUMIN SERPL ELPH-MCNC: 3.8 G/DL — SIGNIFICANT CHANGE UP (ref 3.3–5)
ALP SERPL-CCNC: 95 U/L — SIGNIFICANT CHANGE UP (ref 40–120)
ALT FLD-CCNC: 18 U/L RC — SIGNIFICANT CHANGE UP (ref 10–45)
ANION GAP SERPL CALC-SCNC: 12 MMOL/L — SIGNIFICANT CHANGE UP (ref 5–17)
APTT BLD: 58.5 SEC — HIGH (ref 27.5–37.4)
APTT BLD: 60.7 SEC — HIGH (ref 27.5–37.4)
AST SERPL-CCNC: 15 U/L — SIGNIFICANT CHANGE UP (ref 10–40)
BASOPHILS # BLD AUTO: 0 K/UL — SIGNIFICANT CHANGE UP (ref 0–0.2)
BASOPHILS NFR BLD AUTO: 0 % — SIGNIFICANT CHANGE UP (ref 0–2)
BILIRUB SERPL-MCNC: 0.4 MG/DL — SIGNIFICANT CHANGE UP (ref 0.2–1.2)
BUN SERPL-MCNC: 34 MG/DL — HIGH (ref 7–23)
CALCIUM SERPL-MCNC: 9.3 MG/DL — SIGNIFICANT CHANGE UP (ref 8.4–10.5)
CHLORIDE SERPL-SCNC: 100 MMOL/L — SIGNIFICANT CHANGE UP (ref 96–108)
CO2 SERPL-SCNC: 28 MMOL/L — SIGNIFICANT CHANGE UP (ref 22–31)
CREAT SERPL-MCNC: 1.46 MG/DL — HIGH (ref 0.5–1.3)
EOSINOPHIL # BLD AUTO: 0.03 K/UL — SIGNIFICANT CHANGE UP (ref 0–0.5)
EOSINOPHIL NFR BLD AUTO: 0.2 % — SIGNIFICANT CHANGE UP (ref 0–6)
GLUCOSE BLDC GLUCOMTR-MCNC: 111 MG/DL — HIGH (ref 70–99)
GLUCOSE BLDC GLUCOMTR-MCNC: 141 MG/DL — HIGH (ref 70–99)
GLUCOSE BLDC GLUCOMTR-MCNC: 222 MG/DL — HIGH (ref 70–99)
GLUCOSE BLDC GLUCOMTR-MCNC: 276 MG/DL — HIGH (ref 70–99)
GLUCOSE BLDC GLUCOMTR-MCNC: 401 MG/DL — HIGH (ref 70–99)
GLUCOSE BLDC GLUCOMTR-MCNC: 423 MG/DL — HIGH (ref 70–99)
GLUCOSE BLDC GLUCOMTR-MCNC: 437 MG/DL — HIGH (ref 70–99)
GLUCOSE BLDC GLUCOMTR-MCNC: 486 MG/DL — CRITICAL HIGH (ref 70–99)
GLUCOSE SERPL-MCNC: 108 MG/DL — HIGH (ref 70–99)
HCT VFR BLD CALC: 31.4 % — LOW (ref 39–50)
HGB BLD-MCNC: 9.8 G/DL — LOW (ref 13–17)
IMM GRANULOCYTES NFR BLD AUTO: 0.2 % — SIGNIFICANT CHANGE UP (ref 0–1.5)
INR BLD: 3.3 RATIO — HIGH (ref 0.88–1.16)
INR BLD: 3.56 RATIO — HIGH (ref 0.88–1.16)
LYMPHOCYTES # BLD AUTO: 0.6 K/UL — LOW (ref 1–3.3)
LYMPHOCYTES # BLD AUTO: 3.5 % — LOW (ref 13–44)
MCHC RBC-ENTMCNC: 28.4 PG — SIGNIFICANT CHANGE UP (ref 27–34)
MCHC RBC-ENTMCNC: 31.2 GM/DL — LOW (ref 32–36)
MCV RBC AUTO: 91 FL — SIGNIFICANT CHANGE UP (ref 80–100)
MONOCYTES # BLD AUTO: 0.81 K/UL — SIGNIFICANT CHANGE UP (ref 0–0.9)
MONOCYTES NFR BLD AUTO: 4.7 % — SIGNIFICANT CHANGE UP (ref 2–14)
NEUTROPHILS # BLD AUTO: 15.62 K/UL — HIGH (ref 1.8–7.4)
NEUTROPHILS NFR BLD AUTO: 91.4 % — HIGH (ref 43–77)
PLATELET # BLD AUTO: 431 K/UL — HIGH (ref 150–400)
POTASSIUM SERPL-MCNC: 4.4 MMOL/L — SIGNIFICANT CHANGE UP (ref 3.5–5.3)
POTASSIUM SERPL-SCNC: 4.4 MMOL/L — SIGNIFICANT CHANGE UP (ref 3.5–5.3)
PROCALCITONIN SERPL-MCNC: 0.07 NG/ML — HIGH (ref 0–0.04)
PROT SERPL-MCNC: 7.4 G/DL — SIGNIFICANT CHANGE UP (ref 6–8.3)
PROTHROM AB SERPL-ACNC: 38.2 SEC — HIGH (ref 10–13.1)
PROTHROM AB SERPL-ACNC: 39.5 SEC — HIGH (ref 9.8–12.7)
RBC # BLD: 3.45 M/UL — LOW (ref 4.2–5.8)
RBC # FLD: 15.5 % — HIGH (ref 10.3–14.5)
SODIUM SERPL-SCNC: 140 MMOL/L — SIGNIFICANT CHANGE UP (ref 135–145)
WBC # BLD: 17.1 K/UL — HIGH (ref 3.8–10.5)
WBC # FLD AUTO: 17.1 K/UL — HIGH (ref 3.8–10.5)

## 2018-01-14 PROCEDURE — 71250 CT THORAX DX C-: CPT | Mod: 26

## 2018-01-14 PROCEDURE — 99223 1ST HOSP IP/OBS HIGH 75: CPT

## 2018-01-14 RX ORDER — DEXTROSE 50 % IN WATER 50 %
1 SYRINGE (ML) INTRAVENOUS ONCE
Qty: 0 | Refills: 0 | Status: DISCONTINUED | OUTPATIENT
Start: 2018-01-14 | End: 2018-01-19

## 2018-01-14 RX ORDER — INSULIN GLARGINE 100 [IU]/ML
20 INJECTION, SOLUTION SUBCUTANEOUS AT BEDTIME
Qty: 0 | Refills: 0 | Status: DISCONTINUED | OUTPATIENT
Start: 2018-01-14 | End: 2018-01-18

## 2018-01-14 RX ORDER — GLUCAGON INJECTION, SOLUTION 0.5 MG/.1ML
1 INJECTION, SOLUTION SUBCUTANEOUS ONCE
Qty: 0 | Refills: 0 | Status: DISCONTINUED | OUTPATIENT
Start: 2018-01-14 | End: 2018-01-19

## 2018-01-14 RX ORDER — SODIUM CHLORIDE 9 MG/ML
1000 INJECTION, SOLUTION INTRAVENOUS
Qty: 0 | Refills: 0 | Status: DISCONTINUED | OUTPATIENT
Start: 2018-01-14 | End: 2018-01-19

## 2018-01-14 RX ORDER — INSULIN LISPRO 100/ML
10 VIAL (ML) SUBCUTANEOUS ONCE
Qty: 0 | Refills: 0 | Status: COMPLETED | OUTPATIENT
Start: 2018-01-14 | End: 2018-01-14

## 2018-01-14 RX ORDER — SODIUM CHLORIDE 9 MG/ML
1000 INJECTION INTRAMUSCULAR; INTRAVENOUS; SUBCUTANEOUS
Qty: 0 | Refills: 0 | Status: DISCONTINUED | OUTPATIENT
Start: 2018-01-14 | End: 2018-01-19

## 2018-01-14 RX ORDER — INSULIN LISPRO 100/ML
6 VIAL (ML) SUBCUTANEOUS AT BEDTIME
Qty: 0 | Refills: 0 | Status: DISCONTINUED | OUTPATIENT
Start: 2018-01-14 | End: 2018-01-14

## 2018-01-14 RX ORDER — INSULIN LISPRO 100/ML
6 VIAL (ML) SUBCUTANEOUS
Qty: 0 | Refills: 0 | Status: DISCONTINUED | OUTPATIENT
Start: 2018-01-14 | End: 2018-01-17

## 2018-01-14 RX ORDER — DEXTROSE 50 % IN WATER 50 %
25 SYRINGE (ML) INTRAVENOUS ONCE
Qty: 0 | Refills: 0 | Status: DISCONTINUED | OUTPATIENT
Start: 2018-01-14 | End: 2018-01-19

## 2018-01-14 RX ORDER — INSULIN LISPRO 100/ML
6 VIAL (ML) SUBCUTANEOUS ONCE
Qty: 0 | Refills: 0 | Status: COMPLETED | OUTPATIENT
Start: 2018-01-14 | End: 2018-01-14

## 2018-01-14 RX ORDER — DEXTROSE 50 % IN WATER 50 %
12.5 SYRINGE (ML) INTRAVENOUS ONCE
Qty: 0 | Refills: 0 | Status: DISCONTINUED | OUTPATIENT
Start: 2018-01-14 | End: 2018-01-19

## 2018-01-14 RX ORDER — HALOPERIDOL DECANOATE 100 MG/ML
1 INJECTION INTRAMUSCULAR ONCE
Qty: 0 | Refills: 0 | Status: COMPLETED | OUTPATIENT
Start: 2018-01-14 | End: 2018-01-14

## 2018-01-14 RX ADMIN — INSULIN GLARGINE 20 UNIT(S): 100 INJECTION, SOLUTION SUBCUTANEOUS at 22:28

## 2018-01-14 RX ADMIN — Medication 0.5 MILLIGRAM(S): at 22:27

## 2018-01-14 RX ADMIN — Medication 3 MILLILITER(S): at 12:53

## 2018-01-14 RX ADMIN — Medication 6 UNIT(S): at 01:20

## 2018-01-14 RX ADMIN — HALOPERIDOL DECANOATE 0.5 MILLIGRAM(S): 100 INJECTION INTRAMUSCULAR at 02:48

## 2018-01-14 RX ADMIN — Medication 300 MILLIGRAM(S): at 07:11

## 2018-01-14 RX ADMIN — Medication 3: at 17:36

## 2018-01-14 RX ADMIN — Medication 10 UNIT(S): at 15:14

## 2018-01-14 RX ADMIN — Medication 40 MILLIGRAM(S): at 12:53

## 2018-01-14 RX ADMIN — Medication 20 MILLIGRAM(S): at 22:27

## 2018-01-14 RX ADMIN — ATORVASTATIN CALCIUM 40 MILLIGRAM(S): 80 TABLET, FILM COATED ORAL at 22:27

## 2018-01-14 RX ADMIN — Medication 10 UNIT(S): at 13:04

## 2018-01-14 RX ADMIN — Medication 30 MILLIGRAM(S): at 14:56

## 2018-01-14 RX ADMIN — Medication 0.5 MILLIGRAM(S): at 07:12

## 2018-01-14 RX ADMIN — SODIUM CHLORIDE 50 MILLILITER(S): 9 INJECTION INTRAMUSCULAR; INTRAVENOUS; SUBCUTANEOUS at 11:43

## 2018-01-14 RX ADMIN — Medication 3 MILLILITER(S): at 00:24

## 2018-01-14 RX ADMIN — INSULIN GLARGINE 10 UNIT(S): 100 INJECTION, SOLUTION SUBCUTANEOUS at 00:24

## 2018-01-14 RX ADMIN — Medication 3 MILLILITER(S): at 08:39

## 2018-01-14 RX ADMIN — Medication 30 MILLIGRAM(S): at 07:12

## 2018-01-14 RX ADMIN — Medication 3 MILLILITER(S): at 17:37

## 2018-01-14 RX ADMIN — LAMOTRIGINE 25 MILLIGRAM(S): 25 TABLET, ORALLY DISINTEGRATING ORAL at 12:53

## 2018-01-14 NOTE — BEHAVIORAL HEALTH ASSESSMENT NOTE - NSBHCHARTREVIEWLAB_PSY_A_CORE FT
9.8    17.10 )-----------( 431      ( 14 Jan 2018 09:14 )             31.4     01-14    140  |  100  |  34<H>  ----------------------------<  108<H>  4.4   |  28  |  1.46<H>    Ca    9.3      14 Jan 2018 07:06    TPro  7.4  /  Alb  3.8  /  TBili  0.4  /  DBili  x   /  AST  15  /  ALT  18  /  AlkPhos  95  01-14

## 2018-01-14 NOTE — BEHAVIORAL HEALTH ASSESSMENT NOTE - SUMMARY
89 year old male, divorded, domiciled alone, has a home aide, hx depression, anxiety, no prior psych admissions, not in current psych tx, mult med issues including DM, anemia, multinodular goiter ,  copd, atrial fibrillation, recetnly discharged from [facility] where he was admitted for SOB sec to afib with RVR, COPD exacerbation in setting of influenza., sent out on steroid taper which he completed and now returning with SOB and haullicinations, pt seen for ams, agitation. Pt required haldol 1mg x1 iv in er, with good effect. Pt is poor historian, confused, disorganized thought process. pt couldn't explain details of admission, pt denies mood symptoms, no si/hi, no anxiety, psychosis, bebeto. pt disoriented to place,thought he was in [place], pt has been more confused as per son, agitated.

## 2018-01-14 NOTE — CONSULT NOTE ADULT - SUBJECTIVE AND OBJECTIVE BOX
CHIEF COMPLAINT:  SOb     HISTORY OF PRESENT ILLNESS:  89 year old male with hx of DM, anemia, multinodular goiter ,  copd, atrial fibrillation, recetnly discharged from [facility] where he was admitted for SOB sec to afib with RVR, COPD exacerbation in setting of influenza.. sent out on steroid taper which he completed and now returning with SOB and haullicinations.  daughter not at bedside but reports that pt had had sob ( which at this time he denies )   denies any fever or chills  denies cough , GI sx   reports urirnary frequency but no dysuria , bladder scan in  cc   Also reports of visual haukllicinations ( which pt does recall ) but no other complaints       PAST MEDICAL & SURGICAL HISTORY:  Goiter  Diabetes type 2, controlled  COPD (chronic obstructive pulmonary disease)  Atrial fibrillation  History of total hip arthroplasty, right          MEDICATIONS:  diltiazem    milliGRAM(s) Oral daily      ALBUTerol/ipratropium for Nebulization 3 milliLiter(s) Nebulizer every 6 hours  buDESOnide   0.5 milliGRAM(s) Respule 0.5 milliGRAM(s) Inhalation two times a day    haloperidol    Injectable 0.5 milliGRAM(s) IV Push every 8 hours PRN  lamoTRIgine 25 milliGRAM(s) Oral daily  PARoxetine 40 milliGRAM(s) Oral daily      atorvastatin 40 milliGRAM(s) Oral at bedtime  dextrose 50% Injectable 12.5 Gram(s) IV Push once  dextrose 50% Injectable 25 Gram(s) IV Push once  dextrose 50% Injectable 25 Gram(s) IV Push once  dextrose 50% Injectable 12.5 Gram(s) IV Push once  dextrose 50% Injectable 25 Gram(s) IV Push once  dextrose 50% Injectable 25 Gram(s) IV Push once  dextrose Gel 1 Dose(s) Oral once PRN  dextrose Gel 1 Dose(s) Oral once PRN  glucagon  Injectable 1 milliGRAM(s) IntraMuscular once PRN  glucagon  Injectable 1 milliGRAM(s) IntraMuscular once PRN  insulin glargine Injectable (LANTUS) 10 Unit(s) SubCutaneous at bedtime  insulin lispro (HumaLOG) corrective regimen sliding scale   SubCutaneous three times a day before meals  insulin lispro (HumaLOG) corrective regimen sliding scale   SubCutaneous at bedtime  methimazole 5 milliGRAM(s) Oral daily  methylPREDNISolone sodium succinate Injectable 30 milliGRAM(s) IV Push every 8 hours    dextrose 5%. 1000 milliLiter(s) IV Continuous <Continuous>  dextrose 5%. 1000 milliLiter(s) IV Continuous <Continuous>  sodium chloride 0.9%. 1000 milliLiter(s) IV Continuous <Continuous>      FAMILY HISTORY:  No pertinent family history in first degree relatives      SOCIAL HISTORY:    [ ] Non-smoker  [ ] Smoker  [ ] Alcohol    Allergies    No Known Allergies    Intolerances    	    REVIEW OF SYSTEMS:  CONSTITUTIONAL: No fever, weight loss, or fatigue  EYES: No eye pain, visual disturbances, or discharge  ENMT:  No difficulty hearing, tinnitus, vertigo; No sinus or throat pain  NECK: No pain or stiffness  RESPIRATORY: No cough, wheezing, chills or hemoptysis; No Shortness of Breath  CARDIOVASCULAR: No chest pain, palpitations, passing out, dizziness, or leg swelling  GASTROINTESTINAL: No abdominal or epigastric pain. No nausea, vomiting, or hematemesis; No diarrhea or constipation. No melena or hematochezia.  GENITOURINARY: No dysuria, frequency, hematuria, or incontinence  NEUROLOGICAL: No headaches, memory loss, loss of strength, numbness, or tremors  SKIN: No itching, burning, rashes, or lesions   LYMPH Nodes: No enlarged glands  ENDOCRINE: No heat or cold intolerance; No hair loss  MUSCULOSKELETAL: No joint pain or swelling; No muscle, back, or extremity pain  PSYCHIATRIC: No depression, anxiety, mood swings, or difficulty sleeping  HEME/LYMPH: No easy bruising, or bleeding gums  ALLERY AND IMMUNOLOGIC: No hives or eczema	    [ ] All others negative	  [ ] Unable to obtain    PHYSICAL EXAM:  T(C): 36.5 (01-14-18 @ 07:10), Max: 37.2 (01-13-18 @ 21:50)  HR: 81 (01-14-18 @ 07:10) (79 - 85)  BP: 136/75 (01-14-18 @ 07:10) (136/75 - 163/63)  RR: 18 (01-14-18 @ 07:10) (16 - 18)  SpO2: 100% (01-14-18 @ 07:10) (95% - 100%)  Wt(kg): --  I&O's Summary    14 Jan 2018 07:01  -  14 Jan 2018 10:33  --------------------------------------------------------  IN: 200 mL / OUT: 250 mL / NET: -50 mL        Appearance: Normal	  HEENT:   Normal oral mucosa, PERRL, EOMI	  Lymphatic: No lymphadenopathy  Cardiovascular: Normal S1 S2, No JVD, No murmurs, No edema  Respiratory: Lungs clear to auscultation	  Psychiatry: A & O x 3, Mood & affect appropriate  Gastrointestinal:  Soft, Non-tender, + BS	  Skin: No rashes, No ecchymoses, No cyanosis	  Neurologic: Non-focal  Extremities: Normal range of motion, No clubbing, cyanosis or edema  Vascular: Peripheral pulses palpable 2+ bilaterally    TELEMETRY: 	    ECG:  	  RADIOLOGY:  OTHER: 	  	  LABS:	 	    CARDIAC MARKERS:                                  9.8    17.10 )-----------( 431      ( 14 Jan 2018 09:14 )             31.4     01-14    140  |  100  |  34<H>  ----------------------------<  108<H>  4.4   |  28  |  1.46<H>    Ca    9.3      14 Jan 2018 07:06    TPro  7.4  /  Alb  3.8  /  TBili  0.4  /  DBili  x   /  AST  15  /  ALT  18  /  AlkPhos  95  01-14    proBNP: Serum Pro-Brain Natriuretic Peptide: 692 pg/mL (01-13 @ 16:42)    Lipid Profile:   HgA1c:   TSH: CHIEF COMPLAINT:  SOB      HISTORY OF PRESENT ILLNESS:  89 year old male with hx of DM, anemia, multinodular goiter , copd, atrial fibrillation, recetnly discharged from [facility] where he was admitted for SOB sec to afib with RVR, COPD exacerbation in setting of influenza.. sent out on steroid taper which he completed and now returning with SOB and haullicinations.  daughter not at bedside but reports that pt had sob ( which at this time he denies ).         PAST MEDICAL & SURGICAL HISTORY:  Goiter  Diabetes type 2, controlled  COPD (chronic obstructive pulmonary disease)  Atrial fibrillation  History of total hip arthroplasty, right          MEDICATIONS:  diltiazem    milliGRAM(s) Oral daily      ALBUTerol/ipratropium for Nebulization 3 milliLiter(s) Nebulizer every 6 hours  buDESOnide   0.5 milliGRAM(s) Respule 0.5 milliGRAM(s) Inhalation two times a day    haloperidol    Injectable 0.5 milliGRAM(s) IV Push every 8 hours PRN  lamoTRIgine 25 milliGRAM(s) Oral daily  PARoxetine 40 milliGRAM(s) Oral daily      atorvastatin 40 milliGRAM(s) Oral at bedtime  dextrose 50% Injectable 12.5 Gram(s) IV Push once  dextrose 50% Injectable 25 Gram(s) IV Push once  dextrose 50% Injectable 25 Gram(s) IV Push once  dextrose 50% Injectable 12.5 Gram(s) IV Push once  dextrose 50% Injectable 25 Gram(s) IV Push once  dextrose 50% Injectable 25 Gram(s) IV Push once  dextrose Gel 1 Dose(s) Oral once PRN  dextrose Gel 1 Dose(s) Oral once PRN  glucagon  Injectable 1 milliGRAM(s) IntraMuscular once PRN  glucagon  Injectable 1 milliGRAM(s) IntraMuscular once PRN  insulin glargine Injectable (LANTUS) 10 Unit(s) SubCutaneous at bedtime  insulin lispro (HumaLOG) corrective regimen sliding scale   SubCutaneous three times a day before meals  insulin lispro (HumaLOG) corrective regimen sliding scale   SubCutaneous at bedtime  methimazole 5 milliGRAM(s) Oral daily  methylPREDNISolone sodium succinate Injectable 30 milliGRAM(s) IV Push every 8 hours    dextrose 5%. 1000 milliLiter(s) IV Continuous <Continuous>  dextrose 5%. 1000 milliLiter(s) IV Continuous <Continuous>  sodium chloride 0.9%. 1000 milliLiter(s) IV Continuous <Continuous>      FAMILY HISTORY:  No pertinent family history in first degree relatives      SOCIAL HISTORY:    [ ] Non-smoker  [ ] Smoker  [ ] Alcohol    Allergies    No Known Allergies    Intolerances    	    REVIEW OF SYSTEMS:  CONSTITUTIONAL: No fever, weight loss, or fatigue  EYES: No eye pain, visual disturbances, or discharge  ENMT:  No difficulty hearing, tinnitus, vertigo; No sinus or throat pain  NECK: No pain or stiffness  RESPIRATORY: + cough, wheezing, chills or hemoptysis; + Shortness of Breath  CARDIOVASCULAR: No chest pain, palpitations, passing out, dizziness, or leg swelling  GASTROINTESTINAL: No abdominal or epigastric pain. No nausea, vomiting, or hematemesis; No diarrhea or constipation. No melena or hematochezia.  GENITOURINARY: No dysuria, frequency, hematuria, or incontinence  NEUROLOGICAL: No headaches, memory loss, loss of strength, numbness, or tremors  SKIN: No itching, burning, rashes, or lesions   LYMPH Nodes: No enlarged glands  ENDOCRINE: No heat or cold intolerance; No hair loss  MUSCULOSKELETAL: No joint pain or swelling; No muscle, back, or extremity pain  PSYCHIATRIC: No depression, anxiety, mood swings, or difficulty sleeping  HEME/LYMPH: No easy bruising, or bleeding gums  ALLERGY AND IMMUNOLOGIC: No hives or eczema	    [ ] All others negative	  [ ] Unable to obtain    PHYSICAL EXAM:  T(C): 36.5 (01-14-18 @ 07:10), Max: 37.2 (01-13-18 @ 21:50)  HR: 81 (01-14-18 @ 07:10) (79 - 85)  BP: 136/75 (01-14-18 @ 07:10) (136/75 - 163/63)  RR: 18 (01-14-18 @ 07:10) (16 - 18)  SpO2: 100% (01-14-18 @ 07:10) (95% - 100%)  Wt(kg): --  I&O's Summary    14 Jan 2018 07:01  -  14 Jan 2018 10:33  --------------------------------------------------------  IN: 200 mL / OUT: 250 mL / NET: -50 mL        Appearance: Normal	  HEENT:   dry  oral mucosa, PERRL, EOMI	  Lymphatic: No lymphadenopathy  Cardiovascular: Normal S1 S2, No JVD, 2/6 systolic ejection murmurs, No edema  Respiratory: +bronchospasm   Psychiatry: A & O x 3, Mood & affect appropriate  Gastrointestinal:  Soft, Non-tender, + BS	  Skin: No rashes, No ecchymoses, No cyanosis	  Neurologic: Non-focal  Extremities: Normal range of motion, No clubbing, cyanosis or edema  Vascular: Peripheral pulses palpable 2+ bilaterally    TELEMETRY:  	    ECG: NSR, no acute ischemic STT changes  	  RADIOLOGY:  < from: Xray Chest 2 Views PA/Lat (01.13.18 @ 17:43) >    EXAM:  XR CHEST PA LAT 2V                            PROCEDURE DATE:  01/13/2018            INTERPRETATION:  CLINICAL INFORMATION: Shortness of breath.    TECHNIQUE: PA and lateral views of the chest from January 13, 2018 at   5:39 PM.    COMPARISON: Chest radiograph from December 24, 2017.    FINDINGS:     The lungs are clear. There is a small right pleural effusion.    The cardiomediastinal silhouette is unremarkable.    There are degenerative changes of the visualized spine.    IMPRESSION:    Small right pleural effusion.                LUIS MARTINEZ M.D., RADIOLOGY RESIDENT  This document has been electronically signed.  JJ PEACOCK M.D., ATTENDING RADIOLOGIST  This document has been electronically signed. Jan 14 2018  8:47AM                 OTHER: 	  	  LABS:	 	    CARDIAC MARKERS:      < from: Transthoracic Echocardiogram (12.29.17 @ 15:01) >  Patient name: BASHIR BECKER  YOB: 1928   Age: 89 (M)   MR#: [id number]  Study Date: 12/29/2017  Location: [address] [place], [place] [id number]: Laly Portillo RDCS  Study quality: Technically difficult  Referring Physician: Delilah Katz MD  Blood Pressure: 124/54 mmHg  Height: 183 cm  Weight: 70 kg  BSA: 1.9 m2  ------------------------------------------------------------------------  PROCEDURE: Transthoracic echocardiogram with 2-D, M-Mode  and complete spectral and color flow Doppler.  INDICATION: Unspecified atrial fibrillation (I48.91)  ------------------------------------------------------------------------  Dimensions:    Normal Values:  LA:     3.9    2.0 - 4.0 cm  Ao:     3.6    2.0 - 3.8 cm  SEPTUM: 1.1    0.6 - 1.2 cm  PWT:    1.0   0.6 - 1.1 cm  LVIDd:  4.7    3.0 - 5.6 cm  LVIDs:  3.1    1.8 - 4.0 cm  Derived variables:  LVMI: 93 g/m2  RWT: 0.42  Fractional short: 34 %  EF (Teicholtz): 75 %  Doppler Peak Velocity (m/sec): AoV=2.0  ------------------------------------------------------------------------  Observations:  Mitral Valve: Mitral annular calcification and calcified  mitral leaflets with normal diastolic opening.  Mild-moderate mitral regurgitation.  Aortic Valve/Aorta: Calcified trileaflet aortic valve with  normal opening. Peak transaortic valve gradient equals 17  mm Hg, mean transaortic valve gradient equals 9 mm Hg,  estimated aortic valve area equals 1.6 sqcm (by continuity  equation), aortic valve velocity time integral equals 40  cm, consistent with mild aortic stenosis. Peak left  ventricular outflow tract gradient equals 4 mm Hg, mean  gradient is equal to 2 mm Hg, LVOT velocity time integral  equals 21 cm.  Aortic Root: 3.6 cm.  Left Atrium: Mildly dilated left atrium.  LA volume index =  36 cc/m2.  Left Ventricle: Hyperdynamic left ventricular systolic  function. Increased relative wall thickness with normal  left ventricular mass index, consistent with concentric  left ventricular remodeling. Indeterminate diastolic  function.  Right Heart: Normal right atrium. Normal right ventricular  size and function. Normal tricuspid valve. Mild tricuspid  regurgitation. Normal pulmonic valve.  Pericardium/Pleura: Normal pericardium with trace  pericardial effusion.  Hemodynamic: Estimated rightatrial pressure is 8 mm Hg.  Estimated right ventricular systolic pressure equals 38 mm  Hg, assuming right atrial pressure equals 8 mm Hg,  consistent with borderline pulmonary hypertension.  ------------------------------------------------------------------------  Conclusions:  1. Mitral annular calcification and calcified mitral  leaflets with normal diastolic opening.  2. Calcified trileaflet aortic valve with normal opening.  Peak transaortic valve gradient equals 17 mm Hg, mean  transaorticvalve gradient equals 9 mm Hg, estimated aortic  valve area equals 1.6 sqcm (by continuity equation), aortic  valve velocity time integral equals 40 cm, consistent with  mild aortic stenosis.  3. Mildly dilated left atrium.  LA volume index = 36 cc/m2.  4. Increased relative wall thickness with normal left  ventricular mass index, consistent with concentric left  ventricular remodeling.  5. Hyperdynamic left ventricular systolic function.  6. Normal right ventricular size and function.  7. Estimated right ventricular systolic pressure equals 38  mm Hg, assuming right atrial pressure equals 8 mm Hg,  consistent with borderline pulmonary hypertension.  ------------------------------------------------------------------------  Confirmed on  12/29/2017 - 16:56:34 by Gunjan Perez M.D.  ------------------------------------------------------------------------    < end of copied text >                              9.8    17.10 )-----------( 431      ( 14 Jan 2018 09:14 )             31.4     01-14    140  |  100  |  34<H>  ----------------------------<  108<H>  4.4   |  28  |  1.46<H>    Ca    9.3      14 Jan 2018 07:06    TPro  7.4  /  Alb  3.8  /  TBili  0.4  /  DBili  x   /  AST  15  /  ALT  18  /  AlkPhos  95  01-14    proBNP: Serum Pro-Brain Natriuretic Peptide: 692 pg/mL (01-13 @ 16:42)    Lipid Profile:   HgA1c:   TSH:

## 2018-01-14 NOTE — BEHAVIORAL HEALTH ASSESSMENT NOTE - NSBHCHARTREVIEWVS_PSY_A_CORE FT
Vital Signs Last 24 Hrs  T(C): 36.6 (14 Jan 2018 16:09), Max: 37.2 (13 Jan 2018 21:50)  T(F): 97.9 (14 Jan 2018 16:09), Max: 99 (13 Jan 2018 21:50)  HR: 85 (14 Jan 2018 16:09) (78 - 85)  BP: 160/65 (14 Jan 2018 16:09) (136/75 - 163/63)  BP(mean): --  RR: 17 (14 Jan 2018 16:09) (16 - 18)  SpO2: 100% (14 Jan 2018 16:09) (95% - 100%)

## 2018-01-14 NOTE — CONSULT NOTE ADULT - ASSESSMENT
88 y/o Type 2 diabetes toxic nodular goiter readmitted with confusion, COPD exacerbation, Started on high dose steroids.    Patient eating, will need basal/bolus insulin regiment on steroids.    Hyperthyroidism- repeat TFt's (on steroids), continue tapazol 5 mg/D.

## 2018-01-14 NOTE — BEHAVIORAL HEALTH ASSESSMENT NOTE - NSBHSUICPROTECTFACT_PSY_A_CORE
Fear of death or dying due to pain/suffering/Future oriented/Supportive social network or family/Identifies reasons for living

## 2018-01-14 NOTE — CONSULT NOTE ADULT - SUBJECTIVE AND OBJECTIVE BOX
HPI:  89 year old male with hx of DM, anemia, multinodular goiter ,  copd, atrial fibrillation, recetnly discharged from [facility] where he was admitted for SOB sec to afib with RVR, COPD exacerbation in setting of influenza.. sent out on steroid taper which he completed and now returning with SOB and haullicinations.  daughter not at bedside but reports that pt had had sob ( which at this time he denies )   denies any fever or chills  denies cough , GI sx   reports urirnary frequency but no dysuria , bladder scan in  cc   Also reports of visual haukllicinations ( which pt does recall ) but no other complaints (13 Jan 2018 23:02)      Admit Diagnosis  Shortness of breath      ENDOCRINE HPI: Type 2 diabetes recently admitted with COPD exacerbation due to the Flu. Re-admitted with confusion and hallucinations.  Patient restarted on high dose steroids, FS high again.    At home he is not following FS, was on amaryl only, refusing insulin. FS on admission 319, but came down by next am. Steroids restarted, FS high.  Patient eating well per nurse.     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 More anxious and confused per daughter, no repeat TFT's yet.    PAST MEDICAL & SURGICAL HISTORY:  Goiter  Diabetes type 2, controlled  COPD (chronic obstructive pulmonary disease)  Atrial fibrillation  History of total hip arthroplasty, right      FAMILY HISTORY:  No pertinent family history in first degree relatives      Social History:    Outpatient Medications:    MEDICATIONS  (STANDING):  ALBUTerol/ipratropium for Nebulization 3 milliLiter(s) Nebulizer every 6 hours  atorvastatin 40 milliGRAM(s) Oral at bedtime  buDESOnide   0.5 milliGRAM(s) Respule 0.5 milliGRAM(s) Inhalation two times a day  dextrose 5%. 1000 milliLiter(s) (50 mL/Hr) IV Continuous <Continuous>  dextrose 5%. 1000 milliLiter(s) (50 mL/Hr) IV Continuous <Continuous>  dextrose 50% Injectable 12.5 Gram(s) IV Push once  dextrose 50% Injectable 25 Gram(s) IV Push once  dextrose 50% Injectable 25 Gram(s) IV Push once  dextrose 50% Injectable 12.5 Gram(s) IV Push once  dextrose 50% Injectable 25 Gram(s) IV Push once  dextrose 50% Injectable 25 Gram(s) IV Push once  diltiazem    milliGRAM(s) Oral daily  insulin glargine Injectable (LANTUS) 10 Unit(s) SubCutaneous at bedtime  insulin lispro (HumaLOG) corrective regimen sliding scale   SubCutaneous three times a day before meals  insulin lispro (HumaLOG) corrective regimen sliding scale   SubCutaneous at bedtime  lamoTRIgine 25 milliGRAM(s) Oral daily  methimazole 5 milliGRAM(s) Oral daily  methylPREDNISolone sodium succinate Injectable 20 milliGRAM(s) IV Push every 8 hours  PARoxetine 40 milliGRAM(s) Oral daily  sodium chloride 0.9%. 1000 milliLiter(s) (50 mL/Hr) IV Continuous <Continuous>    MEDICATIONS  (PRN):  dextrose Gel 1 Dose(s) Oral once PRN Blood Glucose LESS THAN 70 milliGRAM(s)/deciliter  dextrose Gel 1 Dose(s) Oral once PRN Blood Glucose LESS THAN 70 milliGRAM(s)/deciliter  glucagon  Injectable 1 milliGRAM(s) IntraMuscular once PRN Glucose LESS THAN 70 milligrams/deciliter  glucagon  Injectable 1 milliGRAM(s) IntraMuscular once PRN Glucose LESS THAN 70 milligrams/deciliter  haloperidol    Injectable 0.5 milliGRAM(s) IV Push every 8 hours PRN agitation      Allergies    No Known Allergies    Intolerances        Review of Systems:  Admit noted.  Patient confused can not contribute.    UNABLE TO OBTAIN    PHYSICAL EXAM:  VITALS: T(C): 36.6 (01-14-18 @ 16:09)  T(F): 97.9 (01-14-18 @ 16:09), Max: 99 (01-13-18 @ 21:50)  HR: 85 (01-14-18 @ 16:09) (78 - 85)  BP: 160/65 (01-14-18 @ 16:09) (136/75 - 163/63)  RR:  (16 - 18)  SpO2:  (95% - 100%)  Wt(lbs): --135  GENERAL: NAD, consused  EYES: No proptosis, no lid lag, anicteric  HEENT:  Atraumatic, Normocephalic, dry mucous membranes  THYROID: bilateral nodular goiter, no cervical lymph nodes.  RESPIRATORY: Clear to auscultation bilaterally; bilateral wheezing.  CARDIOVASCULAR: Regular rate and rhythm; No murmurs; no peripheral edema  GI: Soft, nontender, non distended, normal bowel sounds  SKIN: Dry, intact, No rashes or lesions  MUSCULOSKELETAL: Full range of motion, normal strength  NEURO: confused, moves all limbs.  PSYCH: Alert but confused    POCT Blood Glucose.: 276 mg/dL (01-14-18 @ 16:06)  POCT Blood Glucose.: 423 mg/dL (01-14-18 @ 15:02)  POCT Blood Glucose.: 401 mg/dL (01-14-18 @ 15:00)  POCT Blood Glucose.: 486 mg/dL (01-14-18 @ 12:53)  POCT Blood Glucose.: 437 mg/dL (01-14-18 @ 12:51)  POCT Blood Glucose.: 141 mg/dL (01-14-18 @ 09:09)  POCT Blood Glucose.: 111 mg/dL (01-14-18 @ 06:23)  POCT Blood Glucose.: 406 mg/dL (01-13-18 @ 23:48)                            9.8    17.10 )-----------( 431      ( 14 Jan 2018 09:14 )             31.4       01-14    140  |  100  |  34<H>  ----------------------------<  108<H>  4.4   |  28  |  1.46<H>    Ca    9.3      14 Jan 2018 07:06    TPro  7.4  /  Alb  3.8  /  TBili  0.4  /  DBili  x   /  AST  15  /  ALT  18  /  AlkPhos  95  01-14      Thyroid Function Tests:  12-26 @ 07:27 TSH -- FreeT4 -- T3 46 Anti TPO -- Anti Thyroglobulin Ab -- TSI --  12-25 @ 08:16 TSH 0.69 FreeT4 1.0 T3 -- Anti TPO -- Anti Thyroglobulin Ab -- TSI --      Hemoglobin A1C, Whole Blood: 6.5 % <H> [4.0 - 5.6] (12-25-17 @ 08:22)          Radiology:

## 2018-01-14 NOTE — CONSULT NOTE ADULT - PROBLEM SELECTOR PROBLEM 1
Type 2 diabetes mellitus with hyperglycemia, with long-term current use of insulin
Shortness of breath

## 2018-01-14 NOTE — CONSULT NOTE ADULT - ASSESSMENT
Subacute delirium with hallucinations. Patient with no obvious source of infection, despite increased WBC ( unchanged from prior admission). CXR with small L effusion and minimal R basilar opacities, though unclear if new event or residua from prior imaging on recent admission. Patient with mild exp rhonchi on solumdrol. Repeat PCR is negative    REC    CT chest (nc)  Check ABG - ordered  Continue solumedrol: decrease to 20 q 8  Observe off antibiotics  Duoneb qid and pulmicor bid Subacute delirium with hallucinations. Patient with no obvious source of infection, despite increased WBC ( unchanged from prior admission). CXR with small L effusion and minimal R basilar opacities, though unclear if new event or residua from prior imaging on recent admission. Patient with mild exp rhonchi on solumdrol. Repeat PCR is negative  Dehydration  MR/AS and HfPef  Demential  r/o pulmonary infection  COPD with postinfleunza air way compromise    REC    CT chest (nc)  Check ABG - ordered  Continue solumedrol: decrease to 20 q 8  Observe off antibiotics  Duoneb qid and pulmicor bid

## 2018-01-14 NOTE — CHART NOTE - NSCHARTNOTEFT_GEN_A_CORE
Called  by  RN  to  evaluate  pt  for  agitation  and  combative  behaviour. Patient is a 89y old  Male who presents with a chief complaint of sob / halluicinations (13 Jan 2018 23:02)      Pt  was  seen  and  examined,  Pt  is  disoriented  swinging  at  the  staff.  Pt  is  on  constant  observation  placed  by  the  ED  attendings.   Haldol  0.5  mg  IVP    given   in  the  ED at  this  time.. Continue  to  closely  monitor.  Consider  psych  eval in  the  am .          Follow up with Attending in AM.

## 2018-01-14 NOTE — BEHAVIORAL HEALTH ASSESSMENT NOTE - HPI (INCLUDE ILLNESS QUALITY, SEVERITY, DURATION, TIMING, CONTEXT, MODIFYING FACTORS, ASSOCIATED SIGNS AND SYMPTOMS)
89 year old male, divorded, domiciled alone, has a home aide, hx depression, anxiety, no prior psych admissions, not in current psych tx, mult med issues including DM, anemia, multinodular goiter ,  copd, atrial fibrillation, recetnly discharged from [facility] where he was admitted for SOB sec to afib with RVR, COPD exacerbation in setting of influenza., sent out on steroid taper which he completed and now returning with SOB and haullicinations, pt seen for ams, agitation. Pt required haldol 1mg x1 iv in er, with good effect. Pt is poor historian, confused, disorganized thought process. pt couldn't explain details of admission, pt denies mood symptoms, no si/hi, no anxiety, psychosis, bebeto. pt disoriented to place,thought he was in [place]. As per son, pt has been more confused the last few days, not sleeping, not eating much, at times hallucinating, seeing things but unclear details, slighlty paranoid about staff trying to harm him. pt becomes intermittently agitated, wanted to leave the hospital.

## 2018-01-14 NOTE — PROGRESS NOTE ADULT - ASSESSMENT
89 year old male with hx of DM, anemia, multinodular goiter ,  copd, atrial fibrillation, recetnly discharged from [facility] where he was admitted for SOB sec to afib with RVR, COPD exacerbation in setting of influenza.. sent out on steroid taper which he completed and now returning with SOB and haullicinations.  daughter not at bedside but reports that pt had had sob ( which at this time he denies )   denies any fever or chills  denies cough , GI sx   reports urirnary frequency but no dysuria , bladder scan in  cc   Also reports of visual haukllicinations ( which pt does recall ) but no other complaints      1- SOB : RVP neg  , no pna on CXr but small effusion    procalcitonin  upper end of normal                                                                                                                                                                                                                                                                                               , leukocytosis likley sec to steroids   exam though with coarse bs and mild wheezing : it is improved since last admit.. will clarify if pt completed his steroids course as o/p ( seems to be no medrol at this time )   cont streroids and cont nebs   vest/ resp assisst device   ? element of aspiration : however will not pursue at this time .. will d/w Daughter   2- Hx of afib:   cont rate control and AC ..  would opt for o/p stress test when pul status improves  restart coumadin   3- HTN cont meds   4- DM: hold PO meds ,  FS uncontrolled   will start insluin inpt and f/u with Dr. Haney   5- anemia : at baseline.monitor     6- multinodular goiter : cont tapazole and f/u with  ..repeat TFT in one months .  7- urinary frequency , no evidence of retention .. UA neg . monitor for now , start flomax   8- hallucinations: ? sec to steroids ...     cpnsjl pf

## 2018-01-14 NOTE — CONSULT NOTE ADULT - SUBJECTIVE AND OBJECTIVE BOX
PULMONARY CONSULT  Kai Onofre MD  [phone]    Initial HPI on admission: History obtained from chart and son at bedside  HPI:  89 year old male with hx of DM, anemia, multinodular goiter ,  copd, atrial fibrillation, recetnly discharged from [facility] where he was admitted for SOB sec to afib with RVR, COPD exacerbation in setting of influenza.. sent out on steroid taper which he completed and now returning with SOB and haullicinations. Patient noted to have deterioratingmental status post discharge with active tremors, agitation/deliruium and hallucinatilons. Some increase in dyspnea noted. Patient was started on solumedrol in ER. Procalcitonin negative. Family denies fever or chills  cough , GI sx   reports urirnary frequency but no dysuria , bladder scan in  cc     PAST MEDICAL & SURGICAL HISTORY:  Goiter  Diabetes type 2, controlled  COPD (chronic obstructive pulmonary disease)  Atrial fibrillation  History of total hip arthroplasty, right    Allergies    No Known Allergies    Intolerances      FAMILY HISTORY:  No pertinent family history in first degree relatives    REVIEW OF SYSTEMS  NA: patient hallucinating and disoriented    Social history:       Medications:  MEDICATIONS  (STANDING):  ALBUTerol/ipratropium for Nebulization 3 milliLiter(s) Nebulizer every 6 hours  atorvastatin 40 milliGRAM(s) Oral at bedtime  buDESOnide   0.5 milliGRAM(s) Respule 0.5 milliGRAM(s) Inhalation two times a day  dextrose 5%. 1000 milliLiter(s) (50 mL/Hr) IV Continuous <Continuous>  dextrose 5%. 1000 milliLiter(s) (50 mL/Hr) IV Continuous <Continuous>  dextrose 50% Injectable 12.5 Gram(s) IV Push once  dextrose 50% Injectable 25 Gram(s) IV Push once  dextrose 50% Injectable 25 Gram(s) IV Push once  dextrose 50% Injectable 12.5 Gram(s) IV Push once  dextrose 50% Injectable 25 Gram(s) IV Push once  dextrose 50% Injectable 25 Gram(s) IV Push once  diltiazem    milliGRAM(s) Oral daily  insulin glargine Injectable (LANTUS) 10 Unit(s) SubCutaneous at bedtime  insulin lispro (HumaLOG) corrective regimen sliding scale   SubCutaneous three times a day before meals  insulin lispro (HumaLOG) corrective regimen sliding scale   SubCutaneous at bedtime  lamoTRIgine 25 milliGRAM(s) Oral daily  methimazole 5 milliGRAM(s) Oral daily  methylPREDNISolone sodium succinate Injectable 30 milliGRAM(s) IV Push every 8 hours  PARoxetine 40 milliGRAM(s) Oral daily  sodium chloride 0.9%. 1000 milliLiter(s) (50 mL/Hr) IV Continuous <Continuous>    MEDICATIONS  (PRN):  dextrose Gel 1 Dose(s) Oral once PRN Blood Glucose LESS THAN 70 milliGRAM(s)/deciliter  dextrose Gel 1 Dose(s) Oral once PRN Blood Glucose LESS THAN 70 milliGRAM(s)/deciliter  glucagon  Injectable 1 milliGRAM(s) IntraMuscular once PRN Glucose LESS THAN 70 milligrams/deciliter  glucagon  Injectable 1 milliGRAM(s) IntraMuscular once PRN Glucose LESS THAN 70 milligrams/deciliter  haloperidol    Injectable 0.5 milliGRAM(s) IV Push every 8 hours PRN agitation    Vital Signs Last 24 Hrs  T(C): 36.6 (2018 15:23), Max: 37.2 (2018 21:50)  T(F): 97.9 (2018 15:23), Max: 99 (2018 21:50)  HR: 78 (2018 15:23) (78 - 85)  BP: 151/67 (2018 15:23) (136/75 - 163/63)  BP(mean): --  RR: 18 (2018 15:23) (16 - 18)  SpO2: 97% (2018 15:23) (95% - 100%)    LABS:                        9.8    17.10 )-----------( 431      ( 2018 09:14 )             31.4     -14    140  |  100  |  34<H>  ----------------------------<  108<H>  4.4   |  28  |  1.46<H>    Ca    9.3      2018 07:06    TPro  7.4  /  Alb  3.8  /  TBili  0.4  /  DBili  x   /  AST  15  /  ALT  18  /  AlkPhos  95  01-14      PT/INR - ( 2018 09:14 )   PT: 38.2 sec;   INR: 3.30 ratio         PTT - ( 2018 09:14 )  PTT:58.5 sec  Urinalysis Basic - ( 2018 19:03 )    Color: Yellow / Appearance: Clear / S.023 / pH: x  Gluc: x / Ketone: Negative  / Bili: Negative / Urobili: Negative   Blood: x / Protein: 30 mg/dL / Nitrite: Negative   Leuk Esterase: Negative / RBC: 0-2 /HPF / WBC 0-2 /HPF   Sq Epi: x / Non Sq Epi: Occasional /HPF / Bacteria: Few /HPF      Procalcitonin, Serum: 0.07 ng/mL (18 @ 06:59)    Serum Pro-Brain Natriuretic Peptide: 692 pg/mL (18 @ 16:42)      CULTURES:    Physical Examination:    General: No acute distress.      HEENT: Pupils equal, reactive to light.  Symmetric.    PULM: Bilaterl exp rhonchi, scattered; rare wheeze    CVS: Regular rate and rhythm, no murmurs, rubs, or gallops    ABD: Soft, nondistended, nontender, normoactive bowel sounds, no masses    EXT: No edema, nontender    SKIN: Warm and well perfused, no rashes noted.    NEURO: Alert, oriented, interactive, nonfocal    RADIOLOGY REVIEWED PERSONALLY  CXR: small L effusion; linear opacities R lower - may be residual from prior CXR during influenza admission which was more diffusely abnormal    CT chest: Pending    TTE: Mod MR and MIld AS; nl lVEF

## 2018-01-14 NOTE — CONSULT NOTE ADULT - PROBLEM SELECTOR RECOMMENDATION 9
Lantus 20 units at HS.  Humalog 6 units per meal.  Humalog scale-mid
Likely COPD exacerbation   Nebulizers   Consider Prednisone taper

## 2018-01-15 LAB
ANION GAP SERPL CALC-SCNC: 12 MMOL/L — SIGNIFICANT CHANGE UP (ref 5–17)
APTT BLD: 52.3 SEC — HIGH (ref 27.5–37.4)
BUN SERPL-MCNC: 33 MG/DL — HIGH (ref 7–23)
CALCIUM SERPL-MCNC: 9 MG/DL — SIGNIFICANT CHANGE UP (ref 8.4–10.5)
CHLORIDE SERPL-SCNC: 103 MMOL/L — SIGNIFICANT CHANGE UP (ref 96–108)
CO2 SERPL-SCNC: 25 MMOL/L — SIGNIFICANT CHANGE UP (ref 22–31)
CREAT SERPL-MCNC: 1.31 MG/DL — HIGH (ref 0.5–1.3)
CULTURE RESULTS: NO GROWTH — SIGNIFICANT CHANGE UP
GLUCOSE BLDC GLUCOMTR-MCNC: 229 MG/DL — HIGH (ref 70–99)
GLUCOSE BLDC GLUCOMTR-MCNC: 241 MG/DL — HIGH (ref 70–99)
GLUCOSE BLDC GLUCOMTR-MCNC: 249 MG/DL — HIGH (ref 70–99)
GLUCOSE BLDC GLUCOMTR-MCNC: 279 MG/DL — HIGH (ref 70–99)
GLUCOSE SERPL-MCNC: 233 MG/DL — HIGH (ref 70–99)
HCT VFR BLD CALC: 28.4 % — LOW (ref 39–50)
HGB BLD-MCNC: 9 G/DL — LOW (ref 13–17)
INR BLD: 2.46 RATIO — HIGH (ref 0.88–1.16)
MCHC RBC-ENTMCNC: 28.4 PG — SIGNIFICANT CHANGE UP (ref 27–34)
MCHC RBC-ENTMCNC: 31.7 GM/DL — LOW (ref 32–36)
MCV RBC AUTO: 89.6 FL — SIGNIFICANT CHANGE UP (ref 80–100)
PLATELET # BLD AUTO: 323 K/UL — SIGNIFICANT CHANGE UP (ref 150–400)
POTASSIUM SERPL-MCNC: 5 MMOL/L — SIGNIFICANT CHANGE UP (ref 3.5–5.3)
POTASSIUM SERPL-SCNC: 5 MMOL/L — SIGNIFICANT CHANGE UP (ref 3.5–5.3)
PROTHROM AB SERPL-ACNC: 28.3 SEC — HIGH (ref 10–13.1)
RBC # BLD: 3.17 M/UL — LOW (ref 4.2–5.8)
RBC # FLD: 15.3 % — HIGH (ref 10.3–14.5)
SODIUM SERPL-SCNC: 140 MMOL/L — SIGNIFICANT CHANGE UP (ref 135–145)
SPECIMEN SOURCE: SIGNIFICANT CHANGE UP
T3 SERPL-MCNC: 42 NG/DL — LOW (ref 80–200)
T3FREE SERPL-MCNC: 1.32 PG/ML — LOW (ref 1.8–4.6)
TSH SERPL-MCNC: 0.59 UIU/ML — SIGNIFICANT CHANGE UP (ref 0.27–4.2)
WBC # BLD: 9.76 K/UL — SIGNIFICANT CHANGE UP (ref 3.8–10.5)
WBC # FLD AUTO: 9.76 K/UL — SIGNIFICANT CHANGE UP (ref 3.8–10.5)

## 2018-01-15 PROCEDURE — 93010 ELECTROCARDIOGRAM REPORT: CPT

## 2018-01-15 PROCEDURE — 99233 SBSQ HOSP IP/OBS HIGH 50: CPT

## 2018-01-15 RX ORDER — HALOPERIDOL DECANOATE 100 MG/ML
0.5 INJECTION INTRAMUSCULAR
Qty: 0 | Refills: 0 | Status: DISCONTINUED | OUTPATIENT
Start: 2018-01-15 | End: 2018-01-16

## 2018-01-15 RX ORDER — WARFARIN SODIUM 2.5 MG/1
2 TABLET ORAL ONCE
Qty: 0 | Refills: 0 | Status: COMPLETED | OUTPATIENT
Start: 2018-01-15 | End: 2018-01-15

## 2018-01-15 RX ADMIN — Medication 0.5 MILLIGRAM(S): at 06:41

## 2018-01-15 RX ADMIN — Medication 6 UNIT(S): at 12:05

## 2018-01-15 RX ADMIN — Medication 3 MILLILITER(S): at 06:41

## 2018-01-15 RX ADMIN — Medication 3 MILLILITER(S): at 23:18

## 2018-01-15 RX ADMIN — Medication 3 MILLILITER(S): at 17:30

## 2018-01-15 RX ADMIN — Medication 40 MILLIGRAM(S): at 12:02

## 2018-01-15 RX ADMIN — Medication 0.5 MILLIGRAM(S): at 17:13

## 2018-01-15 RX ADMIN — Medication 20 MILLIGRAM(S): at 06:41

## 2018-01-15 RX ADMIN — Medication 1: at 21:51

## 2018-01-15 RX ADMIN — Medication 3 MILLILITER(S): at 12:04

## 2018-01-15 RX ADMIN — INSULIN GLARGINE 20 UNIT(S): 100 INJECTION, SOLUTION SUBCUTANEOUS at 21:50

## 2018-01-15 RX ADMIN — LAMOTRIGINE 25 MILLIGRAM(S): 25 TABLET, ORALLY DISINTEGRATING ORAL at 12:02

## 2018-01-15 RX ADMIN — Medication 2: at 17:00

## 2018-01-15 RX ADMIN — Medication 20 MILLIGRAM(S): at 13:15

## 2018-01-15 RX ADMIN — Medication 3 MILLILITER(S): at 00:21

## 2018-01-15 RX ADMIN — Medication 6 UNIT(S): at 17:00

## 2018-01-15 RX ADMIN — ATORVASTATIN CALCIUM 40 MILLIGRAM(S): 80 TABLET, FILM COATED ORAL at 21:50

## 2018-01-15 RX ADMIN — Medication 300 MILLIGRAM(S): at 08:45

## 2018-01-15 RX ADMIN — WARFARIN SODIUM 2 MILLIGRAM(S): 2.5 TABLET ORAL at 21:50

## 2018-01-15 RX ADMIN — Medication 2: at 12:02

## 2018-01-15 RX ADMIN — Medication 2: at 08:19

## 2018-01-15 RX ADMIN — Medication 6 UNIT(S): at 08:20

## 2018-01-15 RX ADMIN — HALOPERIDOL DECANOATE 0.5 MILLIGRAM(S): 100 INJECTION INTRAMUSCULAR at 17:11

## 2018-01-15 NOTE — PROGRESS NOTE BEHAVIORAL HEALTH - NSBHCONSULTRECOMMENDOTHER_PSY_A_CORE FT
1) cont paxil, lamictal for now. cont haldol 0.5mg po /iv q6hr prn agitation.  2) for confusion/agitation, consider starting standing low dose haldol at 0.5 mg po BID, f/u QTc please  3) for acute agitation, continue haldol PRN as ordered  4) dtr, tristen, called, and who is aware of above plan

## 2018-01-15 NOTE — PROGRESS NOTE ADULT - ASSESSMENT
89 year old male with hx of DM, anemia, multinodular goiter ,  copd, atrial fibrillation, recetnly discharged from NS where he was admitted for SOB sec to afib with RVR, COPD exacerbation in setting of influenza.. sent out on steroid taper which he completed and now returning with SOB and haullicinations.  daughter not at bedside but reports that pt had had sob ( which at this time he denies )   denies any fever or chills  denies cough , GI sx   reports urirnary frequency but no dysuria , bladder scan in  cc   Also reports of visual haukllicinations ( which pt does recall ) but no other complaints      1- SOB : RVP neg  , no pna on CXr but small effusion    procalcitonin  upper end of normal                                                                                                                                                                                                                                                                                               , leukocytosis likley sec to steroids   exam though with coarse bs and mild wheezing : it is improved since last admit..   cont streroids and cont nebs   vest/ resp assisst device   ? element of aspiration : however will not pursue at this time .. will d/w Daughter   agree w  CT chest  2- Hx of afib:   cont rate control and AC ..  would opt for o/p stress test when pul status improves  cont  coumadin   3- HTN cont meds   4- DM: hold PO meds ,  FS uncontrolled   cont  insluin inpt and f/u with Dr. Haney   5- anemia : at baseline.monitor     6- multinodular goiter : cont tapazole and f/u with  ..repeat TFT in one months .  7- urinary frequency , no evidence of retention .. UA neg . monitor for now , start flomax   8- hallucinations: ? sec to steroids .... cont meds per psyc.. called  for f/u

## 2018-01-15 NOTE — PROGRESS NOTE ADULT - ASSESSMENT
ACute delirium with halluncinations - resolved post Haldol  COPD with emphysema s/p recent influenza  No evidence of acute pneumonia or bronchospasm  AF  multinodular goiter    REC    Continue bronchodilators  DC solumedrol: prednisone 40 mg qd X 3 days  Observe off antibiotics

## 2018-01-15 NOTE — PHYSICAL THERAPY INITIAL EVALUATION ADULT - ADDITIONAL COMMENTS
As per patient he lives at home with aide ambulates with rolling walker no stairs (questionable reliability) As per patient he lives at home with aide however he states aide leaves at night ambulates with rolling walker no stairs (questionable reliability)

## 2018-01-15 NOTE — PHYSICAL THERAPY INITIAL EVALUATION ADULT - PERTINENT HX OF CURRENT PROBLEM, REHAB EVAL
Pt is a an 89 year old male admitted to [facility] on 1/13/18 for sob / hallucinations

## 2018-01-15 NOTE — PROGRESS NOTE BEHAVIORAL HEALTH - NSBHCHARTREVIEWVS_PSY_A_CORE FT
T(C): 36.7 (01-15-18 @ 04:18), Max: 37.1 (01-14-18 @ 21:37)  HR: 85 (01-15-18 @ 04:18) (78 - 85)  BP: 143/55 (01-15-18 @ 04:18) (140/64 - 160/65)  RR: 18 (01-15-18 @ 04:18) (17 - 18)  SpO2: 94% (01-15-18 @ 04:18) (94% - 100%)  Wt(kg): --

## 2018-01-15 NOTE — CHART NOTE - NSCHARTNOTEFT_GEN_A_CORE
Need for constant observation evaluation: Pt seen at the bedside, pt was awake all night, calm, but trying to get out of bed frequently (as per RN and PCA). Pt denies discomfort, but wants to go home. Seen by psych yesterday afternoon and recommended constant observation with no psych contraindications to discharge. Needs reevaluation of constant observation during a day (daylight).  Will f/u with primary  team.      Denise Mg NP, #[id number]

## 2018-01-15 NOTE — PHYSICAL THERAPY INITIAL EVALUATION ADULT - PRECAUTIONS/LIMITATIONS, REHAB EVAL
pt with hx of DM, anemia, multinodular goiter,copd, atrial fibrillation, recetnly discharged from NS where he was admitted for SOB sec to afib with RVR, COPD exacerbation in setting of influenza.. sent out on steroid taper which he completed and now returning with SOB and hallucinations. fall precautions/pt with hx of DM, anemia, multinodular goiter,copd, atrial fibrillation, recetnly discharged from NS where he was admitted for SOB sec to afib with RVR, COPD exacerbation in setting of influenza.. sent out on steroid taper which he completed and now returning with SOB and hallucinations.

## 2018-01-15 NOTE — PROGRESS NOTE BEHAVIORAL HEALTH - NSBHCHARTREVIEWLAB_PSY_A_CORE FT
9.0    9.76  )-----------( 323      ( 15 Lonnie 2018 07:05 )             28.4   01-15    140  |  103  |  33<H>  ----------------------------<  233<H>  5.0   |  25  |  1.31<H>    Ca    9.0      15 Lonnie 2018 07:05    TPro  7.4  /  Alb  3.8  /  TBili  0.4  /  DBili  x   /  AST  15  /  ALT  18  /  AlkPhos  95  01-14

## 2018-01-15 NOTE — PROGRESS NOTE BEHAVIORAL HEALTH - NSBHFUPINTERVALHXFT_PSY_A_CORE
Pt seen, AOA x 2, received one dose of Haldol PRN last night, for agitation. Pt compliant with treatment, no side effects reported. Sleep was restless, appetite decreased, denies depression or anxiety, no si/hi. Dtr called, states pt has hx of bipolar disorder, compliant with his medications.

## 2018-01-15 NOTE — PROGRESS NOTE BEHAVIORAL HEALTH - RISK ASSESSMENT
pt overall low risk for suicide attempt, no si/hi, no hx attempt, supportive family, risk factors include agitation

## 2018-01-15 NOTE — PROGRESS NOTE ADULT - ASSESSMENT
88 y/o Type 2 diabetes toxic nodular goiter readmitted with confusion, COPD exacerbation, Started on high dose steroids.    Patient eating, Started need basal/bolus insulin regiment on steroids.  Solumedrol tapered to prednisone 40 mg/D X3 days, As FS are in the 200th will not change insulin dose.    Hyperthyroidism- repeat TFT's, show low T4, T3, m/p due to acute ilness and steroid use. Will D/C tapazol.   Repeat TFT's in 2-3 weeks.

## 2018-01-16 LAB
AMMONIA BLD-MCNC: 44 UMOL/L — SIGNIFICANT CHANGE UP (ref 11–55)
ANION GAP SERPL CALC-SCNC: 10 MMOL/L — SIGNIFICANT CHANGE UP (ref 5–17)
APTT BLD: 45.5 SEC — HIGH (ref 27.5–37.4)
BUN SERPL-MCNC: 33 MG/DL — HIGH (ref 7–23)
CALCIUM SERPL-MCNC: 9 MG/DL — SIGNIFICANT CHANGE UP (ref 8.4–10.5)
CHLORIDE SERPL-SCNC: 103 MMOL/L — SIGNIFICANT CHANGE UP (ref 96–108)
CO2 SERPL-SCNC: 28 MMOL/L — SIGNIFICANT CHANGE UP (ref 22–31)
CREAT SERPL-MCNC: 1.25 MG/DL — SIGNIFICANT CHANGE UP (ref 0.5–1.3)
GLUCOSE BLDC GLUCOMTR-MCNC: 113 MG/DL — HIGH (ref 70–99)
GLUCOSE BLDC GLUCOMTR-MCNC: 149 MG/DL — HIGH (ref 70–99)
GLUCOSE BLDC GLUCOMTR-MCNC: 256 MG/DL — HIGH (ref 70–99)
GLUCOSE BLDC GLUCOMTR-MCNC: 278 MG/DL — HIGH (ref 70–99)
GLUCOSE SERPL-MCNC: 160 MG/DL — HIGH (ref 70–99)
HCT VFR BLD CALC: 30.6 % — LOW (ref 39–50)
HGB BLD-MCNC: 9.7 G/DL — LOW (ref 13–17)
INR BLD: 2.07 RATIO — HIGH (ref 0.88–1.16)
MCHC RBC-ENTMCNC: 28.7 PG — SIGNIFICANT CHANGE UP (ref 27–34)
MCHC RBC-ENTMCNC: 31.7 GM/DL — LOW (ref 32–36)
MCV RBC AUTO: 90.5 FL — SIGNIFICANT CHANGE UP (ref 80–100)
PLATELET # BLD AUTO: 331 K/UL — SIGNIFICANT CHANGE UP (ref 150–400)
POTASSIUM SERPL-MCNC: 4.5 MMOL/L — SIGNIFICANT CHANGE UP (ref 3.5–5.3)
POTASSIUM SERPL-SCNC: 4.5 MMOL/L — SIGNIFICANT CHANGE UP (ref 3.5–5.3)
PROTHROM AB SERPL-ACNC: 23.7 SEC — HIGH (ref 10–13.1)
RBC # BLD: 3.38 M/UL — LOW (ref 4.2–5.8)
RBC # FLD: 15.7 % — HIGH (ref 10.3–14.5)
SODIUM SERPL-SCNC: 141 MMOL/L — SIGNIFICANT CHANGE UP (ref 135–145)
WBC # BLD: 12.23 K/UL — HIGH (ref 3.8–10.5)
WBC # FLD AUTO: 12.23 K/UL — HIGH (ref 3.8–10.5)

## 2018-01-16 PROCEDURE — 70450 CT HEAD/BRAIN W/O DYE: CPT | Mod: 26

## 2018-01-16 PROCEDURE — 93010 ELECTROCARDIOGRAM REPORT: CPT

## 2018-01-16 RX ORDER — WARFARIN SODIUM 2.5 MG/1
2 TABLET ORAL ONCE
Qty: 0 | Refills: 0 | Status: COMPLETED | OUTPATIENT
Start: 2018-01-16 | End: 2018-01-16

## 2018-01-16 RX ORDER — HALOPERIDOL DECANOATE 100 MG/ML
0.5 INJECTION INTRAMUSCULAR EVERY 8 HOURS
Qty: 0 | Refills: 0 | Status: DISCONTINUED | OUTPATIENT
Start: 2018-01-16 | End: 2018-01-19

## 2018-01-16 RX ORDER — THIAMINE MONONITRATE (VIT B1) 100 MG
100 TABLET ORAL DAILY
Qty: 0 | Refills: 0 | Status: DISCONTINUED | OUTPATIENT
Start: 2018-01-16 | End: 2018-01-19

## 2018-01-16 RX ORDER — ALPRAZOLAM 0.25 MG
0.25 TABLET ORAL EVERY 8 HOURS
Qty: 0 | Refills: 0 | Status: DISCONTINUED | OUTPATIENT
Start: 2018-01-16 | End: 2018-01-19

## 2018-01-16 RX ORDER — QUETIAPINE FUMARATE 200 MG/1
25 TABLET, FILM COATED ORAL AT BEDTIME
Qty: 0 | Refills: 0 | Status: DISCONTINUED | OUTPATIENT
Start: 2018-01-16 | End: 2018-01-19

## 2018-01-16 RX ADMIN — Medication 3 MILLILITER(S): at 13:07

## 2018-01-16 RX ADMIN — Medication 6 UNIT(S): at 08:53

## 2018-01-16 RX ADMIN — ATORVASTATIN CALCIUM 40 MILLIGRAM(S): 80 TABLET, FILM COATED ORAL at 21:30

## 2018-01-16 RX ADMIN — Medication 40 MILLIGRAM(S): at 05:08

## 2018-01-16 RX ADMIN — Medication 6 UNIT(S): at 17:20

## 2018-01-16 RX ADMIN — Medication 3 MILLILITER(S): at 17:20

## 2018-01-16 RX ADMIN — Medication 3 MILLILITER(S): at 23:31

## 2018-01-16 RX ADMIN — LAMOTRIGINE 25 MILLIGRAM(S): 25 TABLET, ORALLY DISINTEGRATING ORAL at 13:06

## 2018-01-16 RX ADMIN — Medication 30 MILLIGRAM(S): at 17:28

## 2018-01-16 RX ADMIN — WARFARIN SODIUM 2 MILLIGRAM(S): 2.5 TABLET ORAL at 21:30

## 2018-01-16 RX ADMIN — QUETIAPINE FUMARATE 25 MILLIGRAM(S): 200 TABLET, FILM COATED ORAL at 21:30

## 2018-01-16 RX ADMIN — Medication 3: at 17:19

## 2018-01-16 RX ADMIN — Medication 0.5 MILLIGRAM(S): at 17:21

## 2018-01-16 RX ADMIN — Medication 3 MILLILITER(S): at 05:08

## 2018-01-16 RX ADMIN — Medication 0.5 MILLIGRAM(S): at 05:08

## 2018-01-16 RX ADMIN — Medication 40 MILLIGRAM(S): at 13:07

## 2018-01-16 RX ADMIN — Medication 1: at 21:31

## 2018-01-16 RX ADMIN — Medication 300 MILLIGRAM(S): at 05:09

## 2018-01-16 RX ADMIN — HALOPERIDOL DECANOATE 0.5 MILLIGRAM(S): 100 INJECTION INTRAMUSCULAR at 05:08

## 2018-01-16 RX ADMIN — Medication 6 UNIT(S): at 13:07

## 2018-01-16 RX ADMIN — Medication 100 MILLIGRAM(S): at 13:11

## 2018-01-16 RX ADMIN — INSULIN GLARGINE 20 UNIT(S): 100 INJECTION, SOLUTION SUBCUTANEOUS at 21:30

## 2018-01-16 NOTE — CHART NOTE - NSCHARTNOTEFT_GEN_A_CORE
89 year old male with hx of DM, anemia, multinodular goiter, copd, atrial fibrillation, recently discharged from NS where he was admitted for SOB sec to afib with RVR, COPD exacerbation in setting of influenza, sent out on steroid taper which he completed and now returning with SOB and hallucinations. Pt confused and agitated. Behavioral health on board and recommends constant observation. Will maintain 1:1 overnight.     Vijaya Hand NP  [id number]

## 2018-01-16 NOTE — CONSULT NOTE ADULT - SUBJECTIVE AND OBJECTIVE BOX
Chart reviewed and patient seen by undersigned    Historians include charts (current and my old notes from last month), Dr. Katz,  and the pt.    History of Present Illness  The patient is a 89 year old WM with history of Bipolar Disorder, Generalized Anxiety Disorder, visual hallucinations last month (he saw part of his bed missing), decline in short term memory recently per daughter's discussion with me on 12/25/17. He was stable on discharge but readmitted here on 1/13/18 for dyspnea and hallucinations. Seen here by house psychiatry and given Haldol 0.50mg bid. RN today tells me that he was agitated and hallucinating last night and calmed down with Haldol. Pt. given Solumedrol here. No UTI/PNA.     Past Psychiatric History  Bipolar Disorder. Has taken Paxil 40mg/day, Lamictal 25mg/day for years. Took prn Xanax 0.50mg prn for years but not daily. Taken off Xanax last admission with no sx. of withdrawal. [facility] to [facility] and daughter). No history of suicide attempts. Chronically anxious. History of delirium after hip surgery, UTI.     Psychosocial History  Retired . Has an aid 7 days a week. No cigarettes/ETOH/illicit drugs.     PAST MEDICAL & SURGICAL HISTORY:  Goiter  Diabetes type 2, controlled  COPD (chronic obstructive pulmonary disease)  Atrial fibrillation  History of total hip arthroplasty, right      FAMILY HISTORY:  No pertinent family history in first degree relatives      Vital Signs Last 24 Hrs  T(C): 36.7 (16 Jan 2018 11:15), Max: 37.1 (15 Lonnie 2018 21:55)  T(F): 98 (16 Jan 2018 11:15), Max: 98.7 (15 Lonnie 2018 21:55)  HR: 86 (16 Jan 2018 11:15) (84 - 122)  BP: 130/65 (16 Jan 2018 11:15) (130/65 - 166/76)  BP(mean): --  RR: 18 (16 Jan 2018 11:15) (18 - 18)  SpO2: 96% (16 Jan 2018 11:15) (94% - 96%)                          9.7    12.23 )-----------( 331      ( 16 Jan 2018 07:45 )             30.6       01-16    141  |  103  |  33<H>  ----------------------------<  160<H>  4.5   |  28  |  1.25    Ca    9.0      16 Jan 2018 07:40              MEDICATIONS  (STANDING):  ALBUTerol/ipratropium for Nebulization 3 milliLiter(s) Nebulizer every 6 hours  atorvastatin 40 milliGRAM(s) Oral at bedtime  buDESOnide   0.5 milliGRAM(s) Respule 0.5 milliGRAM(s) Inhalation two times a day  dextrose 5%. 1000 milliLiter(s) (50 mL/Hr) IV Continuous <Continuous>  dextrose 5%. 1000 milliLiter(s) (50 mL/Hr) IV Continuous <Continuous>  dextrose 50% Injectable 12.5 Gram(s) IV Push once  dextrose 50% Injectable 25 Gram(s) IV Push once  dextrose 50% Injectable 25 Gram(s) IV Push once  dextrose 50% Injectable 12.5 Gram(s) IV Push once  dextrose 50% Injectable 25 Gram(s) IV Push once  dextrose 50% Injectable 25 Gram(s) IV Push once  diltiazem    milliGRAM(s) Oral daily  haloperidol     Tablet 0.5 milliGRAM(s) Oral two times a day  insulin glargine Injectable (LANTUS) 20 Unit(s) SubCutaneous at bedtime  insulin lispro (HumaLOG) corrective regimen sliding scale   SubCutaneous three times a day before meals  insulin lispro (HumaLOG) corrective regimen sliding scale   SubCutaneous at bedtime  insulin lispro Injectable (HumaLOG) 6 Unit(s) SubCutaneous three times a day before meals  lamoTRIgine 25 milliGRAM(s) Oral daily  PARoxetine 40 milliGRAM(s) Oral daily  predniSONE   Tablet   Oral   predniSONE   Tablet 30 milliGRAM(s) Oral two times a day  sodium chloride 0.9%. 1000 milliLiter(s) (50 mL/Hr) IV Continuous <Continuous>  thiamine 100 milliGRAM(s) Oral daily    MEDICATIONS  (PRN):  dextrose Gel 1 Dose(s) Oral once PRN Blood Glucose LESS THAN 70 milliGRAM(s)/deciliter  dextrose Gel 1 Dose(s) Oral once PRN Blood Glucose LESS THAN 70 milliGRAM(s)/deciliter  glucagon  Injectable 1 milliGRAM(s) IntraMuscular once PRN Glucose LESS THAN 70 milligrams/deciliter  glucagon  Injectable 1 milliGRAM(s) IntraMuscular once PRN Glucose LESS THAN 70 milligrams/deciliter  haloperidol    Injectable 0.5 milliGRAM(s) IV Push every 8 hours PRN agitation      Elderly WM in bed, feet shaking, cooperative, alert and oriented x 3  .  No psychomotor abnormalities. Insight and judgment are fair. Speech is coherent, circumstantial, with normal rate and volume. No hallucinations nor delusions. The patient denied suicidal and homicidal ideation and plan. Mood is euthymic and affect full range and appropriate. Attention and concentration, short term memory fair. Impaired long term memory as he cannot name the current President of the [place].     Suicidal risk assessment  Risk factors include being an elderly WM with cognitive impairment and psychosis  Protective factors include no plan, no past attempts, no guns.

## 2018-01-16 NOTE — CONSULT NOTE ADULT - CONSULT REASON
Assume psychiatric care while in the hospital
SOB
Type 2 diabetes- uncontrolled  Toxic nodular goiter
ams visual hallucinations.
Dyspnea

## 2018-01-16 NOTE — CONSULT NOTE ADULT - ASSESSMENT
Impression  History of Bipolar Disorder, Generalized Anxiety Disorder, Delirium  Rule out delirium now from corticosteroids  Rule out dementia  EPS (shaking of legs) from Haldol  Doubt benzodiazepine withdrawal as pt. had no psychosis/autonomic sx/withdrawal sx after it was stopped last admission    Recommend  D/C Haldol. If QTc is under 500ms, start Seroquel 25mg p.o. qhs  Haldol 0.50 mg IV q8h prn agitation if QTc is under 500ms  Continue Paxil and Lamictal. Hold Lamictal for any sign of a rash  Xanax 0.25mg p.o. q8h prn anxiety  Check B12, folate, head CT  [facility]  Will follow with you here.  Thank you.    Dimitri Vazquez M.D.  Psychiatry  [phone]

## 2018-01-16 NOTE — PROVIDER CONTACT NOTE (OTHER) - SITUATION
Patient converted to atrial fib,denies chest discomfort when asked.Then at 0017 1/16/17 converted back to sinus.

## 2018-01-16 NOTE — PROGRESS NOTE ADULT - ASSESSMENT
ACute delirium with halluncinations - resolved post Haldol  COPD with emphysema s/p recent influenza  Mild bronchospasm on PE today, though asymptomatic  AF  multinodular goiter    REC    Continue bronchodilators  Change to Prednisone 30 mg po bid to taper  Observe off antibiotics

## 2018-01-16 NOTE — CONSULT NOTE ADULT - SUBJECTIVE AND OBJECTIVE BOX
[facility]), [facility]        Patient is a 89y old  Male who presents with a chief complaint of sob / halluicinations (2018 23:02)      HPI:  89 year old male with hx of DM, anemia, multinodular goiter ,  copd, atrial fibrillation, recently discharged from NS where he was admitted for SOB sec to afib with RVR, COPD exacerbation in setting of influenza.. sent out on steroid taper which he completed and now returning with SOB and haullicinations.  daughter not at bedside but reports that pt had had sob ( which at this time he denies )   denies any fever or chills  denies cough , GI sx   reports urirnary frequency but no dysuria , bladder scan in  cc   Also reports of visual hallucinations ( which pt does recall ) but no other complaints (2018 23:02)           *****PAST MEDICAL / Surgical  HISTORY:  PAST MEDICAL & SURGICAL HISTORY:  Goiter  Diabetes type 2, controlled  COPD (chronic obstructive pulmonary disease)  Atrial fibrillation  History of total hip arthroplasty, right           *****FAMILY HISTORY:  FAMILY HISTORY:  No pertinent family history in first degree relatives           *****SOCIAL HISTORY:  Alcohol: None  Smoking: None         *****ALLERGIES:   Allergies    No Known Allergies    Intolerances             *****MEDICATIONS: current medication reviewed and documented.   MEDICATIONS  (STANDING):  ALBUTerol/ipratropium for Nebulization 3 milliLiter(s) Nebulizer every 6 hours  atorvastatin 40 milliGRAM(s) Oral at bedtime  buDESOnide   0.5 milliGRAM(s) Respule 0.5 milliGRAM(s) Inhalation two times a day  dextrose 5%. 1000 milliLiter(s) (50 mL/Hr) IV Continuous <Continuous>  dextrose 5%. 1000 milliLiter(s) (50 mL/Hr) IV Continuous <Continuous>  dextrose 50% Injectable 12.5 Gram(s) IV Push once  dextrose 50% Injectable 25 Gram(s) IV Push once  dextrose 50% Injectable 25 Gram(s) IV Push once  dextrose 50% Injectable 12.5 Gram(s) IV Push once  dextrose 50% Injectable 25 Gram(s) IV Push once  dextrose 50% Injectable 25 Gram(s) IV Push once  diltiazem    milliGRAM(s) Oral daily  haloperidol     Tablet 0.5 milliGRAM(s) Oral two times a day  insulin glargine Injectable (LANTUS) 20 Unit(s) SubCutaneous at bedtime  insulin lispro (HumaLOG) corrective regimen sliding scale   SubCutaneous three times a day before meals  insulin lispro (HumaLOG) corrective regimen sliding scale   SubCutaneous at bedtime  insulin lispro Injectable (HumaLOG) 6 Unit(s) SubCutaneous three times a day before meals  lamoTRIgine 25 milliGRAM(s) Oral daily  PARoxetine 40 milliGRAM(s) Oral daily  predniSONE   Tablet   Oral   predniSONE   Tablet 30 milliGRAM(s) Oral two times a day  sodium chloride 0.9%. 1000 milliLiter(s) (50 mL/Hr) IV Continuous <Continuous>  thiamine 100 milliGRAM(s) Oral daily    MEDICATIONS  (PRN):  dextrose Gel 1 Dose(s) Oral once PRN Blood Glucose LESS THAN 70 milliGRAM(s)/deciliter  dextrose Gel 1 Dose(s) Oral once PRN Blood Glucose LESS THAN 70 milliGRAM(s)/deciliter  glucagon  Injectable 1 milliGRAM(s) IntraMuscular once PRN Glucose LESS THAN 70 milligrams/deciliter  glucagon  Injectable 1 milliGRAM(s) IntraMuscular once PRN Glucose LESS THAN 70 milligrams/deciliter  haloperidol    Injectable 0.5 milliGRAM(s) IV Push every 8 hours PRN agitation           *****REVIEW OF SYSTEM:  GEN: no fever, no chills, no pain  RESP: no SOB, no cough, no sputum  CVS: no chest pain, no palpitations, no edema  GI: no abdominal pain, no nausea, no vomiting, no constipation, no diarrhea  : no dysurea, no frequency, no hematurea  Neuro: no headache, no dizziness  PSYCH: no anxiety, no depression  Derm : no itching, no rash         *****VITAL SIGNS:  T(C): 36.7 (18 @ 11:15), Max: 37.1 (01-15-18 @ 21:55)  HR: 86 (18 @ 11:15) (84 - 122)  BP: 130/65 (18 @ 11:15) (130/65 - 166/76)  RR: 18 (18 @ 11:15) (18 - 18)  SpO2: 96% (18 @ 11:15) (94% - 96%)  Wt(kg): --    01-15 @ 07: @ 07:00  --------------------------------------------------------  IN: 590 mL / OUT: 1100 mL / NET: -510 mL     @ 07: @ 15:49  --------------------------------------------------------  IN: 600 mL / OUT: 175 mL / NET: 425 mL             *****PHYSICAL EXAM:  GEN: A&O X 3 , NAD , comfortable  HEENT: NCAT, PERRL, MMM, hearing intact  Neck: supple , no JVD  CVS: S1S2 , regular , No M/R/G appreciated  PULM: CTA B/L,  no W/R/R appreciated  ABD.: soft. non tender, non distended,  bowel sounds present  Extrem: intact pulses , no edema   Derm: No rash , no ecchymoses  PSYCH : normal mood,  no delusion not anxious      Alert oriented x 3   Attention comprehension are fair. Able to name, repeat, read without any difficulty.   Able to follow 3 step commands.     EOMI fundi not visualized,  VFF to confrontration  No facial asymmetry   Tongue is midline   Palate elevates symmetrically   Moving all 4 ext symmetrically no pronator drift.  Reflexes are symmetric throughout   sensation is grossly symmetric  Gait : not assessed.  B/L down going toes        >>> <<<         *****LAB AND IMAGIN.7    12.23 )-----------( 331      ( 2018 07:45 )             30.6               -    141  |  103  |  33<H>  ----------------------------<  160<H>  4.5   |  28  |  1.25    Ca    9.0      2018 07:40      PT/INR - ( 2018 07:45 )   PT: 23.7 sec;   INR: 2.07 ratio         PTT - ( 2018 07:45 )  PTT:45.5 sec                            [All pertinent recent Imaging reports reviewed]         *****A S S E S S M E N T   A N D   P L A N :            80 minutes spent on the total encounter;  more than 50 % of the visit was spent on counseling  and or coordinating care by the attending physician.    Thank you for allowing me to participate in the care of this [facility] patient. Please do not hesitate to call me if you have any questions.   ___________________________  Will follow with you.  Thank you,  Selena Wilburn MD  Diplomate of the American Board of Neurology and Psychiatry.  Diplomate of the American Board of Vascular Neurology.   [facility] ([facility]), [facility]   Ph: [phone]      This note was partially created using voice recognition software and is inherently subject to errors including those of syntax and sound alike substitutions which may escape proofreading. In such instances original meaning may be extrapolated by contextual derivation. [facility]), [facility]        Patient is a 89y old  Male who presents with a chief complaint of sob / halluicinations (2018 23:02)      HPI:  89 year old male with hx of DM, anemia, multinodular goiter ,  copd, atrial fibrillation, recently discharged from NS where he was admitted for SOB sec to afib with RVR, COPD exacerbation in setting of influenza.. sent out on steroid taper which he completed and now returning with SOB and haullicinations.  daughter not at bedside but reports that pt had had sob ( which at this time he denies )   denies any fever or chills  denies cough , GI sx   reports urirnary frequency but no dysuria , bladder scan in  cc   Also reports of visual hallucinations ( which pt does recall ) but no other complaints (2018 23:02)           *****PAST MEDICAL / Surgical  HISTORY:  PAST MEDICAL & SURGICAL HISTORY:  Goiter  Diabetes type 2, controlled  COPD (chronic obstructive pulmonary disease)  Atrial fibrillation  History of total hip arthroplasty, right           *****FAMILY HISTORY:  FAMILY HISTORY:  No pertinent family history in first degree relatives           *****SOCIAL HISTORY:  Alcohol: None  Smoking: None         *****ALLERGIES:   Allergies    No Known Allergies    Intolerances             *****MEDICATIONS: current medication reviewed and documented.   MEDICATIONS  (STANDING):  ALBUTerol/ipratropium for Nebulization 3 milliLiter(s) Nebulizer every 6 hours  atorvastatin 40 milliGRAM(s) Oral at bedtime  buDESOnide   0.5 milliGRAM(s) Respule 0.5 milliGRAM(s) Inhalation two times a day  dextrose 5%. 1000 milliLiter(s) (50 mL/Hr) IV Continuous <Continuous>  dextrose 5%. 1000 milliLiter(s) (50 mL/Hr) IV Continuous <Continuous>  dextrose 50% Injectable 12.5 Gram(s) IV Push once  dextrose 50% Injectable 25 Gram(s) IV Push once  dextrose 50% Injectable 25 Gram(s) IV Push once  dextrose 50% Injectable 12.5 Gram(s) IV Push once  dextrose 50% Injectable 25 Gram(s) IV Push once  dextrose 50% Injectable 25 Gram(s) IV Push once  diltiazem    milliGRAM(s) Oral daily  haloperidol     Tablet 0.5 milliGRAM(s) Oral two times a day  insulin glargine Injectable (LANTUS) 20 Unit(s) SubCutaneous at bedtime  insulin lispro (HumaLOG) corrective regimen sliding scale   SubCutaneous three times a day before meals  insulin lispro (HumaLOG) corrective regimen sliding scale   SubCutaneous at bedtime  insulin lispro Injectable (HumaLOG) 6 Unit(s) SubCutaneous three times a day before meals  lamoTRIgine 25 milliGRAM(s) Oral daily  PARoxetine 40 milliGRAM(s) Oral daily  predniSONE   Tablet   Oral   predniSONE   Tablet 30 milliGRAM(s) Oral two times a day  sodium chloride 0.9%. 1000 milliLiter(s) (50 mL/Hr) IV Continuous <Continuous>  thiamine 100 milliGRAM(s) Oral daily    MEDICATIONS  (PRN):  dextrose Gel 1 Dose(s) Oral once PRN Blood Glucose LESS THAN 70 milliGRAM(s)/deciliter  dextrose Gel 1 Dose(s) Oral once PRN Blood Glucose LESS THAN 70 milliGRAM(s)/deciliter  glucagon  Injectable 1 milliGRAM(s) IntraMuscular once PRN Glucose LESS THAN 70 milligrams/deciliter  glucagon  Injectable 1 milliGRAM(s) IntraMuscular once PRN Glucose LESS THAN 70 milligrams/deciliter  haloperidol    Injectable 0.5 milliGRAM(s) IV Push every 8 hours PRN agitation           *****REVIEW OF SYSTEM:  GEN: no fever, no chills, no pain  RESP: no SOB, no cough, no sputum  CVS: no chest pain, no palpitations, no edema  GI: no abdominal pain, no nausea, no vomiting, no constipation, no diarrhea  : no dysurea, no frequency, no hematurea  Neuro: no headache, no dizziness  PSYCH: no anxiety, no depression  Derm : no itching, no rash         *****VITAL SIGNS:  T(C): 36.7 (18 @ 11:15), Max: 37.1 (01-15-18 @ 21:55)  HR: 86 (18 @ 11:15) (84 - 122)  BP: 130/65 (18 @ 11:15) (130/65 - 166/76)  RR: 18 (18 @ 11:15) (18 - 18)  SpO2: 96% (18 @ 11:15) (94% - 96%)  Wt(kg): --    01-15 @ 07: @ 07:00  --------------------------------------------------------  IN: 590 mL / OUT: 1100 mL / NET: -510 mL     @ 07: @ 15:49  --------------------------------------------------------  IN: 600 mL / OUT: 175 mL / NET: 425 mL             *****PHYSICAL EXAM:     Alert oriented x2 ( did not know the date, but knows month and year, also did not know the name of the hospital)    Somewhat confused conversation. Poor insight.     Able to follow 1-2 step commands.  able to read, name objects provided.      EOMI fundi not visualized,  VFF to confrontration  No facial asymmetry   Tongue is midline   Palate elevates symmetrically   Moving all 4 ext symmetrically no pronator drift.  Reflexes are symmetric throughout   sensation is grossly symmetric  Gait : not assessed.  B/L down going toes        >>> <<<         *****LAB AND IMAGIN.7    12.23 )-----------( 331      ( 2018 07:45 )             30.6               01-16    141  |  103  |  33<H>  ----------------------------<  160<H>  4.5   |  28  |  1.25    Ca    9.0      2018 07:40      PT/INR - ( 2018 07:45 )   PT: 23.7 sec;   INR: 2.07 ratio         PTT - ( 2018 07:45 )  PTT:45.5 sec                            [All pertinent recent Imaging reports reviewed]         *****A S S E S S M E N T   A N D   P L A N :      89 year old male with hx of DM, anemia, multinodular goiter ,  copd, atrial fibrillation, recently discharged from NS where he was admitted for SOB sec to afib with RVR, COPD exacerbation in setting of influenza.. sent out on steroid taper which he completed and now returning with SOB and hallucinations.  Pt is a poor historian unable to give full history.     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 related to steroid vs. albuterol vs.  Ammonia, cpk  and LFts given the generalized body mild myoclonus  consider changing to xopenex to see if there is any improvemnet.     80 minutes spent on the total encounter;  more than 50 % of the visit was spent on counseling  and or coordinating care by the attending physician.    Thank you for allowing me to participate in the care of this [facility] patient. Please do not hesitate to call me if you have any questions.   ___________________________  Will follow with you.  Thank you,  Selena Wilburn MD  Diplomate of the American Board of Neurology and Psychiatry.  Diplomate of the American Board of Vascular Neurology.   [facility] ([facility]), [facility]   Ph: [phone]      This note was partially created using voice recognition software and is inherently subject to errors including those of syntax and sound alike substitutions which may escape proofreading. In such instances original meaning may be extrapolated by contextual derivation.

## 2018-01-16 NOTE — PROGRESS NOTE ADULT - ASSESSMENT
89 year old male with hx of DM, anemia, multinodular goiter ,  copd, atrial fibrillation, recetnly discharged from NS where he was admitted for SOB sec to afib with RVR, COPD exacerbation in setting of influenza.. sent out on steroid taper which he completed and now returning with SOB and haullicinations.  daughter not at bedside but reports that pt had had sob ( which at this time he denies )   denies any fever or chills  denies cough , GI sx   reports urirnary frequency but no dysuria , bladder scan in  cc   Also reports of visual haukllicinations ( which pt does recall ) but no other complaints      1- SOB : RVP neg  , no pna on CXr but small effusion    procalcitonin  upper end of normal                                                                                                                                                                                                                                                                                               , leukocytosis likley sec to steroids   exam though with coarse bs and mild wheezing : it is improved since last admit..   cont streroids and cont nebs   vest/ resp assisst device   ? element of aspiration : d/w  Daughter that possibity and she does not want to pursue   CT chest: no pna     d/w DR Burt   obsereve off abx   2- Hx of afib:   cont rate control and AC ..  would opt for o/p stress test when pul status improves  cont  coumadin based on inr   3- HTN cont meds   4- DM: hold PO meds ,  FS uncontrolled , insulin per    cont  insluin inpt and f/u with Dr. Haney   5- anemia : at baseline.monitor     6- multinodular goiter :  tapazole on hold for now  and f/u with  ..repeat TFT in one month  7- urinary frequency , no evidence of retention .. UA neg . monitor for now ,  flomax   8- hallucinations: ? sec to steroids   d/w  .. changing to seroquel   ccalled neuro consult for evaluation     D/wDaughter at length .. also discussed re:DNR GOC...  pt had been DNR ... will reistitue

## 2018-01-17 LAB
ALBUMIN SERPL ELPH-MCNC: 3.4 G/DL — SIGNIFICANT CHANGE UP (ref 3.3–5)
ALP SERPL-CCNC: 84 U/L — SIGNIFICANT CHANGE UP (ref 40–120)
ALT FLD-CCNC: 16 U/L — SIGNIFICANT CHANGE UP (ref 10–45)
ANION GAP SERPL CALC-SCNC: 11 MMOL/L — SIGNIFICANT CHANGE UP (ref 5–17)
AST SERPL-CCNC: 18 U/L — SIGNIFICANT CHANGE UP (ref 10–40)
BASE EXCESS BLDA CALC-SCNC: 2.3 MMOL/L — HIGH (ref -2–2)
BILIRUB DIRECT SERPL-MCNC: 0.1 MG/DL — SIGNIFICANT CHANGE UP (ref 0–0.2)
BILIRUB INDIRECT FLD-MCNC: 0.1 MG/DL — LOW (ref 0.2–1)
BILIRUB SERPL-MCNC: 0.2 MG/DL — SIGNIFICANT CHANGE UP (ref 0.2–1.2)
BUN SERPL-MCNC: 35 MG/DL — HIGH (ref 7–23)
CALCIUM SERPL-MCNC: 8.8 MG/DL — SIGNIFICANT CHANGE UP (ref 8.4–10.5)
CHLORIDE SERPL-SCNC: 104 MMOL/L — SIGNIFICANT CHANGE UP (ref 96–108)
CK SERPL-CCNC: 32 U/L — SIGNIFICANT CHANGE UP (ref 30–200)
CO2 BLDA-SCNC: 28 MMOL/L — SIGNIFICANT CHANGE UP (ref 22–30)
CO2 SERPL-SCNC: 27 MMOL/L — SIGNIFICANT CHANGE UP (ref 22–31)
CREAT SERPL-MCNC: 1.35 MG/DL — HIGH (ref 0.5–1.3)
FOLATE SERPL-MCNC: 12.6 NG/ML — SIGNIFICANT CHANGE UP (ref 4.8–24.2)
FOLATE SERPL-MCNC: 13.1 NG/ML — SIGNIFICANT CHANGE UP (ref 4.8–24.2)
GAS PNL BLDA: SIGNIFICANT CHANGE UP
GLUCOSE BLDC GLUCOMTR-MCNC: 131 MG/DL — HIGH (ref 70–99)
GLUCOSE BLDC GLUCOMTR-MCNC: 180 MG/DL — HIGH (ref 70–99)
GLUCOSE BLDC GLUCOMTR-MCNC: 204 MG/DL — HIGH (ref 70–99)
GLUCOSE BLDC GLUCOMTR-MCNC: 264 MG/DL — HIGH (ref 70–99)
GLUCOSE SERPL-MCNC: 218 MG/DL — HIGH (ref 70–99)
HCO3 BLDA-SCNC: 27 MMOL/L — SIGNIFICANT CHANGE UP (ref 21–29)
HCT VFR BLD CALC: 30.3 % — LOW (ref 39–50)
HGB BLD-MCNC: 9.5 G/DL — LOW (ref 13–17)
INR BLD: 2.51 RATIO — HIGH (ref 0.88–1.16)
MCHC RBC-ENTMCNC: 29.2 PG — SIGNIFICANT CHANGE UP (ref 27–34)
MCHC RBC-ENTMCNC: 31.4 GM/DL — LOW (ref 32–36)
MCV RBC AUTO: 93.2 FL — SIGNIFICANT CHANGE UP (ref 80–100)
PCO2 BLDA: 46 MMHG — SIGNIFICANT CHANGE UP (ref 32–46)
PH BLDA: 7.39 — SIGNIFICANT CHANGE UP (ref 7.35–7.45)
PLATELET # BLD AUTO: 264 K/UL — SIGNIFICANT CHANGE UP (ref 150–400)
PO2 BLDA: 137 MMHG — HIGH (ref 74–108)
POTASSIUM SERPL-MCNC: 4.7 MMOL/L — SIGNIFICANT CHANGE UP (ref 3.5–5.3)
POTASSIUM SERPL-SCNC: 4.7 MMOL/L — SIGNIFICANT CHANGE UP (ref 3.5–5.3)
PROT SERPL-MCNC: 6.5 G/DL — SIGNIFICANT CHANGE UP (ref 6–8.3)
PROTHROM AB SERPL-ACNC: 28.9 SEC — HIGH (ref 10–13.1)
RBC # BLD: 3.25 M/UL — LOW (ref 4.2–5.8)
RBC # FLD: 16 % — HIGH (ref 10.3–14.5)
SAO2 % BLDA: 99 % — HIGH (ref 92–96)
SODIUM SERPL-SCNC: 142 MMOL/L — SIGNIFICANT CHANGE UP (ref 135–145)
THYROGLOB AB FLD IA-ACNC: <20 IU/ML — SIGNIFICANT CHANGE UP (ref 0–40)
THYROGLOB AB SERPL-ACNC: <20 IU/ML — SIGNIFICANT CHANGE UP (ref 0–40)
THYROPEROXIDASE AB SERPL-ACNC: <10 IU/ML — SIGNIFICANT CHANGE UP (ref 0–34.9)
VIT B12 SERPL-MCNC: 1086 PG/ML — SIGNIFICANT CHANGE UP (ref 232–1245)
VIT B12 SERPL-MCNC: 990 PG/ML — SIGNIFICANT CHANGE UP (ref 232–1245)
WBC # BLD: 9.09 K/UL — SIGNIFICANT CHANGE UP (ref 3.8–10.5)
WBC # FLD AUTO: 9.09 K/UL — SIGNIFICANT CHANGE UP (ref 3.8–10.5)

## 2018-01-17 RX ORDER — INSULIN LISPRO 100/ML
8 VIAL (ML) SUBCUTANEOUS
Qty: 0 | Refills: 0 | Status: DISCONTINUED | OUTPATIENT
Start: 2018-01-17 | End: 2018-01-18

## 2018-01-17 RX ADMIN — Medication 6 UNIT(S): at 12:05

## 2018-01-17 RX ADMIN — INSULIN GLARGINE 20 UNIT(S): 100 INJECTION, SOLUTION SUBCUTANEOUS at 22:15

## 2018-01-17 RX ADMIN — Medication 3 MILLILITER(S): at 05:23

## 2018-01-17 RX ADMIN — Medication 3 MILLILITER(S): at 17:02

## 2018-01-17 RX ADMIN — Medication 6 UNIT(S): at 08:06

## 2018-01-17 RX ADMIN — Medication: at 12:05

## 2018-01-17 RX ADMIN — QUETIAPINE FUMARATE 25 MILLIGRAM(S): 200 TABLET, FILM COATED ORAL at 22:16

## 2018-01-17 RX ADMIN — Medication 0.5 MILLIGRAM(S): at 05:23

## 2018-01-17 RX ADMIN — Medication 30 MILLIGRAM(S): at 05:23

## 2018-01-17 RX ADMIN — Medication 8 UNIT(S): at 17:02

## 2018-01-17 RX ADMIN — Medication 0.5 MILLIGRAM(S): at 17:04

## 2018-01-17 RX ADMIN — Medication 100 MILLIGRAM(S): at 12:23

## 2018-01-17 RX ADMIN — Medication 40 MILLIGRAM(S): at 12:23

## 2018-01-17 RX ADMIN — Medication 300 MILLIGRAM(S): at 05:23

## 2018-01-17 RX ADMIN — ATORVASTATIN CALCIUM 40 MILLIGRAM(S): 80 TABLET, FILM COATED ORAL at 22:16

## 2018-01-17 RX ADMIN — Medication: at 08:06

## 2018-01-17 RX ADMIN — Medication 0.25 MILLIGRAM(S): at 22:17

## 2018-01-17 RX ADMIN — Medication 3 MILLILITER(S): at 23:08

## 2018-01-17 RX ADMIN — LAMOTRIGINE 25 MILLIGRAM(S): 25 TABLET, ORALLY DISINTEGRATING ORAL at 12:23

## 2018-01-17 RX ADMIN — Medication 30 MILLIGRAM(S): at 17:04

## 2018-01-17 RX ADMIN — Medication 3 MILLILITER(S): at 12:23

## 2018-01-17 NOTE — PROGRESS NOTE ADULT - ASSESSMENT
89 y/o Type 2 diabetes toxic nodular goiter readmitted with confusion, COPD exacerbation, Started on high dose steroids.    Patient eating, Started need basal/bolus insulin regiment on steroids.  Solumedrol tapered to prednisone 40 mg/D X3 days, then a taper.  FS still high, depending on diet, mostly high post meals.    Hyperthyroidism- repeat TFT's, show low T4, T3, m/p due to acute ilness and steroid use. Will D/C tapazol.   Repeat TFT's in 2-3 weeks.

## 2018-01-17 NOTE — PROGRESS NOTE ADULT - ASSESSMENT
89 year old male with hx of DM, anemia, multinodular goiter ,  copd, atrial fibrillation, recetnly discharged from NS where he was admitted for SOB sec to afib with RVR, COPD exacerbation in setting of influenza.. sent out on steroid taper which he completed and now returning with SOB and haullicinations.  daughter not at bedside but reports that pt had had sob ( which at this time he denies )   denies any fever or chills  denies cough , GI sx   reports urirnary frequency but no dysuria , bladder scan in  cc   Also reports of visual haukllicinations ( which pt does recall ) but no other complaints      1- SOB : RVP neg  , no pna on CXr but small effusion    procalcitonin  upper end of normal                                                                                                                                                                                                                                                                                               , leukocytosis likley sec to steroids   exam though with coarse bs and mild wheezing : it is improved since last admit..   cont streroids and will start taper slowly  cont nebs   vest/ resp assisst device   ? element of aspiration : d/w  Daughter that possibity and she does not want to pursue   CT chest: no pna      obsereve off abx   2- Hx of afib:   cont rate control and AC ..  would opt for o/p stress test when pul status improves  cont  coumadin based on inr   3- HTN cont meds   4- DM: hold PO meds ,  FS uncontrolled , insulin per    cont  insluin inpt and f/u with Dr. Haney   5- anemia : at baseline.monitor     6- multinodular goiter :  tapazole on hold for now  and f/u with  ..repeat TFT in one month  7- urinary frequency , no evidence of retention .. UA neg . monitor for now ,  flomax   8- hallucinations: ? sec to steroids   d/w  .. changing to seroquel   CT head neg ..  doubt  parkinson , CK , LFT normal.. will check EEG : though doubt seizres    called Daughter explained above   pt is   DNR

## 2018-01-18 LAB
ANION GAP SERPL CALC-SCNC: 10 MMOL/L — SIGNIFICANT CHANGE UP (ref 5–17)
BUN SERPL-MCNC: 35 MG/DL — HIGH (ref 7–23)
CALCIUM SERPL-MCNC: 8.5 MG/DL — SIGNIFICANT CHANGE UP (ref 8.4–10.5)
CHLORIDE SERPL-SCNC: 103 MMOL/L — SIGNIFICANT CHANGE UP (ref 96–108)
CO2 SERPL-SCNC: 26 MMOL/L — SIGNIFICANT CHANGE UP (ref 22–31)
CREAT SERPL-MCNC: 1.25 MG/DL — SIGNIFICANT CHANGE UP (ref 0.5–1.3)
GLUCOSE BLDC GLUCOMTR-MCNC: 185 MG/DL — HIGH (ref 70–99)
GLUCOSE BLDC GLUCOMTR-MCNC: 206 MG/DL — HIGH (ref 70–99)
GLUCOSE BLDC GLUCOMTR-MCNC: 238 MG/DL — HIGH (ref 70–99)
GLUCOSE BLDC GLUCOMTR-MCNC: 376 MG/DL — HIGH (ref 70–99)
GLUCOSE SERPL-MCNC: 210 MG/DL — HIGH (ref 70–99)
HCT VFR BLD CALC: 29.3 % — LOW (ref 39–50)
HGB BLD-MCNC: 9.1 G/DL — LOW (ref 13–17)
INR BLD: 3.64 RATIO — HIGH (ref 0.88–1.16)
MCHC RBC-ENTMCNC: 28.6 PG — SIGNIFICANT CHANGE UP (ref 27–34)
MCHC RBC-ENTMCNC: 31.1 GM/DL — LOW (ref 32–36)
MCV RBC AUTO: 92.1 FL — SIGNIFICANT CHANGE UP (ref 80–100)
PLATELET # BLD AUTO: 234 K/UL — SIGNIFICANT CHANGE UP (ref 150–400)
POTASSIUM SERPL-MCNC: 4.7 MMOL/L — SIGNIFICANT CHANGE UP (ref 3.5–5.3)
POTASSIUM SERPL-SCNC: 4.7 MMOL/L — SIGNIFICANT CHANGE UP (ref 3.5–5.3)
PROTHROM AB SERPL-ACNC: 40.7 SEC — HIGH (ref 9.8–12.7)
RBC # BLD: 3.18 M/UL — LOW (ref 4.2–5.8)
RBC # FLD: 15.8 % — HIGH (ref 10.3–14.5)
SODIUM SERPL-SCNC: 139 MMOL/L — SIGNIFICANT CHANGE UP (ref 135–145)
WBC # BLD: 8.61 K/UL — SIGNIFICANT CHANGE UP (ref 3.8–10.5)
WBC # FLD AUTO: 8.61 K/UL — SIGNIFICANT CHANGE UP (ref 3.8–10.5)

## 2018-01-18 PROCEDURE — 95819 EEG AWAKE AND ASLEEP: CPT | Mod: 26

## 2018-01-18 RX ORDER — INSULIN LISPRO 100/ML
10 VIAL (ML) SUBCUTANEOUS
Qty: 0 | Refills: 0 | Status: DISCONTINUED | OUTPATIENT
Start: 2018-01-18 | End: 2018-01-19

## 2018-01-18 RX ORDER — INSULIN GLARGINE 100 [IU]/ML
24 INJECTION, SOLUTION SUBCUTANEOUS AT BEDTIME
Qty: 0 | Refills: 0 | Status: DISCONTINUED | OUTPATIENT
Start: 2018-01-18 | End: 2018-01-19

## 2018-01-18 RX ADMIN — Medication 8 UNIT(S): at 17:28

## 2018-01-18 RX ADMIN — ATORVASTATIN CALCIUM 40 MILLIGRAM(S): 80 TABLET, FILM COATED ORAL at 22:08

## 2018-01-18 RX ADMIN — Medication 0.5 MILLIGRAM(S): at 05:31

## 2018-01-18 RX ADMIN — Medication 0.25 MILLIGRAM(S): at 22:10

## 2018-01-18 RX ADMIN — Medication 40 MILLIGRAM(S): at 12:39

## 2018-01-18 RX ADMIN — Medication 3 MILLILITER(S): at 12:39

## 2018-01-18 RX ADMIN — QUETIAPINE FUMARATE 25 MILLIGRAM(S): 200 TABLET, FILM COATED ORAL at 22:08

## 2018-01-18 RX ADMIN — Medication 3 MILLILITER(S): at 17:28

## 2018-01-18 RX ADMIN — Medication 20 MILLIGRAM(S): at 17:28

## 2018-01-18 RX ADMIN — Medication 0.5 MILLIGRAM(S): at 17:28

## 2018-01-18 RX ADMIN — Medication 8 UNIT(S): at 12:39

## 2018-01-18 RX ADMIN — Medication 300 MILLIGRAM(S): at 05:31

## 2018-01-18 RX ADMIN — Medication 5: at 12:39

## 2018-01-18 RX ADMIN — Medication 30 MILLIGRAM(S): at 05:31

## 2018-01-18 RX ADMIN — LAMOTRIGINE 25 MILLIGRAM(S): 25 TABLET, ORALLY DISINTEGRATING ORAL at 12:39

## 2018-01-18 RX ADMIN — Medication 2: at 08:14

## 2018-01-18 RX ADMIN — Medication 8 UNIT(S): at 08:15

## 2018-01-18 RX ADMIN — INSULIN GLARGINE 24 UNIT(S): 100 INJECTION, SOLUTION SUBCUTANEOUS at 22:09

## 2018-01-18 RX ADMIN — Medication 3 MILLILITER(S): at 05:31

## 2018-01-18 RX ADMIN — Medication 100 MILLIGRAM(S): at 12:39

## 2018-01-18 RX ADMIN — Medication 2: at 17:27

## 2018-01-18 NOTE — PROGRESS NOTE ADULT - ASSESSMENT
89 y/o Type 2 diabetes toxic nodular goiter readmitted with confusion, COPD exacerbation, Started on high dose steroids.    Patient eating, On basal/bolus insulin regiment while on steroids.  Solumedrol tapered to prednisone 40 mg/D X2 days, faster taper plans.  FS still high today. depending on diet, mostly high post meals.    Hyperthyroidism- repeat TFT's, show low T4, T3, m/p due to acute ilness and steroid use. Will D/C tapazol.   Repeat TFT's in 2-3 weeks.

## 2018-01-18 NOTE — PROGRESS NOTE ADULT - ASSESSMENT
89 year old male with hx of DM, anemia, multinodular goiter ,  copd, atrial fibrillation, recetnly discharged from [facility] where he was admitted for SOB sec to afib with RVR, COPD exacerbation in setting of influenza.. sent out on steroid taper which he completed and now returning with SOB and haullicinations.  daughter not at bedside but reports that pt had had sob ( which at this time he denies )   denies any fever or chills  denies cough , GI sx   reports urirnary frequency but no dysuria , bladder scan in  cc   Also reports of visual haukllicinations ( which pt does recall ) but no other complaints      1- SOB : RVP neg  , no pna on CXr but small effusion    procalcitonin  upper end of normal                                                                                                                                                                                                                                                                                               , leukocytosis likley sec to steroids   exam though with coarse bs and mild wheezing : it is improved since last admit..   cont streroids stable   vest/ resp assisst device   ? element of aspiration : d/w  Daughter that possibity and she does not want to pursue   CT chest: no pna      obsereve off abx   2- Hx of afib:   cont rate control and AC ..  would opt for o/p stress test when pul status improves  cont  coumadin based on inr   3- HTN cont meds   4- DM: hold PO meds ,  FS uncontrolled , insulin per    cont  insluin inpt and f/u with Dr. Haney   5- anemia : at baseline.monitor     6- multinodular goiter :  tapazole on hold for now  and f/u with  ..repeat TFT in one month  7- urinary frequency , no evidence of retention .. UA neg . monitor for now ,  flomax   8- hallucinations: ? sec to steroids   d/w  .. cont seroquel   CT head neg ..  doubt  parkinson , CK , LFT normal.. will check EEG : though doubt seizres    called Daughter and discussed plans for d/c in 24 hours   pt is   DNR 89 year old male with hx of DM, anemia, multinodular goiter ,  copd, atrial fibrillation, recetnly discharged from [facility] where he was admitted for SOB sec to afib with RVR, COPD exacerbation in setting of influenza.. sent out on steroid taper which he completed and now returning with SOB and haullicinations.  daughter not at bedside but reports that pt had had sob ( which at this time he denies )   denies any fever or chills  denies cough , GI sx   reports urirnary frequency but no dysuria , bladder scan in  cc   Also reports of visual haukllicinations ( which pt does recall ) but no other complaints      1- SOB : RVP neg  , no pna on CXr but small effusion    procalcitonin  upper end of normal                                                                                                                                                                                                                                                                                               , leukocytosis likley sec to steroids   exam though with coarse bs and mild wheezing : it is improved since last admit..   cont streroids stable   vest/ resp assisst device   ? element of aspiration : d/w  Daughter that possibity and she does not want to pursue   CT chest: no pna      obsereve off abx   2- Hx of afib:   cont rate control and AC ..  would opt for o/p stress test when pul status improves  cont  coumadin based on inr   3- HTN cont meds   4- DM: hold PO meds ,  FS uncontrolled , insulin per    cont  insluin inpt and f/u with Dr. Haney   5- anemia : at baseline.monitor     6- multinodular goiter :  tapazole on hold for now  and f/u with  ..repeat TFT in one month  7- urinary frequency , no evidence of retention .. UA neg . monitor for now ,  flomax   8- hallucinations: ? sec to steroids   d/w  .. cont seroquel   CT head neg ..  doubt  parkinson , CK , LFT normal.. 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 He will benefit from having 24 hours supervision    pt is   DNR

## 2018-01-19 ENCOUNTER — TRANSCRIPTION ENCOUNTER (OUTPATIENT)
Age: 83
End: 2018-01-19

## 2018-01-19 VITALS — OXYGEN SATURATION: 96 %

## 2018-01-19 LAB
ANION GAP SERPL CALC-SCNC: 10 MMOL/L — SIGNIFICANT CHANGE UP (ref 5–17)
BUN SERPL-MCNC: 29 MG/DL — HIGH (ref 7–23)
CALCIUM SERPL-MCNC: 8.7 MG/DL — SIGNIFICANT CHANGE UP (ref 8.4–10.5)
CHLORIDE SERPL-SCNC: 104 MMOL/L — SIGNIFICANT CHANGE UP (ref 96–108)
CO2 SERPL-SCNC: 26 MMOL/L — SIGNIFICANT CHANGE UP (ref 22–31)
CREAT SERPL-MCNC: 1.23 MG/DL — SIGNIFICANT CHANGE UP (ref 0.5–1.3)
GLUCOSE BLDC GLUCOMTR-MCNC: 187 MG/DL — HIGH (ref 70–99)
GLUCOSE BLDC GLUCOMTR-MCNC: 223 MG/DL — HIGH (ref 70–99)
GLUCOSE BLDC GLUCOMTR-MCNC: 400 MG/DL — HIGH (ref 70–99)
GLUCOSE SERPL-MCNC: 103 MG/DL — HIGH (ref 70–99)
HCT VFR BLD CALC: 27.9 % — LOW (ref 39–50)
HCT VFR BLD CALC: 31.5 % — LOW (ref 39–50)
HGB BLD-MCNC: 10.2 G/DL — LOW (ref 13–17)
HGB BLD-MCNC: 8.5 G/DL — LOW (ref 13–17)
INR BLD: 3.05 RATIO — HIGH (ref 0.88–1.16)
MCHC RBC-ENTMCNC: 27.9 PG — SIGNIFICANT CHANGE UP (ref 27–34)
MCHC RBC-ENTMCNC: 30.5 GM/DL — LOW (ref 32–36)
MCHC RBC-ENTMCNC: 30.8 PG — SIGNIFICANT CHANGE UP (ref 27–34)
MCHC RBC-ENTMCNC: 32.5 GM/DL — SIGNIFICANT CHANGE UP (ref 32–36)
MCV RBC AUTO: 91.5 FL — SIGNIFICANT CHANGE UP (ref 80–100)
MCV RBC AUTO: 94.7 FL — SIGNIFICANT CHANGE UP (ref 80–100)
PLATELET # BLD AUTO: 230 K/UL — SIGNIFICANT CHANGE UP (ref 150–400)
PLATELET # BLD AUTO: 254 K/UL — SIGNIFICANT CHANGE UP (ref 150–400)
POTASSIUM SERPL-MCNC: 4.6 MMOL/L — SIGNIFICANT CHANGE UP (ref 3.5–5.3)
POTASSIUM SERPL-SCNC: 4.6 MMOL/L — SIGNIFICANT CHANGE UP (ref 3.5–5.3)
PROTHROM AB SERPL-ACNC: 35.2 SEC — HIGH (ref 10–13.1)
RBC # BLD: 3.05 M/UL — LOW (ref 4.2–5.8)
RBC # BLD: 3.33 M/UL — LOW (ref 4.2–5.8)
RBC # FLD: 14.4 % — SIGNIFICANT CHANGE UP (ref 10.3–14.5)
RBC # FLD: 15.7 % — HIGH (ref 10.3–14.5)
SODIUM SERPL-SCNC: 140 MMOL/L — SIGNIFICANT CHANGE UP (ref 135–145)
WBC # BLD: 10.9 K/UL — HIGH (ref 3.8–10.5)
WBC # BLD: 8.08 K/UL — SIGNIFICANT CHANGE UP (ref 3.8–10.5)
WBC # FLD AUTO: 10.9 K/UL — HIGH (ref 3.8–10.5)
WBC # FLD AUTO: 8.08 K/UL — SIGNIFICANT CHANGE UP (ref 3.8–10.5)

## 2018-01-19 PROCEDURE — 95819 EEG AWAKE AND ASLEEP: CPT

## 2018-01-19 PROCEDURE — 85027 COMPLETE CBC AUTOMATED: CPT

## 2018-01-19 PROCEDURE — 82140 ASSAY OF AMMONIA: CPT

## 2018-01-19 PROCEDURE — 93005 ELECTROCARDIOGRAM TRACING: CPT

## 2018-01-19 PROCEDURE — 97161 PT EVAL LOW COMPLEX 20 MIN: CPT

## 2018-01-19 PROCEDURE — 71250 CT THORAX DX C-: CPT

## 2018-01-19 PROCEDURE — 71046 X-RAY EXAM CHEST 2 VIEWS: CPT

## 2018-01-19 PROCEDURE — 85730 THROMBOPLASTIN TIME PARTIAL: CPT

## 2018-01-19 PROCEDURE — 82550 ASSAY OF CK (CPK): CPT

## 2018-01-19 PROCEDURE — 92610 EVALUATE SWALLOWING FUNCTION: CPT

## 2018-01-19 PROCEDURE — 87086 URINE CULTURE/COLONY COUNT: CPT

## 2018-01-19 PROCEDURE — 94640 AIRWAY INHALATION TREATMENT: CPT

## 2018-01-19 PROCEDURE — 82962 GLUCOSE BLOOD TEST: CPT

## 2018-01-19 PROCEDURE — 87633 RESP VIRUS 12-25 TARGETS: CPT

## 2018-01-19 PROCEDURE — 82607 VITAMIN B-12: CPT

## 2018-01-19 PROCEDURE — 82746 ASSAY OF FOLIC ACID SERUM: CPT

## 2018-01-19 PROCEDURE — 87486 CHLMYD PNEUM DNA AMP PROBE: CPT

## 2018-01-19 PROCEDURE — 80048 BASIC METABOLIC PNL TOTAL CA: CPT

## 2018-01-19 PROCEDURE — 97116 GAIT TRAINING THERAPY: CPT

## 2018-01-19 PROCEDURE — 84481 FREE ASSAY (FT-3): CPT

## 2018-01-19 PROCEDURE — 87798 DETECT AGENT NOS DNA AMP: CPT

## 2018-01-19 PROCEDURE — 83880 ASSAY OF NATRIURETIC PEPTIDE: CPT

## 2018-01-19 PROCEDURE — 97530 THERAPEUTIC ACTIVITIES: CPT

## 2018-01-19 PROCEDURE — 85610 PROTHROMBIN TIME: CPT

## 2018-01-19 PROCEDURE — 82803 BLOOD GASES ANY COMBINATION: CPT

## 2018-01-19 PROCEDURE — 80076 HEPATIC FUNCTION PANEL: CPT

## 2018-01-19 PROCEDURE — 81001 URINALYSIS AUTO W/SCOPE: CPT

## 2018-01-19 PROCEDURE — 86376 MICROSOMAL ANTIBODY EACH: CPT

## 2018-01-19 PROCEDURE — 99285 EMERGENCY DEPT VISIT HI MDM: CPT | Mod: 25

## 2018-01-19 PROCEDURE — 36600 WITHDRAWAL OF ARTERIAL BLOOD: CPT

## 2018-01-19 PROCEDURE — 84480 ASSAY TRIIODOTHYRONINE (T3): CPT

## 2018-01-19 PROCEDURE — 84443 ASSAY THYROID STIM HORMONE: CPT

## 2018-01-19 PROCEDURE — 84145 PROCALCITONIN (PCT): CPT

## 2018-01-19 PROCEDURE — 70450 CT HEAD/BRAIN W/O DYE: CPT

## 2018-01-19 PROCEDURE — 87581 M.PNEUMON DNA AMP PROBE: CPT

## 2018-01-19 PROCEDURE — 80053 COMPREHEN METABOLIC PANEL: CPT

## 2018-01-19 RX ORDER — THIAMINE MONONITRATE (VIT B1) 100 MG
1 TABLET ORAL
Qty: 30 | Refills: 0 | OUTPATIENT
Start: 2018-01-19 | End: 2018-02-17

## 2018-01-19 RX ORDER — ALPRAZOLAM 0.25 MG
1 TABLET ORAL
Qty: 15 | Refills: 0 | OUTPATIENT
Start: 2018-01-19 | End: 2018-01-23

## 2018-01-19 RX ORDER — QUETIAPINE FUMARATE 200 MG/1
1 TABLET, FILM COATED ORAL
Qty: 30 | Refills: 0 | OUTPATIENT
Start: 2018-01-19 | End: 2018-02-17

## 2018-01-19 RX ORDER — WARFARIN SODIUM 2.5 MG/1
1 TABLET ORAL
Qty: 0 | Refills: 0 | COMMUNITY

## 2018-01-19 RX ORDER — METHIMAZOLE 10 MG/1
1 TABLET ORAL
Qty: 0 | Refills: 0 | COMMUNITY

## 2018-01-19 RX ADMIN — Medication 3 MILLILITER(S): at 12:19

## 2018-01-19 RX ADMIN — Medication 10 UNIT(S): at 17:34

## 2018-01-19 RX ADMIN — Medication 20 MILLIGRAM(S): at 05:40

## 2018-01-19 RX ADMIN — Medication 10 UNIT(S): at 08:25

## 2018-01-19 RX ADMIN — Medication 5: at 12:18

## 2018-01-19 RX ADMIN — Medication 20 MILLIGRAM(S): at 17:34

## 2018-01-19 RX ADMIN — Medication 0.5 MILLIGRAM(S): at 17:34

## 2018-01-19 RX ADMIN — Medication 0.5 MILLIGRAM(S): at 05:40

## 2018-01-19 RX ADMIN — Medication 3 MILLILITER(S): at 17:34

## 2018-01-19 RX ADMIN — Medication 300 MILLIGRAM(S): at 05:40

## 2018-01-19 RX ADMIN — Medication 3 MILLILITER(S): at 05:40

## 2018-01-19 RX ADMIN — Medication 100 MILLIGRAM(S): at 12:19

## 2018-01-19 RX ADMIN — LAMOTRIGINE 25 MILLIGRAM(S): 25 TABLET, ORALLY DISINTEGRATING ORAL at 12:19

## 2018-01-19 RX ADMIN — Medication 1: at 08:25

## 2018-01-19 RX ADMIN — Medication 2: at 17:34

## 2018-01-19 RX ADMIN — Medication 40 MILLIGRAM(S): at 12:19

## 2018-01-19 RX ADMIN — Medication 10 UNIT(S): at 12:18

## 2018-01-19 NOTE — DISCHARGE NOTE ADULT - MEDICATION SUMMARY - MEDICATIONS TO STOP TAKING
I will STOP taking the medications listed below when I get home from the hospital:    methIMAzole 5 mg oral tablet  -- 1 tab(s) by mouth once a day    Medrol Dosepak 4 mg oral tablet

## 2018-01-19 NOTE — DISCHARGE NOTE ADULT - MEDICATION SUMMARY - MEDICATIONS TO CHANGE
I will SWITCH the dose or number of times a day I take the medications listed below when I get home from the hospital:    Amaryl 1 mg oral tablet  -- 1 tab(s) by mouth once a day    warfarin 2 mg oral tablet  -- 1 tab(s) by mouth once a day  hold 1/12/18 and 1/13/18, resume 1/14/18

## 2018-01-19 NOTE — DIETITIAN INITIAL EVALUATION ADULT. - NS AS NUTRI INTERV MEALS SNACK
Defer PO diet to Pt/family wishes and medical team. Continues consistent CHO, Kosher diet./General/healthful diet

## 2018-01-19 NOTE — DISCHARGE NOTE ADULT - PATIENT PORTAL LINK FT
“You can access the [facility] Patient Portal, offered by [facility], by registering with the following website: [email or web address]”

## 2018-01-19 NOTE — PROGRESS NOTE ADULT - PROBLEM SELECTOR PLAN 1
COPD exacerbation   Nebulizers and prednisone taper
Increase lantus 25 units daily and increase humalog 10 units per meal + scale.  Discussed with daughter will restart glimepiride on D/C. Will adjust dose to prednisone dose, tapet given.
Keep lantus 20 units daily and humalog 6 units per meal + scale.
Keep lantus 20 units daily and increase humalog 8 units per meal + scale.  Discussed with daughter will restart glimepiride on D/C. Will adjust dose to prednisone dose, tapet given.

## 2018-01-19 NOTE — DISCHARGE NOTE ADULT - CARE PLAN
Principal Discharge DX:	COPD (chronic obstructive pulmonary disease)  Goal:	Resolution of symptom  Assessment and plan of treatment:	Call your Health Care provider upon arrival home to make a follow up appointment within one week.  Take all inhalers as prescribed by your Health Care Provider.  Take steroids as prescribed by your Health Care Provider.  If your cough increases infrequency and severity and/or you have shortness of breath or increased shortness of breath call your Health Care Provider.  If you develop fever, chills, night sweats, malaise, and/or change in mental status call your Health care Provider.  Nutrition is very important.  Eat small frequent meals.  Increase your activity as tolerated.  Do not stay in bed all day  Secondary Diagnosis:	Type 2 diabetes mellitus with hyperglycemia, with long-term current use of insulin  Assessment and plan of treatment:	HgA1C this admission.  Make sure you get your HgA1c checked every three months.  If you take oral diabetes medications, check your blood glucose two times a day.  If you take insulin, check your blood glucose before meals and at bedtime.  It's important not to skip any meals.  Keep a log of your blood glucose results and always take it with you to your doctor appointments.  Keep a list of your current medications including injectables and over the counter medications and bring this medication list with you to all your doctor appointments.  If you have not seen your ophthalmologist this year call for appointment.  Check your feet daily for redness, sores, or openings. Do not self treat. If no improvement in two days call your primary care physician for an appointment.  Low blood sugar (hypoglycemia) is a blood sugar below 70mg/dl. Check your blood sugar if you feel signs/symptoms of hypoglycemia. If your blood sugar is below 70 take 15 grams of carbohydrates (ex 4 oz of apple juice, 3-4 glucose tablets, or 4-6 oz of regular soda) wait 15 minutes and repeat blood sugar to make sure it comes up above 70.  If your blood sugar is above 70 and you are due for a meal, have a meal.  If you are not due for a meal have a snack.  This snack helps keeps your blood sugar at a safe range.  Secondary Diagnosis:	Atrial fibrillation  Assessment and plan of treatment:	Atrial fibrillation is the most common heart rhythm problem & has the risk of stroke & heart attack  It helps if you control your blood pressure, not drink more than 1-2 alcohol drinks per day, cut down on caffeine, getting treatment for over active thyroid gland, & getting exercise  Call your doctor if you feel your heart racing or beating unusually, chest tightness or pain, lightheaded, faint, shortness of breath especially with exercise  It is important to take your heart medication as prescribed  You may be on anticoagulation which is very important to take as directed - you may need blood work to monitor drug levels  Secondary Diagnosis:	Adenomatous goiter, toxic or with hyperthyroidism  Assessment and plan of treatment:	Adenomatous goiter, toxic or with hyperthyroidism.  Plan: Hold tapazol.  Repeat TFT's in 2-3 weeks.

## 2018-01-19 NOTE — SWALLOW BEDSIDE ASSESSMENT ADULT - SWALLOW EVAL: PATIENT/FAMILY GOALS STATEMENT
Pt and daughter report occasional episodes of coughing with solids, and intermittent coughing when drinking thin liquids "too quickly." However, Pt and daughter report that it's difficult to differentiate between baseline cough and cough related to PO intake.

## 2018-01-19 NOTE — DISCHARGE NOTE ADULT - HOSPITAL COURSE
89 year old male with hx of DM, anemia, multinodular goiter ,  copd, atrial fibrillation, recetnly discharged from [facility] where he was admitted for SOB sec to afib with RVR, COPD exacerbation in setting of influenza.. sent out on steroid taper which he completed and now returning with SOB and hallucinations  SOB : RVP neg  , no pna on CXr but small effusion    procalcitonin  upper end of normal,  leukocytosis likely sec to steroids ,  CT chest: no pna obsereve off abx   exam though with coarse bs and mild wheezing : it is improved since last admit..   cont streroids taper  vest/ resp assisst device    ?aspiration PNA : Speech and Swallow completed and recommended dysphagia 2 with nectar thick liquids. MBS recommended as outpatient    Amaryl 1 mg oral tablet  -- Take 2 tablets twice a day until   January22th (when prednisone is 20mg)   THEN take 1 tablet twice a day until january 24th. (when prednisone is 10mg)  THEN take one tablet daily.    follow up with PMD.

## 2018-01-19 NOTE — DISCHARGE NOTE ADULT - SECONDARY DIAGNOSIS.
Type 2 diabetes mellitus with hyperglycemia, with long-term current use of insulin Atrial fibrillation Adenomatous goiter, toxic or with hyperthyroidism

## 2018-01-19 NOTE — PROGRESS NOTE ADULT - SUBJECTIVE AND OBJECTIVE BOX
Subjective: Patient seen and examined. No new events except as noted.   resting comfortably in bed   no cp or sob       REVIEW OF SYSTEMS:    CONSTITUTIONAL: + weakness, no fevers or chills  EYES/ENT: No visual changes;  No vertigo or throat pain   NECK: No pain or stiffness  RESPIRATORY: + cough, wheezing, No hemoptysis; + shortness of breath  CARDIOVASCULAR: No chest pain or palpitations  GASTROINTESTINAL: No abdominal or epigastric pain. No nausea, vomiting, or hematemesis; No diarrhea or constipation. No melena or hematochezia.  GENITOURINARY: No dysuria, frequency or hematuria  NEUROLOGICAL: No numbness or weakness  SKIN: No itching, burning, rashes, or lesions   All other review of systems is negative unless indicated above.    MEDICATIONS:  MEDICATIONS  (STANDING):  ALBUTerol/ipratropium for Nebulization 3 milliLiter(s) Nebulizer every 6 hours  atorvastatin 40 milliGRAM(s) Oral at bedtime  buDESOnide   0.5 milliGRAM(s) Respule 0.5 milliGRAM(s) Inhalation two times a day  dextrose 5%. 1000 milliLiter(s) (50 mL/Hr) IV Continuous <Continuous>  dextrose 5%. 1000 milliLiter(s) (50 mL/Hr) IV Continuous <Continuous>  dextrose 50% Injectable 12.5 Gram(s) IV Push once  dextrose 50% Injectable 25 Gram(s) IV Push once  dextrose 50% Injectable 25 Gram(s) IV Push once  dextrose 50% Injectable 12.5 Gram(s) IV Push once  dextrose 50% Injectable 25 Gram(s) IV Push once  dextrose 50% Injectable 25 Gram(s) IV Push once  diltiazem    milliGRAM(s) Oral daily  haloperidol     Tablet 0.5 milliGRAM(s) Oral two times a day  insulin glargine Injectable (LANTUS) 20 Unit(s) SubCutaneous at bedtime  insulin lispro (HumaLOG) corrective regimen sliding scale   SubCutaneous three times a day before meals  insulin lispro (HumaLOG) corrective regimen sliding scale   SubCutaneous at bedtime  insulin lispro Injectable (HumaLOG) 6 Unit(s) SubCutaneous three times a day before meals  lamoTRIgine 25 milliGRAM(s) Oral daily  PARoxetine 40 milliGRAM(s) Oral daily  sodium chloride 0.9%. 1000 milliLiter(s) (50 mL/Hr) IV Continuous <Continuous>  warfarin 2 milliGRAM(s) Oral once      PHYSICAL EXAM:  T(C): 37 (01-15-18 @ 11:56), Max: 37.1 (01-14-18 @ 21:37)  HR: 88 (01-15-18 @ 11:56) (84 - 88)  BP: 148/65 (01-15-18 @ 11:56) (140/64 - 148/65)  RR: 18 (01-15-18 @ 11:56) (17 - 18)  SpO2: 99% (01-15-18 @ 11:56) (94% - 99%)  Wt(kg): --  I&O's Summary    14 Jan 2018 07:01  -  15 Lonnie 2018 07:00  --------------------------------------------------------  IN: 1050 mL / OUT: 1550 mL / NET: -500 mL    15 Lonnie 2018 07:01  -  15 Lonnie 2018 20:16  --------------------------------------------------------  IN: 540 mL / OUT: 500 mL / NET: 40 mL          Appearance: Normal	  HEENT:   Normal oral mucosa, PERRL, EOMI	  Lymphatic: No lymphadenopathy , no edema  Cardiovascular: Normal S1 S2, No JVD, No murmurs , Peripheral pulses palpable 2+ bilaterally  Respiratory: +wheezing , normal effort 	  Gastrointestinal:  Soft, Non-tender, + BS	  Skin: No rashes, No ecchymoses, No cyanosis, warm to touch  Musculoskeletal: Normal range of motion, normal strength  Psychiatry:  Mood & affect appropriate  Ext: No edema      LABS:    CARDIAC MARKERS:                                9.0    9.76  )-----------( 323      ( 15 Lonnie 2018 07:05 )             28.4     01-15    140  |  103  |  33<H>  ----------------------------<  233<H>  5.0   |  25  |  1.31<H>    Ca    9.0      15 Lonnie 2018 07:05    TPro  7.4  /  Alb  3.8  /  TBili  0.4  /  DBili  x   /  AST  15  /  ALT  18  /  AlkPhos  95  01-14    proBNP:   Lipid Profile:   HgA1c:   TSH: Thyroid Stimulating Hormone, Serum: 0.59 uIU/mL (01-15 @ 07:05)      2-5          TELEMETRY: 	    ECG:  	  RADIOLOGY:   DIAGNOSTIC TESTING:  [ ] Echocardiogram:  [ ]  Catheterization:  [ ] Stress Test:    OTHER:
[facility] [place] [facility] [place]        - Patient seen and examined.  - Today, patient is without complaints.  Son at bedside. Pt reports that he feels beetter        *****MEDICATIONS: Current medication reviewed and documented.    MEDICATIONS  (STANDING):  ALBUTerol/ipratropium for Nebulization 3 milliLiter(s) Nebulizer every 6 hours  atorvastatin 40 milliGRAM(s) Oral at bedtime  buDESOnide   0.5 milliGRAM(s) Respule 0.5 milliGRAM(s) Inhalation two times a day  dextrose 5%. 1000 milliLiter(s) (50 mL/Hr) IV Continuous <Continuous>  dextrose 5%. 1000 milliLiter(s) (50 mL/Hr) IV Continuous <Continuous>  dextrose 50% Injectable 12.5 Gram(s) IV Push once  dextrose 50% Injectable 25 Gram(s) IV Push once  dextrose 50% Injectable 25 Gram(s) IV Push once  dextrose 50% Injectable 12.5 Gram(s) IV Push once  dextrose 50% Injectable 25 Gram(s) IV Push once  dextrose 50% Injectable 25 Gram(s) IV Push once  diltiazem    milliGRAM(s) Oral daily  insulin glargine Injectable (LANTUS) 24 Unit(s) SubCutaneous at bedtime  insulin lispro (HumaLOG) corrective regimen sliding scale   SubCutaneous three times a day before meals  insulin lispro (HumaLOG) corrective regimen sliding scale   SubCutaneous at bedtime  insulin lispro Injectable (HumaLOG) 10 Unit(s) SubCutaneous three times a day before meals  lamoTRIgine 25 milliGRAM(s) Oral daily  PARoxetine 40 milliGRAM(s) Oral daily  predniSONE   Tablet   Oral   predniSONE   Tablet 20 milliGRAM(s) Oral two times a day  QUEtiapine 25 milliGRAM(s) Oral at bedtime  sodium chloride 0.9%. 1000 milliLiter(s) (50 mL/Hr) IV Continuous <Continuous>  thiamine 100 milliGRAM(s) Oral daily    MEDICATIONS  (PRN):  ALPRAZolam 0.25 milliGRAM(s) Oral every 8 hours PRN anxiety  dextrose Gel 1 Dose(s) Oral once PRN Blood Glucose LESS THAN 70 milliGRAM(s)/deciliter  dextrose Gel 1 Dose(s) Oral once PRN Blood Glucose LESS THAN 70 milliGRAM(s)/deciliter  glucagon  Injectable 1 milliGRAM(s) IntraMuscular once PRN Glucose LESS THAN 70 milligrams/deciliter  glucagon  Injectable 1 milliGRAM(s) IntraMuscular once PRN Glucose LESS THAN 70 milligrams/deciliter  haloperidol    Injectable 0.5 milliGRAM(s) IV Push every 8 hours PRN agitation           ***** REVIEW OF SYSTEM:  GEN: no fever, no chills, no pain  RESP: no SOB, no cough, no sputum  CVS: no chest pain, no palpitations, no edema  GI: no abdominal pain, no nausea, no vomiting, no constipation, no diarrhea  : no dysurea, no frequency  NEURO: no headache, no diziness  PSYCH: no depression, not anxious  Derm : no itching, no rash         ***** VITAL SIGNS:  T(F): 97.8 (18 @ 21:18), Max: 97.8 (18 @ 21:18)  HR: 78 (18 @ 21:18) (61 - 78)  BP: 132/61 (18 @ 21:18) (119/53 - 132/61)  RR: 18 (18 @ 21:18) (18 - 18)  SpO2: 95% (18 @ 21:18) (95% - 100%)  Wt(kg): --  ,   I&O's Summary    2018 07:  -  2018 07:00  --------------------------------------------------------  IN: 1045 mL / OUT: 101 mL / NET: 944 mL    2018 07:  -  2018 21:26  --------------------------------------------------------  IN: 960 mL / OUT: 900 mL / NET: 60 mL             *****PHYSICAL EXAM:        Alert oriented x2 ( did not know the date, but knows month and year, also did not know the name of the hospital)    better attentiona dn comprehension. insight has improved.   Able to follow 1-2 step commands.  able to read, name objects provided.   Recall 1/5      EOMI fundi not visualized,  VFF to confrontration  No facial asymmetry   Tongue is midline   Palate elevates symmetrically   Moving all 4 ext symmetrically no pronator drift.  Reflexes are symmetric throughout   sensation is grossly symmetric  Gait : not assessed.  B/L down going toes   + muscle faciculation have diminished.       *****LAB AND IMAGIN.1    8.61  )-----------( 234      ( 2018 09:02 )             29.3                   139  |  103  |  35<H>  ----------------------------<  210<H>  4.7   |  26  |  1.25    Ca    8.5      2018 08:54    TPro  6.5  /  Alb  3.4  /  TBili  0.2  /  DBili  0.1  /  AST  18  /  ALT  16  /  AlkPhos  84  -    PT/INR - ( 2018 14:20 )   PT: 40.7 sec;   INR: 3.64 ratio                CARDIAC MARKERS ( 2018 23:27 )  x     / x     / 32 U/L / x     / x                ABG - ( 2018 23:36 )  pH: 7.39  /  pCO2: 46    /  pO2: 137   / HCO3: 27    / Base Excess: 2.3   /  SaO2: 99                  [All pertinent recent Imaging/Reports reviewed]           *****A S S E S S M E N T   A N D   P L A N :        89 year old male with hx of DM, anemia, multinodular goiter ,  copd, atrial fibrillation, recently discharged from [facility] where he was admitted for SOB sec to afib with RVR, COPD exacerbation in setting of influenza.. sent out on steroid taper which he completed and now returning with SOB and hallucinations.  Pt is a poor historian unable to give full history.     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 haldol, which has improved with lowering the doses.   Thank you for allowing me to participate in the care of this patient. Please do not hesitate to call me if you have any  questions.        ________________  Selena Wilburn MD  [facility] [place] [facility]  [phone]     30 minutes spent on total encounter; more than 50 % of the visit was  spent counseling and or  coordinating care by the attending physician.   This note was partially created using voice recognition software and is inherently subject to errors including those of syntax and sound alike substitutions which may escape proofreading. In such instances, original meaning may be extrapolated by contextual derivation.   At the present time, we do not provide outpatient followup, this will need to be arranged through an alternative practice. Best to call the pt insurance to find  a participating provider.  This was explained to patient at the time of the visit.
[facility]        - Patient seen and examined.  - Today, patient is without complaints. feeling better today More awake. Daughter at bedside.          *****MEDICATIONS: Current medication reviewed and documented.    MEDICATIONS  (STANDING):  ALBUTerol/ipratropium for Nebulization 3 milliLiter(s) Nebulizer every 6 hours  atorvastatin 40 milliGRAM(s) Oral at bedtime  buDESOnide   0.5 milliGRAM(s) Respule 0.5 milliGRAM(s) Inhalation two times a day  dextrose 5%. 1000 milliLiter(s) (50 mL/Hr) IV Continuous <Continuous>  dextrose 5%. 1000 milliLiter(s) (50 mL/Hr) IV Continuous <Continuous>  dextrose 50% Injectable 12.5 Gram(s) IV Push once  dextrose 50% Injectable 25 Gram(s) IV Push once  dextrose 50% Injectable 25 Gram(s) IV Push once  dextrose 50% Injectable 12.5 Gram(s) IV Push once  dextrose 50% Injectable 25 Gram(s) IV Push once  dextrose 50% Injectable 25 Gram(s) IV Push once  diltiazem    milliGRAM(s) Oral daily  insulin glargine Injectable (LANTUS) 20 Unit(s) SubCutaneous at bedtime  insulin lispro (HumaLOG) corrective regimen sliding scale   SubCutaneous three times a day before meals  insulin lispro (HumaLOG) corrective regimen sliding scale   SubCutaneous at bedtime  insulin lispro Injectable (HumaLOG) 8 Unit(s) SubCutaneous three times a day before meals  lamoTRIgine 25 milliGRAM(s) Oral daily  PARoxetine 40 milliGRAM(s) Oral daily  predniSONE   Tablet   Oral   predniSONE   Tablet 30 milliGRAM(s) Oral two times a day  QUEtiapine 25 milliGRAM(s) Oral at bedtime  sodium chloride 0.9%. 1000 milliLiter(s) (50 mL/Hr) IV Continuous <Continuous>  thiamine 100 milliGRAM(s) Oral daily    MEDICATIONS  (PRN):  ALPRAZolam 0.25 milliGRAM(s) Oral every 8 hours PRN anxiety  dextrose Gel 1 Dose(s) Oral once PRN Blood Glucose LESS THAN 70 milliGRAM(s)/deciliter  dextrose Gel 1 Dose(s) Oral once PRN Blood Glucose LESS THAN 70 milliGRAM(s)/deciliter  glucagon  Injectable 1 milliGRAM(s) IntraMuscular once PRN Glucose LESS THAN 70 milligrams/deciliter  glucagon  Injectable 1 milliGRAM(s) IntraMuscular once PRN Glucose LESS THAN 70 milligrams/deciliter  haloperidol    Injectable 0.5 milliGRAM(s) IV Push every 8 hours PRN agitation           ***** REVIEW OF SYSTEM:  GEN: no fever, no chills, no pain  RESP: no SOB, no cough, no sputum  CVS: no chest pain, no palpitations, no edema  GI: no abdominal pain, no nausea, no vomiting, no constipation, no diarrhea  : no dysurea, no frequency  NEURO: no headache, no diziness  PSYCH: no depression, not anxious  Derm : no itching, no rash         ***** VITAL SIGNS:  T(F): 97.5 (18 @ 11:10), Max: 97.8 (18 @ 04:10)  HR: 110 (18 @ 11:10) (78 - 110)  BP: 118/59 (18 @ 11:10) (118/59 - 128/72)  RR: 18 (18 @ 11:10) (17 - 18)  SpO2: 95% (18 @ 11:10) (95% - 98%)  Wt(kg): --  ,   I&O's Summary    2018 07:  -  2018 07:00  --------------------------------------------------------  IN: 1380 mL / OUT: 875 mL / NET: 505 mL    2018 07:  -  2018 16:17  --------------------------------------------------------  IN: 440 mL / OUT: 1 mL / NET: 439 mL             *****PHYSICAL EXAM:     Alert oriented x2 ( did not know the date, but knows month and year, also did not know the name of the hospital)    Somewhat confused conversation. Poor insight.     Able to follow 1-2 step commands.  able to read, name objects provided.      EOMI fundi not visualized,  VFF to confrontration  No facial asymmetry   Tongue is midline   Palate elevates symmetrically   Moving all 4 ext symmetrically no pronator drift.  Reflexes are symmetric throughout   sensation is grossly symmetric  Gait : not assessed.  B/L down going toes        *****LAB AND IMAGIN.5    9.09  )-----------( 264      ( 2018 07:34 )             30.3               -    142  |  104  |  35<H>  ----------------------------<  218<H>  4.7   |  27  |  1.35<H>    Ca    8.8      2018 07:24    TPro  6.5  /  Alb  3.4  /  TBili  0.2  /  DBili  0.1  /  AST  18  /  ALT  16  /  AlkPhos  84  01-16    PT/INR - ( 2018 07:25 )   PT: 28.9 sec;   INR: 2.51 ratio         PTT - ( 2018 07:45 )  PTT:45.5 sec       CARDIAC MARKERS ( 2018 23:27 )  x     / x     / 32 U/L / x     / x                    [All pertinent recent Imaging/Reports reviewed]           *****A S S E S S M E N T   A N D   P L A N :    89 year old male with hx of DM, anemia, multinodular goiter ,  copd, atrial fibrillation, recently discharged from [facility] where he was admitted for SOB sec to afib with RVR, COPD exacerbation in setting of influenza.. sent out on steroid taper which he completed and now returning with SOB and hallucinations.  Pt is a poor historian unable to give full history.     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 related to steroid vs. albuterol vs.  Ammonia, cpk  and LFts given the generalized body mild myoclonus  consider changing to xopenex to see if there is any improvemnet.         Thank you for allowing me to participate in the care of this patient. Please do not hesitate to call me if you have any  questions.        ________________  Selena Wilburn MD  [facility]  [phone]     30 minutes spent on total encounter; more than 50 % of the visit was  spent counseling and or  coordinating care by the attending physician.   This note was partially created using voice recognition software and is inherently subject to errors including those of syntax and sound alike substitutions which may escape proofreading. In such instances, original meaning may be extrapolated by contextual derivation.   At the present time, we do not provide outpatient followup, this will need to be arranged through an alternative practice. Best to call the pt insurance to find  a participating provider.  This was explained to patient at the time of the visit.
Chief Complaint: Type 2 diabetes recently admitted with COPD exacerbation due to the Flu. Re-admitted with confusion and hallucinations. Patient on steroid taper.    FS high set today despite taper, PO intake good.   Says he is always hungry.      MEDICATIONS  (STANDING):  ALBUTerol/ipratropium for Nebulization 3 milliLiter(s) Nebulizer every 6 hours  atorvastatin 40 milliGRAM(s) Oral at bedtime  buDESOnide   0.5 milliGRAM(s) Respule 0.5 milliGRAM(s) Inhalation two times a day  dextrose 5%. 1000 milliLiter(s) (50 mL/Hr) IV Continuous <Continuous>  dextrose 5%. 1000 milliLiter(s) (50 mL/Hr) IV Continuous <Continuous>  dextrose 50% Injectable 12.5 Gram(s) IV Push once  dextrose 50% Injectable 25 Gram(s) IV Push once  dextrose 50% Injectable 25 Gram(s) IV Push once  dextrose 50% Injectable 12.5 Gram(s) IV Push once  dextrose 50% Injectable 25 Gram(s) IV Push once  dextrose 50% Injectable 25 Gram(s) IV Push once  diltiazem    milliGRAM(s) Oral daily  insulin glargine Injectable (LANTUS) 20 Unit(s) SubCutaneous at bedtime  insulin lispro (HumaLOG) corrective regimen sliding scale   SubCutaneous three times a day before meals  insulin lispro (HumaLOG) corrective regimen sliding scale   SubCutaneous at bedtime  insulin lispro Injectable (HumaLOG) 8 Unit(s) SubCutaneous three times a day before meals  lamoTRIgine 25 milliGRAM(s) Oral daily  PARoxetine 40 milliGRAM(s) Oral daily  predniSONE   Tablet   Oral   predniSONE   Tablet 20 milliGRAM(s) Oral two times a day  QUEtiapine 25 milliGRAM(s) Oral at bedtime  sodium chloride 0.9%. 1000 milliLiter(s) (50 mL/Hr) IV Continuous <Continuous>  thiamine 100 milliGRAM(s) Oral daily    MEDICATIONS  (PRN):  ALPRAZolam 0.25 milliGRAM(s) Oral every 8 hours PRN anxiety  dextrose Gel 1 Dose(s) Oral once PRN Blood Glucose LESS THAN 70 milliGRAM(s)/deciliter  dextrose Gel 1 Dose(s) Oral once PRN Blood Glucose LESS THAN 70 milliGRAM(s)/deciliter  glucagon  Injectable 1 milliGRAM(s) IntraMuscular once PRN Glucose LESS THAN 70 milligrams/deciliter  glucagon  Injectable 1 milliGRAM(s) IntraMuscular once PRN Glucose LESS THAN 70 milligrams/deciliter  haloperidol    Injectable 0.5 milliGRAM(s) IV Push every 8 hours PRN agitation      Allergies    No Known Allergies    Intolerances        PHYSICAL EXAM:  VITALS: T(C): 36.3 (01-18-18 @ 12:11)  T(F): 97.3 (01-18-18 @ 12:11), Max: 97.6 (01-17-18 @ 20:38)  HR: 72 (01-18-18 @ 12:11) (61 - 84)  BP: 129/86 (01-18-18 @ 12:11) (119/53 - 148/52)  RR:  (18 - 18)  SpO2:  (98% - 100%)  GENERAL: NAD, tremolous  EYES: No proptosis,  HEENT:  Atraumatic, Normocephalic,   THYROID: bilat nodular goiter, no Ln.  RESPIRATORY: mild wheezing bilaterally  CARDIOVASCULAR: Regular rhythm; No murmurs; no peripheral edema  GI: Soft, nontender, non distended, normal bowel sounds  SKIN: Dry, intact, No rashes or lesions  MUSCULOSKELETAL: normal strength  NEURO: extraocular movements intact, no tremor, normal reflexes  PSYCH: Alert and oriente,  confused    POCT Blood Glucose.: 206 mg/dL (01-18-18 @ 16:35)  POCT Blood Glucose.: 376 mg/dL (01-18-18 @ 12:06)  POCT Blood Glucose.: 238 mg/dL (01-18-18 @ 07:47)  POCT Blood Glucose.: 204 mg/dL (01-17-18 @ 20:57)  POCT Blood Glucose.: 131 mg/dL (01-17-18 @ 16:32)  POCT Blood Glucose.: 180 mg/dL (01-17-18 @ 11:12)  POCT Blood Glucose.: 264 mg/dL (01-17-18 @ 07:27)  POCT Blood Glucose.: 256 mg/dL (01-16-18 @ 21:24)  POCT Blood Glucose.: 278 mg/dL (01-16-18 @ 16:29)  POCT Blood Glucose.: 113 mg/dL (01-16-18 @ 11:17)  POCT Blood Glucose.: 149 mg/dL (01-16-18 @ 08:16)  POCT Blood Glucose.: 279 mg/dL (01-15-18 @ 20:36)                            9.1    8.61  )-----------( 234      ( 18 Jan 2018 09:02 )             29.3       01-18    139  |  103  |  35<H>  ----------------------------<  210<H>  4.7   |  26  |  1.25    EGFR if : 59<L>  EGFR if non : 51<L>    Ca    8.5      01-18    TPro  6.5  /  Alb  3.4  /  TBili  0.2  /  DBili  0.1  /  AST  18  /  ALT  16  /  AlkPhos  84  01-16      Thyroid Function Tests:  01-16 @ 23:27 TSH -- FreeT4 -- T3 -- Anti TPO <10.0 Anti Thyroglobulin Ab <20.0 TSI --  01-15 @ 07:05 TSH 0.59 FreeT4 -- T3 42 Anti TPO -- Anti Thyroglobulin Ab -- TSI --      Hemoglobin A1C, Whole Blood: 6.5 % <H> [4.0 - 5.6] (12-25-17 @ 08:22)
Chief Complaint: Type 2 diabetes recently admitted with COPD exacerbation due to the Flu. Re-admitted with confusion and hallucinations. Patient on steroid taper.    More calm and alert.  Solumedrol changed to prednisone PO.  Afebrile, PO intake good.        MEDICATIONS  (STANDING):  ALBUTerol/ipratropium for Nebulization 3 milliLiter(s) Nebulizer every 6 hours  atorvastatin 40 milliGRAM(s) Oral at bedtime  buDESOnide   0.5 milliGRAM(s) Respule 0.5 milliGRAM(s) Inhalation two times a day  dextrose 5%. 1000 milliLiter(s) (50 mL/Hr) IV Continuous <Continuous>  dextrose 5%. 1000 milliLiter(s) (50 mL/Hr) IV Continuous <Continuous>  dextrose 50% Injectable 12.5 Gram(s) IV Push once  dextrose 50% Injectable 25 Gram(s) IV Push once  dextrose 50% Injectable 25 Gram(s) IV Push once  dextrose 50% Injectable 12.5 Gram(s) IV Push once  dextrose 50% Injectable 25 Gram(s) IV Push once  dextrose 50% Injectable 25 Gram(s) IV Push once  diltiazem    milliGRAM(s) Oral daily  haloperidol     Tablet 0.5 milliGRAM(s) Oral two times a day  insulin glargine Injectable (LANTUS) 20 Unit(s) SubCutaneous at bedtime  insulin lispro (HumaLOG) corrective regimen sliding scale   SubCutaneous three times a day before meals  insulin lispro (HumaLOG) corrective regimen sliding scale   SubCutaneous at bedtime  insulin lispro Injectable (HumaLOG) 6 Unit(s) SubCutaneous three times a day before meals  lamoTRIgine 25 milliGRAM(s) Oral daily  methimazole 5 milliGRAM(s) Oral daily  PARoxetine 40 milliGRAM(s) Oral daily  sodium chloride 0.9%. 1000 milliLiter(s) (50 mL/Hr) IV Continuous <Continuous>  warfarin 2 milliGRAM(s) Oral once    MEDICATIONS  (PRN):  dextrose Gel 1 Dose(s) Oral once PRN Blood Glucose LESS THAN 70 milliGRAM(s)/deciliter  dextrose Gel 1 Dose(s) Oral once PRN Blood Glucose LESS THAN 70 milliGRAM(s)/deciliter  glucagon  Injectable 1 milliGRAM(s) IntraMuscular once PRN Glucose LESS THAN 70 milligrams/deciliter  glucagon  Injectable 1 milliGRAM(s) IntraMuscular once PRN Glucose LESS THAN 70 milligrams/deciliter  haloperidol    Injectable 0.5 milliGRAM(s) IV Push every 8 hours PRN agitation      Allergies    No Known Allergies    Intolerances        PHYSICAL EXAM:  VITALS: T(C): 37 (01-15-18 @ 11:56)  T(F): 98.6 (01-15-18 @ 11:56), Max: 98.7 (01-14-18 @ 21:37)  HR: 88 (01-15-18 @ 11:56) (84 - 88)  BP: 148/65 (01-15-18 @ 11:56) (140/64 - 148/65)  RR:  (17 - 18)  SpO2:  (94% - 99%)  GENERAL: NAD,   EYES: No proptosis,  HEENT:  Atraumatic, Normocephalic,   THYROID: Bilateral nodular goiter.  RESPIRATORY: Clear to auscultation bilaterally, mild wheezing.  CARDIOVASCULAR: Regular rhythm; No murmurs; no peripheral edema  GI: Soft, nontender, non distended, normal bowel sounds  SKIN: Dry, intact, No rashes or lesions  MUSCULOSKELETAL: normal strength  NEURO: extraocular movements intact, no tremor, reduced reflexes  PSYCH: Alert and oriented but confused.    POCT Blood Glucose.: 249 mg/dL (01-15-18 @ 16:31)  POCT Blood Glucose.: 241 mg/dL (01-15-18 @ 11:03)  POCT Blood Glucose.: 229 mg/dL (01-15-18 @ 07:11)  POCT Blood Glucose.: 222 mg/dL (01-14-18 @ 20:34)  POCT Blood Glucose.: 276 mg/dL (01-14-18 @ 16:06)  POCT Blood Glucose.: 423 mg/dL (01-14-18 @ 15:02)  POCT Blood Glucose.: 401 mg/dL (01-14-18 @ 15:00)  POCT Blood Glucose.: 486 mg/dL (01-14-18 @ 12:53)  POCT Blood Glucose.: 437 mg/dL (01-14-18 @ 12:51)  POCT Blood Glucose.: 141 mg/dL (01-14-18 @ 09:09)  POCT Blood Glucose.: 111 mg/dL (01-14-18 @ 06:23)  POCT Blood Glucose.: 406 mg/dL (01-13-18 @ 23:48)                            9.0    9.76  )-----------( 323      ( 15 Lonnie 2018 07:05 )             28.4       01-15    140  |  103  |  33<H>  ----------------------------<  233<H>  5.0   |  25  |  1.31<H>    EGFR if : 56<L>  EGFR if non : 48<L>    Ca    9.0      01-15    TPro  7.4  /  Alb  3.8  /  TBili  0.4  /  DBili  x   /  AST  15  /  ALT  18  /  AlkPhos  95  01-14      Thyroid Function Tests:  01-15 @ 07:05 TSH 0.59 FreeT4 -- T3 42 Anti TPO -- Anti Thyroglobulin Ab -- TSI --  12-26 @ 07:27 TSH -- FreeT4 -- T3 46 Anti TPO -- Anti Thyroglobulin Ab -- TSI --      Hemoglobin A1C, Whole Blood: 6.5 % <H> [4.0 - 5.6] (12-25-17 @ 08:22)
Chief Complaint: Type 2 diabetes recently admitted with COPD exacerbation due to the Flu. Re-admitted with confusion and hallucinations. Patient on steroid taper.    PO intake very good.  On sterid taper, afebrile.  Tremolos      MEDICATIONS  (STANDING):  ALBUTerol/ipratropium for Nebulization 3 milliLiter(s) Nebulizer every 6 hours  atorvastatin 40 milliGRAM(s) Oral at bedtime  buDESOnide   0.5 milliGRAM(s) Respule 0.5 milliGRAM(s) Inhalation two times a day  dextrose 5%. 1000 milliLiter(s) (50 mL/Hr) IV Continuous <Continuous>  dextrose 5%. 1000 milliLiter(s) (50 mL/Hr) IV Continuous <Continuous>  dextrose 50% Injectable 12.5 Gram(s) IV Push once  dextrose 50% Injectable 25 Gram(s) IV Push once  dextrose 50% Injectable 25 Gram(s) IV Push once  dextrose 50% Injectable 12.5 Gram(s) IV Push once  dextrose 50% Injectable 25 Gram(s) IV Push once  dextrose 50% Injectable 25 Gram(s) IV Push once  diltiazem    milliGRAM(s) Oral daily  insulin glargine Injectable (LANTUS) 20 Unit(s) SubCutaneous at bedtime  insulin lispro (HumaLOG) corrective regimen sliding scale   SubCutaneous three times a day before meals  insulin lispro (HumaLOG) corrective regimen sliding scale   SubCutaneous at bedtime  insulin lispro Injectable (HumaLOG) 6 Unit(s) SubCutaneous three times a day before meals  lamoTRIgine 25 milliGRAM(s) Oral daily  PARoxetine 40 milliGRAM(s) Oral daily  predniSONE   Tablet   Oral   predniSONE   Tablet 30 milliGRAM(s) Oral two times a day  QUEtiapine 25 milliGRAM(s) Oral at bedtime  sodium chloride 0.9%. 1000 milliLiter(s) (50 mL/Hr) IV Continuous <Continuous>  thiamine 100 milliGRAM(s) Oral daily    MEDICATIONS  (PRN):  ALPRAZolam 0.25 milliGRAM(s) Oral every 8 hours PRN anxiety  dextrose Gel 1 Dose(s) Oral once PRN Blood Glucose LESS THAN 70 milliGRAM(s)/deciliter  dextrose Gel 1 Dose(s) Oral once PRN Blood Glucose LESS THAN 70 milliGRAM(s)/deciliter  glucagon  Injectable 1 milliGRAM(s) IntraMuscular once PRN Glucose LESS THAN 70 milligrams/deciliter  glucagon  Injectable 1 milliGRAM(s) IntraMuscular once PRN Glucose LESS THAN 70 milligrams/deciliter  haloperidol    Injectable 0.5 milliGRAM(s) IV Push every 8 hours PRN agitation      Allergies    No Known Allergies    Intolerances        PHYSICAL EXAM:  VITALS: T(C): 36.4 (01-17-18 @ 11:10)  T(F): 97.5 (01-17-18 @ 11:10), Max: 97.8 (01-17-18 @ 04:10)  HR: 110 (01-17-18 @ 11:10) (78 - 110)  BP: 118/59 (01-17-18 @ 11:10) (118/59 - 128/72)  RR:  (17 - 18)  SpO2:  (95% - 98%)  GENERAL: NAD,   HEENT:  Atraumatic, Normocephalic,   THYROID: bilateral nodular goiter.  RESPIRATORY: Clear to auscultation bilaterally, mild wheezing.  CARDIOVASCULAR: Regular rhythm; No murmurs; no peripheral edema  GI: Soft, nontender, non distended, normal bowel sounds  SKIN: Dry, intact, No rashes or lesions  MUSCULOSKELETAL: normal strength  NEURO: extraocular movements intact, no tremor, normal reflexes  PSYCH: Alert and oriented x 3, normal affect, normal mood      POCT Blood Glucose.: 180 mg/dL (01-17-18 @ 11:12)  POCT Blood Glucose.: 264 mg/dL (01-17-18 @ 07:27)  POCT Blood Glucose.: 256 mg/dL (01-16-18 @ 21:24)  POCT Blood Glucose.: 278 mg/dL (01-16-18 @ 16:29)  POCT Blood Glucose.: 113 mg/dL (01-16-18 @ 11:17)  POCT Blood Glucose.: 149 mg/dL (01-16-18 @ 08:16)  POCT Blood Glucose.: 279 mg/dL (01-15-18 @ 20:36)  POCT Blood Glucose.: 249 mg/dL (01-15-18 @ 16:31)  POCT Blood Glucose.: 241 mg/dL (01-15-18 @ 11:03)  POCT Blood Glucose.: 229 mg/dL (01-15-18 @ 07:11)  POCT Blood Glucose.: 222 mg/dL (01-14-18 @ 20:34)  POCT Blood Glucose.: 276 mg/dL (01-14-18 @ 16:06)                            9.5    9.09  )-----------( 264      ( 17 Jan 2018 07:34 )             30.3       01-17    142  |  104  |  35<H>  ----------------------------<  218<H>  4.7   |  27  |  1.35<H>    EGFR if : 54<L>  EGFR if non : 46<L>    Ca    8.8      01-17    TPro  6.5  /  Alb  3.4  /  TBili  0.2  /  DBili  0.1  /  AST  18  /  ALT  16  /  AlkPhos  84  01-16      Thyroid Function Tests:  01-15 @ 07:05 TSH 0.59 FreeT4 -- T3 42 Anti TPO -- Anti Thyroglobulin Ab -- TSI --  12-26 @ 07:27 TSH -- FreeT4 -- T3 46 Anti TPO -- Anti Thyroglobulin Ab -- TSI --      Hemoglobin A1C, Whole Blood: 6.5 % <H> [4.0 - 5.6] (12-25-17 @ 08:22)
Events noted. Did not require prn Haldol over the past 24 hours. Slept well. Daughter at bedside confirms history of Bipolar Disorder and alcohol dependence (in remission for decades). She reports pt. with a history of "playing cards while sleeping.''      Vital Signs Last 24 Hrs  T(C): 36.4 (17 Jan 2018 11:10), Max: 36.6 (17 Jan 2018 04:10)  T(F): 97.5 (17 Jan 2018 11:10), Max: 97.8 (17 Jan 2018 04:10)  HR: 110 (17 Jan 2018 11:10) (78 - 110)  BP: 118/59 (17 Jan 2018 11:10) (118/59 - 128/72)  BP(mean): 68 (17 Jan 2018 04:10) (68 - 68)  RR: 18 (17 Jan 2018 11:10) (17 - 18)  SpO2: 95% (17 Jan 2018 11:10) (95% - 98%)                          9.5    9.09  )-----------( 264      ( 17 Jan 2018 07:34 )             30.3       01-17    142  |  104  |  35<H>  ----------------------------<  218<H>  4.7   |  27  |  1.35<H>    Ca    8.8      17 Jan 2018 07:24    TPro  6.5  /  Alb  3.4  /  TBili  0.2  /  DBili  0.1  /  AST  18  /  ALT  16  /  AlkPhos  84  01-16              MEDICATIONS  (STANDING):  ALBUTerol/ipratropium for Nebulization 3 milliLiter(s) Nebulizer every 6 hours  atorvastatin 40 milliGRAM(s) Oral at bedtime  buDESOnide   0.5 milliGRAM(s) Respule 0.5 milliGRAM(s) Inhalation two times a day  dextrose 5%. 1000 milliLiter(s) (50 mL/Hr) IV Continuous <Continuous>  dextrose 5%. 1000 milliLiter(s) (50 mL/Hr) IV Continuous <Continuous>  dextrose 50% Injectable 12.5 Gram(s) IV Push once  dextrose 50% Injectable 25 Gram(s) IV Push once  dextrose 50% Injectable 25 Gram(s) IV Push once  dextrose 50% Injectable 12.5 Gram(s) IV Push once  dextrose 50% Injectable 25 Gram(s) IV Push once  dextrose 50% Injectable 25 Gram(s) IV Push once  diltiazem    milliGRAM(s) Oral daily  insulin glargine Injectable (LANTUS) 20 Unit(s) SubCutaneous at bedtime  insulin lispro (HumaLOG) corrective regimen sliding scale   SubCutaneous three times a day before meals  insulin lispro (HumaLOG) corrective regimen sliding scale   SubCutaneous at bedtime  insulin lispro Injectable (HumaLOG) 6 Unit(s) SubCutaneous three times a day before meals  lamoTRIgine 25 milliGRAM(s) Oral daily  PARoxetine 40 milliGRAM(s) Oral daily  predniSONE   Tablet   Oral   predniSONE   Tablet 30 milliGRAM(s) Oral two times a day  QUEtiapine 25 milliGRAM(s) Oral at bedtime  sodium chloride 0.9%. 1000 milliLiter(s) (50 mL/Hr) IV Continuous <Continuous>  thiamine 100 milliGRAM(s) Oral daily    MEDICATIONS  (PRN):  ALPRAZolam 0.25 milliGRAM(s) Oral every 8 hours PRN anxiety  dextrose Gel 1 Dose(s) Oral once PRN Blood Glucose LESS THAN 70 milliGRAM(s)/deciliter  dextrose Gel 1 Dose(s) Oral once PRN Blood Glucose LESS THAN 70 milliGRAM(s)/deciliter  glucagon  Injectable 1 milliGRAM(s) IntraMuscular once PRN Glucose LESS THAN 70 milligrams/deciliter  glucagon  Injectable 1 milliGRAM(s) IntraMuscular once PRN Glucose LESS THAN 70 milligrams/deciliter  haloperidol    Injectable 0.5 milliGRAM(s) IV Push every 8 hours PRN agitation      Elderly WM in bed, calm with occasional arm jerking, no foot movements, cooperative, alert and oriented x 3. Insight and judgment are fair. Speech is coherent with normal rate and volume. No hallucinations nor delusions. The patient denied suicidal and homicidal ideation and plan. Mood is euthymic and affect full range and appropriate. Attention and concentration, short term memory, and long term memory within normal limits.
Follow-up Pulm Progress Note  Kai Onofre MD  [phone]    Afebrile off antibiotics  Awake, alert, feels well without c/o SOB.    CT Chest reviewed: No evidence pneumonia; emphysema with some basilar bronchiectasis and minimal tree in bud  Alert, calm today, more appropriate  Currently on nebulizers and solumedrol 20 q 8  PCR neg    Vital Signs Last 24 Hrs  T(C): 36.7 (16 Jan 2018 11:15), Max: 37.1 (15 Lonnie 2018 21:55)  T(F): 98 (16 Jan 2018 11:15), Max: 98.7 (15 Lonnie 2018 21:55)  HR: 86 (16 Jan 2018 11:15) (84 - 122)  BP: 130/65 (16 Jan 2018 11:15) (130/65 - 166/76)  BP(mean): --  RR: 18 (16 Jan 2018 11:15) (18 - 18)  SpO2: 96% (16 Jan 2018 11:15) (94% - 96%)                        9.7    12.23 )-----------( 331      ( 16 Jan 2018 07:45 )             30.6       01-16    141  |  103  |  33<H>  ----------------------------<  160<H>  4.5   |  28  |  1.25    Ca    9.0      16 Jan 2018 07:40        PT/INR - ( 15 Lonnie 2018 07:05 )   PT: 28.3 sec;   INR: 2.46 ratio         PTT - ( 15 Lonnie 2018 07:05 )  PTT:52.3 sec  Procalcitonin, Serum: 0.07 ng/mL (01-14-18 @ 06:59)    Serum Pro-Brain Natriuretic Peptide: 692 pg/mL (01-13-18 @ 16:42)      CULTURES:  Culture Results:   No growth (01-14 @ 12:24)    Most recent blood culture -- 01-14 @ 12:24   -- -- .Urine Clean Catch (Midstream) 01-14 @ 12:24      Physical Examination: Non toxic, NAD  PULM: Bilateral exp rhonchi with scattered wheezes  CVS: Regular rate and rhythm, no murmurs, rubs, or gallops  ABD: Soft, non-tender  EXT:  No clubbing, cyanosis, or edema    RADIOLOGY REVIEWED  CXR:    CT chest:    TTE:
Follow-up Pulm Progress Note  Kai Onofre MD  [phone]    Afebrile off antibiotics  CT Chest reviewed: No evidence pneumonia; emphysema with some basilar bronchiectasis and minimal tree in bud  Alert, calm today, more appropriate  Currently on nebulizers and solumedrol 20 q 8  PCR neg    Medications:  Vital Signs Last 24 Hrs  T(C): 37 (15 Lonnie 2018 11:56), Max: 37.1 (14 Jan 2018 21:37)  T(F): 98.6 (15 Lonnie 2018 11:56), Max: 98.7 (14 Jan 2018 21:37)  HR: 88 (15 Lonnie 2018 11:56) (78 - 88)  BP: 148/65 (15 Lonnie 2018 11:56) (140/64 - 160/65)  BP(mean): --  RR: 18 (15 Lonnie 2018 11:56) (17 - 18)  SpO2: 99% (15 Lonnie 2018 11:56) (94% - 100%)      01-14 @ 07:01  -  01-15 @ 07:00  --------------------------------------------------------  IN: 1050 mL / OUT: 1550 mL / NET: -500 mL    LABS:                        9.0    9.76  )-----------( 323      ( 15 Lonnie 2018 07:05 )             28.4     01-15    140  |  103  |  33<H>  ----------------------------<  233<H>  5.0   |  25  |  1.31<H>    Ca    9.0      15 Lonnie 2018 07:05    TPro  7.4  /  Alb  3.8  /  TBili  0.4  /  DBili  x   /  AST  15  /  ALT  18  /  AlkPhos  95  01-14      PT/INR - ( 15 Lonnie 2018 07:05 )   PT: 28.3 sec;   INR: 2.46 ratio         PTT - ( 15 Lonnie 2018 07:05 )  PTT:52.3 sec  Procalcitonin, Serum: 0.07 ng/mL (01-14-18 @ 06:59)    Serum Pro-Brain Natriuretic Peptide: 692 pg/mL (01-13-18 @ 16:42)      CULTURES:  Culture Results:   No growth (01-14 @ 12:24)    Most recent blood culture -- 01-14 @ 12:24   -- -- .Urine Clean Catch (Midstream) 01-14 @ 12:24      Physical Examination: Non toxic, NAD  PULM: Few exp rhonchi; no wheeze  CVS: Regular rate and rhythm, no murmurs, rubs, or gallops  ABD: Soft, non-tender  EXT:  No clubbing, cyanosis, or edema    RADIOLOGY REVIEWED  CXR:    CT chest:    TTE:
Patient is a 89y old  Male who presents with a chief complaint of sob / halluicinations (13 Jan 2018 23:02)                                                                  REVIEW OF SYSTEMS:     CONSTITUTIONAL: No weakness, fevers or chills  RESPIRATORY: No cough, wheezing,  No shortness of breath  CARDIOVASCULAR: No chest pain or palpitations  GASTROINTESTINAL: No abdominal pain  . No nausea, vomiting  GENITOURINARY: No dysuria, frequency   NEUROLOGICAL: No numbness or weakness                                                                                                                                                                                                                                                                                   Medications:  MEDICATIONS  (STANDING):  ALBUTerol/ipratropium for Nebulization 3 milliLiter(s) Nebulizer every 6 hours  atorvastatin 40 milliGRAM(s) Oral at bedtime  buDESOnide   0.5 milliGRAM(s) Respule 0.5 milliGRAM(s) Inhalation two times a day  dextrose 5%. 1000 milliLiter(s) (50 mL/Hr) IV Continuous <Continuous>  dextrose 5%. 1000 milliLiter(s) (50 mL/Hr) IV Continuous <Continuous>  dextrose 50% Injectable 12.5 Gram(s) IV Push once  dextrose 50% Injectable 25 Gram(s) IV Push once  dextrose 50% Injectable 25 Gram(s) IV Push once  dextrose 50% Injectable 12.5 Gram(s) IV Push once  dextrose 50% Injectable 25 Gram(s) IV Push once  dextrose 50% Injectable 25 Gram(s) IV Push once  diltiazem    milliGRAM(s) Oral daily  haloperidol     Tablet 0.5 milliGRAM(s) Oral two times a day  insulin glargine Injectable (LANTUS) 20 Unit(s) SubCutaneous at bedtime  insulin lispro (HumaLOG) corrective regimen sliding scale   SubCutaneous three times a day before meals  insulin lispro (HumaLOG) corrective regimen sliding scale   SubCutaneous at bedtime  insulin lispro Injectable (HumaLOG) 6 Unit(s) SubCutaneous three times a day before meals  lamoTRIgine 25 milliGRAM(s) Oral daily  PARoxetine 40 milliGRAM(s) Oral daily  sodium chloride 0.9%. 1000 milliLiter(s) (50 mL/Hr) IV Continuous <Continuous>  warfarin 2 milliGRAM(s) Oral once    MEDICATIONS  (PRN):  dextrose Gel 1 Dose(s) Oral once PRN Blood Glucose LESS THAN 70 milliGRAM(s)/deciliter  dextrose Gel 1 Dose(s) Oral once PRN Blood Glucose LESS THAN 70 milliGRAM(s)/deciliter  glucagon  Injectable 1 milliGRAM(s) IntraMuscular once PRN Glucose LESS THAN 70 milligrams/deciliter  glucagon  Injectable 1 milliGRAM(s) IntraMuscular once PRN Glucose LESS THAN 70 milligrams/deciliter  haloperidol    Injectable 0.5 milliGRAM(s) IV Push every 8 hours PRN agitation       Allergies    No Known Allergies    Intolerances      Vital Signs Last 24 Hrs  T(C): 36.5 (15 Lonnie 2018 20:33), Max: 37.1 (14 Jan 2018 21:37)  T(F): 97.7 (15 Lonnie 2018 20:33), Max: 98.7 (14 Jan 2018 21:37)  HR: 84 (15 Lonnie 2018 20:33) (84 - 88)  BP: 164/73 (15 Lonnie 2018 20:33) (140/64 - 164/73)  BP(mean): --  RR: 18 (15 Lonnie 2018 20:33) (17 - 18)  SpO2: 94% (15 Lonnie 2018 20:33) (94% - 99%)  CAPILLARY BLOOD GLUCOSE      POCT Blood Glucose.: 279 mg/dL (15 Lonnie 2018 20:36)  POCT Blood Glucose.: 249 mg/dL (15 Lonnie 2018 16:31)  POCT Blood Glucose.: 241 mg/dL (15 Lonnie 2018 11:03)  POCT Blood Glucose.: 229 mg/dL (15 Lonnie 2018 07:11)      01-14 @ 07:01  -  01-15 @ 07:00  --------------------------------------------------------  IN: 1050 mL / OUT: 1550 mL / NET: -500 mL    01-15 @ 07:01  -  01-15 @ 21:20  --------------------------------------------------------  IN: 540 mL / OUT: 500 mL / NET: 40 mL      Physical Exam:    General:NAD  HEENT:  Nonicteric, PERRLA  CV:  RRR, S1S2   Lungs:  coarse BS  B    Abdomen:  Soft, non-tender, no distended, positive BS  Extremities:  2+ pulses, no c/c, no edema  :  No greene  Neuro:  Alert not oriented                                                                                                                                                                                                                                                                                        LABS:                               9.0    9.76  )-----------( 323      ( 15 Lonnie 2018 07:05 )             28.4                      01-15    140  |  103  |  33<H>  ----------------------------<  233<H>  5.0   |  25  |  1.31<H>    Ca    9.0      15 Lonnie 2018 07:05    TPro  7.4  /  Alb  3.8  /  TBili  0.4  /  DBili  x   /  AST  15  /  ALT  18  /  AlkPhos  95  01-14
Patient is a 89y old  Male who presents with a chief complaint of sob / halluicinations (13 Jan 2018 23:02)                                                               I    REVIEW OF SYSTEMS:     CONSTITUTIONAL: No weakness, fevers or chills  RESPIRATORY: No cough, wheezing,  No shortness of breath  CARDIOVASCULAR: No chest pain or palpitations  GASTROINTESTINAL: No abdominal pain  . No nausea, vomiting  GENITOURINARY: No dysuria, frequency   NEUROLOGICAL: No numbness or weakness                                                                                                                                                                                                                                                                                  Medications:  MEDICATIONS  (STANDING):  ALBUTerol/ipratropium for Nebulization 3 milliLiter(s) Nebulizer every 6 hours  atorvastatin 40 milliGRAM(s) Oral at bedtime  buDESOnide   0.5 milliGRAM(s) Respule 0.5 milliGRAM(s) Inhalation two times a day  dextrose 5%. 1000 milliLiter(s) (50 mL/Hr) IV Continuous <Continuous>  dextrose 5%. 1000 milliLiter(s) (50 mL/Hr) IV Continuous <Continuous>  dextrose 50% Injectable 12.5 Gram(s) IV Push once  dextrose 50% Injectable 25 Gram(s) IV Push once  dextrose 50% Injectable 25 Gram(s) IV Push once  dextrose 50% Injectable 12.5 Gram(s) IV Push once  dextrose 50% Injectable 25 Gram(s) IV Push once  dextrose 50% Injectable 25 Gram(s) IV Push once  diltiazem    milliGRAM(s) Oral daily  insulin glargine Injectable (LANTUS) 20 Unit(s) SubCutaneous at bedtime  insulin lispro (HumaLOG) corrective regimen sliding scale   SubCutaneous three times a day before meals  insulin lispro (HumaLOG) corrective regimen sliding scale   SubCutaneous at bedtime  insulin lispro Injectable (HumaLOG) 6 Unit(s) SubCutaneous three times a day before meals  lamoTRIgine 25 milliGRAM(s) Oral daily  PARoxetine 40 milliGRAM(s) Oral daily  predniSONE   Tablet   Oral   predniSONE   Tablet 30 milliGRAM(s) Oral two times a day  QUEtiapine 25 milliGRAM(s) Oral at bedtime  sodium chloride 0.9%. 1000 milliLiter(s) (50 mL/Hr) IV Continuous <Continuous>  thiamine 100 milliGRAM(s) Oral daily    MEDICATIONS  (PRN):  ALPRAZolam 0.25 milliGRAM(s) Oral every 8 hours PRN anxiety  dextrose Gel 1 Dose(s) Oral once PRN Blood Glucose LESS THAN 70 milliGRAM(s)/deciliter  dextrose Gel 1 Dose(s) Oral once PRN Blood Glucose LESS THAN 70 milliGRAM(s)/deciliter  glucagon  Injectable 1 milliGRAM(s) IntraMuscular once PRN Glucose LESS THAN 70 milligrams/deciliter  glucagon  Injectable 1 milliGRAM(s) IntraMuscular once PRN Glucose LESS THAN 70 milligrams/deciliter  haloperidol    Injectable 0.5 milliGRAM(s) IV Push every 8 hours PRN agitation       Allergies    No Known Allergies    Intolerances      Vital Signs Last 24 Hrs  T(C): 36.4 (16 Jan 2018 21:22), Max: 37 (16 Jan 2018 00:18)  T(F): 97.6 (16 Jan 2018 21:22), Max: 98.6 (16 Jan 2018 00:18)  HR: 78 (16 Jan 2018 21:22) (78 - 122)  BP: 128/72 (16 Jan 2018 21:22) (128/72 - 166/76)  BP(mean): --  RR: 18 (16 Jan 2018 21:22) (18 - 18)  SpO2: 98% (16 Jan 2018 21:22) (95% - 98%)  CAPILLARY BLOOD GLUCOSE      POCT Blood Glucose.: 256 mg/dL (16 Jan 2018 21:24)  POCT Blood Glucose.: 278 mg/dL (16 Jan 2018 16:29)  POCT Blood Glucose.: 113 mg/dL (16 Jan 2018 11:17)  POCT Blood Glucose.: 149 mg/dL (16 Jan 2018 08:16)      01-15 @ 07:01 - 01-16 @ 07:00  --------------------------------------------------------  IN: 590 mL / OUT: 1100 mL / NET: -510 mL    01-16 @ 07:01 - 01-16 @ 22:50  --------------------------------------------------------  IN: 1260 mL / OUT: 575 mL / NET: 685 mL      Physical Exam:    General:  elderly thin , on and off jerky movement of the whole body   HEENT:  Nonicteric, PERRLA  CV:  RRR, S1S2   Lungs:  Wheezing /ronchi b    Abdomen:  Soft, non-tender, no distended, positive BS  Extremities:  2+ pulses, no c/c, no edema  Skin:  Warm and dry, no rashes  :  No greene  Neuro:  AAOx3, non-focal, grossly intact                                                                                                                                                                                                                                                                                                LABS:                               9.7    12.23 )-----------( 331      ( 16 Jan 2018 07:45 )             30.6                      01-16    141  |  103  |  33<H>  ----------------------------<  160<H>  4.5   |  28  |  1.25    Ca    9.0      16 Jan 2018 07:40
Patient is a 89y old  Male who presents with a chief complaint of sob / halluicinations (13 Jan 2018 23:02)                                                               REVIEW OF SYSTEMS:     CONSTITUTIONAL: No weakness, fevers or chills  RESPIRATORY: No cough, wheezing,  No shortness of breath  CARDIOVASCULAR: No chest pain or palpitations  GASTROINTESTINAL: No abdominal pain  . No nausea, vomiting, or hematemesis  GENITOURINARY: No dysuria, frequency  NEUROLOGICAL: No numbness or weakness  psych : no more hallucinations                                                                                                                                                                                                                                                                                Medications:  MEDICATIONS  (STANDING):  ALBUTerol/ipratropium for Nebulization 3 milliLiter(s) Nebulizer every 6 hours  atorvastatin 40 milliGRAM(s) Oral at bedtime  buDESOnide   0.5 milliGRAM(s) Respule 0.5 milliGRAM(s) Inhalation two times a day  dextrose 5%. 1000 milliLiter(s) (50 mL/Hr) IV Continuous <Continuous>  dextrose 5%. 1000 milliLiter(s) (50 mL/Hr) IV Continuous <Continuous>  dextrose 50% Injectable 12.5 Gram(s) IV Push once  dextrose 50% Injectable 25 Gram(s) IV Push once  dextrose 50% Injectable 25 Gram(s) IV Push once  dextrose 50% Injectable 12.5 Gram(s) IV Push once  dextrose 50% Injectable 25 Gram(s) IV Push once  dextrose 50% Injectable 25 Gram(s) IV Push once  diltiazem    milliGRAM(s) Oral daily  insulin glargine Injectable (LANTUS) 20 Unit(s) SubCutaneous at bedtime  insulin lispro (HumaLOG) corrective regimen sliding scale   SubCutaneous three times a day before meals  insulin lispro (HumaLOG) corrective regimen sliding scale   SubCutaneous at bedtime  insulin lispro Injectable (HumaLOG) 8 Unit(s) SubCutaneous three times a day before meals  lamoTRIgine 25 milliGRAM(s) Oral daily  PARoxetine 40 milliGRAM(s) Oral daily  predniSONE   Tablet   Oral   predniSONE   Tablet 20 milliGRAM(s) Oral two times a day  QUEtiapine 25 milliGRAM(s) Oral at bedtime  sodium chloride 0.9%. 1000 milliLiter(s) (50 mL/Hr) IV Continuous <Continuous>  thiamine 100 milliGRAM(s) Oral daily    MEDICATIONS  (PRN):  ALPRAZolam 0.25 milliGRAM(s) Oral every 8 hours PRN anxiety  dextrose Gel 1 Dose(s) Oral once PRN Blood Glucose LESS THAN 70 milliGRAM(s)/deciliter  dextrose Gel 1 Dose(s) Oral once PRN Blood Glucose LESS THAN 70 milliGRAM(s)/deciliter  glucagon  Injectable 1 milliGRAM(s) IntraMuscular once PRN Glucose LESS THAN 70 milligrams/deciliter  glucagon  Injectable 1 milliGRAM(s) IntraMuscular once PRN Glucose LESS THAN 70 milligrams/deciliter  haloperidol    Injectable 0.5 milliGRAM(s) IV Push every 8 hours PRN agitation       Allergies    No Known Allergies    Intolerances      Vital Signs Last 24 Hrs  T(C): 36.3 (18 Jan 2018 12:11), Max: 36.4 (17 Jan 2018 20:38)  T(F): 97.3 (18 Jan 2018 12:11), Max: 97.6 (17 Jan 2018 20:38)  HR: 72 (18 Jan 2018 12:11) (61 - 84)  BP: 129/86 (18 Jan 2018 12:11) (119/53 - 148/52)  BP(mean): --  RR: 18 (18 Jan 2018 12:11) (18 - 18)  SpO2: 100% (18 Jan 2018 12:11) (98% - 100%)  CAPILLARY BLOOD GLUCOSE      POCT Blood Glucose.: 376 mg/dL (18 Jan 2018 12:06)  POCT Blood Glucose.: 238 mg/dL (18 Jan 2018 07:47)  POCT Blood Glucose.: 204 mg/dL (17 Jan 2018 20:57)  POCT Blood Glucose.: 131 mg/dL (17 Jan 2018 16:32)      01-17 @ 07:01 - 01-18 @ 07:00  --------------------------------------------------------  IN: 1045 mL / OUT: 101 mL / NET: 944 mL    01-18 @ 07:01 - 01-18 @ 15:05  --------------------------------------------------------  IN: 720 mL / OUT: 600 mL / NET: 120 mL      Physical Exam:    General: NAD   HEENT:  Nonicteric, PERRLA  CV:  RRR, S1S2   Lungs:  coarse BS /scattered Wheezing   Abdomen:  Soft, non-tender, no distended, positive BS  Extremities: , no edema    Neuro:  AAOx3, non-focal, grossly intact                                                                                                                                                                                                                                                                                                LABS:                               9.1    8.61  )-----------( 234      ( 18 Jan 2018 09:02 )             29.3                      01-18    139  |  103  |  35<H>  ----------------------------<  210<H>  4.7   |  26  |  1.25    Ca    8.5      18 Jan 2018 08:54    TPro  6.5  /  Alb  3.4  /  TBili  0.2  /  DBili  0.1  /  AST  18  /  ALT  16  /  AlkPhos  84  01-16
Patient is a 89y old  Male who presents with a chief complaint of sob / halluicinations (13 Jan 2018 23:02)                              REVIEW OF SYSTEMS:     CONSTITUTIONAL: No weakness, fevers or chills  RESPIRATORY: No cough, wheezing,  No shortness of breath  CARDIOVASCULAR: No chest pain or palpitations  GASTROINTESTINAL: No abdominal pain  . No nausea, vomitin  GENITOURINARY: No dysuria, frequency \  NEUROLOGICAL: No numbness or weakness  Psych : no hallucinations                                                                                                                                                                                                                                                                            Medications:  MEDICATIONS  (STANDING):  ALBUTerol/ipratropium for Nebulization 3 milliLiter(s) Nebulizer every 6 hours  atorvastatin 40 milliGRAM(s) Oral at bedtime  buDESOnide   0.5 milliGRAM(s) Respule 0.5 milliGRAM(s) Inhalation two times a day  dextrose 5%. 1000 milliLiter(s) (50 mL/Hr) IV Continuous <Continuous>  dextrose 5%. 1000 milliLiter(s) (50 mL/Hr) IV Continuous <Continuous>  dextrose 50% Injectable 12.5 Gram(s) IV Push once  dextrose 50% Injectable 25 Gram(s) IV Push once  dextrose 50% Injectable 25 Gram(s) IV Push once  dextrose 50% Injectable 12.5 Gram(s) IV Push once  dextrose 50% Injectable 25 Gram(s) IV Push once  dextrose 50% Injectable 25 Gram(s) IV Push once  diltiazem    milliGRAM(s) Oral daily  insulin glargine Injectable (LANTUS) 20 Unit(s) SubCutaneous at bedtime  insulin lispro (HumaLOG) corrective regimen sliding scale   SubCutaneous three times a day before meals  insulin lispro (HumaLOG) corrective regimen sliding scale   SubCutaneous at bedtime  insulin lispro Injectable (HumaLOG) 6 Unit(s) SubCutaneous three times a day before meals  lamoTRIgine 25 milliGRAM(s) Oral daily  PARoxetine 40 milliGRAM(s) Oral daily  predniSONE   Tablet   Oral   predniSONE   Tablet 30 milliGRAM(s) Oral two times a day  QUEtiapine 25 milliGRAM(s) Oral at bedtime  sodium chloride 0.9%. 1000 milliLiter(s) (50 mL/Hr) IV Continuous <Continuous>  thiamine 100 milliGRAM(s) Oral daily    MEDICATIONS  (PRN):  ALPRAZolam 0.25 milliGRAM(s) Oral every 8 hours PRN anxiety  dextrose Gel 1 Dose(s) Oral once PRN Blood Glucose LESS THAN 70 milliGRAM(s)/deciliter  dextrose Gel 1 Dose(s) Oral once PRN Blood Glucose LESS THAN 70 milliGRAM(s)/deciliter  glucagon  Injectable 1 milliGRAM(s) IntraMuscular once PRN Glucose LESS THAN 70 milligrams/deciliter  glucagon  Injectable 1 milliGRAM(s) IntraMuscular once PRN Glucose LESS THAN 70 milligrams/deciliter  haloperidol    Injectable 0.5 milliGRAM(s) IV Push every 8 hours PRN agitation       Allergies    No Known Allergies    Intolerances      Vital Signs Last 24 Hrs  T(C): 36.4 (17 Jan 2018 11:10), Max: 36.6 (17 Jan 2018 04:10)  T(F): 97.5 (17 Jan 2018 11:10), Max: 97.8 (17 Jan 2018 04:10)  HR: 110 (17 Jan 2018 11:10) (78 - 110)  BP: 118/59 (17 Jan 2018 11:10) (118/59 - 128/72)  BP(mean): 68 (17 Jan 2018 04:10) (68 - 68)  RR: 18 (17 Jan 2018 11:10) (17 - 18)  SpO2: 95% (17 Jan 2018 11:10) (95% - 98%)  CAPILLARY BLOOD GLUCOSE      POCT Blood Glucose.: 180 mg/dL (17 Jan 2018 11:12)  POCT Blood Glucose.: 264 mg/dL (17 Jan 2018 07:27)  POCT Blood Glucose.: 256 mg/dL (16 Jan 2018 21:24)  POCT Blood Glucose.: 278 mg/dL (16 Jan 2018 16:29)      01-16 @ 07:01 - 01-17 @ 07:00  --------------------------------------------------------  IN: 1380 mL / OUT: 875 mL / NET: 505 mL    01-17 @ 07:01 - 01-17 @ 13:46  --------------------------------------------------------  IN: 440 mL / OUT: 0 mL / NET: 440 mL      Physical Exam:    General:  elderly in NAD   HEENT:  Nonicteric, PERRLA  CV:  RRR, S1S2   Lungs:  scattered wheezing and ronchi B   Abdomen:  Soft, non-tender, no distended, positive BS  Extremities:  2+ pulses, no c/c, no edema  Skin:  Warm and dry, no rashes  :  No greene  Neuro:  AAOx3, non-focal, grossly intact                                                                                                                                                                                                                                                                                                LABS:                               9.5    9.09  )-----------( 264      ( 17 Jan 2018 07:34 )             30.3                      01-17    142  |  104  |  35<H>  ----------------------------<  218<H>  4.7   |  27  |  1.35<H>    Ca    8.8      17 Jan 2018 07:24    TPro  6.5  /  Alb  3.4  /  TBili  0.2  /  DBili  0.1  /  AST  18  /  ALT  16  /  AlkPhos  84  01-16
Patient is a 89y old  Male who presents with a chief complaint of sob / halluicinations (2018 12:03)                                                            REVIEW OF SYSTEMS:     CONSTITUTIONAL: No weakness, fevers or chills  RESPIRATORY: No cough, wheezing,  No shortness of breath  CARDIOVASCULAR: No chest pain or palpitations  GASTROINTESTINAL: No abdominal pain  . No nausea, vomiting  GENITOURINARY: No dysuria, frequency  NEUROLOGICAL: No numbness or weakness                                                                                                                                                                                                                                                                                 Medications:  MEDICATIONS  (STANDING):  ALBUTerol/ipratropium for Nebulization 3 milliLiter(s) Nebulizer every 6 hours  atorvastatin 40 milliGRAM(s) Oral at bedtime  buDESOnide   0.5 milliGRAM(s) Respule 0.5 milliGRAM(s) Inhalation two times a day  dextrose 5%. 1000 milliLiter(s) (50 mL/Hr) IV Continuous <Continuous>  dextrose 5%. 1000 milliLiter(s) (50 mL/Hr) IV Continuous <Continuous>  dextrose 50% Injectable 12.5 Gram(s) IV Push once  dextrose 50% Injectable 25 Gram(s) IV Push once  dextrose 50% Injectable 25 Gram(s) IV Push once  dextrose 50% Injectable 12.5 Gram(s) IV Push once  dextrose 50% Injectable 25 Gram(s) IV Push once  dextrose 50% Injectable 25 Gram(s) IV Push once  diltiazem    milliGRAM(s) Oral daily  insulin glargine Injectable (LANTUS) 24 Unit(s) SubCutaneous at bedtime  insulin lispro (HumaLOG) corrective regimen sliding scale   SubCutaneous three times a day before meals  insulin lispro (HumaLOG) corrective regimen sliding scale   SubCutaneous at bedtime  insulin lispro Injectable (HumaLOG) 10 Unit(s) SubCutaneous three times a day before meals  lamoTRIgine 25 milliGRAM(s) Oral daily  PARoxetine 40 milliGRAM(s) Oral daily  predniSONE   Tablet   Oral   predniSONE   Tablet 20 milliGRAM(s) Oral two times a day  QUEtiapine 25 milliGRAM(s) Oral at bedtime  sodium chloride 0.9%. 1000 milliLiter(s) (50 mL/Hr) IV Continuous <Continuous>  thiamine 100 milliGRAM(s) Oral daily    MEDICATIONS  (PRN):  ALPRAZolam 0.25 milliGRAM(s) Oral every 8 hours PRN anxiety  dextrose Gel 1 Dose(s) Oral once PRN Blood Glucose LESS THAN 70 milliGRAM(s)/deciliter  dextrose Gel 1 Dose(s) Oral once PRN Blood Glucose LESS THAN 70 milliGRAM(s)/deciliter  glucagon  Injectable 1 milliGRAM(s) IntraMuscular once PRN Glucose LESS THAN 70 milligrams/deciliter  glucagon  Injectable 1 milliGRAM(s) IntraMuscular once PRN Glucose LESS THAN 70 milligrams/deciliter  haloperidol    Injectable 0.5 milliGRAM(s) IV Push every 8 hours PRN agitation       Allergies    No Known Allergies    Intolerances      Vital Signs Last 24 Hrs  T(C): 36.4 (2018 11:17), Max: 36.6 (2018 21:18)  T(F): 97.6 (2018 11:17), Max: 97.8 (2018 21:18)  HR: 68 (2018 11:17) (67 - 78)  BP: 125/70 (2018 11:17) (125/70 - 134/56)  BP(mean): --  RR: 18 (2018 11:17) (18 - 18)  SpO2: 96% (2018 11:51) (95% - 96%)  CAPILLARY BLOOD GLUCOSE      POCT Blood Glucose.: 400 mg/dL (2018 11:20)  POCT Blood Glucose.: 187 mg/dL (2018 07:39)  POCT Blood Glucose.: 185 mg/dL (2018 21:08)  POCT Blood Glucose.: 206 mg/dL (2018 16:35)       @ 07: @ 07:00  --------------------------------------------------------  IN: 1220 mL / OUT: 1275 mL / NET: -55 mL     @ 07:  -   @ 15:01  --------------------------------------------------------  IN: 50 mL / OUT: 450 mL / NET: -400 mL      Physical Exam:      Daily Weight in k.3 (2018 11:47)  General:  NAD   HEENT:  Nonicteric, PERRLA  CV:  RRR, S1S2   Lungs:  CTA B  Abdomen:  Soft, non-tender, no distended, positive BS  Extremities:  2+ pulses, no c/c, no edema    Neuro:  AAOx3, non-focal, grossly intact                                                                                                                                                                                                                                                                                                LABS:                               10.2   10.9  )-----------( 254      ( 2018 13:19 )             31.5                          140  |  104  |  29<H>  ----------------------------<  103<H>  4.6   |  26  |  1.23    Ca    8.7      2018 07:25
Patient is a 89y old  Male who presents with a chief complaint of sob / halluicinations (2018 23:02)                                                               INTERVAL HPI/OVERNIGHT EVENTS: on and off agitaaton     REVIEW OF SYSTEMS:     CONSTITUTIONAL: No weakness, fevers or chills  RESPIRATORY: No cough, wheezing,  No shortness of breath  CARDIOVASCULAR: No chest pain or palpitations  GASTROINTESTINAL: No abdominal pain  . No nausea, vomiting,  GENITOURINARY: No dysuria, frequency or hematuria  NEUROLOGICAL: No numbness or weakness                                                                                                                                                                                                                                                                             Medications:  MEDICATIONS  (STANDING):  ALBUTerol/ipratropium for Nebulization 3 milliLiter(s) Nebulizer every 6 hours  atorvastatin 40 milliGRAM(s) Oral at bedtime  buDESOnide   0.5 milliGRAM(s) Respule 0.5 milliGRAM(s) Inhalation two times a day  dextrose 5%. 1000 milliLiter(s) (50 mL/Hr) IV Continuous <Continuous>  dextrose 5%. 1000 milliLiter(s) (50 mL/Hr) IV Continuous <Continuous>  dextrose 50% Injectable 12.5 Gram(s) IV Push once  dextrose 50% Injectable 25 Gram(s) IV Push once  dextrose 50% Injectable 25 Gram(s) IV Push once  dextrose 50% Injectable 12.5 Gram(s) IV Push once  dextrose 50% Injectable 25 Gram(s) IV Push once  dextrose 50% Injectable 25 Gram(s) IV Push once  diltiazem    milliGRAM(s) Oral daily  insulin glargine Injectable (LANTUS) 20 Unit(s) SubCutaneous at bedtime  insulin lispro (HumaLOG) corrective regimen sliding scale   SubCutaneous three times a day before meals  insulin lispro (HumaLOG) corrective regimen sliding scale   SubCutaneous at bedtime  insulin lispro Injectable (HumaLOG) 6 Unit(s) SubCutaneous three times a day before meals  lamoTRIgine 25 milliGRAM(s) Oral daily  methimazole 5 milliGRAM(s) Oral daily  methylPREDNISolone sodium succinate Injectable 20 milliGRAM(s) IV Push every 8 hours  PARoxetine 40 milliGRAM(s) Oral daily  sodium chloride 0.9%. 1000 milliLiter(s) (50 mL/Hr) IV Continuous <Continuous>    MEDICATIONS  (PRN):  dextrose Gel 1 Dose(s) Oral once PRN Blood Glucose LESS THAN 70 milliGRAM(s)/deciliter  dextrose Gel 1 Dose(s) Oral once PRN Blood Glucose LESS THAN 70 milliGRAM(s)/deciliter  glucagon  Injectable 1 milliGRAM(s) IntraMuscular once PRN Glucose LESS THAN 70 milligrams/deciliter  glucagon  Injectable 1 milliGRAM(s) IntraMuscular once PRN Glucose LESS THAN 70 milligrams/deciliter  haloperidol    Injectable 0.5 milliGRAM(s) IV Push every 8 hours PRN agitation       Allergies    No Known Allergies    Intolerances      Vital Signs Last 24 Hrs  T(C): 37.1 (2018 21:37), Max: 37.1 (2018 21:37)  T(F): 98.7 (2018 21:37), Max: 98.7 (2018 21:37)  HR: 84 (2018 21:37) (78 - 85)  BP: 140/64 (2018 21:37) (136/75 - 160/65)  BP(mean): --  RR: 17 (2018 21:37) (17 - 18)  SpO2: 98% (2018 21:37) (97% - 100%)  CAPILLARY BLOOD GLUCOSE      POCT Blood Glucose.: 222 mg/dL (2018 20:34)  POCT Blood Glucose.: 276 mg/dL (2018 16:06)  POCT Blood Glucose.: 423 mg/dL (2018 15:02)  POCT Blood Glucose.: 401 mg/dL (2018 15:00)  POCT Blood Glucose.: 486 mg/dL (2018 12:53)  POCT Blood Glucose.: 437 mg/dL (2018 12:51)  POCT Blood Glucose.: 141 mg/dL (2018 09:09)  POCT Blood Glucose.: 111 mg/dL (2018 06:23)  POCT Blood Glucose.: 406 mg/dL (2018 23:48)       @ 07:  -   @ 23:35  --------------------------------------------------------  IN: 500 mL / OUT: 1150 mL / NET: -650 mL      Physical Exam:    Daily Height in cm: 185.42 (2018 16:09)    Daily Weight in k.4 (2018 16:09)  General:  NAD  HEENT:  Nonicteric, PERRLA  CV:  RRR, S1S2   Lungs:  B ronchi scattered wheezing   Abdomen:  Soft, non-tender, no distended, positive BS  Extremities:  2+ pulses, no c/c, no edema  Skin:  Warm and dry, no rashes  :  No greene  Neuro:  AAOx3, non-focal, grossly intact                                                                                                                                                                                                                                                                                                LABS:                               9.8    17.10 )-----------( 431      ( 2018 09:14 )             31.4                      -14    140  |  100  |  34<H>  ----------------------------<  108<H>  4.4   |  28  |  1.46<H>    Ca    9.3      2018 07:06    TPro  7.4  /  Alb  3.8  /  TBili  0.4  /  DBili  x   /  AST  15  /  ALT  18  /  AlkPhos  95  -
[facility]        - Patient seen and examined.  - Today, patient is without complaints.  Son at bedside. Pt reports that he feels beetter        *****MEDICATIONS: Current medication reviewed and documented.    MEDICATIONS  (STANDING):  ALBUTerol/ipratropium for Nebulization 3 milliLiter(s) Nebulizer every 6 hours  atorvastatin 40 milliGRAM(s) Oral at bedtime  buDESOnide   0.5 milliGRAM(s) Respule 0.5 milliGRAM(s) Inhalation two times a day  dextrose 5%. 1000 milliLiter(s) (50 mL/Hr) IV Continuous <Continuous>  dextrose 5%. 1000 milliLiter(s) (50 mL/Hr) IV Continuous <Continuous>  dextrose 50% Injectable 12.5 Gram(s) IV Push once  dextrose 50% Injectable 25 Gram(s) IV Push once  dextrose 50% Injectable 25 Gram(s) IV Push once  dextrose 50% Injectable 12.5 Gram(s) IV Push once  dextrose 50% Injectable 25 Gram(s) IV Push once  dextrose 50% Injectable 25 Gram(s) IV Push once  diltiazem    milliGRAM(s) Oral daily  insulin glargine Injectable (LANTUS) 24 Unit(s) SubCutaneous at bedtime  insulin lispro (HumaLOG) corrective regimen sliding scale   SubCutaneous three times a day before meals  insulin lispro (HumaLOG) corrective regimen sliding scale   SubCutaneous at bedtime  insulin lispro Injectable (HumaLOG) 10 Unit(s) SubCutaneous three times a day before meals  lamoTRIgine 25 milliGRAM(s) Oral daily  PARoxetine 40 milliGRAM(s) Oral daily  predniSONE   Tablet   Oral   predniSONE   Tablet 20 milliGRAM(s) Oral two times a day  QUEtiapine 25 milliGRAM(s) Oral at bedtime  sodium chloride 0.9%. 1000 milliLiter(s) (50 mL/Hr) IV Continuous <Continuous>  thiamine 100 milliGRAM(s) Oral daily    MEDICATIONS  (PRN):  ALPRAZolam 0.25 milliGRAM(s) Oral every 8 hours PRN anxiety  dextrose Gel 1 Dose(s) Oral once PRN Blood Glucose LESS THAN 70 milliGRAM(s)/deciliter  dextrose Gel 1 Dose(s) Oral once PRN Blood Glucose LESS THAN 70 milliGRAM(s)/deciliter  glucagon  Injectable 1 milliGRAM(s) IntraMuscular once PRN Glucose LESS THAN 70 milligrams/deciliter  glucagon  Injectable 1 milliGRAM(s) IntraMuscular once PRN Glucose LESS THAN 70 milligrams/deciliter  haloperidol    Injectable 0.5 milliGRAM(s) IV Push every 8 hours PRN agitation           ***** REVIEW OF SYSTEM:  GEN: no fever, no chills, no pain  RESP: no SOB, no cough, no sputum  CVS: no chest pain, no palpitations, no edema  GI: no abdominal pain, no nausea, no vomiting, no constipation, no diarrhea  : no dysurea, no frequency  NEURO: no headache, no diziness  PSYCH: no depression, not anxious  Derm : no itching, no rash         ***              *****PHYSICAL EXAM:        Alert oriented x2 ( did not know the date, but knows month and year, also did not know the name of the hospital)    better attentiona dn comprehension. insight has improved.   Able to follow 1-2 step commands.  able to read, name objects provided.   Recall /      EOMI fundi not visualized,  VFF to confrontration  No facial asymmetry   Tongue is midline   Palate elevates symmetrically   Moving all 4 ext symmetrically no pronator drift.  Reflexes are symmetric throughout   sensation is grossly symmetric  Gait : not assessed.  B/L down going toes   + muscle faciculation have diminished.  mild resting tremor on the left.       *****LAB AND IMAGIN.1    8.61  )-----------( 234      ( 2018 09:02 )             29.3                   139  |  103  |  35<H>  ----------------------------<  210<H>  4.7   |  26  |  1.25    Ca    8.5      2018 08:54    TPro  6.5  /  Alb  3.4  /  TBili  0.2  /  DBili  0.1  /  AST  18  /  ALT  16  /  AlkPhos  84  01-16    PT/INR - ( 2018 14:20 )   PT: 40.7 sec;   INR: 3.64 ratio                CARDIAC MARKERS ( 2018 23:27 )  x     / x     / 32 U/L / x     / x                ABG - ( 2018 23:36 )  pH: 7.39  /  pCO2: 46    /  pO2: 137   / HCO3: 27    / Base Excess: 2.3   /  SaO2: 99                  [All pertinent recent Imaging/Reports reviewed]           *****A S S E S S M E N T   A N D   P L A N :        89 year old male with hx of DM, anemia, multinodular goiter ,  copd, atrial fibrillation, recently discharged from [facility] where he was admitted for SOB sec to afib with RVR, COPD exacerbation in setting of influenza.. sent out on steroid taper which he completed and now returning with SOB and hallucinations.  Pt is a poor historian unable to give full history.     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 haldol, which has improved with lowering the doses.   Does have underlying dementia( possibly lewy body given the ReM behav sleep d/o and visual hallucinations) , will need follow up.   Thank you for allowing me to participate in the care of this patient. Please do not hesitate to call me if you have any  questions.        ________________  Selena Wilburn MD  [facility]  [phone]     30 minutes spent on total encounter; more than 50 % of the visit was  spent counseling and or  coordinating care by the attending physician.   This note was partially created using voice recognition software and is inherently subject to errors including those of syntax and sound alike substitutions which may escape proofreading. In such instances, original meaning may be extrapolated by contextual derivation.   At the present time, we do not provide outpatient followup, this will need to be arranged through an alternative practice. Best to call the pt insurance to find  a participating provider.  This was explained to patient at the time of the visit.
Subjective: Patient seen and examined. No new events except as noted.   off 1:1   no cp or sob     REVIEW OF SYSTEMS:    CONSTITUTIONAL: +weakness, no fevers or chills  EYES/ENT: No visual changes;  No vertigo or throat pain   NECK: No pain or stiffness  RESPIRATORY: No cough, wheezing, hemoptysis; No shortness of breath  CARDIOVASCULAR: No chest pain or palpitations  GASTROINTESTINAL: No abdominal or epigastric pain. No nausea, vomiting, or hematemesis; No diarrhea or constipation. No melena or hematochezia.  GENITOURINARY: No dysuria, frequency or hematuria  NEUROLOGICAL: No numbness or weakness  SKIN: No itching, burning, rashes, or lesions   All other review of systems is negative unless indicated above.    MEDICATIONS:  MEDICATIONS  (STANDING):  ALBUTerol/ipratropium for Nebulization 3 milliLiter(s) Nebulizer every 6 hours  atorvastatin 40 milliGRAM(s) Oral at bedtime  buDESOnide   0.5 milliGRAM(s) Respule 0.5 milliGRAM(s) Inhalation two times a day  dextrose 5%. 1000 milliLiter(s) (50 mL/Hr) IV Continuous <Continuous>  dextrose 5%. 1000 milliLiter(s) (50 mL/Hr) IV Continuous <Continuous>  dextrose 50% Injectable 12.5 Gram(s) IV Push once  dextrose 50% Injectable 25 Gram(s) IV Push once  dextrose 50% Injectable 25 Gram(s) IV Push once  dextrose 50% Injectable 12.5 Gram(s) IV Push once  dextrose 50% Injectable 25 Gram(s) IV Push once  dextrose 50% Injectable 25 Gram(s) IV Push once  diltiazem    milliGRAM(s) Oral daily  insulin glargine Injectable (LANTUS) 20 Unit(s) SubCutaneous at bedtime  insulin lispro (HumaLOG) corrective regimen sliding scale   SubCutaneous three times a day before meals  insulin lispro (HumaLOG) corrective regimen sliding scale   SubCutaneous at bedtime  insulin lispro Injectable (HumaLOG) 6 Unit(s) SubCutaneous three times a day before meals  lamoTRIgine 25 milliGRAM(s) Oral daily  PARoxetine 40 milliGRAM(s) Oral daily  predniSONE   Tablet   Oral   predniSONE   Tablet 30 milliGRAM(s) Oral two times a day  QUEtiapine 25 milliGRAM(s) Oral at bedtime  sodium chloride 0.9%. 1000 milliLiter(s) (50 mL/Hr) IV Continuous <Continuous>  thiamine 100 milliGRAM(s) Oral daily      PHYSICAL EXAM:  T(C): 36.4 (01-17-18 @ 11:10), Max: 36.6 (01-17-18 @ 04:10)  HR: 110 (01-17-18 @ 11:10) (78 - 110)  BP: 118/59 (01-17-18 @ 11:10) (118/59 - 128/72)  RR: 18 (01-17-18 @ 11:10) (17 - 18)  SpO2: 95% (01-17-18 @ 11:10) (95% - 98%)  Wt(kg): --  I&O's Summary    16 Jan 2018 07:01  -  17 Jan 2018 07:00  --------------------------------------------------------  IN: 1380 mL / OUT: 875 mL / NET: 505 mL    17 Jan 2018 07:01  -  17 Jan 2018 12:25  --------------------------------------------------------  IN: 120 mL / OUT: 0 mL / NET: 120 mL          Appearance: Normal	  HEENT:   Normal oral mucosa, PERRL, EOMI	  Lymphatic: No lymphadenopathy , no edema  Cardiovascular: Normal S1 S2, No JVD, 3/6 systolic  murmurs , Peripheral pulses palpable 2+ bilaterally  Respiratory: Lungs clear to auscultation, normal effort 	  Gastrointestinal:  Soft, Non-tender, + BS	  Skin: No rashes, No ecchymoses, No cyanosis, warm to touch  Musculoskeletal: Normal range of motion, normal strength  Psychiatry:  Mood & affect appropriate  Ext: No edema      LABS:    CARDIAC MARKERS:  CARDIAC MARKERS ( 16 Jan 2018 23:27 )  x     / x     / 32 U/L / x     / x                                    9.5    9.09  )-----------( 264      ( 17 Jan 2018 07:34 )             30.3     01-17    142  |  104  |  35<H>  ----------------------------<  218<H>  4.7   |  27  |  1.35<H>    Ca    8.8      17 Jan 2018 07:24    TPro  6.5  /  Alb  3.4  /  TBili  0.2  /  DBili  0.1  /  AST  18  /  ALT  16  /  AlkPhos  84  01-16    proBNP:   Lipid Profile:   HgA1c:   TSH:             TELEMETRY: SR	    ECG:  	  RADIOLOGY:   DIAGNOSTIC TESTING:  [ ] Echocardiogram:  [ ]  Catheterization:  [ ] Stress Test:    OTHER:
Subjective: Patient seen and examined. No new events except as noted.   resting comfortably in bed     REVIEW OF SYSTEMS:    CONSTITUTIONAL: N+weakness, No fevers or chills  EYES/ENT: No visual changes;  No vertigo or throat pain   NECK: No pain or stiffness  RESPIRATORY: No cough, wheezing, hemoptysis; No shortness of breath  CARDIOVASCULAR: No chest pain or palpitations  GASTROINTESTINAL: No abdominal or epigastric pain. No nausea, vomiting, or hematemesis; No diarrhea or constipation. No melena or hematochezia.  GENITOURINARY: No dysuria, frequency or hematuria  NEUROLOGICAL: No numbness or weakness  SKIN: No itching, burning, rashes, or lesions   All other review of systems is negative unless indicated above.    MEDICATIONS:  MEDICATIONS  (STANDING):  ALBUTerol/ipratropium for Nebulization 3 milliLiter(s) Nebulizer every 6 hours  atorvastatin 40 milliGRAM(s) Oral at bedtime  buDESOnide   0.5 milliGRAM(s) Respule 0.5 milliGRAM(s) Inhalation two times a day  dextrose 5%. 1000 milliLiter(s) (50 mL/Hr) IV Continuous <Continuous>  dextrose 5%. 1000 milliLiter(s) (50 mL/Hr) IV Continuous <Continuous>  dextrose 50% Injectable 12.5 Gram(s) IV Push once  dextrose 50% Injectable 25 Gram(s) IV Push once  dextrose 50% Injectable 25 Gram(s) IV Push once  dextrose 50% Injectable 12.5 Gram(s) IV Push once  dextrose 50% Injectable 25 Gram(s) IV Push once  dextrose 50% Injectable 25 Gram(s) IV Push once  diltiazem    milliGRAM(s) Oral daily  insulin glargine Injectable (LANTUS) 24 Unit(s) SubCutaneous at bedtime  insulin lispro (HumaLOG) corrective regimen sliding scale   SubCutaneous three times a day before meals  insulin lispro (HumaLOG) corrective regimen sliding scale   SubCutaneous at bedtime  insulin lispro Injectable (HumaLOG) 10 Unit(s) SubCutaneous three times a day before meals  lamoTRIgine 25 milliGRAM(s) Oral daily  PARoxetine 40 milliGRAM(s) Oral daily  predniSONE   Tablet   Oral   predniSONE   Tablet 20 milliGRAM(s) Oral two times a day  QUEtiapine 25 milliGRAM(s) Oral at bedtime  sodium chloride 0.9%. 1000 milliLiter(s) (50 mL/Hr) IV Continuous <Continuous>  thiamine 100 milliGRAM(s) Oral daily      PHYSICAL EXAM:  T(C): 36.4 (01-19-18 @ 11:17), Max: 36.6 (01-18-18 @ 21:18)  HR: 68 (01-19-18 @ 11:17) (67 - 78)  BP: 125/70 (01-19-18 @ 11:17) (125/70 - 134/56)  RR: 18 (01-19-18 @ 11:17) (18 - 18)  SpO2: 96% (01-19-18 @ 11:51) (95% - 96%)  Wt(kg): --  I&O's Summary    18 Jan 2018 07:01  -  19 Jan 2018 07:00  --------------------------------------------------------  IN: 1220 mL / OUT: 1275 mL / NET: -55 mL    19 Jan 2018 07:01  -  19 Jan 2018 14:10  --------------------------------------------------------  IN: 50 mL / OUT: 350 mL / NET: -300 mL          Appearance: Normal	  HEENT:   Normal oral mucosa, PERRL, EOMI	  Lymphatic: No lymphadenopathy , no edema  Cardiovascular: Normal S1 S2, No JVD, No murmurs , Peripheral pulses palpable 2+ bilaterally  Respiratory: Lungs clear to auscultation, normal effort 	  Gastrointestinal:  Soft, Non-tender, + BS	  Skin: No rashes, No ecchymoses, No cyanosis, warm to touch  Musculoskeletal: Normal range of motion, normal strength  Psychiatry:  Mood & affect appropriate  Ext: No edema      LABS:    CARDIAC MARKERS:  CARDIAC MARKERS ( 16 Jan 2018 23:27 )  x     / x     / 32 U/L / x     / x                                    10.2   10.9  )-----------( 254      ( 19 Jan 2018 13:19 )             31.5     01-19    140  |  104  |  29<H>  ----------------------------<  103<H>  4.6   |  26  |  1.23    Ca    8.7      19 Jan 2018 07:25      proBNP:   Lipid Profile:   HgA1c:   TSH:             TELEMETRY: 	    ECG:  	  RADIOLOGY:   DIAGNOSTIC TESTING:  [ ] Echocardiogram:  [ ]  Catheterization:  [ ] Stress Test:    OTHER:
Subjective: Patient seen and examined. No new events except as noted.   resting comfortably in bed   no cp or sob     REVIEW OF SYSTEMS:    CONSTITUTIONAL: + weakness, no fevers or chills  EYES/ENT: No visual changes;  No vertigo or throat pain   NECK: No pain or stiffness  RESPIRATORY: No cough, wheezing, hemoptysis; No shortness of breath  CARDIOVASCULAR: No chest pain or palpitations  GASTROINTESTINAL: No abdominal or epigastric pain. No nausea, vomiting, or hematemesis; No diarrhea or constipation. No melena or hematochezia.  GENITOURINARY: No dysuria, frequency or hematuria  NEUROLOGICAL: No numbness or weakness  SKIN: No itching, burning, rashes, or lesions   All other review of systems is negative unless indicated above.    MEDICATIONS:  MEDICATIONS  (STANDING):  ALBUTerol/ipratropium for Nebulization 3 milliLiter(s) Nebulizer every 6 hours  atorvastatin 40 milliGRAM(s) Oral at bedtime  buDESOnide   0.5 milliGRAM(s) Respule 0.5 milliGRAM(s) Inhalation two times a day  dextrose 5%. 1000 milliLiter(s) (50 mL/Hr) IV Continuous <Continuous>  dextrose 5%. 1000 milliLiter(s) (50 mL/Hr) IV Continuous <Continuous>  dextrose 50% Injectable 12.5 Gram(s) IV Push once  dextrose 50% Injectable 25 Gram(s) IV Push once  dextrose 50% Injectable 25 Gram(s) IV Push once  dextrose 50% Injectable 12.5 Gram(s) IV Push once  dextrose 50% Injectable 25 Gram(s) IV Push once  dextrose 50% Injectable 25 Gram(s) IV Push once  diltiazem    milliGRAM(s) Oral daily  insulin glargine Injectable (LANTUS) 20 Unit(s) SubCutaneous at bedtime  insulin lispro (HumaLOG) corrective regimen sliding scale   SubCutaneous three times a day before meals  insulin lispro (HumaLOG) corrective regimen sliding scale   SubCutaneous at bedtime  insulin lispro Injectable (HumaLOG) 8 Unit(s) SubCutaneous three times a day before meals  lamoTRIgine 25 milliGRAM(s) Oral daily  PARoxetine 40 milliGRAM(s) Oral daily  predniSONE   Tablet   Oral   predniSONE   Tablet 20 milliGRAM(s) Oral two times a day  QUEtiapine 25 milliGRAM(s) Oral at bedtime  sodium chloride 0.9%. 1000 milliLiter(s) (50 mL/Hr) IV Continuous <Continuous>  thiamine 100 milliGRAM(s) Oral daily      PHYSICAL EXAM:  T(C): 36.2 (01-18-18 @ 03:23), Max: 36.4 (01-17-18 @ 20:38)  HR: 61 (01-18-18 @ 03:23) (61 - 84)  BP: 119/53 (01-18-18 @ 03:23) (119/53 - 148/52)  RR: 18 (01-18-18 @ 03:23) (18 - 18)  SpO2: 98% (01-18-18 @ 03:23) (98% - 98%)  Wt(kg): --  I&O's Summary    17 Jan 2018 07:01  -  18 Jan 2018 07:00  --------------------------------------------------------  IN: 1045 mL / OUT: 101 mL / NET: 944 mL    18 Jan 2018 07:01  -  18 Jan 2018 12:08  --------------------------------------------------------  IN: 360 mL / OUT: 600 mL / NET: -240 mL          Appearance: Normal	  HEENT:   Normal oral mucosa, PERRL, EOMI	  Lymphatic: No lymphadenopathy , no edema  Cardiovascular: Normal S1 S2, No JVD, No murmurs , Peripheral pulses palpable 2+ bilaterally  Respiratory: decreased bs and  effort 	  Gastrointestinal:  Soft, Non-tender, + BS	  Skin: No rashes, No ecchymoses, No cyanosis, warm to touch  Musculoskeletal: Normal range of motion, normal strength  Psychiatry:  lethargic   Ext: No edema      LABS:    CARDIAC MARKERS:  CARDIAC MARKERS ( 16 Jan 2018 23:27 )  x     / x     / 32 U/L / x     / x                                    9.1    8.61  )-----------( 234      ( 18 Jan 2018 09:02 )             29.3     01-18    139  |  103  |  35<H>  ----------------------------<  210<H>  4.7   |  26  |  1.25    Ca    8.5      18 Jan 2018 08:54    TPro  6.5  /  Alb  3.4  /  TBili  0.2  /  DBili  0.1  /  AST  18  /  ALT  16  /  AlkPhos  84  01-16    proBNP:   Lipid Profile:   HgA1c:   TSH:             TELEMETRY: SR	    ECG:  	  RADIOLOGY:   DIAGNOSTIC TESTING:  [ ] Echocardiogram:  [ ]  Catheterization:  [ ] Stress Test:    OTHER:
Subjective: Patient seen and examined. No new events except as noted.   started on 1:1 for agitation     REVIEW OF SYSTEMS:    CONSTITUTIONAL: No weakness, fevers or chills  EYES/ENT: No visual changes;  No vertigo or throat pain   NECK: No pain or stiffness  RESPIRATORY: No cough, wheezing, hemoptysis; No shortness of breath  CARDIOVASCULAR: No chest pain or palpitations  GASTROINTESTINAL: No abdominal or epigastric pain. No nausea, vomiting, or hematemesis; No diarrhea or constipation. No melena or hematochezia.  GENITOURINARY: No dysuria, frequency or hematuria  NEUROLOGICAL: No numbness or weakness  SKIN: No itching, burning, rashes, or lesions   All other review of systems is negative unless indicated above.    MEDICATIONS:  MEDICATIONS  (STANDING):  ALBUTerol/ipratropium for Nebulization 3 milliLiter(s) Nebulizer every 6 hours  atorvastatin 40 milliGRAM(s) Oral at bedtime  buDESOnide   0.5 milliGRAM(s) Respule 0.5 milliGRAM(s) Inhalation two times a day  dextrose 5%. 1000 milliLiter(s) (50 mL/Hr) IV Continuous <Continuous>  dextrose 5%. 1000 milliLiter(s) (50 mL/Hr) IV Continuous <Continuous>  dextrose 50% Injectable 12.5 Gram(s) IV Push once  dextrose 50% Injectable 25 Gram(s) IV Push once  dextrose 50% Injectable 25 Gram(s) IV Push once  dextrose 50% Injectable 12.5 Gram(s) IV Push once  dextrose 50% Injectable 25 Gram(s) IV Push once  dextrose 50% Injectable 25 Gram(s) IV Push once  diltiazem    milliGRAM(s) Oral daily  haloperidol     Tablet 0.5 milliGRAM(s) Oral two times a day  insulin glargine Injectable (LANTUS) 20 Unit(s) SubCutaneous at bedtime  insulin lispro (HumaLOG) corrective regimen sliding scale   SubCutaneous three times a day before meals  insulin lispro (HumaLOG) corrective regimen sliding scale   SubCutaneous at bedtime  insulin lispro Injectable (HumaLOG) 6 Unit(s) SubCutaneous three times a day before meals  lamoTRIgine 25 milliGRAM(s) Oral daily  PARoxetine 40 milliGRAM(s) Oral daily  predniSONE   Tablet 40 milliGRAM(s) Oral daily  sodium chloride 0.9%. 1000 milliLiter(s) (50 mL/Hr) IV Continuous <Continuous>  thiamine 100 milliGRAM(s) Oral daily      PHYSICAL EXAM:  T(C): 36.7 (01-16-18 @ 11:15), Max: 37.1 (01-15-18 @ 21:55)  HR: 86 (01-16-18 @ 11:15) (84 - 122)  BP: 130/65 (01-16-18 @ 11:15) (130/65 - 166/76)  RR: 18 (01-16-18 @ 11:15) (18 - 18)  SpO2: 96% (01-16-18 @ 11:15) (94% - 96%)  Wt(kg): --  I&O's Summary    15 Lonnie 2018 07:01  -  16 Jan 2018 07:00  --------------------------------------------------------  IN: 590 mL / OUT: 1100 mL / NET: -510 mL    16 Jan 2018 07:01  -  16 Jan 2018 12:52  --------------------------------------------------------  IN: 240 mL / OUT: 0 mL / NET: 240 mL          Appearance: Normal	  HEENT:   Normal oral mucosa, PERRL, EOMI	  Lymphatic: No lymphadenopathy , no edema  Cardiovascular: Normal S1 S2, No JVD, No murmurs , Peripheral pulses palpable 2+ bilaterally  Respiratory: Lungs clear to auscultation, normal effort 	  Gastrointestinal:  Soft, Non-tender, + BS	  Skin: No rashes, No ecchymoses, No cyanosis, warm to touch  Musculoskeletal: Normal range of motion, normal strength  Psychiatry:  Mood & affect appropriate  Ext: No edema      LABS:    CARDIAC MARKERS:                                9.7    12.23 )-----------( 331      ( 16 Jan 2018 07:45 )             30.6     01-16    141  |  103  |  33<H>  ----------------------------<  160<H>  4.5   |  28  |  1.25    Ca    9.0      16 Jan 2018 07:40      proBNP:   Lipid Profile:   HgA1c:   TSH:     2-5          TELEMETRY: 	  SR  ECG:  	  RADIOLOGY:   DIAGNOSTIC TESTING:  [ ] Echocardiogram:  [ ]  Catheterization:  [ ] Stress Test:    OTHER:

## 2018-01-19 NOTE — PROGRESS NOTE ADULT - PROBLEM SELECTOR PLAN 4
As above   pulm following

## 2018-01-19 NOTE — SWALLOW BEDSIDE ASSESSMENT ADULT - SLP PERTINENT HISTORY OF CURRENT PROBLEM
89 year old male with hx of DM, anemia, multinodular goiter, copd, atrial fibrillation, recently discharged from [facility] where he was admitted for SOB sec to afib with RVR, COPD exacerbation in setting of influenza.. sent out on steroid taper which he completed and now returning with SOB and hallucinations. daughter not at bedside but reports that pt had sob (which at this time he denies); denies any fever or chills; denies cough, GI sx reports urinary frequency but no dysuria. Also reports of visual hallucinations (which pt does recall) but no other complaints.

## 2018-01-19 NOTE — SWALLOW BEDSIDE ASSESSMENT ADULT - PHARYNGEAL PHASE
Delayed pharyngeal swallow/occasional cough throughout trials that appeared unrelated to PO intake, similar in quality to baseline cough Delayed pharyngeal swallow/wet cough with increased frequency from baseline, consistent after trials of sequential sips on multiple trials Within functional limits

## 2018-01-19 NOTE — PROGRESS NOTE ADULT - PROBLEM SELECTOR PLAN 3
,maintaining sinus   Started on coumadin
,maintaining sinus    on coumadin  INR therapeutic
,maintaining sinus    on coumadin  INR therapeutic
,maintaining sinus   Started on coumadin
,maintaining sinus   Started on coumadin

## 2018-01-19 NOTE — DIETITIAN INITIAL EVALUATION ADULT. - NS AS NUTRI INTERV COLLABORAT
Malnutrition sticker placed in chart, Team aware. RD remains available to monitor PO intake, wt, labs/Collaboration with other providers

## 2018-01-19 NOTE — DIETITIAN INITIAL EVALUATION ADULT. - SOURCE
RN,  medical record/other (specify)/patient RN,  medical record, Previous RD note from 10/2016/other (specify)/patient

## 2018-01-19 NOTE — CHART NOTE - NSCHARTNOTEFT_GEN_A_CORE
Upon Nutritional Assessment by the Registered Dietitian your patient was determined to meet criteria / has evidence of the following diagnosis/diagnoses:          [ ]  Mild Protein Calorie Malnutrition        [ ]  Moderate Protein Calorie Malnutrition        [ X] Severe Protein Calorie Malnutrition        [ ] Unspecified Protein Calorie Malnutrition        [ ] Underweight / BMI <19        [ ] Morbid Obesity / BMI > 40      Findings as based on:  [X ] Comprehensive nutrition assessment: 17% wt loss x1.55 years   [X ] Nutrition Focused Physical Exam Severe fat and muscle wasting   [X ] Other:       Nutrition Plan/Recommendations:      Add glucerna x1     PROVIDER Section:     By signing this assessment you are acknowledging and agree with the diagnosis/diagnoses assigned by the Registered Dietitian    Comments:

## 2018-01-19 NOTE — SWALLOW BEDSIDE ASSESSMENT ADULT - SWALLOW EVAL: DIAGNOSIS
Pt presents with a baseline cough that makes behavioral analysis of swallow function difficult to a degree. However, Pt presents with evidence of oropharyngeal dysphagia notable for mildly reduced oral management for chewables, suspected mild delay in pharyngeal trigger and cough that appears more consistent than observed baseline cough after trials of thin liquids. Would suggest objective exam for comprehensive assessment of swallow. Additionally, Pt presents with dysphonia notable for hoarse/harsh vocal quality.

## 2018-01-19 NOTE — PROGRESS NOTE ADULT - ASSESSMENT
89 year old male with hx of DM, anemia, multinodular goiter ,  copd, atrial fibrillation, recetnly discharged from [facility] where he was admitted for SOB sec to afib with RVR, COPD exacerbation in setting of influenza.. sent out on steroid taper which he completed and now returning with SOB and haullicinations.  daughter not at bedside but reports that pt had had sob ( which at this time he denies )   denies any fever or chills  denies cough , GI sx   reports urirnary frequency but no dysuria , bladder scan in  cc   Also reports of visual haukllicinations ( which pt does recall ) but no other complaints      1- SOB : RVP neg  , no pna on CXr but small effusion    procalcitonin  upper end of normal,  leukocytosis likley sec to steroids ,  CT chest: no pna      obsereve off abx   exam though with coarse bs and mild wheezing : it is improved since last admit..   cont streroids taper  vest/ resp assisst device   ? element of aspiration : d/w  Daughter that possibity initailly and she did not want to pursue evaluatio.n ( had said she would not take away his enjoyment of food ) . at this time she seems to want to evaluate his swallow however when told this might delay his d/c she changed her mind and would rather not do eval.. suggested that she could cont his diet as he wishes at home and if he worsens with his breathing he could be swtiched back to Dysphagia   spoke to S/s and we agreed to have them speak to daughter and possibly evaluate..    2- Hx of afib:   cont rate control and AC .. coumadin 1 mg and monitor as o/p   would opt for o/p stress test when pul status improves    3- HTN cont meds   4- DM: hold PO meds ,  FS uncontrolled , insulin per    cont  insluin inpt and f/u with Dr. Haney   5- anemia : at baseline.monitor     6- multinodular goiter :  tapazole on hold for now  and f/u with  ..repeat TFT in one month  7- urinary frequency , no evidence of retention .. UA neg . monitor for now ,  flomax   8- hallucinations: ? sec to steroids   d/w  .. cont seroquel   CT head neg ..  doubt  parkinson , CK , LFT normal.. 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 He will benefit from having 24 hours supervision    pt is   DNR

## 2018-01-19 NOTE — SWALLOW BEDSIDE ASSESSMENT ADULT - ASR SWALLOW RECOMMEND DIAG
MBS recommended. However, if Pt cleared for d/c, may be completed as outpatient. Pt and daughter state that it is their preference to have exam performed as outpatient so that Pt may go home today.

## 2018-01-19 NOTE — DIETITIAN INITIAL EVALUATION ADULT. - OTHER INFO
Nutrition consult received for nutrition services assessment. Nutrition consult received for nutrition services assessment. Pt seen sitting in chair, states he has been eating well and tolerating softer diet. Per RN, Pt consumes 100% of meals. Pt states he used to take Glucerna but stopped as it wasting helping with weight gain. Pt was amenable to receive Glucerna in house. Pt denies GI distress. Denies chewing./swallowing difficulty. Denies micronutrient supplementation. NKFA

## 2018-01-19 NOTE — SWALLOW BEDSIDE ASSESSMENT ADULT - NS ASR SWALLOW FINDINGS DISCUS
Nursing/Patient/Family/Pt's daughter Physician/Nursing/Patient/Family/Dr. Katz, NP Anne, RN Seema, Pt's daughter

## 2018-01-19 NOTE — DIETITIAN INITIAL EVALUATION ADULT. - ORAL INTAKE PTA
Pt reports "eating like a Pig at home". Has HHA who preform food shopping and cooking. Breakfast: cereal, fruit, coffee/tea.  Lunch: 1.2 cod cut sandwich. Dinner: Chicken, hungry man, vegetables. Pt drinks water PTA./good

## 2018-01-19 NOTE — PROGRESS NOTE ADULT - PROBLEM SELECTOR PROBLEM 2
Diabetes type 2, controlled
Adenomatous goiter, toxic or with hyperthyroidism
Diabetes type 2, controlled

## 2018-01-19 NOTE — DIETITIAN INITIAL EVALUATION ADULT. - ADHERENCE
n/a/Pt denies following a therapeutic diet PTA. Pt states he followed a Kosher diet only when in the hospital as he thinks the quality of food is better

## 2018-01-19 NOTE — SWALLOW BEDSIDE ASSESSMENT ADULT - SLP GENERAL OBSERVATIONS
Pt awake, alert, able to follow commands and answer questions appropriately, fluent verbal output. Pt presents with dysphonia notable for hoarse/harsh vocal quality.

## 2018-01-19 NOTE — DIETITIAN INITIAL EVALUATION ADULT. - ENERGY NEEDS
Ht: 6'1", Wt: 135.3lbs, BMI: 17.8kg/m2, IBW: 184lbs(+/-10%), 73%IBW  Pertinent information: Pt admitted for SOB and hallucinations. Per chart, PT with COPD exacerbation and emphysema. small effusion ? aspiration however team discussed with family who do not wish to pursue.   No Edema, Skin intact Ht: 6'1", Wt: 135.3lbs, BMI: 17.8kg/m2, IBW: 184lbs(+/-10%), 73%IBW  Pertinent information: Pt admitted for SOB and hallucinations. Per chart, Pt with COPD exacerbation and emphysema. small effusion ? aspiration however team discussed with family who do not wish to pursue.   No Edema, Skin intact

## 2018-01-19 NOTE — EEG REPORT - NS EEG TEXT BOX
1/18/2018    Hx: Concern for Seizures      Study Interpretation:    FINDINGS:  The background was continuous, spontaneously variable and reactive.  During wakefulness, the posteriorly dominant rhythm consisted of symmetric, well modulated 7-8 Hz activity, with an amplitude to 40 uV, that attenuated to eye opening.  Low amplitude central beta was noted in wakefulness.    Sleep Background:  Drowsiness was characterized by fragmentation, attenuation, and slowing of the background activity.    Segments of stage II sleep were not recorded.    Background Slowing:  Intermittent theta and polymorphic delta activity diffusely.  No focal slowing was present.    Other Paroxysmal Non-Epileptiform Findings:    None.    Epileptiform Activity:   No epileptiform discharges were present.    Events:  No clinical events were recorded.  No seizures were recorded.    Activation Procedures:   -Hyperventilation was not performed.    -Photic stimulation was not performed.    Artifacts:  Intermittent myogenic and movement artifacts were noted.    Compressed Spectral Array Digital Analysis    FINDINGS:  Compressed Spectral Array (CSA) data was reviewed separately and correlated with the electroencephalographic findings detailed above.  CSA showed a variable spectral pattern.  Areas of increased power in particular were reviewed in detail, and compared with the raw EEG data.  Areas of abrupt increases in spectral power were reviewed to exclude seizures, and were determined to be artifactual in nature.    The relative ratio of the power of delta range frequencies and faster frequencies remained stable over the course of the study.  There was no definitive increase in the relative power in the delta frequency spectrum apparent in the left hemisphere versus the right hemisphere.      Compressed Spectral Array (Digital Analysis) Summary/ Impression:  No persistent hemispheric asymmetry.  Intermittent areas of increased power reviewed, without definite epileptiform activity associated on CSA.      EEG Classification:  Abnormal study  - mild to moderate diffuse slowing    Impression:  Findings indicate non-specific mild to moderate diffuse or multifocal cerebral dysfunction. There were no epileptiform abnormalities recorded, which does not exclude the diagnosis of epilepsy.  Consider repeat study if clinically indicated.

## 2018-01-19 NOTE — PROVIDER CONTACT NOTE (OTHER) - BACKGROUND
Admitted for AMS and SOB. Currently on prednisone taper and insulin was increased yesterday by endocrinology.

## 2018-01-19 NOTE — SWALLOW BEDSIDE ASSESSMENT ADULT - ADDITIONAL RECOMMENDATIONS
Pt and daughter Pt and daughter counseled extensively re aspiration risks and precautions. They report preference to f/u for MBS as outpatient so that Pt can be d/gladys home today.

## 2018-01-19 NOTE — SWALLOW BEDSIDE ASSESSMENT ADULT - COMMENTS
HC: SOB 2/2 afib with RVR, COPD exacerbation in setting of influenza. sent out on steroid taper which he completed and now returning with SOB and hallucinations. RVP neg, no pna on CXr but small effusion; leukocytosis likely 2/2 steroids. ? element of aspiration : d/w  Daughter that possibility and she does not want to pursue. CT chest: no pna.     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 haldol, which has improved with lowering the doses.     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 She reports pt with a h/o "playing cards while sleeping.'' Delirium resolving. Rule out REM sleep behavior disorder.

## 2018-01-19 NOTE — PROGRESS NOTE ADULT - PROBLEM SELECTOR PLAN 2
On Insulin
Hold tapazol.  Repeat TFT's in 2-3 weeks.
Hold tapazol.  Repeat TFT's in 2-3 weeks.  D/W patient's daughter.
Hold tapazol.  Repeat TFT's in 2-3 weeks.  D/W patient's daughter.
On Insulin

## 2018-01-19 NOTE — PROGRESS NOTE ADULT - PROBLEM SELECTOR PROBLEM 1
Shortness of breath
Type 2 diabetes mellitus with hyperglycemia, with long-term current use of insulin

## 2018-01-19 NOTE — DISCHARGE NOTE ADULT - PLAN OF CARE
Resolution of symptom Call your Health Care provider upon arrival home to make a follow up appointment within one week.  Take all inhalers as prescribed by your Health Care Provider.  Take steroids as prescribed by your Health Care Provider.  If your cough increases infrequency and severity and/or you have shortness of breath or increased shortness of breath call your Health Care Provider.  If you develop fever, chills, night sweats, malaise, and/or change in mental status call your Health care Provider.  Nutrition is very important.  Eat small frequent meals.  Increase your activity as tolerated.  Do not stay in bed all day HgA1C this admission.  Make sure you get your HgA1c checked every three months.  If you take oral diabetes medications, check your blood glucose two times a day.  If you take insulin, check your blood glucose before meals and at bedtime.  It's important not to skip any meals.  Keep a log of your blood glucose results and always take it with you to your doctor appointments.  Keep a list of your current medications including injectables and over the counter medications and bring this medication list with you to all your doctor appointments.  If you have not seen your ophthalmologist this year call for appointment.  Check your feet daily for redness, sores, or openings. Do not self treat. If no improvement in two days call your primary care physician for an appointment.  Low blood sugar (hypoglycemia) is a blood sugar below 70mg/dl. Check your blood sugar if you feel signs/symptoms of hypoglycemia. If your blood sugar is below 70 take 15 grams of carbohydrates (ex 4 oz of apple juice, 3-4 glucose tablets, or 4-6 oz of regular soda) wait 15 minutes and repeat blood sugar to make sure it comes up above 70.  If your blood sugar is above 70 and you are due for a meal, have a meal.  If you are not due for a meal have a snack.  This snack helps keeps your blood sugar at a safe range. Atrial fibrillation is the most common heart rhythm problem & has the risk of stroke & heart attack  It helps if you control your blood pressure, not drink more than 1-2 alcohol drinks per day, cut down on caffeine, getting treatment for over active thyroid gland, & getting exercise  Call your doctor if you feel your heart racing or beating unusually, chest tightness or pain, lightheaded, faint, shortness of breath especially with exercise  It is important to take your heart medication as prescribed  You may be on anticoagulation which is very important to take as directed - you may need blood work to monitor drug levels Adenomatous goiter, toxic or with hyperthyroidism.  Plan: Hold tapazol.  Repeat TFT's in 2-3 weeks.

## 2018-01-19 NOTE — DISCHARGE NOTE ADULT - MEDICATION SUMMARY - MEDICATIONS TO TAKE
I will START or STAY ON the medications listed below when I get home from the hospital:    budesonide 1 mg/2 mL inhalation suspension  -- 2 milliliter(s) inhaled 3 times a day, As Needed  -- Indication: For COPD (chronic obstructive pulmonary disease)    predniSONE 10 mg oral tablet  -- 3 tab(s) by mouth once a dayX2 days from 1/20-1/21. 2 Tabs once a day X 2 days 1/22-1/23  1 tab X2 days 1/24-1/25  -- It is very important that you take or use this exactly as directed.  Do not skip doses or discontinue unless directed by your doctor.  Obtain medical advice before taking any non-prescription drugs as some may affect the action of this medication.  Take with food or milk.    -- Indication: For COPD (chronic obstructive pulmonary disease)    acetaminophen 325 mg oral tablet  -- 2 tab(s) by mouth every 8 hours, As Needed - 3)  -- Indication: For pain    dilTIAZem 300 mg/24 hours oral tablet, extended release  -- 1 tab(s) by mouth once a day  -- Indication: For Atrial fibrillation    warfarin 1 mg oral tablet  -- 1 tab(s) by mouth once a day (at bedtime)  until Sunday night then go to PMD or cardiology for lab work on Monday  -- Indication: For Atrial fibrillation    lamoTRIgine 25 mg oral tablet  -- 1 tab(s) by mouth once a day  -- Indication: For Seizure    PARoxetine 40 mg oral tablet  -- 1 tab(s) by mouth once a day  -- Indication: For Depression    metFORMIN 1000 mg oral tablet  -- 1 tab(s) by mouth 2 times a day  -- Indication: For Diabetes type 2, controlled    Amaryl 1 mg oral tablet  -- 1 tab(s) by mouth once a day    Take 2 tablets twice a day until   1/22 (when prednisone is 20mg)   THEN take 1 tablet twice a day until . 1/24(when prednisone is 10mg)  THEN take one tablet daily.      -- Indication: For Diabetes type 2, controlled    rosuvastatin 10 mg oral tablet  -- 1 tab(s) by mouth once a day (at bedtime)  -- Indication: For hypelipidemia    QUEtiapine 25 mg oral tablet  -- 1 tab(s) by mouth once a day (at bedtime)  -- Indication: For Agitation    ALPRAZolam 0.25 mg oral tablet  -- 1 tab(s) by mouth every 8 hours, As needed, anxiety MDD:3  -- Indication: For Anxiety    Spiriva 18 mcg inhalation capsule  -- 1 cap(s) inhaled once a day  -- Indication: For COPD (chronic obstructive pulmonary disease)    polyethylene glycol 3350 oral powder for reconstitution  -- 17 gram(s) by mouth once a day, As Needed  -- Indication: For COnstipation    Flonase 50 mcg/inh nasal spray  -- 1 spray(s) into nose once a day, As Needed  -- Indication: For COngestion    thiamine 100 mg oral tablet  -- 1 tab(s) by mouth once a day  -- Indication: For Supplement

## 2018-01-19 NOTE — DISCHARGE NOTE ADULT - ADDITIONAL INSTRUCTIONS
Adenomatous goiter, toxic or with hyperthyroidism.  Plan: Hold tapazol.  Repeat TFT's in 2-3 weeks.  Follow up with Endocrinology

## 2018-01-19 NOTE — SWALLOW BEDSIDE ASSESSMENT ADULT - ASR SWALLOW ASPIRATION MONITOR
change of breathing pattern/cough/gurgly voice/pneumonia/throat clearing/upper respiratory infection/Monitor for s/s aspiration/laryngeal penetration. If noted:  D/C p.o. intake, provide non-oral nutrition/hydration/meds, and contact this service @ [phone]/fever

## 2018-03-31 RX ORDER — MIRTAZAPINE 45 MG/1
1 TABLET, ORALLY DISINTEGRATING ORAL
Qty: 0 | Refills: 0 | COMMUNITY
Start: 2018-03-31

## 2018-04-05 ENCOUNTER — INPATIENT (INPATIENT)
Facility: HOSPITAL | Age: 83
LOS: 5 days | Discharge: HOME | DRG: 871 | End: 2018-04-11
Attending: INTERNAL MEDICINE | Admitting: INTERNAL MEDICINE
Payer: MEDICARE

## 2018-04-05 VITALS
DIASTOLIC BLOOD PRESSURE: 70 MMHG | SYSTOLIC BLOOD PRESSURE: 127 MMHG | RESPIRATION RATE: 16 BRPM | WEIGHT: 128.97 LBS | OXYGEN SATURATION: 96 % | HEART RATE: 101 BPM | TEMPERATURE: 98 F

## 2018-04-05 DIAGNOSIS — J18.9 PNEUMONIA, UNSPECIFIED ORGANISM: ICD-10-CM

## 2018-04-05 DIAGNOSIS — Z96.641 PRESENCE OF RIGHT ARTIFICIAL HIP JOINT: Chronic | ICD-10-CM

## 2018-04-05 LAB
ALBUMIN SERPL ELPH-MCNC: 4.2 G/DL — SIGNIFICANT CHANGE UP (ref 3.3–5)
ALP SERPL-CCNC: 95 U/L — SIGNIFICANT CHANGE UP (ref 40–120)
ALT FLD-CCNC: 8 U/L RC — LOW (ref 10–45)
ANION GAP SERPL CALC-SCNC: 17 MMOL/L — SIGNIFICANT CHANGE UP (ref 5–17)
APTT BLD: 63.8 SEC — HIGH (ref 27.5–37.4)
AST SERPL-CCNC: 12 U/L — SIGNIFICANT CHANGE UP (ref 10–40)
BASOPHILS # BLD AUTO: 0 K/UL — SIGNIFICANT CHANGE UP (ref 0–0.2)
BASOPHILS NFR BLD AUTO: 0 % — SIGNIFICANT CHANGE UP (ref 0–2)
BILIRUB SERPL-MCNC: 0.3 MG/DL — SIGNIFICANT CHANGE UP (ref 0.2–1.2)
BUN SERPL-MCNC: 20 MG/DL — SIGNIFICANT CHANGE UP (ref 7–23)
CALCIUM SERPL-MCNC: 10.3 MG/DL — SIGNIFICANT CHANGE UP (ref 8.4–10.5)
CHLORIDE SERPL-SCNC: 97 MMOL/L — SIGNIFICANT CHANGE UP (ref 96–108)
CO2 SERPL-SCNC: 25 MMOL/L — SIGNIFICANT CHANGE UP (ref 22–31)
CREAT SERPL-MCNC: 1.2 MG/DL — SIGNIFICANT CHANGE UP (ref 0.5–1.3)
EOSINOPHIL # BLD AUTO: 0 K/UL — SIGNIFICANT CHANGE UP (ref 0–0.5)
EOSINOPHIL NFR BLD AUTO: 0 % — SIGNIFICANT CHANGE UP (ref 0–6)
GLUCOSE BLDC GLUCOMTR-MCNC: 121 MG/DL — HIGH (ref 70–99)
GLUCOSE BLDC GLUCOMTR-MCNC: 137 MG/DL — HIGH (ref 70–99)
GLUCOSE SERPL-MCNC: 155 MG/DL — HIGH (ref 70–99)
HCT VFR BLD CALC: 31.7 % — LOW (ref 39–50)
HGB BLD-MCNC: 10.5 G/DL — LOW (ref 13–17)
INR BLD: 1.97 RATIO — HIGH (ref 0.88–1.16)
LYMPHOCYTES # BLD AUTO: 0.7 K/UL — LOW (ref 1–3.3)
LYMPHOCYTES # BLD AUTO: 2 % — LOW (ref 13–44)
MCHC RBC-ENTMCNC: 31.1 PG — SIGNIFICANT CHANGE UP (ref 27–34)
MCHC RBC-ENTMCNC: 33.1 GM/DL — SIGNIFICANT CHANGE UP (ref 32–36)
MCV RBC AUTO: 94 FL — SIGNIFICANT CHANGE UP (ref 80–100)
MONOCYTES # BLD AUTO: 1.6 K/UL — HIGH (ref 0–0.9)
MONOCYTES NFR BLD AUTO: 7 % — SIGNIFICANT CHANGE UP (ref 2–14)
NEUTROPHILS # BLD AUTO: 21.4 K/UL — HIGH (ref 1.8–7.4)
NEUTROPHILS NFR BLD AUTO: 91 % — HIGH (ref 43–77)
PLAT MORPH BLD: NORMAL — SIGNIFICANT CHANGE UP
PLATELET # BLD AUTO: 513 K/UL — HIGH (ref 150–400)
POTASSIUM SERPL-MCNC: 4.3 MMOL/L — SIGNIFICANT CHANGE UP (ref 3.5–5.3)
POTASSIUM SERPL-SCNC: 4.3 MMOL/L — SIGNIFICANT CHANGE UP (ref 3.5–5.3)
PROT SERPL-MCNC: 8.5 G/DL — HIGH (ref 6–8.3)
PROTHROM AB SERPL-ACNC: 21.6 SEC — HIGH (ref 9.8–12.7)
RBC # BLD: 3.37 M/UL — LOW (ref 4.2–5.8)
RBC # FLD: 13.6 % — SIGNIFICANT CHANGE UP (ref 10.3–14.5)
RBC BLD AUTO: NORMAL — SIGNIFICANT CHANGE UP
SODIUM SERPL-SCNC: 139 MMOL/L — SIGNIFICANT CHANGE UP (ref 135–145)
WBC # BLD: 23.8 K/UL — HIGH (ref 3.8–10.5)
WBC # FLD AUTO: 23.8 K/UL — HIGH (ref 3.8–10.5)

## 2018-04-05 PROCEDURE — 70450 CT HEAD/BRAIN W/O DYE: CPT | Mod: 26

## 2018-04-05 PROCEDURE — 93010 ELECTROCARDIOGRAM REPORT: CPT

## 2018-04-05 PROCEDURE — 99285 EMERGENCY DEPT VISIT HI MDM: CPT | Mod: 25,GC

## 2018-04-05 PROCEDURE — 71045 X-RAY EXAM CHEST 1 VIEW: CPT | Mod: 26

## 2018-04-05 RX ORDER — TIOTROPIUM BROMIDE 18 UG/1
1 CAPSULE ORAL; RESPIRATORY (INHALATION)
Qty: 0 | Refills: 0 | COMMUNITY

## 2018-04-05 RX ORDER — PIPERACILLIN AND TAZOBACTAM 4; .5 G/20ML; G/20ML
3.38 INJECTION, POWDER, LYOPHILIZED, FOR SOLUTION INTRAVENOUS EVERY 8 HOURS
Qty: 0 | Refills: 0 | Status: DISCONTINUED | OUTPATIENT
Start: 2018-04-05 | End: 2018-04-11

## 2018-04-05 RX ORDER — PIPERACILLIN AND TAZOBACTAM 4; .5 G/20ML; G/20ML
3.38 INJECTION, POWDER, LYOPHILIZED, FOR SOLUTION INTRAVENOUS ONCE
Qty: 0 | Refills: 0 | Status: COMPLETED | OUTPATIENT
Start: 2018-04-05 | End: 2018-04-06

## 2018-04-05 RX ORDER — ALPRAZOLAM 0.25 MG
0.25 TABLET ORAL EVERY 8 HOURS
Qty: 0 | Refills: 0 | Status: DISCONTINUED | OUTPATIENT
Start: 2018-04-05 | End: 2018-04-11

## 2018-04-05 RX ORDER — INSULIN LISPRO 100/ML
VIAL (ML) SUBCUTANEOUS
Qty: 0 | Refills: 0 | Status: DISCONTINUED | OUTPATIENT
Start: 2018-04-05 | End: 2018-04-05

## 2018-04-05 RX ORDER — INSULIN LISPRO 100/ML
VIAL (ML) SUBCUTANEOUS AT BEDTIME
Qty: 0 | Refills: 0 | Status: DISCONTINUED | OUTPATIENT
Start: 2018-04-05 | End: 2018-04-05

## 2018-04-05 RX ORDER — DEXTROSE 50 % IN WATER 50 %
25 SYRINGE (ML) INTRAVENOUS ONCE
Qty: 0 | Refills: 0 | Status: DISCONTINUED | OUTPATIENT
Start: 2018-04-05 | End: 2018-04-11

## 2018-04-05 RX ORDER — DEXTROSE 50 % IN WATER 50 %
1 SYRINGE (ML) INTRAVENOUS ONCE
Qty: 0 | Refills: 0 | Status: DISCONTINUED | OUTPATIENT
Start: 2018-04-05 | End: 2018-04-11

## 2018-04-05 RX ORDER — VANCOMYCIN HCL 1 G
1000 VIAL (EA) INTRAVENOUS ONCE
Qty: 0 | Refills: 0 | Status: COMPLETED | OUTPATIENT
Start: 2018-04-05 | End: 2018-04-05

## 2018-04-05 RX ORDER — DEXTROSE 50 % IN WATER 50 %
12.5 SYRINGE (ML) INTRAVENOUS ONCE
Qty: 0 | Refills: 0 | Status: DISCONTINUED | OUTPATIENT
Start: 2018-04-05 | End: 2018-04-11

## 2018-04-05 RX ORDER — AZITHROMYCIN 500 MG/1
500 TABLET, FILM COATED ORAL ONCE
Qty: 0 | Refills: 0 | Status: COMPLETED | OUTPATIENT
Start: 2018-04-05 | End: 2018-04-05

## 2018-04-05 RX ORDER — INSULIN LISPRO 100/ML
VIAL (ML) SUBCUTANEOUS AT BEDTIME
Qty: 0 | Refills: 0 | Status: DISCONTINUED | OUTPATIENT
Start: 2018-04-05 | End: 2018-04-11

## 2018-04-05 RX ORDER — METFORMIN HYDROCHLORIDE 850 MG/1
1 TABLET ORAL
Qty: 0 | Refills: 0 | COMMUNITY

## 2018-04-05 RX ORDER — INSULIN LISPRO 100/ML
VIAL (ML) SUBCUTANEOUS
Qty: 0 | Refills: 0 | Status: DISCONTINUED | OUTPATIENT
Start: 2018-04-05 | End: 2018-04-11

## 2018-04-05 RX ORDER — POLYETHYLENE GLYCOL 3350 17 G/17G
17 POWDER, FOR SOLUTION ORAL DAILY
Qty: 0 | Refills: 0 | Status: DISCONTINUED | OUTPATIENT
Start: 2018-04-05 | End: 2018-04-11

## 2018-04-05 RX ORDER — ACETAMINOPHEN 500 MG
650 TABLET ORAL EVERY 6 HOURS
Qty: 0 | Refills: 0 | Status: DISCONTINUED | OUTPATIENT
Start: 2018-04-05 | End: 2018-04-11

## 2018-04-05 RX ORDER — GLUCAGON INJECTION, SOLUTION 0.5 MG/.1ML
1 INJECTION, SOLUTION SUBCUTANEOUS ONCE
Qty: 0 | Refills: 0 | Status: DISCONTINUED | OUTPATIENT
Start: 2018-04-05 | End: 2018-04-11

## 2018-04-05 RX ORDER — ATORVASTATIN CALCIUM 80 MG/1
40 TABLET, FILM COATED ORAL AT BEDTIME
Qty: 0 | Refills: 0 | Status: DISCONTINUED | OUTPATIENT
Start: 2018-04-05 | End: 2018-04-11

## 2018-04-05 RX ORDER — ACETAMINOPHEN 500 MG
975 TABLET ORAL ONCE
Qty: 0 | Refills: 0 | Status: COMPLETED | OUTPATIENT
Start: 2018-04-05 | End: 2018-04-05

## 2018-04-05 RX ORDER — SODIUM CHLORIDE 9 MG/ML
1000 INJECTION INTRAMUSCULAR; INTRAVENOUS; SUBCUTANEOUS ONCE
Qty: 0 | Refills: 0 | Status: COMPLETED | OUTPATIENT
Start: 2018-04-05 | End: 2018-04-05

## 2018-04-05 RX ORDER — WARFARIN SODIUM 2.5 MG/1
1 TABLET ORAL ONCE
Qty: 0 | Refills: 0 | Status: COMPLETED | OUTPATIENT
Start: 2018-04-05 | End: 2018-04-05

## 2018-04-05 RX ORDER — LAMOTRIGINE 25 MG/1
25 TABLET, ORALLY DISINTEGRATING ORAL DAILY
Qty: 0 | Refills: 0 | Status: DISCONTINUED | OUTPATIENT
Start: 2018-04-05 | End: 2018-04-11

## 2018-04-05 RX ORDER — SODIUM CHLORIDE 9 MG/ML
1000 INJECTION, SOLUTION INTRAVENOUS
Qty: 0 | Refills: 0 | Status: DISCONTINUED | OUTPATIENT
Start: 2018-04-05 | End: 2018-04-11

## 2018-04-05 RX ORDER — DILTIAZEM HCL 120 MG
300 CAPSULE, EXT RELEASE 24 HR ORAL DAILY
Qty: 0 | Refills: 0 | Status: DISCONTINUED | OUTPATIENT
Start: 2018-04-05 | End: 2018-04-11

## 2018-04-05 RX ORDER — FLUTICASONE PROPIONATE 50 MCG
2 SPRAY, SUSPENSION NASAL DAILY
Qty: 0 | Refills: 0 | Status: DISCONTINUED | OUTPATIENT
Start: 2018-04-05 | End: 2018-04-11

## 2018-04-05 RX ORDER — ALPRAZOLAM 0.25 MG
0.5 TABLET ORAL
Qty: 0 | Refills: 0 | COMMUNITY

## 2018-04-05 RX ORDER — BUDESONIDE, MICRONIZED 100 %
0.25 POWDER (GRAM) MISCELLANEOUS
Qty: 0 | Refills: 0 | Status: DISCONTINUED | OUTPATIENT
Start: 2018-04-05 | End: 2018-04-11

## 2018-04-05 RX ORDER — ALBUTEROL 90 UG/1
2 AEROSOL, METERED ORAL EVERY 6 HOURS
Qty: 0 | Refills: 0 | Status: DISCONTINUED | OUTPATIENT
Start: 2018-04-05 | End: 2018-04-06

## 2018-04-05 RX ORDER — PANTOPRAZOLE SODIUM 20 MG/1
40 TABLET, DELAYED RELEASE ORAL
Qty: 0 | Refills: 0 | Status: DISCONTINUED | OUTPATIENT
Start: 2018-04-05 | End: 2018-04-11

## 2018-04-05 RX ADMIN — ATORVASTATIN CALCIUM 40 MILLIGRAM(S): 80 TABLET, FILM COATED ORAL at 23:59

## 2018-04-05 RX ADMIN — Medication 250 MILLIGRAM(S): at 22:58

## 2018-04-05 RX ADMIN — AZITHROMYCIN 250 MILLIGRAM(S): 500 TABLET, FILM COATED ORAL at 17:35

## 2018-04-05 RX ADMIN — WARFARIN SODIUM 1 MILLIGRAM(S): 2.5 TABLET ORAL at 23:59

## 2018-04-05 RX ADMIN — Medication 975 MILLIGRAM(S): at 18:54

## 2018-04-05 RX ADMIN — SODIUM CHLORIDE 1000 MILLILITER(S): 9 INJECTION INTRAMUSCULAR; INTRAVENOUS; SUBCUTANEOUS at 15:29

## 2018-04-05 NOTE — ED PROVIDER NOTE - ATTENDING CONTRIBUTION TO CARE
Attending MD Rios: I personally have seen and examined this patient.  Resident note reviewed and agree on plan of care and except where noted.  See below for details.     89M with PMH AFib on Coumadin, DM2, COPD, hypothyroid, home O2 doesn ot use presents to the ED with 5d of decreased po intake, AMS, hallucinations.  Reports VH for 1.5yrs, HHA, daughter, PMD report only 4-5 days, similar episodes in past with UTI.  PMD seen today, suspected dehydration.  SOB at baseline, unchanged.  Reports does not use home O2 because does not like using it.  Denies dysuria, reports difficulty starting stream.  Denies hematuria.  Reports one small emetic episode last night.  Denies abdominal pain, blood in stools.  HHA reports that he had Jello last night and "it came back up".  Reports poor po intake. Nonbloody nonbiloius.  Reports felt warm around dinner time, denies chills.  On exam, NAD, head NCAT, PERRL, dry oral mucosa, FROM at neck, no tenderness to palpation or stepoffs along length of spine, lungs with scattered wheezing with good inspiratory effort, no crackles, +S1S2, no m/r/g, prominent xyphoid process, abdomen soft with +BS, NT, ND, no CVAT, moving all extremities with 5/5 strength bilateral upper and lower extremities, good and equal  strength bilaterally, no calf tenderness, swelling, erythema or warmth; A/P: 89M with hallucinations, warm to touch, rectal T 100.9, Ddx includes infectious process, will obtain labs, Cx, Ua, UrCx, CXR, give IVFs, EKG, reassess

## 2018-04-05 NOTE — H&P ADULT - NSHPLABSRESULTS_GEN_ALL_CORE
10.5   23.8  )-----------( 513      ( 2018 15:30 )             31.7       -    139  |  97  |  20  ----------------------------<  155<H>  4.3   |  25  |  1.20    Ca    10.3      2018 15:30    TPro  8.5<H>  /  Alb  4.2  /  TBili  0.3  /  DBili  x   /  AST  12  /  ALT  8<L>  /  AlkPhos  95  04-05              Urinalysis Basic - ( 2018 17:28 )    Color: Yellow / Appearance: SL Turbid / S.020 / pH: x  Gluc: x / Ketone: Negative  / Bili: Negative / Urobili: Negative   Blood: x / Protein: 150 mg/dL / Nitrite: Negative   Leuk Esterase: Negative / RBC: 10-25 /HPF / WBC 3-5 /HPF   Sq Epi: x / Non Sq Epi: x / Bacteria: x            Lactate Trend      CARDIAC MARKERS ( 2018 15:30 )  x     / 0.01 ng/mL / 40 U/L / x     / 1.5 ng/mL        CAPILLARY BLOOD GLUCOSE      POCT Blood Glucose.: 137 mg/dL (2018 19:04)        < from: Xray Chest 1 View AP/PA (18 @ 16:29) >      IMPRESSION:    Pleural plaques. Patchy opacities throughoutboth lungs which are new.   Etiology is uncertain.    < end of copied text >    EKG SR NSST/T

## 2018-04-05 NOTE — ED PROVIDER NOTE - OBJECTIVE STATEMENT
89yo male w hx of afib on coumadin, DM2, COPD, hypothyroidism, presenting with decreased intake food and water for 5 days, more confused, and seeing and hearing people/animals in his room. had similar issues in past with UTIs. Seen by PMD who referred to ED. Daughter was informed and agreed to transfer to ED. Takes paxil and quetiapine. Notes SOB chronic, unchanged from baseline. Supposed to use oxygen at home, but doesn't. Notes trouble going to bathroom  n/v/c/d/cp/weakness.  Patient says he has been seeing hallucinations for 1.5 years now. Patient notes chronic edema around his eyes.   Aide reports he had hallucinations with last visit to ED and for last 5 days, but doesn't think this has been consistent for 1.5 years  PMD Dr. Link(sp)

## 2018-04-05 NOTE — H&P ADULT - HISTORY OF PRESENT ILLNESS
88yo male w hx of afib on coumadin, DM2, COPD, hypothyroidism, presenting with decreased intake food and water for 5 days, more confused, and seeing and hearing people/animals in his room. had similar issues in past with UTIs. Seen by PMD who referred to ED. Daughter was informed and agreed to transfer to ED. Takes paxil and quetiapine. Notes SOB chronic, unchanged from baseline. Supposed to use oxygen at home, but doesn't. Notes trouble going to bathroom  n/v/c/d/cp/weakness.  Patient says he has been seeing hallucinations for 1.5 years now. Patient notes chronic edema around his eyes.   Aide reports he had hallucinations with last visit to ED and for last 5 days, but doesn't think this has been consistent for 1.5 years

## 2018-04-05 NOTE — H&P ADULT - NSHPSOCIALHISTORY_GEN_ALL_CORE
Social History:    Marital Status:  (   )    (   ) Single    (  x )    (  )   Occupation:   Lives with: (  ) alone  (  ) children   (  ) spouse   (  ) parents  ( x ) other    Substance Use (street drugs): ( x ) never used  (  ) other:  Tobacco Usage:  ( x  ) never smoked   (   ) former smoker   (   ) current smoker  (     ) pack years  (        ) last cigarette date  Alcohol Usage: denies    (     ) Advanced Directives: (     ) None    (      ) DNR    (     ) DNI    (     ) Health Care Proxy:

## 2018-04-05 NOTE — H&P ADULT - ASSESSMENT
89 m with  Sepsis probably from UTI vs Pneumonia- panculture, antibiotics, ID evaluation  US abdomen- r/o abcess  CT chest pending   Pulmonary evaluation Dr. Cormier  Confusion- CT head pending , B12, TSH, Folate, Neurology evaluation Dr. Alejo, Psychiatry evaluation called  PT  Diabetes control  PAF- continue AC with Coumadin  PT  d/w daughter  Further action as per clinical course   Osiot Devlin MD pager [phone]

## 2018-04-05 NOTE — ED PROVIDER NOTE - PROGRESS NOTE DETAILS
AK: Discussed with Dr. Iglesias. Will admit to Dr. Devlin per Dr. Iglesias. He will pass all info along to Dr. Devlin Attending MD Rios: Reviewed labs WBC noted, CXR noted, will treat as HCAP, admit.  Patient amenable and family updated.

## 2018-04-05 NOTE — ED ADULT NURSE NOTE - OBJECTIVE STATEMENT
88 y/o M A&Ox3 (with bouts of intermittent confusion) past medical hx of COPD, AFib (currently taking coumadin), and hip replacement surgery presents to the ED c/o failure to thrive. Pt's health aide reports patient has been exhibiting an altered mental status. Pt. is confused and is experiencing visual hallucinations. Pt health aide also reports pt has had a decrease in appetite- has not been eating or drinking much in the past 5 days. Pt reports SOB at all times. Pt is in no respiratory distress and has spontaneous unlabored breathing. Pt reports experiencing visual hallucinations - sees animals, doctors, and people chatting in his bedroom. Pt states visual hallucinations started after hip replacement surgery about a year and a half ago. Pt reports he has lost 60 lbs in the last year. Pt denies fever, chills, dizziness, HA, and N/V, numbness and tingling. Pt states he is having some difficulty urinating- but denies pain when urinating. No hematuria. Lung sounds clear - diminished bilaterally. + strength in lower and upper extremities. Pt is supposed to be on O2 at home, but reports he does not use it. O2 sat above 95% on RA. Hanh orbital edema noted - pt reports this is not new, has had this for a long time. Neuro intact. Equal strength and sensation in all extremities. PERRL. VSS. Pt and health aide deny any hx of recent falls. Patient safety maintained. 88 y/o M A&Ox3 (with bouts of intermittent confusion) past medical hx of COPD, AFib (currently taking coumadin), and hip replacement surgery presents to the ED c/o failure to thrive. Pt's health aide reports patient has been exhibiting an altered mental status. Pt. is confused and is experiencing visual hallucinations. Pt health aide also reports pt has had a decrease in appetite- has not been eating or drinking much in the past 5 days. Pt reports SOB at all times. Pt has spontaneous breathing - moderate abd retractions noted. Pt reports experiencing visual hallucinations - sees animals, doctors, and people chatting in his bedroom. Pt states visual hallucinations started after hip replacement surgery about a year and a half ago. Pt reports he has lost 60 lbs in the last year. Pt denies fever, chills, dizziness, HA, and numbness and tingling. Pt reports vomited 2x last night after eating jello. Pt states he is having some difficulty urinating- but denies pain when urinating. No hematuria. Lung sounds clear - diminished bilaterally. + strength in lower and upper extremities. Pt is supposed to be on O2 at home, but reports he does not use it. O2 sat above 95% on RA. Hanh orbital edema noted - pt reports this is not new, has had this for a long time. Neuro intact. Equal strength and sensation in all extremities. PERRL. Pt and health aide deny any hx of recent falls. Patient safety maintained. 88 y/o M A&Ox3 (with bouts of intermittent confusion) past medical hx of COPD, AFib (currently taking coumadin), and hip replacement surgery presents to the ED c/o failure to thrive. Pt's health aide reports patient has been exhibiting an altered mental status. Pt. is confused and is experiencing visual hallucinations. Pt health aide also reports pt has had a decrease in appetite- has not been eating or drinking much in the past 5 days. Pt reports SOB at all times. Pt has spontaneous breathing - moderate abd retractions noted. Pt reports experiencing visual hallucinations - sees animals, doctors, and people chatting in his bedroom. Pt. aware that these hallucinations are not real. Pt states visual hallucinations started after hip replacement surgery about a year and a half ago. Pt reports he has lost 60 lbs in the last year. Pt denies fever, chills, dizziness, HA, and numbness and tingling. Pt reports vomited 2x last night after eating jello. Pt states he is having some difficulty urinating- but denies pain when urinating. No hematuria. Lung sounds clear - diminished bilaterally. + strength in lower and upper extremities. Pt is supposed to be on O2 at home, but reports he does not use it. O2 sat above 95% on RA. Hanh orbital edema noted - pt reports this is not new, has had this for a long time. Neuro intact. Equal strength and sensation in all extremities. PERRL. Pt and health aide deny any hx of recent falls. Pt. in no danger to himself or anyone else. Patient safety maintained.

## 2018-04-05 NOTE — ED PROVIDER NOTE - MEDICAL DECISION MAKING DETAILS
89yo male w hx of afib on coumadin, DM2, COPD, hypothyroidism, presenting with decreased intake food and water for 5 days, more confused, and seeing and hearing people/animals in his room. had similar issues in past with UTIs. Labs, urine, search for infection, eval for UTI. Reassess.

## 2018-04-05 NOTE — ED ADULT NURSE REASSESSMENT NOTE - NS ED NURSE REASSESS COMMENT FT1
pt. straight cathed as per MD's orders. Sterile technique maintained. 2 RNs present. Successful after 2 attempts - coude used after 1st attempt. Pt. tolerated procedure well. Aide remained at bedside. 200 mL of dark, yellow urine obtained.

## 2018-04-05 NOTE — H&P ADULT - NSHPPHYSICALEXAM_GEN_ALL_CORE
PHYSICAL EXAMINATION:  Vital Signs Last 24 Hrs  T(C): 37.1 (05 Apr 2018 18:46), Max: 38.3 (05 Apr 2018 14:45)  T(F): 98.8 (05 Apr 2018 18:46), Max: 100.9 (05 Apr 2018 14:45)  HR: 100 (05 Apr 2018 18:46) (96 - 101)  BP: 138/73 (05 Apr 2018 18:46) (127/70 - 146/62)  BP(mean): --  RR: 20 (05 Apr 2018 18:46) (16 - 20)  SpO2: 95% (05 Apr 2018 18:46) (95% - 96%)  CAPILLARY BLOOD GLUCOSE      POCT Blood Glucose.: 137 mg/dL (05 Apr 2018 19:04)  POCT Blood Glucose.: 121 mg/dL (05 Apr 2018 18:51)      GENERAL: NAD, well-groomed, well-developed  HEAD:  atraumatic, normocephalic  EYES: sclera anicteric  ENMT: mucous membranes moist  NECK: supple, No JVD  CHEST/LUNG: clear to auscultation bilaterally; no rales, rhonchi, or wheezing b/l  HEART: normal S1, S2  ABDOMEN: BS+, soft, ND, NT   EXTREMITIES:  pulses palpable; no clubbing, cyanosis, or edema b/l LEs  NEURO: awake, alert, interactive; moves all extremities  SKIN: no rashes or lesions

## 2018-04-05 NOTE — ED PROVIDER NOTE - PHYSICAL EXAMINATION
Gen: No acute distress, alert, cooperative  Head: Normocephalic, Atraumatic  HEENT: PERRL, oral mucosa moist, normal conjunctiva, ocular motion intact all quadrants. Perirobital edema DELIA  Lung: CTAB, no respiratory distress, no crackles or wheezes  CV: rrr, no murmur  Abd: soft, NTND, no rebound or guarding  MSK: No LE edema, no visible deformities, normal ROM extremities  Neuro: No focal neurologic deficits, normal strength and sensation throughout  Skin: Warm and dry, no evidence of rash   Psych: normal affect, follows commands Gen: No acute distress, alert, cooperative  Head: Normocephalic, Atraumatic  HEENT: PERRL, oral mucosa dry, normal conjunctiva, ocular motion intact all quadrants. Perirobital edema DELIA  Lung: CTAB, no respiratory distress, no crackles or wheezes  CV: rrr, no murmur  Abd: soft, NTND, no rebound or guarding  MSK: No LE edema, no visible deformities, normal ROM extremities  Neuro: No focal neurologic deficits, normal strength and sensation throughout  Skin: Warm and dry, no evidence of rash   Psych: normal affect, follows commands

## 2018-04-06 DIAGNOSIS — R65.10 SYSTEMIC INFLAMMATORY RESPONSE SYNDROME (SIRS) OF NON-INFECTIOUS ORIGIN WITHOUT ACUTE ORGAN DYSFUNCTION: ICD-10-CM

## 2018-04-06 DIAGNOSIS — R50.9 FEVER, UNSPECIFIED: ICD-10-CM

## 2018-04-06 DIAGNOSIS — F05 DELIRIUM DUE TO KNOWN PHYSIOLOGICAL CONDITION: ICD-10-CM

## 2018-04-06 DIAGNOSIS — D72.829 ELEVATED WHITE BLOOD CELL COUNT, UNSPECIFIED: ICD-10-CM

## 2018-04-06 LAB
ANION GAP SERPL CALC-SCNC: 14 MMOL/L — SIGNIFICANT CHANGE UP (ref 5–17)
BUN SERPL-MCNC: 22 MG/DL — SIGNIFICANT CHANGE UP (ref 7–23)
CALCIUM SERPL-MCNC: 9.5 MG/DL — SIGNIFICANT CHANGE UP (ref 8.4–10.5)
CHLORIDE SERPL-SCNC: 99 MMOL/L — SIGNIFICANT CHANGE UP (ref 96–108)
CO2 SERPL-SCNC: 27 MMOL/L — SIGNIFICANT CHANGE UP (ref 22–31)
CREAT SERPL-MCNC: 1.25 MG/DL — SIGNIFICANT CHANGE UP (ref 0.5–1.3)
CULTURE RESULTS: NO GROWTH — SIGNIFICANT CHANGE UP
GLUCOSE BLDC GLUCOMTR-MCNC: 117 MG/DL — HIGH (ref 70–99)
GLUCOSE BLDC GLUCOMTR-MCNC: 141 MG/DL — HIGH (ref 70–99)
GLUCOSE BLDC GLUCOMTR-MCNC: 197 MG/DL — HIGH (ref 70–99)
GLUCOSE BLDC GLUCOMTR-MCNC: 207 MG/DL — HIGH (ref 70–99)
GLUCOSE BLDC GLUCOMTR-MCNC: 211 MG/DL — HIGH (ref 70–99)
GLUCOSE SERPL-MCNC: 144 MG/DL — HIGH (ref 70–99)
HBA1C BLD-MCNC: 5.8 % — HIGH (ref 4–5.6)
HCT VFR BLD CALC: 30 % — LOW (ref 39–50)
HGB BLD-MCNC: 9.3 G/DL — LOW (ref 13–17)
INR BLD: 1.72 RATIO — HIGH (ref 0.88–1.16)
MCHC RBC-ENTMCNC: 29.1 PG — SIGNIFICANT CHANGE UP (ref 27–34)
MCHC RBC-ENTMCNC: 31 GM/DL — LOW (ref 32–36)
MCV RBC AUTO: 93.8 FL — SIGNIFICANT CHANGE UP (ref 80–100)
PLATELET # BLD AUTO: 443 K/UL — HIGH (ref 150–400)
POTASSIUM SERPL-MCNC: 3.8 MMOL/L — SIGNIFICANT CHANGE UP (ref 3.5–5.3)
POTASSIUM SERPL-SCNC: 3.8 MMOL/L — SIGNIFICANT CHANGE UP (ref 3.5–5.3)
PROTHROM AB SERPL-ACNC: 19.7 SEC — HIGH (ref 10–13.1)
RBC # BLD: 3.2 M/UL — LOW (ref 4.2–5.8)
RBC # FLD: 14.6 % — HIGH (ref 10.3–14.5)
SODIUM SERPL-SCNC: 140 MMOL/L — SIGNIFICANT CHANGE UP (ref 135–145)
SPECIMEN SOURCE: SIGNIFICANT CHANGE UP
TSH SERPL-MCNC: 2.65 UIU/ML — SIGNIFICANT CHANGE UP (ref 0.27–4.2)
WBC # BLD: 18.23 K/UL — HIGH (ref 3.8–10.5)
WBC # FLD AUTO: 18.23 K/UL — HIGH (ref 3.8–10.5)

## 2018-04-06 PROCEDURE — 99222 1ST HOSP IP/OBS MODERATE 55: CPT

## 2018-04-06 PROCEDURE — 71250 CT THORAX DX C-: CPT | Mod: 26

## 2018-04-06 PROCEDURE — 76700 US EXAM ABDOM COMPLETE: CPT | Mod: 26

## 2018-04-06 RX ORDER — WARFARIN SODIUM 2.5 MG/1
2 TABLET ORAL ONCE
Qty: 0 | Refills: 0 | Status: COMPLETED | OUTPATIENT
Start: 2018-04-06 | End: 2018-04-06

## 2018-04-06 RX ORDER — IPRATROPIUM/ALBUTEROL SULFATE 18-103MCG
3 AEROSOL WITH ADAPTER (GRAM) INHALATION EVERY 6 HOURS
Qty: 0 | Refills: 0 | Status: DISCONTINUED | OUTPATIENT
Start: 2018-04-06 | End: 2018-04-11

## 2018-04-06 RX ADMIN — PIPERACILLIN AND TAZOBACTAM 200 GRAM(S): 4; .5 INJECTION, POWDER, LYOPHILIZED, FOR SOLUTION INTRAVENOUS at 00:40

## 2018-04-06 RX ADMIN — Medication 0.25 MILLIGRAM(S): at 06:25

## 2018-04-06 RX ADMIN — PANTOPRAZOLE SODIUM 40 MILLIGRAM(S): 20 TABLET, DELAYED RELEASE ORAL at 06:25

## 2018-04-06 RX ADMIN — PIPERACILLIN AND TAZOBACTAM 25 GRAM(S): 4; .5 INJECTION, POWDER, LYOPHILIZED, FOR SOLUTION INTRAVENOUS at 08:13

## 2018-04-06 RX ADMIN — LAMOTRIGINE 25 MILLIGRAM(S): 25 TABLET, ORALLY DISINTEGRATING ORAL at 13:37

## 2018-04-06 RX ADMIN — PIPERACILLIN AND TAZOBACTAM 25 GRAM(S): 4; .5 INJECTION, POWDER, LYOPHILIZED, FOR SOLUTION INTRAVENOUS at 23:03

## 2018-04-06 RX ADMIN — WARFARIN SODIUM 2 MILLIGRAM(S): 2.5 TABLET ORAL at 22:43

## 2018-04-06 RX ADMIN — Medication 0.25 MILLIGRAM(S): at 17:25

## 2018-04-06 RX ADMIN — PIPERACILLIN AND TAZOBACTAM 25 GRAM(S): 4; .5 INJECTION, POWDER, LYOPHILIZED, FOR SOLUTION INTRAVENOUS at 16:07

## 2018-04-06 RX ADMIN — Medication 300 MILLIGRAM(S): at 06:25

## 2018-04-06 RX ADMIN — Medication 20 MILLIGRAM(S): at 13:37

## 2018-04-06 RX ADMIN — Medication 0.25 MILLIGRAM(S): at 06:27

## 2018-04-06 RX ADMIN — ATORVASTATIN CALCIUM 40 MILLIGRAM(S): 80 TABLET, FILM COATED ORAL at 22:43

## 2018-04-06 RX ADMIN — Medication 3 MILLILITER(S): at 23:03

## 2018-04-06 RX ADMIN — Medication 2 SPRAY(S): at 13:36

## 2018-04-06 RX ADMIN — Medication 2: at 13:36

## 2018-04-06 NOTE — DIETITIAN INITIAL EVALUATION ADULT. - FACTORS AFF FOOD INTAKE
noted Pt S/P bedside swallow evaluation during January 2018 admission and recommended for dysphagia 2 c nectar consistency and plan for MBS follow up as outpatient which daughter reports did not happen, states they tried the thickened liquids at home and Pt would not drink them; Pt reports he wears denture which fit well; daughter reports some coughing with foods- coughed earlier c salad

## 2018-04-06 NOTE — DIETITIAN INITIAL EVALUATION ADULT. - PHYSICAL APPEARANCE
Pt agreeable to nutrition focused physical exam: found to have severe muscle loss around shoulders, clavicles, calves; severe fat loss around buccal, tricep and on ribs; mild to moderate muscle loss around temples/underweight

## 2018-04-06 NOTE — BEHAVIORAL HEALTH ASSESSMENT NOTE - HPI (INCLUDE ILLNESS QUALITY, SEVERITY, DURATION, TIMING, CONTEXT, MODIFYING FACTORS, ASSOCIATED SIGNS AND SYMPTOMS)
This is a 89-y.o. CM patient with PMH of afib on coumadin, DM2, COPD, hypothyroidism, and PPH of possible previous delirium due to UTI, presenting with decreased intake food and water for 5 days, more confused, and seeing and hearing people/animals in his room. Consult was requested to evaluate the patient for confusion and hallucinations.    On evaluation, patient is calm, quiet, cooperative. He says he has been seeing hallucinations for 1.5 years now. They tend to get worse at times, particularly during a medical illness (e.g. UTI), but patient knows those phenomena are 'not real'. Collteral hx. sources do not believe patient has had hallucinations for that long a time (1.5 yrs). He is supposed to be on oxygen, but he does not use it regularly. No SI/HI, no delusions noted, no mood sx, hopelessness, helplessness, prominent anxiety or mood sx. noted or reported.    Patient was not a management problem overnight. He, however, was confused and disoriented at times, attempting to leave his bed and required redirection. This is a 89-y.o. CM patient with PMH of afib on coumadin, DM2, COPD, hypothyroidism, and PPH of bipolar disorder, alcohol use in remission, and possible previous delirium due to UTI, presenting with decreased intake food and water for 5 days, more confused, and seeing and hearing people/animals in his room. Consult was requested to evaluate the patient for confusion and hallucinations.    On evaluation, patient is calm, quiet, cooperative. He says he has been seeing hallucinations for 1.5 years now. They tend to get worse at times, particularly during a medical illness (e.g. UTI), but patient knows those phenomena are 'not real'. Collteral hx. sources do not believe patient has had hallucinations for that long a time (1.5 yrs). He is supposed to be on oxygen, but he does not use it regularly. No SI/HI, no delusions noted, no mood sx, hopelessness, helplessness, prominent anxiety or mood sx. noted or reported.    As per daughter, the patient had a first episode of delirium 1.5yr ago following a hip surgery. Since then, he has had several. He also has a hx. of bipolar disorder and is [facility].    Patient was not a management problem overnight. He, however, was confused and disoriented at times, attempting to leave his bed and required redirection. This is a 89-y.o. CM patient with PMH of afib on coumadin, DM2, COPD, hypothyroidism, and PPH of bipolar disorder, alcohol use in remission, and possible previous delirium due to UTI, presenting with decreased intake food and water for 5 days, more confused, and seeing and hearing people/animals in his room. Consult was requested to evaluate the patient for confusion and hallucinations.    On evaluation, patient is calm, quiet, cooperative. He states he has been seeing 'natural things' that he has not been bothered by. Patient is unsure where he is or what day it may be. He says he has been seeing hallucinations for 1.5 year now. They tend to get worse at times, particularly during a medical illness (e.g. UTI), but patient knows those phenomena are 'not real'. Collateral hx. sources do not believe patient has had hallucinations for that long a time (1.5 yr). He is supposed to be on oxygen, but he does not use it regularly. No SI/HI, no delusions noted, no mood sx, hopelessness, helplessness, prominent anxiety or mood sx. noted or reported.    As per daughter, the patient had a first episode of delirium 1.5yr ago following a hip surgery. Since then, he has had several. He also has a hx. of bipolar disorder and is in [facility].    Patient was not a management problem overnight. He, however, was confused and disoriented at times, attempting to leave his bed and required redirection.

## 2018-04-06 NOTE — BEHAVIORAL HEALTH ASSESSMENT NOTE - RISK ASSESSMENT
Low, as patient does not have SI/HI or command hallucinations, is not bothered by visual experiences and can tell they are not real

## 2018-04-06 NOTE — BEHAVIORAL HEALTH ASSESSMENT NOTE - NSBHCHARTREVIEWINVESTIGATE_PSY_A_CORE FT
< from: 12 Lead ECG (04.05.18 @ 15:13) >    Ventricular Rate 97 BPM    Atrial Rate 97 BPM    P-R Interval 216 ms    QRS Duration 76 ms     ms    QTc 459 ms    < end of copied text >

## 2018-04-06 NOTE — BEHAVIORAL HEALTH ASSESSMENT NOTE - NSBHCHARTREVIEWVS_PSY_A_CORE FT
Vital Signs Last 24 Hrs  T(C): 37.1 (05 Apr 2018 20:54), Max: 38.3 (05 Apr 2018 14:45)  T(F): 98.8 (05 Apr 2018 20:54), Max: 100.9 (05 Apr 2018 14:45)  HR: 101 (06 Apr 2018 06:33) (96 - 101)  BP: 167/72 (06 Apr 2018 06:33) (127/68 - 167/72)  BP(mean): --  RR: 20 (05 Apr 2018 20:54) (16 - 20)  SpO2: 94% (05 Apr 2018 20:54) (94% - 96%)

## 2018-04-06 NOTE — CHART NOTE - NSCHARTNOTEFT_GEN_A_CORE
Upon Nutritional Assessment by the Registered Dietitian your patient was determined to meet criteria / has evidence of the following diagnosis/diagnoses:          [ ]  Mild Protein Calorie Malnutrition        [ ]  Moderate Protein Calorie Malnutrition        [x] Severe Protein Calorie Malnutrition        [ ] Unspecified Protein Calorie Malnutrition        [x] Underweight / BMI <19        [ ] Morbid Obesity / BMI > 40      Findings as based on:  [x] Comprehensive nutrition assessment- wt loss, prolonged suboptimal intake and appetite   [x] Nutrition Focused Physical Exam- severe muscle/fat loss   [ ] Other:       Nutrition Plan/Recommendations:      Recommend Glucerna shake x2 daily. Encouraged PO intake-small, frequent meals and nutrient dense snacks. Discussed protein rich foods available on menu. Discussed consuming protein first at meal times and then eating the other foods.     PROVIDER Section:     By signing this assessment you are acknowledging and agree with the diagnosis/diagnoses assigned by the Registered Dietitian    Comments:

## 2018-04-06 NOTE — CONSULT NOTE ADULT - SUBJECTIVE AND OBJECTIVE BOX
[facility] - [facility]  CONSULT NOTE    CHIEF COMPLAINT: confusion, decreased appetite    HPI: 89yo male w hx of afib on coumadin, DM2, COPD, hypothyroidism, presenting with decreased intake food and water for 5 days, more confused, and seeing and hearing people/animals in his room. had similar issues in past with UTIs. Seen by PMD who referred to ED.  . Notes SOB chronic, unchanged from baseline. states he has "some COPD" for which he sees a physician/friend in [place].  Supposed to use oxygen at home, but doesn't. Notes trouble going to bathroom  n/v/c/d/cp/weakness.  Patient says he has been seeing hallucinations for 1.5 years now. Patient notes chronic edema around his eyes.   Aide reports he had hallucinations with last visit.  Pt is concerned about inability to gain weight.  No complaints of increased sob, cough, congestion, dicolored mucus or wheeze.    PMHX:  Goiter  Diabetes type 2, controlled  COPD (chronic obstructive pulmonary disease)  Diabetes  Atrial fibrillation      PSHX:  History of total hip arthroplasty, right      FAMILY HISTORY:  No pertinent family history in first degree relatives      SOCIAL HISTORY:    Pulmonary Medications:   ALBUTerol    90 MICROgram(s) HFA Inhaler 2 Puff(s) Inhalation every 6 hours PRN  buDESOnide   0.25 milliGRAM(s) Respule 0.25 milliGRAM(s) Inhalation two times a day      Antimicrobials:  piperacillin/tazobactam IVPB. 3.375 Gram(s) IV Intermittent every 8 hours      Cardiology:  diltiazem    milliGRAM(s) Oral daily      Other:  acetaminophen   Tablet 650 milliGRAM(s) Oral every 6 hours PRN  acetaminophen   Tablet. 650 milliGRAM(s) Oral every 6 hours PRN  ALPRAZolam 0.25 milliGRAM(s) Oral every 8 hours PRN  atorvastatin 40 milliGRAM(s) Oral at bedtime  dextrose 5%. 1000 milliLiter(s) IV Continuous <Continuous>  dextrose 5%. 1000 milliLiter(s) IV Continuous <Continuous>  dextrose 50% Injectable 12.5 Gram(s) IV Push once  dextrose 50% Injectable 25 Gram(s) IV Push once  dextrose 50% Injectable 25 Gram(s) IV Push once  dextrose 50% Injectable 12.5 Gram(s) IV Push once  dextrose 50% Injectable 25 Gram(s) IV Push once  dextrose 50% Injectable 25 Gram(s) IV Push once  dextrose Gel 1 Dose(s) Oral once PRN  dextrose Gel 1 Dose(s) Oral once PRN  fluticasone propionate 50 MICROgram(s)/spray Nasal Spray 2 Spray(s) Both Nostrils daily  glucagon  Injectable 1 milliGRAM(s) IntraMuscular once PRN  glucagon  Injectable 1 milliGRAM(s) IntraMuscular once PRN  insulin lispro (HumaLOG) corrective regimen sliding scale   SubCutaneous three times a day before meals  insulin lispro (HumaLOG) corrective regimen sliding scale   SubCutaneous at bedtime  lamoTRIgine 25 milliGRAM(s) Oral daily  methimazole 5 milliGRAM(s) Oral daily  pantoprazole    Tablet 40 milliGRAM(s) Oral before breakfast  PARoxetine 20 milliGRAM(s) Oral daily  polyethylene glycol 3350 17 Gram(s) Oral daily PRN      Allergies    No Known Allergies    Intolerances        HOME MEDICATIONS:    REVIEW OF SYSTEMS: (Negative unless otherwise delineated)     Constitutional: No fevers, chills, sweats. weight loss, fatigue, weakness, malaise, lethargy  Eyes: No itching or discharge from the eyes, visual change, blurred vision, double vision, yellow sclerae, eye pain.  ENT: No ear pain, ear discharge, tinnitus, change in hearing, nasal congestion, runny nose, post nasal drip, epistaxis, sinus pain, sore throat, globus sensation, dental problems, oral ulcers/lesions  CV: No chest pain, chest pressure, chest discomfort, palpitations, lightheadedness/dizziness, syncope, lower extremity edema, inability to lay flat to sleep, awakening from sleep unable to sleep, claudication.  Resp: No dyspnea at rest, dyspnea on exertion, chest congestion/wheeze, cough. stridor, sputum production, chest pain with respiration, hemoptysis  GI: No anorexia, nausea, vomiting, constipation, abdominal pain. blood in the stool, diarrhea, melena, change in bowel habits or stools, dysphagia, odynophagia, heartburn  : No burning on urination, urinary urgency, urinary frequency, urinary hesitancy, incontinence, blood in the urine, waking up at night to urinate, change in urine color, urinary retention.   Neurological: No headache, dizziness, syncope, paralysis, unsteady gait, muscle weakness, change in bowel or bladder control, numbness or tingling in the extremities, change in smell or taste, change in speech, tremor, memory change/loss, vertigo, frequent falls, confusion  MSK: No muscle pain, back pain, joint pain, joint stiffness, joint swelling   Hematologic: No anemia, bleeding, bruising, ecchymoses.   Lymphatics: No enlarged nodes, history of splenectomy  Psychiatric: No depression, anxiety, bipolar disorde, schizophrenia, hallucinations, suicidal/homicidal thoughts  Endocrinologic: No weight change, sweating, cold or heat intolerance, polyuria, polydipsia, polyphagia, abnormal hair growth, change in nails, tremor, neck pain or swelling.   Allergies: No hives, eczema, allergic rhinitis  Integumentary: No rash, new/growing/changing skin lesions, bruising, pruritis, decubiti                                                                                                                                                                                [ ]Unable to obtain    OBJECTIVE:      ICU Vital Signs Last 24 Hrs  T(C): 37.1 (2018 20:54), Max: 38.3 (2018 14:45)  T(F): 98.8 (2018 20:54), Max: 100.9 (2018 14:45)  HR: 101 (2018 06:33) (96 - 101)  BP: 167/72 (2018 06:33) (127/68 - 167/72)  BP(mean): --  ABP: --  ABP(mean): --  RR: 20 (2018 20:54) (16 - 20)  SpO2: 94% (2018 20:54) (94% - 96%)      I&O's Detail    2018 07:01  -  2018 07:00  --------------------------------------------------------  IN:    IV PiggyBack: 350 mL  Total IN: 350 mL    OUT:    Voided: 600 mL  Total OUT: 600 mL    Total NET: -250 mL        Daily Height in cm: 180.34 (2018 20:54)    Daily   CAPILLARY BLOOD GLUCOSE      POCT Blood Glucose.: 141 mg/dL (2018 08:38)      PHYSICAL EXAM:  General: Awake, alert, cooperative, no distress, appears stated age   Head: Atraumatic, normocephalic  Eyes: Anicteric, conjunctiva/corneas clear, EOM's intact, PERRL, both eyes               Ears: External examination WNL, both ears                Nose: Nares normal, septum midline, mucosa normal, no drainage or sinus tenderness  Throat: Mallampatti Grade     No tonsillar or pharyngeal exudates. Lips, mucosa and tongue WNL; teeth and gums WNL  Neck: Supple, symmetrica, trachea midline; thyroid without enlargement/tenderness/nodules; no carotid bruit; no JVD  Back: Symmetric, no curvature, range of motion normal, no CVA tenderness  Chest wall: No tenderness or deformity  Respiratory: Respirations unlabored; few coarse BS to auscultation and percussion bilaterally  Cardiovascular: Regular rate and rhythm, S1 S2 normal. No murmurs, rubs or gallops.   Abdomen: Soft, non-tender, non-distended. No organomegaly. No masses. Normal bowel sounds  Extremities: Warm to touch. No clubbing or cyanosis. No pedal edema.  Pulses: 2+ peripheral pulses all extremities  Skin: Normal skin color, texture and turgor. No rashes or lesions  Lymph Nodes: Cervical, supraclavicular and axillary nodes normal  Neurological: Motor and sensory examination equal and normal. A and O x 3  Psychiatry: Appropriate mood and affect.    LABS:                        10.5   23.8  )-----------( 513      ( 2018 15:30 )             31.7     04-06    140  |  99  |  22  ----------------------------<  144<H>  3.8   |  27  |  1.25    Ca    9.5      2018 07:22    TPro  8.5<H>  /  Alb  4.2  /  TBili  0.3  /  DBili  x   /  AST  12  /  ALT  8<L>  /  AlkPhos  95  04-05    PT/INR - ( 2018 22:35 )   PT: 21.6 sec;   INR: 1.97 ratio         PTT - ( 2018 22:35 )  PTT:63.8 sec  Urinalysis Basic - ( 2018 17:28 )    Color: Yellow / Appearance: SL Turbid / S.020 / pH: x  Gluc: x / Ketone: Negative  / Bili: Negative / Urobili: Negative   Blood: x / Protein: 150 mg/dL / Nitrite: Negative   Leuk Esterase: Negative / RBC: 10-25 /HPF / WBC 3-5 /HPF   Sq Epi: x / Non Sq Epi: x / Bacteria: x            MICROBIOLOGY:     RADIOLOGY:  [ ] Reviewed and interpreted by me  < from: Xray Chest 1 View AP/PA (18 @ 16:29) >  XAM:  XR CHEST AP OR PA 1V                            PROCEDURE DATE:  2018            INTERPRETATION:  CLINICAL INFORMATION: 5 days of confusion.    A single portable view of the chest was obtained.     Comparison: 2018. CT chest 2018    The mediastinal cardiac silhouette is unremarkable.    Pleural plaques. Patchy opacities throughout both lungs which are new.   Etiology is uncertain.    No acute osseous finding.     IMPRESSION:    Pleural plaques. Patchy opacities throughoutboth lungs which are new.   Etiology is uncertain.        < end of copied text >

## 2018-04-06 NOTE — BEHAVIORAL HEALTH ASSESSMENT NOTE - NSBHSUICPROTECTFACT_PSY_A_CORE
Responsibility to family and others/Future oriented/Supportive social network or family/Identifies reasons for living

## 2018-04-06 NOTE — DIETITIAN INITIAL EVALUATION ADULT. - NS AS NUTRI INTERV ED CONTENT
Discussed importance of adequate intake & consuming protein w/ all meals and snacks for optimal nutrition & glycemic control. Encouraged PO intake-small, frequent meals and nutrient dense snacks. Discussed protein rich foods available on menu. Discussed consuming protein first at meal times and then eating the other foods.

## 2018-04-06 NOTE — DIETITIAN INITIAL EVALUATION ADULT. - OTHER INFO
Pt seen for consult for "unintentional wt loss >10%." Pt and daughter confirm wt loss of about 29 pounds (from 164 to 135 pounds) in about 1.5 years- noted in January 2018 wt of about 135 pounds and now admit wt of 132 pounds, wt loss may have stabilize. Pt reports NKFA. States he no micronutrient coverage at home. Noted Pt was on Metformin, Amaryl at home. Noted Pt was on Warfarin, daughter reports being familiar c vitamin K interaction and INR levels PTA were stable. Pt requests Kosher diet while in house as he prefers the quality of the Kosher meals. Pt and daughter agreeable to repeat swallow evaluation while in house.

## 2018-04-06 NOTE — CONSULT NOTE ADULT - SUBJECTIVE AND OBJECTIVE BOX
BASHIR BECKER 89y Male  MRN-[id number]    Patient is a 89y old  Male who presents with a chief complaint of FTT, hallucinations, AMS (2018 00:44)      HPI:  88yo male w hx of afib on coumadin, DM2, COPD, hypothyroidism, presenting with decreased intake food and water for 5 days, more confused, and seeing and hearing people/animals in his room. had similar issues in past with UTIs. Seen by PMD who referred to ED. Daughter was informed and agreed to transfer to ED. Takes paxil and quetiapine. Notes SOB chronic, unchanged from baseline. Supposed to use oxygen at home, but doesn't. Notes trouble going to bathroom  n/v/c/d/cp/weakness.  Patient says he has been seeing hallucinations for 1.5 years now. Patient notes chronic edema around his eyes.   Aide reports he had hallucinations with last visit to ED and for last 5 days, but doesn't think this has been consistent for 1.5 years     Fever in ER    Answers some questions but confused. Hx from H+P      PAST MEDICAL & SURGICAL HISTORY:  Goiter  Diabetes type 2, controlled  COPD (chronic obstructive pulmonary disease)  Atrial fibrillation  History of total hip arthroplasty, right      Allergies    No Known Allergies    Intolerances        ANTIMICROBIALS:  piperacillin/tazobactam IVPB. 3.375 every 8 hours      MEDICATIONS  (STANDING):  atorvastatin 40 milliGRAM(s) Oral at bedtime  buDESOnide   0.25 milliGRAM(s) Respule 0.25 milliGRAM(s) Inhalation two times a day  dextrose 5%. 1000 milliLiter(s) (50 mL/Hr) IV Continuous <Continuous>  dextrose 5%. 1000 milliLiter(s) (50 mL/Hr) IV Continuous <Continuous>  dextrose 50% Injectable 12.5 Gram(s) IV Push once  dextrose 50% Injectable 25 Gram(s) IV Push once  dextrose 50% Injectable 25 Gram(s) IV Push once  dextrose 50% Injectable 12.5 Gram(s) IV Push once  dextrose 50% Injectable 25 Gram(s) IV Push once  dextrose 50% Injectable 25 Gram(s) IV Push once  diltiazem    milliGRAM(s) Oral daily  fluticasone propionate 50 MICROgram(s)/spray Nasal Spray 2 Spray(s) Both Nostrils daily  insulin lispro (HumaLOG) corrective regimen sliding scale   SubCutaneous three times a day before meals  insulin lispro (HumaLOG) corrective regimen sliding scale   SubCutaneous at bedtime  lamoTRIgine 25 milliGRAM(s) Oral daily  methimazole 5 milliGRAM(s) Oral daily  pantoprazole    Tablet 40 milliGRAM(s) Oral before breakfast  PARoxetine 20 milliGRAM(s) Oral daily  piperacillin/tazobactam IVPB. 3.375 Gram(s) IV Intermittent every 8 hours      Social History  Smoking: no  Etoh: no  Drug use: no      FAMILY HISTORY:  No pertinent family history in first degree relatives      Vital Signs Last 24 Hrs  T(C): 37.1 (2018 20:54), Max: 38.3 (2018 14:45)  T(F): 98.8 (2018 20:54), Max: 100.9 (2018 14:45)  HR: 101 (2018 06:33) (96 - 101)  BP: 167/72 (2018 06:33) (127/68 - 167/72)  BP(mean): --  RR: 20 (2018 20:54) (16 - 20)  SpO2: 94% (2018 20:54) (94% - 96%)    CBC Full  -  ( 2018 08:19 )  WBC Count : 18.23 K/uL  Hemoglobin : 9.3 g/dL  Hematocrit : 30.0 %  Platelet Count - Automated : 443 K/uL  Mean Cell Volume : 93.8 fl  Mean Cell Hemoglobin : 29.1 pg  Mean Cell Hemoglobin Concentration : 31.0 gm/dL  Auto Neutrophil # : x  Auto Lymphocyte # : x  Auto Monocyte # : x  Auto Eosinophil # : x  Auto Basophil # : x  Auto Neutrophil % : x  Auto Lymphocyte % : x  Auto Monocyte % : x  Auto Eosinophil % : x  Auto Basophil % : x    04-06    140  |  99  |  22  ----------------------------<  144<H>  3.8   |  27  |  1.25    Ca    9.5      2018 07:22    TPro  8.5<H>  /  Alb  4.2  /  TBili  0.3  /  DBili  x   /  AST  12  /  ALT  8<L>  /  AlkPhos  95  04-05    LIVER FUNCTIONS - ( 2018 15:30 )  Alb: 4.2 g/dL / Pro: 8.5 g/dL / ALK PHOS: 95 U/L / ALT: 8 U/L RC / AST: 12 U/L / GGT: x           Urinalysis Basic - ( 2018 17:28 )    Color: Yellow / Appearance: SL Turbid / S.020 / pH: x  Gluc: x / Ketone: Negative  / Bili: Negative / Urobili: Negative   Blood: x / Protein: 150 mg/dL / Nitrite: Negative   Leuk Esterase: Negative / RBC: 10-25 /HPF / WBC 3-5 /HPF   Sq Epi: x / Non Sq Epi: x / Bacteria: x        MICROBIOLOGY:      RADIOLOGY  US Abdomen Complete (18 @ 10:41) >  Hepatomegaly, with diffuse hepatic steatosis.    Mild intrahepatic biliary ductal dilatation. However, common bile duct is   within normal limits.    Mildly contracted gallbladder with cholelithiasis.    Nonvisualization of the pancreas.    CT Chest No Cont (18 @ 08:28) >  No focal consolidation. 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 If symptoms persist consider repeat CT or   MRI if clinically warranted..

## 2018-04-06 NOTE — DIETITIAN INITIAL EVALUATION ADULT. - ORAL INTAKE PTA
poor/was trying to consume 3 meals daily, eating small amounts, mostly cereal, yogurt/gelatin, some sandwiches and frozen dinners

## 2018-04-06 NOTE — BEHAVIORAL HEALTH ASSESSMENT NOTE - NSBHCONSULTMEDS_PSY_A_CORE FT
Continue Seroquel  Melatonin 3mg p.o. at bedtime  Consider switching paroxetine to a non-anticholinergic agent (e.g. Lexapro) Consider Haldol 1mg p.o. at bedtime  Melatonin 3mg p.o. at bedtime  Continue Lamictal  Consider switching paroxetine to a non-anticholinergic agent (e.g. Lexapro 10mg p.o. daily)

## 2018-04-06 NOTE — DIETITIAN INITIAL EVALUATION ADULT. - NS AS NUTRI INTERV MEALS SNACK
As per Pt & daughter request, Kosher, pureed diet- would consider removing consistent CHO diet restriction at this time to allow for more choices given age, A1C and suboptimal intake. RD to provide food preferences- nutrient dense snacks. Consider swallow evaluation as per Pt/family request.

## 2018-04-06 NOTE — DIETITIAN INITIAL EVALUATION ADULT. - ENERGY NEEDS
ht: 5'11", wt:132 pounds, BMI:18.4 kg/m2, IBW:172 pounds +/- 10%     Pt admitted c confusion, poor PO intake, negative for UTI and now negative for PNA. Noted Pt being followed by Psych- delirium due to other medical condition.

## 2018-04-06 NOTE — BEHAVIORAL HEALTH ASSESSMENT NOTE - SUMMARY
This is a 89-y.o. CM patient with PMH of afib on coumadin, DM2, COPD, hypothyroidism, and PPH of possible previous delirium due to UTI, presenting with decreased intake food and water for 5 days, more confused, and seeing and hearing people/animals in his room. Hallucinatory phenomena possibly present outside delirious state and warrant a dementia work-up. This is a 89-y.o. 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 Hallucinatory phenomena possibly present outside delirious state and warrant a dementia work-up.

## 2018-04-06 NOTE — PROGRESS NOTE ADULT - SUBJECTIVE AND OBJECTIVE BOX
Patient is a 89y old  Male who presents with a chief complaint of FTT, hallucinations, AMS (2018 00:44)      SUBJECTIVE / OVERNIGHT EVENTS: feels better. Family at bedside.   Review of Systems  chest pain no  palpitations no  sob no  nausea no  headache no    MEDICATIONS  (STANDING):  atorvastatin 40 milliGRAM(s) Oral at bedtime  buDESOnide   0.25 milliGRAM(s) Respule 0.25 milliGRAM(s) Inhalation two times a day  dextrose 5%. 1000 milliLiter(s) (50 mL/Hr) IV Continuous <Continuous>  dextrose 5%. 1000 milliLiter(s) (50 mL/Hr) IV Continuous <Continuous>  dextrose 50% Injectable 12.5 Gram(s) IV Push once  dextrose 50% Injectable 25 Gram(s) IV Push once  dextrose 50% Injectable 25 Gram(s) IV Push once  dextrose 50% Injectable 12.5 Gram(s) IV Push once  dextrose 50% Injectable 25 Gram(s) IV Push once  dextrose 50% Injectable 25 Gram(s) IV Push once  diltiazem    milliGRAM(s) Oral daily  fluticasone propionate 50 MICROgram(s)/spray Nasal Spray 2 Spray(s) Both Nostrils daily  insulin lispro (HumaLOG) corrective regimen sliding scale   SubCutaneous three times a day before meals  insulin lispro (HumaLOG) corrective regimen sliding scale   SubCutaneous at bedtime  lamoTRIgine 25 milliGRAM(s) Oral daily  methimazole 5 milliGRAM(s) Oral daily  pantoprazole    Tablet 40 milliGRAM(s) Oral before breakfast  PARoxetine 20 milliGRAM(s) Oral daily  piperacillin/tazobactam IVPB. 3.375 Gram(s) IV Intermittent every 8 hours  warfarin 2 milliGRAM(s) Oral once    MEDICATIONS  (PRN):  acetaminophen   Tablet 650 milliGRAM(s) Oral every 6 hours PRN For Temp greater than 38.5 C (101.3 F)  acetaminophen   Tablet. 650 milliGRAM(s) Oral every 6 hours PRN Mild Pain (1 - 3)  ALBUTerol    90 MICROgram(s) HFA Inhaler 2 Puff(s) Inhalation every 6 hours PRN Shortness of Breath and/or Wheezing  ALPRAZolam 0.25 milliGRAM(s) Oral every 8 hours PRN anxiety  dextrose Gel 1 Dose(s) Oral once PRN Blood Glucose LESS THAN 70 milliGRAM(s)/deciliter  dextrose Gel 1 Dose(s) Oral once PRN Blood Glucose LESS THAN 70 milliGRAM(s)/deciliter  glucagon  Injectable 1 milliGRAM(s) IntraMuscular once PRN Glucose LESS THAN 70 milligrams/deciliter  glucagon  Injectable 1 milliGRAM(s) IntraMuscular once PRN Glucose LESS THAN 70 milligrams/deciliter  polyethylene glycol 3350 17 Gram(s) Oral daily PRN Constipation          PHYSICAL EXAM:  GENERAL: NAD, well-developed  HEAD:  Atraumatic, Normocephalic  EYES: EOMI, PERRLA, conjunctiva and sclera clear  NECK: Supple, No JVD  CHEST/LUNG: Clear to auscultation bilaterally; No wheeze  HEART: Regular rate and rhythm; No murmurs, rubs, or gallops  ABDOMEN: Soft, Nontender, Nondistended; Bowel sounds present  EXTREMITIES:  2+ Peripheral Pulses, No clubbing, cyanosis, or edema Toenails.  PSYCH: less confused  NEUROLOGY: non-focal  SKIN: No rashes or lesions    LABS:                        9.3    18.23 )-----------( 443      ( 2018 08:19 )             30.0     04-06    140  |  99  |  22  ----------------------------<  144<H>  3.8   |  27  |  1.25    Ca    9.5      2018 07:22    TPro  8.5<H>  /  Alb  4.2  /  TBili  0.3  /  DBili  x   /  AST  12  /  ALT  8<L>  /  AlkPhos  95  04-05    PT/INR - ( 2018 08:19 )   PT: 19.7 sec;   INR: 1.72 ratio         PTT - ( 2018 22:35 )  PTT:63.8 sec  CARDIAC MARKERS ( 2018 15:30 )  x     / 0.01 ng/mL / 40 U/L / x     / 1.5 ng/mL      Urinalysis Basic - ( 2018 17:28 )    Color: Yellow / Appearance: SL Turbid / S.020 / pH: x  Gluc: x / Ketone: Negative  / Bili: Negative / Urobili: Negative   Blood: x / Protein: 150 mg/dL / Nitrite: Negative   Leuk Esterase: Negative / RBC: 10-25 /HPF / WBC 3-5 /HPF   Sq Epi: x / Non Sq Epi: x / Bacteria: x        RADIOLOGY & ADDITIONAL TESTS:    Imaging Personally Reviewed:  < from: US Abdomen Complete (18 @ 10:41) >  IMPRESSION:     Hepatomegaly, with diffuse hepatic steatosis.    Mild intrahepatic biliary ductal dilatation. However, common bile duct is   within normal limits.    Mildly contracted gallbladder with cholelithiasis.    Nonvisualization of the pancreas.      < end of copied text >  < from: CT Chest No Cont (18 @ 08:28) >    IMPRESSION: No focal consolidation. Emphysema, unchanged.        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 If symptoms persist consider repeat CT or   MRI if clinically warranted..        < end of copied text >  Care Discussed with Consultants/Other Providers:

## 2018-04-06 NOTE — CONSULT NOTE ADULT - ATTENDING COMMENTS
Jovany Verdugo  Attending Physician   Division of Infectious Disease  Pager #[phone]  After 5pm/weekend or no response, call #[phone]    Please call the ID service [phone] with questions or concerns over the weekend.

## 2018-04-06 NOTE — DIETITIAN INITIAL EVALUATION ADULT. - ADHERENCE
Pt reports he was eating concentrated sweets to help him gain wt, was checking Finger sticks and usually his Finger sticks were 80-90, has had one episode of hypoglycemia before at home per daughter, reviewed how to treat hypoglycemia; Pt in the past has taken Glucerna shakes but most recently was not taking it at home, would like to have it in house

## 2018-04-06 NOTE — BEHAVIORAL HEALTH ASSESSMENT NOTE - NSBHCHARTREVIEWIMAGING_PSY_A_CORE FT
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 If symptoms persist consider repeat CT or   MRI if clinically warranted..      < end of copied text >

## 2018-04-06 NOTE — BEHAVIORAL HEALTH ASSESSMENT NOTE - NSBHCHARTREVIEWLAB_PSY_A_CORE FT
10.5   23.8  )-----------( 513      ( 2018 15:30 )             31.7     04-06    140  |  99  |  22  ----------------------------<  144<H>  3.8   |  27  |  1.25    Ca    9.5      2018 07:22    TPro  8.5<H>  /  Alb  4.2  /  TBili  0.3  /  DBili  x   /  AST  12  /  ALT  8<L>  /  AlkPhos  95  04-05    Urinalysis Basic - ( 2018 17:28 )    Color: Yellow / Appearance: SL Turbid / S.020 / pH: x  Gluc: x / Ketone: Negative  / Bili: Negative / Urobili: Negative   Blood: x / Protein: 150 mg/dL / Nitrite: Negative   Leuk Esterase: Negative / RBC: 10-25 /HPF / WBC 3-5 /HPF   Sq Epi: x / Non Sq Epi: x / Bacteria: x

## 2018-04-06 NOTE — CONSULT NOTE ADULT - SUBJECTIVE AND OBJECTIVE BOX
HPI:  88yo male w hx of afib on coumadin, DM2, COPD, hypothyroidism, presenting with decreased intake food and water for 5 days, more confused, and seeing and hearing people/animals in his room. had similar issues in past with UTIs. Seen by PMD who referred to ED. Daughter was informed and agreed to transfer to ED. Takes paxil and quetiapine. Notes SOB chronic, unchanged from baseline. Supposed to use oxygen at home, but doesn't. Notes trouble going to bathroom  n/v/c/d/cp/weakness.  Patient says he has been seeing hallucinations for 1.5 years now. Patient notes chronic edema around his eyes.   Aide reports he had hallucinations with last visit to ED and for last 5 days, but doesn't think this has been consistent for 1.5 years (05 Apr 2018 20:48)  Patient describes seeing people but knows they are not real. He advises that this is a problem that occurs more significantly during medical illness.          Review of Systems:  All review of systems negative, except for those marked:  General:		  Eyes:			  ENT:			  Pulmonary:		  Cardiac:		  Gastrointestinal:	  Renal/Urologic:	  Musculoskeletal		  Endocrine:		  Hematologic:	  Neurologic:		  Skin:			  Allergy/Immune	  Psychiatric:		    PAST MEDICAL & SURGICAL HISTORY:  Goiter  Diabetes type 2, controlled  COPD (chronic obstructive pulmonary disease)  Atrial fibrillation  History of total hip arthroplasty, right    Past Hospitalizations:  MEDICATIONS  (STANDING):  atorvastatin 40 milliGRAM(s) Oral at bedtime  buDESOnide   0.25 milliGRAM(s) Respule 0.25 milliGRAM(s) Inhalation two times a day  dextrose 5%. 1000 milliLiter(s) (50 mL/Hr) IV Continuous <Continuous>  dextrose 5%. 1000 milliLiter(s) (50 mL/Hr) IV Continuous <Continuous>  dextrose 50% Injectable 12.5 Gram(s) IV Push once  dextrose 50% Injectable 25 Gram(s) IV Push once  dextrose 50% Injectable 25 Gram(s) IV Push once  dextrose 50% Injectable 12.5 Gram(s) IV Push once  dextrose 50% Injectable 25 Gram(s) IV Push once  dextrose 50% Injectable 25 Gram(s) IV Push once  diltiazem    milliGRAM(s) Oral daily  fluticasone propionate 50 MICROgram(s)/spray Nasal Spray 2 Spray(s) Both Nostrils daily  insulin lispro (HumaLOG) corrective regimen sliding scale   SubCutaneous three times a day before meals  insulin lispro (HumaLOG) corrective regimen sliding scale   SubCutaneous at bedtime  lamoTRIgine 25 milliGRAM(s) Oral daily  methimazole 5 milliGRAM(s) Oral daily  pantoprazole    Tablet 40 milliGRAM(s) Oral before breakfast  PARoxetine 20 milliGRAM(s) Oral daily  piperacillin/tazobactam IVPB. 3.375 Gram(s) IV Intermittent every 8 hours    MEDICATIONS  (PRN):  acetaminophen   Tablet 650 milliGRAM(s) Oral every 6 hours PRN For Temp greater than 38.5 C (101.3 F)  acetaminophen   Tablet. 650 milliGRAM(s) Oral every 6 hours PRN Mild Pain (1 - 3)  ALBUTerol    90 MICROgram(s) HFA Inhaler 2 Puff(s) Inhalation every 6 hours PRN Shortness of Breath and/or Wheezing  ALPRAZolam 0.25 milliGRAM(s) Oral every 8 hours PRN anxiety  dextrose Gel 1 Dose(s) Oral once PRN Blood Glucose LESS THAN 70 milliGRAM(s)/deciliter  dextrose Gel 1 Dose(s) Oral once PRN Blood Glucose LESS THAN 70 milliGRAM(s)/deciliter  glucagon  Injectable 1 milliGRAM(s) IntraMuscular once PRN Glucose LESS THAN 70 milligrams/deciliter  glucagon  Injectable 1 milliGRAM(s) IntraMuscular once PRN Glucose LESS THAN 70 milligrams/deciliter  polyethylene glycol 3350 17 Gram(s) Oral daily PRN Constipation    Allergies    No Known Allergies    Intolerances          FAMILY HISTORY:  Sister with possible schizophrenia    [] Mental Retardation/Developmental Delay:  [] Cerebral Palsy:  [] Autism:  [] Deafness:  [] Speech Delay:  [] Blindness:  [] Learning Disorder:  [] Depression:  [] ADD  [] Bipolar Disorder:  [] Tourette  [] Obsessive Compulsive DIsorder:  [] Epilepsy  [] Psychosis  [] Other:    Social History Denies Tob/ETOH use      Vital Signs Last 24 Hrs  T(C): 37.1 (05 Apr 2018 20:54), Max: 38.3 (05 Apr 2018 14:45)  T(F): 98.8 (05 Apr 2018 20:54), Max: 100.9 (05 Apr 2018 14:45)  HR: 97 (05 Apr 2018 20:54) (96 - 101)  BP: 127/68 (05 Apr 2018 20:54) (127/68 - 146/62)  BP(mean): --  RR: 20 (05 Apr 2018 20:54) (16 - 20)  SpO2: 94% (05 Apr 2018 20:54) (94% - 96%)  Daily Height in cm: 180.34 (05 Apr 2018 20:54)    Daily       GENERAL PHYSICAL EXAM  All physical exam findings normal, except for those marked:  General:	well nourished, not acutely or chronically ill-appearing  HEENT:	normocephalic, atraumatic, clear conjunctiva, external ear normal, TM clear, nasal mucosa normal, oral pharynx clear  Neck:          supple, full range of motion, no nuchal rigidity  Extremities:	no joint swelling, erythema, tenderness; normal ROM, no contractures  Skin:		no rash    NEUROLOGIC EXAM  Mental Status:     Oriented to time/place/person; Good eye contact ; follow simple commands ;  Age appropriate language  and fund of  knowledge.  Cranial Nerves:   PERRL, EOMI, no facial asymmetry , V1-V3 intact , symmetric palate, tongue midline.   Eyes:			Normal: optic discs   Visual Fields:		Full visual field  Muscle Strength:	 Full strength 5/5, proximal and distal,  upper and lower extremities  Muscle Tone:	Slightly rigid UE bilateral  Deep Tendon Reflexes:         2+/4  : Biceps, Brachioradialis, Triceps Bilateral;  2+/4 : Patelar, Ankle bilateral. No clonus.  Plantar Response:	Plantar reflexes flexion bilaterally  Sensation:		Intact to pain, light touch, temperature and vibration throughout.  Coordination/	No dysmetria in finger to nose test bilaterally  Cerebellum	  Tandem Gait/Romberg	NT    Lab Results:                        10.5   23.8  )-----------( 513      ( 05 Apr 2018 15:30 )             31.7     04-05    139  |  97  |  20  ----------------------------<  155<H>  4.3   |  25  |  1.20    Ca    10.3      05 Apr 2018 15:30    TPro  8.5<H>  /  Alb  4.2  /  TBili  0.3  /  DBili  x   /  AST  12  /  ALT  8<L>  /  AlkPhos  95  04-05    LIVER FUNCTIONS - ( 05 Apr 2018 15:30 )  Alb: 4.2 g/dL / Pro: 8.5 g/dL / ALK PHOS: 95 U/L / ALT: 8 U/L RC / AST: 12 U/L / GGT: x           PT/INR - ( 05 Apr 2018 22:35 )   PT: 21.6 sec;   INR: 1.97 ratio         PTT - ( 05 Apr 2018 22:35 )  PTT:63.8 sec    EEG Results:    Imaging Studies:

## 2018-04-07 LAB
ANION GAP SERPL CALC-SCNC: 15 MMOL/L — SIGNIFICANT CHANGE UP (ref 5–17)
BASOPHILS # BLD AUTO: 0.01 K/UL — SIGNIFICANT CHANGE UP (ref 0–0.2)
BASOPHILS NFR BLD AUTO: 0.1 % — SIGNIFICANT CHANGE UP (ref 0–2)
BUN SERPL-MCNC: 18 MG/DL — SIGNIFICANT CHANGE UP (ref 7–23)
CALCIUM SERPL-MCNC: 9.3 MG/DL — SIGNIFICANT CHANGE UP (ref 8.4–10.5)
CHLORIDE SERPL-SCNC: 102 MMOL/L — SIGNIFICANT CHANGE UP (ref 96–108)
CO2 SERPL-SCNC: 25 MMOL/L — SIGNIFICANT CHANGE UP (ref 22–31)
CREAT SERPL-MCNC: 1.35 MG/DL — HIGH (ref 0.5–1.3)
EOSINOPHIL # BLD AUTO: 0.19 K/UL — SIGNIFICANT CHANGE UP (ref 0–0.5)
EOSINOPHIL NFR BLD AUTO: 1.9 % — SIGNIFICANT CHANGE UP (ref 0–6)
FOLATE SERPL-MCNC: 17.3 NG/ML — SIGNIFICANT CHANGE UP (ref 4.8–24.2)
GLUCOSE BLDC GLUCOMTR-MCNC: 142 MG/DL — HIGH (ref 70–99)
GLUCOSE BLDC GLUCOMTR-MCNC: 184 MG/DL — HIGH (ref 70–99)
GLUCOSE BLDC GLUCOMTR-MCNC: 196 MG/DL — HIGH (ref 70–99)
GLUCOSE BLDC GLUCOMTR-MCNC: 301 MG/DL — HIGH (ref 70–99)
GLUCOSE BLDC GLUCOMTR-MCNC: 371 MG/DL — HIGH (ref 70–99)
GLUCOSE SERPL-MCNC: 114 MG/DL — HIGH (ref 70–99)
HCT VFR BLD CALC: 30.2 % — LOW (ref 39–50)
HGB BLD-MCNC: 9.4 G/DL — LOW (ref 13–17)
IMM GRANULOCYTES NFR BLD AUTO: 0.4 % — SIGNIFICANT CHANGE UP (ref 0–1.5)
INR BLD: 1.55 RATIO — HIGH (ref 0.88–1.16)
LYMPHOCYTES # BLD AUTO: 0.94 K/UL — LOW (ref 1–3.3)
LYMPHOCYTES # BLD AUTO: 9.6 % — LOW (ref 13–44)
MCHC RBC-ENTMCNC: 29.7 PG — SIGNIFICANT CHANGE UP (ref 27–34)
MCHC RBC-ENTMCNC: 31.1 GM/DL — LOW (ref 32–36)
MCV RBC AUTO: 95.6 FL — SIGNIFICANT CHANGE UP (ref 80–100)
MONOCYTES # BLD AUTO: 0.93 K/UL — HIGH (ref 0–0.9)
MONOCYTES NFR BLD AUTO: 9.5 % — SIGNIFICANT CHANGE UP (ref 2–14)
NEUTROPHILS # BLD AUTO: 7.68 K/UL — HIGH (ref 1.8–7.4)
NEUTROPHILS NFR BLD AUTO: 78.5 % — HIGH (ref 43–77)
PLATELET # BLD AUTO: 435 K/UL — HIGH (ref 150–400)
POTASSIUM SERPL-MCNC: 3.8 MMOL/L — SIGNIFICANT CHANGE UP (ref 3.5–5.3)
POTASSIUM SERPL-SCNC: 3.8 MMOL/L — SIGNIFICANT CHANGE UP (ref 3.5–5.3)
PROTHROM AB SERPL-ACNC: 17.7 SEC — HIGH (ref 10–13.1)
RBC # BLD: 3.16 M/UL — LOW (ref 4.2–5.8)
RBC # FLD: 14.6 % — HIGH (ref 10.3–14.5)
SODIUM SERPL-SCNC: 142 MMOL/L — SIGNIFICANT CHANGE UP (ref 135–145)
VIT B12 SERPL-MCNC: 500 PG/ML — SIGNIFICANT CHANGE UP (ref 232–1245)
WBC # BLD: 9.79 K/UL — SIGNIFICANT CHANGE UP (ref 3.8–10.5)
WBC # FLD AUTO: 9.79 K/UL — SIGNIFICANT CHANGE UP (ref 3.8–10.5)

## 2018-04-07 RX ORDER — WARFARIN SODIUM 2.5 MG/1
2 TABLET ORAL ONCE
Qty: 0 | Refills: 0 | Status: COMPLETED | OUTPATIENT
Start: 2018-04-07 | End: 2018-04-07

## 2018-04-07 RX ORDER — SODIUM CHLORIDE 9 MG/ML
1000 INJECTION INTRAMUSCULAR; INTRAVENOUS; SUBCUTANEOUS
Qty: 0 | Refills: 0 | Status: DISCONTINUED | OUTPATIENT
Start: 2018-04-07 | End: 2018-04-08

## 2018-04-07 RX ADMIN — PANTOPRAZOLE SODIUM 40 MILLIGRAM(S): 20 TABLET, DELAYED RELEASE ORAL at 06:28

## 2018-04-07 RX ADMIN — Medication 3 MILLILITER(S): at 00:00

## 2018-04-07 RX ADMIN — ATORVASTATIN CALCIUM 40 MILLIGRAM(S): 80 TABLET, FILM COATED ORAL at 21:57

## 2018-04-07 RX ADMIN — Medication 3 MILLILITER(S): at 05:39

## 2018-04-07 RX ADMIN — LAMOTRIGINE 25 MILLIGRAM(S): 25 TABLET, ORALLY DISINTEGRATING ORAL at 11:35

## 2018-04-07 RX ADMIN — Medication 0.25 MILLIGRAM(S): at 05:39

## 2018-04-07 RX ADMIN — Medication 3 MILLILITER(S): at 11:35

## 2018-04-07 RX ADMIN — PIPERACILLIN AND TAZOBACTAM 25 GRAM(S): 4; .5 INJECTION, POWDER, LYOPHILIZED, FOR SOLUTION INTRAVENOUS at 09:08

## 2018-04-07 RX ADMIN — Medication 3 MILLILITER(S): at 17:42

## 2018-04-07 RX ADMIN — Medication 20 MILLIGRAM(S): at 11:36

## 2018-04-07 RX ADMIN — Medication 2 SPRAY(S): at 11:35

## 2018-04-07 RX ADMIN — Medication 0.25 MILLIGRAM(S): at 22:37

## 2018-04-07 RX ADMIN — POLYETHYLENE GLYCOL 3350 17 GRAM(S): 17 POWDER, FOR SOLUTION ORAL at 22:38

## 2018-04-07 RX ADMIN — Medication 5: at 14:13

## 2018-04-07 RX ADMIN — SODIUM CHLORIDE 75 MILLILITER(S): 9 INJECTION INTRAMUSCULAR; INTRAVENOUS; SUBCUTANEOUS at 16:22

## 2018-04-07 RX ADMIN — PIPERACILLIN AND TAZOBACTAM 25 GRAM(S): 4; .5 INJECTION, POWDER, LYOPHILIZED, FOR SOLUTION INTRAVENOUS at 16:22

## 2018-04-07 RX ADMIN — Medication 300 MILLIGRAM(S): at 06:29

## 2018-04-07 RX ADMIN — WARFARIN SODIUM 2 MILLIGRAM(S): 2.5 TABLET ORAL at 21:57

## 2018-04-07 NOTE — PROGRESS NOTE ADULT - SUBJECTIVE AND OBJECTIVE BOX
BASHIR BECKER  Patient is a 89y old  Male who presents with a chief complaint of FTT, hallucinations, AMS (06 Apr 2018 00:44)    HPI:  88yo male w hx of afib on coumadin, DM2, COPD, hypothyroidism, presenting with decreased intake food and water for 5 days, more confused, and seeing and hearing people/animals in his room. had similar issues in past with UTIs. Seen by PMD who referred to ED. Daughter was informed and agreed to transfer to ED. Takes paxil and quetiapine. Notes SOB chronic, unchanged from baseline. Supposed to use oxygen at home, but doesn't. Notes trouble going to bathroom  n/v/c/d/cp/weakness.  Patient says he has been seeing hallucinations for 1.5 years now. Patient notes chronic edema around his eyes.   Aide reports he had hallucinations with last visit to ED and for last 5 days, but doesn't think this has been consistent for 1.5 years (05 Apr 2018 20:48)  No events overnight, calm.    acetaminophen   Tablet 650 milliGRAM(s) Oral every 6 hours PRN  acetaminophen   Tablet. 650 milliGRAM(s) Oral every 6 hours PRN  ALBUTerol/ipratropium for Nebulization 3 milliLiter(s) Nebulizer every 6 hours  ALPRAZolam 0.25 milliGRAM(s) Oral every 8 hours PRN  atorvastatin 40 milliGRAM(s) Oral at bedtime  buDESOnide   0.25 milliGRAM(s) Respule 0.25 milliGRAM(s) Inhalation two times a day  dextrose 5%. 1000 milliLiter(s) IV Continuous <Continuous>  dextrose 5%. 1000 milliLiter(s) IV Continuous <Continuous>  dextrose 50% Injectable 12.5 Gram(s) IV Push once  dextrose 50% Injectable 25 Gram(s) IV Push once  dextrose 50% Injectable 25 Gram(s) IV Push once  dextrose 50% Injectable 12.5 Gram(s) IV Push once  dextrose 50% Injectable 25 Gram(s) IV Push once  dextrose 50% Injectable 25 Gram(s) IV Push once  dextrose Gel 1 Dose(s) Oral once PRN  dextrose Gel 1 Dose(s) Oral once PRN  diltiazem    milliGRAM(s) Oral daily  fluticasone propionate 50 MICROgram(s)/spray Nasal Spray 2 Spray(s) Both Nostrils daily  glucagon  Injectable 1 milliGRAM(s) IntraMuscular once PRN  glucagon  Injectable 1 milliGRAM(s) IntraMuscular once PRN  insulin lispro (HumaLOG) corrective regimen sliding scale   SubCutaneous three times a day before meals  insulin lispro (HumaLOG) corrective regimen sliding scale   SubCutaneous at bedtime  lamoTRIgine 25 milliGRAM(s) Oral daily  methimazole 5 milliGRAM(s) Oral daily  pantoprazole    Tablet 40 milliGRAM(s) Oral before breakfast  PARoxetine 20 milliGRAM(s) Oral daily  piperacillin/tazobactam IVPB. 3.375 Gram(s) IV Intermittent every 8 hours  polyethylene glycol 3350 17 Gram(s) Oral daily PRN    Vital Signs Last 24 Hrs  T(C): 36.7 (06 Apr 2018 21:58), Max: 36.7 (06 Apr 2018 21:58)  T(F): 98 (06 Apr 2018 21:58), Max: 98 (06 Apr 2018 21:58)  HR: 70 (06 Apr 2018 21:58) (70 - 101)  BP: 136/66 (06 Apr 2018 21:58) (116/64 - 167/72)  BP(mean): --  RR: 18 (06 Apr 2018 21:58) (18 - 20)  SpO2: 99% (06 Apr 2018 21:58) (97% - 99%)  Resting comfortably, no facial asymmetry, moves extremities without focal deficits.                        9.3    18.23 )-----------( 443      ( 06 Apr 2018 08:19 )             30.0     04-06    140  |  99  |  22  ----------------------------<  144<H>  3.8   |  27  |  1.25    Ca    9.5      06 Apr 2018 07:22    TPro  8.5<H>  /  Alb  4.2  /  TBili  0.3  /  DBili  x   /  AST  12  /  ALT  8<L>  /  AlkPhos  95  04-05    CT Head- atrphy, IWMD, reduced volume of mammilary bodies.    Impression: Long standing episodic hallucinations, stable while in hospital.  	      Abnormality on CT as described.    Plan: Check thiamine level. Treat as appropriate if low.          Please call if needed.

## 2018-04-07 NOTE — PROGRESS NOTE ADULT - SUBJECTIVE AND OBJECTIVE BOX
Patient is a 89y old  Male who presents with a chief complaint of FTT, hallucinations, AMS (2018 00:44)      SUBJECTIVE / OVERNIGHT EVENTS: No new complaints. c/o weight loss.  Review of Systems  chest pain no  palpitations no  sob no  nausea no  headache no    MEDICATIONS  (STANDING):  ALBUTerol/ipratropium for Nebulization 3 milliLiter(s) Nebulizer every 6 hours  atorvastatin 40 milliGRAM(s) Oral at bedtime  buDESOnide   0.25 milliGRAM(s) Respule 0.25 milliGRAM(s) Inhalation two times a day  dextrose 5%. 1000 milliLiter(s) (50 mL/Hr) IV Continuous <Continuous>  dextrose 5%. 1000 milliLiter(s) (50 mL/Hr) IV Continuous <Continuous>  dextrose 50% Injectable 12.5 Gram(s) IV Push once  dextrose 50% Injectable 25 Gram(s) IV Push once  dextrose 50% Injectable 25 Gram(s) IV Push once  dextrose 50% Injectable 12.5 Gram(s) IV Push once  dextrose 50% Injectable 25 Gram(s) IV Push once  dextrose 50% Injectable 25 Gram(s) IV Push once  diltiazem    milliGRAM(s) Oral daily  fluticasone propionate 50 MICROgram(s)/spray Nasal Spray 2 Spray(s) Both Nostrils daily  insulin lispro (HumaLOG) corrective regimen sliding scale   SubCutaneous three times a day before meals  insulin lispro (HumaLOG) corrective regimen sliding scale   SubCutaneous at bedtime  lamoTRIgine 25 milliGRAM(s) Oral daily  methimazole 5 milliGRAM(s) Oral daily  pantoprazole    Tablet 40 milliGRAM(s) Oral before breakfast  PARoxetine 20 milliGRAM(s) Oral daily  piperacillin/tazobactam IVPB. 3.375 Gram(s) IV Intermittent every 8 hours    MEDICATIONS  (PRN):  acetaminophen   Tablet 650 milliGRAM(s) Oral every 6 hours PRN For Temp greater than 38.5 C (101.3 F)  acetaminophen   Tablet. 650 milliGRAM(s) Oral every 6 hours PRN Mild Pain (1 - 3)  ALPRAZolam 0.25 milliGRAM(s) Oral every 8 hours PRN anxiety  dextrose Gel 1 Dose(s) Oral once PRN Blood Glucose LESS THAN 70 milliGRAM(s)/deciliter  dextrose Gel 1 Dose(s) Oral once PRN Blood Glucose LESS THAN 70 milliGRAM(s)/deciliter  glucagon  Injectable 1 milliGRAM(s) IntraMuscular once PRN Glucose LESS THAN 70 milligrams/deciliter  glucagon  Injectable 1 milliGRAM(s) IntraMuscular once PRN Glucose LESS THAN 70 milligrams/deciliter  polyethylene glycol 3350 17 Gram(s) Oral daily PRN Constipation          PHYSICAL EXAM:  GENERAL: NAD  HEAD:  Atraumatic, Normocephalic  EYES: EOMI, PERRLA, conjunctiva and sclera clear  NECK: Supple, No JVD  CHEST/LUNG: Clear to auscultation bilaterally; No wheeze  HEART: Regular rate and rhythm; No murmurs, rubs, or gallops  ABDOMEN: Soft, Nontender, Nondistended; Bowel sounds present  EXTREMITIES:  2+ Peripheral Pulses, No clubbing, cyanosis, or edema  PSYCH: no more hallucinations  NEUROLOGY: non-focal  SKIN: No rashes or lesions    LABS:                        9.4    9.79  )-----------( 435      ( 2018 08:52 )             30.2     -    142  |  102  |  18  ----------------------------<  114<H>  3.8   |  25  |  1.35<H>    Ca    9.3      2018 07:25    TPro  8.5<H>  /  Alb  4.2  /  TBili  0.3  /  DBili  x   /  AST  12  /  ALT  8<L>  /  AlkPhos  95  04-05    PT/INR - ( 2018 08:52 )   PT: 17.7 sec;   INR: 1.55 ratio         PTT - ( 2018 22:35 )  PTT:63.8 sec  CARDIAC MARKERS ( 2018 15:30 )  x     / 0.01 ng/mL / 40 U/L / x     / 1.5 ng/mL      Urinalysis Basic - ( 2018 17:28 )    Color: Yellow / Appearance: SL Turbid / S.020 / pH: x  Gluc: x / Ketone: Negative  / Bili: Negative / Urobili: Negative   Blood: x / Protein: 150 mg/dL / Nitrite: Negative   Leuk Esterase: Negative / RBC: 10-25 /HPF / WBC 3-5 /HPF   Sq Epi: x / Non Sq Epi: x / Bacteria: x        RADIOLOGY & ADDITIONAL TESTS:    Imaging Personally Reviewed:    Consultant(s) Notes Reviewed:      Care Discussed with Consultants/Other Providers:

## 2018-04-07 NOTE — PHYSICAL THERAPY INITIAL EVALUATION ADULT - BED MOBILITY TRAINING, PT EVAL
Pt will perform bed mobility w/ [place] in 2 week Pt will perform bed mobility w/ [place] in 4 week

## 2018-04-07 NOTE — PHYSICAL THERAPY INITIAL EVALUATION ADULT - ADDITIONAL COMMENTS
Pt lives alone in a apartment with elevator access. Pt has [facility] 7 days a week/10 hours a day for assist with ADLs. Pt states he was IND with all mobility PTA, amb without device. Pt states he amb with a cane sometimes. Pt lives alone in a apartment with elevator access, ramp to enter building. Pt has [facility] 7 days a week/10 hours a day for assist with ADLs. Pt states he was IND with all mobility PTA, amb without device. Pt states he amb with a cane or RW. Per private aide, pt was much more steady on his feet prior to admission

## 2018-04-07 NOTE — PHYSICAL THERAPY INITIAL EVALUATION ADULT - BALANCE TRAINING, PT EVAL
Pt will demonstrate improved static/dynamic balance to Fair+, in order to improve stability, decrease fall risk and increase independence with ADLs within 2 weeks Pt will demonstrate improved static/dynamic balance to Good-, in order to improve stability, decrease fall risk and increase independence with ADLs within 4 weeks

## 2018-04-07 NOTE — PHYSICAL THERAPY INITIAL EVALUATION ADULT - PERTINENT HX OF CURRENT PROBLEM, REHAB EVAL
89 m with Sepsis probably from UTI vs Pneumonia- panculture, antibiotics, ID evaluation. US Abdomen: Hepatomegaly, with diffuse hepatic steatosis. Mild intrahepatic biliary ductal dilatation. However, common bile duct is within normal limits. Mildly contracted gallbladder with cholelithiasis. Nonvisualization of the pancreas. XRay Chest: Pleural plaques. Patchy opacities throughout both lungs which are new.  Etiology is uncertain.

## 2018-04-07 NOTE — PHYSICAL THERAPY INITIAL EVALUATION ADULT - TRANSFER TRAINING, PT EVAL
Pt will perform ALL transfers with independence, w/ RW as needed, in 2 weeks Pt will perform ALL transfers with independence, w/ RW as needed, in 4 weeks

## 2018-04-07 NOTE — PROGRESS NOTE ADULT - SUBJECTIVE AND OBJECTIVE BOX
Afebrile. O2 sat 97% on room air.  No shortness of breath at rest.  No significant cough or sputum production.  CT chest results reviewed.    Vital Signs Last 24 Hrs  T(C): 36.5 (07 Apr 2018 08:40), Max: 36.7 (06 Apr 2018 21:58)  T(F): 97.7 (07 Apr 2018 08:40), Max: 98.1 (07 Apr 2018 05:41)  HR: 78 (07 Apr 2018 08:40) (70 - 80)  BP: 129/54 (07 Apr 2018 08:40) (116/64 - 136/66)  BP(mean): --  RR: 18 (07 Apr 2018 08:40) (18 - 20)  SpO2: 99% (07 Apr 2018 08:40) (97% - 100%)    Medications:  MEDICATIONS  (STANDING):  ALBUTerol/ipratropium for Nebulization 3 milliLiter(s) Nebulizer every 6 hours  atorvastatin 40 milliGRAM(s) Oral at bedtime  buDESOnide   0.25 milliGRAM(s) Respule 0.25 milliGRAM(s) Inhalation two times a day  dextrose 5%. 1000 milliLiter(s) (50 mL/Hr) IV Continuous <Continuous>  dextrose 5%. 1000 milliLiter(s) (50 mL/Hr) IV Continuous <Continuous>  dextrose 50% Injectable 12.5 Gram(s) IV Push once  dextrose 50% Injectable 25 Gram(s) IV Push once  dextrose 50% Injectable 25 Gram(s) IV Push once  dextrose 50% Injectable 12.5 Gram(s) IV Push once  dextrose 50% Injectable 25 Gram(s) IV Push once  dextrose 50% Injectable 25 Gram(s) IV Push once  diltiazem    milliGRAM(s) Oral daily  fluticasone propionate 50 MICROgram(s)/spray Nasal Spray 2 Spray(s) Both Nostrils daily  insulin lispro (HumaLOG) corrective regimen sliding scale   SubCutaneous three times a day before meals  insulin lispro (HumaLOG) corrective regimen sliding scale   SubCutaneous at bedtime  lamoTRIgine 25 milliGRAM(s) Oral daily  methimazole 5 milliGRAM(s) Oral daily  pantoprazole    Tablet 40 milliGRAM(s) Oral before breakfast  PARoxetine 20 milliGRAM(s) Oral daily  piperacillin/tazobactam IVPB. 3.375 Gram(s) IV Intermittent every 8 hours    MEDICATIONS  (PRN):  acetaminophen   Tablet 650 milliGRAM(s) Oral every 6 hours PRN For Temp greater than 38.5 C (101.3 F)  acetaminophen   Tablet. 650 milliGRAM(s) Oral every 6 hours PRN Mild Pain (1 - 3)  ALPRAZolam 0.25 milliGRAM(s) Oral every 8 hours PRN anxiety  dextrose Gel 1 Dose(s) Oral once PRN Blood Glucose LESS THAN 70 milliGRAM(s)/deciliter  dextrose Gel 1 Dose(s) Oral once PRN Blood Glucose LESS THAN 70 milliGRAM(s)/deciliter  glucagon  Injectable 1 milliGRAM(s) IntraMuscular once PRN Glucose LESS THAN 70 milligrams/deciliter  glucagon  Injectable 1 milliGRAM(s) IntraMuscular once PRN Glucose LESS THAN 70 milligrams/deciliter  polyethylene glycol 3350 17 Gram(s) Oral daily PRN Constipation      Allergies    No Known Allergies    Intolerances        Physical Examination:    Pleasant. In no acute distress.  Neck: no JVD, LAD, accessory muscle use  PULM: Slightly coarse breath sounds bilaterally. No wheezes, rhonchi, or rales.  CVS: RR  Abdomen: nontender  Extremities: no edema    LAB:  WBC down to 9790   Blood/urine cultures negative to date    CT chest:   PROCEDURE DATE:  04/06/2018      INTERPRETATION:  CLINICAL INFORMATION: Decreased appetite, altered mental   status, sepsis.    COMPARISON: CT chest 1/14/2018, 11/18/2016    PROCEDURE:   CT of the Chest was performed without intravenous contrast.  Sagittal and coronal reformats were performed.      FINDINGS:    CHEST:     Lungs and large airways: Distal mucoid impaction. Right lower lobe   bronchiectasis, unchanged. Emphysema, unchanged.  Pleura:Unilaterally calcified right pleura.  Heart and pericardium: Heart size is normal. Trace pericardial effusion,   decreased as compared to the prior. Cardiac valvular calcifications.  Mediastinum and dylan: No lymphadenopathy.   Chest wall and lower neck: Unchanged hypodense nodule in the right lobe   of the thyroid.  Vessels: Atherosclerotic changes.  Bones: Chronic appearing fracture deformity of the L1 vertebral body.   Collimated out of view on the most recent prior and worsened as compared   to the 11/18/2016 prior.   Upper abdomen: Nonobstructing dependent gallbladder stones.    IMPRESSION: No focal consolidation. Emphysema, unchanged.    SURESH ELIZABETH M.D., RADIOLOGY RESIDENT  This document has been electronically signed.  LEONORA D WALLER M.D., ATTENDING RADIOLOGIST  This document has been electronically signed. Apr 6 2018 11:43AM      PLAN:  1. Continue Duoneb/Budesonide neb tx.  2. On Zosyn as per ID.  3. Coumadin dosing as per Medicine.    POC: Scott Duckworth M.D.  [phone]

## 2018-04-08 DIAGNOSIS — N17.9 ACUTE KIDNEY FAILURE, UNSPECIFIED: ICD-10-CM

## 2018-04-08 LAB
ANION GAP SERPL CALC-SCNC: 15 MMOL/L — SIGNIFICANT CHANGE UP (ref 5–17)
APPEARANCE UR: CLEAR — SIGNIFICANT CHANGE UP
BACTERIA # UR AUTO: ABNORMAL
BILIRUB UR-MCNC: NEGATIVE — SIGNIFICANT CHANGE UP
BUN SERPL-MCNC: 28 MG/DL — HIGH (ref 7–23)
CALCIUM SERPL-MCNC: 9.5 MG/DL — SIGNIFICANT CHANGE UP (ref 8.4–10.5)
CHLORIDE SERPL-SCNC: 103 MMOL/L — SIGNIFICANT CHANGE UP (ref 96–108)
CO2 SERPL-SCNC: 24 MMOL/L — SIGNIFICANT CHANGE UP (ref 22–31)
COLOR SPEC: YELLOW — SIGNIFICANT CHANGE UP
COMMENT - URINE: SIGNIFICANT CHANGE UP
CREAT ?TM UR-MCNC: 38 MG/DL — SIGNIFICANT CHANGE UP
CREAT SERPL-MCNC: 1.67 MG/DL — HIGH (ref 0.5–1.3)
DIFF PNL FLD: NEGATIVE — SIGNIFICANT CHANGE UP
EPI CELLS # UR: 4 /HPF — SIGNIFICANT CHANGE UP (ref 0–5)
GLUCOSE BLDC GLUCOMTR-MCNC: 146 MG/DL — HIGH (ref 70–99)
GLUCOSE BLDC GLUCOMTR-MCNC: 178 MG/DL — HIGH (ref 70–99)
GLUCOSE BLDC GLUCOMTR-MCNC: 239 MG/DL — HIGH (ref 70–99)
GLUCOSE BLDC GLUCOMTR-MCNC: 275 MG/DL — HIGH (ref 70–99)
GLUCOSE SERPL-MCNC: 150 MG/DL — HIGH (ref 70–99)
GLUCOSE UR QL: 500 MG/DL
GRAN CASTS # UR COMP ASSIST: 3 /LPF — HIGH
HCT VFR BLD CALC: 26.2 % — LOW (ref 39–50)
HGB BLD-MCNC: 8.5 G/DL — LOW (ref 13–17)
HYALINE CASTS # UR AUTO: 1 /LPF — SIGNIFICANT CHANGE UP (ref 0–7)
INR BLD: 1.95 RATIO — HIGH (ref 0.88–1.16)
KETONES UR-MCNC: NEGATIVE — SIGNIFICANT CHANGE UP
LEUKOCYTE ESTERASE UR-ACNC: NEGATIVE — SIGNIFICANT CHANGE UP
MCHC RBC-ENTMCNC: 29.6 PG — SIGNIFICANT CHANGE UP (ref 27–34)
MCHC RBC-ENTMCNC: 32.4 GM/DL — SIGNIFICANT CHANGE UP (ref 32–36)
MCV RBC AUTO: 91.3 FL — SIGNIFICANT CHANGE UP (ref 80–100)
NITRITE UR-MCNC: NEGATIVE — SIGNIFICANT CHANGE UP
PH UR: 5.5 — SIGNIFICANT CHANGE UP (ref 5–8)
PLATELET # BLD AUTO: 495 K/UL — HIGH (ref 150–400)
POTASSIUM SERPL-MCNC: 3.8 MMOL/L — SIGNIFICANT CHANGE UP (ref 3.5–5.3)
POTASSIUM SERPL-SCNC: 3.8 MMOL/L — SIGNIFICANT CHANGE UP (ref 3.5–5.3)
PROT UR-MCNC: ABNORMAL MG/DL
PROTHROM AB SERPL-ACNC: 22.3 SEC — HIGH (ref 10–13.1)
RBC # BLD: 2.87 M/UL — LOW (ref 4.2–5.8)
RBC # FLD: 14.8 % — HIGH (ref 10.3–14.5)
RBC CASTS # UR COMP ASSIST: 5 /HPF — HIGH (ref 0–4)
SODIUM SERPL-SCNC: 142 MMOL/L — SIGNIFICANT CHANGE UP (ref 135–145)
SODIUM UR-SCNC: 93 MMOL/L — SIGNIFICANT CHANGE UP
SP GR SPEC: 1.01 — SIGNIFICANT CHANGE UP (ref 1.01–1.02)
UROBILINOGEN FLD QL: NEGATIVE MG/DL — SIGNIFICANT CHANGE UP
WBC # BLD: 9.23 K/UL — SIGNIFICANT CHANGE UP (ref 3.8–10.5)
WBC # FLD AUTO: 9.23 K/UL — SIGNIFICANT CHANGE UP (ref 3.8–10.5)
WBC UR QL: 6 /HPF — HIGH (ref 0–5)

## 2018-04-08 PROCEDURE — 74176 CT ABD & PELVIS W/O CONTRAST: CPT | Mod: 26

## 2018-04-08 PROCEDURE — 99232 SBSQ HOSP IP/OBS MODERATE 35: CPT

## 2018-04-08 RX ORDER — SODIUM CHLORIDE 9 MG/ML
1000 INJECTION INTRAMUSCULAR; INTRAVENOUS; SUBCUTANEOUS
Qty: 0 | Refills: 0 | Status: DISCONTINUED | OUTPATIENT
Start: 2018-04-08 | End: 2018-04-09

## 2018-04-08 RX ADMIN — PIPERACILLIN AND TAZOBACTAM 25 GRAM(S): 4; .5 INJECTION, POWDER, LYOPHILIZED, FOR SOLUTION INTRAVENOUS at 16:18

## 2018-04-08 RX ADMIN — PIPERACILLIN AND TAZOBACTAM 25 GRAM(S): 4; .5 INJECTION, POWDER, LYOPHILIZED, FOR SOLUTION INTRAVENOUS at 08:09

## 2018-04-08 RX ADMIN — Medication 2 SPRAY(S): at 11:35

## 2018-04-08 RX ADMIN — Medication 20 MILLIGRAM(S): at 11:36

## 2018-04-08 RX ADMIN — Medication 1: at 09:00

## 2018-04-08 RX ADMIN — PIPERACILLIN AND TAZOBACTAM 25 GRAM(S): 4; .5 INJECTION, POWDER, LYOPHILIZED, FOR SOLUTION INTRAVENOUS at 00:03

## 2018-04-08 RX ADMIN — SODIUM CHLORIDE 75 MILLILITER(S): 9 INJECTION INTRAMUSCULAR; INTRAVENOUS; SUBCUTANEOUS at 18:00

## 2018-04-08 RX ADMIN — Medication 3 MILLILITER(S): at 12:12

## 2018-04-08 RX ADMIN — Medication 3 MILLILITER(S): at 05:18

## 2018-04-08 RX ADMIN — Medication 3: at 12:25

## 2018-04-08 RX ADMIN — Medication 300 MILLIGRAM(S): at 05:18

## 2018-04-08 RX ADMIN — Medication 3 MILLILITER(S): at 00:03

## 2018-04-08 RX ADMIN — Medication 3 MILLILITER(S): at 18:00

## 2018-04-08 RX ADMIN — PANTOPRAZOLE SODIUM 40 MILLIGRAM(S): 20 TABLET, DELAYED RELEASE ORAL at 05:18

## 2018-04-08 RX ADMIN — PIPERACILLIN AND TAZOBACTAM 25 GRAM(S): 4; .5 INJECTION, POWDER, LYOPHILIZED, FOR SOLUTION INTRAVENOUS at 23:02

## 2018-04-08 RX ADMIN — Medication 0.25 MILLIGRAM(S): at 05:18

## 2018-04-08 RX ADMIN — Medication 0.25 MILLIGRAM(S): at 18:26

## 2018-04-08 RX ADMIN — LAMOTRIGINE 25 MILLIGRAM(S): 25 TABLET, ORALLY DISINTEGRATING ORAL at 11:36

## 2018-04-08 RX ADMIN — ATORVASTATIN CALCIUM 40 MILLIGRAM(S): 80 TABLET, FILM COATED ORAL at 21:55

## 2018-04-08 RX ADMIN — SODIUM CHLORIDE 75 MILLILITER(S): 9 INJECTION INTRAMUSCULAR; INTRAVENOUS; SUBCUTANEOUS at 08:10

## 2018-04-08 NOTE — PROGRESS NOTE ADULT - SUBJECTIVE AND OBJECTIVE BOX
Afebrile. O2 sat 95% on 2 liters nasal O2.  OOB ---> chair, in no respiratory discomfort.  No cough or sputum production.  No SOB at rest.    Vital Signs Last 24 Hrs  T(C): 36.6 (08 Apr 2018 08:39), Max: 36.9 (08 Apr 2018 00:22)  T(F): 97.9 (08 Apr 2018 08:39), Max: 98.5 (08 Apr 2018 00:22)  HR: 95 (08 Apr 2018 08:39) (94 - 100)  BP: 134/54 (08 Apr 2018 08:39) (106/48 - 134/54)  BP(mean): --  RR: 18 (08 Apr 2018 08:39) (18 - 18)  SpO2: 95% (08 Apr 2018 08:39) (90% - 95%)    Medications:  MEDICATIONS  (STANDING):  ALBUTerol/ipratropium for Nebulization 3 milliLiter(s) Nebulizer every 6 hours  atorvastatin 40 milliGRAM(s) Oral at bedtime  buDESOnide   0.25 milliGRAM(s) Respule 0.25 milliGRAM(s) Inhalation two times a day  dextrose 5%. 1000 milliLiter(s) (50 mL/Hr) IV Continuous <Continuous>  dextrose 5%. 1000 milliLiter(s) (50 mL/Hr) IV Continuous <Continuous>  dextrose 50% Injectable 12.5 Gram(s) IV Push once  dextrose 50% Injectable 25 Gram(s) IV Push once  dextrose 50% Injectable 25 Gram(s) IV Push once  dextrose 50% Injectable 12.5 Gram(s) IV Push once  dextrose 50% Injectable 25 Gram(s) IV Push once  dextrose 50% Injectable 25 Gram(s) IV Push once  diltiazem    milliGRAM(s) Oral daily  fluticasone propionate 50 MICROgram(s)/spray Nasal Spray 2 Spray(s) Both Nostrils daily  insulin lispro (HumaLOG) corrective regimen sliding scale   SubCutaneous three times a day before meals  insulin lispro (HumaLOG) corrective regimen sliding scale   SubCutaneous at bedtime  lamoTRIgine 25 milliGRAM(s) Oral daily  methimazole 5 milliGRAM(s) Oral daily  pantoprazole    Tablet 40 milliGRAM(s) Oral before breakfast  PARoxetine 20 milliGRAM(s) Oral daily  piperacillin/tazobactam IVPB. 3.375 Gram(s) IV Intermittent every 8 hours  sodium chloride 0.9%. 1000 milliLiter(s) (75 mL/Hr) IV Continuous <Continuous>    MEDICATIONS  (PRN):  acetaminophen   Tablet 650 milliGRAM(s) Oral every 6 hours PRN For Temp greater than 38.5 C (101.3 F)  acetaminophen   Tablet. 650 milliGRAM(s) Oral every 6 hours PRN Mild Pain (1 - 3)  ALPRAZolam 0.25 milliGRAM(s) Oral every 8 hours PRN anxiety  dextrose Gel 1 Dose(s) Oral once PRN Blood Glucose LESS THAN 70 milliGRAM(s)/deciliter  dextrose Gel 1 Dose(s) Oral once PRN Blood Glucose LESS THAN 70 milliGRAM(s)/deciliter  glucagon  Injectable 1 milliGRAM(s) IntraMuscular once PRN Glucose LESS THAN 70 milligrams/deciliter  glucagon  Injectable 1 milliGRAM(s) IntraMuscular once PRN Glucose LESS THAN 70 milligrams/deciliter  polyethylene glycol 3350 17 Gram(s) Oral daily PRN Constipation      Allergies    No Known Allergies    Intolerances        Physical Examination:  Pleasant. In no acute distress.  Neck: no JVD, LAD, accessory muscle use  PULM: Slightly coarse breath sounds bilaterally. No wheezes, rhonchi, or rales.  CVS: RR  Abdomen: nontender  Extremities: no edema    LAB:  WBC 9230   Blood/urine cultures negative to date  INR 1.95    CT chest:   PROCEDURE DATE:  04/06/2018      INTERPRETATION:  CLINICAL INFORMATION: Decreased appetite, altered mental   status, sepsis.    COMPARISON: CT chest 1/14/2018, 11/18/2016    PROCEDURE:   CT of the Chest was performed without intravenous contrast.  Sagittal and coronal reformats were performed.      FINDINGS:    CHEST:     Lungs and large airways: Distal mucoid impaction. Right lower lobe   bronchiectasis, unchanged. Emphysema, unchanged.  Pleura:Unilaterally calcified right pleura.  Heart and pericardium: Heart size is normal. Trace pericardial effusion,   decreased as compared to the prior. Cardiac valvular calcifications.  Mediastinum and dylan: No lymphadenopathy.   Chest wall and lower neck: Unchanged hypodense nodule in the right lobe   of the thyroid.  Vessels: Atherosclerotic changes.  Bones: Chronic appearing fracture deformity of the L1 vertebral body.   Collimated out of view on the most recent prior and worsened as compared   to the 11/18/2016 prior.   Upper abdomen: Nonobstructing dependent gallbladder stones.    IMPRESSION: No focal consolidation. Emphysema, unchanged.    PLAN:  1. Continue Duoneb/Budesonide neb tx.  2. On Zosyn as per ID.  3. Coumadin dosing as per Medicine.    POC: Scott Duckworth M.D.  [phone]                              Impression:          Plan:          POC: Scott Duckworth M.D.  [phone] Afebrile. O2 sat 95% on 2 liters nasal O2.  OOB ---> chair, in no respiratory discomfort.  No cough or sputum production.  No SOB at rest.    Vital Signs Last 24 Hrs  T(C): 36.6 (08 Apr 2018 08:39), Max: 36.9 (08 Apr 2018 00:22)  T(F): 97.9 (08 Apr 2018 08:39), Max: 98.5 (08 Apr 2018 00:22)  HR: 95 (08 Apr 2018 08:39) (94 - 100)  BP: 134/54 (08 Apr 2018 08:39) (106/48 - 134/54)  BP(mean): --  RR: 18 (08 Apr 2018 08:39) (18 - 18)  SpO2: 95% (08 Apr 2018 08:39) (90% - 95%)    Medications:  MEDICATIONS  (STANDING):  ALBUTerol/ipratropium for Nebulization 3 milliLiter(s) Nebulizer every 6 hours  atorvastatin 40 milliGRAM(s) Oral at bedtime  buDESOnide   0.25 milliGRAM(s) Respule 0.25 milliGRAM(s) Inhalation two times a day  dextrose 5%. 1000 milliLiter(s) (50 mL/Hr) IV Continuous <Continuous>  dextrose 5%. 1000 milliLiter(s) (50 mL/Hr) IV Continuous <Continuous>  dextrose 50% Injectable 12.5 Gram(s) IV Push once  dextrose 50% Injectable 25 Gram(s) IV Push once  dextrose 50% Injectable 25 Gram(s) IV Push once  dextrose 50% Injectable 12.5 Gram(s) IV Push once  dextrose 50% Injectable 25 Gram(s) IV Push once  dextrose 50% Injectable 25 Gram(s) IV Push once  diltiazem    milliGRAM(s) Oral daily  fluticasone propionate 50 MICROgram(s)/spray Nasal Spray 2 Spray(s) Both Nostrils daily  insulin lispro (HumaLOG) corrective regimen sliding scale   SubCutaneous three times a day before meals  insulin lispro (HumaLOG) corrective regimen sliding scale   SubCutaneous at bedtime  lamoTRIgine 25 milliGRAM(s) Oral daily  methimazole 5 milliGRAM(s) Oral daily  pantoprazole    Tablet 40 milliGRAM(s) Oral before breakfast  PARoxetine 20 milliGRAM(s) Oral daily  piperacillin/tazobactam IVPB. 3.375 Gram(s) IV Intermittent every 8 hours  sodium chloride 0.9%. 1000 milliLiter(s) (75 mL/Hr) IV Continuous <Continuous>    MEDICATIONS  (PRN):  acetaminophen   Tablet 650 milliGRAM(s) Oral every 6 hours PRN For Temp greater than 38.5 C (101.3 F)  acetaminophen   Tablet. 650 milliGRAM(s) Oral every 6 hours PRN Mild Pain (1 - 3)  ALPRAZolam 0.25 milliGRAM(s) Oral every 8 hours PRN anxiety  dextrose Gel 1 Dose(s) Oral once PRN Blood Glucose LESS THAN 70 milliGRAM(s)/deciliter  dextrose Gel 1 Dose(s) Oral once PRN Blood Glucose LESS THAN 70 milliGRAM(s)/deciliter  glucagon  Injectable 1 milliGRAM(s) IntraMuscular once PRN Glucose LESS THAN 70 milligrams/deciliter  glucagon  Injectable 1 milliGRAM(s) IntraMuscular once PRN Glucose LESS THAN 70 milligrams/deciliter  polyethylene glycol 3350 17 Gram(s) Oral daily PRN Constipation      Allergies    No Known Allergies    Intolerances        Physical Examination:  Pleasant. In no acute distress.  Neck: no JVD, LAD, accessory muscle use  PULM: Slightly coarse breath sounds bilaterally. No wheezes, rhonchi, or rales.  CVS: RR  Abdomen: nontender  Extremities: no edema    LAB:  WBC 9230   Blood/urine cultures negative to date  INR 1.95    CT chest:   PROCEDURE DATE:  04/06/2018      INTERPRETATION:  CLINICAL INFORMATION: Decreased appetite, altered mental   status, sepsis.    COMPARISON: CT chest 1/14/2018, 11/18/2016    PROCEDURE:   CT of the Chest was performed without intravenous contrast.  Sagittal and coronal reformats were performed.      FINDINGS:    CHEST:     Lungs and large airways: Distal mucoid impaction. Right lower lobe   bronchiectasis, unchanged. Emphysema, unchanged.  Pleura:Unilaterally calcified right pleura.  Heart and pericardium: Heart size is normal. Trace pericardial effusion,   decreased as compared to the prior. Cardiac valvular calcifications.  Mediastinum and dylan: No lymphadenopathy.   Chest wall and lower neck: Unchanged hypodense nodule in the right lobe   of the thyroid.  Vessels: Atherosclerotic changes.  Bones: Chronic appearing fracture deformity of the L1 vertebral body.   Collimated out of view on the most recent prior and worsened as compared   to the 11/18/2016 prior.   Upper abdomen: Nonobstructing dependent gallbladder stones.    IMPRESSION: No focal consolidation. Emphysema, unchanged.    PLAN:  1. Continue Duoneb/Budesonide neb tx.  2. On Zosyn as per ID.  3. Coumadin dosing as per Medicine.    POC: Scott Duckworth M.D.  [phone]

## 2018-04-08 NOTE — PROGRESS NOTE ADULT - SUBJECTIVE AND OBJECTIVE BOX
infectious diseases progress note:    Patient is a 89y old  Male who presents with a chief complaint of FTT, hallucinations, AMS (06 Apr 2018 00:44)        Pneumonia        ROS:  CONSTITUTIONAL:  Negative fever or chills, feels well, good appetite  EYES:  Negative  blurry vision or double vision  CARDIOVASCULAR:  Negative for chest pain or palpitations  RESPIRATORY:  Negative for cough, wheezing, or SOB   GASTROINTESTINAL:  Negative for nausea, vomiting, diarrhea, constipation, or abdominal pain  GENITOURINARY:  Negative frequency, urgency or dysuria  NEUROLOGIC:  No headache, confusion, dizziness, lightheadedness    Allergies    No Known Allergies    Intolerances        ANTIBIOTICS/RELEVANT:  antimicrobials  piperacillin/tazobactam IVPB. 3.375 Gram(s) IV Intermittent every 8 hours    immunologic:    OTHER:  acetaminophen   Tablet 650 milliGRAM(s) Oral every 6 hours PRN  acetaminophen   Tablet. 650 milliGRAM(s) Oral every 6 hours PRN  ALBUTerol/ipratropium for Nebulization 3 milliLiter(s) Nebulizer every 6 hours  ALPRAZolam 0.25 milliGRAM(s) Oral every 8 hours PRN  atorvastatin 40 milliGRAM(s) Oral at bedtime  buDESOnide   0.25 milliGRAM(s) Respule 0.25 milliGRAM(s) Inhalation two times a day  dextrose 5%. 1000 milliLiter(s) IV Continuous <Continuous>  dextrose 5%. 1000 milliLiter(s) IV Continuous <Continuous>  dextrose 50% Injectable 12.5 Gram(s) IV Push once  dextrose 50% Injectable 25 Gram(s) IV Push once  dextrose 50% Injectable 25 Gram(s) IV Push once  dextrose 50% Injectable 12.5 Gram(s) IV Push once  dextrose 50% Injectable 25 Gram(s) IV Push once  dextrose 50% Injectable 25 Gram(s) IV Push once  dextrose Gel 1 Dose(s) Oral once PRN  dextrose Gel 1 Dose(s) Oral once PRN  diltiazem    milliGRAM(s) Oral daily  fluticasone propionate 50 MICROgram(s)/spray Nasal Spray 2 Spray(s) Both Nostrils daily  glucagon  Injectable 1 milliGRAM(s) IntraMuscular once PRN  glucagon  Injectable 1 milliGRAM(s) IntraMuscular once PRN  insulin lispro (HumaLOG) corrective regimen sliding scale   SubCutaneous three times a day before meals  insulin lispro (HumaLOG) corrective regimen sliding scale   SubCutaneous at bedtime  lamoTRIgine 25 milliGRAM(s) Oral daily  methimazole 5 milliGRAM(s) Oral daily  pantoprazole    Tablet 40 milliGRAM(s) Oral before breakfast  PARoxetine 20 milliGRAM(s) Oral daily  polyethylene glycol 3350 17 Gram(s) Oral daily PRN  sodium chloride 0.9%. 1000 milliLiter(s) IV Continuous <Continuous>      Objective:  Vital Signs Last 24 Hrs  T(C): 36.7 (08 Apr 2018 05:13), Max: 36.9 (08 Apr 2018 00:22)  T(F): 98.1 (08 Apr 2018 05:13), Max: 98.5 (08 Apr 2018 00:22)  HR: 100 (08 Apr 2018 05:13) (78 - 100)  BP: 116/61 (08 Apr 2018 05:13) (106/48 - 129/54)  BP(mean): --  RR: 18 (08 Apr 2018 05:13) (18 - 18)  SpO2: 95% (08 Apr 2018 05:13) (90% - 99%)    PHYSICAL EXAM:   slightly confused   Eyes:ROJELIO, EOMI  Ear/Nose/Throat: no oral lesion, no sinus tenderness on percussion	  Neck:no JVD, no lymphadenopathy, supple  Respiratory: CTA wilfredo  Cardiovascular: S1S2 RRR, no murmurs  Gastrointestinal:soft, (+) BS, no HSM  Extremities:no e/e/c        LABS:                        9.4    9.79  )-----------( 435      ( 07 Apr 2018 08:52 )             30.2     04-07    142  |  102  |  18  ----------------------------<  114<H>  3.8   |  25  |  1.35<H>    Ca    9.3      07 Apr 2018 07:25      PT/INR - ( 07 Apr 2018 08:52 )   PT: 17.7 sec;   INR: 1.55 ratio                 MICROBIOLOGY:    RECENT CULTURES:  04-05 @ 19:50 .Blood Blood-Peripheral                No growth to date.    04-05 @ 17:23 .Urine Clean Catch (Midstream)                No growth          RESPIRATORY CULTURES:              RADIOLOGY & ADDITIONAL STUDIES:        Pager [phone]  After 5 pm/weekends or if no response :[phone]

## 2018-04-08 NOTE — CHART NOTE - NSCHARTNOTEFT_GEN_A_CORE
Received call from RN in regards to patient and daughter complaining about Dysphagia diet with Nectar consistency liquids while awaiting a Speech Eval.   Discussed case with Speech pathologist as patient is known to the service and did not follow up with outpt MBS as was recommended in prior admission.   Discussed with patient and daughter who state they would prefer not to have a Speech eval and want patient on a Regular diet with thin liquids understanding he is high risk for aspiration.   Risks of aspiration discussed with daughter and she states understanding. Instructions on preventing aspiration reinforced.    Discontinued Speech eval and will place patient on a consist carb diet. Discussed with Dr. Devlin.

## 2018-04-08 NOTE — PROGRESS NOTE ADULT - SUBJECTIVE AND OBJECTIVE BOX
Patient is a 89y old  Male who presents with a chief complaint of FTT, hallucinations, AMS (06 Apr 2018 00:44)      SUBJECTIVE / OVERNIGHT EVENTS: Comfortable without new complaints.   Review of Systems  chest pain no  palpitations no  sob no  nausea no  headache no    MEDICATIONS  (STANDING):  ALBUTerol/ipratropium for Nebulization 3 milliLiter(s) Nebulizer every 6 hours  atorvastatin 40 milliGRAM(s) Oral at bedtime  buDESOnide   0.25 milliGRAM(s) Respule 0.25 milliGRAM(s) Inhalation two times a day  dextrose 5%. 1000 milliLiter(s) (50 mL/Hr) IV Continuous <Continuous>  dextrose 5%. 1000 milliLiter(s) (50 mL/Hr) IV Continuous <Continuous>  dextrose 50% Injectable 12.5 Gram(s) IV Push once  dextrose 50% Injectable 25 Gram(s) IV Push once  dextrose 50% Injectable 25 Gram(s) IV Push once  dextrose 50% Injectable 12.5 Gram(s) IV Push once  dextrose 50% Injectable 25 Gram(s) IV Push once  dextrose 50% Injectable 25 Gram(s) IV Push once  diltiazem    milliGRAM(s) Oral daily  fluticasone propionate 50 MICROgram(s)/spray Nasal Spray 2 Spray(s) Both Nostrils daily  insulin lispro (HumaLOG) corrective regimen sliding scale   SubCutaneous three times a day before meals  insulin lispro (HumaLOG) corrective regimen sliding scale   SubCutaneous at bedtime  lamoTRIgine 25 milliGRAM(s) Oral daily  methimazole 5 milliGRAM(s) Oral daily  pantoprazole    Tablet 40 milliGRAM(s) Oral before breakfast  PARoxetine 20 milliGRAM(s) Oral daily  piperacillin/tazobactam IVPB. 3.375 Gram(s) IV Intermittent every 8 hours  sodium chloride 0.9%. 1000 milliLiter(s) (75 mL/Hr) IV Continuous <Continuous>    MEDICATIONS  (PRN):  acetaminophen   Tablet 650 milliGRAM(s) Oral every 6 hours PRN For Temp greater than 38.5 C (101.3 F)  acetaminophen   Tablet. 650 milliGRAM(s) Oral every 6 hours PRN Mild Pain (1 - 3)  ALPRAZolam 0.25 milliGRAM(s) Oral every 8 hours PRN anxiety  dextrose Gel 1 Dose(s) Oral once PRN Blood Glucose LESS THAN 70 milliGRAM(s)/deciliter  dextrose Gel 1 Dose(s) Oral once PRN Blood Glucose LESS THAN 70 milliGRAM(s)/deciliter  glucagon  Injectable 1 milliGRAM(s) IntraMuscular once PRN Glucose LESS THAN 70 milligrams/deciliter  glucagon  Injectable 1 milliGRAM(s) IntraMuscular once PRN Glucose LESS THAN 70 milligrams/deciliter  polyethylene glycol 3350 17 Gram(s) Oral daily PRN Constipation          PHYSICAL EXAM:  GENERAL: NAD  HEAD:  Atraumatic, Normocephalic  EYES: EOMI, PERRLA, conjunctiva and sclera clear  NECK: Supple, No JVD  CHEST/LUNG: Clear to auscultation bilaterally; No wheeze  HEART: Regular rate and rhythm; No murmurs, rubs, or gallops  ABDOMEN: Soft, Nontender, Nondistended; Bowel sounds present  EXTREMITIES:  2+ Peripheral Pulses, No clubbing, cyanosis, or edema  NEUROLOGY: non-focal  SKIN: No rashes or lesions    LABS:                        8.5    9.23  )-----------( 495      ( 08 Apr 2018 11:54 )             26.2     04-08    142  |  103  |  28<H>  ----------------------------<  150<H>  3.8   |  24  |  1.67<H>    Ca    9.5      08 Apr 2018 07:15      PT/INR - ( 08 Apr 2018 09:38 )   PT: 22.3 sec;   INR: 1.95 ratio                   RADIOLOGY & ADDITIONAL TESTS:    Imaging Personally Reviewed:    Consultant(s) Notes Reviewed:      Care Discussed with Consultants/Other Providers:

## 2018-04-08 NOTE — CONSULT NOTE ADULT - SUBJECTIVE AND OBJECTIVE BOX
Dr. Amador  Office [phone]  Cell [phone]  Rachna CROCKETT  Cell [phone]    HPI:  88yo male w hx of afib on coumadin, DM2, COPD, presenting with decreased intake of food and water, more confused, and seeing and hearing people/animals in his room. had similar issues in past with UTIs. Seen by PMD who referred to ED. Daughter was informed and agreed to transfer to ED. Takes paxil and quetiapine. Notes SOB chronic, unchanged from baseline. Supposed to use oxygen at home, but doesn't. Notes trouble going to bathroom  n/v/c/d/cp/weakness.  Patient says he has been seeing hallucinations for 1.5 years now. Patient notes chronic edema around his eyes.  Has worsening of renal function, as per patient he is making good urine.       Allergies:  No Known Allergies      PAST MEDICAL & SURGICAL HISTORY:  Goiter  Diabetes type 2, controlled  COPD (chronic obstructive pulmonary disease)  Atrial fibrillation  History of total hip arthroplasty, right      Home Medications Reviewed    Hospital Medications:   MEDICATIONS  (STANDING):  ALBUTerol/ipratropium for Nebulization 3 milliLiter(s) Nebulizer every 6 hours  atorvastatin 40 milliGRAM(s) Oral at bedtime  buDESOnide   0.25 milliGRAM(s) Respule 0.25 milliGRAM(s) Inhalation two times a day  dextrose 5%. 1000 milliLiter(s) (50 mL/Hr) IV Continuous <Continuous>  dextrose 5%. 1000 milliLiter(s) (50 mL/Hr) IV Continuous <Continuous>  dextrose 50% Injectable 12.5 Gram(s) IV Push once  dextrose 50% Injectable 25 Gram(s) IV Push once  dextrose 50% Injectable 25 Gram(s) IV Push once  dextrose 50% Injectable 12.5 Gram(s) IV Push once  dextrose 50% Injectable 25 Gram(s) IV Push once  dextrose 50% Injectable 25 Gram(s) IV Push once  diltiazem    milliGRAM(s) Oral daily  fluticasone propionate 50 MICROgram(s)/spray Nasal Spray 2 Spray(s) Both Nostrils daily  insulin lispro (HumaLOG) corrective regimen sliding scale   SubCutaneous three times a day before meals  insulin lispro (HumaLOG) corrective regimen sliding scale   SubCutaneous at bedtime  lamoTRIgine 25 milliGRAM(s) Oral daily  methimazole 5 milliGRAM(s) Oral daily  pantoprazole    Tablet 40 milliGRAM(s) Oral before breakfast  PARoxetine 20 milliGRAM(s) Oral daily  piperacillin/tazobactam IVPB. 3.375 Gram(s) IV Intermittent every 8 hours  sodium chloride 0.9%. 1000 milliLiter(s) (75 mL/Hr) IV Continuous <Continuous>      SOCIAL HISTORY:  Denies ETOh, Smoking,     FAMILY HISTORY:  No pertinent family history in first degree relatives      REVIEW OF SYSTEMS:  CONSTITUTIONAL: No weakness, fevers or chills  EYES/ENT: No visual changes;  No vertigo or throat pain   NECK: No pain or stiffness  RESPIRATORY: No cough, wheezing, hemoptysis; No shortness of breath  CARDIOVASCULAR: No chest pain or palpitations.  GASTROINTESTINAL: No abdominal or epigastric pain. No nausea, vomiting, or hematemesis; No diarrhea or constipation. No melena or hematochezia.  GENITOURINARY: No dysuria, frequency, foamy urine, urinary urgency, incontinence or hematuria  NEUROLOGICAL: No numbness or weakness  SKIN: No itching, burning, rashes, or lesions   VASCULAR: No bilateral lower extremity edema.   All other review of systems is negative unless indicated above.    VITALS:  T(F): 97.9 (18 @ 08:39), Max: 98.5 (18 @ 00:22)  HR: 95 (18 @ 08:39)  BP: 134/54 (18 @ 08:39)  RR: 18 (18 @ 08:39)  SpO2: 95% (18 @ 08:39)  Wt(kg): --     @ 07:  -   @ 07:00  --------------------------------------------------------  IN: 1140 mL / OUT: 1000 mL / NET: 140 mL     @ 07:01  -   @ 14:47  --------------------------------------------------------  IN: 100 mL / OUT: 176 mL / NET: -76 mL          PHYSICAL EXAM:  Constitutional: NAD  HEENT: anicteric sclera, oropharynx clear, MMM  Neck: No JVD  Respiratory: CTAB, no wheezes, rales or rhonchi  Cardiovascular: S1, S2, RRR  Gastrointestinal: BS+, soft, NT/ND  Extremities: No cyanosis or clubbing. No peripheral edema  Neurological: no focal deficits  : No CVA tenderness. No greene.   Skin: No rashes, dry       LABS:      142  |  103  |  28<H>  ----------------------------<  150<H>  3.8   |  24  |  1.67<H>    Ca    9.5      2018 07:15      Creatinine Trend: 1.67 <--, 1.35 <--, 1.25 <--, 1.20 <--                        8.5    9.23  )-----------( 495      ( 2018 11:54 )             26.2     Urine Studies:  Urinalysis Basic - ( 2018 17:28 )    Color: Yellow / Appearance: SL Turbid / S.020 / pH:   Gluc:  / Ketone: Negative  / Bili: Negative / Urobili: Negative   Blood:  / Protein: 150 mg/dL / Nitrite: Negative   Leuk Esterase: Negative / RBC: 10-25 /HPF / WBC 3-5 /HPF   Sq Epi:  / Non Sq Epi:  / Bacteria:           RADIOLOGY & ADDITIONAL STUDIES:

## 2018-04-08 NOTE — PROGRESS NOTE ADULT - ATTENDING COMMENTS
Jovany Verdugo  Attending Physician   Division of Infectious Disease  Pager #[phone]  After 5pm/weekend or no response, call #[phone]    Please call the ID service [phone] with questions or concerns over the weekend.

## 2018-04-08 NOTE — CONSULT NOTE ADULT - PROBLEM SELECTOR RECOMMENDATION 9
Etiology?  Possible AIN  Pre-renal can not be totally ruled out as clinically he is dehydrated but renal function has worsened on IVF which was started yesterday  CT abdomen done today has no obstruction  Continue NS 75cc/hr  Get UA, Urine Na, Cr, Eosinophil, CBC with differential  Consider to change antibiotics  Monitor renal function and electrolyte
-unclear source  -Ct -ve for PNA  -F/u urine and blood cx  -trend temps  -cont zosyn 3.375 gm iv q8 empirically

## 2018-04-08 NOTE — CONSULT NOTE ADULT - ASSESSMENT
89 year old with longstanding episodic visual hallucinations. Patient is, at present, not agitated or confused. Unclear etiology of present sx and agree with plan for psychiatry evaluation.  Generally non-focal examination and if CT Head (done in ER) is benign then no inpatient neurology investigation and would encourage outpatient follow up.
89 yr old gentelman, history as above, including recurrent UTI, inability to gain weight, ?COPD who presents with unexplained leukocytosis and confusion.  Appears better this AM.  No evidence UTI.  Pneumonia possible.    continue zosyn  budesonide  prn albuterol  chest CT to clarify cxr findings   monitor bloodwork, clinical status  dvt prophylaxis  routine gi prophylaxis as necessary  Oxygen saturation greater than 92%    Beatriz Desai MD  [phone]  Pulmonary
89yo male w hx of A. jose raul on coumadin, DM2, COPD, presenting with decreased intake of food and water, has worsening of renal function
89yo male w hx of afib on coumadin, DM2, COPD, hypothyroidism, presenting with decreased intake food and water for 5 days with fever/leukocyosis/SIRS

## 2018-04-09 ENCOUNTER — TRANSCRIPTION ENCOUNTER (OUTPATIENT)
Age: 83
End: 2018-04-09

## 2018-04-09 DIAGNOSIS — N18.3 CHRONIC KIDNEY DISEASE, STAGE 3 (MODERATE): ICD-10-CM

## 2018-04-09 LAB
ANION GAP SERPL CALC-SCNC: 14 MMOL/L — SIGNIFICANT CHANGE UP (ref 5–17)
BASOPHILS # BLD AUTO: 0.01 K/UL — SIGNIFICANT CHANGE UP (ref 0–0.2)
BASOPHILS NFR BLD AUTO: 0.1 % — SIGNIFICANT CHANGE UP (ref 0–2)
BUN SERPL-MCNC: 21 MG/DL — SIGNIFICANT CHANGE UP (ref 7–23)
CALCIUM SERPL-MCNC: 9.9 MG/DL — SIGNIFICANT CHANGE UP (ref 8.4–10.5)
CHLORIDE SERPL-SCNC: 102 MMOL/L — SIGNIFICANT CHANGE UP (ref 96–108)
CO2 SERPL-SCNC: 28 MMOL/L — SIGNIFICANT CHANGE UP (ref 22–31)
CREAT SERPL-MCNC: 1.27 MG/DL — SIGNIFICANT CHANGE UP (ref 0.5–1.3)
EOSINOPHIL # BLD AUTO: 0.22 K/UL — SIGNIFICANT CHANGE UP (ref 0–0.5)
EOSINOPHIL NFR BLD AUTO: 2.1 % — SIGNIFICANT CHANGE UP (ref 0–6)
GLUCOSE BLDC GLUCOMTR-MCNC: 161 MG/DL — HIGH (ref 70–99)
GLUCOSE BLDC GLUCOMTR-MCNC: 202 MG/DL — HIGH (ref 70–99)
GLUCOSE BLDC GLUCOMTR-MCNC: 222 MG/DL — HIGH (ref 70–99)
GLUCOSE BLDC GLUCOMTR-MCNC: 274 MG/DL — HIGH (ref 70–99)
GLUCOSE SERPL-MCNC: 167 MG/DL — HIGH (ref 70–99)
HCT VFR BLD CALC: 29.9 % — LOW (ref 39–50)
HGB BLD-MCNC: 9.1 G/DL — LOW (ref 13–17)
IMM GRANULOCYTES NFR BLD AUTO: 0.2 % — SIGNIFICANT CHANGE UP (ref 0–1.5)
INR BLD: 2.17 RATIO — HIGH (ref 0.88–1.16)
LYMPHOCYTES # BLD AUTO: 1.12 K/UL — SIGNIFICANT CHANGE UP (ref 1–3.3)
LYMPHOCYTES # BLD AUTO: 10.6 % — LOW (ref 13–44)
MCHC RBC-ENTMCNC: 28.6 PG — SIGNIFICANT CHANGE UP (ref 27–34)
MCHC RBC-ENTMCNC: 30.4 GM/DL — LOW (ref 32–36)
MCV RBC AUTO: 94 FL — SIGNIFICANT CHANGE UP (ref 80–100)
MONOCYTES # BLD AUTO: 0.97 K/UL — HIGH (ref 0–0.9)
MONOCYTES NFR BLD AUTO: 9.2 % — SIGNIFICANT CHANGE UP (ref 2–14)
NEUTROPHILS # BLD AUTO: 8.21 K/UL — HIGH (ref 1.8–7.4)
NEUTROPHILS NFR BLD AUTO: 77.8 % — HIGH (ref 43–77)
PLATELET # BLD AUTO: 534 K/UL — HIGH (ref 150–400)
POTASSIUM SERPL-MCNC: 4.2 MMOL/L — SIGNIFICANT CHANGE UP (ref 3.5–5.3)
POTASSIUM SERPL-SCNC: 4.2 MMOL/L — SIGNIFICANT CHANGE UP (ref 3.5–5.3)
PROTHROM AB SERPL-ACNC: 24.9 SEC — HIGH (ref 10–13.1)
RBC # BLD: 3.18 M/UL — LOW (ref 4.2–5.8)
RBC # FLD: 14.5 % — SIGNIFICANT CHANGE UP (ref 10.3–14.5)
SODIUM SERPL-SCNC: 144 MMOL/L — SIGNIFICANT CHANGE UP (ref 135–145)
WBC # BLD: 10.55 K/UL — HIGH (ref 3.8–10.5)
WBC # FLD AUTO: 10.55 K/UL — HIGH (ref 3.8–10.5)

## 2018-04-09 PROCEDURE — 99232 SBSQ HOSP IP/OBS MODERATE 35: CPT

## 2018-04-09 RX ORDER — WARFARIN SODIUM 2.5 MG/1
2 TABLET ORAL ONCE
Qty: 0 | Refills: 0 | Status: COMPLETED | OUTPATIENT
Start: 2018-04-09 | End: 2018-04-09

## 2018-04-09 RX ORDER — ALPRAZOLAM 0.25 MG
1 TABLET ORAL
Qty: 0 | Refills: 0 | COMMUNITY

## 2018-04-09 RX ORDER — ALPRAZOLAM 0.25 MG
1 TABLET ORAL
Qty: 0 | Refills: 0 | DISCHARGE
Start: 2018-04-09

## 2018-04-09 RX ADMIN — Medication 0.25 MILLIGRAM(S): at 17:08

## 2018-04-09 RX ADMIN — Medication 1: at 17:31

## 2018-04-09 RX ADMIN — Medication 3 MILLILITER(S): at 05:21

## 2018-04-09 RX ADMIN — ATORVASTATIN CALCIUM 40 MILLIGRAM(S): 80 TABLET, FILM COATED ORAL at 22:31

## 2018-04-09 RX ADMIN — Medication 3 MILLILITER(S): at 17:19

## 2018-04-09 RX ADMIN — PANTOPRAZOLE SODIUM 40 MILLIGRAM(S): 20 TABLET, DELAYED RELEASE ORAL at 05:22

## 2018-04-09 RX ADMIN — Medication 0.25 MILLIGRAM(S): at 03:28

## 2018-04-09 RX ADMIN — Medication 3 MILLILITER(S): at 12:04

## 2018-04-09 RX ADMIN — PIPERACILLIN AND TAZOBACTAM 25 GRAM(S): 4; .5 INJECTION, POWDER, LYOPHILIZED, FOR SOLUTION INTRAVENOUS at 08:39

## 2018-04-09 RX ADMIN — Medication 3: at 12:47

## 2018-04-09 RX ADMIN — Medication 2: at 09:07

## 2018-04-09 RX ADMIN — Medication 20 MILLIGRAM(S): at 12:06

## 2018-04-09 RX ADMIN — Medication 2 SPRAY(S): at 12:07

## 2018-04-09 RX ADMIN — LAMOTRIGINE 25 MILLIGRAM(S): 25 TABLET, ORALLY DISINTEGRATING ORAL at 12:05

## 2018-04-09 RX ADMIN — Medication 0.25 MILLIGRAM(S): at 05:21

## 2018-04-09 RX ADMIN — PIPERACILLIN AND TAZOBACTAM 25 GRAM(S): 4; .5 INJECTION, POWDER, LYOPHILIZED, FOR SOLUTION INTRAVENOUS at 17:07

## 2018-04-09 RX ADMIN — WARFARIN SODIUM 2 MILLIGRAM(S): 2.5 TABLET ORAL at 22:31

## 2018-04-09 RX ADMIN — Medication 300 MILLIGRAM(S): at 05:21

## 2018-04-09 NOTE — PROGRESS NOTE ADULT - SUBJECTIVE AND OBJECTIVE BOX
Dr. Amador (Nephrology)  Office [phone]  Cell [phone]  Rachna CROCKETT  Cell [phone]      Patient is a 89y old  Male who presents with a chief complaint of FTT, hallucinations, AMS (09 Apr 2018 10:41)      Patient seen and examined at bedside. No chest pain/sob    VITALS:  T(F): 97.5 (04-09-18 @ 16:20), Max: 98.6 (04-09-18 @ 07:59)  HR: 94 (04-09-18 @ 16:20)  BP: 162/68 (04-09-18 @ 16:20)  RR: 18 (04-09-18 @ 16:20)  SpO2: 99% (04-09-18 @ 16:20)  Wt(kg): --    04-08 @ 07:01  -  04-09 @ 07:00  --------------------------------------------------------  IN: 1245 mL / OUT: 2525 mL / NET: -1280 mL    04-09 @ 07:01  -  04-09 @ 16:28  --------------------------------------------------------  IN: 700 mL / OUT: 400 mL / NET: 300 mL          PHYSICAL EXAM:  Constitutional: NAD  Neck: No JVD  Respiratory: CTAB, no wheezes, rales or rhonchi  Cardiovascular: S1, S2, RRR  Gastrointestinal: BS+, soft, NT/ND  Extremities: No peripheral edema    Hospital Medications:   MEDICATIONS  (STANDING):  ALBUTerol/ipratropium for Nebulization 3 milliLiter(s) Nebulizer every 6 hours  atorvastatin 40 milliGRAM(s) Oral at bedtime  buDESOnide   0.25 milliGRAM(s) Respule 0.25 milliGRAM(s) Inhalation two times a day  dextrose 5%. 1000 milliLiter(s) (50 mL/Hr) IV Continuous <Continuous>  dextrose 5%. 1000 milliLiter(s) (50 mL/Hr) IV Continuous <Continuous>  dextrose 50% Injectable 12.5 Gram(s) IV Push once  dextrose 50% Injectable 25 Gram(s) IV Push once  dextrose 50% Injectable 25 Gram(s) IV Push once  dextrose 50% Injectable 12.5 Gram(s) IV Push once  dextrose 50% Injectable 25 Gram(s) IV Push once  dextrose 50% Injectable 25 Gram(s) IV Push once  diltiazem    milliGRAM(s) Oral daily  fluticasone propionate 50 MICROgram(s)/spray Nasal Spray 2 Spray(s) Both Nostrils daily  insulin lispro (HumaLOG) corrective regimen sliding scale   SubCutaneous three times a day before meals  insulin lispro (HumaLOG) corrective regimen sliding scale   SubCutaneous at bedtime  lamoTRIgine 25 milliGRAM(s) Oral daily  methimazole 5 milliGRAM(s) Oral daily  pantoprazole    Tablet 40 milliGRAM(s) Oral before breakfast  PARoxetine 20 milliGRAM(s) Oral daily  piperacillin/tazobactam IVPB. 3.375 Gram(s) IV Intermittent every 8 hours  warfarin 2 milliGRAM(s) Oral once      LABS:  04-09    144  |  102  |  21  ----------------------------<  167<H>  4.2   |  28  |  1.27    Ca    9.9      09 Apr 2018 07:24      Creatinine Trend: 1.27 <--, 1.67 <--, 1.35 <--, 1.25 <--, 1.20 <--                                9.1    10.55 )-----------( 534      ( 09 Apr 2018 09:26 )             29.9     Urine Studies:  Urinalysis - [04-08-18 @ 21:57]      Color Yellow / Appearance Clear / SG 1.014 / pH 5.5      Gluc 500 / Ketone Negative  / Bili Negative / Urobili Negative       Blood Negative / Protein Trace / Leuk Est Negative / Nitrite Negative      RBC 5 / WBC 6 / Hyaline 1 / Gran 3 / Sq Epi  / Non Sq Epi 4 / Bacteria Few    Urine Creatinine 38      [04-08-18 @ 19:55]  Urine Sodium 93      [04-08-18 @ 19:55]    HbA1c 5.8      [04-06-18 @ 08:19]  TSH 2.65      [04-06-18 @ 08:19]        RADIOLOGY & ADDITIONAL STUDIES:

## 2018-04-09 NOTE — DISCHARGE NOTE ADULT - HOME CARE AGENCY
[facility]. Nurse and Physical therapist to arrange visit within 24-48 hours after discharge. [phone]

## 2018-04-09 NOTE — DISCHARGE NOTE ADULT - ADDITIONAL INSTRUCTIONS
Please follow up with Dr. Alston within 1-2 weeks   Please follow up with Dr. Christiansen within 1-2 weeks for Paxil taper

## 2018-04-09 NOTE — CONSULT NOTE ADULT - SUBJECTIVE AND OBJECTIVE BOX
pt is an 89 y/o Dm male seen for diabetic footcare. right 2nd toe has a bandage on the that has blood  pedal pulses nonpalpable, TG WNL CFT 3 sec x 10  epicritic sensation intact  nails elongated and thickened x 10 right 2nd toe noted to have subungual hematoma and lifted from the proximal aspect of nail bed, distally attached there is active bleeding.  NIDDM . traumatic nail lysis right 2nd toe  Nails debridement x 10  DSD applied to right 2nd toe nail  message left for  daughter will need consent for total nail removal  will follow up for consent and nail removal tomorrow  thank you for consult

## 2018-04-09 NOTE — PROGRESS NOTE ADULT - SUBJECTIVE AND OBJECTIVE BOX
Patient is a 89y old  Male who presents with a chief complaint of FTT, hallucinations, AMS (2018 10:41)      SUBJECTIVE / OVERNIGHT EVENTS: Comfortable without new complaints. Feels better. Wants to go home.  Review of Systems  chest pain no  palpitations no  sob no  nausea no  headache no    MEDICATIONS  (STANDING):  ALBUTerol/ipratropium for Nebulization 3 milliLiter(s) Nebulizer every 6 hours  atorvastatin 40 milliGRAM(s) Oral at bedtime  buDESOnide   0.25 milliGRAM(s) Respule 0.25 milliGRAM(s) Inhalation two times a day  dextrose 5%. 1000 milliLiter(s) (50 mL/Hr) IV Continuous <Continuous>  dextrose 5%. 1000 milliLiter(s) (50 mL/Hr) IV Continuous <Continuous>  dextrose 50% Injectable 12.5 Gram(s) IV Push once  dextrose 50% Injectable 25 Gram(s) IV Push once  dextrose 50% Injectable 25 Gram(s) IV Push once  dextrose 50% Injectable 12.5 Gram(s) IV Push once  dextrose 50% Injectable 25 Gram(s) IV Push once  dextrose 50% Injectable 25 Gram(s) IV Push once  diltiazem    milliGRAM(s) Oral daily  fluticasone propionate 50 MICROgram(s)/spray Nasal Spray 2 Spray(s) Both Nostrils daily  insulin lispro (HumaLOG) corrective regimen sliding scale   SubCutaneous three times a day before meals  insulin lispro (HumaLOG) corrective regimen sliding scale   SubCutaneous at bedtime  lamoTRIgine 25 milliGRAM(s) Oral daily  methimazole 5 milliGRAM(s) Oral daily  pantoprazole    Tablet 40 milliGRAM(s) Oral before breakfast  PARoxetine 20 milliGRAM(s) Oral daily  piperacillin/tazobactam IVPB. 3.375 Gram(s) IV Intermittent every 8 hours  sodium chloride 0.9%. 1000 milliLiter(s) (75 mL/Hr) IV Continuous <Continuous>    MEDICATIONS  (PRN):  acetaminophen   Tablet 650 milliGRAM(s) Oral every 6 hours PRN For Temp greater than 38.5 C (101.3 F)  acetaminophen   Tablet. 650 milliGRAM(s) Oral every 6 hours PRN Mild Pain (1 - 3)  ALPRAZolam 0.25 milliGRAM(s) Oral every 8 hours PRN anxiety  dextrose Gel 1 Dose(s) Oral once PRN Blood Glucose LESS THAN 70 milliGRAM(s)/deciliter  dextrose Gel 1 Dose(s) Oral once PRN Blood Glucose LESS THAN 70 milliGRAM(s)/deciliter  glucagon  Injectable 1 milliGRAM(s) IntraMuscular once PRN Glucose LESS THAN 70 milligrams/deciliter  glucagon  Injectable 1 milliGRAM(s) IntraMuscular once PRN Glucose LESS THAN 70 milligrams/deciliter  polyethylene glycol 3350 17 Gram(s) Oral daily PRN Constipation          PHYSICAL EXAM:  GENERAL: NAD, well-developed  HEAD:  Atraumatic, Normocephalic  EYES: EOMI, PERRLA, conjunctiva and sclera clear  NECK: Supple, No JVD  CHEST/LUNG: Clear to auscultation bilaterally; No wheeze  HEART: Regular rate and rhythm; No murmurs, rubs, or gallops  ABDOMEN: Soft, Nontender, Nondistended; Bowel sounds present  EXTREMITIES:  2+ Peripheral Pulses, No clubbing, cyanosis, or edema  PSYCH: AAOx3  NEUROLOGY: non-focal  SKIN: No rashes or lesions    LABS:                        9.1    10.55 )-----------( 534      ( 2018 09:26 )             29.9     04-09    144  |  102  |  21  ----------------------------<  167<H>  4.2   |  28  |  1.27    Ca    9.9      2018 07:24      PT/INR - ( 2018 09:26 )   PT: 24.9 sec;   INR: 2.17 ratio               Urinalysis Basic - ( 2018 21:57 )    Color: Yellow / Appearance: Clear / S.014 / pH: x  Gluc: x / Ketone: Negative  / Bili: Negative / Urobili: Negative mg/dL   Blood: x / Protein: Trace mg/dL / Nitrite: Negative   Leuk Esterase: Negative / RBC: 5 /HPF / WBC 6 /HPF   Sq Epi: x / Non Sq Epi: 4 /HPF / Bacteria: Few        RADIOLOGY & ADDITIONAL TESTS:    Imaging Personally Reviewed:    < from: CT Abdomen and Pelvis No Cont (18 @ 11:54) >  IMPRESSION:    Enlarged prostate.    No lymphadenopathy.    Emphysema.        < end of copied text >  Consultant(s) Notes Reviewed:      Care Discussed with Consultants/Other Providers:

## 2018-04-09 NOTE — PROGRESS NOTE ADULT - ATTENDING COMMENTS
Jovany Verdugo  Attending Physician   Division of Infectious Disease  Pager #[phone]  After 5pm/weekend or no response, call #[phone]    Please call the ID service [phone] with questions or concerns over the weekend. Jovany Verdugo  Attending Physician   Division of Infectious Disease  Pager #[phone]  After 5pm/weekend or no response, call #[phone]

## 2018-04-09 NOTE — DISCHARGE NOTE ADULT - CARE PROVIDER_API CALL
Arti Abdalla (NESHA), Surgery  Dept Director  [address] [place] [address]  Phone: [phone]  Fax: [phone] Arti Abdalla (NESHA), Surgery  Dept Director  [address] [place] [address]  Phone: [phone]  Fax: [phone]

## 2018-04-09 NOTE — DISCHARGE NOTE ADULT - PLAN OF CARE
Remain free of symptoms toe pain resolution Keep dressing clean dry and intact to right foot until follow up. No need for dressing changes  Weight bearing as tolerated to right foot  Please follow up with Dr Alston within 1 week at [facility], [phone] Pneumonia is a lung infection that can cause a fever, cough, and trouble breathing.  Continue all antibiotics as ordered until complete.  Nutrition is important, eat small frequent meals.  Get lots of rest and drink fluids.  Call your health care provider upon arrival home from hospital and make a follow up appointment for one week.  If your cough worsens, you develop fever greater than 101', you have shaking chills, a fast heartbeat, trouble breathing and/or feel your are breathing much faster than usual, call your healthcare provider.  Make sure you wash your hands frequently. s/p right 2nd nail removal due to trauma with nail bed ulcer and laceration that was repaired  nail bed clean and granular no active bleeding suture intact and laceration repaired  applied xeroform with DSD  keep dressing intact until follow up   Keep dressing clean dry and intact to right foot until follow up. No need for dressing changes  Weight bearing as tolerated to right foot  Please follow up with Dr Alston within 1 week at [facility], [phone] Please follow up with Dr. Christiansen within 1-2 weeks. Followed by Nephrology  Creatinine now back to baseline

## 2018-04-09 NOTE — PROGRESS NOTE ADULT - SUBJECTIVE AND OBJECTIVE BOX
BASHIR BECKER 89y [id number]    Patient is a 89y old  Male who presents with a chief complaint of FTT, hallucinations, AMS (2018 10:41)      Follow Up/CC:  fever    Interval History/ROS:     Allergies    No Known Allergies    Intolerances        ANTIMICROBIALS:  piperacillin/tazobactam IVPB. 3.375 every 8 hours      MEDICATIONS  (STANDING):  ALBUTerol/ipratropium for Nebulization 3 milliLiter(s) Nebulizer every 6 hours  atorvastatin 40 milliGRAM(s) Oral at bedtime  buDESOnide   0.25 milliGRAM(s) Respule 0.25 milliGRAM(s) Inhalation two times a day  dextrose 5%. 1000 milliLiter(s) (50 mL/Hr) IV Continuous <Continuous>  dextrose 5%. 1000 milliLiter(s) (50 mL/Hr) IV Continuous <Continuous>  dextrose 50% Injectable 12.5 Gram(s) IV Push once  dextrose 50% Injectable 25 Gram(s) IV Push once  dextrose 50% Injectable 25 Gram(s) IV Push once  dextrose 50% Injectable 12.5 Gram(s) IV Push once  dextrose 50% Injectable 25 Gram(s) IV Push once  dextrose 50% Injectable 25 Gram(s) IV Push once  diltiazem    milliGRAM(s) Oral daily  fluticasone propionate 50 MICROgram(s)/spray Nasal Spray 2 Spray(s) Both Nostrils daily  insulin lispro (HumaLOG) corrective regimen sliding scale   SubCutaneous three times a day before meals  insulin lispro (HumaLOG) corrective regimen sliding scale   SubCutaneous at bedtime  lamoTRIgine 25 milliGRAM(s) Oral daily  methimazole 5 milliGRAM(s) Oral daily  pantoprazole    Tablet 40 milliGRAM(s) Oral before breakfast  PARoxetine 20 milliGRAM(s) Oral daily  piperacillin/tazobactam IVPB. 3.375 Gram(s) IV Intermittent every 8 hours  sodium chloride 0.9%. 1000 milliLiter(s) (75 mL/Hr) IV Continuous <Continuous>    MEDICATIONS  (PRN):  acetaminophen   Tablet 650 milliGRAM(s) Oral every 6 hours PRN For Temp greater than 38.5 C (101.3 F)  acetaminophen   Tablet. 650 milliGRAM(s) Oral every 6 hours PRN Mild Pain (1 - 3)  ALPRAZolam 0.25 milliGRAM(s) Oral every 8 hours PRN anxiety  dextrose Gel 1 Dose(s) Oral once PRN Blood Glucose LESS THAN 70 milliGRAM(s)/deciliter  dextrose Gel 1 Dose(s) Oral once PRN Blood Glucose LESS THAN 70 milliGRAM(s)/deciliter  glucagon  Injectable 1 milliGRAM(s) IntraMuscular once PRN Glucose LESS THAN 70 milligrams/deciliter  glucagon  Injectable 1 milliGRAM(s) IntraMuscular once PRN Glucose LESS THAN 70 milligrams/deciliter  polyethylene glycol 3350 17 Gram(s) Oral daily PRN Constipation        Vital Signs Last 24 Hrs  T(C): 37 (2018 07:59), Max: 37 (2018 07:59)  T(F): 98.6 (2018 07:59), Max: 98.6 (2018 07:59)  HR: 97 (2018 07:59) (84 - 97)  BP: 95/62 (2018 07:59) (95/62 - 138/53)  BP(mean): --  RR: 18 (2018 07:59) (18 - 18)  SpO2: 97% (2018 07:59) (96% - 97%)    CBC Full  -  ( 2018 09:26 )  WBC Count : 10.55 K/uL  Hemoglobin : 9.1 g/dL  Hematocrit : 29.9 %  Platelet Count - Automated : 534 K/uL  Mean Cell Volume : 94.0 fl  Mean Cell Hemoglobin : 28.6 pg  Mean Cell Hemoglobin Concentration : 30.4 gm/dL  Auto Neutrophil # : 8.21 K/uL  Auto Lymphocyte # : 1.12 K/uL  Auto Monocyte # : 0.97 K/uL  Auto Eosinophil # : 0.22 K/uL  Auto Basophil # : 0.01 K/uL  Auto Neutrophil % : 77.8 %  Auto Lymphocyte % : 10.6 %  Auto Monocyte % : 9.2 %  Auto Eosinophil % : 2.1 %  Auto Basophil % : 0.1 %        144  |  102  |  21  ----------------------------<  167<H>  4.2   |  28  |  1.27    Ca    9.9      2018 07:24        Urinalysis Basic - ( 2018 21:57 )    Color: Yellow / Appearance: Clear / S.014 / pH: x  Gluc: x / Ketone: Negative  / Bili: Negative / Urobili: Negative mg/dL   Blood: x / Protein: Trace mg/dL / Nitrite: Negative   Leuk Esterase: Negative / RBC: 5 /HPF / WBC 6 /HPF   Sq Epi: x / Non Sq Epi: 4 /HPF / Bacteria: Few        MICROBIOLOGY:  .Blood Blood-Peripheral  18   No growth to date.  --  --      .Urine Clean Catch (Midstream)  18   No growth  --  --              v            RADIOLOGY BASHIR BECKER 89y [id number]    Patient is a 89y old  Male who presents with a chief complaint of FTT, hallucinations, AMS (2018 10:41)      Follow Up/CC:  fever    Interval History/ROS: feels well, no complaints    Allergies    No Known Allergies    Intolerances        ANTIMICROBIALS:  piperacillin/tazobactam IVPB. 3.375 every 8 hours      MEDICATIONS  (STANDING):  ALBUTerol/ipratropium for Nebulization 3 milliLiter(s) Nebulizer every 6 hours  atorvastatin 40 milliGRAM(s) Oral at bedtime  buDESOnide   0.25 milliGRAM(s) Respule 0.25 milliGRAM(s) Inhalation two times a day  dextrose 5%. 1000 milliLiter(s) (50 mL/Hr) IV Continuous <Continuous>  dextrose 5%. 1000 milliLiter(s) (50 mL/Hr) IV Continuous <Continuous>  dextrose 50% Injectable 12.5 Gram(s) IV Push once  dextrose 50% Injectable 25 Gram(s) IV Push once  dextrose 50% Injectable 25 Gram(s) IV Push once  dextrose 50% Injectable 12.5 Gram(s) IV Push once  dextrose 50% Injectable 25 Gram(s) IV Push once  dextrose 50% Injectable 25 Gram(s) IV Push once  diltiazem    milliGRAM(s) Oral daily  fluticasone propionate 50 MICROgram(s)/spray Nasal Spray 2 Spray(s) Both Nostrils daily  insulin lispro (HumaLOG) corrective regimen sliding scale   SubCutaneous three times a day before meals  insulin lispro (HumaLOG) corrective regimen sliding scale   SubCutaneous at bedtime  lamoTRIgine 25 milliGRAM(s) Oral daily  methimazole 5 milliGRAM(s) Oral daily  pantoprazole    Tablet 40 milliGRAM(s) Oral before breakfast  PARoxetine 20 milliGRAM(s) Oral daily  piperacillin/tazobactam IVPB. 3.375 Gram(s) IV Intermittent every 8 hours  sodium chloride 0.9%. 1000 milliLiter(s) (75 mL/Hr) IV Continuous <Continuous>    MEDICATIONS  (PRN):  acetaminophen   Tablet 650 milliGRAM(s) Oral every 6 hours PRN For Temp greater than 38.5 C (101.3 F)  acetaminophen   Tablet. 650 milliGRAM(s) Oral every 6 hours PRN Mild Pain (1 - 3)  ALPRAZolam 0.25 milliGRAM(s) Oral every 8 hours PRN anxiety  dextrose Gel 1 Dose(s) Oral once PRN Blood Glucose LESS THAN 70 milliGRAM(s)/deciliter  dextrose Gel 1 Dose(s) Oral once PRN Blood Glucose LESS THAN 70 milliGRAM(s)/deciliter  glucagon  Injectable 1 milliGRAM(s) IntraMuscular once PRN Glucose LESS THAN 70 milligrams/deciliter  glucagon  Injectable 1 milliGRAM(s) IntraMuscular once PRN Glucose LESS THAN 70 milligrams/deciliter  polyethylene glycol 3350 17 Gram(s) Oral daily PRN Constipation        Vital Signs Last 24 Hrs  T(C): 37 (2018 07:59), Max: 37 (2018 07:59)  T(F): 98.6 (2018 07:59), Max: 98.6 (2018 07:59)  HR: 97 (2018 07:59) (84 - 97)  BP: 95/62 (2018 07:59) (95/62 - 138/53)  BP(mean): --  RR: 18 (2018 07:59) (18 - 18)  SpO2: 97% (2018 07:59) (96% - 97%)    CBC Full  -  ( 2018 09:26 )  WBC Count : 10.55 K/uL  Hemoglobin : 9.1 g/dL  Hematocrit : 29.9 %  Platelet Count - Automated : 534 K/uL  Mean Cell Volume : 94.0 fl  Mean Cell Hemoglobin : 28.6 pg  Mean Cell Hemoglobin Concentration : 30.4 gm/dL  Auto Neutrophil # : 8.21 K/uL  Auto Lymphocyte # : 1.12 K/uL  Auto Monocyte # : 0.97 K/uL  Auto Eosinophil # : 0.22 K/uL  Auto Basophil # : 0.01 K/uL  Auto Neutrophil % : 77.8 %  Auto Lymphocyte % : 10.6 %  Auto Monocyte % : 9.2 %  Auto Eosinophil % : 2.1 %  Auto Basophil % : 0.1 %        144  |  102  |  21  ----------------------------<  167<H>  4.2   |  28  |  1.27    Ca    9.9      2018 07:24        Urinalysis Basic - ( 2018 21:57 )    Color: Yellow / Appearance: Clear / S.014 / pH: x  Gluc: x / Ketone: Negative  / Bili: Negative / Urobili: Negative mg/dL   Blood: x / Protein: Trace mg/dL / Nitrite: Negative   Leuk Esterase: Negative / RBC: 5 /HPF / WBC 6 /HPF   Sq Epi: x / Non Sq Epi: 4 /HPF / Bacteria: Few        MICROBIOLOGY:  .Blood Blood-Peripheral  18   No growth to date.  --  --      .Urine Clean Catch (Midstream)  18   No growth  --  --    RADIOLOGY    CT Abdomen and Pelvis No Cont (18 @ 11:54) >  Enlarged prostate.    No lymphadenopathy.    Emphysema.      CT Chest No Cont (18 @ 08:28) >  No focal consolidation. Emphysema, unchanged.

## 2018-04-09 NOTE — CHART NOTE - NSCHARTNOTEFT_GEN_A_CORE
pt seen for malnutrition follow up.     Pt c sepsis, followed by ID, cultures negative, Neurology following noted vitamin B12 level checked, WNL, thiamin level to be checked. Noted over weekend, Pt and daughter declined to be on dysphagia I diet c nectar consistency liquids and declined further swallow evaluation, has been on Kosher, consistent CHO diet since.     Source: Patient [x]- noted Pt confused at times    Diet : Kosher, consistent CHO diet, Glucerna shake x 2 daily       Patient reports [x] other: he eats well at breakfast (observed Pt has already consumed all of cereal, almost all of yogurt and is drinking some Glucerna shake. Reports he does not eat too much at lunch and dinner but has been enjoying Kosher meals, states he feels portion sizes in house are large. States she has been having the Glucerna shakes as well, agreeable to different flavor while in house. Noted last BM 4/8.       Current Weight: Weight (kg): 59.9 (04-05 @ 20:54), no new wt       Pertinent Medications: MEDICATIONS  (STANDING):  ALBUTerol/ipratropium for Nebulization 3 milliLiter(s) Nebulizer every 6 hours  atorvastatin 40 milliGRAM(s) Oral at bedtime  buDESOnide   0.25 milliGRAM(s) Respule 0.25 milliGRAM(s) Inhalation two times a day  dextrose 5%. 1000 milliLiter(s) (50 mL/Hr) IV Continuous <Continuous>  dextrose 5%. 1000 milliLiter(s) (50 mL/Hr) IV Continuous <Continuous>  dextrose 50% Injectable 12.5 Gram(s) IV Push once  dextrose 50% Injectable 25 Gram(s) IV Push once  dextrose 50% Injectable 25 Gram(s) IV Push once  dextrose 50% Injectable 12.5 Gram(s) IV Push once  dextrose 50% Injectable 25 Gram(s) IV Push once  dextrose 50% Injectable 25 Gram(s) IV Push once  diltiazem    milliGRAM(s) Oral daily  fluticasone propionate 50 MICROgram(s)/spray Nasal Spray 2 Spray(s) Both Nostrils daily  insulin lispro (HumaLOG) corrective regimen sliding scale   SubCutaneous three times a day before meals  insulin lispro (HumaLOG) corrective regimen sliding scale   SubCutaneous at bedtime  lamoTRIgine 25 milliGRAM(s) Oral daily  methimazole 5 milliGRAM(s) Oral daily  pantoprazole    Tablet 40 milliGRAM(s) Oral before breakfast  PARoxetine 20 milliGRAM(s) Oral daily  piperacillin/tazobactam IVPB. 3.375 Gram(s) IV Intermittent every 8 hours  sodium chloride 0.9%. 1000 milliLiter(s) (75 mL/Hr) IV Continuous <Continuous>    MEDICATIONS  (PRN):  acetaminophen   Tablet 650 milliGRAM(s) Oral every 6 hours PRN For Temp greater than 38.5 C (101.3 F)  acetaminophen   Tablet. 650 milliGRAM(s) Oral every 6 hours PRN Mild Pain (1 - 3)  ALPRAZolam 0.25 milliGRAM(s) Oral every 8 hours PRN anxiety  dextrose Gel 1 Dose(s) Oral once PRN Blood Glucose LESS THAN 70 milliGRAM(s)/deciliter  dextrose Gel 1 Dose(s) Oral once PRN Blood Glucose LESS THAN 70 milliGRAM(s)/deciliter  glucagon  Injectable 1 milliGRAM(s) IntraMuscular once PRN Glucose LESS THAN 70 milligrams/deciliter  glucagon  Injectable 1 milliGRAM(s) IntraMuscular once PRN Glucose LESS THAN 70 milligrams/deciliter  polyethylene glycol 3350 17 Gram(s) Oral daily PRN Constipation    Pertinent Labs:  04-09 Na144 mmol/L Glu 167 mg/dL<H> K+ 4.2 mmol/L Cr  1.27 mg/dL BUN 21 mg/dL 04-05 Alb 4.2 g/dL 04-06 [id number] 5.8 %<H>  POCT glucose: 4/9: 202, 4/8: 146-275    Skin: intact, no edema at this time     Estimated Needs:   [x] no change since previous assessment    Previous Nutrition Diagnosis:     [x] Malnutrition     Nutrition Diagnosis is [x] ongoing- addressed c supplements and gradual improvement in intake     New Nutrition Diagnosis: [x] not applicable    Recommend    [x] Continue c current diet. If agreeable c team, would consider removing consistent CHO restriction considering Pt age and preference for some sweets at this time.     [x] Nutrition Supplement: Continue w/ Glucerna shakes.     [x] Encouraged PO intake as tolerated.        Monitoring and Evaluation:     [x] PO intake [x] Tolerance to diet prescription [x] weights [x] follow up per protocol    RD remains available.   Stephanie Lopez, MS, RD, , CNSC #[phone]

## 2018-04-09 NOTE — DISCHARGE NOTE ADULT - MEDICATION SUMMARY - MEDICATIONS TO TAKE
I will START or STAY ON the medications listed below when I get home from the hospital:    budesonide 1 mg/2 mL inhalation suspension  -- 2 milliliter(s) inhaled 3 times a day, As Needed  -- Indication: For Breathing     acetaminophen 325 mg oral tablet  -- 2 tab(s) by mouth every 8 hours, As Needed - 3)  -- Indication: For Pain    dilTIAZem 300 mg/24 hours oral tablet, extended release  -- 1 tab(s) by mouth once a day  -- Indication: For AF    warfarin 1 mg oral tablet  -- 1 tab(s) by mouth Tues, Thurs, Fri and Sun  -- Indication: For AF    warfarin 2 mg oral tablet  -- 1 tab(s) by mouth Mon, Wed and Sat  -- Indication: For AF    lamoTRIgine 25 mg oral tablet  -- 1 tab(s) by mouth once a day  -- Indication: For Depression    PARoxetine 20 mg oral tablet  -- 1 tab(s) by mouth once a day  -- Indication: For Depression     Amaryl 1 mg oral tablet  -- 1 tab(s) by mouth once a day    -- Indication: For Diabetes     metFORMIN 500 mg oral tablet  -- 2 tab(s) by mouth 2 times a day  -- Indication: For Diabetes     rosuvastatin 10 mg oral tablet  -- 1 tab(s) by mouth once a day (at bedtime)  -- Indication: For CAD    methIMAzole 5 mg oral tablet  -- 1 tab(s) by mouth once a day  -- Indication: For Hypothyroid     ALPRAZolam 0.25 mg oral tablet  -- 1 tab(s) by mouth every 8 hours, As needed, anxiety  -- Indication: For Anxiety     albuterol 90 mcg/inh inhalation aerosol  -- 2 puff(s) inhaled 4 times a day, As Needed  -- Indication: For Breathing     polyethylene glycol 3350 oral powder for reconstitution  -- 17 gram(s) by mouth once a day, As Needed  -- Indication: For Constipation     Flonase 50 mcg/inh nasal spray  -- 1 spray(s) in each nasal, As directed  -- Indication: For Rhinities     pantoprazole 40 mg oral delayed release tablet  -- 1 tab(s) by mouth once a day  -- Indication: For GERD I will START or STAY ON the medications listed below when I get home from the hospital:    budesonide 1 mg/2 mL inhalation suspension  -- 2 milliliter(s) inhaled 3 times a day, As Needed  -- Indication: For Breathing     acetaminophen 325 mg oral tablet  -- 2 tab(s) by mouth every 8 hours, As Needed - 3)  -- Indication: For Pain    dilTIAZem 300 mg/24 hours oral tablet, extended release  -- 1 tab(s) by mouth once a day  -- Indication: For AF    warfarin 1 mg oral tablet  -- 1 tab(s) by mouth Tues, Thurs, Fri and Sun  -- Indication: For AF    warfarin 2 mg oral tablet  -- 1 tab(s) by mouth Mon, Wed and Sat  -- Indication: For AF    lamoTRIgine 25 mg oral tablet  -- 1 tab(s) by mouth once a day  -- Indication: For Depression    PARoxetine 20 mg oral tablet  -- 1 tab(s) by mouth once a day  -- Indication: For Depression     Amaryl 1 mg oral tablet  -- 1 tab(s) by mouth once a day    -- Indication: For Diabetes     metFORMIN 500 mg oral tablet  -- 2 tab(s) by mouth 2 times a day  -- Indication: For Diabetes     rosuvastatin 10 mg oral tablet  -- 1 tab(s) by mouth once a day (at bedtime)  -- Indication: For CAD    methIMAzole 5 mg oral tablet  -- 1 tab(s) by mouth once a day  -- Indication: For Hypothyroid     ALPRAZolam 0.25 mg oral tablet  -- 1 tab(s) by mouth every 8 hours, As needed, anxiety  -- Indication: For Anxiety     albuterol 90 mcg/inh inhalation aerosol  -- 2 puff(s) inhaled 4 times a day, As Needed  -- Indication: For Breathing     mupirocin 2% topical ointment  -- 1 application on skin every 12 hours  -- Indication: For Ointment    polyethylene glycol 3350 oral powder for reconstitution  -- 17 gram(s) by mouth once a day, As Needed  -- Indication: For Constipation     Flonase 50 mcg/inh nasal spray  -- 1 spray(s) in each nasal, As directed  -- Indication: For Rhinities     pantoprazole 40 mg oral delayed release tablet  -- 1 tab(s) by mouth once a day  -- Indication: For GERD I will START or STAY ON the medications listed below when I get home from the hospital:    budesonide 1 mg/2 mL inhalation suspension  -- 2 milliliter(s) inhaled 3 times a day, As Needed  -- Indication: For Breathing     acetaminophen 325 mg oral tablet  -- 2 tab(s) by mouth every 8 hours, As Needed - 3)  -- Indication: For Pain    dilTIAZem 300 mg/24 hours oral tablet, extended release  -- 1 tab(s) by mouth once a day  -- Indication: For AF    warfarin 1 mg oral tablet  -- 1 tab(s) by mouth Tues, Thurs, Fri and Sun  -- Indication: For AF    warfarin 2 mg oral tablet  -- 1 tab(s) by mouth Mon, Wed and Sat  -- Indication: For AF    lamoTRIgine 25 mg oral tablet  -- 1 tab(s) by mouth once a day  -- Indication: For Depression    PARoxetine 20 mg oral tablet  -- 1 tab(s) by mouth once a day  -- Indication: For Depression     Amaryl 1 mg oral tablet  -- 1 tab(s) by mouth once a day    -- Indication: For Diabetes     metFORMIN 500 mg oral tablet  -- 2 tab(s) by mouth 2 times a day  -- Indication: For Diabetes     rosuvastatin 10 mg oral tablet  -- 1 tab(s) by mouth once a day (at bedtime)  -- Indication: For CAD    methIMAzole 5 mg oral tablet  -- 1 tab(s) by mouth once a day  -- Indication: For Hypothyroid     ALPRAZolam 0.25 mg oral tablet  -- 1 tab(s) by mouth every 8 hours, As needed, anxiety  -- Indication: For Anxiety     albuterol 90 mcg/inh inhalation aerosol  -- 2 puff(s) inhaled 4 times a day, As Needed  -- Indication: For Breathing     mupirocin 2% topical ointment  -- 1 application on skin every 12 hours  -- Indication: For Ointment for toe     polyethylene glycol 3350 oral powder for reconstitution  -- 17 gram(s) by mouth once a day, As Needed  -- Indication: For Constipation     Flonase 50 mcg/inh nasal spray  -- 1 spray(s) in each nasal, As directed  -- Indication: For Rhinities     pantoprazole 40 mg oral delayed release tablet  -- 1 tab(s) by mouth once a day  -- Indication: For GERD

## 2018-04-09 NOTE — DISCHARGE NOTE ADULT - CARE PLAN
Principal Discharge DX:	PNA (pneumonia) Principal Discharge DX:	PNA (pneumonia)  Goal:	Remain free of symptoms Principal Discharge DX:	PNA (pneumonia)  Goal:	Remain free of symptoms  Goal:	toe pain resolution  Assessment and plan of treatment:	Keep dressing clean dry and intact to right foot until follow up. No need for dressing changes  Weight bearing as tolerated to right foot  Please follow up with Dr Alston within 1 week at [facility], [phone] Principal Discharge DX:	PNA (pneumonia)  Goal:	Remain free of symptoms  Assessment and plan of treatment:	Pneumonia is a lung infection that can cause a fever, cough, and trouble breathing.  Continue all antibiotics as ordered until complete.  Nutrition is important, eat small frequent meals.  Get lots of rest and drink fluids.  Call your health care provider upon arrival home from hospital and make a follow up appointment for one week.  If your cough worsens, you develop fever greater than 101', you have shaking chills, a fast heartbeat, trouble breathing and/or feel your are breathing much faster than usual, call your healthcare provider.  Make sure you wash your hands frequently.  Secondary Diagnosis:	Toe pain  Goal:	toe pain resolution  Assessment and plan of treatment:	Keep dressing clean dry and intact to right foot until follow up. No need for dressing changes  Weight bearing as tolerated to right foot  Please follow up with Dr Alston within 1 week at [facility], [phone] Principal Discharge DX:	PNA (pneumonia)  Goal:	Remain free of symptoms  Assessment and plan of treatment:	Pneumonia is a lung infection that can cause a fever, cough, and trouble breathing.  Continue all antibiotics as ordered until complete.  Nutrition is important, eat small frequent meals.  Get lots of rest and drink fluids.  Call your health care provider upon arrival home from hospital and make a follow up appointment for one week.  If your cough worsens, you develop fever greater than 101', you have shaking chills, a fast heartbeat, trouble breathing and/or feel your are breathing much faster than usual, call your healthcare provider.  Make sure you wash your hands frequently.  Secondary Diagnosis:	Toe pain  Goal:	toe pain resolution  Assessment and plan of treatment:	s/p right 2nd nail removal due to trauma with nail bed ulcer and laceration that was repaired  nail bed clean and granular no active bleeding suture intact and laceration repaired  applied xeroform with DSD  keep dressing intact until follow up   Keep dressing clean dry and intact to right foot until follow up. No need for dressing changes  Weight bearing as tolerated to right foot  Please follow up with Dr Alston within 1 week at [facility], [phone]  Secondary Diagnosis:	Depression, unspecified depression type  Assessment and plan of treatment:	Please follow up with Dr. Christiansen within 1-2 weeks.  Secondary Diagnosis:	HARJEET (acute kidney injury)  Assessment and plan of treatment:	Followed by Nephrology  Creatinine now back to baseline

## 2018-04-09 NOTE — DISCHARGE NOTE ADULT - HOSPITAL COURSE
89yo male w hx of afib on coumadin, DM2, COPD, hypothyroidism, presenting with decreased intake food and water for 5 days with fever/leukocyosis/SIRS source not clear completed course of zosyn 88yo male w hx of afib on coumadin, DM2, COPD, hypothyroidism, presenting with decreased intake food and water for 5 days with fever/leukocyosis/SIRS source not clear completed course of zosyn. Follow by Nephrology/Psych/ID and Pod. sp Ingrown toe extraction. To follow up with Pod and Psych as an outpt. No more antibiotics needed course completed

## 2018-04-10 DIAGNOSIS — E87.0 HYPEROSMOLALITY AND HYPERNATREMIA: ICD-10-CM

## 2018-04-10 LAB
ANION GAP SERPL CALC-SCNC: 14 MMOL/L — SIGNIFICANT CHANGE UP (ref 5–17)
BUN SERPL-MCNC: 18 MG/DL — SIGNIFICANT CHANGE UP (ref 7–23)
CALCIUM SERPL-MCNC: 9.9 MG/DL — SIGNIFICANT CHANGE UP (ref 8.4–10.5)
CHLORIDE SERPL-SCNC: 104 MMOL/L — SIGNIFICANT CHANGE UP (ref 96–108)
CO2 SERPL-SCNC: 28 MMOL/L — SIGNIFICANT CHANGE UP (ref 22–31)
CREAT SERPL-MCNC: 1.27 MG/DL — SIGNIFICANT CHANGE UP (ref 0.5–1.3)
CULTURE RESULTS: SIGNIFICANT CHANGE UP
CULTURE RESULTS: SIGNIFICANT CHANGE UP
GLUCOSE BLDC GLUCOMTR-MCNC: 175 MG/DL — HIGH (ref 70–99)
GLUCOSE BLDC GLUCOMTR-MCNC: 179 MG/DL — HIGH (ref 70–99)
GLUCOSE BLDC GLUCOMTR-MCNC: 306 MG/DL — HIGH (ref 70–99)
GLUCOSE BLDC GLUCOMTR-MCNC: 309 MG/DL — HIGH (ref 70–99)
GLUCOSE SERPL-MCNC: 183 MG/DL — HIGH (ref 70–99)
HCT VFR BLD CALC: 27.1 % — LOW (ref 39–50)
HGB BLD-MCNC: 8.5 G/DL — LOW (ref 13–17)
INR BLD: 2.1 RATIO — HIGH (ref 0.88–1.16)
MCHC RBC-ENTMCNC: 29.1 PG — SIGNIFICANT CHANGE UP (ref 27–34)
MCHC RBC-ENTMCNC: 31.4 GM/DL — LOW (ref 32–36)
MCV RBC AUTO: 92.8 FL — SIGNIFICANT CHANGE UP (ref 80–100)
PLATELET # BLD AUTO: 532 K/UL — HIGH (ref 150–400)
POTASSIUM SERPL-MCNC: 3.7 MMOL/L — SIGNIFICANT CHANGE UP (ref 3.5–5.3)
POTASSIUM SERPL-SCNC: 3.7 MMOL/L — SIGNIFICANT CHANGE UP (ref 3.5–5.3)
PROTHROM AB SERPL-ACNC: 24.1 SEC — HIGH (ref 10–13.1)
RBC # BLD: 2.92 M/UL — LOW (ref 4.2–5.8)
RBC # FLD: 14.9 % — HIGH (ref 10.3–14.5)
SODIUM SERPL-SCNC: 146 MMOL/L — HIGH (ref 135–145)
SPECIMEN SOURCE: SIGNIFICANT CHANGE UP
SPECIMEN SOURCE: SIGNIFICANT CHANGE UP
WBC # BLD: 7.66 K/UL — SIGNIFICANT CHANGE UP (ref 3.8–10.5)
WBC # FLD AUTO: 7.66 K/UL — SIGNIFICANT CHANGE UP (ref 3.8–10.5)

## 2018-04-10 PROCEDURE — 99232 SBSQ HOSP IP/OBS MODERATE 35: CPT

## 2018-04-10 RX ORDER — MUPIROCIN 20 MG/G
1 OINTMENT TOPICAL EVERY 12 HOURS
Qty: 0 | Refills: 0 | Status: DISCONTINUED | OUTPATIENT
Start: 2018-04-10 | End: 2018-04-11

## 2018-04-10 RX ORDER — ACETAMINOPHEN 500 MG
650 TABLET ORAL EVERY 6 HOURS
Qty: 0 | Refills: 0 | Status: DISCONTINUED | OUTPATIENT
Start: 2018-04-10 | End: 2018-04-11

## 2018-04-10 RX ORDER — WARFARIN SODIUM 2.5 MG/1
2 TABLET ORAL ONCE
Qty: 0 | Refills: 0 | Status: COMPLETED | OUTPATIENT
Start: 2018-04-10 | End: 2018-04-10

## 2018-04-10 RX ORDER — MUPIROCIN 20 MG/G
1 OINTMENT TOPICAL
Qty: 1 | Refills: 0 | OUTPATIENT
Start: 2018-04-10 | End: 2018-04-23

## 2018-04-10 RX ADMIN — POLYETHYLENE GLYCOL 3350 17 GRAM(S): 17 POWDER, FOR SOLUTION ORAL at 21:16

## 2018-04-10 RX ADMIN — ATORVASTATIN CALCIUM 40 MILLIGRAM(S): 80 TABLET, FILM COATED ORAL at 21:17

## 2018-04-10 RX ADMIN — MUPIROCIN 1 APPLICATION(S): 20 OINTMENT TOPICAL at 17:22

## 2018-04-10 RX ADMIN — Medication 3 MILLILITER(S): at 12:28

## 2018-04-10 RX ADMIN — Medication 3 MILLILITER(S): at 06:07

## 2018-04-10 RX ADMIN — PIPERACILLIN AND TAZOBACTAM 25 GRAM(S): 4; .5 INJECTION, POWDER, LYOPHILIZED, FOR SOLUTION INTRAVENOUS at 23:48

## 2018-04-10 RX ADMIN — PIPERACILLIN AND TAZOBACTAM 25 GRAM(S): 4; .5 INJECTION, POWDER, LYOPHILIZED, FOR SOLUTION INTRAVENOUS at 00:54

## 2018-04-10 RX ADMIN — Medication 300 MILLIGRAM(S): at 06:03

## 2018-04-10 RX ADMIN — Medication 0.25 MILLIGRAM(S): at 17:22

## 2018-04-10 RX ADMIN — Medication 3 MILLILITER(S): at 00:54

## 2018-04-10 RX ADMIN — LAMOTRIGINE 25 MILLIGRAM(S): 25 TABLET, ORALLY DISINTEGRATING ORAL at 12:28

## 2018-04-10 RX ADMIN — Medication 3 MILLILITER(S): at 23:48

## 2018-04-10 RX ADMIN — Medication 20 MILLIGRAM(S): at 12:28

## 2018-04-10 RX ADMIN — Medication 2 SPRAY(S): at 12:29

## 2018-04-10 RX ADMIN — Medication 4: at 18:08

## 2018-04-10 RX ADMIN — Medication 0.25 MILLIGRAM(S): at 21:36

## 2018-04-10 RX ADMIN — WARFARIN SODIUM 2 MILLIGRAM(S): 2.5 TABLET ORAL at 21:16

## 2018-04-10 RX ADMIN — Medication 1: at 08:42

## 2018-04-10 RX ADMIN — Medication 4: at 12:29

## 2018-04-10 RX ADMIN — Medication 0.25 MILLIGRAM(S): at 06:08

## 2018-04-10 RX ADMIN — Medication 3 MILLILITER(S): at 17:21

## 2018-04-10 RX ADMIN — PIPERACILLIN AND TAZOBACTAM 25 GRAM(S): 4; .5 INJECTION, POWDER, LYOPHILIZED, FOR SOLUTION INTRAVENOUS at 08:47

## 2018-04-10 RX ADMIN — PIPERACILLIN AND TAZOBACTAM 25 GRAM(S): 4; .5 INJECTION, POWDER, LYOPHILIZED, FOR SOLUTION INTRAVENOUS at 17:21

## 2018-04-10 RX ADMIN — PANTOPRAZOLE SODIUM 40 MILLIGRAM(S): 20 TABLET, DELAYED RELEASE ORAL at 06:04

## 2018-04-10 NOTE — PROGRESS NOTE ADULT - SUBJECTIVE AND OBJECTIVE BOX
Patient will require a wheelchair in the home on discharge due to gait instability. Patient is unable to ambulate with a cane or walker. Patient has adequate space at home to maneuver the wheelchair. Without the use of the wheelchair, pt will not be able to perform ADL's. Patient had a trail of wheelchair here and is able to self propel. Patient is willing to use the wheelchair at home

## 2018-04-10 NOTE — PROGRESS NOTE ADULT - ATTENDING COMMENTS
Jovany Verdugo  Attending Physician   Division of Infectious Disease  Pager #[phone]  After 5pm/weekend or no response, call #[phone]

## 2018-04-10 NOTE — PROGRESS NOTE ADULT - SUBJECTIVE AND OBJECTIVE BOX
BASHIR BECKER 89y [id number]    Patient is a 89y old  Male who presents with a chief complaint of FTT, hallucinations, AMS (2018 10:41)      Follow Up/CC:  fever    Interval History/ROS: s/p right toe nail removal today    Allergies    No Known Allergies    Intolerances        ANTIMICROBIALS:  piperacillin/tazobactam IVPB. 3.375 every 8 hours      MEDICATIONS  (STANDING):  ALBUTerol/ipratropium for Nebulization 3 milliLiter(s) Nebulizer every 6 hours  atorvastatin 40 milliGRAM(s) Oral at bedtime  buDESOnide   0.25 milliGRAM(s) Respule 0.25 milliGRAM(s) Inhalation two times a day  dextrose 5%. 1000 milliLiter(s) (50 mL/Hr) IV Continuous <Continuous>  dextrose 5%. 1000 milliLiter(s) (50 mL/Hr) IV Continuous <Continuous>  dextrose 50% Injectable 12.5 Gram(s) IV Push once  dextrose 50% Injectable 25 Gram(s) IV Push once  dextrose 50% Injectable 25 Gram(s) IV Push once  dextrose 50% Injectable 12.5 Gram(s) IV Push once  dextrose 50% Injectable 25 Gram(s) IV Push once  dextrose 50% Injectable 25 Gram(s) IV Push once  diltiazem    milliGRAM(s) Oral daily  fluticasone propionate 50 MICROgram(s)/spray Nasal Spray 2 Spray(s) Both Nostrils daily  insulin lispro (HumaLOG) corrective regimen sliding scale   SubCutaneous three times a day before meals  insulin lispro (HumaLOG) corrective regimen sliding scale   SubCutaneous at bedtime  lamoTRIgine 25 milliGRAM(s) Oral daily  methimazole 5 milliGRAM(s) Oral daily  mupirocin 2% Ointment 1 Application(s) Topical every 12 hours  pantoprazole    Tablet 40 milliGRAM(s) Oral before breakfast  PARoxetine 20 milliGRAM(s) Oral daily  piperacillin/tazobactam IVPB. 3.375 Gram(s) IV Intermittent every 8 hours    MEDICATIONS  (PRN):  acetaminophen   Tablet 650 milliGRAM(s) Oral every 6 hours PRN For Temp greater than 38.5 C (101.3 F)  acetaminophen   Tablet. 650 milliGRAM(s) Oral every 6 hours PRN Mild Pain (1 - 3)  acetaminophen   Tablet. 650 milliGRAM(s) Oral every 6 hours PRN Mild Pain (1 - 3)  ALPRAZolam 0.25 milliGRAM(s) Oral every 8 hours PRN anxiety  dextrose Gel 1 Dose(s) Oral once PRN Blood Glucose LESS THAN 70 milliGRAM(s)/deciliter  dextrose Gel 1 Dose(s) Oral once PRN Blood Glucose LESS THAN 70 milliGRAM(s)/deciliter  glucagon  Injectable 1 milliGRAM(s) IntraMuscular once PRN Glucose LESS THAN 70 milligrams/deciliter  glucagon  Injectable 1 milliGRAM(s) IntraMuscular once PRN Glucose LESS THAN 70 milligrams/deciliter  polyethylene glycol 3350 17 Gram(s) Oral daily PRN Constipation        Vital Signs Last 24 Hrs  T(C): 36.8 (10 Apr 2018 08:43), Max: 36.8 (10 Apr 2018 08:43)  T(F): 98.2 (10 Apr 2018 08:43), Max: 98.2 (10 Apr 2018 08:43)  HR: 96 (10 Apr 2018 08:43) (89 - 99)  BP: 150/82 (10 Apr 2018 08:43) (142/70 - 162/68)  BP(mean): --  RR: 18 (10 Apr 2018 08:43) (18 - 18)  SpO2: 92% (10 Apr 2018 08:43) (92% - 99%)    CBC Full  -  ( 10 Apr 2018 09:47 )  WBC Count : 7.66 K/uL  Hemoglobin : 8.5 g/dL  Hematocrit : 27.1 %  Platelet Count - Automated : 532 K/uL  Mean Cell Volume : 92.8 fl  Mean Cell Hemoglobin : 29.1 pg  Mean Cell Hemoglobin Concentration : 31.4 gm/dL  Auto Neutrophil # : x  Auto Lymphocyte # : x  Auto Monocyte # : x  Auto Eosinophil # : x  Auto Basophil # : x  Auto Neutrophil % : x  Auto Lymphocyte % : x  Auto Monocyte % : x  Auto Eosinophil % : x  Auto Basophil % : x    04-10    146<H>  |  104  |  18  ----------------------------<  183<H>  3.7   |  28  |  1.27    Ca    9.9      10 Apr 2018 07:13        Urinalysis Basic - ( 2018 21:57 )    Color: Yellow / Appearance: Clear / S.014 / pH: x  Gluc: x / Ketone: Negative  / Bili: Negative / Urobili: Negative mg/dL   Blood: x / Protein: Trace mg/dL / Nitrite: Negative   Leuk Esterase: Negative / RBC: 5 /HPF / WBC 6 /HPF   Sq Epi: x / Non Sq Epi: 4 /HPF / Bacteria: Few        MICROBIOLOGY:  .Blood Blood-Peripheral  18   No growth to date.  --  --      .Urine Clean Catch (Midstream)  18   No growth  --  --              v            RADIOLOGY

## 2018-04-10 NOTE — PROGRESS NOTE ADULT - SUBJECTIVE AND OBJECTIVE BOX
pt seen at bedside in NAD.  spoke with daughter about removing  the toenail. consent signed and witness and in chart  spoke with pt as well who understands plan as well  right 2nd toe anaesthetized with 1% lido 4 cc  nail removed with laceration noted on nail bed no pus noted pt bleeding well no bone exposed   wound copiously cleaned with wound cleanser and sutured laceration with 4.0 monocryl  applied betadine and DSD  will order sx shoe for ambulation  and bactroban for daily dressings   will follow up appearance tomorrow

## 2018-04-10 NOTE — PROGRESS NOTE ADULT - SUBJECTIVE AND OBJECTIVE BOX
Dr. Amador (Nephrology)  Office [phone]  Cell [phone]  Rachna CROCKETT  Cell [phone]      Patient is a 89y old  Male who presents with a chief complaint of FTT, hallucinations, AMS (09 Apr 2018 10:41)      Patient seen and examined at bedside. No chest pain/sob    VITALS:  T(F): 98.2 (04-10-18 @ 08:43), Max: 98.2 (04-10-18 @ 08:43)  HR: 96 (04-10-18 @ 08:43)  BP: 150/82 (04-10-18 @ 08:43)  RR: 18 (04-10-18 @ 08:43)  SpO2: 92% (04-10-18 @ 08:43)  Wt(kg): --    04-09 @ 07:01  -  04-10 @ 07:00  --------------------------------------------------------  IN: 1790 mL / OUT: 400 mL / NET: 1390 mL    04-10 @ 07:01  -  04-10 @ 16:07  --------------------------------------------------------  IN: 620 mL / OUT: 500 mL / NET: 120 mL          PHYSICAL EXAM:  Constitutional: NAD  Neck: No JVD  Respiratory: CTAB, no wheezes, rales or rhonchi  Cardiovascular: S1, S2, RRR  Gastrointestinal: BS+, soft, NT/ND  Extremities: No peripheral edema    Hospital Medications:   MEDICATIONS  (STANDING):  ALBUTerol/ipratropium for Nebulization 3 milliLiter(s) Nebulizer every 6 hours  atorvastatin 40 milliGRAM(s) Oral at bedtime  buDESOnide   0.25 milliGRAM(s) Respule 0.25 milliGRAM(s) Inhalation two times a day  dextrose 5%. 1000 milliLiter(s) (50 mL/Hr) IV Continuous <Continuous>  dextrose 5%. 1000 milliLiter(s) (50 mL/Hr) IV Continuous <Continuous>  dextrose 50% Injectable 12.5 Gram(s) IV Push once  dextrose 50% Injectable 25 Gram(s) IV Push once  dextrose 50% Injectable 25 Gram(s) IV Push once  dextrose 50% Injectable 12.5 Gram(s) IV Push once  dextrose 50% Injectable 25 Gram(s) IV Push once  dextrose 50% Injectable 25 Gram(s) IV Push once  diltiazem    milliGRAM(s) Oral daily  fluticasone propionate 50 MICROgram(s)/spray Nasal Spray 2 Spray(s) Both Nostrils daily  insulin lispro (HumaLOG) corrective regimen sliding scale   SubCutaneous three times a day before meals  insulin lispro (HumaLOG) corrective regimen sliding scale   SubCutaneous at bedtime  lamoTRIgine 25 milliGRAM(s) Oral daily  methimazole 5 milliGRAM(s) Oral daily  mupirocin 2% Ointment 1 Application(s) Topical every 12 hours  pantoprazole    Tablet 40 milliGRAM(s) Oral before breakfast  PARoxetine 20 milliGRAM(s) Oral daily  piperacillin/tazobactam IVPB. 3.375 Gram(s) IV Intermittent every 8 hours      LABS:  04-10    146<H>  |  104  |  18  ----------------------------<  183<H>  3.7   |  28  |  1.27    Ca    9.9      10 Apr 2018 07:13      Creatinine Trend: 1.27 <--, 1.27 <--, 1.67 <--, 1.35 <--, 1.25 <--, 1.20 <--                                8.5    7.66  )-----------( 532      ( 10 Apr 2018 09:47 )             27.1     Urine Studies:  Urinalysis - [04-08-18 @ 21:57]      Color Yellow / Appearance Clear / SG 1.014 / pH 5.5      Gluc 500 / Ketone Negative  / Bili Negative / Urobili Negative       Blood Negative / Protein Trace / Leuk Est Negative / Nitrite Negative      RBC 5 / WBC 6 / Hyaline 1 / Gran 3 / Sq Epi  / Non Sq Epi 4 / Bacteria Few    Urine Creatinine 38      [04-08-18 @ 19:55]  Urine Sodium 93      [04-08-18 @ 19:55]    HbA1c 5.8      [04-06-18 @ 08:19]  TSH 2.65      [04-06-18 @ 08:19]        RADIOLOGY & ADDITIONAL STUDIES:

## 2018-04-10 NOTE — PROGRESS NOTE ADULT - SUBJECTIVE AND OBJECTIVE BOX
Patient is a 89y old  Male who presents with a chief complaint of FTT, hallucinations, AMS (2018 10:41)      SUBJECTIVE / OVERNIGHT EVENTS: Comfortable without new complaints. S/P nail removal from toe..  Review of Systems  chest pain no  palpitations no  sob no  nausea no  headache no    MEDICATIONS  (STANDING):  ALBUTerol/ipratropium for Nebulization 3 milliLiter(s) Nebulizer every 6 hours  atorvastatin 40 milliGRAM(s) Oral at bedtime  buDESOnide   0.25 milliGRAM(s) Respule 0.25 milliGRAM(s) Inhalation two times a day  dextrose 5%. 1000 milliLiter(s) (50 mL/Hr) IV Continuous <Continuous>  dextrose 5%. 1000 milliLiter(s) (50 mL/Hr) IV Continuous <Continuous>  dextrose 50% Injectable 12.5 Gram(s) IV Push once  dextrose 50% Injectable 25 Gram(s) IV Push once  dextrose 50% Injectable 25 Gram(s) IV Push once  dextrose 50% Injectable 12.5 Gram(s) IV Push once  dextrose 50% Injectable 25 Gram(s) IV Push once  dextrose 50% Injectable 25 Gram(s) IV Push once  diltiazem    milliGRAM(s) Oral daily  fluticasone propionate 50 MICROgram(s)/spray Nasal Spray 2 Spray(s) Both Nostrils daily  insulin lispro (HumaLOG) corrective regimen sliding scale   SubCutaneous three times a day before meals  insulin lispro (HumaLOG) corrective regimen sliding scale   SubCutaneous at bedtime  lamoTRIgine 25 milliGRAM(s) Oral daily  methimazole 5 milliGRAM(s) Oral daily  mupirocin 2% Ointment 1 Application(s) Topical every 12 hours  pantoprazole    Tablet 40 milliGRAM(s) Oral before breakfast  PARoxetine 20 milliGRAM(s) Oral daily  piperacillin/tazobactam IVPB. 3.375 Gram(s) IV Intermittent every 8 hours    MEDICATIONS  (PRN):  acetaminophen   Tablet 650 milliGRAM(s) Oral every 6 hours PRN For Temp greater than 38.5 C (101.3 F)  acetaminophen   Tablet. 650 milliGRAM(s) Oral every 6 hours PRN Mild Pain (1 - 3)  acetaminophen   Tablet. 650 milliGRAM(s) Oral every 6 hours PRN Mild Pain (1 - 3)  ALPRAZolam 0.25 milliGRAM(s) Oral every 8 hours PRN anxiety  dextrose Gel 1 Dose(s) Oral once PRN Blood Glucose LESS THAN 70 milliGRAM(s)/deciliter  dextrose Gel 1 Dose(s) Oral once PRN Blood Glucose LESS THAN 70 milliGRAM(s)/deciliter  glucagon  Injectable 1 milliGRAM(s) IntraMuscular once PRN Glucose LESS THAN 70 milligrams/deciliter  glucagon  Injectable 1 milliGRAM(s) IntraMuscular once PRN Glucose LESS THAN 70 milligrams/deciliter  polyethylene glycol 3350 17 Gram(s) Oral daily PRN Constipation          PHYSICAL EXAM:  GENERAL: NAD, well-developed  HEAD:  Atraumatic, Normocephalic  EYES: EOMI, PERRLA, conjunctiva and sclera clear  NECK: Supple, No JVD  CHEST/LUNG: Clear to auscultation bilaterally; No wheeze  HEART: Regular rate and rhythm; No murmurs, rubs, or gallops  ABDOMEN: Soft, Nontender, Nondistended; Bowel sounds present  EXTREMITIES:  2+ Peripheral Pulses, No clubbing, cyanosis, or edema R foot with dressing.  NEUROLOGY: non-focal  SKIN: No rashes or lesions    LABS:                        8.5    7.66  )-----------( 532      ( 10 Apr 2018 09:47 )             27.1     04-10    146<H>  |  104  |  18  ----------------------------<  183<H>  3.7   |  28  |  1.27    Ca    9.9      10 Apr 2018 07:13      PT/INR - ( 10 Apr 2018 09:40 )   PT: 24.1 sec;   INR: 2.10 ratio               Urinalysis Basic - ( 2018 21:57 )    Color: Yellow / Appearance: Clear / S.014 / pH: x  Gluc: x / Ketone: Negative  / Bili: Negative / Urobili: Negative mg/dL   Blood: x / Protein: Trace mg/dL / Nitrite: Negative   Leuk Esterase: Negative / RBC: 5 /HPF / WBC 6 /HPF   Sq Epi: x / Non Sq Epi: 4 /HPF / Bacteria: Few        RADIOLOGY & ADDITIONAL TESTS:    Imaging Personally Reviewed:    Consultant(s) Notes Reviewed:      Care Discussed with Consultants/Other Providers:

## 2018-04-10 NOTE — PROGRESS NOTE ADULT - SUBJECTIVE AND OBJECTIVE BOX
Follow-up Pulm Progress Note    No new respiratory events overnight.  Denies increased SOB, chest pain, cough or mucus.  resting comfortably.    Medications:  MEDICATIONS  (STANDING):  ALBUTerol/ipratropium for Nebulization 3 milliLiter(s) Nebulizer every 6 hours  atorvastatin 40 milliGRAM(s) Oral at bedtime  buDESOnide   0.25 milliGRAM(s) Respule 0.25 milliGRAM(s) Inhalation two times a day  dextrose 5%. 1000 milliLiter(s) (50 mL/Hr) IV Continuous <Continuous>  dextrose 5%. 1000 milliLiter(s) (50 mL/Hr) IV Continuous <Continuous>  dextrose 50% Injectable 12.5 Gram(s) IV Push once  dextrose 50% Injectable 25 Gram(s) IV Push once  dextrose 50% Injectable 25 Gram(s) IV Push once  dextrose 50% Injectable 12.5 Gram(s) IV Push once  dextrose 50% Injectable 25 Gram(s) IV Push once  dextrose 50% Injectable 25 Gram(s) IV Push once  diltiazem    milliGRAM(s) Oral daily  fluticasone propionate 50 MICROgram(s)/spray Nasal Spray 2 Spray(s) Both Nostrils daily  insulin lispro (HumaLOG) corrective regimen sliding scale   SubCutaneous three times a day before meals  insulin lispro (HumaLOG) corrective regimen sliding scale   SubCutaneous at bedtime  lamoTRIgine 25 milliGRAM(s) Oral daily  methimazole 5 milliGRAM(s) Oral daily  pantoprazole    Tablet 40 milliGRAM(s) Oral before breakfast  PARoxetine 20 milliGRAM(s) Oral daily  piperacillin/tazobactam IVPB. 3.375 Gram(s) IV Intermittent every 8 hours    MEDICATIONS  (PRN):  acetaminophen   Tablet 650 milliGRAM(s) Oral every 6 hours PRN For Temp greater than 38.5 C (101.3 F)  acetaminophen   Tablet. 650 milliGRAM(s) Oral every 6 hours PRN Mild Pain (1 - 3)  ALPRAZolam 0.25 milliGRAM(s) Oral every 8 hours PRN anxiety  dextrose Gel 1 Dose(s) Oral once PRN Blood Glucose LESS THAN 70 milliGRAM(s)/deciliter  dextrose Gel 1 Dose(s) Oral once PRN Blood Glucose LESS THAN 70 milliGRAM(s)/deciliter  glucagon  Injectable 1 milliGRAM(s) IntraMuscular once PRN Glucose LESS THAN 70 milligrams/deciliter  glucagon  Injectable 1 milliGRAM(s) IntraMuscular once PRN Glucose LESS THAN 70 milligrams/deciliter  polyethylene glycol 3350 17 Gram(s) Oral daily PRN Constipation      Vent settings (if applicable)      Vital Signs Last 24 Hrs  T(C): 36.8 (10 Apr 2018 08:43), Max: 36.8 (10 Apr 2018 08:43)  T(F): 98.2 (10 Apr 2018 08:43), Max: 98.2 (10 Apr 2018 08:43)  HR: 96 (10 Apr 2018 08:43) (89 - 99)  BP: 150/82 (10 Apr 2018 08:43) (142/70 - 162/68)  BP(mean): --  RR: 18 (10 Apr 2018 08:43) (18 - 18)  SpO2: 92% (10 Apr 2018 08:43) (92% - 99%)          04-09 @ 07:01  -  04-10 @ 07:00  --------------------------------------------------------  IN: 1790 mL / OUT: 400 mL / NET: 1390 mL          LABS:                        9.1    10.55 )-----------( 534      ( 2018 09:26 )             29.9     04-10    146<H>  |  104  |  18  ----------------------------<  183<H>  3.7   |  28  |  1.27    Ca    9.9      10 Apr 2018 07:13            CAPILLARY BLOOD GLUCOSE      POCT Blood Glucose.: 179 mg/dL (10 Apr 2018 08:36)    PT/INR - ( 2018 09:26 )   PT: 24.9 sec;   INR: 2.17 ratio           Urinalysis Basic - ( 2018 21:57 )    Color: Yellow / Appearance: Clear / S.014 / pH: x  Gluc: x / Ketone: Negative  / Bili: Negative / Urobili: Negative mg/dL   Blood: x / Protein: Trace mg/dL / Nitrite: Negative   Leuk Esterase: Negative / RBC: 5 /HPF / WBC 6 /HPF   Sq Epi: x / Non Sq Epi: 4 /HPF / Bacteria: Few            CULTURES:  Culture Results:   No growth to date. ( @ 19:50)  Culture Results:   No growth to date. ( @ 19:50)  Culture Results:   No growth ( @ 17:23)    Most recent blood culture --  @ 19:50   -- -- .Blood Blood-Peripheral  @ 19:50  Most recent blood culture --  @ 17:23   -- -- .Urine Clean Catch (Midstream)  @ 17:23      Physical Examination:  Awake and alert, generally comfortable  HEENT: unremarkable  PULM: Clear to auscultation bilaterally, no significant sputum production  CVS: Regular rate and rhythm, no murmurs, rubs, or gallops  Abd:  soft, non tender  Extrem: No CCE    RADIOLOGY REVIEWED  CXR:    CT chest:

## 2018-04-11 VITALS — OXYGEN SATURATION: 94 % | WEIGHT: 134.48 LBS

## 2018-04-11 LAB
GLUCOSE BLDC GLUCOMTR-MCNC: 181 MG/DL — HIGH (ref 70–99)
GLUCOSE BLDC GLUCOMTR-MCNC: 265 MG/DL — HIGH (ref 70–99)
INR BLD: 2.47 RATIO — HIGH (ref 0.88–1.16)
PROTHROM AB SERPL-ACNC: 28.4 SEC — HIGH (ref 10–13.1)
VIT B1 SERPL-MCNC: 112.7 NMOL/L — SIGNIFICANT CHANGE UP (ref 66.5–200)

## 2018-04-11 PROCEDURE — 51701 INSERT BLADDER CATHETER: CPT

## 2018-04-11 PROCEDURE — 83036 HEMOGLOBIN GLYCOSYLATED A1C: CPT

## 2018-04-11 PROCEDURE — 82607 VITAMIN B-12: CPT

## 2018-04-11 PROCEDURE — 82746 ASSAY OF FOLIC ACID SERUM: CPT

## 2018-04-11 PROCEDURE — 82553 CREATINE MB FRACTION: CPT

## 2018-04-11 PROCEDURE — 76700 US EXAM ABDOM COMPLETE: CPT

## 2018-04-11 PROCEDURE — 85730 THROMBOPLASTIN TIME PARTIAL: CPT

## 2018-04-11 PROCEDURE — 84300 ASSAY OF URINE SODIUM: CPT

## 2018-04-11 PROCEDURE — 99285 EMERGENCY DEPT VISIT HI MDM: CPT | Mod: 25

## 2018-04-11 PROCEDURE — 97162 PT EVAL MOD COMPLEX 30 MIN: CPT

## 2018-04-11 PROCEDURE — 87086 URINE CULTURE/COLONY COUNT: CPT

## 2018-04-11 PROCEDURE — 84443 ASSAY THYROID STIM HORMONE: CPT

## 2018-04-11 PROCEDURE — 94640 AIRWAY INHALATION TREATMENT: CPT

## 2018-04-11 PROCEDURE — 84484 ASSAY OF TROPONIN QUANT: CPT

## 2018-04-11 PROCEDURE — 80053 COMPREHEN METABOLIC PANEL: CPT

## 2018-04-11 PROCEDURE — 99232 SBSQ HOSP IP/OBS MODERATE 35: CPT

## 2018-04-11 PROCEDURE — 82550 ASSAY OF CK (CPK): CPT

## 2018-04-11 PROCEDURE — 84425 ASSAY OF VITAMIN B-1: CPT

## 2018-04-11 PROCEDURE — 85027 COMPLETE CBC AUTOMATED: CPT

## 2018-04-11 PROCEDURE — 71045 X-RAY EXAM CHEST 1 VIEW: CPT

## 2018-04-11 PROCEDURE — 81001 URINALYSIS AUTO W/SCOPE: CPT

## 2018-04-11 PROCEDURE — 97530 THERAPEUTIC ACTIVITIES: CPT

## 2018-04-11 PROCEDURE — 82570 ASSAY OF URINE CREATININE: CPT

## 2018-04-11 PROCEDURE — 85610 PROTHROMBIN TIME: CPT

## 2018-04-11 PROCEDURE — 97116 GAIT TRAINING THERAPY: CPT

## 2018-04-11 PROCEDURE — 82962 GLUCOSE BLOOD TEST: CPT

## 2018-04-11 PROCEDURE — 74176 CT ABD & PELVIS W/O CONTRAST: CPT

## 2018-04-11 PROCEDURE — 93005 ELECTROCARDIOGRAM TRACING: CPT | Mod: XU

## 2018-04-11 PROCEDURE — 87040 BLOOD CULTURE FOR BACTERIA: CPT

## 2018-04-11 PROCEDURE — 70450 CT HEAD/BRAIN W/O DYE: CPT

## 2018-04-11 PROCEDURE — 96374 THER/PROPH/DIAG INJ IV PUSH: CPT | Mod: XU

## 2018-04-11 PROCEDURE — 83880 ASSAY OF NATRIURETIC PEPTIDE: CPT

## 2018-04-11 PROCEDURE — 80048 BASIC METABOLIC PNL TOTAL CA: CPT

## 2018-04-11 PROCEDURE — 71250 CT THORAX DX C-: CPT

## 2018-04-11 RX ORDER — IPRATROPIUM/ALBUTEROL SULFATE 18-103MCG
3 AEROSOL WITH ADAPTER (GRAM) INHALATION EVERY 6 HOURS
Qty: 0 | Refills: 0 | Status: DISCONTINUED | OUTPATIENT
Start: 2018-04-11 | End: 2018-04-11

## 2018-04-11 RX ADMIN — Medication 3: at 13:17

## 2018-04-11 RX ADMIN — Medication 20 MILLIGRAM(S): at 12:17

## 2018-04-11 RX ADMIN — Medication 0.25 MILLIGRAM(S): at 06:05

## 2018-04-11 RX ADMIN — Medication 2 SPRAY(S): at 12:17

## 2018-04-11 RX ADMIN — Medication 3 MILLILITER(S): at 06:05

## 2018-04-11 RX ADMIN — MUPIROCIN 1 APPLICATION(S): 20 OINTMENT TOPICAL at 06:06

## 2018-04-11 RX ADMIN — PIPERACILLIN AND TAZOBACTAM 25 GRAM(S): 4; .5 INJECTION, POWDER, LYOPHILIZED, FOR SOLUTION INTRAVENOUS at 08:50

## 2018-04-11 RX ADMIN — PANTOPRAZOLE SODIUM 40 MILLIGRAM(S): 20 TABLET, DELAYED RELEASE ORAL at 06:06

## 2018-04-11 RX ADMIN — Medication 300 MILLIGRAM(S): at 06:05

## 2018-04-11 RX ADMIN — Medication 1: at 08:47

## 2018-04-11 NOTE — PROGRESS NOTE ADULT - PROBLEM SELECTOR PLAN 1
likely sec to prerenal improved   per RN patient is eating and drinking well  monitor bmp
likely sec to prerenal improved today on IVF.   per RN patient is eating and drinking well  d/c IVF for now  monitor bmp
likely sec to prerenal improved to its baseline  patient is eating and drinking well  monitor bmp
-better  -source not clear  -complete zosyn today and stop  - cx-ve
-better  -source not clear  -DC zosyn - s/p 5 days  - cx-ve
-better  -source not clear  -DC zosyn - s/p 5 days  - cx-ve

## 2018-04-11 NOTE — PROGRESS NOTE ADULT - GASTROINTESTINAL DETAILS
no distention/soft/nontender/no rebound tenderness
no rebound tenderness/nontender/no distention/soft
no rebound tenderness/nontender/soft/no distention

## 2018-04-11 NOTE — PROGRESS NOTE ADULT - PROBLEM SELECTOR PLAN 3
slightly high, likely dehydration  encourage increase PO free water  Monitor Na level  If worsens use 1/2 NS 1L
slightly high, likely dehydration  encourage increase PO free water, discussed with family at bedside as well.
-as above

## 2018-04-11 NOTE — PROGRESS NOTE ADULT - PROVIDER SPECIALTY LIST ADULT
Infectious Disease
Internal Medicine
Nephrology
Nephrology
Neurology
Podiatry
Podiatry
Pulmonology
Internal Medicine
Pulmonology
Internal Medicine
Nephrology
Infectious Disease

## 2018-04-11 NOTE — PROGRESS NOTE ADULT - RS GEN PE MLT RESP DETAILS PC
breath sounds equal/clear to auscultation bilaterally/good air movement/respirations non-labored
breath sounds equal/good air movement/respirations non-labored/clear to auscultation bilaterally
clear to auscultation bilaterally/respirations non-labored/breath sounds equal/good air movement

## 2018-04-11 NOTE — PROGRESS NOTE ADULT - ASSESSMENT
89 m with  Sepsis probably from UTI - antibiotics empiric, ID follow.  Pulmonary evaluation noted.  Confusion-resolved. Vitamin B1 level pending .  Psychiatry evaluation noted.  PT  Diabetes control  PAF- continue AC with Coumadin  PT  SS evaluation refused by patient/daughter   Podiatry evaluation re toenails. May be done as OTP.  weight loss- Will order CT abdomen/pelvis to r/o malignacy.  HARJEET improving - Nephrology evaluation noted.  DCP home if cleared by ID. Follow with PMD/ Pulmonary/Cardiology.    Osito Devlin MD pager [phone]
89 m with  Sepsis probably from UTI - finishing antibiotics empiric, ID follow.  Pulmonary evaluation noted.  Confusion-resolved. Vitamin B1 level pending .  Psychiatry evaluation noted.  PT  Diabetes control  PAF- continue AC with Coumadin  PT  SS evaluation refused by patient/daughter   Podiatry follow. s/p nail removal..  weight loss- no malignacy on CT abdomen..  HARJEET improving - Nephrology evaluation noted.  DCP home in am. Follow with PMD/ Pulmonary/Cardiology.    Osito Devlin MD pager [phone]
89 yr old gentelman, history as above, including recurrent UTI, inability to gain weight,  who presents with unexplained leukocytosis and confusion.  Appears better this AM.  No evidence UTI.  CT chest with underlying emphysema, but no evidence pneumonia.  Unclear source of infection, Acute kidney injurty  continue zosyn as per ID  budesonide  prn albuterol  chest CT without acute findings  monitor bloodwork, clinical status  dvt prophylaxis  routine gi prophylaxis as necessary  Oxygen saturation greater than 92%  as per medicine, renal, ID    Beatriz Desai MD  [phone]  Pulmonary
88yo male w hx of A. jose raul on coumadin, DM2, COPD, presenting with decreased intake of food and water, has worsening of renal function
88yo male w hx of afib on coumadin, DM2, COPD, hypothyroidism, presenting with decreased intake food and water for 5 days with fever/leukocyosis/SIRS
88yo male w hx of afib on coumadin, DM2, COPD, hypothyroidism, presenting with decreased intake food and water for 5 days with fever/leukocyosis/SIRS    all cultures negative:  no focal findings of infection.  seems to be at baseline.  to complete short course of ab  early this week
89 m with  Sepsis probably from UTI - antibiotics empiric, ID evaluation noted  Pulmonary evaluation noted.  Confusion-Neurology evaluation noted.  Psychiatry evaluation noted.  PT  Diabetes control  PAF- continue AC with Coumadin  PT  SS evaluation  Podiatry evaluation re toenails.  d/w daughter    Osito Devlin MD pager [phone]
89 m with  Sepsis probably from UTI - antibiotics empiric, ID follow noted  Pulmonary evaluation noted.  Confusion-Neurology evaluation noted. Will check Vitamin B1 level.  Psychiatry evaluation noted.  PT  Diabetes control  PAF- continue AC with Coumadin  PT  SS evaluation pending   Podiatry evaluation re toenails.  weight loss- Will order CT abdomen/pelvis to r/o malignacy.  DCP home.    Osito Devlin MD pager [phone]
89 m with  Sepsis probably from UTI - antibiotics empiric, ID follow noted  Pulmonary evaluation noted.  Confusion-Neurology evaluation noted. Will check Vitamin B1 level.  Psychiatry evaluation noted.  PT  Diabetes control  PAF- continue AC with Coumadin  PT  SS evaluation refused by patient/daughter   Podiatry evaluation re toenails.  weight loss- Will order CT abdomen/pelvis to r/o malignacy.  HARJEET- follow, Nephrology evaluation Dr. Amador  DCP home.    Osito Devlin MD pager [phone]
89 yr old gentelman, history as above, including recurrent UTI, inability to gain weight,  who presents with unexplained leukocytosis and confusion.  Appears better this AM.  No evidence UTI.  CT chest with underlying emphysema, but no evidence pneumonia.  Unclear source of infection, Acute kidney injurty  continue zosyn as per ID  dc budesonide  prn albuterol  chest CT without acute findings  monitor bloodwork, clinical status  dvt prophylaxis  routine gi prophylaxis as necessary  Oxygen saturation greater than 92%  as per medicine, renal, ID    Beatriz Desai MD  [phone]  Pulmonary
89yo male w hx of A. jose raul on coumadin, DM2, COPD, presenting with decreased intake of food and water, has worsening of renal function
88yo male w hx of A. jose raul on coumadin, DM2, COPD, presenting with decreased intake of food and water, has worsening of renal function
89yo male w hx of afib on coumadin, DM2, COPD, hypothyroidism, presenting with decreased intake food and water for 5 days with fever/leukocyosis/SIRS
89yo male w hx of afib on coumadin, DM2, COPD, hypothyroidism, presenting with decreased intake food and water for 5 days with fever/leukocyosis/SIRS

## 2018-04-11 NOTE — PROGRESS NOTE ADULT - PROBLEM SELECTOR PROBLEM 2
Leukocytosis
Leukocytosis
Stage 3 chronic kidney disease
Leukocytosis
Leukocytosis

## 2018-04-11 NOTE — PROGRESS NOTE ADULT - SUBJECTIVE AND OBJECTIVE BOX
pt seen at bedside in NAD. s/p right 2nd nail removal due to trauma with nail bed ulcer and laceration that was repaired  nail bed clean and granular no active bleeding suture intact and laceration repaired  applied xeroform with DSD  keep dressing intact until follow up   can follow up in office once [facility] [phone]

## 2018-04-11 NOTE — PROGRESS NOTE ADULT - PROBLEM SELECTOR PROBLEM 1
Fever
Fever
HARJEET (acute kidney injury)
Fever
Fever

## 2018-04-11 NOTE — PROGRESS NOTE ADULT - SUBJECTIVE AND OBJECTIVE BOX
BASHIR BECKER 89y [id number]    Patient is a 89y old  Male who presents with a chief complaint of FTT, hallucinations, AMS (09 Apr 2018 10:41)      Follow Up/CC:  fever    Interval History/ROS: feels ok    Allergies    No Known Allergies    Intolerances        ANTIMICROBIALS:  piperacillin/tazobactam IVPB. 3.375 every 8 hours      MEDICATIONS  (STANDING):  atorvastatin 40 milliGRAM(s) Oral at bedtime  dextrose 5%. 1000 milliLiter(s) (50 mL/Hr) IV Continuous <Continuous>  dextrose 5%. 1000 milliLiter(s) (50 mL/Hr) IV Continuous <Continuous>  dextrose 50% Injectable 12.5 Gram(s) IV Push once  dextrose 50% Injectable 25 Gram(s) IV Push once  dextrose 50% Injectable 25 Gram(s) IV Push once  dextrose 50% Injectable 12.5 Gram(s) IV Push once  dextrose 50% Injectable 25 Gram(s) IV Push once  dextrose 50% Injectable 25 Gram(s) IV Push once  diltiazem    milliGRAM(s) Oral daily  fluticasone propionate 50 MICROgram(s)/spray Nasal Spray 2 Spray(s) Both Nostrils daily  insulin lispro (HumaLOG) corrective regimen sliding scale   SubCutaneous three times a day before meals  insulin lispro (HumaLOG) corrective regimen sliding scale   SubCutaneous at bedtime  lamoTRIgine 25 milliGRAM(s) Oral daily  methimazole 5 milliGRAM(s) Oral daily  mupirocin 2% Ointment 1 Application(s) Topical every 12 hours  pantoprazole    Tablet 40 milliGRAM(s) Oral before breakfast  PARoxetine 20 milliGRAM(s) Oral daily  piperacillin/tazobactam IVPB. 3.375 Gram(s) IV Intermittent every 8 hours    MEDICATIONS  (PRN):  acetaminophen   Tablet 650 milliGRAM(s) Oral every 6 hours PRN For Temp greater than 38.5 C (101.3 F)  acetaminophen   Tablet. 650 milliGRAM(s) Oral every 6 hours PRN Mild Pain (1 - 3)  acetaminophen   Tablet. 650 milliGRAM(s) Oral every 6 hours PRN Mild Pain (1 - 3)  ALBUTerol/ipratropium for Nebulization 3 milliLiter(s) Nebulizer every 6 hours PRN Shortness of Breath and/or Wheezing  ALPRAZolam 0.25 milliGRAM(s) Oral every 8 hours PRN anxiety  dextrose Gel 1 Dose(s) Oral once PRN Blood Glucose LESS THAN 70 milliGRAM(s)/deciliter  dextrose Gel 1 Dose(s) Oral once PRN Blood Glucose LESS THAN 70 milliGRAM(s)/deciliter  glucagon  Injectable 1 milliGRAM(s) IntraMuscular once PRN Glucose LESS THAN 70 milligrams/deciliter  glucagon  Injectable 1 milliGRAM(s) IntraMuscular once PRN Glucose LESS THAN 70 milligrams/deciliter  polyethylene glycol 3350 17 Gram(s) Oral daily PRN Constipation        Vital Signs Last 24 Hrs  T(C): 36.6 (11 Apr 2018 07:16), Max: 36.8 (10 Apr 2018 21:55)  T(F): 97.8 (11 Apr 2018 07:16), Max: 98.2 (10 Apr 2018 21:55)  HR: 88 (11 Apr 2018 07:16) (83 - 88)  BP: 126/63 (11 Apr 2018 07:16) (126/63 - 160/67)  BP(mean): --  RR: 18 (11 Apr 2018 07:16) (17 - 18)  SpO2: 94% (11 Apr 2018 07:20) (90% - 98%)    CBC Full  -  ( 10 Apr 2018 09:47 )  WBC Count : 7.66 K/uL  Hemoglobin : 8.5 g/dL  Hematocrit : 27.1 %  Platelet Count - Automated : 532 K/uL  Mean Cell Volume : 92.8 fl  Mean Cell Hemoglobin : 29.1 pg  Mean Cell Hemoglobin Concentration : 31.4 gm/dL  Auto Neutrophil # : x  Auto Lymphocyte # : x  Auto Monocyte # : x  Auto Eosinophil # : x  Auto Basophil # : x  Auto Neutrophil % : x  Auto Lymphocyte % : x  Auto Monocyte % : x  Auto Eosinophil % : x  Auto Basophil % : x    04-10    146<H>  |  104  |  18  ----------------------------<  183<H>  3.7   |  28  |  1.27    Ca    9.9      10 Apr 2018 07:13            MICROBIOLOGY:  .Blood Blood-Peripheral  04-05-18   No growth at 5 days.  --  --      .Urine Clean Catch (Midstream)  04-05-18   No growth  --  --      RADIOLOGY

## 2018-04-11 NOTE — PROGRESS NOTE ADULT - PROBLEM SELECTOR PROBLEM 3
Hypernatremia
SIRS (systemic inflammatory response syndrome)
SIRS (systemic inflammatory response syndrome)
Hypernatremia
SIRS (systemic inflammatory response syndrome)
SIRS (systemic inflammatory response syndrome)

## 2018-04-11 NOTE — PROGRESS NOTE ADULT - SUBJECTIVE AND OBJECTIVE BOX
Dr. Amador  Office [phone]  Cell [phone]  Rachna CROCKETT  Cell [phone]      Patient is a 89y old  Male who presents with a chief complaint of FTT, hallucinations, AMS (09 Apr 2018 10:41)      Patient seen and examined at bedside. No chest pain/sob    VITALS:  T(F): 97.8 (04-11-18 @ 07:16), Max: 98.2 (04-10-18 @ 21:55)  HR: 88 (04-11-18 @ 07:16)  BP: 126/63 (04-11-18 @ 07:16)  RR: 18 (04-11-18 @ 07:16)  SpO2: 94% (04-11-18 @ 07:20)  Wt(kg): --    04-10 @ 07:01  -  04-11 @ 07:00  --------------------------------------------------------  IN: 820 mL / OUT: 1500 mL / NET: -680 mL    04-11 @ 07:01  -  04-11 @ 12:24  --------------------------------------------------------  IN: 120 mL / OUT: 200 mL / NET: -80 mL          PHYSICAL EXAM:  Constitutional: NAD  Neck: No JVD  Respiratory: CTAB, no wheezes, rales or rhonchi  Cardiovascular: S1, S2, RRR  Gastrointestinal: BS+, soft, NT/ND  Extremities: No peripheral edema    Hospital Medications:   MEDICATIONS  (STANDING):  atorvastatin 40 milliGRAM(s) Oral at bedtime  dextrose 5%. 1000 milliLiter(s) (50 mL/Hr) IV Continuous <Continuous>  dextrose 5%. 1000 milliLiter(s) (50 mL/Hr) IV Continuous <Continuous>  dextrose 50% Injectable 12.5 Gram(s) IV Push once  dextrose 50% Injectable 25 Gram(s) IV Push once  dextrose 50% Injectable 25 Gram(s) IV Push once  dextrose 50% Injectable 12.5 Gram(s) IV Push once  dextrose 50% Injectable 25 Gram(s) IV Push once  dextrose 50% Injectable 25 Gram(s) IV Push once  diltiazem    milliGRAM(s) Oral daily  fluticasone propionate 50 MICROgram(s)/spray Nasal Spray 2 Spray(s) Both Nostrils daily  insulin lispro (HumaLOG) corrective regimen sliding scale   SubCutaneous three times a day before meals  insulin lispro (HumaLOG) corrective regimen sliding scale   SubCutaneous at bedtime  lamoTRIgine 25 milliGRAM(s) Oral daily  methimazole 5 milliGRAM(s) Oral daily  mupirocin 2% Ointment 1 Application(s) Topical every 12 hours  pantoprazole    Tablet 40 milliGRAM(s) Oral before breakfast  PARoxetine 20 milliGRAM(s) Oral daily      LABS:  04-10    146<H>  |  104  |  18  ----------------------------<  183<H>  3.7   |  28  |  1.27    Ca    9.9      10 Apr 2018 07:13      Creatinine Trend: 1.27 <--, 1.27 <--, 1.67 <--, 1.35 <--, 1.25 <--, 1.20 <--                                8.5    7.66  )-----------( 532      ( 10 Apr 2018 09:47 )             27.1     Urine Studies:  Urinalysis - [04-08-18 @ 21:57]      Color Yellow / Appearance Clear / SG 1.014 / pH 5.5      Gluc 500 / Ketone Negative  / Bili Negative / Urobili Negative       Blood Negative / Protein Trace / Leuk Est Negative / Nitrite Negative      RBC 5 / WBC 6 / Hyaline 1 / Gran 3 / Sq Epi  / Non Sq Epi 4 / Bacteria Few    Urine Creatinine 38      [04-08-18 @ 19:55]  Urine Sodium 93      [04-08-18 @ 19:55]    HbA1c 5.8      [04-06-18 @ 08:19]  TSH 2.65      [04-06-18 @ 08:19]        RADIOLOGY & ADDITIONAL STUDIES:

## 2018-04-11 NOTE — PROGRESS NOTE ADULT - ATTENDING COMMENTS
Jovany Verdugo  Attending Physician   Division of Infectious Disease  Pager #[phone]  After 5pm/weekend or no response, call #[phone]

## 2018-04-11 NOTE — PROGRESS NOTE ADULT - SUBJECTIVE AND OBJECTIVE BOX
Follow-up Pulm Progress Note    No new respiratory events overnight.  Denies increased SOB, chest pain, cough or mucus.    Medications:  MEDICATIONS  (STANDING):  ALBUTerol/ipratropium for Nebulization 3 milliLiter(s) Nebulizer every 6 hours  atorvastatin 40 milliGRAM(s) Oral at bedtime  buDESOnide   0.25 milliGRAM(s) Respule 0.25 milliGRAM(s) Inhalation two times a day  dextrose 5%. 1000 milliLiter(s) (50 mL/Hr) IV Continuous <Continuous>  dextrose 5%. 1000 milliLiter(s) (50 mL/Hr) IV Continuous <Continuous>  dextrose 50% Injectable 12.5 Gram(s) IV Push once  dextrose 50% Injectable 25 Gram(s) IV Push once  dextrose 50% Injectable 25 Gram(s) IV Push once  dextrose 50% Injectable 12.5 Gram(s) IV Push once  dextrose 50% Injectable 25 Gram(s) IV Push once  dextrose 50% Injectable 25 Gram(s) IV Push once  diltiazem    milliGRAM(s) Oral daily  fluticasone propionate 50 MICROgram(s)/spray Nasal Spray 2 Spray(s) Both Nostrils daily  insulin lispro (HumaLOG) corrective regimen sliding scale   SubCutaneous three times a day before meals  insulin lispro (HumaLOG) corrective regimen sliding scale   SubCutaneous at bedtime  lamoTRIgine 25 milliGRAM(s) Oral daily  methimazole 5 milliGRAM(s) Oral daily  mupirocin 2% Ointment 1 Application(s) Topical every 12 hours  pantoprazole    Tablet 40 milliGRAM(s) Oral before breakfast  PARoxetine 20 milliGRAM(s) Oral daily  piperacillin/tazobactam IVPB. 3.375 Gram(s) IV Intermittent every 8 hours    MEDICATIONS  (PRN):  acetaminophen   Tablet 650 milliGRAM(s) Oral every 6 hours PRN For Temp greater than 38.5 C (101.3 F)  acetaminophen   Tablet. 650 milliGRAM(s) Oral every 6 hours PRN Mild Pain (1 - 3)  acetaminophen   Tablet. 650 milliGRAM(s) Oral every 6 hours PRN Mild Pain (1 - 3)  ALPRAZolam 0.25 milliGRAM(s) Oral every 8 hours PRN anxiety  dextrose Gel 1 Dose(s) Oral once PRN Blood Glucose LESS THAN 70 milliGRAM(s)/deciliter  dextrose Gel 1 Dose(s) Oral once PRN Blood Glucose LESS THAN 70 milliGRAM(s)/deciliter  glucagon  Injectable 1 milliGRAM(s) IntraMuscular once PRN Glucose LESS THAN 70 milligrams/deciliter  glucagon  Injectable 1 milliGRAM(s) IntraMuscular once PRN Glucose LESS THAN 70 milligrams/deciliter  polyethylene glycol 3350 17 Gram(s) Oral daily PRN Constipation      Vent settings (if applicable)      Vital Signs Last 24 Hrs  T(C): 36.6 (11 Apr 2018 07:16), Max: 36.8 (10 Apr 2018 21:55)  T(F): 97.8 (11 Apr 2018 07:16), Max: 98.2 (10 Apr 2018 21:55)  HR: 88 (11 Apr 2018 07:16) (83 - 88)  BP: 126/63 (11 Apr 2018 07:16) (126/63 - 160/67)  BP(mean): --  RR: 18 (11 Apr 2018 07:16) (17 - 18)  SpO2: 94% (11 Apr 2018 07:20) (90% - 98%)          04-10 @ 07:01  -  04-11 @ 07:00  --------------------------------------------------------  IN: 820 mL / OUT: 1500 mL / NET: -680 mL          LABS:                        8.5    7.66  )-----------( 532      ( 10 Apr 2018 09:47 )             27.1     04-10    146<H>  |  104  |  18  ----------------------------<  183<H>  3.7   |  28  |  1.27    Ca    9.9      10 Apr 2018 07:13            CAPILLARY BLOOD GLUCOSE      POCT Blood Glucose.: 181 mg/dL (11 Apr 2018 08:30)    PT/INR - ( 10 Apr 2018 09:40 )   PT: 24.1 sec;   INR: 2.10 ratio                   CULTURES:  Culture Results:   No growth at 5 days. (04-05 @ 19:50)  Culture Results:   No growth at 5 days. (04-05 @ 19:50)  Culture Results:   No growth (04-05 @ 17:23)        Physical Examination:  Awake and alert, generally comfortable  HEENT: unremarkable  PULM: Clear to auscultation bilaterally, no significant sputum production  CVS: Regular rate and rhythm, no murmurs, rubs, or gallops  Abd:  soft, non tender  Extrem: No CCE    RADIOLOGY REVIEWED  CXR:    CT chest:

## 2018-06-18 ENCOUNTER — EMERGENCY (EMERGENCY)
Facility: HOSPITAL | Age: 83
LOS: 1 days | Discharge: HOME | End: 2018-06-18
Attending: EMERGENCY MEDICINE
Payer: MEDICARE

## 2018-06-18 VITALS
RESPIRATION RATE: 18 BRPM | HEART RATE: 102 BPM | OXYGEN SATURATION: 96 % | DIASTOLIC BLOOD PRESSURE: 72 MMHG | SYSTOLIC BLOOD PRESSURE: 160 MMHG

## 2018-06-18 VITALS
OXYGEN SATURATION: 97 % | HEART RATE: 98 BPM | DIASTOLIC BLOOD PRESSURE: 77 MMHG | RESPIRATION RATE: 19 BRPM | SYSTOLIC BLOOD PRESSURE: 149 MMHG

## 2018-06-18 DIAGNOSIS — Z96.641 PRESENCE OF RIGHT ARTIFICIAL HIP JOINT: Chronic | ICD-10-CM

## 2018-06-18 LAB
ALBUMIN SERPL ELPH-MCNC: 4 G/DL — SIGNIFICANT CHANGE UP (ref 3.3–5)
ALP SERPL-CCNC: 91 U/L — SIGNIFICANT CHANGE UP (ref 40–120)
ALT FLD-CCNC: 7 U/L — LOW (ref 10–45)
ANION GAP SERPL CALC-SCNC: 13 MMOL/L — SIGNIFICANT CHANGE UP (ref 5–17)
APPEARANCE UR: CLEAR — SIGNIFICANT CHANGE UP
APTT BLD: 55 SEC — HIGH (ref 27.5–37.4)
AST SERPL-CCNC: 17 U/L — SIGNIFICANT CHANGE UP (ref 10–40)
BACTERIA # UR AUTO: ABNORMAL /HPF
BASOPHILS # BLD AUTO: 0 K/UL — SIGNIFICANT CHANGE UP (ref 0–0.2)
BASOPHILS NFR BLD AUTO: 0.1 % — SIGNIFICANT CHANGE UP (ref 0–2)
BILIRUB SERPL-MCNC: 0.3 MG/DL — SIGNIFICANT CHANGE UP (ref 0.2–1.2)
BILIRUB UR-MCNC: NEGATIVE — SIGNIFICANT CHANGE UP
BUN SERPL-MCNC: 22 MG/DL — SIGNIFICANT CHANGE UP (ref 7–23)
CALCIUM SERPL-MCNC: 9.6 MG/DL — SIGNIFICANT CHANGE UP (ref 8.4–10.5)
CHLORIDE SERPL-SCNC: 101 MMOL/L — SIGNIFICANT CHANGE UP (ref 96–108)
CO2 SERPL-SCNC: 28 MMOL/L — SIGNIFICANT CHANGE UP (ref 22–31)
COLOR SPEC: YELLOW — SIGNIFICANT CHANGE UP
COMMENT - URINE: SIGNIFICANT CHANGE UP
CREAT SERPL-MCNC: 1.22 MG/DL — SIGNIFICANT CHANGE UP (ref 0.5–1.3)
DIFF PNL FLD: NEGATIVE — SIGNIFICANT CHANGE UP
EOSINOPHIL # BLD AUTO: 0 K/UL — SIGNIFICANT CHANGE UP (ref 0–0.5)
EOSINOPHIL NFR BLD AUTO: 0.3 % — SIGNIFICANT CHANGE UP (ref 0–6)
EPI CELLS # UR: SIGNIFICANT CHANGE UP /HPF
GLUCOSE SERPL-MCNC: 113 MG/DL — HIGH (ref 70–99)
GLUCOSE UR QL: NEGATIVE — SIGNIFICANT CHANGE UP
HCT VFR BLD CALC: 28 % — LOW (ref 39–50)
HGB BLD-MCNC: 9.1 G/DL — LOW (ref 13–17)
HYALINE CASTS # UR AUTO: ABNORMAL
INR BLD: 1.22 RATIO — HIGH (ref 0.88–1.16)
KETONES UR-MCNC: NEGATIVE — SIGNIFICANT CHANGE UP
LEUKOCYTE ESTERASE UR-ACNC: ABNORMAL
LYMPHOCYTES # BLD AUTO: 1 K/UL — SIGNIFICANT CHANGE UP (ref 1–3.3)
LYMPHOCYTES # BLD AUTO: 6.9 % — LOW (ref 13–44)
MCHC RBC-ENTMCNC: 29.9 PG — SIGNIFICANT CHANGE UP (ref 27–34)
MCHC RBC-ENTMCNC: 32.7 GM/DL — SIGNIFICANT CHANGE UP (ref 32–36)
MCV RBC AUTO: 91.3 FL — SIGNIFICANT CHANGE UP (ref 80–100)
MONOCYTES # BLD AUTO: 1 K/UL — HIGH (ref 0–0.9)
MONOCYTES NFR BLD AUTO: 6.6 % — SIGNIFICANT CHANGE UP (ref 2–14)
NEUTROPHILS # BLD AUTO: 12.5 K/UL — HIGH (ref 1.8–7.4)
NEUTROPHILS NFR BLD AUTO: 86.1 % — HIGH (ref 43–77)
NITRITE UR-MCNC: NEGATIVE — SIGNIFICANT CHANGE UP
PH UR: 6 — SIGNIFICANT CHANGE UP (ref 5–8)
PLATELET # BLD AUTO: 499 K/UL — HIGH (ref 150–400)
POTASSIUM SERPL-MCNC: 4.7 MMOL/L — SIGNIFICANT CHANGE UP (ref 3.5–5.3)
POTASSIUM SERPL-SCNC: 4.7 MMOL/L — SIGNIFICANT CHANGE UP (ref 3.5–5.3)
PROT SERPL-MCNC: 7.4 G/DL — SIGNIFICANT CHANGE UP (ref 6–8.3)
PROT UR-MCNC: 100 MG/DL
PROTHROM AB SERPL-ACNC: 13.3 SEC — HIGH (ref 9.8–12.7)
RBC # BLD: 3.06 M/UL — LOW (ref 4.2–5.8)
RBC # FLD: 14.4 % — SIGNIFICANT CHANGE UP (ref 10.3–14.5)
RBC CASTS # UR COMP ASSIST: SIGNIFICANT CHANGE UP /HPF (ref 0–2)
SODIUM SERPL-SCNC: 142 MMOL/L — SIGNIFICANT CHANGE UP (ref 135–145)
SP GR SPEC: 1.02 — SIGNIFICANT CHANGE UP (ref 1.01–1.02)
UROBILINOGEN FLD QL: NEGATIVE — SIGNIFICANT CHANGE UP
WBC # BLD: 14.5 K/UL — HIGH (ref 3.8–10.5)
WBC # FLD AUTO: 14.5 K/UL — HIGH (ref 3.8–10.5)
WBC UR QL: SIGNIFICANT CHANGE UP /HPF (ref 0–5)

## 2018-06-18 PROCEDURE — 70486 CT MAXILLOFACIAL W/O DYE: CPT | Mod: 26

## 2018-06-18 PROCEDURE — 85610 PROTHROMBIN TIME: CPT

## 2018-06-18 PROCEDURE — 99284 EMERGENCY DEPT VISIT MOD MDM: CPT | Mod: 25

## 2018-06-18 PROCEDURE — 72125 CT NECK SPINE W/O DYE: CPT

## 2018-06-18 PROCEDURE — 80053 COMPREHEN METABOLIC PANEL: CPT

## 2018-06-18 PROCEDURE — 12001 RPR S/N/AX/GEN/TRNK 2.5CM/<: CPT

## 2018-06-18 PROCEDURE — 12011 RPR F/E/E/N/L/M 2.5 CM/<: CPT | Mod: XU

## 2018-06-18 PROCEDURE — 12011 RPR F/E/E/N/L/M 2.5 CM/<: CPT | Mod: 59,GC

## 2018-06-18 PROCEDURE — 12001 RPR S/N/AX/GEN/TRNK 2.5CM/<: CPT | Mod: GC

## 2018-06-18 PROCEDURE — 70486 CT MAXILLOFACIAL W/O DYE: CPT

## 2018-06-18 PROCEDURE — 85027 COMPLETE CBC AUTOMATED: CPT

## 2018-06-18 PROCEDURE — 99284 EMERGENCY DEPT VISIT MOD MDM: CPT | Mod: 25,GC

## 2018-06-18 PROCEDURE — 72125 CT NECK SPINE W/O DYE: CPT | Mod: 26

## 2018-06-18 PROCEDURE — 70450 CT HEAD/BRAIN W/O DYE: CPT | Mod: 26

## 2018-06-18 PROCEDURE — 85730 THROMBOPLASTIN TIME PARTIAL: CPT

## 2018-06-18 PROCEDURE — 81001 URINALYSIS AUTO W/SCOPE: CPT

## 2018-06-18 PROCEDURE — 70450 CT HEAD/BRAIN W/O DYE: CPT

## 2018-06-18 RX ORDER — ACETAMINOPHEN 500 MG
650 TABLET ORAL ONCE
Qty: 0 | Refills: 0 | Status: COMPLETED | OUTPATIENT
Start: 2018-06-18 | End: 2018-06-18

## 2018-06-18 RX ADMIN — Medication 650 MILLIGRAM(S): at 20:20

## 2018-06-18 NOTE — ED PROVIDER NOTE - ATTENDING CONTRIBUTION TO CARE
Elderly man s/p mechanical trip and fall, was walking with cane, holding bag and keys and foot caught on ground, fell forward striking face.  No loss of consciousness, no visual changes, no nausea/vomiting, no numbness, tingling and weakness.  Reporting pain in nose and R little finger.  No other pain complaints.    On exam patient well appearing, vital signs within normal limits, A&Ox4, RRR S1/S2, lungs clear to ascultation bilaterally, no chest wall tenderness, abdomen soft, non tender, non distended, no midline spinal tenderness, nasal laceration with some blood in nares, no septal hematoma, small laceration on R 5th finger normal ROM, no other bony point tenderness.    Given age screening labs/coags, CT/plain films for traumatic injury, re-eval.

## 2018-06-18 NOTE — ED PROVIDER NOTE - MEDICAL DECISION MAKING DETAILS
88 yo M w/ mechanical fall on coumadin, bleeding resolved; will check CT head, labs, coags, reassess

## 2018-06-18 NOTE — ED ADULT NURSE REASSESSMENT NOTE - NS ED NURSE REASSESS COMMENT FT1
Pt upset at being in emergency room for "too long" discharged by MD in wheelchair, accompanied by caregiver.

## 2018-06-18 NOTE — ED PROVIDER NOTE - PLAN OF CARE
Thank you for visiting our Emergency Department, it has been a pleasure taking part in your healthcare.    Your discharge diagnosis is: fall  Please take all discharge medications as indicated below:  Take Tylenol for pain as needed, please follow instructions on manufacturers label. If you have any questions please consult a pharmacist or your PMD.  Please follow up with your PMD within x48 hours.  A copy of resulted labs, imaging, and findings have been provided to you.   You have had a detailed discussion with your provider regarding your diagnosis, care management and discharge planning including, but not limited to: return precautions, follow up visits with existing or new providers, new prescriptions and/or medication changes, wound and/or spint/cast care or other care   aspects specific to your diagnosis and treatment. You have been given the opportunity to have your questions answered. At this time you have been deemed stable and fit for discharge.  Return precautions to the Emergency Department include but are not limited to: unrelenting nausea, vomiting, fever, chills, chest pain, shortness of breath, dizziness, chest or abdominal pain, worsening back pain, syncope, blood in urine or stool, headache that doesn't resolve, numbness or tingling, loss of sensation, loss of motor function, or any other concerning symptoms.

## 2018-06-18 NOTE — ED PROVIDER NOTE - PROGRESS NOTE DETAILS
pt reassessed at bedside, repeatedly asking to be discharged, informed patient to return to [facility] ED in x7 days for suture removal, appears well NAD deemed stable and fit for discharge discussed return precautions, PMD f/u  Efrain Rowell MD  PGY-1 Emergency Medicine

## 2018-06-18 NOTE — ED PROVIDER NOTE - NS ED ROS FT
CONST: no fevers, chills, or lightheadedness    ///    EYES: no pain, no vision change    ///    HENT: laceration to nose / face, nosebleed    ///    CV: no chest pain, no palpitations    ///    RESP: no shortness of breath    ///    ABD: no abdominal pain, no nausea/vomiting    ///    : no dysuria, no hematuria    ///    MSK: no musculoskeletal pain    ///    NEURO: no headache, no focal weakness or loss of sensation    ///    SKIN:  abrasions across bilateral arms R > L, abrasion over nose

## 2018-06-18 NOTE — ED PROVIDER NOTE - OBJECTIVE STATEMENT
89 yo M w/ pmh DM, COPD, hypothyroidism, AF (coumadin) p/w mechanical fall. Pt was walking outside w/ aide and tripped on sidewalk, fell forward onto face and right hand/arm, w/ nosebleed and prolonged bleed from upper anterior nose laceration. Denies presyncopal sx, chest pain, palpitations, sob immediately prior to fall. No LOC, no AMS after fall. Denies recent infection, travel.

## 2018-06-18 NOTE — ED PROVIDER NOTE - CARE PLAN
Principal Discharge DX:	Fall  Assessment and plan of treatment:	Thank you for visiting our Emergency Department, it has been a pleasure taking part in your healthcare.    Your discharge diagnosis is: fall  Please take all discharge medications as indicated below:  Take Tylenol for pain as needed, please follow instructions on manufacturers label. If you have any questions please consult a pharmacist or your PMD.  Please follow up with your PMD within x48 hours.  A copy of resulted labs, imaging, and findings have been provided to you.   You have had a detailed discussion with your provider regarding your diagnosis, care management and discharge planning including, but not limited to: return precautions, follow up visits with existing or new providers, new prescriptions and/or medication changes, wound and/or spint/cast care or other care   aspects specific to your diagnosis and treatment. You have been given the opportunity to have your questions answered. At this time you have been deemed stable and fit for discharge.  Return precautions to the Emergency Department include but are not limited to: unrelenting nausea, vomiting, fever, chills, chest pain, shortness of breath, dizziness, chest or abdominal pain, worsening back pain, syncope, blood in urine or stool, headache that doesn't resolve, numbness or tingling, loss of sensation, loss of motor function, or any other concerning symptoms.

## 2018-06-18 NOTE — ED ADULT NURSE NOTE - OBJECTIVE STATEMENT
89 yr old male to ed, accomp by aide, s/p trip and fall on carpet while carrying cane and bag. Pt awake alert and orientedx3 Resp even and nonlab Denies chest pain or sob Moving all four extremities. Moving all four extremities Denies numbness or weakness. Lac to bridge of nose and rt pinky finger. Pt states " I had tetanus x1 yr ago." Responds approp to verbal and tactile stimuli. On Plavix for afib.

## 2018-06-18 NOTE — ED PROVIDER NOTE - PHYSICAL EXAMINATION
*GEN:   comfortable, in no acute distress, AOx3    ///    *EYES:   pupils equally round and reactive to light, extra-occular movements intact    ///    *HEENT:   dried blood in bilateral nares; multiple abrasions across face; tenderness over nasal bone but no bony mobility; airway patent, moist mucosal membranes    ///    *CV:   regular rate and rhythm    ///    *RESP:   clear to auscultation bilaterally, non-labored    ///    *ABD:   soft, non-tender    ///    *:   no cva/flank tenderness    ///    *MSK:   no MSK tenderness or limited ROM    ///    *SKIN:   lacerations over upper anterior nose and dorsal R hand fifth PIP    ///    *NEURO:   AOx3, no focal weakness or loss of sensation, gait normal

## 2018-08-25 ENCOUNTER — INPATIENT (INPATIENT)
Facility: HOSPITAL | Age: 83
LOS: 3 days | Discharge: HOME | DRG: 871 | End: 2018-08-29
Attending: INTERNAL MEDICINE | Admitting: INTERNAL MEDICINE
Payer: MEDICARE

## 2018-08-25 VITALS
DIASTOLIC BLOOD PRESSURE: 62 MMHG | RESPIRATION RATE: 18 BRPM | WEIGHT: 130.07 LBS | SYSTOLIC BLOOD PRESSURE: 148 MMHG | OXYGEN SATURATION: 97 % | HEART RATE: 105 BPM | HEIGHT: 71 IN

## 2018-08-25 DIAGNOSIS — R09.89 OTHER SPECIFIED SYMPTOMS AND SIGNS INVOLVING THE CIRCULATORY AND RESPIRATORY SYSTEMS: ICD-10-CM

## 2018-08-25 DIAGNOSIS — E16.2 HYPOGLYCEMIA, UNSPECIFIED: ICD-10-CM

## 2018-08-25 DIAGNOSIS — J44.1 CHRONIC OBSTRUCTIVE PULMONARY DISEASE WITH (ACUTE) EXACERBATION: ICD-10-CM

## 2018-08-25 DIAGNOSIS — Z29.9 ENCOUNTER FOR PROPHYLACTIC MEASURES, UNSPECIFIED: ICD-10-CM

## 2018-08-25 DIAGNOSIS — K46.9 UNSPECIFIED ABDOMINAL HERNIA WITHOUT OBSTRUCTION OR GANGRENE: ICD-10-CM

## 2018-08-25 DIAGNOSIS — I48.91 UNSPECIFIED ATRIAL FIBRILLATION: ICD-10-CM

## 2018-08-25 DIAGNOSIS — E11.8 TYPE 2 DIABETES MELLITUS WITH UNSPECIFIED COMPLICATIONS: ICD-10-CM

## 2018-08-25 DIAGNOSIS — J18.9 PNEUMONIA, UNSPECIFIED ORGANISM: ICD-10-CM

## 2018-08-25 DIAGNOSIS — Z96.641 PRESENCE OF RIGHT ARTIFICIAL HIP JOINT: Chronic | ICD-10-CM

## 2018-08-25 DIAGNOSIS — E87.2 ACIDOSIS: ICD-10-CM

## 2018-08-25 LAB
ALBUMIN SERPL ELPH-MCNC: 4.3 G/DL — SIGNIFICANT CHANGE UP (ref 3.3–5)
ALP SERPL-CCNC: 98 U/L — SIGNIFICANT CHANGE UP (ref 40–120)
ALT FLD-CCNC: 9 U/L — LOW (ref 10–45)
ANION GAP SERPL CALC-SCNC: 20 MMOL/L — HIGH (ref 5–17)
AST SERPL-CCNC: 19 U/L — SIGNIFICANT CHANGE UP (ref 10–40)
BASOPHILS # BLD AUTO: 0 K/UL — SIGNIFICANT CHANGE UP (ref 0–0.2)
BASOPHILS NFR BLD AUTO: 0.1 % — SIGNIFICANT CHANGE UP (ref 0–2)
BILIRUB SERPL-MCNC: 0.2 MG/DL — SIGNIFICANT CHANGE UP (ref 0.2–1.2)
BUN SERPL-MCNC: 38 MG/DL — HIGH (ref 7–23)
CALCIUM SERPL-MCNC: 10 MG/DL — SIGNIFICANT CHANGE UP (ref 8.4–10.5)
CHLORIDE SERPL-SCNC: 102 MMOL/L — SIGNIFICANT CHANGE UP (ref 96–108)
CO2 SERPL-SCNC: 22 MMOL/L — SIGNIFICANT CHANGE UP (ref 22–31)
CREAT SERPL-MCNC: 2.07 MG/DL — HIGH (ref 0.5–1.3)
EOSINOPHIL # BLD AUTO: 0.1 K/UL — SIGNIFICANT CHANGE UP (ref 0–0.5)
EOSINOPHIL NFR BLD AUTO: 0.5 % — SIGNIFICANT CHANGE UP (ref 0–6)
GAS PNL BLDV: SIGNIFICANT CHANGE UP
GAS PNL BLDV: SIGNIFICANT CHANGE UP
GLUCOSE BLDC GLUCOMTR-MCNC: 112 MG/DL — HIGH (ref 70–99)
GLUCOSE SERPL-MCNC: 33 MG/DL — CRITICAL LOW (ref 70–99)
HCT VFR BLD CALC: 31.7 % — LOW (ref 39–50)
HGB BLD-MCNC: 9.6 G/DL — LOW (ref 13–17)
LYMPHOCYTES # BLD AUTO: 0.9 K/UL — LOW (ref 1–3.3)
LYMPHOCYTES # BLD AUTO: 5.8 % — LOW (ref 13–44)
MCHC RBC-ENTMCNC: 26.8 PG — LOW (ref 27–34)
MCHC RBC-ENTMCNC: 30.1 GM/DL — LOW (ref 32–36)
MCV RBC AUTO: 89 FL — SIGNIFICANT CHANGE UP (ref 80–100)
MONOCYTES # BLD AUTO: 1.1 K/UL — HIGH (ref 0–0.9)
MONOCYTES NFR BLD AUTO: 6.9 % — SIGNIFICANT CHANGE UP (ref 2–14)
NEUTROPHILS # BLD AUTO: 13.5 K/UL — HIGH (ref 1.8–7.4)
NEUTROPHILS NFR BLD AUTO: 86.7 % — HIGH (ref 43–77)
PLATELET # BLD AUTO: 873 K/UL — HIGH (ref 150–400)
POTASSIUM SERPL-MCNC: 4.3 MMOL/L — SIGNIFICANT CHANGE UP (ref 3.5–5.3)
POTASSIUM SERPL-SCNC: 4.3 MMOL/L — SIGNIFICANT CHANGE UP (ref 3.5–5.3)
PROT SERPL-MCNC: 8.2 G/DL — SIGNIFICANT CHANGE UP (ref 6–8.3)
RBC # BLD: 3.56 M/UL — LOW (ref 4.2–5.8)
RBC # FLD: 14 % — SIGNIFICANT CHANGE UP (ref 10.3–14.5)
SODIUM SERPL-SCNC: 144 MMOL/L — SIGNIFICANT CHANGE UP (ref 135–145)
WBC # BLD: 15.6 K/UL — HIGH (ref 3.8–10.5)
WBC # FLD AUTO: 15.6 K/UL — HIGH (ref 3.8–10.5)

## 2018-08-25 PROCEDURE — 99223 1ST HOSP IP/OBS HIGH 75: CPT

## 2018-08-25 PROCEDURE — 71250 CT THORAX DX C-: CPT | Mod: 26

## 2018-08-25 PROCEDURE — 71045 X-RAY EXAM CHEST 1 VIEW: CPT | Mod: 26

## 2018-08-25 PROCEDURE — 99285 EMERGENCY DEPT VISIT HI MDM: CPT | Mod: GC,25

## 2018-08-25 PROCEDURE — 93010 ELECTROCARDIOGRAM REPORT: CPT

## 2018-08-25 RX ORDER — SODIUM CHLORIDE 9 MG/ML
1000 INJECTION, SOLUTION INTRAVENOUS
Qty: 0 | Refills: 0 | Status: DISCONTINUED | OUTPATIENT
Start: 2018-08-25 | End: 2018-08-26

## 2018-08-25 RX ORDER — MIRTAZAPINE 45 MG/1
15 TABLET, ORALLY DISINTEGRATING ORAL AT BEDTIME
Qty: 0 | Refills: 0 | Status: DISCONTINUED | OUTPATIENT
Start: 2018-08-25 | End: 2018-08-29

## 2018-08-25 RX ORDER — POLYETHYLENE GLYCOL 3350 17 G/17G
17 POWDER, FOR SOLUTION ORAL DAILY
Qty: 0 | Refills: 0 | Status: DISCONTINUED | OUTPATIENT
Start: 2018-08-25 | End: 2018-08-29

## 2018-08-25 RX ORDER — FLUTICASONE PROPIONATE 50 MCG
1 SPRAY, SUSPENSION NASAL
Qty: 0 | Refills: 0 | Status: DISCONTINUED | OUTPATIENT
Start: 2018-08-25 | End: 2018-08-29

## 2018-08-25 RX ORDER — CEFTRIAXONE 500 MG/1
1 INJECTION, POWDER, FOR SOLUTION INTRAMUSCULAR; INTRAVENOUS EVERY 24 HOURS
Qty: 0 | Refills: 0 | Status: DISCONTINUED | OUTPATIENT
Start: 2018-08-25 | End: 2018-08-29

## 2018-08-25 RX ORDER — INSULIN LISPRO 100/ML
VIAL (ML) SUBCUTANEOUS
Qty: 0 | Refills: 0 | Status: DISCONTINUED | OUTPATIENT
Start: 2018-08-25 | End: 2018-08-28

## 2018-08-25 RX ORDER — CEFTRIAXONE 500 MG/1
1 INJECTION, POWDER, FOR SOLUTION INTRAMUSCULAR; INTRAVENOUS ONCE
Qty: 0 | Refills: 0 | Status: COMPLETED | OUTPATIENT
Start: 2018-08-25 | End: 2018-08-25

## 2018-08-25 RX ORDER — IPRATROPIUM/ALBUTEROL SULFATE 18-103MCG
3 AEROSOL WITH ADAPTER (GRAM) INHALATION EVERY 6 HOURS
Qty: 0 | Refills: 0 | Status: DISCONTINUED | OUTPATIENT
Start: 2018-08-25 | End: 2018-08-27

## 2018-08-25 RX ORDER — SODIUM CHLORIDE 9 MG/ML
1000 INJECTION, SOLUTION INTRAVENOUS
Qty: 0 | Refills: 0 | Status: DISCONTINUED | OUTPATIENT
Start: 2018-08-25 | End: 2018-08-29

## 2018-08-25 RX ORDER — INSULIN LISPRO 100/ML
VIAL (ML) SUBCUTANEOUS AT BEDTIME
Qty: 0 | Refills: 0 | Status: DISCONTINUED | OUTPATIENT
Start: 2018-08-25 | End: 2018-08-28

## 2018-08-25 RX ORDER — RIVAROXABAN 15 MG-20MG
15 KIT ORAL AT BEDTIME
Qty: 0 | Refills: 0 | Status: DISCONTINUED | OUTPATIENT
Start: 2018-08-25 | End: 2018-08-28

## 2018-08-25 RX ORDER — DILTIAZEM HCL 120 MG
180 CAPSULE, EXT RELEASE 24 HR ORAL DAILY
Qty: 0 | Refills: 0 | Status: DISCONTINUED | OUTPATIENT
Start: 2018-08-25 | End: 2018-08-29

## 2018-08-25 RX ORDER — ATORVASTATIN CALCIUM 80 MG/1
40 TABLET, FILM COATED ORAL DAILY
Qty: 0 | Refills: 0 | Status: DISCONTINUED | OUTPATIENT
Start: 2018-08-25 | End: 2018-08-29

## 2018-08-25 RX ORDER — DEXTROSE 50 % IN WATER 50 %
15 SYRINGE (ML) INTRAVENOUS ONCE
Qty: 0 | Refills: 0 | Status: DISCONTINUED | OUTPATIENT
Start: 2018-08-25 | End: 2018-08-29

## 2018-08-25 RX ORDER — IPRATROPIUM/ALBUTEROL SULFATE 18-103MCG
3 AEROSOL WITH ADAPTER (GRAM) INHALATION ONCE
Qty: 0 | Refills: 0 | Status: COMPLETED | OUTPATIENT
Start: 2018-08-25 | End: 2018-08-25

## 2018-08-25 RX ORDER — ALPRAZOLAM 0.25 MG
0.25 TABLET ORAL DAILY
Qty: 0 | Refills: 0 | Status: DISCONTINUED | OUTPATIENT
Start: 2018-08-25 | End: 2018-08-29

## 2018-08-25 RX ORDER — AZITHROMYCIN 500 MG/1
500 TABLET, FILM COATED ORAL EVERY 24 HOURS
Qty: 0 | Refills: 0 | Status: DISCONTINUED | OUTPATIENT
Start: 2018-08-25 | End: 2018-08-29

## 2018-08-25 RX ORDER — WARFARIN SODIUM 2.5 MG/1
1 TABLET ORAL
Qty: 0 | Refills: 0 | COMMUNITY

## 2018-08-25 RX ORDER — LAMOTRIGINE 25 MG/1
25 TABLET, ORALLY DISINTEGRATING ORAL AT BEDTIME
Qty: 0 | Refills: 0 | Status: DISCONTINUED | OUTPATIENT
Start: 2018-08-25 | End: 2018-08-29

## 2018-08-25 RX ORDER — DILTIAZEM HCL 120 MG
1 CAPSULE, EXT RELEASE 24 HR ORAL
Qty: 0 | Refills: 0 | COMMUNITY

## 2018-08-25 RX ORDER — DEXTROSE 50 % IN WATER 50 %
12.5 SYRINGE (ML) INTRAVENOUS ONCE
Qty: 0 | Refills: 0 | Status: DISCONTINUED | OUTPATIENT
Start: 2018-08-25 | End: 2018-08-29

## 2018-08-25 RX ORDER — SODIUM CHLORIDE 9 MG/ML
1000 INJECTION INTRAMUSCULAR; INTRAVENOUS; SUBCUTANEOUS ONCE
Qty: 0 | Refills: 0 | Status: COMPLETED | OUTPATIENT
Start: 2018-08-25 | End: 2018-08-25

## 2018-08-25 RX ORDER — PANTOPRAZOLE SODIUM 20 MG/1
40 TABLET, DELAYED RELEASE ORAL
Qty: 0 | Refills: 0 | Status: DISCONTINUED | OUTPATIENT
Start: 2018-08-25 | End: 2018-08-29

## 2018-08-25 RX ORDER — GLIMEPIRIDE 1 MG
1 TABLET ORAL
Qty: 0 | Refills: 0 | COMMUNITY

## 2018-08-25 RX ORDER — DEXTROSE 50 % IN WATER 50 %
50 SYRINGE (ML) INTRAVENOUS ONCE
Qty: 0 | Refills: 0 | Status: COMPLETED | OUTPATIENT
Start: 2018-08-25 | End: 2018-08-25

## 2018-08-25 RX ORDER — AZITHROMYCIN 500 MG/1
500 TABLET, FILM COATED ORAL ONCE
Qty: 0 | Refills: 0 | Status: COMPLETED | OUTPATIENT
Start: 2018-08-25 | End: 2018-08-25

## 2018-08-25 RX ORDER — DEXTROSE 50 % IN WATER 50 %
25 SYRINGE (ML) INTRAVENOUS ONCE
Qty: 0 | Refills: 0 | Status: DISCONTINUED | OUTPATIENT
Start: 2018-08-25 | End: 2018-08-29

## 2018-08-25 RX ORDER — BUDESONIDE, MICRONIZED 100 %
2 POWDER (GRAM) MISCELLANEOUS
Qty: 0 | Refills: 0 | COMMUNITY

## 2018-08-25 RX ADMIN — SODIUM CHLORIDE 1000 MILLILITER(S): 9 INJECTION INTRAMUSCULAR; INTRAVENOUS; SUBCUTANEOUS at 19:50

## 2018-08-25 RX ADMIN — Medication 3 MILLILITER(S): at 20:55

## 2018-08-25 RX ADMIN — Medication 50 MILLILITER(S): at 20:08

## 2018-08-25 RX ADMIN — SODIUM CHLORIDE 100 MILLILITER(S): 9 INJECTION, SOLUTION INTRAVENOUS at 20:59

## 2018-08-25 RX ADMIN — RIVAROXABAN 15 MILLIGRAM(S): KIT at 23:59

## 2018-08-25 RX ADMIN — Medication 1 SPRAY(S): at 23:59

## 2018-08-25 RX ADMIN — CEFTRIAXONE 100 GRAM(S): 500 INJECTION, POWDER, FOR SOLUTION INTRAMUSCULAR; INTRAVENOUS at 21:43

## 2018-08-25 RX ADMIN — Medication 50 MILLIGRAM(S): at 20:55

## 2018-08-25 RX ADMIN — AZITHROMYCIN 250 MILLIGRAM(S): 500 TABLET, FILM COATED ORAL at 22:29

## 2018-08-25 NOTE — H&P ADULT - PROBLEM SELECTOR PLAN 2
Pt with some mild wheeze at bedside, unclear baseline. Given recent increased 02 use will Rx for COPD exacerbation for now. PO prednisone may also be beneficial for PNA as well.  -Cont. prednisone for 5 days total  -Nebs  -Supplemental 02 as needed  -Cont. Z-pack

## 2018-08-25 NOTE — ED PROVIDER NOTE - ATTENDING CONTRIBUTION TO CARE
I have seen and evaluated this patient with the resident.   I agree with the findings  unless other wise stated.  I have made appropriate changes in documentations where needed, After my face to face bedside evaluation, I am further  noting: Pt feeling confused and weak since yesterdayno appetite no fever no sz no dysuria cough, awake no distress pale looking Lungs clear heart regular abdomen soft non focal neuro exam labs cxr reviewed pt does have HARJEET and not eating well probably causing hypoglycemia will hydrate repeat lactate admit --petersen

## 2018-08-25 NOTE — H&P ADULT - NSHPATTENDINGPLANDISCUSS_GEN_ALL_CORE
Coy, Asked by hospitalist in charge overnight to see on behalf of Dr. Devlin, Dr. Devlin to assume care in AM

## 2018-08-25 NOTE — ED PROVIDER NOTE - MEDICAL DECISION MAKING DETAILS
FS, D50, EKG, CXR, labs, UA and urine culture, IV fluid and D5 maintenance fluid, prednisone and duoneb for CODP exacerbation. DDX includes medication induced hypoglycemia, infection, COPD exacerbation, decreased PO intake

## 2018-08-25 NOTE — ED PROVIDER NOTE - PROGRESS NOTE DETAILS
JUSTINE Lazo MD : called by lab to inform of lactate of 5 hours after pt had been admitted and seen by admitting team - spoke w admitting hospitalist and endorsed the result.

## 2018-08-25 NOTE — H&P ADULT - NSHPPHYSICALEXAM_GEN_ALL_CORE
Vital Signs Last 24 Hrs  T(C): 36.7 (08-25-18 @ 21:45), Max: 36.7 (08-25-18 @ 19:47)  T(F): 98 (08-25-18 @ 21:45), Max: 98 (08-25-18 @ 19:47)  HR: 101 (08-25-18 @ 22:37) (93 - 113)  BP: 124/52 (08-25-18 @ 22:37) (124/52 - 185/78)  BP(mean): --  RR: 18 (08-25-18 @ 22:37) (18 - 20)  SpO2: 99% (08-25-18 @ 22:37) (97% - 100%)

## 2018-08-25 NOTE — H&P ADULT - PROBLEM SELECTOR PLAN 6
On PO Meds, would consider stopping glimepiride  -Fingersticks and sliding scale inpatient especially given hypoglycemia

## 2018-08-25 NOTE — ED PROVIDER NOTE - CONSTITUTIONAL, MLM
normal... Well appearing, well nourished, awake, alert, oriented to person, place, time/situation and in no apparent distress. Well appearing, well nourished, awake, alert, oriented to person, place, time/situation and in no apparent distress. pale looking bagginess around eyes --petersen

## 2018-08-25 NOTE — ED PROVIDER NOTE - CARE PLAN
Principal Discharge DX:	Hypoglycemia  Secondary Diagnosis:	COPD exacerbation  Secondary Diagnosis:	Dehydration

## 2018-08-25 NOTE — H&P ADULT - PROBLEM SELECTOR PLAN 3
Suspect related to glimepiride, HARJEET and possible infection.  -Fingersticks Q2hrs overnight until stable.  -D/C sliding scale if pt still hypoglycemic in AM  -Cont. D5 NS overnight  -Resume diet

## 2018-08-25 NOTE — H&P ADULT - ASSESSMENT
89M DM2 on PO meds, COPD has home 02, afib on xarelto p/w weakness likely related to hypoglycemia, HARJEET and possible pneumonia with COPD exacerbation

## 2018-08-25 NOTE — H&P ADULT - PROBLEM SELECTOR PLAN 4
Lactate 5 on admission now slightly down trending to 4. Assumed to be related to infection.  -Cont. gentle IVF overnight and reassess in AM  -Cont to trend lactate in AM  -Will cont. IV antibiotics as above

## 2018-08-25 NOTE — H&P ADULT - PROBLEM SELECTOR PLAN 7
DVT PPx  -Pt on systemic AC Appears stable, patient and daughter requesting surgery consult. Possible harness?

## 2018-08-25 NOTE — ED ADULT NURSE REASSESSMENT NOTE - NS ED NURSE REASSESS COMMENT FT1
pt medicated as per md babcock ordered. pt denies pain of any kind at this time. pt aox3. md babcock aware of finger stick. safety maintained. home aid at bedside. will continue to monitor.

## 2018-08-25 NOTE — ED PROVIDER NOTE - OBJECTIVE STATEMENT
89 yo male with PMH of DM on oral medication including metformin and glipizide, COPD, afib, COPD, presents to the ED for weakness and dizziness and was found to be hypoglycemic to 30 by fingerstick in triage. Patient A&O x3, NAD prior to D50 given. Brought to room 27 in critical care, two PIV started and given D50 amp x 1 with repeat . Home aid endorses cough at home and pt c/o mild abdominal pain. +dizziness and fatigue as well, otherwise negative ROS. Pt lives alone, states he was concerned about being at home alone once his home aid leaves so he asked her to bring him to ED before she went home for the day. Pt does not take insulin, denies taking extra pills of his diabetes medications accidently or intentionally. Normal affect and mentating appropriately. Patient endorses he did not eat much food yesterday, decreased appetite.

## 2018-08-25 NOTE — ED ADULT NURSE NOTE - OBJECTIVE STATEMENT
89 yo male pt with history of diabetes on oral medication, metformin and glipizide, cob, afib, presents to ed complaining of and dizziness and was found to be hypoglycemic to 30 in triage. pt aox3, pt denies chest pain/palpitations/n/v/numbness/tingling. pt given amp of d50 brought to critical room 27. repeat finger stick 120. pt drinking apple sauce. pt states "I had a right hip surgery two years ago, and since then I have had a decreased appetite, I lost 60 pounds since that surgery." pt states "I feel more tired lately and I haven't been eating. I had egg drop soup and chicken before I got here but that's all I ate today." pt lives alone, has home aid. pt complaining of 4/10 pain in inguinal hernia area. pt denies headaches/vision changes/urinary symptoms. abd nontender and nondistended. skin warm dry and intact. wheezing auscultated bilaterally. pt placed in position of comfort. home aid at bedside.

## 2018-08-25 NOTE — H&P ADULT - HISTORY OF PRESENT ILLNESS
89M DM2 on PO meds, COPD has home 02, afib on xarelto p/w 89M DM2 on PO meds, COPD has home 02, afib on xarelto p/w weakness. As per pt, daughter and HHA, roughly 4-5 days ago pt started to have URI, symptoms, productive cough and weakness. He went to see his PMD who diagnosed with pneumonia and started him on PO antibiotics. They are unsure which antibiotics he was prescribed. Pt initially seemed to be getting better but over the last 2-3 days has had significant weakness and decreased appetite. Pt's daughter thinks last night he may have had some mild hallucinations. Pt denies any fevers, chills, dysuria, diarrhea or abdominal pain. He does endorse a new hernia which he is concerned about. Pt also endorses some increased SOB and wheezing over the past several days. As per daughter, he has been using his home oxygen which he does not use regularly.    In ER: Given Ceftriaxone 1mgIV, azithromycin 500mg, NS 1L, Nebs, D50IV, prednisone 50mg 89M DM2 on PO meds, COPD has home 02, afib on xarelto p/w weakness. As per pt, daughter and HHA, roughly 4-5 days ago pt started to have URI, symptoms, productive cough and weakness. He went to see his PMD who diagnosed with pneumonia and started him on PO antibiotics. They are unsure which antibiotics he was prescribed. Pt initially seemed to be getting better but over the last 2-3 days has had significant weakness and decreased appetite. Pt's daughter thinks last night he may have had some mild hallucinations. Pt denies any fevers, chills, dysuria, diarrhea or abdominal pain. He does endorse a new hernia which he is concerned about. Pt also endorses some increased SOB and wheezing over the past several days. As per daughter, he has been using his home oxygen which he does not use regularly. Also noted fingerstick in 20s at home this afternoon.     In ER: Given Ceftriaxone 1mgIV, azithromycin 500mg, NS 1L, Nebs, D50IV, prednisone 50mg

## 2018-08-25 NOTE — H&P ADULT - PROBLEM SELECTOR PLAN 1
Was diagnosed with pneumonia outpatient. Not obvious on CXR, given lactate and leukocytosis will cont. to Rx for bacterial PNA for now  -Cont. ceftriaxone and azithromycin  -F/u BCx and urine legionella   -trend lactate  -CT chest non-contrast  -F/u RVP  -F/u serum procalcitonin

## 2018-08-26 PROBLEM — E04.9 NONTOXIC GOITER, UNSPECIFIED: Chronic | Status: ACTIVE | Noted: 2018-01-13

## 2018-08-26 LAB
ANION GAP SERPL CALC-SCNC: 18 MMOL/L — HIGH (ref 5–17)
APPEARANCE UR: CLEAR — SIGNIFICANT CHANGE UP
BASOPHILS # BLD AUTO: 0 K/UL — SIGNIFICANT CHANGE UP (ref 0–0.2)
BASOPHILS NFR BLD AUTO: 0 % — SIGNIFICANT CHANGE UP (ref 0–2)
BILIRUB UR-MCNC: NEGATIVE — SIGNIFICANT CHANGE UP
BUN SERPL-MCNC: 32 MG/DL — HIGH (ref 7–23)
CALCIUM SERPL-MCNC: 8.9 MG/DL — SIGNIFICANT CHANGE UP (ref 8.4–10.5)
CHLORIDE SERPL-SCNC: 106 MMOL/L — SIGNIFICANT CHANGE UP (ref 96–108)
CO2 SERPL-SCNC: 18 MMOL/L — LOW (ref 22–31)
COLOR SPEC: YELLOW — SIGNIFICANT CHANGE UP
CREAT SERPL-MCNC: 1.62 MG/DL — HIGH (ref 0.5–1.3)
DIFF PNL FLD: NEGATIVE — SIGNIFICANT CHANGE UP
EOSINOPHIL # BLD AUTO: 0 K/UL — SIGNIFICANT CHANGE UP (ref 0–0.5)
EOSINOPHIL NFR BLD AUTO: 0 % — SIGNIFICANT CHANGE UP (ref 0–6)
GAS PNL BLDA: SIGNIFICANT CHANGE UP
GLUCOSE BLDC GLUCOMTR-MCNC: 158 MG/DL — HIGH (ref 70–99)
GLUCOSE BLDC GLUCOMTR-MCNC: 195 MG/DL — HIGH (ref 70–99)
GLUCOSE BLDC GLUCOMTR-MCNC: 195 MG/DL — HIGH (ref 70–99)
GLUCOSE BLDC GLUCOMTR-MCNC: 219 MG/DL — HIGH (ref 70–99)
GLUCOSE BLDC GLUCOMTR-MCNC: 250 MG/DL — HIGH (ref 70–99)
GLUCOSE BLDC GLUCOMTR-MCNC: 255 MG/DL — HIGH (ref 70–99)
GLUCOSE SERPL-MCNC: 216 MG/DL — HIGH (ref 70–99)
GLUCOSE UR QL: NEGATIVE — SIGNIFICANT CHANGE UP
HBA1C BLD-MCNC: 5.5 % — SIGNIFICANT CHANGE UP (ref 4–5.6)
HCT VFR BLD CALC: 26.9 % — LOW (ref 39–50)
HGB BLD-MCNC: 8.3 G/DL — LOW (ref 13–17)
HPIV4 RNA SPEC QL NAA+PROBE: DETECTED
IMM GRANULOCYTES NFR BLD AUTO: 0.3 % — SIGNIFICANT CHANGE UP (ref 0–1.5)
INR BLD: 2.18 RATIO — HIGH (ref 0.88–1.16)
KETONES UR-MCNC: NEGATIVE — SIGNIFICANT CHANGE UP
LACTATE SERPL-SCNC: 5.4 MMOL/L — CRITICAL HIGH (ref 0.7–2)
LACTATE SERPL-SCNC: 5.4 MMOL/L — CRITICAL HIGH (ref 0.7–2)
LEGIONELLA AG UR QL: NEGATIVE — SIGNIFICANT CHANGE UP
LEUKOCYTE ESTERASE UR-ACNC: ABNORMAL
LYMPHOCYTES # BLD AUTO: 0.21 K/UL — LOW (ref 1–3.3)
LYMPHOCYTES # BLD AUTO: 3.4 % — LOW (ref 13–44)
MAGNESIUM SERPL-MCNC: 1.5 MG/DL — LOW (ref 1.6–2.6)
MCHC RBC-ENTMCNC: 28.2 PG — SIGNIFICANT CHANGE UP (ref 27–34)
MCHC RBC-ENTMCNC: 30.9 GM/DL — LOW (ref 32–36)
MCV RBC AUTO: 91.5 FL — SIGNIFICANT CHANGE UP (ref 80–100)
MONOCYTES # BLD AUTO: 0.05 K/UL — SIGNIFICANT CHANGE UP (ref 0–0.9)
MONOCYTES NFR BLD AUTO: 0.8 % — LOW (ref 2–14)
NEUTROPHILS # BLD AUTO: 5.89 K/UL — SIGNIFICANT CHANGE UP (ref 1.8–7.4)
NEUTROPHILS NFR BLD AUTO: 95.5 % — HIGH (ref 43–77)
NITRITE UR-MCNC: NEGATIVE — SIGNIFICANT CHANGE UP
PH UR: 5.5 — SIGNIFICANT CHANGE UP (ref 5–8)
PLATELET # BLD AUTO: 603 K/UL — HIGH (ref 150–400)
POTASSIUM SERPL-MCNC: 4.8 MMOL/L — SIGNIFICANT CHANGE UP (ref 3.5–5.3)
POTASSIUM SERPL-SCNC: 4.8 MMOL/L — SIGNIFICANT CHANGE UP (ref 3.5–5.3)
PROCALCITONIN SERPL-MCNC: 0.09 NG/ML — SIGNIFICANT CHANGE UP (ref 0.02–0.1)
PROT UR-MCNC: 30 MG/DL
PROTHROM AB SERPL-ACNC: 25 SEC — HIGH (ref 10–13.1)
RAPID RVP RESULT: DETECTED
RBC # BLD: 2.94 M/UL — LOW (ref 4.2–5.8)
RBC # FLD: 14.9 % — HIGH (ref 10.3–14.5)
SODIUM SERPL-SCNC: 142 MMOL/L — SIGNIFICANT CHANGE UP (ref 135–145)
SP GR SPEC: 1.02 — SIGNIFICANT CHANGE UP (ref 1.01–1.02)
UROBILINOGEN FLD QL: NEGATIVE — SIGNIFICANT CHANGE UP
WBC # BLD: 6.17 K/UL — SIGNIFICANT CHANGE UP (ref 3.8–10.5)
WBC # FLD AUTO: 6.17 K/UL — SIGNIFICANT CHANGE UP (ref 3.8–10.5)

## 2018-08-26 PROCEDURE — 93010 ELECTROCARDIOGRAM REPORT: CPT | Mod: 76,77

## 2018-08-26 RX ORDER — PANTOPRAZOLE SODIUM 20 MG/1
40 TABLET, DELAYED RELEASE ORAL ONCE
Qty: 0 | Refills: 0 | Status: COMPLETED | OUTPATIENT
Start: 2018-08-26 | End: 2018-08-27

## 2018-08-26 RX ORDER — SODIUM CHLORIDE 9 MG/ML
500 INJECTION INTRAMUSCULAR; INTRAVENOUS; SUBCUTANEOUS
Qty: 0 | Refills: 0 | Status: COMPLETED | OUTPATIENT
Start: 2018-08-26 | End: 2018-08-26

## 2018-08-26 RX ORDER — BUDESONIDE, MICRONIZED 100 %
0.5 POWDER (GRAM) MISCELLANEOUS EVERY 12 HOURS
Qty: 0 | Refills: 0 | Status: DISCONTINUED | OUTPATIENT
Start: 2018-08-26 | End: 2018-08-29

## 2018-08-26 RX ORDER — SODIUM CHLORIDE 9 MG/ML
1000 INJECTION INTRAMUSCULAR; INTRAVENOUS; SUBCUTANEOUS
Qty: 0 | Refills: 0 | Status: DISCONTINUED | OUTPATIENT
Start: 2018-08-26 | End: 2018-08-29

## 2018-08-26 RX ORDER — SODIUM CHLORIDE 9 MG/ML
500 INJECTION INTRAMUSCULAR; INTRAVENOUS; SUBCUTANEOUS ONCE
Qty: 0 | Refills: 0 | Status: COMPLETED | OUTPATIENT
Start: 2018-08-26 | End: 2018-08-27

## 2018-08-26 RX ORDER — MAGNESIUM SULFATE 500 MG/ML
1 VIAL (ML) INJECTION ONCE
Qty: 0 | Refills: 0 | Status: COMPLETED | OUTPATIENT
Start: 2018-08-26 | End: 2018-08-26

## 2018-08-26 RX ADMIN — Medication 3 MILLILITER(S): at 05:42

## 2018-08-26 RX ADMIN — Medication 40 MILLIGRAM(S): at 05:41

## 2018-08-26 RX ADMIN — PANTOPRAZOLE SODIUM 40 MILLIGRAM(S): 20 TABLET, DELAYED RELEASE ORAL at 06:23

## 2018-08-26 RX ADMIN — ATORVASTATIN CALCIUM 40 MILLIGRAM(S): 80 TABLET, FILM COATED ORAL at 12:45

## 2018-08-26 RX ADMIN — Medication 1 SPRAY(S): at 17:48

## 2018-08-26 RX ADMIN — SODIUM CHLORIDE 50 MILLILITER(S): 9 INJECTION INTRAMUSCULAR; INTRAVENOUS; SUBCUTANEOUS at 20:01

## 2018-08-26 RX ADMIN — LAMOTRIGINE 25 MILLIGRAM(S): 25 TABLET, ORALLY DISINTEGRATING ORAL at 21:13

## 2018-08-26 RX ADMIN — Medication 3: at 10:55

## 2018-08-26 RX ADMIN — Medication 100 GRAM(S): at 07:35

## 2018-08-26 RX ADMIN — Medication 3 MILLILITER(S): at 21:14

## 2018-08-26 RX ADMIN — Medication 3 MILLILITER(S): at 12:44

## 2018-08-26 RX ADMIN — SODIUM CHLORIDE 200 MILLILITER(S): 9 INJECTION INTRAMUSCULAR; INTRAVENOUS; SUBCUTANEOUS at 07:35

## 2018-08-26 RX ADMIN — Medication 2: at 17:47

## 2018-08-26 RX ADMIN — Medication 2: at 06:22

## 2018-08-26 RX ADMIN — SODIUM CHLORIDE 50 MILLILITER(S): 9 INJECTION INTRAMUSCULAR; INTRAVENOUS; SUBCUTANEOUS at 14:25

## 2018-08-26 RX ADMIN — AZITHROMYCIN 500 MILLIGRAM(S): 500 TABLET, FILM COATED ORAL at 21:13

## 2018-08-26 RX ADMIN — RIVAROXABAN 15 MILLIGRAM(S): KIT at 21:48

## 2018-08-26 RX ADMIN — Medication 3 MILLILITER(S): at 00:01

## 2018-08-26 RX ADMIN — CEFTRIAXONE 100 GRAM(S): 500 INJECTION, POWDER, FOR SOLUTION INTRAMUSCULAR; INTRAVENOUS at 21:12

## 2018-08-26 RX ADMIN — Medication 1 SPRAY(S): at 05:42

## 2018-08-26 RX ADMIN — MIRTAZAPINE 15 MILLIGRAM(S): 45 TABLET, ORALLY DISINTEGRATING ORAL at 21:48

## 2018-08-26 RX ADMIN — Medication 180 MILLIGRAM(S): at 05:41

## 2018-08-26 RX ADMIN — Medication 3 MILLILITER(S): at 17:48

## 2018-08-26 RX ADMIN — Medication 0.5 MILLIGRAM(S): at 17:49

## 2018-08-26 NOTE — ED ADULT NURSE REASSESSMENT NOTE - NS ED NURSE REASSESS COMMENT FT1
Pt placed on BiPAP Report received from Katy RODGERS Report received from Katy RN. Pt A&Ox3. Admitted to medicine, RTM. Blood glucose normalize. Pt tachycardic. [facility].

## 2018-08-26 NOTE — CONSULT NOTE ADULT - SUBJECTIVE AND OBJECTIVE BOX
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 The patient was admitted in January with multifactorial shortness of breath - influenza induced COPD exacerbation and atrial fibrillation with RVR. He returned in April with encephalopathy felt to be related to an infection and treated with zosyn with improvement. The patient comes to the hospital with several days of URI type symptoms punctuated by rhinorrhea, nasal congestion and cough. He was started on oral antibiotics with some improvement but subsequently developed loss of appetite and profound weakness associated with mild hallucinations which he has had in the past when infected. No fevers, chills or sweats.     PMHX:  COPD (chronic obstructive pulmonary disease)/Emphysema  Pleural plaques  Atrial fibrillation  Anemia  Goiter - hypothyroidism  Diabetes type 2, controlled    PSHX:  History of total hip arthroplasty, right      FAMILY HISTORY:  No pertinent family history in first degree relatives      SOCIAL HISTORY:    Pulmonary Medications:   ALBUTerol/ipratropium for Nebulization 3 milliLiter(s) Nebulizer every 6 hours      Antimicrobials:  azithromycin   Tablet 500 milliGRAM(s) Oral every 24 hours  cefTRIAXone   IVPB 1 Gram(s) IV Intermittent every 24 hours      Cardiology:  diltiazem    milliGRAM(s) Oral daily      Other:  ALPRAZolam 0.25 milliGRAM(s) Oral daily PRN  atorvastatin 40 milliGRAM(s) Oral daily  dextrose 40% Gel 15 Gram(s) Oral once PRN  dextrose 5%. 1000 milliLiter(s) IV Continuous <Continuous>  dextrose 50% Injectable 12.5 Gram(s) IV Push once  dextrose 50% Injectable 25 Gram(s) IV Push once  fluticasone propionate 50 MICROgram(s)/spray Nasal Spray 1 Spray(s) Both Nostrils two times a day  insulin lispro (HumaLOG) corrective regimen sliding scale   SubCutaneous three times a day before meals  insulin lispro (HumaLOG) corrective regimen sliding scale   SubCutaneous at bedtime  lamoTRIgine 25 milliGRAM(s) Oral at bedtime  methimazole 5 milliGRAM(s) Oral daily  mirtazapine 15 milliGRAM(s) Oral at bedtime  pantoprazole    Tablet 40 milliGRAM(s) Oral before breakfast  polyethylene glycol 3350 17 Gram(s) Oral daily PRN  predniSONE   Tablet 40 milliGRAM(s) Oral daily  rivaroxaban 15 milliGRAM(s) Oral at bedtime      Allergies    No Known Allergies    Intolerances        HOME MEDICATIONS: see  H & P    REVIEW OF SYSTEMS:  Constitutional: As per HPI  HEENT: Within normal limits  CV: As per HPI  Resp: As per HPI  GI: Within normal limits   : Within normal limits  Musculoskeletal: Within normal limits  Skin: Within normal limits  Neurological: Within normal limits  Psychiatric: Within normal limits  Endocrine: Within normal limits  Hematologic/Lymphatic: Within normal limits  Allergic/Immunologic: Within normal limits    [ ] All other systems negative    OBJECTIVE:    I&O's Detail    Daily Height in cm: 180.34 (25 Aug 2018 19:40)    Daily   CAPILLARY BLOOD GLUCOSE      POCT Blood Glucose.: 255 mg/dL (26 Aug 2018 10:12)  POCT Blood Glucose.: 219 mg/dL (26 Aug 2018 06:21)  POCT Blood Glucose.: 195 mg/dL (26 Aug 2018 05:40)  POCT Blood Glucose.: 158 mg/dL (25 Aug 2018 23:59)  POCT Blood Glucose.: 112 mg/dL (25 Aug 2018 22:33)  POCT Blood Glucose.: 123 mg/dL (25 Aug 2018 21:02)  POCT Blood Glucose.: 121 mg/dL (25 Aug 2018 20:01)  POCT Blood Glucose.: 35 mg/dL (25 Aug 2018 19:48)  POCT Blood Glucose.: 30 mg/dL (25 Aug 2018 19:41)      PHYSICAL EXAM:  ICU Vital Signs Last 24 Hrs  T(C): 36.5 (26 Aug 2018 12:22), Max: 37 (26 Aug 2018 11:20)  T(F): 97.7 (26 Aug 2018 12:22), Max: 98.6 (26 Aug 2018 11:20)  HR: 104 (26 Aug 2018 12:22) (93 - 116)  BP: 141/61 (26 Aug 2018 12:22) (107/67 - 185/78)  BP(mean): --  ABP: --  ABP(mean): --  RR: 16 (26 Aug 2018 12:22) (16 - 20)  SpO2: 99% (26 Aug 2018 12:22) (97% - 100%)    General: Awake. Alert. Cooperative. No distress. Appears stated age 	  HEENT:   Atraumatic. Normocephalic. Anicteric. Normal oral mucosa. PERRL. EOMI.  Neck: Supple. Trachea midline. Thyroid without enlargement/tenderness/nodules. No carotid bruit. No JVD.	  Cardiovascular: Regular rate and rhythm. S1 S2 normal. No murmurs, rubs or gallops.  Respiratory: Respirations unlabored. Clear to auscultation and percussion bilaterally. No curvature.  Abdomen: Soft. Non-tender. Non-distended. No organomegaly. No masses. Normal bowel sounds.  Extremities: Warm to touch. No clubbing or cyanosis. No pedal edema.  Pulses: 2+ peripheral pulses all extremities.	  Skin: Normal skin color. No rashes or lesions. No ecchymoses. No cyanosis. Warm to touch.  Lymph Nodes: Cervical, supraclavicular and axillary nodes normal  Neurological: Motor and sensory examination equal and normal. A and O x 3  Psychiatry: Appropriate mood and affect.      LABS:                          8.3    6.17  )-----------( 603      ( 26 Aug 2018 08:24 )             26.9                         9.6    15.6  )-----------( 873      ( 25 Aug 2018 20:06 )             31.7         142  |  106  |  32<H>  ----------------------------<  216<H>  4.8   |  18<L>  |  1.62<H>      144  |  102  |  38<H>  ----------------------------<  33<LL>  4.3   |  22  |  2.07<H>    Ca      8.9          Ca      10.0            Mg       1.5         TPro  8.2  /  Alb  4.3  /  TBili  0.2  /  DBili  x   /  AST  19  /  ALT  9<L>  /  AlkPhos  98      PT/INR - ( 26 Aug 2018 08:24 )   PT: 25.0 sec;   INR: 2.18 ratio           Venous Blood Gas:   @ 22:42  7.29/55/24//34  VBG Lactate: 4.0    Venous Blood Gas:   @ 20:06  7.29/56/28/  VBG Lactate: 5.3    Procalcitonin, Serum: 0.09 ng/mL ( @ 05:50)    < from: Transthoracic Echocardiogram (17 @ 15:01) >    Patient name: BASHIR BECKER  YOB: 1928   Age: 89 (M)   MR#: [id number]  Study Date: 2017  Location: [address] [place], [place] [id number]: Laly PortilloMICHAEL  Study quality: Technically difficult  Referring Physician: Delilah Katz MD  Blood Pressure: 124/54 mmHg  Height: 183 cm  Weight: 70 kg  BSA: 1.9 m2  ------------------------------------------------------------------------  PROCEDURE: Transthoracic echocardiogram with 2-D, M-Mode  and complete spectral and color flow Doppler.  INDICATION: Unspecified atrial fibrillation (I48.91)  ------------------------------------------------------------------------  Dimensions:    Normal Values:  LA:     3.9    2.0 - 4.0 cm  Ao:     3.6    2.0 - 3.8 cm  SEPTUM: 1.1    0.6 - 1.2 cm  PWT:    1.0   0.6 - 1.1 cm  LVIDd:  4.7    3.0 - 5.6 cm  LVIDs:  3.1    1.8 - 4.0 cm  Derived variables:  LVMI: 93 g/m2  RWT: 0.42  Fractional short: 34 %  EF ([place]): 75 %  Doppler Peak Velocity (m/sec): AoV=2.0  ------------------------------------------------------------------------  Observations:  Mitral Valve: Mitral annular calcification and calcified  mitral leaflets with normal diastolic opening.  Mild-moderate mitral regurgitation.  Aortic Valve/Aorta: Calcified trileaflet aortic valve with  normal opening. Peak transaortic valve gradient equals 17  mm Hg, mean transaortic valve gradient equals 9 mm Hg,  estimated aortic valve area equals 1.6 sqcm (by continuity  equation), aortic valve velocity time integral equals 40  cm, consistent with mild aortic stenosis. Peak left  ventricular outflow tract gradient equals 4 mm Hg, mean  gradient is equal to 2 mm Hg, LVOT velocity time integral  equals 21 cm.  Aortic Root: 3.6 cm.  Left Atrium: Mildly dilated left atrium.  LA volume index =  36 cc/m2.  Left Ventricle: Hyperdynamic left ventricular systolic  function. Increased relative wall thickness with normal  left ventricular mass index, consistent with concentric  left ventricular remodeling. Indeterminate diastolic  function.  Right Heart: Normal right atrium. Normal right ventricular  size and function. Normal tricuspid valve. Mild tricuspid  regurgitation. Normal pulmonic valve.  Pericardium/Pleura: Normal pericardium with trace  pericardial effusion.  Hemodynamic: Estimated rightatrial pressure is 8 mm Hg.  Estimated right ventricular systolic pressure equals 38 mm  Hg, assuming right atrial pressure equals 8 mm Hg,  consistent with borderline pulmonary hypertension.  ------------------------------------------------------------------------  Conclusions:  1. Mitral annular calcification and calcified mitral  leaflets with normal diastolic opening.  2. Calcified trileaflet aortic valve with normal opening.  Peak transaortic valve gradient equals 17 mm Hg, mean  transaorticvalve gradient equals 9 mm Hg, estimated aortic  valve area equals 1.6 sqcm (by continuity equation), aortic  valve velocity time integral equals 40 cm, consistent with  mild aortic stenosis.  3. Mildly dilated left atrium.  LA volume index = 36 cc/m2.  4. Increased relative wall thickness with normal left  ventricular mass index, consistent with concentric left  ventricular remodeling.  5. Hyperdynamic left ventricular systolic function.  6. Normal right ventricular size and function.  7. Estimated right ventricular systolic pressure equals 38  mm Hg, assuming right atrial pressure equals 8 mm Hg,  consistent with borderline pulmonary hypertension.  ------------------------------------------------------------------------  Confirmed on  2017 - 16:56:34 by Gunjan Perez M.D.  ------------------------------------------------------------------------    < end of copied text >  ---------------------------------------------------------------------------------------------------------    MICROBIOLOGY:   Urinalysis Basic - ( 26 Aug 2018 02:52 )    Color: Yellow / Appearance: Clear / S.017 / pH: x  Gluc: x / Ketone: Negative  / Bili: Negative / Urobili: Negative   Blood: x / Protein: 30 mg/dL / Nitrite: Negative   Leuk Esterase: Small / RBC: 0-2 /HPF / WBC 10-25 /HPF   Sq Epi: x / Non Sq Epi: Occasional /HPF / Bacteria: x    Rapid Respiratory Viral Panel (18 @ 23:39)    Rapid RVP Result: Detected: The FilmArray RVP Rapid uses polymerase chain reaction (PCR) and melt  curve analysis to screen for adenovirus; coronavirus HKU1, NL63, 229E,  OC43; human metapneumovirus (hMPV); human enterovirus/rhinovirus  (Entero/RV); influenza A; influenza A/H1;influenza A/H3; influenza  A/H1-2009; influenza B; parainfluenza viruses 1, 2, 3, 4; respiratory  syncytial virus; Bordetella pertussis; Mycoplasma pneumoniae; and  Chlamydophila pneumoniae.    Parainfluenza 4 (RapRVP): Detected    RADIOLOGY:  [x ] Chest radiographs reviewed and interpreted by me    < from: CT Chest No Cont (18 @ 23:46) >    EXAM:  CT CHEST                          PROCEDURE DATE:  2018      INTERPRETATION:  CLINICAL INFORMATION: COPD, hypoglycemia, evaluate for   pneumonia.    COMPARISON: CT chest 2018. CT 2018. CT 2016.    PROCEDURE:   CT of the Chest was performed without intravenous contrast.  Sagittal and coronal reformats were performed.      FINDINGS:    CHEST:     LUNGS AND LARGE AIRWAYS: Patent central airways.  Emphysema. Bibasilar   subsegmental atelectasis. A 5 mm right lowerlobe nodule (2:60). Tiny   tree-in-bud opacities visualized peripherally at the superior segment of   the right lower lobe posteriorly. Few tiny tree-in-bud opacities   visualized at the periphery of the left lower lobe.  PLEURA: Calcified right pleural plaques.  VESSELS: Atherosclerotic changes aorta and branches.  HEART: Heart size is normal. Trace pericardial effusion. Coronary   calcifications.  MEDIASTINUM AND ALFRED: Stable subcentimeter mediastinal lymph nodes.  CHEST WALL AND LOWER NECK: Unchanged hypodense right thyroid nodule.  VISUALIZED UPPER ABDOMEN: Cholelithiasis.  BONES: Generative changes. Redemonstrated L1 compression deformity.    IMPRESSION:   Tree-in-bud opacities at both lower lobes peripherally indicative of   mucoid impacted distal airways. Infection cannot be excluded. Recommend   follow-up.    Emphysema.    MARIELOS MINOR M.D., RADIOLOGY RESIDENT  This document has been electronically signed.  LORENZO MAYES M.D., ATTENDING RADIOLOGIST  This document has been electronically signed. 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 The patient was admitted in January with multifactorial shortness of breath - influenza induced COPD exacerbation and atrial fibrillation with RVR. He returned in April with encephalopathy felt to be related to an infection and treated with zosyn with improvement. The patient comes to the hospital with several days of URI type symptoms punctuated by rhinorrhea, nasal congestion and cough. He was started on oral antibiotics with some improvement but subsequently developed loss of appetite and profound weakness associated with mild hallucinations which he has had in the past when infected. No fevers, chills or sweats.     PMHX:  COPD (chronic obstructive pulmonary disease)/Emphysema  Pleural plaques  Atrial fibrillation  Anemia  Goiter - hypothyroidism  Diabetes type 2, controlled    PSHX:  History of total hip arthroplasty, right      FAMILY HISTORY:  No pertinent family history in first degree relatives      SOCIAL HISTORY:  former smoker discontinuing ~ 30 years ago; no EtOH for ~ 35 years; ambulates with a cane; lives with home health aide    Pulmonary Medications:   ALBUTerol/ipratropium for Nebulization 3 milliLiter(s) Nebulizer every 6 hours      Antimicrobials:  azithromycin   Tablet 500 milliGRAM(s) Oral every 24 hours  cefTRIAXone   IVPB 1 Gram(s) IV Intermittent every 24 hours      Cardiology:  diltiazem    milliGRAM(s) Oral daily      Other:  ALPRAZolam 0.25 milliGRAM(s) Oral daily PRN  atorvastatin 40 milliGRAM(s) Oral daily  dextrose 40% Gel 15 Gram(s) Oral once PRN  dextrose 5%. 1000 milliLiter(s) IV Continuous <Continuous>  dextrose 50% Injectable 12.5 Gram(s) IV Push once  dextrose 50% Injectable 25 Gram(s) IV Push once  fluticasone propionate 50 MICROgram(s)/spray Nasal Spray 1 Spray(s) Both Nostrils two times a day  insulin lispro (HumaLOG) corrective regimen sliding scale   SubCutaneous three times a day before meals  insulin lispro (HumaLOG) corrective regimen sliding scale   SubCutaneous at bedtime  lamoTRIgine 25 milliGRAM(s) Oral at bedtime  methimazole 5 milliGRAM(s) Oral daily  mirtazapine 15 milliGRAM(s) Oral at bedtime  pantoprazole    Tablet 40 milliGRAM(s) Oral before breakfast  polyethylene glycol 3350 17 Gram(s) Oral daily PRN  predniSONE   Tablet 40 milliGRAM(s) Oral daily  rivaroxaban 15 milliGRAM(s) Oral at bedtime      Allergies    No Known Allergies    Intolerances        HOME MEDICATIONS: see  H & P    REVIEW OF SYSTEMS:  Constitutional: As per HPI  HEENT: Within normal limits  CV: As per HPI  Resp: As per HPI  GI: Within normal limits   : Within normal limits  Musculoskeletal: Within normal limits  Skin: Within normal limits  Neurological: Within normal limits  Psychiatric: Within normal limits  Endocrine: Within normal limits  Hematologic/Lymphatic: Within normal limits  Allergic/Immunologic: Within normal limits    [ ] All other systems negative    OBJECTIVE:    I&O's Detail    Daily Height in cm: 180.34 (25 Aug 2018 19:40)    Daily   CAPILLARY BLOOD GLUCOSE      POCT Blood Glucose.: 255 mg/dL (26 Aug 2018 10:12)  POCT Blood Glucose.: 219 mg/dL (26 Aug 2018 06:21)  POCT Blood Glucose.: 195 mg/dL (26 Aug 2018 05:40)  POCT Blood Glucose.: 158 mg/dL (25 Aug 2018 23:59)  POCT Blood Glucose.: 112 mg/dL (25 Aug 2018 22:33)  POCT Blood Glucose.: 123 mg/dL (25 Aug 2018 21:02)  POCT Blood Glucose.: 121 mg/dL (25 Aug 2018 20:01)  POCT Blood Glucose.: 35 mg/dL (25 Aug 2018 19:48)  POCT Blood Glucose.: 30 mg/dL (25 Aug 2018 19:41)      PHYSICAL EXAM:  ICU Vital Signs Last 24 Hrs  T(C): 36.5 (26 Aug 2018 12:22), Max: 37 (26 Aug 2018 11:20)  T(F): 97.7 (26 Aug 2018 12:22), Max: 98.6 (26 Aug 2018 11:20)  HR: 104 (26 Aug 2018 12:22) (93 - 116)  BP: 141/61 (26 Aug 2018 12:22) (107/67 - 185/78)  BP(mean): --  ABP: --  ABP(mean): --  RR: 16 (26 Aug 2018 12:22) (16 - 20)  SpO2: 99% (26 Aug 2018 12:22) (97% - 100%)    General: Awake. Alert. Cooperative. No distress. Appears stated age 	  HEENT:   Atraumatic. Normocephalic. Anicteric. Normal oral mucosa. PERRL. EOMI.  Neck: Supple. Trachea midline. Thyroid without enlargement/tenderness/nodules. No carotid bruit. No JVD.	  Cardiovascular: Regular rate and rhythm. S1 S2 normal. No murmurs, rubs or gallops.  Respiratory: Respirations unlabored. Clear to auscultation and percussion bilaterally. No curvature.  Abdomen: Soft. Non-tender. Non-distended. No organomegaly. No masses. Normal bowel sounds.  Extremities: Warm to touch. No clubbing or cyanosis. No pedal edema.  Pulses: 2+ peripheral pulses all extremities.	  Skin: Normal skin color. No rashes or lesions. No ecchymoses. No cyanosis. Warm to touch.  Lymph Nodes: Cervical, supraclavicular and axillary nodes normal  Neurological: Motor and sensory examination equal and normal. A and O x 3  Psychiatry: Appropriate mood and affect.      LABS:                          8.3    6.17  )-----------( 603      ( 26 Aug 2018 08:24 )             26.9                         9.6    15.6  )-----------( 873      ( 25 Aug 2018 20:06 )             31.7         142  |  106  |  32<H>  ----------------------------<  216<H>  4.8   |  18<L>  |  1.62<H>      144  |  102  |  38<H>  ----------------------------<  33<LL>  4.3   |  22  |  2.07<H>    Ca      8.9          Ca      10.0            Mg       1.5         TPro  8.2  /  Alb  4.3  /  TBili  0.2  /  DBili  x   /  AST  19  /  ALT  9<L>  /  AlkPhos  98      PT/INR - ( 26 Aug 2018 08:24 )   PT: 25.0 sec;   INR: 2.18 ratio           Venous Blood Gas:   @ 22:42  7.29/55/24//34  VBG Lactate: 4.0    Venous Blood Gas:   @ 20:06  7.//  VBG Lactate: 5.3    Procalcitonin, Serum: 0.09 ng/mL ( @ 05:50)    < from: Transthoracic Echocardiogram (17 @ 15:01) >    Patient name: BASHIR BECKER  YOB: 1928   Age: 89 (M)   MR#: [id number]  Study Date: 2017  Location: [address] [place], [place] [id number]: [facility]  Study quality: Technically difficult  Referring Physician: Delilah Katz MD  Blood Pressure: 124/54 mmHg  Height: 183 cm  Weight: 70 kg  BSA: 1.9 m2  ------------------------------------------------------------------------  PROCEDURE: Transthoracic echocardiogram with 2-D, M-Mode  and complete spectral and color flow Doppler.  INDICATION: Unspecified atrial fibrillation (I48.91)  ------------------------------------------------------------------------  Dimensions:    Normal Values:  LA:     3.9    2.0 - 4.0 cm  Ao:     3.6    2.0 - 3.8 cm  SEPTUM: 1.1    0.6 - 1.2 cm  PWT:    1.0   0.6 - 1.1 cm  LVIDd:  4.7    3.0 - 5.6 cm  LVIDs:  3.1    1.8 - 4.0 cm  Derived variables:  LVMI: 93 g/m2  RWT: 0.42  Fractional short: 34 %  EF (Teicholtz): 75 %  Doppler Peak Velocity (m/sec): AoV=2.0  ------------------------------------------------------------------------  Observations:  Mitral Valve: Mitral annular calcification and calcified  mitral leaflets with normal diastolic opening.  Mild-moderate mitral regurgitation.  Aortic Valve/Aorta: Calcified trileaflet aortic valve with  normal opening. Peak transaortic valve gradient equals 17  mm Hg, mean transaortic valve gradient equals 9 mm Hg,  estimated aortic valve area equals 1.6 sqcm (by continuity  equation), aortic valve velocity time integral equals 40  cm, consistent with mild aortic stenosis. Peak left  ventricular outflow tract gradient equals 4 mm Hg, mean  gradient is equal to 2 mm Hg, LVOT velocity time integral  equals 21 cm.  Aortic Root: 3.6 cm.  Left Atrium: Mildly dilated left atrium.  LA volume index =  36 cc/m2.  Left Ventricle: Hyperdynamic left ventricular systolic  function. Increased relative wall thickness with normal  left ventricular mass index, consistent with concentric  left ventricular remodeling. Indeterminate diastolic  function.  Right Heart: Normal right atrium. Normal right ventricular  size and function. Normal tricuspid valve. Mild tricuspid  regurgitation. Normal pulmonic valve.  Pericardium/Pleura: Normal pericardium with trace  pericardial effusion.  Hemodynamic: Estimated rightatrial pressure is 8 mm Hg.  Estimated right ventricular systolic pressure equals 38 mm  Hg, assuming right atrial pressure equals 8 mm Hg,  consistent with borderline pulmonary hypertension.  ------------------------------------------------------------------------  Conclusions:  1. Mitral annular calcification and calcified mitral  leaflets with normal diastolic opening.  2. Calcified trileaflet aortic valve with normal opening.  Peak transaortic valve gradient equals 17 mm Hg, mean  transaorticvalve gradient equals 9 mm Hg, estimated aortic  valve area equals 1.6 sqcm (by continuity equation), aortic  valve velocity time integral equals 40 cm, consistent with  mild aortic stenosis.  3. Mildly dilated left atrium.  LA volume index = 36 cc/m2.  4. Increased relative wall thickness with normal left  ventricular mass index, consistent with concentric left  ventricular remodeling.  5. Hyperdynamic left ventricular systolic function.  6. Normal right ventricular size and function.  7. Estimated right ventricular systolic pressure equals 38  mm Hg, assuming right atrial pressure equals 8 mm Hg,  consistent with borderline pulmonary hypertension.  ------------------------------------------------------------------------  Confirmed on  2017 - 16:56:34 by Gunjan Perez M.D.  ------------------------------------------------------------------------    < end of copied text >  ---------------------------------------------------------------------------------------------------------    MICROBIOLOGY:   Urinalysis Basic - ( 26 Aug 2018 02:52 )    Color: Yellow / Appearance: Clear / S.017 / pH: x  Gluc: x / Ketone: Negative  / Bili: Negative / Urobili: Negative   Blood: x / Protein: 30 mg/dL / Nitrite: Negative   Leuk Esterase: Small / RBC: 0-2 /HPF / WBC 10-25 /HPF   Sq Epi: x / Non Sq Epi: Occasional /HPF / Bacteria: x    Rapid Respiratory Viral Panel (18 @ 23:39)    Rapid RVP Result: Detected: The FilmArray RVP Rapid uses polymerase chain reaction (PCR) and melt  curve analysis to screen for adenovirus; coronavirus HKU1, NL63, 229E,  OC43; human metapneumovirus (hMPV); human enterovirus/rhinovirus  (Entero/RV); influenza A; influenza A/H1;influenza A/H3; influenza  A/H1-2009; influenza B; parainfluenza viruses 1, 2, 3, 4; respiratory  syncytial virus; Bordetella pertussis; Mycoplasma pneumoniae; and  Chlamydophila pneumoniae.    Parainfluenza 4 (RapRVP): Detected    RADIOLOGY:  [x ] Chest radiographs reviewed and interpreted by me    < from: CT Chest No Cont (18 @ 23:46) >    EXAM:  CT CHEST                          PROCEDURE DATE:  2018      INTERPRETATION:  CLINICAL INFORMATION: COPD, hypoglycemia, evaluate for   pneumonia.    COMPARISON: CT chest 2018. CT 2018. CT 2016.    PROCEDURE:   CT of the Chest was performed without intravenous contrast.  Sagittal and coronal reformats were performed.      FINDINGS:    CHEST:     LUNGS AND LARGE AIRWAYS: Patent central airways.  Emphysema. Bibasilar   subsegmental atelectasis. A 5 mm right lowerlobe nodule (2:60). Tiny   tree-in-bud opacities visualized peripherally at the superior segment of   the right lower lobe posteriorly. Few tiny tree-in-bud opacities   visualized at the periphery of the left lower lobe.  PLEURA: Calcified right pleural plaques.  VESSELS: Atherosclerotic changes aorta and branches.  HEART: Heart size is normal. Trace pericardial effusion. Coronary   calcifications.  MEDIASTINUM AND ALFRED: Stable subcentimeter mediastinal lymph nodes.  CHEST WALL AND LOWER NECK: Unchanged hypodense right thyroid nodule.  VISUALIZED UPPER ABDOMEN: Cholelithiasis.  BONES: Generative changes. Redemonstrated L1 compression deformity.    IMPRESSION:   Tree-in-bud opacities at both lower lobes peripherally indicative of   mucoid impacted distal airways. Infection cannot be excluded. Recommend   follow-up.    Emphysema.    MARIELOS MINOR M.D., RADIOLOGY RESIDENT  This document has been electronically signed.  LORENZO MAYES M.D., ATTENDING RADIOLOGIST  This document has been electronically signed. 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 The patient also has a history of atrial fibrillation on coumadin, diabetes, recurrent UTIs and hypothyroidism. The patient was admitted in January with multifactorial shortness of breath - influenza induced COPD exacerbation and atrial fibrillation with RVR. He returned in April with encephalopathy felt to be related to an infection and treated with zosyn with improvement. The patient comes to the hospital with several days of URI type symptoms punctuated by rhinorrhea, nasal congestion and cough. He was started on oral antibiotics with some improvement but subsequently developed loss of appetite and profound weakness associated with mild hallucinations which he has had in the past when infected. No fevers, chills or sweats. Mild chest congestion and wheeze without shortness of breath. 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 1000 milliLiter(s) IV Continuous <Continuous>  dextrose 50% Injectable 12.5 Gram(s) IV Push once  dextrose 50% Injectable 25 Gram(s) IV Push once  fluticasone propionate 50 MICROgram(s)/spray Nasal Spray 1 Spray(s) Both Nostrils two times a day  insulin lispro (HumaLOG) corrective regimen sliding scale   SubCutaneous three times a day before meals  insulin lispro (HumaLOG) corrective regimen sliding scale   SubCutaneous at bedtime  lamoTRIgine 25 milliGRAM(s) Oral at bedtime  methimazole 5 milliGRAM(s) Oral daily  mirtazapine 15 milliGRAM(s) Oral at bedtime  pantoprazole    Tablet 40 milliGRAM(s) Oral before breakfast  polyethylene glycol 3350 17 Gram(s) Oral daily PRN  predniSONE   Tablet 40 milliGRAM(s) Oral daily  rivaroxaban 15 milliGRAM(s) Oral at bedtime      Allergies    No Known Allergies    HOME MEDICATIONS: see  H & P    REVIEW OF SYSTEMS:  Constitutional: As per HPI  HEENT: Within normal limits  CV: As per HPI  Resp: As per HPI  GI: Within normal limits   : Within normal limits  Musculoskeletal: tremors; weakness  Skin: Within normal limits  Neurological: Within normal limits  Psychiatric: Within normal limits  Endocrine: Within normal limits  Hematologic/Lymphatic: Within normal limits  Allergic/Immunologic: Within normal limits    OBJECTIVE:    I&O's Detail    Daily Height in cm: 180.34 (25 Aug 2018 19:40)        POCT Blood Glucose.: 255 mg/dL (26 Aug 2018 10:12)  POCT Blood Glucose.: 219 mg/dL (26 Aug 2018 06:21)  POCT Blood Glucose.: 195 mg/dL (26 Aug 2018 05:40)  POCT Blood Glucose.: 158 mg/dL (25 Aug 2018 23:59)  POCT Blood Glucose.: 112 mg/dL (25 Aug 2018 22:33)  POCT Blood Glucose.: 123 mg/dL (25 Aug 2018 21:02)  POCT Blood Glucose.: 121 mg/dL (25 Aug 2018 20:01)  POCT Blood Glucose.: 35 mg/dL (25 Aug 2018 19:48)  POCT Blood Glucose.: 30 mg/dL (25 Aug 2018 19:41)      PHYSICAL EXAM:  ICU Vital Signs Last 24 Hrs  T(C): 36.5 (26 Aug 2018 12:22), Max: 37 (26 Aug 2018 11:20)  T(F): 97.7 (26 Aug 2018 12:22), Max: 98.6 (26 Aug 2018 11:20)  HR: 104 (26 Aug 2018 12:22) (93 - 116)  BP: 141/61 (26 Aug 2018 12:22) (107/67 - 185/78)  BP(mean): --  ABP: --  ABP(mean): --  RR: 16 (26 Aug 2018 12:22) (16 - 20)  SpO2: 99% (26 Aug 2018 12:22) (97% - 100%) on room air    General: Awake. Alert. Cooperative. No distress. Appears stated age 	  HEENT:   Atraumatic. Bitemporal wasting. Anicteric. Normal oral mucosa. PERRL. EOMI.  Neck: Supple. Trachea midline. Thyroid without enlargement/tenderness/nodules. No carotid bruit. No JVD. Loss of bilateral supraclavicular fat pads  Cardiovascular: Irregularly irregular rate and rhythm. S1 S2 normal. No murmurs, rubs or gallops.  Respiratory: Respirations unlabored. Mild bilateral wheeze. Kyphosis.  Abdomen: Soft. Non-tender. Non-distended. No organomegaly. No masses. Normal bowel sounds. Small reducible right groin hernia  Extremities: Warm to touch. No clubbing or cyanosis. No pedal edema.  Pulses: 2+ peripheral pulses all extremities.	  Skin: Normal skin color. No rashes or lesions. No ecchymoses. No cyanosis. Warm to touch.  Lymph Nodes: Cervical, supraclavicular and axillary nodes normal  Neurological: Motor and sensory examination equal and normal. A and O x 3  Psychiatry: Appropriate mood and affect.      LABS:                          8.3    6.17  )-----------( 603      ( 26 Aug 2018 08:24 )             26.9                         9.6    15.6  )-----------( 873      ( 25 Aug 2018 20:06 )             31.7         142  |  106  |  32<H>  ----------------------------<  216<H>  4.8   |  18<L>  |  1.62<H>        144  |  102  |  38<H>  ----------------------------<  33<LL>  4.3   |  22  |  2.07<H>    Ca      8.9          Ca      10.0            Mg       1.5         TPro  8.2  /  Alb  4.3  /  TBili  0.2  /  DBili  x   /  AST  19  /  ALT  9<L>  /  AlkPhos  98      PT/INR - ( 26 Aug 2018 08:24 )   PT: 25.0 sec;   INR: 2.18 ratio      Venous Blood Gas:   @ 22:42  7.29/55//34  VBG Lactate: 4.0    Venous Blood Gas:   @ 20:06  7.29/56//  VBG Lactate: 5.3    Procalcitonin, Serum: 0.09 ng/mL ( @ 05:50)    < from: Transthoracic Echocardiogram (17 @ 15:01) >    Patient name: BASHIR BECKER  YOB: 1928   Age: 89 (M)   MR#: [id number]  Study Date: 2017  Location: [address] [place], [place] [id number]: Laly Portillo RDCS  Study quality: Technically difficult  Referring Physician: Delilah Katz MD  Blood Pressure: 124/54 mmHg  Height: 183 cm  Weight: 70 kg  BSA: 1.9 m2  ------------------------------------------------------------------------  PROCEDURE: Transthoracic echocardiogram with 2-D, M-Mode  and complete spectral and color flow Doppler.  INDICATION: Unspecified atrial fibrillation (I48.91)  ------------------------------------------------------------------------  Dimensions:    Normal Values:  LA:     3.9    2.0 - 4.0 cm  Ao:     3.6    2.0 - 3.8 cm  SEPTUM: 1.1    0.6 - 1.2 cm  PWT:    1.0   0.6 - 1.1 cm  LVIDd:  4.7    3.0 - 5.6 cm  LVIDs:  3.1    1.8 - 4.0 cm  Derived variables:  LVMI: 93 g/m2  RWT: 0.42  Fractional short: 34 %  EF (Teicholtz): 75 %  Doppler Peak Velocity (m/sec): AoV=2.0  ------------------------------------------------------------------------  Observations:  Mitral Valve: Mitral annular calcification and calcified  mitral leaflets with normal diastolic opening.  Mild-moderate mitral regurgitation.  Aortic Valve/Aorta: Calcified trileaflet aortic valve with  normal opening. Peak transaortic valve gradient equals 17  mm Hg, mean transaortic valve gradient equals 9 mm Hg,  estimated aortic valve area equals 1.6 sqcm (by continuity  equation), aortic valve velocity time integral equals 40  cm, consistent with mild aortic stenosis. Peak left  ventricular outflow tract gradient equals 4 mm Hg, mean  gradient is equal to 2 mm Hg, LVOT velocity time integral  equals 21 cm.  Aortic Root: 3.6 cm.  Left Atrium: Mildly dilated left atrium.  LA volume index =  36 cc/m2.  Left Ventricle: Hyperdynamic left ventricular systolic  function. Increased relative wall thickness with normal  left ventricular mass index, consistent with concentric  left ventricular remodeling. Indeterminate diastolic  function.  Right Heart: Normal right atrium. Normal right ventricular  size and function. Normal tricuspid valve. Mild tricuspid  regurgitation. Normal pulmonic valve.  Pericardium/Pleura: Normal pericardium with trace  pericardial effusion.  Hemodynamic: Estimated rightatrial pressure is 8 mm Hg.  Estimated right ventricular systolic pressure equals 38 mm  Hg, assuming right atrial pressure equals 8 mm Hg,  consistent with borderline pulmonary hypertension.  ------------------------------------------------------------------------  Conclusions:  1. Mitral annular calcification and calcified mitral  leaflets with normal diastolic opening.  2. Calcified trileaflet aortic valve with normal opening.  Peak transaortic valve gradient equals 17 mm Hg, mean  transaorticvalve gradient equals 9 mm Hg, estimated aortic  valve area equals 1.6 sqcm (by continuity equation), aortic  valve velocity time integral equals 40 cm, consistent with  mild aortic stenosis.  3. Mildly dilated left atrium.  LA volume index = 36 cc/m2.  4. Increased relative wall thickness with normal left  ventricular mass index, consistent with concentric left  ventricular remodeling.  5. Hyperdynamic left ventricular systolic function.  6. Normal right ventricular size and function.  7. Estimated right ventricular systolic pressure equals 38  mm Hg, assuming right atrial pressure equals 8 mm Hg,  consistent with borderline pulmonary hypertension.  ------------------------------------------------------------------------  Confirmed on  2017 - 16:56:34 by Gunjan Perez M.D.  ------------------------------------------------------------------------    < end of copied text >  ---------------------------------------------------------------------------------------------------------    MICROBIOLOGY:   Urinalysis Basic - ( 26 Aug 2018 02:52 )    Color: Yellow / Appearance: Clear / S.017 / pH: x  Gluc: x / Ketone: Negative  / Bili: Negative / Urobili: Negative   Blood: x / Protein: 30 mg/dL / Nitrite: Negative   Leuk Esterase: Small / RBC: 0-2 /HPF / WBC 10-25 /HPF   Sq Epi: x / Non Sq Epi: Occasional /HPF / Bacteria: x    Rapid Respiratory Viral Panel (18 @ 23:39)    Rapid RVP Result: Detected: The [employer] RVP Rapid uses polymerase chain reaction (PCR) and melt  curve analysis to screen for adenovirus; coronavirus HKU1, NL63, 229E,  OC43; human metapneumovirus (hMPV); human enterovirus/rhinovirus  (Entero/RV); influenza A; influenza A/H1;influenza A/H3; influenza  A/H1-2009; influenza B; parainfluenza viruses 1, 2, 3, 4; respiratory  syncytial virus; Bordetella pertussis; Mycoplasma pneumoniae; and  Chlamydophila pneumoniae.    Parainfluenza 4 (RapRVP): Detected    RADIOLOGY:  [x ] Chest radiographs reviewed and interpreted by me    < from: CT Chest No Cont (18 @ 23:46) >    EXAM:  CT CHEST                          PROCEDURE DATE:  2018      INTERPRETATION:  CLINICAL INFORMATION: COPD, hypoglycemia, evaluate for   pneumonia.    COMPARISON: CT chest 2018. CT 2018. CT 2016.    PROCEDURE:   CT of the Chest was performed without intravenous contrast.  Sagittal and coronal reformats were performed.      FINDINGS:    CHEST:     LUNGS AND LARGE AIRWAYS: Patent central airways.  Emphysema. Bibasilar   subsegmental atelectasis. A 5 mm right lower lobe nodule (2:60). Tiny   tree-in-bud opacities visualized peripherally at the superior segment of   the right lower lobe posteriorly. Few tiny tree-in-bud opacities   visualized at the periphery of the left lower lobe.  PLEURA: Calcified right pleural plaques.  VESSELS: Atherosclerotic changes aorta and branches.  HEART: Heart size is normal. Trace pericardial effusion. Coronary   calcifications.  MEDIASTINUM AND ALFRED: Stable subcentimeter mediastinal lymph nodes.  CHEST WALL AND LOWER NECK: Unchanged hypodense right thyroid nodule.  VISUALIZED UPPER ABDOMEN: Cholelithiasis.  BONES: Generative changes. Redemonstrated L1 compression deformity.    IMPRESSION:   Tree-in-bud opacities at both lower lobes peripherally indicative of   mucoid impacted distal airways. Infection cannot be excluded. Recommend   follow-up.    Emphysema.    MARIELOS MINOR M.D., RADIOLOGY RESIDENT  This document has been electronically signed.  LORENZO MAYES M.D., ATTENDING RADIOLOGIST  This document has been electronically signed. Aug 26 2018 12:07PM    < end of copied text >  ---------------------------------------------------------------------------------------------------------

## 2018-08-26 NOTE — CONSULT NOTE ADULT - CONSULT REASON
parainfluenza viral infection; COPD exacerbation; pneumonia; emphysema parainfluenza viral infection; COPD exacerbation; pneumonia; emphysema; lung nodule

## 2018-08-26 NOTE — PROGRESS NOTE ADULT - SUBJECTIVE AND OBJECTIVE BOX
Patient is a 89y old  Male who presents with a chief complaint of FTT, hallucinations, AMS (26 Aug 2018 12:23)    Admitted overnight by hospitalist service   SUBJECTIVE / OVERNIGHT EVENTS: feels better.  Review of Systems  chest pain no  palpitations no  sob no  nausea no  headache no    MEDICATIONS  (STANDING):  ALBUTerol/ipratropium for Nebulization 3 milliLiter(s) Nebulizer every 6 hours  atorvastatin 40 milliGRAM(s) Oral daily  azithromycin   Tablet 500 milliGRAM(s) Oral every 24 hours  cefTRIAXone   IVPB 1 Gram(s) IV Intermittent every 24 hours  dextrose 5%. 1000 milliLiter(s) (50 mL/Hr) IV Continuous <Continuous>  dextrose 50% Injectable 12.5 Gram(s) IV Push once  dextrose 50% Injectable 25 Gram(s) IV Push once  diltiazem    milliGRAM(s) Oral daily  fluticasone propionate 50 MICROgram(s)/spray Nasal Spray 1 Spray(s) Both Nostrils two times a day  insulin lispro (HumaLOG) corrective regimen sliding scale   SubCutaneous three times a day before meals  insulin lispro (HumaLOG) corrective regimen sliding scale   SubCutaneous at bedtime  lamoTRIgine 25 milliGRAM(s) Oral at bedtime  methimazole 5 milliGRAM(s) Oral daily  mirtazapine 15 milliGRAM(s) Oral at bedtime  pantoprazole    Tablet 40 milliGRAM(s) Oral before breakfast  predniSONE   Tablet 40 milliGRAM(s) Oral daily  rivaroxaban 15 milliGRAM(s) Oral at bedtime    MEDICATIONS  (PRN):  ALPRAZolam 0.25 milliGRAM(s) Oral daily PRN anxiety  dextrose 40% Gel 15 Gram(s) Oral once PRN Blood Glucose LESS THAN 70 milliGRAM(s)/deciliter  polyethylene glycol 3350 17 Gram(s) Oral daily PRN Constipation      Vital Signs Last 24 Hrs  T(C): 36.5 (26 Aug 2018 12:22), Max: 37 (26 Aug 2018 11:20)  T(F): 97.7 (26 Aug 2018 12:22), Max: 98.6 (26 Aug 2018 11:20)  HR: 104 (26 Aug 2018 12:22) (93 - 116)  BP: 141/61 (26 Aug 2018 12:22) (107/67 - 185/78)  BP(mean): --  RR: 16 (26 Aug 2018 12:22) (16 - 20)  SpO2: 99% (26 Aug 2018 12:22) (97% - 100%)    PHYSICAL EXAM:  GENERAL: NAD  HEAD:  Atraumatic, Normocephalic  EYES: EOMI, PERRLA, conjunctiva and sclera clear  NECK: Supple, No JVD  CHEST/LUNG: Clear to auscultation bilaterally; No wheeze  HEART: Regular rate and rhythm; No murmurs, rubs, or gallops  ABDOMEN: Soft, Nontender, Nondistended; Bowel sounds present  EXTREMITIES:  2+ Peripheral Pulses, No clubbing, cyanosis, or edema  PSYCH: AAOx3  NEUROLOGY: non-focal  SKIN: No rashes or lesions    LABS:                        8.3    6.17  )-----------( 603      ( 26 Aug 2018 08:24 )             26.9         142  |  106  |  32<H>  ----------------------------<  216<H>  4.8   |  18<L>  |  1.62<H>    Ca    8.9      26 Aug 2018 05:50  Mg     1.5         TPro  8.2  /  Alb  4.3  /  TBili  0.2  /  DBili  x   /  AST  19  /  ALT  9<L>  /  AlkPhos  98      PT/INR - ( 26 Aug 2018 08:24 )   PT: 25.0 sec;   INR: 2.18 ratio               Urinalysis Basic - ( 26 Aug 2018 02:52 )    Color: Yellow / Appearance: Clear / S.017 / pH: x  Gluc: x / Ketone: Negative  / Bili: Negative / Urobili: Negative   Blood: x / Protein: 30 mg/dL / Nitrite: Negative   Leuk Esterase: Small / RBC: 0-2 /HPF / WBC 10-25 /HPF   Sq Epi: x / Non Sq Epi: Occasional /HPF / Bacteria: x          RADIOLOGY & ADDITIONAL TESTS:    Imaging Personally Reviewed:  < from: CT Chest No Cont (18 @ 23:46) >  IMPRESSION:   Tree-in-bud opacities at both lower lobes peripherally indicative of   mucoid impacted distal airways. Infection cannot be excluded. Recommend   follow-up.    Emphysema.    < end of copied text >  < from: CT Chest No Cont (18 @ 23:46) >  IMPRESSION:   Tree-in-bud opacities at both lower lobes peripherally indicative of   mucoid impacted distal airways. Infection cannot be excluded. Recommend   follow-up.    Emphysema.    < end of copied text >    Consultant(s) Notes Reviewed:      Care Discussed with Consultants/Other Providers:

## 2018-08-26 NOTE — ED ADULT NURSE REASSESSMENT NOTE - NS ED NURSE REASSESS COMMENT FT1
Lola NP aware of tachycardia. Lola provide meal tray for pt. 3units humalog administered for blood glucose 255. Pt admitted to medicine, RTM. Will continue to monitor

## 2018-08-26 NOTE — ED ADULT NURSE REASSESSMENT NOTE - NS ED NURSE REASSESS COMMENT FT1
patient is resting comfortably in bed in no acute distress. patient denies pain at this time. patient aware not to get out of bed and is fall risk. patient waiting for bed.

## 2018-08-26 NOTE — ED ADULT NURSE REASSESSMENT NOTE - NS ED NURSE REASSESS COMMENT FT1
Pt receive admitted bed assignment on 2D. Pt is medicine pt but boarding in [facility] due to contact precautions for parainfluenza 4. Report given to Mare RODGERS

## 2018-08-27 DIAGNOSIS — E05.90 THYROTOXICOSIS, UNSPECIFIED WITHOUT THYROTOXIC CRISIS OR STORM: ICD-10-CM

## 2018-08-27 DIAGNOSIS — E11.65 TYPE 2 DIABETES MELLITUS WITH HYPERGLYCEMIA: ICD-10-CM

## 2018-08-27 LAB
-  COAGULASE NEGATIVE STAPHYLOCOCCUS: SIGNIFICANT CHANGE UP
ANION GAP SERPL CALC-SCNC: 13 MMOL/L — SIGNIFICANT CHANGE UP (ref 5–17)
ANION GAP SERPL CALC-SCNC: 13 MMOL/L — SIGNIFICANT CHANGE UP (ref 5–17)
BLD GP AB SCN SERPL QL: NEGATIVE — SIGNIFICANT CHANGE UP
BUN SERPL-MCNC: 30 MG/DL — HIGH (ref 7–23)
BUN SERPL-MCNC: 30 MG/DL — HIGH (ref 7–23)
CALCIUM SERPL-MCNC: 8.9 MG/DL — SIGNIFICANT CHANGE UP (ref 8.4–10.5)
CALCIUM SERPL-MCNC: 9.6 MG/DL — SIGNIFICANT CHANGE UP (ref 8.4–10.5)
CHLORIDE SERPL-SCNC: 100 MMOL/L — SIGNIFICANT CHANGE UP (ref 96–108)
CHLORIDE SERPL-SCNC: 106 MMOL/L — SIGNIFICANT CHANGE UP (ref 96–108)
CK MB CFR SERPL CALC: 3.7 NG/ML — SIGNIFICANT CHANGE UP (ref 0–6.7)
CK MB CFR SERPL CALC: 3.9 NG/ML — SIGNIFICANT CHANGE UP (ref 0–6.7)
CK SERPL-CCNC: 64 U/L — SIGNIFICANT CHANGE UP (ref 30–200)
CK SERPL-CCNC: 75 U/L — SIGNIFICANT CHANGE UP (ref 30–200)
CO2 SERPL-SCNC: 22 MMOL/L — SIGNIFICANT CHANGE UP (ref 22–31)
CO2 SERPL-SCNC: 22 MMOL/L — SIGNIFICANT CHANGE UP (ref 22–31)
CREAT SERPL-MCNC: 1.24 MG/DL — SIGNIFICANT CHANGE UP (ref 0.5–1.3)
CREAT SERPL-MCNC: 1.33 MG/DL — HIGH (ref 0.5–1.3)
CULTURE RESULTS: NO GROWTH — SIGNIFICANT CHANGE UP
GAS PNL BLDA: SIGNIFICANT CHANGE UP
GLUCOSE BLDC GLUCOMTR-MCNC: 180 MG/DL — HIGH (ref 70–99)
GLUCOSE BLDC GLUCOMTR-MCNC: 187 MG/DL — HIGH (ref 70–99)
GLUCOSE BLDC GLUCOMTR-MCNC: 219 MG/DL — HIGH (ref 70–99)
GLUCOSE BLDC GLUCOMTR-MCNC: 231 MG/DL — HIGH (ref 70–99)
GLUCOSE BLDC GLUCOMTR-MCNC: 270 MG/DL — HIGH (ref 70–99)
GLUCOSE BLDC GLUCOMTR-MCNC: 371 MG/DL — HIGH (ref 70–99)
GLUCOSE BLDC GLUCOMTR-MCNC: 392 MG/DL — HIGH (ref 70–99)
GLUCOSE SERPL-MCNC: 163 MG/DL — HIGH (ref 70–99)
GLUCOSE SERPL-MCNC: 224 MG/DL — HIGH (ref 70–99)
GRAM STN FLD: SIGNIFICANT CHANGE UP
HCT VFR BLD CALC: 26.5 % — LOW (ref 39–50)
HCT VFR BLD CALC: 27.7 % — LOW (ref 39–50)
HGB BLD-MCNC: 8.1 G/DL — LOW (ref 13–17)
HGB BLD-MCNC: 8.3 G/DL — LOW (ref 13–17)
MAGNESIUM SERPL-MCNC: 1.7 MG/DL — SIGNIFICANT CHANGE UP (ref 1.6–2.6)
MAGNESIUM SERPL-MCNC: 2.2 MG/DL — SIGNIFICANT CHANGE UP (ref 1.6–2.6)
MCHC RBC-ENTMCNC: 26.6 PG — LOW (ref 27–34)
MCHC RBC-ENTMCNC: 26.9 PG — LOW (ref 27–34)
MCHC RBC-ENTMCNC: 30.1 GM/DL — LOW (ref 32–36)
MCHC RBC-ENTMCNC: 30.6 GM/DL — LOW (ref 32–36)
MCV RBC AUTO: 88.1 FL — SIGNIFICANT CHANGE UP (ref 80–100)
MCV RBC AUTO: 88.4 FL — SIGNIFICANT CHANGE UP (ref 80–100)
METHOD TYPE: SIGNIFICANT CHANGE UP
PHOSPHATE SERPL-MCNC: 2.3 MG/DL — LOW (ref 2.5–4.5)
PHOSPHATE SERPL-MCNC: 3.6 MG/DL — SIGNIFICANT CHANGE UP (ref 2.5–4.5)
PLATELET # BLD AUTO: 683 K/UL — HIGH (ref 150–400)
PLATELET # BLD AUTO: 702 K/UL — HIGH (ref 150–400)
POTASSIUM SERPL-MCNC: 4.5 MMOL/L — SIGNIFICANT CHANGE UP (ref 3.5–5.3)
POTASSIUM SERPL-MCNC: 4.6 MMOL/L — SIGNIFICANT CHANGE UP (ref 3.5–5.3)
POTASSIUM SERPL-SCNC: 4.5 MMOL/L — SIGNIFICANT CHANGE UP (ref 3.5–5.3)
POTASSIUM SERPL-SCNC: 4.6 MMOL/L — SIGNIFICANT CHANGE UP (ref 3.5–5.3)
PROCALCITONIN SERPL-MCNC: 0.09 NG/ML — SIGNIFICANT CHANGE UP (ref 0.02–0.1)
RBC # BLD: 3.01 M/UL — LOW (ref 4.2–5.8)
RBC # BLD: 3.13 M/UL — LOW (ref 4.2–5.8)
RBC # FLD: 13.4 % — SIGNIFICANT CHANGE UP (ref 10.3–14.5)
RBC # FLD: 13.5 % — SIGNIFICANT CHANGE UP (ref 10.3–14.5)
RH IG SCN BLD-IMP: POSITIVE — SIGNIFICANT CHANGE UP
SODIUM SERPL-SCNC: 135 MMOL/L — SIGNIFICANT CHANGE UP (ref 135–145)
SODIUM SERPL-SCNC: 141 MMOL/L — SIGNIFICANT CHANGE UP (ref 135–145)
SPECIMEN SOURCE: SIGNIFICANT CHANGE UP
SPECIMEN SOURCE: SIGNIFICANT CHANGE UP
TROPONIN T, HIGH SENSITIVITY RESULT: 24 NG/L — SIGNIFICANT CHANGE UP (ref 0–51)
TROPONIN T, HIGH SENSITIVITY RESULT: 44 NG/L — SIGNIFICANT CHANGE UP (ref 0–51)
WBC # BLD: 10.3 K/UL — SIGNIFICANT CHANGE UP (ref 3.8–10.5)
WBC # BLD: 11.9 K/UL — HIGH (ref 3.8–10.5)
WBC # FLD AUTO: 10.3 K/UL — SIGNIFICANT CHANGE UP (ref 3.8–10.5)
WBC # FLD AUTO: 11.9 K/UL — HIGH (ref 3.8–10.5)

## 2018-08-27 PROCEDURE — 99233 SBSQ HOSP IP/OBS HIGH 50: CPT

## 2018-08-27 PROCEDURE — 94010 BREATHING CAPACITY TEST: CPT | Mod: 26

## 2018-08-27 PROCEDURE — 93010 ELECTROCARDIOGRAM REPORT: CPT

## 2018-08-27 PROCEDURE — 99291 CRITICAL CARE FIRST HOUR: CPT

## 2018-08-27 RX ORDER — POTASSIUM PHOSPHATE, MONOBASIC POTASSIUM PHOSPHATE, DIBASIC 236; 224 MG/ML; MG/ML
15 INJECTION, SOLUTION INTRAVENOUS ONCE
Qty: 0 | Refills: 0 | Status: COMPLETED | OUTPATIENT
Start: 2018-08-27 | End: 2018-08-27

## 2018-08-27 RX ORDER — MAGNESIUM SULFATE 500 MG/ML
2 VIAL (ML) INJECTION ONCE
Qty: 0 | Refills: 0 | Status: COMPLETED | OUTPATIENT
Start: 2018-08-27 | End: 2018-08-27

## 2018-08-27 RX ORDER — INSULIN GLARGINE 100 [IU]/ML
3 INJECTION, SOLUTION SUBCUTANEOUS AT BEDTIME
Qty: 0 | Refills: 0 | Status: DISCONTINUED | OUTPATIENT
Start: 2018-08-27 | End: 2018-08-28

## 2018-08-27 RX ORDER — ADENOSINE 3 MG/ML
6 INJECTION INTRAVENOUS ONCE
Qty: 0 | Refills: 0 | Status: COMPLETED | OUTPATIENT
Start: 2018-08-27 | End: 2018-08-27

## 2018-08-27 RX ORDER — VANCOMYCIN HCL 1 G
1000 VIAL (EA) INTRAVENOUS ONCE
Qty: 0 | Refills: 0 | Status: COMPLETED | OUTPATIENT
Start: 2018-08-27 | End: 2018-08-27

## 2018-08-27 RX ORDER — IPRATROPIUM BROMIDE 0.2 MG/ML
500 SOLUTION, NON-ORAL INHALATION EVERY 6 HOURS
Qty: 0 | Refills: 0 | Status: DISCONTINUED | OUTPATIENT
Start: 2018-08-27 | End: 2018-08-29

## 2018-08-27 RX ORDER — METOPROLOL TARTRATE 50 MG
25 TABLET ORAL
Qty: 0 | Refills: 0 | Status: DISCONTINUED | OUTPATIENT
Start: 2018-08-27 | End: 2018-08-29

## 2018-08-27 RX ORDER — INSULIN LISPRO 100/ML
2 VIAL (ML) SUBCUTANEOUS
Qty: 0 | Refills: 0 | Status: DISCONTINUED | OUTPATIENT
Start: 2018-08-27 | End: 2018-08-28

## 2018-08-27 RX ORDER — LEVALBUTEROL 1.25 MG/.5ML
0.63 SOLUTION, CONCENTRATE RESPIRATORY (INHALATION) EVERY 6 HOURS
Qty: 0 | Refills: 0 | Status: DISCONTINUED | OUTPATIENT
Start: 2018-08-27 | End: 2018-08-29

## 2018-08-27 RX ADMIN — SODIUM CHLORIDE 50 MILLILITER(S): 9 INJECTION INTRAMUSCULAR; INTRAVENOUS; SUBCUTANEOUS at 19:22

## 2018-08-27 RX ADMIN — RIVAROXABAN 15 MILLIGRAM(S): KIT at 22:02

## 2018-08-27 RX ADMIN — ATORVASTATIN CALCIUM 40 MILLIGRAM(S): 80 TABLET, FILM COATED ORAL at 12:01

## 2018-08-27 RX ADMIN — Medication 1: at 07:57

## 2018-08-27 RX ADMIN — Medication 3: at 22:02

## 2018-08-27 RX ADMIN — SODIUM CHLORIDE 50 MILLILITER(S): 9 INJECTION INTRAMUSCULAR; INTRAVENOUS; SUBCUTANEOUS at 00:02

## 2018-08-27 RX ADMIN — ADENOSINE 6 MILLIGRAM(S): 3 INJECTION INTRAVENOUS at 06:06

## 2018-08-27 RX ADMIN — PANTOPRAZOLE SODIUM 40 MILLIGRAM(S): 20 TABLET, DELAYED RELEASE ORAL at 00:02

## 2018-08-27 RX ADMIN — Medication 5: at 17:19

## 2018-08-27 RX ADMIN — Medication 3: at 12:00

## 2018-08-27 RX ADMIN — CEFTRIAXONE 100 GRAM(S): 500 INJECTION, POWDER, FOR SOLUTION INTRAMUSCULAR; INTRAVENOUS at 23:24

## 2018-08-27 RX ADMIN — LAMOTRIGINE 25 MILLIGRAM(S): 25 TABLET, ORALLY DISINTEGRATING ORAL at 22:02

## 2018-08-27 RX ADMIN — Medication 180 MILLIGRAM(S): at 05:04

## 2018-08-27 RX ADMIN — Medication 25 MILLIGRAM(S): at 17:21

## 2018-08-27 RX ADMIN — Medication 250 MILLIGRAM(S): at 05:30

## 2018-08-27 RX ADMIN — INSULIN GLARGINE 3 UNIT(S): 100 INJECTION, SOLUTION SUBCUTANEOUS at 22:02

## 2018-08-27 RX ADMIN — MIRTAZAPINE 15 MILLIGRAM(S): 45 TABLET, ORALLY DISINTEGRATING ORAL at 22:02

## 2018-08-27 RX ADMIN — Medication 1 SPRAY(S): at 05:32

## 2018-08-27 RX ADMIN — Medication 40 MILLIGRAM(S): at 05:04

## 2018-08-27 RX ADMIN — AZITHROMYCIN 500 MILLIGRAM(S): 500 TABLET, FILM COATED ORAL at 22:02

## 2018-08-27 RX ADMIN — Medication 25 MILLIGRAM(S): at 07:05

## 2018-08-27 RX ADMIN — Medication 50 GRAM(S): at 01:13

## 2018-08-27 RX ADMIN — Medication 2 UNIT(S): at 17:19

## 2018-08-27 RX ADMIN — Medication 0.5 MILLIGRAM(S): at 17:21

## 2018-08-27 RX ADMIN — SODIUM CHLORIDE 500 MILLILITER(S): 9 INJECTION INTRAMUSCULAR; INTRAVENOUS; SUBCUTANEOUS at 00:03

## 2018-08-27 RX ADMIN — Medication 1 SPRAY(S): at 17:22

## 2018-08-27 RX ADMIN — PANTOPRAZOLE SODIUM 40 MILLIGRAM(S): 20 TABLET, DELAYED RELEASE ORAL at 05:31

## 2018-08-27 RX ADMIN — POTASSIUM PHOSPHATE, MONOBASIC POTASSIUM PHOSPHATE, DIBASIC 62.5 MILLIMOLE(S): 236; 224 INJECTION, SOLUTION INTRAVENOUS at 01:13

## 2018-08-27 RX ADMIN — Medication 0.5 MILLIGRAM(S): at 05:31

## 2018-08-27 NOTE — CHART NOTE - NSCHARTNOTEFT_GEN_A_CORE
RRT Called 05:58 again for SVT in Mr. Mcdaniels who is a 89M DM2 on PO meds, COPD on home 02, afib on xarelto who is admitted with parainfluenza infection. Again, RRT called for SVT with rate in 160s. Patient afebrile, with SBP in 100s and at baseline mental status and oxygen saturation 91%. Attempted vagal maneuver: coughing followed by leg raise x 2 which did not result in cessation of rhythm. Adenosine 6 mg x 1 given with break of SVT to sinus rhythm with PACs in 100s which appears to be his baseline HR this admission. Patient reported significant relief after adenosine and conversion to sinus rhythm. Prior to RRT 500cc bolus started and labs sent with troponin and electrolytes. IV Mg given by primary team tonight and troponin of 24 should be trended.     #SVT  -check electrolytes and troponin; replete as necessary and trend troponin  -continue to treat underlying infection with supportive care given viral infection. Patient also on ceftriaxone and azithromycin  -consider change albuterol to xopenex     Tessa Isaacs, PGY 3  Internal Medicine  Pager [phone] | [phone]. RRT Called 05:58 again for SVT in Mr. Mcdaniels who is a 89M DM2 on PO meds, COPD on home 02, afib on xarelto who is admitted with parainfluenza infection. Again, RRT called for SVT with rate in 160s. Patient afebrile, with SBP in 100s and at baseline mental status and oxygen saturation 91%. Nasal cannula placed for comfort although patient with COPD and should be on O2 per documentation. Attempted vagal maneuver again: coughing followed by leg raise x 2 which did not result in cessation of rhythm. Adenosine 6 mg x 1 given with break of SVT to sinus rhythm with PACs in 100s again. SBP improved to 110s and patient reported relief again.     #SVT  -mg and phos repleted by primary team  -vancomycin added for bcx positive for coag neg staph; could be contaminant. Consider repeating.   -cardiology called recommending addition of lopressor  -continue to treat underlying infection  -f/u additional cardiology recs given rise in troponin.       Tessa Isaacs, PGY 3  Internal Medicine  Pager [phone] | [phone].

## 2018-08-27 NOTE — CHART NOTE - NSCHARTNOTEFT_GEN_A_CORE
RRT Called 00:19 for SVT in Mr. Mcdaniels who is a 89M DM2 on PO meds, COPD has home 02, afib on xarelto who is admitted with parainfluenza infection. RRT called for SVT with rate in 170s. Patient afebrile, with SBP in 100s and at baseline mental status and oxygen saturation 99%. Attempted vagal maneuver: coughing followed by leg raise x 2 which did not result in cessation of rhythm. Adenosine 6 mg x 1 given with break of SVT to sinus rhythm with PACs in 100s which appears to be his baseline HR this admission. Patient reported significant relief after adenosine and conversion to sinus rhythm. Prior to RRT 500cc bolus started and labs sent with troponin and electrolytes. IV Mg given by primary team tonight and troponin of 24 should be trended.     #SVT  -check electrolytes and troponin; replete as necessary and trend troponin  -continue to treat underlying infection with supportive care given viral infection. Patient also on ceftriaxone and azithromycin  -consider change albuterol to xopenex     Tessa Isaacs, PGY 3  Internal Medicine  Pager [phone] | [phone]

## 2018-08-27 NOTE — CONSULT NOTE ADULT - ASSESSMENT
89 y/o Type 2 DM, COPD recent PNA admitted with hypoglycemia.    Patient is 89 y/o usually seen with admissions for COPD exacerbations, D/Avelino on steroid taper. For that usual D/Avelino on Metformin and glimepiride recently 2,000 mg / 1mg..   Daughter says FS at home are low set at am , no PM FS. Over the past 2 weeks he was treated for possible PNA at home. He was on antibiotics, but no steroids. He was not checking FS during this time, and had poor appetite and did not eat much.  On the day of admission he was noted with blurred vision and confusion, FS on admission was 25 mg/dL.   Antibiotics used was Levaquin, which has a risk of hypoglycemia when combined with sulfonylureas.  Here patient with improved PO intake, hypoglycemia resolved. FS trending high, low dose basal / bolus insulin started.    Hyperthyroidism: patient on tapazol 5 mg daily. Had an episode of AF during admission, claims he is losing weight at home.    DM:  Poor PO intake during recent PNA, interaction between glimepiride and Levaquin used, could have been the last straw effect. Overall control not clear: HbA1c is low set, but patient is anemic, FS at home are only done at am not PM.  Patient on prednisone 40 mg daily here, not as hyperglycemic as can be expected. I therefore agree with Lantus 3 units at HS and Humalog 2 units + low scale before meals.  Upon D/C if off steroids suggest:  FS BID.  Metformin 1,000 mg BID.  Change glimepiride 1 mg daily to Januvia 50 mg daily at am.  If FS controlled on that, in 1-2 weeks can reduce metformin to 1,000 mg daily at PM, as per patient's age.    Hyperthyroidism:  Cause ? neck CT 1-2 years ago only noted a right thyroid nodule. Did not have US or scan.  On tapazol 5 mg daily. Asymptomatic, but claims he is losing weight, and had rapid AF this admission.  Plan: check TFT's, to adjust tapazol, Check TSI level. Consider w/u for underlying cause for possible definitive treatment, althought it will require a few days.
89 year old male with PAfib, COPD, DM, Goiter admitted with parainfluenza viral infection, noted to also go into SVT.     1. SVT   likely in the setting of acute respiratory infection   no evidence of decomp CHF on exam   events noted, no chest pain, sob on evidence of ACS   metoprolol 25 mg BID started today, uptitrate as needed   check echo to eval lv fx    2. PAfib, chronic  currently rate controlled, events noted as mentioned above   continue with diltiazem   , BB started today   CHADS 2. A/c continue with rivaroxaban     3. parainfluenza   CT chest noted   + BC   on IV abx   ID/ pulm f/u
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 Plan of care discussed with the patient at bedside and the floor RN.    Scott Cormier MD, [facility] - [phone]  Pulmonary Medicine

## 2018-08-27 NOTE — CONSULT NOTE ADULT - ATTENDING COMMENTS
Agree with above NP note.  PSVT in setting of acute viral infection/bacteremia  cont ccb and bb as ordered  await echo   cont tele  cont a/c for paf

## 2018-08-27 NOTE — CHART NOTE - NSCHARTNOTEFT_GEN_A_CORE
See RRT sheet for detailed assessment and plan. I personally provided 30 minutes for critical care time for SVT evaluated with exam , bedside monitor ,  treated with IV adenosine x 2 . Case discussed with primary team  and family will be notified .

## 2018-08-27 NOTE — PROGRESS NOTE ADULT - SUBJECTIVE AND OBJECTIVE BOX
[facility] - [facility]     PROGRESS NOTE    CHIEF COMPLAINT:    INTERVAL HISTORY:     REVIEW OF SYSTEMS:  Constitutional: As per interval history  HEENT: Within normal limits  CV: As per interval history  Resp: As per interval history  GI: Within normal limits   : Within normal limits  Musculoskeletal: Within normal limits  Skin: Within normal limits  Neurological: Within normal limits  Psychiatric: Within normal limits  Endocrine: Within normal limits  Hematologic/Lymphatic: Within normal limits  Allergic/Immunologic: Within normal limits    [ ] Unable to assess ROS because     MEDICATIONS:     Pulmonary "  buDESOnide   0.5 milliGRAM(s) Respule 0.5 milliGRAM(s) Inhalation every 12 hours  levalbuterol Inhalation 0.63 milliGRAM(s) Inhalation every 6 hours PRN      Anti-microbials:  azithromycin   Tablet 500 milliGRAM(s) Oral every 24 hours  cefTRIAXone   IVPB 1 Gram(s) IV Intermittent every 24 hours      Cardiovascular:  diltiazem    milliGRAM(s) Oral daily  metoprolol tartrate 25 milliGRAM(s) Oral two times a day      Other:  ALPRAZolam 0.25 milliGRAM(s) Oral daily PRN  atorvastatin 40 milliGRAM(s) Oral daily  dextrose 40% Gel 15 Gram(s) Oral once PRN  dextrose 5%. 1000 milliLiter(s) IV Continuous <Continuous>  dextrose 50% Injectable 12.5 Gram(s) IV Push once  dextrose 50% Injectable 25 Gram(s) IV Push once  fluticasone propionate 50 MICROgram(s)/spray Nasal Spray 1 Spray(s) Both Nostrils two times a day  insulin lispro (HumaLOG) corrective regimen sliding scale   SubCutaneous three times a day before meals  insulin lispro (HumaLOG) corrective regimen sliding scale   SubCutaneous at bedtime  lamoTRIgine 25 milliGRAM(s) Oral at bedtime  methimazole 5 milliGRAM(s) Oral daily  mirtazapine 15 milliGRAM(s) Oral at bedtime  pantoprazole    Tablet 40 milliGRAM(s) Oral before breakfast  polyethylene glycol 3350 17 Gram(s) Oral daily PRN  predniSONE   Tablet 40 milliGRAM(s) Oral daily  rivaroxaban 15 milliGRAM(s) Oral at bedtime  sodium chloride 0.9%. 1000 milliLiter(s) IV Continuous <Continuous>        OBJECTIVE:        I&O's Detail    26 Aug 2018 07:01  -  27 Aug 2018 07:00  --------------------------------------------------------  IN:    Oral Fluid: 420 mL    sodium chloride 0.9%.: 600 mL    Solution: 50 mL    Solution: 250 mL    Solution: 50 mL  Total IN: 1370 mL    OUT:    Voided: 400 mL  Total OUT: 400 mL    Total NET: 970 mL          Daily     Daily     CAPILLARY BLOOD GLUCOSE      POCT Blood Glucose.: 187 mg/dL (27 Aug 2018 07:26)  POCT Blood Glucose.: 180 mg/dL (27 Aug 2018 06:05)  POCT Blood Glucose.: 231 mg/dL (27 Aug 2018 00:25)  POCT Blood Glucose.: 219 mg/dL (27 Aug 2018 00:11)  POCT Blood Glucose.: 195 mg/dL (26 Aug 2018 21:10)  POCT Blood Glucose.: 250 mg/dL (26 Aug 2018 17:41)  POCT Blood Glucose.: 255 mg/dL (26 Aug 2018 10:12)      PHYSICAL EXAM:       ICU Vital Signs Last 24 Hrs  T(C): 36.7 (27 Aug 2018 05:24), Max: 37 (26 Aug 2018 11:20)  T(F): 98 (27 Aug 2018 05:24), Max: 98.6 (26 Aug 2018 11:20)  HR: 97 (27 Aug 2018 07:05) (97 - 168)  BP: 105/63 (27 Aug 2018 07:05) (94/58 - 141/61)  BP(mean): --  ABP: --  ABP(mean): --  RR: 18 (27 Aug 2018 07:05) (16 - 20)  SpO2: 97% (27 Aug 2018 07:05) (93% - 100%)     General: Awake. Alert. Cooperative. No distress. Appears stated age.	  HEENT:   Atraumatic. Normocephalic. Anicteric. Normal oral mucosa. PERRL. EOMI.  Neck: Supple. Trachea midline. Thyroid without enlargement/tenderness/nodules. No carotid bruit. No JVD.	  Cardiovascular: Regular rate and rhythm. S1 S2 normal. No murmurs, rubs or gallops.  Respiratory: Respirations unlabored. Clear to auscultation and percussion bilaterally. No curvature.  Abdomen: Soft. Non-tender. Non-distended. No organomegaly. No masses. Normal bowel sounds.  Extremities: Warm to touch. No clubbing or cyanosis. No pedal edema.  Pulses: 2+ peripheral pulses all extremities.	  Skin: Normal skin color. No rashes or lesions. No ecchymoses. No cyanosis. Warm to touch.  Lymph Nodes: Cervical, supraclavicular and axillary nodes normal  Neurological: Motor and sensory examination equal and normal. A and O x 3  Psychiatry: Appropriate mood and affect.    LABS:                        8.1    11.9  )-----------( 683      ( 27 Aug 2018 05:56 )             26.5                         8.3    10.3  )-----------( 702      ( 27 Aug 2018 00:14 )             27.7         141  |  106  |  30<H>  ----------------------------<  163<H>  4.6   |  22  |  1.24      135  |  100  |  30<H>  ----------------------------<  224<H>  4.5   |  22  |  1.33<H>    Ca      8.9          Ca      9.6          Phos    3.6         Phos    2.3           Mg       2.2         Mg       1.7         TPro  8.2  /  Alb  4.3  /  TBili  0.2  /  DBili  x   /  AST  19  /  ALT  9<L>  /  AlkPhos  98      PT/INR - ( 26 Aug 2018 08:24 )   PT: 25.0 sec;   INR: 2.18 ratio           Venous Blood Gas:   @ 22:42  7.29/55/24//34  VBG Lactate: 4.0  Venous Blood Gas:   @ 20:06  7.29/56//  VBG Lactate: 5.3    ABG - ( 27 Aug 2018 00:30 )  pH: 7.42  /  pCO2: 35    /  pO2: 87    / HCO3: 22    / Base Excess: -1.6  /  SaO2: 96              ABG - ( 26 Aug 2018 20:28 )  pH: 7.41  /  pCO2: 39    /  pO2: 74    / HCO3: 24    / Base Excess: .0    /  SaO2: 96                Procalcitonin, Serum: 0.09 ng/mL ( @ 00:14)    Procalcitonin, Serum: 0.09 ng/mL ( @ 05:50)    CARDIAC MARKERS ( 27 Aug 2018 05:56 )  x     / x     / 64 U/L / x     / 3.7 ng/mL  CARDIAC MARKERS ( 27 Aug 2018 00:14 )  x     / x     / 75 U/L / x     / 3.9 ng/mL    Troponin T, High Sensitivity (18 @ 00:14)    Troponin T, High Sensitivity Result: 24: Rapid upward or downward changes in high-sensitivity troponin levels  suggest acute myocardial injury. Renal impairment may cause sustained  troponin elevations.  Normal: <6 - 14 ng/L  Indeterminate: 15-51 ng/L  Elevated: > 51 ng/L  See [email or web address] on the [employer] for more  information ng/L    Troponin T, High Sensitivity (18 @ 05:56)    Troponin T, High Sensitivity Result: 44: Rapid upward or downward changes in high-sensitivity troponin levels  suggest acute myocardial injury. Renal impairment may cause sustained  troponin elevations.  Normal: <6 - 14 ng/L  Indeterminate: 15-51 ng/L  Elevated: > 51 ng/L  See [email or web address] on the [employer] for more  information ng/L    MICROBIOLOGY:   Urinalysis Basic - ( 26 Aug 2018 02:52 )    Color: Yellow / Appearance: Clear / S.017 / pH: x  Gluc: x / Ketone: Negative  / Bili: Negative / Urobili: Negative   Blood: x / Protein: 30 mg/dL / Nitrite: Negative   Leuk Esterase: Small / RBC: 0-2 /HPF / WBC 10-25 /HPF   Sq Epi: x / Non Sq Epi: Occasional /HPF / Bacteria: x        RADIOLOGY:  [x] Chest radiographs reviewed and interpreted by me    SPIROMETRY:    FEV1 0.81 liters - 30% predicted  FVC 1.88 liters - 48% predicted  FEV1 43    c/w severe obstructive lung disease [facility] - [facility]     PROGRESS NOTE    CHIEF COMPLAINT: parainfluenza viral infection; COPD exacerbation; pneumonia; emphysema; lung nodule    INTERVAL HISTORY: ~ 1 hour of PAT over night without hemodynamic instability, chest pain/pressure/palpitations, lightheadedness or dizziness; resolved fatigue, malaise and weakness; sitting in the chair and eating breakfast; no fevers, chills or sweats; no cough, sputum production, chest congestion or wheeze;     REVIEW OF SYSTEMS:  Constitutional: As per interval history  HEENT: Within normal limits  CV: As per interval history  Resp: As per interval history  GI: Within normal limits   : Within normal limits  Musculoskeletal: Within normal limits  Skin: Within normal limits  Neurological: poor memory  Psychiatric: Within normal limits  Endocrine: Within normal limits  Hematologic/Lymphatic: Within normal limits  Allergic/Immunologic: Within normal limits    MEDICATIONS:     Pulmonary "  buDESOnide   0.5 milliGRAM(s) Respule 0.5 milliGRAM(s) Inhalation every 12 hours  levalbuterol Inhalation 0.63 milliGRAM(s) Inhalation every 6 hours PRN      Anti-microbials:  azithromycin   Tablet 500 milliGRAM(s) Oral every 24 hours  cefTRIAXone   IVPB 1 Gram(s) IV Intermittent every 24 hours      Cardiovascular:  diltiazem    milliGRAM(s) Oral daily  metoprolol tartrate 25 milliGRAM(s) Oral two times a day      Other:  ALPRAZolam 0.25 milliGRAM(s) Oral daily PRN  atorvastatin 40 milliGRAM(s) Oral daily  dextrose 40% Gel 15 Gram(s) Oral once PRN  dextrose 5%. 1000 milliLiter(s) IV Continuous <Continuous>  dextrose 50% Injectable 12.5 Gram(s) IV Push once  dextrose 50% Injectable 25 Gram(s) IV Push once  fluticasone propionate 50 MICROgram(s)/spray Nasal Spray 1 Spray(s) Both Nostrils two times a day  insulin lispro (HumaLOG) corrective regimen sliding scale   SubCutaneous three times a day before meals  insulin lispro (HumaLOG) corrective regimen sliding scale   SubCutaneous at bedtime  lamoTRIgine 25 milliGRAM(s) Oral at bedtime  methimazole 5 milliGRAM(s) Oral daily  mirtazapine 15 milliGRAM(s) Oral at bedtime  pantoprazole    Tablet 40 milliGRAM(s) Oral before breakfast  polyethylene glycol 3350 17 Gram(s) Oral daily PRN  predniSONE   Tablet 40 milliGRAM(s) Oral daily  rivaroxaban 15 milliGRAM(s) Oral at bedtime  sodium chloride 0.9%. 1000 milliLiter(s) IV Continuous <Continuous>        OBJECTIVE:    I&O's Detail    26 Aug 2018 07:01  -  27 Aug 2018 07:00  --------------------------------------------------------  IN:    Oral Fluid: 420 mL    sodium chloride 0.9%.: 600 mL    Solution: 50 mL    Solution: 250 mL    Solution: 50 mL  Total IN: 1370 mL    OUT:    Voided: 400 mL  Total OUT: 400 mL    Total NET: 970 mL    POCT Blood Glucose.: 187 mg/dL (27 Aug 2018 07:26)  POCT Blood Glucose.: 180 mg/dL (27 Aug 2018 06:05)  POCT Blood Glucose.: 231 mg/dL (27 Aug 2018 00:25)  POCT Blood Glucose.: 219 mg/dL (27 Aug 2018 00:11)  POCT Blood Glucose.: 195 mg/dL (26 Aug 2018 21:10)  POCT Blood Glucose.: 250 mg/dL (26 Aug 2018 17:41)  POCT Blood Glucose.: 255 mg/dL (26 Aug 2018 10:12)      PHYSICAL EXAM:       ICU Vital Signs Last 24 Hrs  T(C): 36.7 (27 Aug 2018 05:24), Max: 37 (26 Aug 2018 11:20)  T(F): 98 (27 Aug 2018 05:24), Max: 98.6 (26 Aug 2018 11:20)  HR: 97 (27 Aug 2018 07:05) (97 - 168)  BP: 105/63 (27 Aug 2018 07:05) (94/58 - 141/61)  BP(mean): --  ABP: --  ABP(mean): --  RR: 18 (27 Aug 2018 07:05) (16 - 20)  SpO2: 97% (27 Aug 2018 07:05) (93% - 100%) on room air     General: Awake. Alert. Cooperative. No distress. Appears stated age 	  HEENT:   Atraumatic. Bitemporal wasting. Anicteric. Normal oral mucosa. PERRL. EOMI.  Neck: Supple. Trachea midline. Thyroid without enlargement/tenderness/nodules. No carotid bruit. No JVD. Loss of bilateral supraclavicular fat pads  Cardiovascular: Regular rate and rhythm. S1 S2 normal. No murmurs, rubs or gallops.  Respiratory: Respirations unlabored. Clear to auscultation and percussion. Kyphosis.  Abdomen: Soft. Non-tender. Non-distended. No organomegaly. No masses. Normal bowel sounds. Small reducible right groin hernia  Extremities: Warm to touch. No clubbing or cyanosis. No pedal edema.  Pulses: 2+ peripheral pulses all extremities.	  Skin: Multiple seborrheic kearatoses  Lymph Nodes: Cervical, supraclavicular and axillary nodes normal  Neurological: Motor and sensory examination equal and normal. A and O x 3  Psychiatry: Appropriate mood and affect.    LABS:                        8.1    11.9  )-----------( 683      ( 27 Aug 2018 05:56 )             26.5                         8.3    10.3  )-----------( 702      ( 27 Aug 2018 00:14 )             27.7         141  |  106  |  30<H>  ----------------------------<  163<H>  4.6   |  22  |  1.24        135  |  100  |  30<H>  ----------------------------<  224<H>  4.5   |  22  |  1.33<H>    Ca      8.9          Ca      9.6          Phos    3.6         Phos    2.3           Mg       2.2         Mg       1.7         TPro  8.2  /  Alb  4.3  /  TBili  0.2  /  DBili  x   /  AST  19  /  ALT  9<L>  /  AlkPhos  98      PT/INR - ( 26 Aug 2018 08:24 )   PT: 25.0 sec;   INR: 2.18 ratio      Venous Blood Gas:   @ 22:42  7.29/55/24//34  VBG Lactate: 4.0    Venous Blood Gas:   @ 20:06  7.29/56/28//42  VBG Lactate: 5.3    ABG - ( 27 Aug 2018 00:30 )  pH: 7.42  /  pCO2: 35    /  pO2: 87    / HCO3: 22    / Base Excess: -1.6  /  SaO2: 96        ABG - ( 26 Aug 2018 20:28 )  pH: 7.41  /  pCO2: 39    /  pO2: 74    / HCO3: 24    / Base Excess: .0    /  SaO2: 96        Procalcitonin, Serum: 0.09 ng/mL ( @ 00:14)    Procalcitonin, Serum: 0.09 ng/mL ( @ 05:50)    CARDIAC MARKERS ( 27 Aug 2018 05:56 )  x     / x     / 64 U/L / x     / 3.7 ng/mL  CARDIAC MARKERS ( 27 Aug 2018 00:14 )  x     / x     / 75 U/L / x     / 3.9 ng/mL    Troponin T, High Sensitivity (18 @ 00:14)    Troponin T, High Sensitivity Result: 24: Rapid upward or downward changes in high-sensitivity troponin levels  suggest acute myocardial injury. Renal impairment may cause sustained  troponin elevations.  Normal: <6 - 14 ng/L  Indeterminate: 15-51 ng/L  Elevated: > 51 ng/L  See [email or web address] on the [employer] for more  information ng/L    Troponin T, High Sensitivity (18 @ 05:56)    Troponin T, High Sensitivity Result: 44: Rapid upward or downward changes in high-sensitivity troponin levels  suggest acute myocardial injury. Renal impairment may cause sustained  troponin elevations.  Normal: <6 - 14 ng/L  Indeterminate: 15-51 ng/L  Elevated: > 51 ng/L  See [email or web address] on [employer] [facility] [email or web address] for more  information ng/L    < from: Transthoracic Echocardiogram (17 @ 15:01) >    Patient name: BAHSIR BECKER  YOB: 1928   Age: 89 (M)   MR#: [id number]  Study Date: 2017  Location: [address] [place], [place] [id number]: Laly Portillo RDCS  Study quality: Technically difficult  Referring Physician: Delilah Katz MD  Blood Pressure: 124/54 mmHg  Height: 183 cm  Weight: 70 kg  BSA: 1.9 m2  ------------------------------------------------------------------------  PROCEDURE: Transthoracic echocardiogram with 2-D, M-Mode  and complete spectral and color flow Doppler.  INDICATION: Unspecified atrial fibrillation (I48.91)  ------------------------------------------------------------------------  Dimensions:    Normal Values:  LA:     3.9    2.0 - 4.0 cm  Ao:     3.6    2.0 - 3.8 cm  SEPTUM: 1.1    0.6 - 1.2 cm  PWT:    1.0   0.6 - 1.1 cm  LVIDd:  4.7    3.0 - 5.6 cm  LVIDs:  3.1    1.8 - 4.0 cm  Derived variables:  LVMI: 93 g/m2  RWT: 0.42  Fractional short: 34 %  EF (Teicholtz): 75 %  Doppler Peak Velocity (m/sec): AoV=2.0  ------------------------------------------------------------------------  Observations:  Mitral Valve: Mitral annular calcification and calcified  mitral leaflets with normal diastolic opening.  Mild-moderate mitral regurgitation.  Aortic Valve/Aorta: Calcified trileaflet aortic valve with  normal opening. Peak transaortic valve gradient equals 17  mm Hg, mean transaortic valve gradient equals 9 mm Hg,  estimated aortic valve area equals 1.6 sqcm (by continuity  equation), aortic valve velocity time integral equals 40  cm, consistent with mild aortic stenosis. Peak left  ventricular outflow tract gradient equals 4 mm Hg, mean  gradient is equal to 2 mm Hg, LVOT velocity time integral  equals 21 cm.  Aortic Root: 3.6 cm.  Left Atrium: Mildly dilated left atrium.  LA volume index =  36 cc/m2.  Left Ventricle: Hyperdynamic left ventricular systolic  function. Increased relative wall thickness with normal  left ventricular mass index, consistent with concentric  left ventricular remodeling. Indeterminate diastolic  function.  Right Heart: Normal right atrium. Normal right ventricular  size and function. Normal tricuspid valve. Mild tricuspid  regurgitation. Normal pulmonic valve.  Pericardium/Pleura: Normal pericardium with trace  pericardial effusion.  Hemodynamic: Estimated rightatrial pressure is 8 mm Hg.  Estimated right ventricular systolic pressure equals 38 mm  Hg, assuming right atrial pressure equals 8 mm Hg,  consistent with borderline pulmonary hypertension.  ------------------------------------------------------------------------  Conclusions:  1. Mitral annular calcification and calcified mitral  leaflets with normal diastolic opening.  2. Calcified trileaflet aortic valve with normal opening.  Peak transaortic valve gradient equals 17 mm Hg, mean  transaorticvalve gradient equals 9 mm Hg, estimated aortic  valve area equals 1.6 sqcm (by continuity equation), aortic  valve velocity time integral equals 40 cm, consistent with  mild aortic stenosis.  3. Mildly dilated left atrium.  LA volume index = 36 cc/m2.  4. Increased relative wall thickness with normal left  ventricular mass index, consistent with concentric left  ventricular remodeling.  5. Hyperdynamic left ventricular systolic function.  6. Normal right ventricular size and function.  7. Estimated right ventricular systolic pressure equals 38  mm Hg, assuming right atrial pressure equals 8 mm Hg,  consistent with borderline pulmonary hypertension.  ------------------------------------------------------------------------  Confirmed on  2017 - 16:56:34 by Gunjan Perez M.D.  ------------------------------------------------------------------------    < end of copied text >  ---------------------------------------------------------------------------------------------------------    MICROBIOLOGY:   Urinalysis Basic - ( 26 Aug 2018 02:52 )    Color: Yellow / Appearance: Clear / S.017 / pH: x  Gluc: x / Ketone: Negative  / Bili: Negative / Urobili: Negative   Blood: x / Protein: 30 mg/dL / Nitrite: Negative   Leuk Esterase: Small / RBC: 0-2 /HPF / WBC 10-25 /HPF   Sq Epi: x / Non Sq Epi: Occasional /HPF / Bacteria: x    Culture - Urine (18 @ 05:32)    Specimen Source: .Urine Clean Catch (Midstream)    Culture Results:   No growth    Culture - Blood in AM (18 @ 08:24)    -  Coagulase negative Staphylococcus: Detec    Gram Stain:   Growth in aerobic bottle: Gram Positive Cocci in Clusters    Specimen Source: .Blood Blood-Peripheral    Organism: Blood Culture PCR    Culture Results:   Growth in aerobic bottle: Gram Positive Cocci in Clusters  "Due to technical problems, Proteus sp. will Not be reported as part of  the BCID panel until further notice"  ***Blood Panel PCR results on this specimen are available  approximately 3 hours after the Gram stain result.***  Gram stain, PCR, and/or culture results may not always  correspond due to difference in methodologies.  ************************************************************    Rapid Respiratory Viral Panel (18 @ 23:39)    Rapid RVP Result: Detected: The [employer] RVP Rapid uses polymerase chain reaction (PCR) and melt  curve analysis to screen for adenovirus; coronavirus HKU1, NL63, 229E,  OC43; human metapneumovirus (hMPV); human enterovirus/rhinovirus  (Entero/RV); influenza A; influenza A/H1;influenza A/H3; influenza  A/H1-2009; influenza B; parainfluenza viruses 1, 2, 3, 4; respiratory  syncytial virus; Bordetella pertussis; Mycoplasma pneumoniae; and  Chlamydophila pneumoniae.    Parainfluenza 4 (RapRVP): Detected    RADIOLOGY:  [x] Chest radiographs reviewed and interpreted by me    < from: Xray Chest 1 View- PORTABLE-Urgent (18 @ 20:11) >    EXAM:  XR CHEST PORTABLE URGENT 1V                          PROCEDURE DATE:  2018      INTERPRETATION:  EXAMINATION: XR CHEST PORTABLE URGENT 1V    CLINICAL INDICATION: cough, hypoglycemia    TECHNIQUE: Single portable view of the chest was obtained.    COMPARISON: Chest radiograph from 2018 and CT chest from 2018.    FINDINGS:     The heart is normal in size. Aorta is atherosclerotic. There is   emphysema. There is stable right pleural thickening with calcifications.   There are no focal pulmonary consolidation.    IMPRESSION:   No focal consolidation.    MARY LOU KHAN M.D., RADIOLOGY RESIDENT  This document has been electronically signed.  POOJA MARTINEZ M.D., ATTENDING RADIOLOGIST  This document has been electronically signed. Aug 26 2018  3:52PM      < end of copied text >  ---------------------------------------------------------------------------------------------------------  < from: CT Chest No Cont (18 @ 23:46) >    EXAM:  CT CHEST                          PROCEDURE DATE:  2018      INTERPRETATION:  CLINICAL INFORMATION: COPD, hypoglycemia, evaluate for   pneumonia.    COMPARISON: CT chest 2018. CT 2018. CT 2016.    PROCEDURE:   CT of the Chest was performed without intravenous contrast.  Sagittal and coronal reformats were performed.      FINDINGS:    CHEST:     LUNGS AND LARGE AIRWAYS: Patent central airways.  Emphysema. Bibasilar   subsegmental atelectasis. A 5 mm right lower lobe nodule (2:60). Tiny   tree-in-bud opacities visualized peripherally at the superior segment of   the right lower lobe posteriorly. Few tiny tree-in-bud opacities   visualized at the periphery of the left lower lobe.  PLEURA: Calcified right pleural plaques.  VESSELS: Atherosclerotic changes aorta and branches.  HEART: Heart size is normal. Trace pericardial effusion. Coronary   calcifications.  MEDIASTINUM AND ALFRED: Stable subcentimeter mediastinal lymph nodes.  CHEST WALL AND LOWER NECK: Unchanged hypodense right thyroid nodule.  VISUALIZED UPPER ABDOMEN: Cholelithiasis.  BONES: Generative changes. Redemonstrated L1 compression deformity.    IMPRESSION:   Tree-in-bud opacities at both lower lobes peripherally indicative of   mucoid impacted distal airways. Infection cannot be excluded. Recommend   follow-up.    Emphysema.    MARIELOS MINOR M.D., RADIOLOGY RESIDENT  This document has been electronically signed.  LORENZO MAYES M.D., ATTENDING RADIOLOGIST  This document has been electronically signed. Aug 26 2018 12:07PM    < end of copied text >  ---------------------------------------------------------------------------------------------------------    SPIROMETRY:    FEV1 0.81 liters - 30% predicted  FVC 1.88 liters - 48% predicted  FEV1 43    c/w severe obstructive lung disease

## 2018-08-27 NOTE — CONSULT NOTE ADULT - SUBJECTIVE AND OBJECTIVE BOX
HPI:  89M DM2 on PO meds, COPD has home 02, afib on xarelto p/w weakness. As per pt, daughter and HHA, roughly 4-5 days ago pt started to have URI, symptoms, productive cough and weakness. He went to see his PMD who diagnosed with pneumonia and started him on PO antibiotics. They are unsure which antibiotics he was prescribed. Pt initially seemed to be getting better but over the last 2-3 days has had significant weakness and decreased appetite. Pt's daughter thinks last night he may have had some mild hallucinations. Pt denies any fevers, chills, dysuria, diarrhea or abdominal pain. He does endorse a new hernia which he is concerned about. Pt also endorses some increased SOB and wheezing over the past several days. As per daughter, he has been using his home oxygen which he does not use regularly. Also noted fingerstick in 20s at home this afternoon.     In ER: Given Ceftriaxone 1mgIV, azithromycin 500mg, NS 1L, Nebs, D50IV, prednisone 50mg (25 Aug 2018 22:07)      Admit Diagnosis  Hypoglycemia      ENDOCRINE HPI: 89 y/o Type 2 DM, COPD recent PNA admitted with hypoglycemia.    Patient is 89 y/o usually seen with admissions for COPD exacerbations, D/Avelino on steroid taper. For that usual D/Avelino on Metformin and glimepiride recently 2,000 mg / 1mg..   Daughter says FS at home are low set at am , no PM FS. Over the past 2 weeks he was treated for possible PNA at home. He was on antibiotics, but no steroids. He was not checking FS during this time, and had poor appetite and did not eat much.  On the day of admission he was noted with blurred vision and confusion, FS on admission was 25 mg/dL.   Antibiotics used was Levaquin, which has a risk of hypoglycemia when combined with sulfonylureas.  Here patient with improved PO intake, hypoglycemia resolved. FS trending high, low dose basal / bolus insulin started.    Hyperthyroidism: patient on tapazol 5 mg daily. Had an episode of AF during admission, claims he is losing weight at home.    PAST MEDICAL & SURGICAL HISTORY:  Goiter  Diabetes type 2, controlled  COPD (chronic obstructive pulmonary disease)  Atrial fibrillation  History of total hip arthroplasty, right      FAMILY HISTORY:  No pertinent family history in first degree relatives      Social History: Lives home, daughter involved in care.    Outpatient Medications: as above.    MEDICATIONS  (STANDING):  atorvastatin 40 milliGRAM(s) Oral daily  azithromycin   Tablet 500 milliGRAM(s) Oral every 24 hours  buDESOnide   0.5 milliGRAM(s) Respule 0.5 milliGRAM(s) Inhalation every 12 hours  cefTRIAXone   IVPB 1 Gram(s) IV Intermittent every 24 hours  dextrose 5%. 1000 milliLiter(s) (50 mL/Hr) IV Continuous <Continuous>  dextrose 50% Injectable 12.5 Gram(s) IV Push once  dextrose 50% Injectable 25 Gram(s) IV Push once  diltiazem    milliGRAM(s) Oral daily  fluticasone propionate 50 MICROgram(s)/spray Nasal Spray 1 Spray(s) Both Nostrils two times a day  insulin glargine Injectable (LANTUS) 3 Unit(s) SubCutaneous at bedtime  insulin lispro (HumaLOG) corrective regimen sliding scale   SubCutaneous three times a day before meals  insulin lispro (HumaLOG) corrective regimen sliding scale   SubCutaneous at bedtime  insulin lispro Injectable (HumaLOG) 2 Unit(s) SubCutaneous three times a day before meals  lamoTRIgine 25 milliGRAM(s) Oral at bedtime  methimazole 5 milliGRAM(s) Oral daily  metoprolol tartrate 25 milliGRAM(s) Oral two times a day  mirtazapine 15 milliGRAM(s) Oral at bedtime  pantoprazole    Tablet 40 milliGRAM(s) Oral before breakfast  predniSONE   Tablet 40 milliGRAM(s) Oral daily  rivaroxaban 15 milliGRAM(s) Oral at bedtime  sodium chloride 0.9%. 1000 milliLiter(s) (50 mL/Hr) IV Continuous <Continuous>    MEDICATIONS  (PRN):  ALPRAZolam 0.25 milliGRAM(s) Oral daily PRN anxiety  dextrose 40% Gel 15 Gram(s) Oral once PRN Blood Glucose LESS THAN 70 milliGRAM(s)/deciliter  ipratropium    for Nebulization 500 MICROGram(s) Nebulizer every 6 hours PRN Shortness of Breath and/or Wheezing  levalbuterol Inhalation 0.63 milliGRAM(s) Inhalation every 6 hours PRN SOB/Wheezing  polyethylene glycol 3350 17 Gram(s) Oral daily PRN Constipation      Allergies    No Known Allergies    Intolerances        Review of Systems:  Constitutional: No fever, no chills, anurexia, no heat/cold intolerance.  Eyes: blurry vision on admission resolved.  Neuro: No tremors  HEENT: No pain  Cardiovascular: No chest pain, no palpitations  Respiratory: chronic SOB, no cough  GI: No nausea, vomiting, abdominal pain  : No dysuria  Skin: no rash  Psych: no depression  Endocrine: no polyuria, polydipsia  Hem/lymph: no swelling  Osteoporosis: no fractures    ALL OTHER SYSTEMS REVIEWED AND NEGATIVE    PHYSICAL EXAM:  VITALS: T(C): 36.7 (08-27-18 @ 05:24)  T(F): 98 (08-27-18 @ 05:24), Max: 98.3 (08-26-18 @ 20:44)  HR: 97 (08-27-18 @ 07:05) (97 - 168)  BP: 105/63 (08-27-18 @ 07:05) (94/58 - 114/64)  RR:  (18 - 18)  SpO2:  (93% - 99%)  Wt(kg): ?  GENERAL: NAD, cachectic, alert and responsive.  EYES: No proptosis, no lid lag, anicteric  HEENT:  Atraumatic, Normocephalic, moist mucous membranes  THYROID: nodular gland right 2X3 cm no LN.   RESPIRATORY: Clear to auscultation bilaterally; minimal wheezing bases  CARDIOVASCULAR: Regular rate and rhythm; No murmurs; tr. peripheral edema  GI: Soft, nontender, non distended, normal bowel sounds  SKIN: Dry, intact, No rashes or lesions  MUSCULOSKELETAL: Full range of motion, normal strength  NEURO: sensation intact, extraocular movements intact, no tremor  PSYCH: Alert and oriented x 3, normal affect, normal mood    POCT Blood Glucose.: 270 mg/dL (08-27-18 @ 11:53)  POCT Blood Glucose.: 187 mg/dL (08-27-18 @ 07:26)  POCT Blood Glucose.: 180 mg/dL (08-27-18 @ 06:05)  POCT Blood Glucose.: 231 mg/dL (08-27-18 @ 00:25)  POCT Blood Glucose.: 219 mg/dL (08-27-18 @ 00:11)  POCT Blood Glucose.: 195 mg/dL (08-26-18 @ 21:10)  POCT Blood Glucose.: 250 mg/dL (08-26-18 @ 17:41)  POCT Blood Glucose.: 255 mg/dL (08-26-18 @ 10:12)  POCT Blood Glucose.: 219 mg/dL (08-26-18 @ 06:21)  POCT Blood Glucose.: 195 mg/dL (08-26-18 @ 05:40)  POCT Blood Glucose.: 158 mg/dL (08-25-18 @ 23:59)  POCT Blood Glucose.: 112 mg/dL (08-25-18 @ 22:33)  POCT Blood Glucose.: 123 mg/dL (08-25-18 @ 21:02)  POCT Blood Glucose.: 121 mg/dL (08-25-18 @ 20:01)  POCT Blood Glucose.: 35 mg/dL (08-25-18 @ 19:48)  POCT Blood Glucose.: 30 mg/dL (08-25-18 @ 19:41)                            8.1    11.9  )-----------( 683      ( 27 Aug 2018 05:56 )             26.5       08-27    141  |  106  |  30<H>  ----------------------------<  163<H>  4.6   |  22  |  1.24    Ca    8.9      27 Aug 2018 05:56  Phos  3.6     08-27  Mg     2.2     08-27    TPro  8.2  /  Alb  4.3  /  TBili  0.2  /  DBili  x   /  AST  19  /  ALT  9<L>  /  AlkPhos  98  08-25      Thyroid Function Tests:      Hemoglobin A1C, Whole Blood: 5.5 % [4.0 - 5.6] (08-26-18 @ 08:24)          Radiology:

## 2018-08-27 NOTE — PROGRESS NOTE ADULT - SUBJECTIVE AND OBJECTIVE BOX
Patient is a 89y old  Male who presents with a chief complaint of FTT, hallucinations, AMS (26 Aug 2018 12:23)      SUBJECTIVE / OVERNIGHT EVENTS: events noted. Had SVT.   Review of Systems  chest pain no  palpitations no  sob no  nausea no  headache no    MEDICATIONS  (STANDING):  atorvastatin 40 milliGRAM(s) Oral daily  azithromycin   Tablet 500 milliGRAM(s) Oral every 24 hours  buDESOnide   0.5 milliGRAM(s) Respule 0.5 milliGRAM(s) Inhalation every 12 hours  cefTRIAXone   IVPB 1 Gram(s) IV Intermittent every 24 hours  dextrose 5%. 1000 milliLiter(s) (50 mL/Hr) IV Continuous <Continuous>  dextrose 50% Injectable 12.5 Gram(s) IV Push once  dextrose 50% Injectable 25 Gram(s) IV Push once  diltiazem    milliGRAM(s) Oral daily  fluticasone propionate 50 MICROgram(s)/spray Nasal Spray 1 Spray(s) Both Nostrils two times a day  insulin glargine Injectable (LANTUS) 3 Unit(s) SubCutaneous at bedtime  insulin lispro (HumaLOG) corrective regimen sliding scale   SubCutaneous three times a day before meals  insulin lispro (HumaLOG) corrective regimen sliding scale   SubCutaneous at bedtime  insulin lispro Injectable (HumaLOG) 2 Unit(s) SubCutaneous three times a day before meals  lamoTRIgine 25 milliGRAM(s) Oral at bedtime  methimazole 5 milliGRAM(s) Oral daily  metoprolol tartrate 25 milliGRAM(s) Oral two times a day  mirtazapine 15 milliGRAM(s) Oral at bedtime  pantoprazole    Tablet 40 milliGRAM(s) Oral before breakfast  predniSONE   Tablet 40 milliGRAM(s) Oral daily  rivaroxaban 15 milliGRAM(s) Oral at bedtime  sodium chloride 0.9%. 1000 milliLiter(s) (50 mL/Hr) IV Continuous <Continuous>    MEDICATIONS  (PRN):  ALPRAZolam 0.25 milliGRAM(s) Oral daily PRN anxiety  dextrose 40% Gel 15 Gram(s) Oral once PRN Blood Glucose LESS THAN 70 milliGRAM(s)/deciliter  ipratropium    for Nebulization 500 MICROGram(s) Nebulizer every 6 hours PRN Shortness of Breath and/or Wheezing  levalbuterol Inhalation 0.63 milliGRAM(s) Inhalation every 6 hours PRN SOB/Wheezing  polyethylene glycol 3350 17 Gram(s) Oral daily PRN Constipation      Vital Signs Last 24 Hrs  T(C): 36.7 (27 Aug 2018 05:24), Max: 36.8 (26 Aug 2018 20:44)  T(F): 98 (27 Aug 2018 05:24), Max: 98.3 (26 Aug 2018 20:44)  HR: 92 (27 Aug 2018 17:26) (92 - 168)  BP: 135/70 (27 Aug 2018 17:26) (94/58 - 135/70)  BP(mean): --  RR: 18 (27 Aug 2018 07:05) (18 - 18)  SpO2: 97% (27 Aug 2018 07:05) (93% - 99%)    PHYSICAL EXAM:  GENERAL: NAD, well-developed  HEAD:  Atraumatic, Normocephalic  EYES: EOMI, PERRLA, conjunctiva and sclera clear  NECK: Supple, No JVD  CHEST/LUNG: Clear to auscultation bilaterally; No wheeze  HEART: Regular rate and rhythm; No murmurs, rubs, or gallops  ABDOMEN: Soft, Nontender, Nondistended; Bowel sounds present  EXTREMITIES:  2+ Peripheral Pulses, No clubbing, cyanosis, or edema  PSYCH: AAOx3  NEUROLOGY: non-focal  SKIN: No rashes or lesions    LABS:                        8.1    11.9  )-----------( 683      ( 27 Aug 2018 05:56 )             26.5     08-27    141  |  106  |  30<H>  ----------------------------<  163<H>  4.6   |  22  |  1.24    Ca    8.9      27 Aug 2018 05:56  Phos  3.6     08-27  Mg     2.2     08-27      PT/INR - ( 26 Aug 2018 08:24 )   PT: 25.0 sec;   INR: 2.18 ratio           CARDIAC MARKERS ( 27 Aug 2018 05:56 )  x     / x     / 64 U/L / x     / 3.7 ng/mL  CARDIAC MARKERS ( 27 Aug 2018 00:14 )  x     / x     / 75 U/L / x     / 3.9 ng/mL      Urinalysis Basic - ( 26 Aug 2018 02:52 )    Color: Yellow / Appearance: Clear / S.017 / pH: x  Gluc: x / Ketone: Negative  / Bili: Negative / Urobili: Negative   Blood: x / Protein: 30 mg/dL / Nitrite: Negative   Leuk Esterase: Small / RBC: 0-2 /HPF / WBC 10-25 /HPF   Sq Epi: x / Non Sq Epi: Occasional /HPF / Bacteria: x        Culture - Blood (collected 26 Aug 2018 08:24)  Source: .Blood Blood-Peripheral  Gram Stain (27 Aug 2018 04:35):    Growth in aerobic bottle: Gram Positive Cocci in Clusters  Preliminary Report (27 Aug 2018 04:36):    Growth in aerobic bottle: Gram Positive Cocci in Clusters    "Due to technical problems, Proteus sp. will Not be reported as part of    the BCID panel until further notice"    ***Blood Panel PCR results on this specimen are available    approximately 3 hours after the Gram stain result.***    Gram stain, PCR, and/or culture results may not always    correspond due to difference in methodologies.    ************************************************************    This PCR assay was performed using "[employer].    The following targets are tested for: Enterococcus,    vancomycin resistant enterococci, Listeria monocytogenes,    coagulase negative staphylococci, S. aureus,    methicillin resistant S. aureus, Streptococcus agalactiae    (Group B), S. pneumoniae, S.pyogenes (Group A),    Acinetobacter baumannii, Enterobacter cloacae, E. coli,    Klebsiella oxytoca, K. pneumoniae, Proteus sp.,    Serratia marcescens, Haemophilus influenzae,    Neisseria meningitidis, Pseudomonas aeruginosa, Candida    albicans, C. glabrata, C krusei, C parapsilosis,    C. tropicalis and the KPC resistance gene.  Organism: Blood Culture PCR (27 Aug 2018 06:11)  Organism: Blood Culture PCR (27 Aug 2018 06:11)    Culture - Urine (collected 26 Aug 2018 05:32)  Source: .Urine Clean Catch (Midstream)  Final Report (27 Aug 2018 08:39):    No growth        RADIOLOGY & ADDITIONAL TESTS:    Imaging Personally Reviewed:    Consultant(s) Notes Reviewed:      Care Discussed with Consultants/Other Providers:

## 2018-08-27 NOTE — CONSULT NOTE ADULT - SUBJECTIVE AND OBJECTIVE BOX
CARDIOLOGY CONSULT - Dr. Serrano     CHIEF COMPLAINT: parainfluenza infection    HPI:  89M pmed hx as mentioned below admitted with parainfluenza infection. As per pt, daughter and HHA, roughly 4-5 days ago pt started to have URI, symptoms, productive cough and weakness. He went to see his PMD who diagnosed with pneumonia and started him on PO antibiotics.  Pt initially seemed to be getting better but over the last 2-3 days has had significant weakness and decreased appetite. Pt denies any fevers, chills, dysuria, diarrhea or abdominal pain. He does endorse a new hernia which he is concerned about. Pt also endorses some increased SOB and wheezing over the past several days. As per daughter, he has been using his home oxygen which he does not use regularly. Events also noted for today. RRT x2 was called for SVT.  During each RRT Adenosine x 1 pushed with conversion to sinus tachycardia 110.         PAST MEDICAL & SURGICAL HISTORY:  Goiter  Diabetes type 2, controlled  COPD (chronic obstructive pulmonary disease)  Atrial fibrillation  History of total hip arthroplasty, right          PREVIOUS DIAGNOSTIC TESTING:    [ ] Echocardiogram:  < from: Transthoracic Echocardiogram (12.29.17 @ 15:01) >  EF (Teicholtz): 75 %  Doppler Peak Velocity (m/sec): AoV=2.0  ------------------------------------------------------------------------  Observations:  Mitral Valve: Mitral annular calcification and calcified  mitral leaflets with normal diastolic opening.  Mild-moderate mitral regurgitation.  Aortic Valve/Aorta: Calcified trileaflet aortic valve with  normal opening. Peak transaortic valve gradient equals 17  mm Hg, mean transaortic valve gradient equals 9 mm Hg,  estimated aortic valve area equals 1.6 sqcm (by continuity  equation), aortic valve velocity time integral equals 40  cm, consistent with mild aortic stenosis. Peak left  ventricular outflow tract gradient equals 4 mm Hg, mean  gradient is equal to 2 mm Hg, LVOT velocity time integral  equals 21 cm.  Aortic Root: 3.6 cm.  Left Atrium: Mildly dilated left atrium.  LA volume index =  36 cc/m2.  Left Ventricle: Hyperdynamic left ventricular systolic  function. Increased relative wall thickness with normal  left ventricular mass index, consistent with concentric  left ventricular remodeling. Indeterminate diastolic  function.  Right Heart: Normal right atrium. Normal right ventricular  size and function. Normal tricuspid valve. Mild tricuspid  regurgitation. Normal pulmonic valve.  Pericardium/Pleura: Normal pericardium with trace  pericardial effusion.  Hemodynamic: Estimated rightatrial pressure is 8 mm Hg.  Estimated right ventricular systolic pressure equals 38 mm  Hg, assuming right atrial pressure equals 8 mm Hg,  consistent with borderline pulmonary hypertension.  ------------------------------------------------------------------------  Conclusions:  1. Mitral annular calcification and calcified mitral  leaflets with normal diastolic opening.  2. Calcified trileaflet aortic valve with normal opening.  Peak transaortic valve gradient equals 17 mm Hg, mean  transaorticvalve gradient equals 9 mm Hg, estimated aortic  valve area equals 1.6 sqcm (by continuity equation), aortic  valve velocity time integral equals 40 cm, consistent with  mild aortic stenosis.  3. Mildly dilated left atrium.  LA volume index = 36 cc/m2.  4. Increased relative wall thickness with normal left  ventricular mass index, consistent with concentric left  ventricular remodeling.  5. Hyperdynamic left ventricular systolic function.  6. Normal right ventricular size and function.  7. Estimated right ventricular systolic pressure equals 38  mm Hg, assuming right atrial pressure equals 8 mm Hg,  consistent with borderline pulmonary hypertension.  ------------------------------------------------------------------------  Confirmed on  12/29/2017 - 16:56:34 by Gunjan Perez M.D.  ------------------------------------------------------------------------    < end of copied text >    [ ]  Catheterization:    [ ] Stress Test:  	    MEDICATIONS:  Home Medications:   * Patient Currently Takes Medications as of 25-Aug-2018 23:10 documented in Structured Notes  ALPRAZolam 0.25 mg oral tablet: 1 tab(s) orally every 8 hours, As needed, anxiety, Last Dose Taken:    polyethylene glycol 3350 oral powder for reconstitution: 17 gram(s) orally once a day, As Needed, Last Dose Taken:    rosuvastatin 10 mg oral tablet: 1 tab(s) orally once a day (at bedtime), Last Dose Taken:    Flonase 50 mcg/inh nasal spray: 1 spray(s) in each nasal, As directed, Last Dose Taken:    lamoTRIgine 25 mg oral tablet: 1 tab(s) orally once a day (at bedtime), Last Dose Taken:    mirtazapine 15 mg oral tablet: 1 tab(s) orally once a day (at bedtime), Last Dose Taken:    glimepiride 1 mg oral tablet: 1 tab(s) orally once a day, Last Dose Taken:    Dilt- mg/24 hours oral capsule, extended release: 1 cap(s) orally once a day, Last Dose Taken:    Xarelto 15 mg oral tablet: 1 tab(s) orally once a day (in the evening), Last Dose Taken:    methIMAzole 5 mg oral tablet: 1 tab(s) orally once a day, Last Dose Taken:    albuterol 90 mcg/inh inhalation aerosol: 2 puff(s) inhaled 4 times a day, As Needed, Last Dose Taken:    metFORMIN 500 mg oral tablet: 2 tab(s) orally 2 times a day, Last Dose Taken:    pantoprazole 40 mg oral delayed release tablet: 1 tab(s) orally once a day, Last Dose    MEDICATIONS  (STANDING):  atorvastatin 40 milliGRAM(s) Oral daily  azithromycin   Tablet 500 milliGRAM(s) Oral every 24 hours  buDESOnide   0.5 milliGRAM(s) Respule 0.5 milliGRAM(s) Inhalation every 12 hours  cefTRIAXone   IVPB 1 Gram(s) IV Intermittent every 24 hours  dextrose 5%. 1000 milliLiter(s) (50 mL/Hr) IV Continuous <Continuous>  dextrose 50% Injectable 12.5 Gram(s) IV Push once  dextrose 50% Injectable 25 Gram(s) IV Push once  diltiazem    milliGRAM(s) Oral daily  fluticasone propionate 50 MICROgram(s)/spray Nasal Spray 1 Spray(s) Both Nostrils two times a day  insulin glargine Injectable (LANTUS) 3 Unit(s) SubCutaneous at bedtime  insulin lispro (HumaLOG) corrective regimen sliding scale   SubCutaneous three times a day before meals  insulin lispro (HumaLOG) corrective regimen sliding scale   SubCutaneous at bedtime  insulin lispro Injectable (HumaLOG) 2 Unit(s) SubCutaneous three times a day before meals  lamoTRIgine 25 milliGRAM(s) Oral at bedtime  methimazole 5 milliGRAM(s) Oral daily  metoprolol tartrate 25 milliGRAM(s) Oral two times a day  mirtazapine 15 milliGRAM(s) Oral at bedtime  pantoprazole    Tablet 40 milliGRAM(s) Oral before breakfast  predniSONE   Tablet 40 milliGRAM(s) Oral daily  rivaroxaban 15 milliGRAM(s) Oral at bedtime  sodium chloride 0.9%. 1000 milliLiter(s) (50 mL/Hr) IV Continuous <Continuous>      FAMILY HISTORY:  No pertinent family history in first degree relatives      SOCIAL HISTORY:    [ ] Non-smoker  [ ] Smoker  [ ] Alcohol    Allergies    No Known Allergies    Intolerances    	    REVIEW OF SYSTEMS:  CONSTITUTIONAL: No fever, weight loss, or fatigue  EYES: No eye pain, visual disturbances, or discharge  ENMT:  No difficulty hearing, tinnitus, vertigo; No sinus or throat pain  NECK: No pain or stiffness  RESPIRATORY: No cough, wheezing, chills or hemoptysis; No Shortness of Breath  CARDIOVASCULAR: No chest pain, palpitations, passing out, dizziness, or leg swelling  GASTROINTESTINAL: No abdominal or epigastric pain. No nausea, vomiting, or hematemesis; No diarrhea or constipation. No melena or hematochezia.  GENITOURINARY: No dysuria, frequency, hematuria, or incontinence  NEUROLOGICAL: No headaches, memory loss, loss of strength, numbness, or tremors  SKIN: No itching, burning, rashes, or lesions   	    [ ] All others negative	  [ ] Unable to obtain    PHYSICAL EXAM:  T(C): 36.7 (08-27-18 @ 05:24), Max: 36.8 (08-26-18 @ 20:44)  HR: 97 (08-27-18 @ 07:05) (97 - 168)  BP: 105/63 (08-27-18 @ 07:05) (94/58 - 114/64)  RR: 18 (08-27-18 @ 07:05) (18 - 18)  SpO2: 97% (08-27-18 @ 07:05) (93% - 99%)  Wt(kg): --  I&O's Summary    26 Aug 2018 07:01  -  27 Aug 2018 07:00  --------------------------------------------------------  IN: 1370 mL / OUT: 400 mL / NET: 970 mL    27 Aug 2018 07:01  -  27 Aug 2018 16:23  --------------------------------------------------------  IN: 720 mL / OUT: 150 mL / NET: 570 mL        Appearance: Normal	  Psychiatry: A & O x 3, Mood & affect appropriate  HEENT:   Normal oral mucosa, PERRL, EOMI	  Lymphatic: No lymphadenopathy  Cardiovascular: Normal S1 S2,RRR, No JVD, No murmurs  Respiratory: Lungs clear to auscultation	  Gastrointestinal:  Soft, Non-tender, + BS	  Skin: No rashes, No ecchymoses, No cyanosis	  Neurologic: Non-focal  Extremities: Normal range of motion, No clubbing, cyanosis or edema  Vascular: Peripheral pulses palpable 2+ bilaterally    TELEMETRY: 	    ECG:  	  RADIOLOGY:  OTHER: 	  	  LABS:	 	    CARDIAC MARKERS:                                  8.1    11.9  )-----------( 683      ( 27 Aug 2018 05:56 )             26.5     08-27    141  |  106  |  30<H>  ----------------------------<  163<H>  4.6   |  22  |  1.24    Ca    8.9      27 Aug 2018 05:56  Phos  3.6     08-27  Mg     2.2     08-27    TPro  8.2  /  Alb  4.3  /  TBili  0.2  /  DBili  x   /  AST  19  /  ALT  9<L>  /  AlkPhos  98  08-25    PT/INR - ( 26 Aug 2018 08:24 )   PT: 25.0 sec;   INR: 2.18 ratio           proBNP:   Lipid Profile:   HgA1c:   TSH: CARDIOLOGY CONSULT - Dr. Serrano     CHIEF COMPLAINT: parainfluenza infection    HPI:  89M pmed hx as mentioned below admitted with parainfluenza infection. As per pt, daughter and HHA, roughly 4-5 days ago pt started to have URI, symptoms, productive cough and weakness. He went to see his PMD who diagnosed with pneumonia and started him on PO antibiotics.  Pt initially seemed to be getting better but over the last 2-3 days has had significant weakness and decreased appetite. Pt denies any fevers, chills, dysuria, diarrhea or abdominal pain. He does endorse a new hernia which he is concerned about. Pt also endorses some increased SOB and wheezing over the past several days. As per daughter, he has been using his home oxygen which he does not use regularly. Cardiac  hx includes afib, currently on a/c xarelto. denies hx of chf, valvular disease, MI or CAD.   Events also noted for today. RRT x2 was called for symptomatic SVT.  During each RRT Adenosine x 1 administered with conversion to sinus tachycardia 110.  Currently denies of chest pain, sob, orthopnea, palpitations, increase in LE edema, fever or chills. Echo from december 2017 revealed EF 75%, mild- mod MR, mild AS, hyperdynamic LV fx. ROS otherwise negative        PAST MEDICAL & SURGICAL HISTORY:  Goiter  Diabetes type 2, controlled  COPD (chronic obstructive pulmonary disease)  Atrial fibrillation  History of total hip arthroplasty, right          PREVIOUS DIAGNOSTIC TESTING:    [ ] Echocardiogram:  < from: Transthoracic Echocardiogram (12.29.17 @ 15:01) >  EF (Teicholtz): 75 %  Doppler Peak Velocity (m/sec): AoV=2.0  ------------------------------------------------------------------------  Observations:  Mitral Valve: Mitral annular calcification and calcified  mitral leaflets with normal diastolic opening.  Mild-moderate mitral regurgitation.  Aortic Valve/Aorta: Calcified trileaflet aortic valve with  normal opening. Peak transaortic valve gradient equals 17  mm Hg, mean transaortic valve gradient equals 9 mm Hg,  estimated aortic valve area equals 1.6 sqcm (by continuity  equation), aortic valve velocity time integral equals 40  cm, consistent with mild aortic stenosis. Peak left  ventricular outflow tract gradient equals 4 mm Hg, mean  gradient is equal to 2 mm Hg, LVOT velocity time integral  equals 21 cm.  Aortic Root: 3.6 cm.  Left Atrium: Mildly dilated left atrium.  LA volume index =  36 cc/m2.  Left Ventricle: Hyperdynamic left ventricular systolic  function. Increased relative wall thickness with normal  left ventricular mass index, consistent with concentric  left ventricular remodeling. Indeterminate diastolic  function.  Right Heart: Normal right atrium. Normal right ventricular  size and function. Normal tricuspid valve. Mild tricuspid  regurgitation. Normal pulmonic valve.  Pericardium/Pleura: Normal pericardium with trace  pericardial effusion.  Hemodynamic: Estimated rightatrial pressure is 8 mm Hg.  Estimated right ventricular systolic pressure equals 38 mm  Hg, assuming right atrial pressure equals 8 mm Hg,  consistent with borderline pulmonary hypertension.  ------------------------------------------------------------------------  Conclusions:  1. Mitral annular calcification and calcified mitral  leaflets with normal diastolic opening.  2. Calcified trileaflet aortic valve with normal opening.  Peak transaortic valve gradient equals 17 mm Hg, mean  transaorticvalve gradient equals 9 mm Hg, estimated aortic  valve area equals 1.6 sqcm (by continuity equation), aortic  valve velocity time integral equals 40 cm, consistent with  mild aortic stenosis.  3. Mildly dilated left atrium.  LA volume index = 36 cc/m2.  4. Increased relative wall thickness with normal left  ventricular mass index, consistent with concentric left  ventricular remodeling.  5. Hyperdynamic left ventricular systolic function.  6. Normal right ventricular size and function.  7. Estimated right ventricular systolic pressure equals 38  mm Hg, assuming right atrial pressure equals 8 mm Hg,  consistent with borderline pulmonary hypertension.  ------------------------------------------------------------------------  Confirmed on  12/29/2017 - 16:56:34 by Gunjan Perez M.D.  ------------------------------------------------------------------------    < end of copied text >    [ ]  Catheterization:    [ ] Stress Test:  	    MEDICATIONS:  Home Medications:   * Patient Currently Takes Medications as of 25-Aug-2018 23:10 documented in Structured Notes  ALPRAZolam 0.25 mg oral tablet: 1 tab(s) orally every 8 hours, As needed, anxiety, Last Dose Taken:    polyethylene glycol 3350 oral powder for reconstitution: 17 gram(s) orally once a day, As Needed, Last Dose Taken:    rosuvastatin 10 mg oral tablet: 1 tab(s) orally once a day (at bedtime), Last Dose Taken:    Flonase 50 mcg/inh nasal spray: 1 spray(s) in each nasal, As directed, Last Dose Taken:    lamoTRIgine 25 mg oral tablet: 1 tab(s) orally once a day (at bedtime), Last Dose Taken:    mirtazapine 15 mg oral tablet: 1 tab(s) orally once a day (at bedtime), Last Dose Taken:    glimepiride 1 mg oral tablet: 1 tab(s) orally once a day, Last Dose Taken:    Dilt- mg/24 hours oral capsule, extended release: 1 cap(s) orally once a day, Last Dose Taken:    Xarelto 15 mg oral tablet: 1 tab(s) orally once a day (in the evening), Last Dose Taken:    methIMAzole 5 mg oral tablet: 1 tab(s) orally once a day, Last Dose Taken:    albuterol 90 mcg/inh inhalation aerosol: 2 puff(s) inhaled 4 times a day, As Needed, Last Dose Taken:    metFORMIN 500 mg oral tablet: 2 tab(s) orally 2 times a day, Last Dose Taken:    pantoprazole 40 mg oral delayed release tablet: 1 tab(s) orally once a day, Last Dose    MEDICATIONS  (STANDING):  atorvastatin 40 milliGRAM(s) Oral daily  azithromycin   Tablet 500 milliGRAM(s) Oral every 24 hours  buDESOnide   0.5 milliGRAM(s) Respule 0.5 milliGRAM(s) Inhalation every 12 hours  cefTRIAXone   IVPB 1 Gram(s) IV Intermittent every 24 hours  dextrose 5%. 1000 milliLiter(s) (50 mL/Hr) IV Continuous <Continuous>  dextrose 50% Injectable 12.5 Gram(s) IV Push once  dextrose 50% Injectable 25 Gram(s) IV Push once  diltiazem    milliGRAM(s) Oral daily  fluticasone propionate 50 MICROgram(s)/spray Nasal Spray 1 Spray(s) Both Nostrils two times a day  insulin glargine Injectable (LANTUS) 3 Unit(s) SubCutaneous at bedtime  insulin lispro (HumaLOG) corrective regimen sliding scale   SubCutaneous three times a day before meals  insulin lispro (HumaLOG) corrective regimen sliding scale   SubCutaneous at bedtime  insulin lispro Injectable (HumaLOG) 2 Unit(s) SubCutaneous three times a day before meals  lamoTRIgine 25 milliGRAM(s) Oral at bedtime  methimazole 5 milliGRAM(s) Oral daily  metoprolol tartrate 25 milliGRAM(s) Oral two times a day  mirtazapine 15 milliGRAM(s) Oral at bedtime  pantoprazole    Tablet 40 milliGRAM(s) Oral before breakfast  predniSONE   Tablet 40 milliGRAM(s) Oral daily  rivaroxaban 15 milliGRAM(s) Oral at bedtime  sodium chloride 0.9%. 1000 milliLiter(s) (50 mL/Hr) IV Continuous <Continuous>      FAMILY HISTORY:  No pertinent family history in first degree relatives      SOCIAL HISTORY:    [x ] Non-smoker  [ ] Smoker  [ ] Alcohol    Allergies    No Known Allergies    Intolerances    	    REVIEW OF SYSTEMS:  CONSTITUTIONAL: No fever, weight loss, + fatigue  EYES: No eye pain, visual disturbances, or discharge  ENMT:  No difficulty hearing, tinnitus, vertigo; No sinus or throat pain  NECK: No pain or stiffness  RESPIRATORY: No cough, wheezing, chills or hemoptysis; No Shortness of Breath  CARDIOVASCULAR: No chest pain, palpitations, passing out, dizziness, or leg swelling  GASTROINTESTINAL: No abdominal or epigastric pain. No nausea, vomiting, or hematemesis; No diarrhea or constipation. No melena or hematochezia.  GENITOURINARY: No dysuria, frequency, hematuria, or incontinence  NEUROLOGICAL: No headaches, memory loss, loss of strength, numbness, or tremors  SKIN: No itching, burning, rashes, or lesions   	    [x ] All others negative	  [ ] Unable to obtain    PHYSICAL EXAM:  T(C): 36.7 (08-27-18 @ 05:24), Max: 36.8 (08-26-18 @ 20:44)  HR: 97 (08-27-18 @ 07:05) (97 - 168)  BP: 105/63 (08-27-18 @ 07:05) (94/58 - 114/64)  RR: 18 (08-27-18 @ 07:05) (18 - 18)  SpO2: 97% (08-27-18 @ 07:05) (93% - 99%)  Wt(kg): --  I&O's Summary    26 Aug 2018 07:01  -  27 Aug 2018 07:00  --------------------------------------------------------  IN: 1370 mL / OUT: 400 mL / NET: 970 mL    27 Aug 2018 07:01  -  27 Aug 2018 16:23  --------------------------------------------------------  IN: 720 mL / OUT: 150 mL / NET: 570 mL        Appearance: Normal	  Psychiatry: A & O x 3, Mood & affect appropriate  HEENT:   Normal oral mucosa, PERRL, EOMI	  Lymphatic: No lymphadenopathy  Cardiovascular: Normal S1 S2,RRR, + murmur   Respiratory: Lungs clear to auscultation	  Gastrointestinal:  Soft, Non-tender, + BS	  Skin: No rashes, No ecchymoses, No cyanosis	  Neurologic: Non-focal  Extremities: Normal range of motion, No clubbing, cyanosis or edema  Vascular: Peripheral pulses palpable 2+ bilaterally    TELEMETRY: NSR  SVT x 2 episodes on tele 	    ECG:  HR, prolong Qt  	  RADIOLOGY:  < from: CT Chest No Cont (08.25.18 @ 23:46) >  IMPRESSION:   Tree-in-bud opacities at both lower lobes peripherally indicative of   mucoid impacted distal airways. Infection cannot be excluded. Recommend   follow-up.    Emphysema.      < end of copied text >    OTHER: 	  	  LABS:	 	    CARDIAC MARKERS:                                  8.1    11.9  )-----------( 683      ( 27 Aug 2018 05:56 )             26.5     08-27    141  |  106  |  30<H>  ----------------------------<  163<H>  4.6   |  22  |  1.24    Ca    8.9      27 Aug 2018 05:56  Phos  3.6     08-27  Mg     2.2     08-27    TPro  8.2  /  Alb  4.3  /  TBili  0.2  /  DBili  x   /  AST  19  /  ALT  9<L>  /  AlkPhos  98  08-25    PT/INR - ( 26 Aug 2018 08:24 )   PT: 25.0 sec;   INR: 2.18 ratio           proBNP:   Lipid Profile:   HgA1c:   TSH:

## 2018-08-27 NOTE — CHART NOTE - NSCHARTNOTEFT_GEN_A_CORE
RRT called for -180's. Adenosine pushed during rapid response with conversion to sinus tachycardia 110's. Pt. received duoneb at 9PM. Duoneb discontinued and switched to Xopenox. See RRT note for full details. Will follow up lab results. Will notify attending in AM for cardiology consult. Notified family, Jackie Tai. Continue to monitor pt. throughout the night on telemetry. F/u w/ primary team in AM.    -Mustapha Hewitt PA-C. #[id number].

## 2018-08-27 NOTE — CHART NOTE - NSCHARTNOTEFT_GEN_A_CORE
Notified by RN; pt. converted to SVT again. Dr. Devlin notified. Cardiology Dr. Serrano consulted. As per cardiology, give pt.'s AM Cardizem dose. If BP tolerates, add Metoprolol 25 mg BID. Blood cultures also returned positive for gram positive cocci in aerobic bottle. Vanco 1 g IV x1 given. Repeat blood cultures x 2.     -Mustapha Hewitt PA-C. #[id number].

## 2018-08-28 ENCOUNTER — TRANSCRIPTION ENCOUNTER (OUTPATIENT)
Age: 83
End: 2018-08-28

## 2018-08-28 LAB
ANION GAP SERPL CALC-SCNC: 12 MMOL/L — SIGNIFICANT CHANGE UP (ref 5–17)
BUN SERPL-MCNC: 30 MG/DL — HIGH (ref 7–23)
CALCIUM SERPL-MCNC: 8.9 MG/DL — SIGNIFICANT CHANGE UP (ref 8.4–10.5)
CHLORIDE SERPL-SCNC: 108 MMOL/L — SIGNIFICANT CHANGE UP (ref 96–108)
CO2 SERPL-SCNC: 21 MMOL/L — LOW (ref 22–31)
CREAT SERPL-MCNC: 1.37 MG/DL — HIGH (ref 0.5–1.3)
CULTURE RESULTS: SIGNIFICANT CHANGE UP
GLUCOSE BLDC GLUCOMTR-MCNC: 127 MG/DL — HIGH (ref 70–99)
GLUCOSE BLDC GLUCOMTR-MCNC: 177 MG/DL — HIGH (ref 70–99)
GLUCOSE BLDC GLUCOMTR-MCNC: 188 MG/DL — HIGH (ref 70–99)
GLUCOSE BLDC GLUCOMTR-MCNC: 362 MG/DL — HIGH (ref 70–99)
GLUCOSE SERPL-MCNC: 120 MG/DL — HIGH (ref 70–99)
HCT VFR BLD CALC: 27.8 % — LOW (ref 39–50)
HGB BLD-MCNC: 8.5 G/DL — LOW (ref 13–17)
MCHC RBC-ENTMCNC: 27.1 PG — SIGNIFICANT CHANGE UP (ref 27–34)
MCHC RBC-ENTMCNC: 30.6 GM/DL — LOW (ref 32–36)
MCV RBC AUTO: 88.5 FL — SIGNIFICANT CHANGE UP (ref 80–100)
ORGANISM # SPEC MICROSCOPIC CNT: SIGNIFICANT CHANGE UP
ORGANISM # SPEC MICROSCOPIC CNT: SIGNIFICANT CHANGE UP
PLATELET # BLD AUTO: 518 K/UL — HIGH (ref 150–400)
POTASSIUM SERPL-MCNC: 4.8 MMOL/L — SIGNIFICANT CHANGE UP (ref 3.5–5.3)
POTASSIUM SERPL-SCNC: 4.8 MMOL/L — SIGNIFICANT CHANGE UP (ref 3.5–5.3)
RBC # BLD: 3.14 M/UL — LOW (ref 4.2–5.8)
RBC # FLD: 15 % — HIGH (ref 10.3–14.5)
SODIUM SERPL-SCNC: 141 MMOL/L — SIGNIFICANT CHANGE UP (ref 135–145)
SPECIMEN SOURCE: SIGNIFICANT CHANGE UP
T3 SERPL-MCNC: 53 NG/DL — LOW (ref 80–200)
T4 AB SER-ACNC: 6.4 UG/DL — SIGNIFICANT CHANGE UP (ref 4.6–12)
T4 FREE SERPL-MCNC: 1.4 NG/DL — SIGNIFICANT CHANGE UP (ref 0.9–1.8)
TSH SERPL-MCNC: 0.42 UIU/ML — SIGNIFICANT CHANGE UP (ref 0.27–4.2)
WBC # BLD: 10.35 K/UL — SIGNIFICANT CHANGE UP (ref 3.8–10.5)
WBC # FLD AUTO: 10.35 K/UL — SIGNIFICANT CHANGE UP (ref 3.8–10.5)

## 2018-08-28 RX ORDER — INSULIN LISPRO 100/ML
4 VIAL (ML) SUBCUTANEOUS
Qty: 0 | Refills: 0 | Status: COMPLETED | OUTPATIENT
Start: 2018-08-29 | End: 2018-08-29

## 2018-08-28 RX ORDER — INSULIN GLARGINE 100 [IU]/ML
3 INJECTION, SOLUTION SUBCUTANEOUS AT BEDTIME
Qty: 0 | Refills: 0 | Status: DISCONTINUED | OUTPATIENT
Start: 2018-08-28 | End: 2018-08-29

## 2018-08-28 RX ORDER — INSULIN LISPRO 100/ML
VIAL (ML) SUBCUTANEOUS
Qty: 0 | Refills: 0 | Status: DISCONTINUED | OUTPATIENT
Start: 2018-08-28 | End: 2018-08-29

## 2018-08-28 RX ORDER — INSULIN LISPRO 100/ML
VIAL (ML) SUBCUTANEOUS AT BEDTIME
Qty: 0 | Refills: 0 | Status: DISCONTINUED | OUTPATIENT
Start: 2018-08-28 | End: 2018-08-29

## 2018-08-28 RX ORDER — RIVAROXABAN 15 MG-20MG
15 KIT ORAL ONCE
Qty: 0 | Refills: 0 | Status: COMPLETED | OUTPATIENT
Start: 2018-08-28 | End: 2018-08-28

## 2018-08-28 RX ORDER — RIVAROXABAN 15 MG-20MG
15 KIT ORAL EVERY 24 HOURS
Qty: 0 | Refills: 0 | Status: DISCONTINUED | OUTPATIENT
Start: 2018-08-29 | End: 2018-08-29

## 2018-08-28 RX ADMIN — MIRTAZAPINE 15 MILLIGRAM(S): 45 TABLET, ORALLY DISINTEGRATING ORAL at 22:18

## 2018-08-28 RX ADMIN — Medication 2 UNIT(S): at 08:23

## 2018-08-28 RX ADMIN — Medication 1: at 11:35

## 2018-08-28 RX ADMIN — Medication 0.5 MILLIGRAM(S): at 06:10

## 2018-08-28 RX ADMIN — PANTOPRAZOLE SODIUM 40 MILLIGRAM(S): 20 TABLET, DELAYED RELEASE ORAL at 06:09

## 2018-08-28 RX ADMIN — AZITHROMYCIN 500 MILLIGRAM(S): 500 TABLET, FILM COATED ORAL at 22:18

## 2018-08-28 RX ADMIN — Medication 0.5 MILLIGRAM(S): at 17:14

## 2018-08-28 RX ADMIN — ATORVASTATIN CALCIUM 40 MILLIGRAM(S): 80 TABLET, FILM COATED ORAL at 11:36

## 2018-08-28 RX ADMIN — Medication 180 MILLIGRAM(S): at 06:09

## 2018-08-28 RX ADMIN — RIVAROXABAN 15 MILLIGRAM(S): KIT at 19:42

## 2018-08-28 RX ADMIN — Medication 25 MILLIGRAM(S): at 06:09

## 2018-08-28 RX ADMIN — Medication 40 MILLIGRAM(S): at 06:09

## 2018-08-28 RX ADMIN — Medication 2 UNIT(S): at 17:13

## 2018-08-28 RX ADMIN — INSULIN GLARGINE 3 UNIT(S): 100 INJECTION, SOLUTION SUBCUTANEOUS at 22:18

## 2018-08-28 RX ADMIN — Medication 5: at 17:13

## 2018-08-28 RX ADMIN — Medication 25 MILLIGRAM(S): at 17:14

## 2018-08-28 RX ADMIN — Medication 2 UNIT(S): at 11:35

## 2018-08-28 RX ADMIN — LAMOTRIGINE 25 MILLIGRAM(S): 25 TABLET, ORALLY DISINTEGRATING ORAL at 22:18

## 2018-08-28 RX ADMIN — CEFTRIAXONE 100 GRAM(S): 500 INJECTION, POWDER, FOR SOLUTION INTRAMUSCULAR; INTRAVENOUS at 22:18

## 2018-08-28 NOTE — PROGRESS NOTE ADULT - SUBJECTIVE AND OBJECTIVE BOX
Patient is a 89y old  Male who presents with a chief complaint of FTT, hallucinations, AMS (26 Aug 2018 12:23)      SUBJECTIVE / OVERNIGHT EVENTS: Comfortable without new complaints. Daughter at bedside.  Review of Systems  chest pain no  palpitations no  sob no  nausea no  headache no    MEDICATIONS  (STANDING):  atorvastatin 40 milliGRAM(s) Oral daily  azithromycin   Tablet 500 milliGRAM(s) Oral every 24 hours  buDESOnide   0.5 milliGRAM(s) Respule 0.5 milliGRAM(s) Inhalation every 12 hours  cefTRIAXone   IVPB 1 Gram(s) IV Intermittent every 24 hours  dextrose 5%. 1000 milliLiter(s) (50 mL/Hr) IV Continuous <Continuous>  dextrose 50% Injectable 12.5 Gram(s) IV Push once  dextrose 50% Injectable 25 Gram(s) IV Push once  diltiazem    milliGRAM(s) Oral daily  fluticasone propionate 50 MICROgram(s)/spray Nasal Spray 1 Spray(s) Both Nostrils two times a day  insulin glargine Injectable (LANTUS) 3 Unit(s) SubCutaneous at bedtime  insulin lispro (HumaLOG) corrective regimen sliding scale   SubCutaneous three times a day before meals  insulin lispro (HumaLOG) corrective regimen sliding scale   SubCutaneous at bedtime  insulin lispro Injectable (HumaLOG) 2 Unit(s) SubCutaneous three times a day before meals  lamoTRIgine 25 milliGRAM(s) Oral at bedtime  methimazole 5 milliGRAM(s) Oral daily  metoprolol tartrate 25 milliGRAM(s) Oral two times a day  mirtazapine 15 milliGRAM(s) Oral at bedtime  pantoprazole    Tablet 40 milliGRAM(s) Oral before breakfast  predniSONE   Tablet 40 milliGRAM(s) Oral daily  rivaroxaban 15 milliGRAM(s) Oral at bedtime  sodium chloride 0.9%. 1000 milliLiter(s) (50 mL/Hr) IV Continuous <Continuous>    MEDICATIONS  (PRN):  ALPRAZolam 0.25 milliGRAM(s) Oral daily PRN anxiety  dextrose 40% Gel 15 Gram(s) Oral once PRN Blood Glucose LESS THAN 70 milliGRAM(s)/deciliter  ipratropium    for Nebulization 500 MICROGram(s) Nebulizer every 6 hours PRN Shortness of Breath and/or Wheezing  levalbuterol Inhalation 0.63 milliGRAM(s) Inhalation every 6 hours PRN SOB/Wheezing  polyethylene glycol 3350 17 Gram(s) Oral daily PRN Constipation      Vital Signs Last 24 Hrs  T(C): 36.8 (28 Aug 2018 13:16), Max: 36.8 (27 Aug 2018 20:27)  T(F): 98.2 (28 Aug 2018 13:16), Max: 98.2 (27 Aug 2018 20:27)  HR: 91 (28 Aug 2018 13:16) (84 - 92)  BP: 153/79 (28 Aug 2018 13:16) (121/62 - 153/79)  BP(mean): --  RR: 17 (28 Aug 2018 13:16) (17 - 18)  SpO2: 97% (28 Aug 2018 13:16) (96% - 97%)    PHYSICAL EXAM:  GENERAL: NAD, well-developed  HEAD:  Atraumatic, Normocephalic  EYES: EOMI, PERRLA, conjunctiva and sclera clear  NECK: Supple, No JVD  CHEST/LUNG: Clear to auscultation bilaterally; No wheeze  HEART: Regular rate and rhythm; No murmurs, rubs, or gallops  ABDOMEN: Soft, Nontender, Nondistended; Bowel sounds present Ventral/inguina hernia.  EXTREMITIES:  2+ Peripheral Pulses, No clubbing, cyanosis, or edema  PSYCH: AAOx3  NEUROLOGY: non-focal  SKIN: No rashes or lesions    LABS:                        8.5    10.35 )-----------( 518      ( 28 Aug 2018 07:31 )             27.8     08-28    141  |  108  |  30<H>  ----------------------------<  120<H>  4.8   |  21<L>  |  1.37<H>    Ca    8.9      28 Aug 2018 06:46  Phos  3.6     08-27  Mg     2.2     08-27        CARDIAC MARKERS ( 27 Aug 2018 05:56 )  x     / x     / 64 U/L / x     / 3.7 ng/mL  CARDIAC MARKERS ( 27 Aug 2018 00:14 )  x     / x     / 75 U/L / x     / 3.9 ng/mL          Culture - Blood (collected 27 Aug 2018 09:30)  Source: .Blood Blood-Peripheral  Preliminary Report (28 Aug 2018 10:01):    No growth to date.    Culture - Blood (collected 27 Aug 2018 09:30)  Source: .Blood Blood-Peripheral  Preliminary Report (28 Aug 2018 10:01):    No growth to date.    Culture - Blood (collected 26 Aug 2018 08:24)  Source: .Blood Blood-Peripheral  Gram Stain (27 Aug 2018 04:35):    Growth in aerobic bottle: Gram Positive Cocci in Clusters  Final Report (28 Aug 2018 11:30):    Growth in aerobic bottle: Coag Negative Staphylococcus    Single set isolate, possible contaminant. Contact    Microbiology if susceptibility testing clinically    indicated.    "Due to technical problems, Proteus sp. will Not be reported as part of    the BCID panel until further notice"    ***Blood Panel PCR results on this specimen are available    approximately 3 hours after the Gram stain result.***    Gram stain, PCR, and/or culture results may not always    correspond due to difference in methodologies.    ************************************************************    This PCR assay was performed using [employer].    The following targets are tested for: Enterococcus,    vancomycin resistant enterococci, Listeria monocytogenes,    coagulase negative staphylococci, S. aureus,    methicillin resistant S. aureus, Streptococcus agalactiae    (Group B), S. pneumoniae, S. pyogenes (Group A),    Acinetobacter baumannii, Enterobacter cloacae, E. coli,    Klebsiella oxytoca, K. pneumoniae, Proteus sp.,    Serratia marcescens, Haemophilus influenzae,    Neisseria meningitidis, Pseudomonas aeruginosa, Candida    albicans, C. glabrata, C krusei, C parapsilosis,    C. tropicalis and the KPC resistance gene.  Organism: Blood Culture PCR (28 Aug 2018 11:30)  Organism: Blood Culture PCR (28 Aug 2018 11:30)    Culture - Urine (collected 26 Aug 2018 05:32)  Source: .Urine Clean Catch (Midstream)  Final Report (27 Aug 2018 08:39):    No growth        RADIOLOGY & ADDITIONAL TESTS:    Imaging Personally Reviewed:    Consultant(s) Notes Reviewed:      Care Discussed with Consultants/Other Providers:

## 2018-08-28 NOTE — DISCHARGE NOTE ADULT - CARE PLAN
Principal Discharge DX:	COPD exacerbation  Goal:	stable  Assessment and plan of treatment:	Call your Health Care provider upon arrival home to make a follow up appointment within one week.  Take all inhalers as prescribed by your Health Care Provider.  Take steroids as prescribed by your Health Care Provider.  If your cough increases infrequency and severity and/or you have shortness of breath or increased shortness of breath call your Health Care Provider.  If you develop fever, chills, night sweats, malaise, and/or change in mental status call your Health care Provider.  Nutrition is very important.  Eat small frequent meals.  Increase your activity as tolerated.  Do not stay in bed all day  Secondary Diagnosis:	Atrial fibrillation, unspecified type  Goal:	stable  Assessment and plan of treatment:	Atrial fibrillation is the most common heart rhythm problem.  The condition puts you at risk for has stroke and heart attack  It helps if you control your blood pressure, not drink more than 1-2 alcohol drinks per day, cut down on caffeine, getting treatment for over active thyroid gland, and get regular exercise  Call your doctor if you feel your heart racing or beating unusually, chest tightness or pain, lightheaded, faint, shortness of breath especially with exercise  It is important to take your heart medication as prescribed  You may be on anticoagulation which is very important to take as directed - you may need blood work to monitor drug levels  Secondary Diagnosis:	Diabetes type 2, controlled  Goal:	stable  Assessment and plan of treatment:	HgA1C this admission.  Make sure you get your HgA1c checked every three months.  If you take oral diabetes medications, check your blood glucose two times a day.  If you take insulin, check your blood glucose before meals and at bedtime.  It's important not to skip any meals.  Keep a log of your blood glucose results and always take it with you to your doctor appointments.  Keep a list of your current medications including injectables and over the counter medications and bring this medication list with you to all your doctor appointments.  If you have not seen your ophthalmologist this year call for appointment.  Check your feet daily for redness, sores, or openings. Do not self treat. If no improvement in two days call your primary care physician for an appointment.  Low blood sugar (hypoglycemia) is a blood sugar below 70mg/dl. Check your blood sugar if you feel signs/symptoms of hypoglycemia. If your blood sugar is below 70 take 15 grams of carbohydrates (ex 4 oz of apple juice, 3-4 glucose tablets, or 4-6 oz of regular soda) wait 15 minutes and repeat blood sugar to make sure it comes up above 70.  If your blood sugar is above 70 and you are due for a meal, have a meal.  If you are not due for a meal have a snack.  This snack helps keeps your blood sugar at a safe range.

## 2018-08-28 NOTE — DIETITIAN INITIAL EVALUATION ADULT. - ENERGY NEEDS
ht: 71 inches. wt: 135.8 pounds (current, standing, no edema noted). BMI: 18.8 kG/m2. UBW: 135 pounds. IBW: 172 pounds. %IBW: 80%  Other pertinent objective information: 89 year old male pt with PMH T2DM on PO meds, COPD has home 02, afib on xarelto p/w weakness and 4-5 day URI symptoms. ht: 71 inches. wt: 135.8 pounds (current, standing, no edema noted). BMI: 18.8 kG/m2. UBW: 135 pounds. IBW: 172 pounds. %IBW: 80%  Other pertinent objective information: 89 year old male pt with PMH T2DM on PO meds, COPD on home 02, afib on xarelto p/w weakness and 4-5 day URI symptoms. Found to have ?PNA, parainfluenza (+), COPD exacerbation. ht: 71 inches. wt: 135.8 pounds (current, standing, no edema noted). BMI: 18.8 kG/m2. UBW: 135 pounds. IBW: 172 pounds. %IBW: 80%  Other pertinent objective information: 89 year old male pt with PMH T2DM on PO meds, COPD on home 02, afib on xarelto p/w weakness and 4-5 day URI symptoms. Found to have ?PNA, parainfluenza (+), COPD exacerbation. On antibiotics and prednisone.

## 2018-08-28 NOTE — DIETITIAN INITIAL EVALUATION ADULT. - OTHER INFO
Pt seen for consult: BMI<19 kG/m2. Pt seen for consult: BMI<19 kG/m2. Pt currently reports improving appetite/po intake, observed 50% meal intake of lunch tray. Pt admits to very poor po 4-5 days PTA likely r/t acute illness, however admits that he ocasionally skips meals per his baseline because he is not always hungry and is not amenable to "try and eat if [he] doesn't feel up to it." Pt admits to wt loss over the past 2 years (UBW:164 pounds x ~2 years ago), however per recent RD notes weight has been stable 132-135 pounds over the past several months (April 2018: 132 pounds, January 2018: 135 pounds consistent with current weight 135.8 pounds). States he's tried oral supplements Glucerna/Ensure in the past but does not want to drink it in house. Pt is edentulous, dentures present on bedside tray however pt does not have Polydent and is waiting for his daughter to bring it. Admits to some chewing difficulty as a result but declines changing diet to soft in the interim; instead states he can choose soft food off of the regular menu. Denies swallowing difficulty. Pt denies N+V, diarrhea but admits to chronic constipation; is amenable to stewed prunes daily at breakfast to help address. Last BM yesterday. In terms of DM control, pt takes Metformin and Glimepiride PTA with good adherence; admits to occasional hypoglycemia episodes at home. Checks his fingersticks daily with fasting results often 80s-90s. Pt and daughter inquiring about why the patient is losing weight and what can be done to help prevent further wt loss.

## 2018-08-28 NOTE — DIETITIAN INITIAL EVALUATION ADULT. - SOURCE
electronic medical records/other (specify) electronic medical records, daughter at bedside/other (specify)/family/significant other

## 2018-08-28 NOTE — DISCHARGE NOTE ADULT - PATIENT PORTAL LINK FT
You can access the [facility] Patient Portal, offered by [facility], by registering with the following website: [email or web address]

## 2018-08-28 NOTE — CHART NOTE - NSCHARTNOTEFT_GEN_A_CORE
Upon Nutritional Assessment by the Registered Dietitian your patient was determined to meet criteria / has evidence of the following diagnosis/diagnoses:          [ ]  Mild Protein Calorie Malnutrition        [ ]  Moderate Protein Calorie Malnutrition        [x] Severe Protein Calorie Malnutrition        [ ] Unspecified Protein Calorie Malnutrition        [ ] Underweight / BMI <19        [ ] Morbid Obesity / BMI > 40      Findings as based on:  [x] Comprehensive nutrition assessment   [x] Nutrition Focused Physical Exam  [x] Other: BMI: 18.8 kG/m2      Nutrition Plan/Recommendations:      Please refer to RD initial assessment for recommendations and interventions      PROVIDER Section:     By signing this assessment you are acknowledging and agree with the diagnosis/diagnoses assigned by the Registered Dietitian    Comments:

## 2018-08-28 NOTE — DIETITIAN INITIAL EVALUATION ADULT. - ORAL INTAKE PTA
Pt reports "eating almost nothing" x 4-5 days PTA. When eating at his baseline, pt reports typical breakfast of hot or cold cereal and yogurt with fresh fruit. Lunch: Pt often skips. Dinner: Pt admits he occasionally will skip dinner too because he doesn't feel hungry, but if he eats something it would be a banana and yogurt or pasta. Pt denies taking vitamin/mineral/herbal supplements PTA. Denies food allergies or intolerance./poor

## 2018-08-28 NOTE — DISCHARGE NOTE ADULT - MEDICATION SUMMARY - MEDICATIONS TO TAKE
I will START or STAY ON the medications listed below when I get home from the hospital:    Dilt- mg/24 hours oral capsule, extended release  -- 1 cap(s) by mouth once a day  -- Indication: For Afib     Xarelto 15 mg oral tablet  -- 1 tab(s) by mouth once a day (in the evening)  -- Indication: For Atrial fibrillation, unspecified type    lamoTRIgine 25 mg oral tablet  -- 1 tab(s) by mouth once a day (at bedtime)  -- Indication: For Seizure     mirtazapine 15 mg oral tablet  -- 1 tab(s) by mouth once a day (at bedtime)  -- Indication: For Depression     metFORMIN 500 mg oral tablet  -- 2 tab(s) by mouth 2 times a day  -- Indication: For Diabetes type 2, controlled    rosuvastatin 10 mg oral tablet  -- 1 tab(s) by mouth once a day (at bedtime)  -- Indication: For Hyperlipidemia     methIMAzole 5 mg oral tablet  -- 0.5 tab(s) by mouth once a day  -- Indication: For Hypothyroid     ALPRAZolam 0.25 mg oral tablet  -- 1 tab(s) by mouth every 8 hours, As needed, anxiety  -- Indication: For Anxiety     metoprolol tartrate 25 mg oral tablet  -- 1 tab(s) by mouth 2 times a day  -- Indication: For Atrial fibrillation, unspecified type    Spiriva 18 mcg inhalation capsule  -- 1 cap(s) inhaled once a day   -- Check with your doctor before becoming pregnant.  For inhalation only.  It is very important that you take or use this exactly as directed.  Do not skip doses or discontinue unless directed by your doctor.  Obtain medical advice before taking any non-prescription drugs as some may affect the action of this medication.    -- Indication: For COPD exacerbation    Advair Diskus 250 mcg-50 mcg inhalation powder  -- 1 puff(s) inhaled 2 times a day   -- Check with your doctor before becoming pregnant.  For inhalation only.  Obtain medical advice before taking any non-prescription drugs as some may affect the action of this medication.  Rinse mouth thoroughly after use.    -- Indication: For COPD exacerbation    polyethylene glycol 3350 oral powder for reconstitution  -- 17 gram(s) by mouth once a day, As Needed  -- Indication: For Constipation     Flonase 50 mcg/inh nasal spray  -- 1 spray(s) in each nasal, As directed  -- Indication: For nasal spray     pantoprazole 40 mg oral delayed release tablet  -- 1 tab(s) by mouth once a day  -- Indication: For gerd I will START or STAY ON the medications listed below when I get home from the hospital:    Dilt- mg/24 hours oral capsule, extended release  -- 1 cap(s) by mouth once a day  -- Indication: For Afib     Xarelto 15 mg oral tablet  -- 1 tab(s) by mouth once a day (in the evening)  -- Indication: For Atrial fibrillation, unspecified type    lamoTRIgine 25 mg oral tablet  -- 1 tab(s) by mouth once a day (at bedtime)  -- Indication: For Seizure     mirtazapine 15 mg oral tablet  -- 1 tab(s) by mouth once a day (at bedtime)  -- Indication: For Depression     Januvia 50 mg oral tablet  -- 1 tab(s) by mouth once a day   -- Do not drink alcoholic beverages when taking this medication.    -- Indication: For Diabetes type 2, controlled    metFORMIN 500 mg oral tablet  -- 2 tab(s) by mouth 2 times a day  -- Indication: For Diabetes type 2, controlled    rosuvastatin 10 mg oral tablet  -- 1 tab(s) by mouth once a day (at bedtime)  -- Indication: For Hyperlipidemia     methIMAzole 5 mg oral tablet  -- 0.5 tab(s) by mouth once a day  -- Indication: For Hypothyroid     ALPRAZolam 0.25 mg oral tablet  -- 1 tab(s) by mouth every 8 hours, As needed, anxiety  -- Indication: For Anxiety     metoprolol tartrate 25 mg oral tablet  -- 1 tab(s) by mouth 2 times a day  -- Indication: For Atrial fibrillation, unspecified type    Spiriva 18 mcg inhalation capsule  -- 1 cap(s) inhaled once a day   -- Check with your doctor before becoming pregnant.  For inhalation only.  It is very important that you take or use this exactly as directed.  Do not skip doses or discontinue unless directed by your doctor.  Obtain medical advice before taking any non-prescription drugs as some may affect the action of this medication.    -- Indication: For COPD exacerbation    Advair Diskus 250 mcg-50 mcg inhalation powder  -- 1 puff(s) inhaled 2 times a day   -- Check with your doctor before becoming pregnant.  For inhalation only.  Obtain medical advice before taking any non-prescription drugs as some may affect the action of this medication.  Rinse mouth thoroughly after use.    -- Indication: For COPD exacerbation    polyethylene glycol 3350 oral powder for reconstitution  -- 17 gram(s) by mouth once a day, As Needed  -- Indication: For Constipation     Flonase 50 mcg/inh nasal spray  -- 1 spray(s) in each nasal, As directed  -- Indication: For nasal spray     pantoprazole 40 mg oral delayed release tablet  -- 1 tab(s) by mouth once a day  -- Indication: For gerd I will START or STAY ON the medications listed below when I get home from the hospital:    Dilt- mg/24 hours oral capsule, extended release  -- 1 cap(s) by mouth once a day  -- Indication: For Afib     Xarelto 15 mg oral tablet  -- 1 tab(s) by mouth once a day (in the evening)  -- Indication: For Atrial fibrillation, unspecified type    lamoTRIgine 25 mg oral tablet  -- 1 tab(s) by mouth once a day (at bedtime)  -- Indication: For Seizure     mirtazapine 15 mg oral tablet  -- 1 tab(s) by mouth once a day (at bedtime)  -- Indication: For Depression     Januvia 50 mg oral tablet  -- 1 tab(s) by mouth once a day   -- Do not drink alcoholic beverages when taking this medication.    -- Indication: For Diabetes type 2, controlled    metFORMIN 500 mg oral tablet  -- 2 tab(s) by mouth 2 times a day  -- Indication: For Diabetes type 2, controlled    rosuvastatin 10 mg oral tablet  -- 1 tab(s) by mouth once a day (at bedtime)  -- Indication: For Hyperlipidemia     methIMAzole 5 mg oral tablet  -- 0.5 tab(s) by mouth once a day  -- Indication: For Hypothyroid     ALPRAZolam 0.25 mg oral tablet  -- 1 tab(s) by mouth every 8 hours, As needed, anxiety  -- Indication: For Anxiety     metoprolol tartrate 25 mg oral tablet  -- 1 tab(s) by mouth 2 times a day  -- Indication: For Atrial fibrillation, unspecified type    Spiriva 18 mcg inhalation capsule  -- 1 cap(s) inhaled once a day   -- Check with your doctor before becoming pregnant.  For inhalation only.  It is very important that you take or use this exactly as directed.  Do not skip doses or discontinue unless directed by your doctor.  Obtain medical advice before taking any non-prescription drugs as some may affect the action of this medication.    -- Indication: For COPD exacerbation    Advair Diskus 250 mcg-50 mcg inhalation powder  -- 1 puff(s) inhaled 2 times a day   -- Check with your doctor before becoming pregnant.  For inhalation only.  Obtain medical advice before taking any non-prescription drugs as some may affect the action of this medication.  Rinse mouth thoroughly after use.    -- Indication: For COPD exacerbation    albuterol 90 mcg/inh inhalation aerosol  -- 2 puff(s) inhaled 4 times a day, As Needed  -- Indication: For respiratory     polyethylene glycol 3350 oral powder for reconstitution  -- 17 gram(s) by mouth once a day, As Needed  -- Indication: For Constipation     Flonase 50 mcg/inh nasal spray  -- 1 spray(s) in each nasal, As directed  -- Indication: For nasal spray     pantoprazole 40 mg oral delayed release tablet  -- 1 tab(s) by mouth once a day  -- Indication: For gerd I will START or STAY ON the medications listed below when I get home from the hospital:    Dilt- mg/24 hours oral capsule, extended release  -- 1 cap(s) by mouth once a day  -- Indication: For Afib     Xarelto 15 mg oral tablet  -- 1 tab(s) by mouth once a day (in the evening)  -- Indication: For Atrial fibrillation, unspecified type    lamoTRIgine 25 mg oral tablet  -- 1 tab(s) by mouth once a day (at bedtime)  -- Indication: For Seizure     mirtazapine 15 mg oral tablet  -- 1 tab(s) by mouth once a day (at bedtime)  -- Indication: For Depression     metFORMIN 500 mg oral tablet  -- 2 tab(s) by mouth 2 times a day  -- Indication: For Diabetes type 2, controlled    Januvia 50 mg oral tablet  -- 1 tab(s) by mouth once a day   -- Do not drink alcoholic beverages when taking this medication.    -- Indication: For Diabetes type 2, controlled    rosuvastatin 10 mg oral tablet  -- 1 tab(s) by mouth once a day (at bedtime)  -- Indication: For Hyperlipidemia     methIMAzole 5 mg oral tablet  -- 0.5 tab(s) by mouth once a day  -- Indication: For Hyperthyroidism    ALPRAZolam 0.25 mg oral tablet  -- 1 tab(s) by mouth every 8 hours, As needed, anxiety  -- Indication: For Anxiety     metoprolol tartrate 25 mg oral tablet  -- 1 tab(s) by mouth 2 times a day  -- Indication: For Atrial fibrillation, unspecified type    Advair Diskus 250 mcg-50 mcg inhalation powder  -- 1 puff(s) inhaled 2 times a day   -- Check with your doctor before becoming pregnant.  For inhalation only.  Obtain medical advice before taking any non-prescription drugs as some may affect the action of this medication.  Rinse mouth thoroughly after use.    -- Indication: For COPD exacerbation    albuterol 90 mcg/inh inhalation aerosol  -- 2 puff(s) inhaled 4 times a day, As Needed  -- Indication: For COPD exacerbation    Spiriva 18 mcg inhalation capsule  -- 1 cap(s) inhaled once a day   -- Check with your doctor before becoming pregnant.  For inhalation only.  It is very important that you take or use this exactly as directed.  Do not skip doses or discontinue unless directed by your doctor.  Obtain medical advice before taking any non-prescription drugs as some may affect the action of this medication.    -- Indication: For COPD exacerbation    polyethylene glycol 3350 oral powder for reconstitution  -- 17 gram(s) by mouth once a day, As Needed  -- Indication: For Constipation     Flonase 50 mcg/inh nasal spray  -- 1 spray(s) in each nasal, As directed  -- Indication: For nasal spray     pantoprazole 40 mg oral delayed release tablet  -- 1 tab(s) by mouth once a day  -- Indication: For gerd I will START or STAY ON the medications listed below when I get home from the hospital:    Dilt- mg/24 hours oral capsule, extended release  -- 1 cap(s) by mouth once a day  -- Indication: For Afib     Xarelto 15 mg oral tablet  -- 1 tab(s) by mouth once a day (in the evening)  -- Indication: For Atrial fibrillation, unspecified type    lamoTRIgine 25 mg oral tablet  -- 1 tab(s) by mouth once a day (at bedtime)  -- Indication: For mood     mirtazapine 15 mg oral tablet  -- 1 tab(s) by mouth once a day (at bedtime)  -- Indication: For Depression     metFORMIN 500 mg oral tablet  -- 2 tab(s) by mouth 2 times a day  -- Indication: For Diabetes type 2, controlled    Januvia 50 mg oral tablet  -- 1 tab(s) by mouth once a day   -- Do not drink alcoholic beverages when taking this medication.    -- Indication: For Diabetes type 2, controlled    rosuvastatin 10 mg oral tablet  -- 1 tab(s) by mouth once a day (at bedtime)  -- Indication: For Hyperlipidemia     methIMAzole 5 mg oral tablet  -- 0.5 tab(s) by mouth once a day  -- Indication: For Hyperthyroidism    ALPRAZolam 0.25 mg oral tablet  -- 1 tab(s) by mouth every 8 hours, As needed, anxiety  -- Indication: For Anxiety     metoprolol tartrate 25 mg oral tablet  -- 1 tab(s) by mouth 2 times a day  -- Indication: For Atrial fibrillation, unspecified type    Advair Diskus 250 mcg-50 mcg inhalation powder  -- 1 puff(s) inhaled 2 times a day   -- Check with your doctor before becoming pregnant.  For inhalation only.  Obtain medical advice before taking any non-prescription drugs as some may affect the action of this medication.  Rinse mouth thoroughly after use.    -- Indication: For COPD exacerbation    albuterol 90 mcg/inh inhalation aerosol  -- 2 puff(s) inhaled 4 times a day, As Needed  -- Indication: For COPD exacerbation    Spiriva 18 mcg inhalation capsule  -- 1 cap(s) inhaled once a day   -- Check with your doctor before becoming pregnant.  For inhalation only.  It is very important that you take or use this exactly as directed.  Do not skip doses or discontinue unless directed by your doctor.  Obtain medical advice before taking any non-prescription drugs as some may affect the action of this medication.    -- Indication: For COPD exacerbation    polyethylene glycol 3350 oral powder for reconstitution  -- 17 gram(s) by mouth once a day, As Needed  -- Indication: For Constipation     Flonase 50 mcg/inh nasal spray  -- 1 spray(s) in each nasal, As directed  -- Indication: For nasal spray     pantoprazole 40 mg oral delayed release tablet  -- 1 tab(s) by mouth once a day  -- Indication: For gerd

## 2018-08-28 NOTE — PROGRESS NOTE ADULT - SUBJECTIVE AND OBJECTIVE BOX
[facility] - [facility]     PROGRESS NOTE    CHIEF COMPLAINT: parainfluenza viral infection; COPD exacerbation; pneumonia; emphysema; lung nodule    INTERVAL HISTORY: walking in his room without difficulty; no further arrhythmias - remains in NSR; resolved fatigue, malaise and weakness; sitting in the chair and eating breakfast; no fevers, chills or sweats; no cough, sputum production, chest congestion or wheeze; eating well    REVIEW OF SYSTEMS:  Constitutional: As per interval history  HEENT: Within normal limits  CV: As per interval history  Resp: As per interval history  GI: Within normal limits   : Within normal limits  Musculoskeletal: Within normal limits  Skin: Within normal limits  Neurological: poor memory  Psychiatric: Within normal limits  Endocrine: Within normal limits  Hematologic/Lymphatic: Within normal limits  Allergic/Immunologic: Within normal limits    MEDICATIONS:     Pulmonary "  buDESOnide   0.5 milliGRAM(s) Respule 0.5 milliGRAM(s) Inhalation every 12 hours  ipratropium    for Nebulization 500 MICROGram(s) Nebulizer every 6 hours PRN  levalbuterol Inhalation 0.63 milliGRAM(s) Inhalation every 6 hours PRN      Anti-microbials:  azithromycin   Tablet 500 milliGRAM(s) Oral every 24 hours  cefTRIAXone   IVPB 1 Gram(s) IV Intermittent every 24 hours      Cardiovascular:  diltiazem    milliGRAM(s) Oral daily  metoprolol tartrate 25 milliGRAM(s) Oral two times a day      Other:  ALPRAZolam 0.25 milliGRAM(s) Oral daily PRN  atorvastatin 40 milliGRAM(s) Oral daily  dextrose 40% Gel 15 Gram(s) Oral once PRN  dextrose 5%. 1000 milliLiter(s) IV Continuous <Continuous>  dextrose 50% Injectable 12.5 Gram(s) IV Push once  dextrose 50% Injectable 25 Gram(s) IV Push once  fluticasone propionate 50 MICROgram(s)/spray Nasal Spray 1 Spray(s) Both Nostrils two times a day  insulin glargine Injectable (LANTUS) 3 Unit(s) SubCutaneous at bedtime  insulin lispro (HumaLOG) corrective regimen sliding scale   SubCutaneous three times a day before meals  insulin lispro (HumaLOG) corrective regimen sliding scale   SubCutaneous at bedtime  insulin lispro Injectable (HumaLOG) 2 Unit(s) SubCutaneous three times a day before meals  lamoTRIgine 25 milliGRAM(s) Oral at bedtime  methimazole 5 milliGRAM(s) Oral daily  mirtazapine 15 milliGRAM(s) Oral at bedtime  pantoprazole    Tablet 40 milliGRAM(s) Oral before breakfast  polyethylene glycol 3350 17 Gram(s) Oral daily PRN  predniSONE   Tablet 40 milliGRAM(s) Oral daily  rivaroxaban 15 milliGRAM(s) Oral at bedtime  sodium chloride 0.9%. 1000 milliLiter(s) IV Continuous <Continuous>        OBJECTIVE:    I&O's Detail    27 Aug 2018 07:01  -  28 Aug 2018 07:00  --------------------------------------------------------  IN:    Oral Fluid: 1200 mL    sodium chloride 0.9%.: 50 mL  Total IN: 1250 mL    OUT:    Voided: 1025 mL  Total OUT: 1025 mL    Total NET: 225 mL      28 Aug 2018 07:01  -  28 Aug 2018 09:04  --------------------------------------------------------  IN:    Oral Fluid: 240 mL  Total IN: 240 mL    OUT:    Voided: 350 mL  Total OUT: 350 mL    Total NET: -110 mL    Daily Weight in k.6 (28 Aug 2018 07:58)    POCT Blood Glucose.: 127 mg/dL (28 Aug 2018 07:23)  POCT Blood Glucose.: 371 mg/dL (27 Aug 2018 21:20)  POCT Blood Glucose.: 392 mg/dL (27 Aug 2018 17:09)  POCT Blood Glucose.: 270 mg/dL (27 Aug 2018 11:53)      PHYSICAL EXAM:       ICU Vital Signs Last 24 Hrs  T(C): 36.4 (28 Aug 2018 05:00), Max: 36.8 (27 Aug 2018 20:27)  T(F): 97.5 (28 Aug 2018 05:00), Max: 98.2 (27 Aug 2018 20:27)  HR: 84 (28 Aug 2018 05:00) (84 - 92)  BP: 124/64 (28 Aug 2018 05:00) (121/62 - 135/70)  BP(mean): --  ABP: --  ABP(mean): --  RR: 18 (28 Aug 2018 05:00) (18 - 18)  SpO2: 96% (28 Aug 2018 05:00) (96% - 96%) on room air     General: Awake. Alert. Cooperative. No distress. Appears stated age 	  HEENT:   Atraumatic. Bitemporal wasting. Anicteric. Normal oral mucosa. PERRL. EOMI. Edentulous  Neck: Supple. Trachea midline. Thyroid without enlargement/tenderness/nodules. No carotid bruit. No JVD. Loss of bilateral supraclavicular fat pads  Cardiovascular: Regular rate and rhythm. S1 S2 normal. No murmurs, rubs or gallops.  Respiratory: Respirations unlabored. Clear to auscultation and percussion. Kyphosis.  Abdomen: Soft. Non-tender. Non-distended. No organomegaly. No masses. Normal bowel sounds.   Extremities: Warm to touch. No clubbing or cyanosis. No pedal edema.  Pulses: 2+ peripheral pulses all extremities.	  Skin: Multiple seborrheic kearatoses  Lymph Nodes: Cervical, supraclavicular and axillary nodes normal  Neurological: Motor and sensory examination equal and normal. A and O x 3  Psychiatry: Appropriate mood and affect.    LABS:                        8.5    10.35 )-----------( 518      ( 28 Aug 2018 07:31 )             27.8                         8.1    11.9  )-----------( 683      ( 27 Aug 2018 05:56 )             26.5         141  |  108  |  30<H>  ----------------------------<  120<H>  4.8   |  21<L>  |  1.37<H>        141  |  106  |  30<H>  ----------------------------<  163<H>  4.6   |  22  |  1.24    Ca      8.9          Ca      8.9          Phos    3.6         Phos    2.3           Mg       2.2         Mg       1.7         TPro  8.2  /  Alb  4.3  /  TBili  0.2  /  DBili  x   /  AST  19  /  ALT  9<L>  /  AlkPhos  98      ABG - ( 27 Aug 2018 00:30 )  pH: 7.42  /  pCO2: 35    /  pO2: 87    / HCO3: 22    / Base Excess: -1.6  /  SaO2: 96          ABG - ( 26 Aug 2018 20:28 )  pH: 7.41  /  pCO2: 39    /  pO2: 74    / HCO3: 24    / Base Excess: .0    /  SaO2: 96        Procalcitonin, Serum: 0.09 ng/mL ( @ 00:14)    Procalcitonin, Serum: 0.09 ng/mL ( @ 05:50)    CARDIAC MARKERS ( 27 Aug 2018 05:56 )  x     / x     / 64 U/L / x     / 3.7 ng/mL  CARDIAC MARKERS ( 27 Aug 2018 00:14 )  x     / x     / 75 U/L / x     / 3.9 ng/mL    Troponin T, High Sensitivity (18 @ 00:14)    Troponin T, High Sensitivity Result: 24: Rapid upward or downward changes in high-sensitivity troponin levels  suggest acute myocardial injury. Renal impairment may cause sustained  troponin elevations.  Normal: <6 - 14 ng/L  Indeterminate: 15-51 ng/L  Elevated: > 51 ng/L  See [email or web address] on the [facility] Rally Software for more  information ng/L    Troponin T, High Sensitivity (18 @ 05:56)    Troponin T, High Sensitivity Result: 44: Rapid upward or downward changes in high-sensitivity troponin levels  suggest acute myocardial injury. Renal impairment may cause sustained  troponin elevations.  Normal: <6 - 14 ng/L  Indeterminate: 15-51 ng/L  Elevated: > 51 ng/L  See [email or web address] on the [facility] Rally Software for more  information ng/L    < from: Transthoracic Echocardiogram (17 @ 15:01) >    Patient name: BASHIR BECKER  YOB: 1928   Age: 89 (M)   MR#: [id number]  Study Date: 2017  Location: [address] [place], [place] [id number]: Laly Portillo RDCS  Study quality: Technically difficult  Referring Physician: Delilah Katz MD  Blood Pressure: 124/54 mmHg  Height: 183 cm  Weight: 70 kg  BSA: 1.9 m2  ------------------------------------------------------------------------  PROCEDURE: Transthoracic echocardiogram with 2-D, M-Mode  and complete spectral and color flow Doppler.  INDICATION: Unspecified atrial fibrillation (I48.91)  ------------------------------------------------------------------------  Dimensions:    Normal Values:  LA:     3.9    2.0 - 4.0 cm  Ao:     3.6    2.0 - 3.8 cm  SEPTUM: 1.1    0.6 - 1.2 cm  PWT:    1.0   0.6 - 1.1 cm  LVIDd:  4.7    3.0 - 5.6 cm  LVIDs:  3.1    1.8 - 4.0 cm  Derived variables:  LVMI: 93 g/m2  RWT: 0.42  Fractional short: 34 %  EF (Teicholtz): 75 %  Doppler Peak Velocity (m/sec): AoV=2.0  ------------------------------------------------------------------------  Observations:  Mitral Valve: Mitral annular calcification and calcified  mitral leaflets with normal diastolic opening.  Mild-moderate mitral regurgitation.  Aortic Valve/Aorta: Calcified trileaflet aortic valve with  normal opening. Peak transaortic valve gradient equals 17  mm Hg, mean transaortic valve gradient equals 9 mm Hg,  estimated aortic valve area equals 1.6 sqcm (by continuity  equation), aortic valve velocity time integral equals 40  cm, consistent with mild aortic stenosis. Peak left  ventricular outflow tract gradient equals 4 mm Hg, mean  gradient is equal to 2 mm Hg, LVOT velocity time integral  equals 21 cm.  Aortic Root: 3.6 cm.  Left Atrium: Mildly dilated left atrium.  LA volume index =  36 cc/m2.  Left Ventricle: Hyperdynamic left ventricular systolic  function. Increased relative wall thickness with normal  left ventricular mass index, consistent with concentric  left ventricular remodeling. Indeterminate diastolic  function.  Right Heart: Normal right atrium. Normal right ventricular  size and function. Normal tricuspid valve. Mild tricuspid  regurgitation. Normal pulmonic valve.  Pericardium/Pleura: Normal pericardium with trace  pericardial effusion.  Hemodynamic: Estimated rightatrial pressure is 8 mm Hg.  Estimated right ventricular systolic pressure equals 38 mm  Hg, assuming right atrial pressure equals 8 mm Hg,  consistent with borderline pulmonary hypertension.  ------------------------------------------------------------------------  Conclusions:  1. Mitral annular calcification and calcified mitral  leaflets with normal diastolic opening.  2. Calcified trileaflet aortic valve with normal opening.  Peak transaortic valve gradient equals 17 mm Hg, mean  transaorticvalve gradient equals 9 mm Hg, estimated aortic  valve area equals 1.6 sqcm (by continuity equation), aortic  valve velocity time integral equals 40 cm, consistent with  mild aortic stenosis.  3. Mildly dilated left atrium.  LA volume index = 36 cc/m2.  4. Increased relative wall thickness with normal left  ventricular mass index, consistent with concentric left  ventricular remodeling.  5. Hyperdynamic left ventricular systolic function.  6. Normal right ventricular size and function.  7. Estimated right ventricular systolic pressure equals 38  mm Hg, assuming right atrial pressure equals 8 mm Hg,  consistent with borderline pulmonary hypertension.  ------------------------------------------------------------------------  Confirmed on  2017 - 16:56:34 by Gunjan Chris, M.D.  ------------------------------------------------------------------------    < end of copied text >  ---------------------------------------------------------------------------------------------------------    MICROBIOLOGY:     Urinalysis Basic - ( 26 Aug 2018 02:52 )    Color: Yellow / Appearance: Clear / S.017 / pH: x  Gluc: x / Ketone: Negative  / Bili: Negative / Urobili: Negative   Blood: x / Protein: 30 mg/dL / Nitrite: Negative   Leuk Esterase: Small / RBC: 0-2 /HPF / WBC 10-25 /HPF   Sq Epi: x / Non Sq Epi: Occasional /HPF / Bacteria: x    Culture - Urine (18 @ 05:32)    Specimen Source: .Urine Clean Catch (Midstream)    Culture Results:   No growth    Culture - Blood in AM (18 @ 08:24)    -  Coagulase negative Staphylococcus: Detec    Gram Stain:   Growth in aerobic bottle: Gram Positive Cocci in Clusters    Specimen Source: .Blood Blood-Peripheral    Organism: Blood Culture PCR    Culture Results:   Growth in aerobic bottle: Gram Positive Cocci in Clusters  "Due to technical problems, Proteus sp. will Not be reported as part of  the BCID panel until further notice"  ***Blood Panel PCR results on this specimen are available  approximately 3 hours after the Gram stain result.***  Gram stain, PCR, and/or culture results may not always  correspond due to difference in methodologies.  ************************************************************    Rapid Respiratory Viral Panel (18 @ 23:39)    Rapid RVP Result: Detected: The [employer] RVP Rapid uses polymerase chain reaction (PCR) and melt  curve analysis to screen for adenovirus; coronavirus HKU1, NL63, 229E,  OC43; human metapneumovirus (hMPV); human enterovirus/rhinovirus  (Entero/RV); influenza A; influenza A/H1;influenza A/H3; influenza  A/H1-2009; influenza B; parainfluenza viruses 1, 2, 3, 4; respiratory  syncytial virus; Bordetella pertussis; Mycoplasma pneumoniae; and  Chlamydophila pneumoniae.    Parainfluenza 4 (RapRVP): Detected    RADIOLOGY:  [x] Chest radiographs reviewed and interpreted by me    < from: Xray Chest 1 View- PORTABLE-Urgent (18 @ 20:11) >    EXAM:  XR CHEST PORTABLE URGENT 1V                          PROCEDURE DATE:  2018      INTERPRETATION:  EXAMINATION: XR CHEST PORTABLE URGENT 1V    CLINICAL INDICATION: cough, hypoglycemia    TECHNIQUE: Single portable view of the chest was obtained.    COMPARISON: Chest radiograph from 2018 and CT chest from 2018.    FINDINGS:     The heart is normal in size. Aorta is atherosclerotic. There is   emphysema. There is stable right pleural thickening with calcifications.   There are no focal pulmonary consolidation.    IMPRESSION:   No focal consolidation.    MARY LOU KHAN M.D., RADIOLOGY RESIDENT  This document has been electronically signed.  POOJA MARTINEZ M.D., ATTENDING RADIOLOGIST  This document has been electronically signed. Aug 26 2018  3:52PM      < end of copied text >  ---------------------------------------------------------------------------------------------------------  < from: CT Chest No Cont (18 @ 23:46) >    EXAM:  CT CHEST                          PROCEDURE DATE:  2018      INTERPRETATION:  CLINICAL INFORMATION: COPD, hypoglycemia, evaluate for   pneumonia.    COMPARISON: CT chest 2018. CT 2018. CT 2016.    PROCEDURE:   CT of the Chest was performed without intravenous contrast.  Sagittal and coronal reformats were performed.      FINDINGS:    CHEST:     LUNGS AND LARGE AIRWAYS: Patent central airways.  Emphysema. Bibasilar   subsegmental atelectasis. A 5 mm right lower lobe nodule (2:60). Tiny   tree-in-bud opacities visualized peripherally at the superior segment of   the right lower lobe posteriorly. Few tiny tree-in-bud opacities   visualized at the periphery of the left lower lobe.  PLEURA: Calcified right pleural plaques.  VESSELS: Atherosclerotic changes aorta and branches.  HEART: Heart size is normal. Trace pericardial effusion. Coronary   calcifications.  MEDIASTINUM AND ALFRED: Stable subcentimeter mediastinal lymph nodes.  CHEST WALL AND LOWER NECK: Unchanged hypodense right thyroid nodule.  VISUALIZED UPPER ABDOMEN: Cholelithiasis.  BONES: Generative changes. Redemonstrated L1 compression deformity.    IMPRESSION:   Tree-in-bud opacities at both lower lobes peripherally indicative of   mucoid impacted distal airways. Infection cannot be excluded. Recommend   follow-up.    Emphysema.    MARIELOS MINOR M.D., RADIOLOGY RESIDENT  This document has been electronically signed.  LORENZO MAYES M.D., ATTENDING RADIOLOGIST  This document has been electronically signed. Aug 26 2018 12:07PM    < end of copied text >  ---------------------------------------------------------------------------------------------------------    SPIROMETRY:    FEV1 0.81 liters - 30% predicted  FVC 1.88 liters - 48% predicted  FEV1 43    c/w severe obstructive lung disease

## 2018-08-28 NOTE — DISCHARGE NOTE ADULT - HOSPITAL COURSE
89M DM2 on PO meds, COPD has home 02, afib on xarelto p/w weakness likely related to hypoglycemia, HARJEET and possible pneumonia with COPD exacerbation. You completed a course of antibiotics and prednisone 89M DM2 on PO meds, COPD has home 02, afib on xarelto p/w weakness likely related to hypoglycemia, HARJEET and possible pneumonia with COPD exacerbation. You completed a course of antibiotics and prednisone . You were found to have parainfluenza infection , c/w supportive measures. You have been cleared from a medical standpoint for discharge with close follow up..

## 2018-08-28 NOTE — DISCHARGE NOTE ADULT - MEDICATION SUMMARY - MEDICATIONS TO STOP TAKING
I will STOP taking the medications listed below when I get home from the hospital:    methIMAzole 5 mg oral tablet  -- 1 tab(s) by mouth once a day    glimepiride 1 mg oral tablet  -- 1 tab(s) by mouth once a day

## 2018-08-28 NOTE — DIETITIAN INITIAL EVALUATION ADULT. - NS AS NUTRI INTERV COLLABORAT
Agree that pt should be taken off sulfonylurea at home 2/2 bouts of anorexia and meal skippping and as pt is not amenable to snack between meals or eat if his appetite is poor. Defer d/c DM regimen per team.

## 2018-08-28 NOTE — PROGRESS NOTE ADULT - SUBJECTIVE AND OBJECTIVE BOX
CARDIOLOGY FOLLOW UP - Dr. Serrano    CC no chest pain or sob       PHYSICAL EXAM:  T(C): 36.4 (08-28-18 @ 05:00), Max: 36.8 (08-27-18 @ 20:27)  HR: 84 (08-28-18 @ 05:00) (84 - 92)  BP: 124/64 (08-28-18 @ 05:00) (121/62 - 135/70)  RR: 18 (08-28-18 @ 05:00) (18 - 18)  SpO2: 96% (08-28-18 @ 05:00) (96% - 96%)  Wt(kg): --  I&O's Summary    27 Aug 2018 07:01  -  28 Aug 2018 07:00  --------------------------------------------------------  IN: 1250 mL / OUT: 1025 mL / NET: 225 mL    28 Aug 2018 07:01  -  28 Aug 2018 10:28  --------------------------------------------------------  IN: 240 mL / OUT: 350 mL / NET: -110 mL        Appearance: Normal	  Cardiovascular: Normal S1 S2,RRR, + murmur   Respiratory: Lungs clear to auscultation	  Gastrointestinal:  Soft, Non-tender, + BS	  Extremities: Normal range of motion, No clubbing, cyanosis or edema        MEDICATIONS  (STANDING):  atorvastatin 40 milliGRAM(s) Oral daily  azithromycin   Tablet 500 milliGRAM(s) Oral every 24 hours  buDESOnide   0.5 milliGRAM(s) Respule 0.5 milliGRAM(s) Inhalation every 12 hours  cefTRIAXone   IVPB 1 Gram(s) IV Intermittent every 24 hours  dextrose 5%. 1000 milliLiter(s) (50 mL/Hr) IV Continuous <Continuous>  dextrose 50% Injectable 12.5 Gram(s) IV Push once  dextrose 50% Injectable 25 Gram(s) IV Push once  diltiazem    milliGRAM(s) Oral daily  fluticasone propionate 50 MICROgram(s)/spray Nasal Spray 1 Spray(s) Both Nostrils two times a day  insulin glargine Injectable (LANTUS) 3 Unit(s) SubCutaneous at bedtime  insulin lispro (HumaLOG) corrective regimen sliding scale   SubCutaneous three times a day before meals  insulin lispro (HumaLOG) corrective regimen sliding scale   SubCutaneous at bedtime  insulin lispro Injectable (HumaLOG) 2 Unit(s) SubCutaneous three times a day before meals  lamoTRIgine 25 milliGRAM(s) Oral at bedtime  methimazole 5 milliGRAM(s) Oral daily  metoprolol tartrate 25 milliGRAM(s) Oral two times a day  mirtazapine 15 milliGRAM(s) Oral at bedtime  pantoprazole    Tablet 40 milliGRAM(s) Oral before breakfast  predniSONE   Tablet 40 milliGRAM(s) Oral daily  rivaroxaban 15 milliGRAM(s) Oral at bedtime  sodium chloride 0.9%. 1000 milliLiter(s) (50 mL/Hr) IV Continuous <Continuous>      TELEMETRY: NSR/ PAC  	    ECG:  	  RADIOLOGY:   DIAGNOSTIC TESTING:  [ ] Echocardiogram:  [ ]  Catheterization:  [ ] Stress Test:    OTHER: 	    LABS:	 	                                8.5    10.35 )-----------( 518      ( 28 Aug 2018 07:31 )             27.8     08-28    141  |  108  |  30<H>  ----------------------------<  120<H>  4.8   |  21<L>  |  1.37<H>    Ca    8.9      28 Aug 2018 06:46  Phos  3.6     08-27  Mg     2.2     08-27

## 2018-08-28 NOTE — DISCHARGE NOTE ADULT - CARE PROVIDER_API CALL
Kalen Lam), ColonRectal Surgery; Surgery  [address] [facility]  [address] [place] [address]  Phone: [phone]  Fax: [phone]

## 2018-08-28 NOTE — DISCHARGE NOTE ADULT - PLAN OF CARE
stable Call your Health Care provider upon arrival home to make a follow up appointment within one week.  Take all inhalers as prescribed by your Health Care Provider.  Take steroids as prescribed by your Health Care Provider.  If your cough increases infrequency and severity and/or you have shortness of breath or increased shortness of breath call your Health Care Provider.  If you develop fever, chills, night sweats, malaise, and/or change in mental status call your Health care Provider.  Nutrition is very important.  Eat small frequent meals.  Increase your activity as tolerated.  Do not stay in bed all day Atrial fibrillation is the most common heart rhythm problem.  The condition puts you at risk for has stroke and heart attack  It helps if you control your blood pressure, not drink more than 1-2 alcohol drinks per day, cut down on caffeine, getting treatment for over active thyroid gland, and get regular exercise  Call your doctor if you feel your heart racing or beating unusually, chest tightness or pain, lightheaded, faint, shortness of breath especially with exercise  It is important to take your heart medication as prescribed  You may be on anticoagulation which is very important to take as directed - you may need blood work to monitor drug levels HgA1C this admission.  Make sure you get your HgA1c checked every three months.  If you take oral diabetes medications, check your blood glucose two times a day.  If you take insulin, check your blood glucose before meals and at bedtime.  It's important not to skip any meals.  Keep a log of your blood glucose results and always take it with you to your doctor appointments.  Keep a list of your current medications including injectables and over the counter medications and bring this medication list with you to all your doctor appointments.  If you have not seen your ophthalmologist this year call for appointment.  Check your feet daily for redness, sores, or openings. Do not self treat. If no improvement in two days call your primary care physician for an appointment.  Low blood sugar (hypoglycemia) is a blood sugar below 70mg/dl. Check your blood sugar if you feel signs/symptoms of hypoglycemia. If your blood sugar is below 70 take 15 grams of carbohydrates (ex 4 oz of apple juice, 3-4 glucose tablets, or 4-6 oz of regular soda) wait 15 minutes and repeat blood sugar to make sure it comes up above 70.  If your blood sugar is above 70 and you are due for a meal, have a meal.  If you are not due for a meal have a snack.  This snack helps keeps your blood sugar at a safe range.

## 2018-08-28 NOTE — DIETITIAN INITIAL EVALUATION ADULT. - NS AS NUTRI INTERV ED CONTENT
Discussed with pt and daughter increased protein+energy needs with COPD, reviewed recommendations to help maintain weight and lean body mass, recommended small frequent meals including nutrient/protein dense food/beverages. Pt and daughter voice understanding./Purpose of the nutrition education

## 2018-08-28 NOTE — DIETITIAN INITIAL EVALUATION ADULT. - NUTRITION INTERVENTION
Nutrition Education/Collaboration and Referral of Nutrition Care/Nutrition - Related Medication Management/Meals and Snack/Medical Food Supplements

## 2018-08-28 NOTE — DIETITIAN INITIAL EVALUATION ADULT. - PHYSICAL APPEARANCE
Nutrition-focused physical exam conducted; Pt with periorbital edema therefore unable to assess orbital fat pad adequacy; evidence of severe temporal muscle region wasting present, severe tricep fat wasting present./underweight

## 2018-08-28 NOTE — PROGRESS NOTE ADULT - SUBJECTIVE AND OBJECTIVE BOX
Chief Complaint: 89 y/o Type 2 DM, COPD recent PNA admitted with hypoglycemia.    FS still high post meals, low at am. Tomorrow is the last day of prednisone.  TFT's noted.  PO intake mostly good.    MEDICATIONS  (STANDING):  atorvastatin 40 milliGRAM(s) Oral daily  azithromycin   Tablet 500 milliGRAM(s) Oral every 24 hours  buDESOnide   0.5 milliGRAM(s) Respule 0.5 milliGRAM(s) Inhalation every 12 hours  cefTRIAXone   IVPB 1 Gram(s) IV Intermittent every 24 hours  dextrose 5%. 1000 milliLiter(s) (50 mL/Hr) IV Continuous <Continuous>  dextrose 50% Injectable 12.5 Gram(s) IV Push once  dextrose 50% Injectable 25 Gram(s) IV Push once  diltiazem    milliGRAM(s) Oral daily  fluticasone propionate 50 MICROgram(s)/spray Nasal Spray 1 Spray(s) Both Nostrils two times a day  insulin glargine Injectable (LANTUS) 3 Unit(s) SubCutaneous at bedtime  insulin lispro (HumaLOG) corrective regimen sliding scale   SubCutaneous three times a day before meals  insulin lispro (HumaLOG) corrective regimen sliding scale   SubCutaneous at bedtime  lamoTRIgine 25 milliGRAM(s) Oral at bedtime  methimazole 2.5 milliGRAM(s) Oral daily  metoprolol tartrate 25 milliGRAM(s) Oral two times a day  mirtazapine 15 milliGRAM(s) Oral at bedtime  pantoprazole    Tablet 40 milliGRAM(s) Oral before breakfast  predniSONE   Tablet 40 milliGRAM(s) Oral daily  rivaroxaban 15 milliGRAM(s) Oral at bedtime  sodium chloride 0.9%. 1000 milliLiter(s) (50 mL/Hr) IV Continuous <Continuous>    MEDICATIONS  (PRN):  ALPRAZolam 0.25 milliGRAM(s) Oral daily PRN anxiety  dextrose 40% Gel 15 Gram(s) Oral once PRN Blood Glucose LESS THAN 70 milliGRAM(s)/deciliter  ipratropium    for Nebulization 500 MICROGram(s) Nebulizer every 6 hours PRN Shortness of Breath and/or Wheezing  levalbuterol Inhalation 0.63 milliGRAM(s) Inhalation every 6 hours PRN SOB/Wheezing  polyethylene glycol 3350 17 Gram(s) Oral daily PRN Constipation      Allergies    No Known Allergies    Intolerances        PHYSICAL EXAM:  VITALS: T(C): 36.8 (08-28-18 @ 13:16)  T(F): 98.2 (08-28-18 @ 13:16), Max: 98.2 (08-27-18 @ 20:27)  HR: 94 (08-28-18 @ 17:14) (84 - 94)  BP: 174/80 (08-28-18 @ 17:14) (121/62 - 174/80)  RR:  (17 - 18)  SpO2:  (96% - 97%)  GENERAL: NAD, cachectic, alert and responsive.  EYES: No proptosis, no lid lag, anicteric  HEENT:  Atraumatic, Normocephalic, moist mucous membranes  THYROID: nodular gland right 2X3 cm no LN.   RESPIRATORY: Clear to auscultation bilaterally; minimal wheezing bases  CARDIOVASCULAR: Regular rate and rhythm; No murmurs; trace peripheral edema  GI: Soft, nontender, non distended, normal bowel sounds  SKIN: Dry, intact, No rashes or lesions  MUSCULOSKELETAL: Full range of motion, normal strength      POCT Blood Glucose.: 362 mg/dL (08-28-18 @ 16:54)  POCT Blood Glucose.: 177 mg/dL (08-28-18 @ 11:27)  POCT Blood Glucose.: 127 mg/dL (08-28-18 @ 07:23)  POCT Blood Glucose.: 371 mg/dL (08-27-18 @ 21:20)  POCT Blood Glucose.: 392 mg/dL (08-27-18 @ 17:09)  POCT Blood Glucose.: 270 mg/dL (08-27-18 @ 11:53)  POCT Blood Glucose.: 187 mg/dL (08-27-18 @ 07:26)  POCT Blood Glucose.: 180 mg/dL (08-27-18 @ 06:05)  POCT Blood Glucose.: 231 mg/dL (08-27-18 @ 00:25)  POCT Blood Glucose.: 219 mg/dL (08-27-18 @ 00:11)  POCT Blood Glucose.: 195 mg/dL (08-26-18 @ 21:10)  POCT Blood Glucose.: 250 mg/dL (08-26-18 @ 17:41)  POCT Blood Glucose.: 255 mg/dL (08-26-18 @ 10:12)  POCT Blood Glucose.: 219 mg/dL (08-26-18 @ 06:21)  POCT Blood Glucose.: 195 mg/dL (08-26-18 @ 05:40)  POCT Blood Glucose.: 158 mg/dL (08-25-18 @ 23:59)  POCT Blood Glucose.: 112 mg/dL (08-25-18 @ 22:33)  POCT Blood Glucose.: 123 mg/dL (08-25-18 @ 21:02)  POCT Blood Glucose.: 121 mg/dL (08-25-18 @ 20:01)  POCT Blood Glucose.: 35 mg/dL (08-25-18 @ 19:48)  POCT Blood Glucose.: 30 mg/dL (08-25-18 @ 19:41)                            8.5    10.35 )-----------( 518      ( 28 Aug 2018 07:31 )             27.8       08-28    141  |  108  |  30<H>  ----------------------------<  120<H>  4.8   |  21<L>  |  1.37<H>    EGFR if : 52<L>  EGFR if non : 45<L>    Ca    8.9      08-28  Mg     2.2     08-27  Phos  3.6     08-27    TPro  8.2  /  Alb  4.3  /  TBili  0.2  /  DBili  x   /  AST  19  /  ALT  9<L>  /  AlkPhos  98  08-25      Thyroid Function Tests:  08-28 @ 09:09 TSH 0.42 FreeT4 1.4 T3 53 Anti TPO -- Anti Thyroglobulin Ab -- TSI --      Hemoglobin A1C, Whole Blood: 5.5 % [4.0 - 5.6] (08-26-18 @ 08:24)

## 2018-08-29 VITALS — SYSTOLIC BLOOD PRESSURE: 170 MMHG | DIASTOLIC BLOOD PRESSURE: 90 MMHG | HEART RATE: 95 BPM

## 2018-08-29 LAB
ANION GAP SERPL CALC-SCNC: 10 MMOL/L — SIGNIFICANT CHANGE UP (ref 5–17)
BUN SERPL-MCNC: 29 MG/DL — HIGH (ref 7–23)
CALCIUM SERPL-MCNC: 9.4 MG/DL — SIGNIFICANT CHANGE UP (ref 8.4–10.5)
CHLORIDE SERPL-SCNC: 106 MMOL/L — SIGNIFICANT CHANGE UP (ref 96–108)
CO2 SERPL-SCNC: 26 MMOL/L — SIGNIFICANT CHANGE UP (ref 22–31)
CREAT SERPL-MCNC: 1.19 MG/DL — SIGNIFICANT CHANGE UP (ref 0.5–1.3)
GLUCOSE BLDC GLUCOMTR-MCNC: 123 MG/DL — HIGH (ref 70–99)
GLUCOSE BLDC GLUCOMTR-MCNC: 209 MG/DL — HIGH (ref 70–99)
GLUCOSE BLDC GLUCOMTR-MCNC: 243 MG/DL — HIGH (ref 70–99)
GLUCOSE BLDC GLUCOMTR-MCNC: 283 MG/DL — HIGH (ref 70–99)
GLUCOSE SERPL-MCNC: 128 MG/DL — HIGH (ref 70–99)
HCT VFR BLD CALC: 30.7 % — LOW (ref 39–50)
HGB BLD-MCNC: 9.8 G/DL — LOW (ref 13–17)
MCHC RBC-ENTMCNC: 28.5 PG — SIGNIFICANT CHANGE UP (ref 27–34)
MCHC RBC-ENTMCNC: 31.9 GM/DL — LOW (ref 32–36)
MCV RBC AUTO: 89.2 FL — SIGNIFICANT CHANGE UP (ref 80–100)
PLATELET # BLD AUTO: 558 K/UL — HIGH (ref 150–400)
POTASSIUM SERPL-MCNC: 4.4 MMOL/L — SIGNIFICANT CHANGE UP (ref 3.5–5.3)
POTASSIUM SERPL-SCNC: 4.4 MMOL/L — SIGNIFICANT CHANGE UP (ref 3.5–5.3)
RBC # BLD: 3.44 M/UL — LOW (ref 4.2–5.8)
RBC # FLD: 15.2 % — HIGH (ref 10.3–14.5)
SODIUM SERPL-SCNC: 142 MMOL/L — SIGNIFICANT CHANGE UP (ref 135–145)
WBC # BLD: 10.82 K/UL — HIGH (ref 3.8–10.5)
WBC # FLD AUTO: 10.82 K/UL — HIGH (ref 3.8–10.5)

## 2018-08-29 PROCEDURE — P9040: CPT

## 2018-08-29 PROCEDURE — 80053 COMPREHEN METABOLIC PANEL: CPT

## 2018-08-29 PROCEDURE — 82435 ASSAY OF BLOOD CHLORIDE: CPT

## 2018-08-29 PROCEDURE — 84436 ASSAY OF TOTAL THYROXINE: CPT

## 2018-08-29 PROCEDURE — 71045 X-RAY EXAM CHEST 1 VIEW: CPT

## 2018-08-29 PROCEDURE — 84295 ASSAY OF SERUM SODIUM: CPT

## 2018-08-29 PROCEDURE — 36600 WITHDRAWAL OF ARTERIAL BLOOD: CPT

## 2018-08-29 PROCEDURE — 97161 PT EVAL LOW COMPLEX 20 MIN: CPT

## 2018-08-29 PROCEDURE — 83605 ASSAY OF LACTIC ACID: CPT

## 2018-08-29 PROCEDURE — 93306 TTE W/DOPPLER COMPLETE: CPT | Mod: 26

## 2018-08-29 PROCEDURE — 86923 COMPATIBILITY TEST ELECTRIC: CPT

## 2018-08-29 PROCEDURE — 84443 ASSAY THYROID STIM HORMONE: CPT

## 2018-08-29 PROCEDURE — 84145 PROCALCITONIN (PCT): CPT

## 2018-08-29 PROCEDURE — 85027 COMPLETE CBC AUTOMATED: CPT

## 2018-08-29 PROCEDURE — 87581 M.PNEUMON DNA AMP PROBE: CPT

## 2018-08-29 PROCEDURE — 82962 GLUCOSE BLOOD TEST: CPT

## 2018-08-29 PROCEDURE — 96375 TX/PRO/DX INJ NEW DRUG ADDON: CPT

## 2018-08-29 PROCEDURE — 84484 ASSAY OF TROPONIN QUANT: CPT

## 2018-08-29 PROCEDURE — 94010 BREATHING CAPACITY TEST: CPT

## 2018-08-29 PROCEDURE — 99285 EMERGENCY DEPT VISIT HI MDM: CPT | Mod: 25

## 2018-08-29 PROCEDURE — 83036 HEMOGLOBIN GLYCOSYLATED A1C: CPT

## 2018-08-29 PROCEDURE — 82947 ASSAY GLUCOSE BLOOD QUANT: CPT

## 2018-08-29 PROCEDURE — 96374 THER/PROPH/DIAG INJ IV PUSH: CPT

## 2018-08-29 PROCEDURE — 93005 ELECTROCARDIOGRAM TRACING: CPT

## 2018-08-29 PROCEDURE — 82565 ASSAY OF CREATININE: CPT

## 2018-08-29 PROCEDURE — 82803 BLOOD GASES ANY COMBINATION: CPT

## 2018-08-29 PROCEDURE — 80048 BASIC METABOLIC PNL TOTAL CA: CPT

## 2018-08-29 PROCEDURE — 84439 ASSAY OF FREE THYROXINE: CPT

## 2018-08-29 PROCEDURE — 87040 BLOOD CULTURE FOR BACTERIA: CPT

## 2018-08-29 PROCEDURE — 87486 CHLMYD PNEUM DNA AMP PROBE: CPT

## 2018-08-29 PROCEDURE — 84445 ASSAY OF TSI GLOBULIN: CPT

## 2018-08-29 PROCEDURE — 36430 TRANSFUSION BLD/BLD COMPNT: CPT

## 2018-08-29 PROCEDURE — 87633 RESP VIRUS 12-25 TARGETS: CPT

## 2018-08-29 PROCEDURE — 82330 ASSAY OF CALCIUM: CPT

## 2018-08-29 PROCEDURE — 87798 DETECT AGENT NOS DNA AMP: CPT

## 2018-08-29 PROCEDURE — 87086 URINE CULTURE/COLONY COUNT: CPT

## 2018-08-29 PROCEDURE — 86901 BLOOD TYPING SEROLOGIC RH(D): CPT

## 2018-08-29 PROCEDURE — 83735 ASSAY OF MAGNESIUM: CPT

## 2018-08-29 PROCEDURE — 81001 URINALYSIS AUTO W/SCOPE: CPT

## 2018-08-29 PROCEDURE — 94640 AIRWAY INHALATION TREATMENT: CPT

## 2018-08-29 PROCEDURE — 87449 NOS EACH ORGANISM AG IA: CPT

## 2018-08-29 PROCEDURE — 71250 CT THORAX DX C-: CPT

## 2018-08-29 PROCEDURE — 84480 ASSAY TRIIODOTHYRONINE (T3): CPT

## 2018-08-29 PROCEDURE — 87150 DNA/RNA AMPLIFIED PROBE: CPT

## 2018-08-29 PROCEDURE — 84100 ASSAY OF PHOSPHORUS: CPT

## 2018-08-29 PROCEDURE — 85014 HEMATOCRIT: CPT

## 2018-08-29 PROCEDURE — 86850 RBC ANTIBODY SCREEN: CPT

## 2018-08-29 PROCEDURE — 84132 ASSAY OF SERUM POTASSIUM: CPT

## 2018-08-29 PROCEDURE — 85610 PROTHROMBIN TIME: CPT

## 2018-08-29 PROCEDURE — 86900 BLOOD TYPING SEROLOGIC ABO: CPT

## 2018-08-29 PROCEDURE — 82550 ASSAY OF CK (CPK): CPT

## 2018-08-29 PROCEDURE — 93306 TTE W/DOPPLER COMPLETE: CPT

## 2018-08-29 PROCEDURE — 82553 CREATINE MB FRACTION: CPT

## 2018-08-29 RX ORDER — SITAGLIPTIN 50 MG/1
1 TABLET, FILM COATED ORAL
Qty: 30 | Refills: 0 | OUTPATIENT
Start: 2018-08-29 | End: 2018-09-27

## 2018-08-29 RX ORDER — METOPROLOL TARTRATE 50 MG
1 TABLET ORAL
Qty: 60 | Refills: 0 | OUTPATIENT
Start: 2018-08-29 | End: 2018-09-27

## 2018-08-29 RX ORDER — TIOTROPIUM BROMIDE 18 UG/1
1 CAPSULE ORAL; RESPIRATORY (INHALATION)
Qty: 30 | Refills: 0
Start: 2018-08-29 | End: 2018-09-27

## 2018-08-29 RX ORDER — METHIMAZOLE 10 MG/1
1 TABLET ORAL
Qty: 0 | Refills: 0 | COMMUNITY

## 2018-08-29 RX ORDER — ALBUTEROL 90 UG/1
2 AEROSOL, METERED ORAL
Qty: 1 | Refills: 0 | OUTPATIENT
Start: 2018-08-29 | End: 2018-09-27

## 2018-08-29 RX ORDER — METHIMAZOLE 10 MG/1
0.5 TABLET ORAL
Qty: 15 | Refills: 0 | OUTPATIENT
Start: 2018-08-29 | End: 2018-09-27

## 2018-08-29 RX ORDER — TIOTROPIUM BROMIDE 18 UG/1
1 CAPSULE ORAL; RESPIRATORY (INHALATION)
Qty: 30 | Refills: 0 | OUTPATIENT
Start: 2018-08-29 | End: 2018-09-27

## 2018-08-29 RX ORDER — ALBUTEROL 90 UG/1
2 AEROSOL, METERED ORAL
Qty: 0 | Refills: 0 | COMMUNITY

## 2018-08-29 RX ORDER — FLUTICASONE PROPIONATE AND SALMETEROL 50; 250 UG/1; UG/1
1 POWDER ORAL; RESPIRATORY (INHALATION)
Qty: 1 | Refills: 0
Start: 2018-08-29 | End: 2018-09-27

## 2018-08-29 RX ORDER — FLUTICASONE PROPIONATE AND SALMETEROL 50; 250 UG/1; UG/1
1 POWDER ORAL; RESPIRATORY (INHALATION)
Qty: 1 | Refills: 0 | OUTPATIENT
Start: 2018-08-29 | End: 2018-09-27

## 2018-08-29 RX ORDER — SITAGLIPTIN 50 MG/1
1 TABLET, FILM COATED ORAL
Qty: 30 | Refills: 0
Start: 2018-08-29 | End: 2018-09-27

## 2018-08-29 RX ORDER — METHIMAZOLE 10 MG/1
0.5 TABLET ORAL
Qty: 0 | Refills: 0 | COMMUNITY

## 2018-08-29 RX ORDER — GLIMEPIRIDE 1 MG
1 TABLET ORAL
Qty: 0 | Refills: 0 | COMMUNITY

## 2018-08-29 RX ADMIN — Medication 25 MILLIGRAM(S): at 18:13

## 2018-08-29 RX ADMIN — Medication 4 UNIT(S): at 11:35

## 2018-08-29 RX ADMIN — ATORVASTATIN CALCIUM 40 MILLIGRAM(S): 80 TABLET, FILM COATED ORAL at 11:35

## 2018-08-29 RX ADMIN — Medication 6: at 18:11

## 2018-08-29 RX ADMIN — RIVAROXABAN 15 MILLIGRAM(S): KIT at 18:12

## 2018-08-29 RX ADMIN — Medication 0: at 07:38

## 2018-08-29 RX ADMIN — Medication 4: at 11:35

## 2018-08-29 RX ADMIN — Medication 4 UNIT(S): at 07:38

## 2018-08-29 RX ADMIN — Medication 40 MILLIGRAM(S): at 06:21

## 2018-08-29 RX ADMIN — Medication 1 SPRAY(S): at 18:12

## 2018-08-29 RX ADMIN — Medication 4 UNIT(S): at 18:11

## 2018-08-29 RX ADMIN — Medication 25 MILLIGRAM(S): at 06:21

## 2018-08-29 RX ADMIN — PANTOPRAZOLE SODIUM 40 MILLIGRAM(S): 20 TABLET, DELAYED RELEASE ORAL at 06:21

## 2018-08-29 RX ADMIN — Medication 180 MILLIGRAM(S): at 06:21

## 2018-08-29 RX ADMIN — Medication 0.5 MILLIGRAM(S): at 06:22

## 2018-08-29 NOTE — PROGRESS NOTE ADULT - PROVIDER SPECIALTY LIST ADULT
Cardiology
Endocrinology
Endocrinology
Internal Medicine
Pulmonology
Cardiology

## 2018-08-29 NOTE — PROGRESS NOTE ADULT - SUBJECTIVE AND OBJECTIVE BOX
CARDIOLOGY FOLLOW UP - Dr. Serrano    CC no chest pain or sob        PHYSICAL EXAM:  T(C): 36.2 (08-29-18 @ 05:02), Max: 36.8 (08-28-18 @ 13:16)  HR: 83 (08-29-18 @ 05:02) (83 - 94)  BP: 160/85 (08-29-18 @ 05:02) (153/79 - 174/80)  RR: 18 (08-29-18 @ 05:02) (17 - 18)  SpO2: 94% (08-29-18 @ 05:02) (94% - 97%)  Wt(kg): --  I&O's Summary    28 Aug 2018 07:01  -  29 Aug 2018 07:00  --------------------------------------------------------  IN: 590 mL / OUT: 2500 mL / NET: -1910 mL    29 Aug 2018 07:01  -  29 Aug 2018 10:35  --------------------------------------------------------  IN: 100 mL / OUT: 0 mL / NET: 100 mL        Appearance: Normal	  Cardiovascular: Normal S1 S2,RRR, No JVD, No murmurs  Respiratory: Lungs clear to auscultation	  Gastrointestinal:  Soft, Non-tender, + BS	  Extremities: Normal range of motion, No clubbing, cyanosis or edema        MEDICATIONS  (STANDING):  atorvastatin 40 milliGRAM(s) Oral daily  azithromycin   Tablet 500 milliGRAM(s) Oral every 24 hours  buDESOnide   0.5 milliGRAM(s) Respule 0.5 milliGRAM(s) Inhalation every 12 hours  cefTRIAXone   IVPB 1 Gram(s) IV Intermittent every 24 hours  dextrose 5%. 1000 milliLiter(s) (50 mL/Hr) IV Continuous <Continuous>  dextrose 50% Injectable 12.5 Gram(s) IV Push once  dextrose 50% Injectable 25 Gram(s) IV Push once  diltiazem    milliGRAM(s) Oral daily  fluticasone propionate 50 MICROgram(s)/spray Nasal Spray 1 Spray(s) Both Nostrils two times a day  insulin glargine Injectable (LANTUS) 3 Unit(s) SubCutaneous at bedtime  insulin lispro (HumaLOG) corrective regimen sliding scale   SubCutaneous three times a day before meals  insulin lispro (HumaLOG) corrective regimen sliding scale   SubCutaneous at bedtime  insulin lispro Injectable (HumaLOG) 4 Unit(s) SubCutaneous three times a day before meals  lamoTRIgine 25 milliGRAM(s) Oral at bedtime  methimazole 2.5 milliGRAM(s) Oral daily  metoprolol tartrate 25 milliGRAM(s) Oral two times a day  mirtazapine 15 milliGRAM(s) Oral at bedtime  pantoprazole    Tablet 40 milliGRAM(s) Oral before breakfast  rivaroxaban 15 milliGRAM(s) Oral every 24 hours  sodium chloride 0.9%. 1000 milliLiter(s) (50 mL/Hr) IV Continuous <Continuous>      TELEMETRY: NSR 	    ECG:  	  RADIOLOGY:   DIAGNOSTIC TESTING:  [ ] Echocardiogram:  [ ]  Catheterization:  [ ] Stress Test:    OTHER: 	    LABS:	 	                                9.8    10.82 )-----------( 558      ( 29 Aug 2018 07:51 )             30.7     08-29    142  |  106  |  29<H>  ----------------------------<  128<H>  4.4   |  26  |  1.19    Ca    9.4      29 Aug 2018 06:39 CARDIOLOGY FOLLOW UP - Dr. Serrano    CC no chest pain or sob        PHYSICAL EXAM:  T(C): 36.2 (08-29-18 @ 05:02), Max: 36.8 (08-28-18 @ 13:16)  HR: 83 (08-29-18 @ 05:02) (83 - 94)  BP: 160/85 (08-29-18 @ 05:02) (153/79 - 174/80)  RR: 18 (08-29-18 @ 05:02) (17 - 18)  SpO2: 94% (08-29-18 @ 05:02) (94% - 97%)  Wt(kg): --  I&O's Summary    28 Aug 2018 07:01  -  29 Aug 2018 07:00  --------------------------------------------------------  IN: 590 mL / OUT: 2500 mL / NET: -1910 mL    29 Aug 2018 07:01  -  29 Aug 2018 10:35  --------------------------------------------------------  IN: 100 mL / OUT: 0 mL / NET: 100 mL        Appearance: Normal	  Cardiovascular: Normal S1 S2,RRR, No JVD, No murmurs  Respiratory: exp wheeze 	  Gastrointestinal:  Soft, Non-tender, + BS	  Extremities: Normal range of motion, No clubbing, cyanosis or edema        MEDICATIONS  (STANDING):  atorvastatin 40 milliGRAM(s) Oral daily  azithromycin   Tablet 500 milliGRAM(s) Oral every 24 hours  buDESOnide   0.5 milliGRAM(s) Respule 0.5 milliGRAM(s) Inhalation every 12 hours  cefTRIAXone   IVPB 1 Gram(s) IV Intermittent every 24 hours  dextrose 5%. 1000 milliLiter(s) (50 mL/Hr) IV Continuous <Continuous>  dextrose 50% Injectable 12.5 Gram(s) IV Push once  dextrose 50% Injectable 25 Gram(s) IV Push once  diltiazem    milliGRAM(s) Oral daily  fluticasone propionate 50 MICROgram(s)/spray Nasal Spray 1 Spray(s) Both Nostrils two times a day  insulin glargine Injectable (LANTUS) 3 Unit(s) SubCutaneous at bedtime  insulin lispro (HumaLOG) corrective regimen sliding scale   SubCutaneous three times a day before meals  insulin lispro (HumaLOG) corrective regimen sliding scale   SubCutaneous at bedtime  insulin lispro Injectable (HumaLOG) 4 Unit(s) SubCutaneous three times a day before meals  lamoTRIgine 25 milliGRAM(s) Oral at bedtime  methimazole 2.5 milliGRAM(s) Oral daily  metoprolol tartrate 25 milliGRAM(s) Oral two times a day  mirtazapine 15 milliGRAM(s) Oral at bedtime  pantoprazole    Tablet 40 milliGRAM(s) Oral before breakfast  rivaroxaban 15 milliGRAM(s) Oral every 24 hours  sodium chloride 0.9%. 1000 milliLiter(s) (50 mL/Hr) IV Continuous <Continuous>      TELEMETRY: NSR 	    ECG:  	  RADIOLOGY:   DIAGNOSTIC TESTING:  [ ] Echocardiogram:  [ ]  Catheterization:  [ ] Stress Test:    OTHER: 	    LABS:	 	                                9.8    10.82 )-----------( 558      ( 29 Aug 2018 07:51 )             30.7     08-29    142  |  106  |  29<H>  ----------------------------<  128<H>  4.4   |  26  |  1.19    Ca    9.4      29 Aug 2018 06:39

## 2018-08-29 NOTE — PROGRESS NOTE ADULT - ASSESSMENT
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    Lung nodule    Atrial fibrillation -> NSR    DM    PLAN/RECOMMENDATIONS:    stable oxygenation on room air  ABG without evidence of hypercapnia and adequate oxygenation on room air  bedside spirometry c/w very severe obstructive lung disease  prednisone 40mg daily x 5 days - watch for steroid induced psychosis  glucose control on steroids in the setting of diabetes  xopenex/atrovent nebs only as needed  continue pulmicort nebs  on ceftriaxone/azithro although the non-elevated procalcitonin level speaks strongly against a bacterial superinfection  cardiac meds: Xarelto/cardizem CD/lopressor/lipitor - cardiology evaluation pending  psych meds: xanax/lamictal/remeron  GI/DVT prophylaxis - protonix/xarelto  bowel regimen    Will follow with you. Plan of care discussed with the patient at bedside and Dr. Devlin,    Scott Cormier MD, [facility] - [phone]  Pulmonary Medicine
89 year old male with PAfib, COPD, DM, Goiter admitted with parainfluenza viral infection, noted to also go into SVT.     1. SVT   likely in the setting of acute respiratory infection   no evidence of decomp CHF on exam   no further SVT on tele , cv stable  continue with metoprolol 25 mg BID, continue with CCB  pending echo to eval lv fx    2. PAfib, chronic  currently rate controlled, remains ins NSR  continue with CCB and BB   CHADS 2. A/c continue with rivaroxaban     3. parainfluenza   CT chest noted   repeat BC negative   on IV abx   ID/ pulm f/u     4. HARJEET  pending renal US, encourage PO intake   med f/u
89 year old male with PAfib, COPD, DM, Goiter admitted with parainfluenza viral infection, noted to also go into SVT.     1. SVT   likely in the setting of acute respiratory infection   no evidence of decomp CHF on exam   no further SVT on tele , cv stable  continue with metoprolol 25 mg BID, continue with CCB  pending echo to eval lv fx    2. PAfib, chronic  currently rate controlled, remains ins NSR  continue with CCB and BB   CHADS 2. A/c continue with rivaroxaban     3. parainfluenza   CT chest noted   repeat BC negative   abx  ID/ pulm f/u     4. HARJEET  creat improving   med f/u
89 y/o Type 2 DM, COPD recent PNA admitted with hypoglycemia.    Patient is 89 y/o usually seen with admissions for COPD exacerbations, D/Avelino on steroid taper. For that usual D/Avelino on Metformin and glimepiride recently 2,000 mg / 1mg..   Daughter says FS at home are low set at am , no PM FS. Over the past 2 weeks he was treated for possible PNA at home. He was on antibiotics, but no steroids. He was not checking FS during this time, and had poor appetite and did not eat much.  On the day of admission he was noted with blurred vision and confusion, FS on admission was 25 mg/dL.   Antibiotics used was Levaquin, which has a risk of hypoglycemia when combined with sulfonylureas.  Here patient with improved PO intake, hypoglycemia resolved. FS trending high, low dose basal / bolus insulin started.    Hyperthyroidism: patient on tapazol 5 mg daily. Had an episode of AF during admission, claims he is losing weight at home.    DM:  Poor PO intake during recent PNA, interaction between glimepiride and Levaquin used, could have been the last straw effect. Overall control not clear: HbA1c is low set, but patient is anemic, FS at home are only done at am not PM.  Patient on prednisone 40 mg daily here, On Lantus 3 units at HS and Humalog 2 units + scale before meals FS still high post-meals, will increase humalog to 4 units per meal while on steroids tomorrow.  Upon D/C if off steroids suggest:  FS BID.  Metformin 1,000 mg BID.  Change glimepiride 1 mg daily to Januvia 50 mg daily at am.  If FS controlled on that, in 1-2 weeks can reduce metformin to 1,000 mg daily at PM, as per patient's age.    Hyperthyroidism:  Cause ? neck CT 1-2 years ago only noted a right thyroid nodule. Did not have US or scan.  On tapazol 5 mg daily. Asymptomatic, but claims he is losing weight, and had rapid AF this admission.  TFT's show effect of steroids as both TSH and T3 are low.   Will reduce tapazol to 2.5 mg daily.  Thyroid function tests should be followed in 2-3 weeks post discharge.
89 y/o Type 2 DM, COPD recent PNA admitted with hypoglycemia.    Patient is 89 y/o usually seen with admissions for COPD exacerbations, D/Avelino on steroid taper. For that usual D/Avelino on Metformin and glimepiride recently 2,000 mg / 1mg..   Daughter says FS at home are low set at am , no PM FS. Over the past 2 weeks he was treated for possible PNA at home. He was on antibiotics, but no steroids. He was not checking FS during this time, and had poor appetite and did not eat much.  On the day of admission he was noted with blurred vision and confusion, FS on admission was 25 mg/dL.   Antibiotics used was Levaquin, which has a risk of hypoglycemia when combined with sulfonylureas.  Here patient with improved PO intake, hypoglycemia resolved. FS trending high, low dose basal / bolus insulin started.    Hyperthyroidism: patient on tapazol 5 mg daily. Had an episode of AF during admission, claims he is losing weight at home.    DM:  Poor PO intake during recent PNA, interaction between glimepiride and Levaquin used, could have been the last straw effect. Overall control not clear: HbA1c is low set, but patient is anemic, FS at home are only done at am not PM.  Patient on prednisone 40 mg daily here, last dose today, will be D/Avelino off prednisone.    Can D/C on:  FS BID.  Metformin 1,000 mg BID.  Change glimepiride 1 mg daily to Januvia 50 mg daily at am.  If FS controlled on that, in 1-2 weeks can reduce metformin to 1,000 mg daily at PM, as per patient's age.  All D/W patient's daughter who has instructions.    Hyperthyroidism:  Cause ? neck CT 1-2 years ago only noted a right thyroid nodule. Did not have US or scan.  On tapazol 5 mg daily. Asymptomatic, but claims he is losing weight, and had rapid AF this admission.  TFT's show effect of steroids as both TSH and T3 are low.   Will reduce tapazol to 2.5 mg daily.  Thyroid function tests should be followed in 2-3 weeks post discharge.
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    Lung nodule    Atrial fibrillation -> NSR    DM    PLAN/RECOMMENDATIONS:    stable oxygenation on room air  ABG without evidence of hypercapnia and adequate oxygenation on room air  bedside spirometry c/w very severe obstructive lung disease  prednisone 40mg daily x 5 days - watch for steroid induced psychosis  glucose control on steroids in the setting of diabetes  xopenex/atrovent nebs only as needed  continue pulmicort nebs  on ceftriaxone/azithro although the non-elevated procalcitonin level speaks strongly against a bacterial superinfection  cardiac meds: Xarelto/cardizem CD/lopressor/lipitor - cardiology evaluation pending  psych meds: xanax/lamictal/remeron  GI/DVT prophylaxis - protonix/xarelto  bowel regimen  follow-up chest CT in 6 months to reevaluate the lung nodule and document radiographic resolution of "infilrates"    Will follow with you. Plan of care discussed with the patient at bedside and Dr. Devlin. No pulmonary objection to discharge. Will arrange follow-up in our office. Please discharge on 1) advair or breo ellipta and 2) spiriva or incruse ellipta.    Scott Cormier MD, [facility] [place] - [phone]  Pulmonary Medicine
· Assessment		  89M DM2 on PO meds, COPD has home 02, afib on xarelto p/w weakness likely related to hypoglycemia, HARJEET and possible pneumonia with COPD exacerbation    Pneumonia due to infectious organism, unspecified laterality, unspecified part of lung. -Cont. Ceftin for 5 days.  -F/u BCx and urine legionella   -trend lactate  Pulmonary follow Dr. Cormier/ Cleared for DC.    Lung nodule- follow with Pulmonary for repeat CT chest.    Parainfluenza- supportive care       COPD exacerbation. -Cont. prednisone for 5 days total  -Nebs  -Supplemental 02 as needed  -antibiotics      Hypoglycemia. Suspect related to glimepiride, HARJEET and possible infection.   - BS control. Endocrine evaluation noted.      Lactic acidosis.-  Resolving. Assumed to be related to infection.     Atrial fibrillation, unspecified type now in SR.  -Cont. xarelto, monitor for renal function  -Cont. diltiazem. and BB for SVT.      Controlled type 2 diabetes mellitus with complication, without long-term current use of insulin.   -Fingersticks and sliding scale inpatient especially given hypoglycemia. Endocrine evaluation noted    CKD/ Dehydration resolving.    Hernia.-stable Surgical evaluation though patient is not a good surgical candidate now. Follow as OTP.    Prophylactic measure.  Plan: DVT PPx  -Pt on systemic AC.    Anemia symptomatic- s/p Transfusion    DC home. Close follow with PMD/Pulmonary/ Endocrine/ Cardiology in 3-4 days.       Osito Devlin MD pager [phone]
· Assessment		  89M DM2 on PO meds, COPD has home 02, afib on xarelto p/w weakness likely related to hypoglycemia, HARJEET and possible pneumonia with COPD exacerbation    Pneumonia due to infectious organism, unspecified laterality, unspecified part of lung. -Cont. ceftriaxone and azithromycin  -F/u BCx and urine legionella   -trend lactate  Pulmonary evaluation called Dr. Casa García- supportive care       COPD exacerbation. -Cont. prednisone for 5 days total  -Nebs  -Supplemental 02 as needed  -antibiotics      Hypoglycemia. Suspect related to glimepiride, HARJEET and possible infection.   - BS control. Endocrine evaluation in am.      Lactic acidosis.- Lactate 5 on admission now slightly down trending to 4. Assumed to be related to infection. May be related to Metformin?  -Cont. gentle IVF  -Cont to trend lactate   -Will cont. IV antibiotics as above.     Atrial fibrillation, unspecified type.  -Cont. xarelto, monitor for renal function  -Cont. diltiazem.       Controlled type 2 diabetes mellitus with complication, without long-term current use of insulin.   -Fingersticks and sliding scale inpatient especially given hypoglycemia. Endocrine evaluation    CKD/ Dehydration- gentle IVF, follow BUN/Cr.    Hernia.-stable  Prophylactic measure.  Plan: DVT PPx  -Pt on systemic AC.  Osito Devlin MD pager [phone]
· Assessment		  89M DM2 on PO meds, COPD has home 02, afib on xarelto p/w weakness likely related to hypoglycemia, HARJEET and possible pneumonia with COPD exacerbation    Pneumonia due to infectious organism, unspecified laterality, unspecified part of lung. -Cont. ceftriaxone and azithromycin. Vancomycin for +BC  -F/u BCx and urine legionella   -trend lactate  Pulmonary follow Dr. Casa García- supportive care       COPD exacerbation. -Cont. prednisone for 5 days total  -Nebs  -Supplemental 02 as needed  -antibiotics      Hypoglycemia. Suspect related to glimepiride, HARJEET and possible infection.   - BS control. Endocrine evaluation noted.      Lactic acidosis.- Lactate 5 on admission now slightly down trending to 4. Assumed to be related to infection. May be related to Metformin?  -Cont. gentle IVF  -Cont to trend lactate   -Will cont. IV antibiotics as above.     Atrial fibrillation, unspecified type now in SR.  -Cont. xarelto, monitor for renal function  -Cont. diltiazem. and BB for SVT.      Controlled type 2 diabetes mellitus with complication, without long-term current use of insulin.   -Fingersticks and sliding scale inpatient especially given hypoglycemia. Endocrine evaluation    CKD/ Dehydration- gentle IVF, follow BUN/Cr.    Hernia.-stable    Prophylactic measure.  Plan: DVT PPx  -Pt on systemic AC.    Anemia symptomatic- Transfuse 1 PRBC  Osito Devlin MD pager [phone]
· Assessment		  89M DM2 on PO meds, COPD has home 02, afib on xarelto p/w weakness likely related to hypoglycemia, HARJEET and possible pneumonia with COPD exacerbation    Pneumonia due to infectious organism, unspecified laterality, unspecified part of lung. -Cont. ceftriaxone and azithromycin. Vancomycin for +BC  -F/u BCx and urine legionella   -trend lactate  Pulmonary follow Dr. Casa García- supportive care       COPD exacerbation. -Cont. prednisone for 5 days total  -Nebs  -Supplemental 02 as needed  -antibiotics      Hypoglycemia. Suspect related to glimepiride, HARJEET and possible infection.   - BS control. Endocrine evaluation noted.      Lactic acidosis.- Lactate 5 on admission now slightly down trending to 4. Assumed to be related to infection. May be related to Metformin?  -Cont. gentle IVF  -Cont to trend lactate   -Will cont. IV antibiotics as above.     Atrial fibrillation, unspecified type now in SR.  -Cont. xarelto, monitor for renal function  -Cont. diltiazem. and BB for SVT.      Controlled type 2 diabetes mellitus with complication, without long-term current use of insulin.   -Fingersticks and sliding scale inpatient especially given hypoglycemia. Endocrine evaluation    CKD/ Dehydration- gentle IVF, follow BUN/Cr.    Hernia.-stable Surgical evaluation though patient is not a good surgical candidate now.    Prophylactic measure.  Plan: DVT PPx  -Pt on systemic AC.    Anemia symptomatic- Transfuse 1 PRBC       Osito Devlin MD pager [phone]
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    Atrial fibrillation -> NSR    DM    PLAN/RECOMMENDATIONS:    stable oxygenation on room air  ABG without evidence of hypercapnia and adequate oxygenation on room air  bedside spirometry c/w very severe obstructive lung disease  prednisone 40mg daily - watch for steroid induced psychosis  glucose control on steroids in the setting of diabetes  xopenex/atrovent nebs only as needed  continue pulmicort nebs  on ceftriaxone/azithro although the non-elevated procalcitonin level speaks strongly against a bacterial superinfection  cardiac meds: Xarelto/cardizem CD/lopressor/lipitor - cardiology evaluation pending  psych meds: xanax/lamictal/remeron  GI/DVT prophylaxis - protonix/xarelto  bowel regimen    Will follow with you. Plan of care discussed with the patient at bedside and Dr. Claus Cormier MD, [facility] - [phone]  Pulmonary Medicine

## 2018-08-29 NOTE — PHYSICAL THERAPY INITIAL EVALUATION ADULT - PRECAUTIONS/LIMITATIONS, REHAB EVAL
cardiac precautions/isolation precautions/Found to be hypoglycemia, +PNA. Given ceftriaxone and azithromycin. CT Chest 8/25: +Tree-in-bud opacities at both lower lobes peripherally indicative of mucoid impacted distal airways. Infection cannot be excluded. +Emphysema. Cardiac following 2/2 SVT on tele,  likely in the setting of acute respiratory infection.

## 2018-08-29 NOTE — PROGRESS NOTE ADULT - SUBJECTIVE AND OBJECTIVE BOX
Chief Complaint: 89 y/o Type 2 DM, COPD recent PNA admitted with hypoglycemia.    Last day of prednisone 40 mg, will be D/Avelino off steroids.  FS still high post meals 180-250 mg/dL, low at am.   PO intake mostly good.      MEDICATIONS  (STANDING):  atorvastatin 40 milliGRAM(s) Oral daily  azithromycin   Tablet 500 milliGRAM(s) Oral every 24 hours  buDESOnide   0.5 milliGRAM(s) Respule 0.5 milliGRAM(s) Inhalation every 12 hours  cefTRIAXone   IVPB 1 Gram(s) IV Intermittent every 24 hours  dextrose 5%. 1000 milliLiter(s) (50 mL/Hr) IV Continuous <Continuous>  dextrose 50% Injectable 12.5 Gram(s) IV Push once  dextrose 50% Injectable 25 Gram(s) IV Push once  diltiazem    milliGRAM(s) Oral daily  fluticasone propionate 50 MICROgram(s)/spray Nasal Spray 1 Spray(s) Both Nostrils two times a day  insulin glargine Injectable (LANTUS) 3 Unit(s) SubCutaneous at bedtime  insulin lispro (HumaLOG) corrective regimen sliding scale   SubCutaneous three times a day before meals  insulin lispro (HumaLOG) corrective regimen sliding scale   SubCutaneous at bedtime  insulin lispro Injectable (HumaLOG) 4 Unit(s) SubCutaneous three times a day before meals  lamoTRIgine 25 milliGRAM(s) Oral at bedtime  methimazole 2.5 milliGRAM(s) Oral daily  metoprolol tartrate 25 milliGRAM(s) Oral two times a day  mirtazapine 15 milliGRAM(s) Oral at bedtime  pantoprazole    Tablet 40 milliGRAM(s) Oral before breakfast  rivaroxaban 15 milliGRAM(s) Oral every 24 hours  sodium chloride 0.9%. 1000 milliLiter(s) (50 mL/Hr) IV Continuous <Continuous>    MEDICATIONS  (PRN):  ALPRAZolam 0.25 milliGRAM(s) Oral daily PRN anxiety  dextrose 40% Gel 15 Gram(s) Oral once PRN Blood Glucose LESS THAN 70 milliGRAM(s)/deciliter  ipratropium    for Nebulization 500 MICROGram(s) Nebulizer every 6 hours PRN Shortness of Breath and/or Wheezing  levalbuterol Inhalation 0.63 milliGRAM(s) Inhalation every 6 hours PRN SOB/Wheezing  polyethylene glycol 3350 17 Gram(s) Oral daily PRN Constipation      Allergies    No Known Allergies    Intolerances        PHYSICAL EXAM:  VITALS: T(C): 36.2 (08-29-18 @ 05:02)  T(F): 97.2 (08-29-18 @ 05:02), Max: 98.2 (08-28-18 @ 21:28)  HR: 86 (08-29-18 @ 12:38) (83 - 94)  BP: 157/78 (08-29-18 @ 12:38) (154/76 - 160/85)  RR:  (18 - 18)  SpO2:  (92% - 96%)  GENERAL: NAD, cachectic, alert and responsive.  EYES: No proptosis, no lid lag, anicteric  HEENT:  Atraumatic, Normocephalic, moist mucous membranes  THYROID: nodular gland right 2X3 cm no LN.   RESPIRATORY: Clear to auscultation bilaterally; minimal wheezing bases  CARDIOVASCULAR: Regular rate and rhythm; No murmurs; trace peripheral edema  GI: Soft, nontender, non distended, normal bowel sounds  SKIN: Dry, intact, No rashes or lesions  MUSCULOSKELETAL: Full range of motion, normal strength      POCT Blood Glucose.: 243 mg/dL (08-29-18 @ 17:03)  POCT Blood Glucose.: 209 mg/dL (08-29-18 @ 11:15)  POCT Blood Glucose.: 123 mg/dL (08-29-18 @ 07:12)  POCT Blood Glucose.: 188 mg/dL (08-28-18 @ 21:13)  POCT Blood Glucose.: 362 mg/dL (08-28-18 @ 16:54)  POCT Blood Glucose.: 177 mg/dL (08-28-18 @ 11:27)  POCT Blood Glucose.: 127 mg/dL (08-28-18 @ 07:23)  POCT Blood Glucose.: 371 mg/dL (08-27-18 @ 21:20)  POCT Blood Glucose.: 392 mg/dL (08-27-18 @ 17:09)  POCT Blood Glucose.: 270 mg/dL (08-27-18 @ 11:53)  POCT Blood Glucose.: 187 mg/dL (08-27-18 @ 07:26)  POCT Blood Glucose.: 180 mg/dL (08-27-18 @ 06:05)  POCT Blood Glucose.: 231 mg/dL (08-27-18 @ 00:25)  POCT Blood Glucose.: 219 mg/dL (08-27-18 @ 00:11)  POCT Blood Glucose.: 195 mg/dL (08-26-18 @ 21:10)  POCT Blood Glucose.: 250 mg/dL (08-26-18 @ 17:41)                            9.8    10.82 )-----------( 558      ( 29 Aug 2018 07:51 )             30.7       08-29    142  |  106  |  29<H>  ----------------------------<  128<H>  4.4   |  26  |  1.19    EGFR if : 62  EGFR if non : 53<L>    Ca    9.4      08-29  Mg     2.2     08-27  Phos  3.6     08-27        Thyroid Function Tests:  08-28 @ 09:09 TSH 0.42 FreeT4 1.4 T3 53 Anti TPO -- Anti Thyroglobulin Ab -- TSI --      Hemoglobin A1C, Whole Blood: 5.5 % [4.0 - 5.6] (08-26-18 @ 08:24)

## 2018-08-29 NOTE — PHYSICAL THERAPY INITIAL EVALUATION ADULT - PERTINENT HX OF CURRENT PROBLEM, REHAB EVAL
89M DM2 on PO meds, COPD has home 02, afib on xarelto p/w weakness. As per pt, daughter and HHA, roughly 4-5 days ago pt started to have URI, symptoms, productive cough and weakness. PMD dx'd him with pneumonia and started him on PO antibiotics. Pt's daughter states last night he may have had some mild hallucinations. As per daughter, he has been using his home O2 which he does not use regularly

## 2018-08-29 NOTE — PHYSICAL THERAPY INITIAL EVALUATION ADULT - ADDITIONAL COMMENTS
Pt lives alone in an apartment building with an elevator access. Has HHA x10 hours a day 7 days a week to assist with ADLs. Pt states he was ambulating independently with use of straight cane prior and receiving [facility] PT services. Owns rollator and wheelchair, reports he can only use straight cane inside the home due to tight spaces.

## 2018-08-29 NOTE — PROGRESS NOTE ADULT - ATTENDING COMMENTS
Agree with above NP note.  cont ccb and bb as ordered  await echo   cont tele  cont a/c for paf
Agree with above NP note.  cont ccb and bb as ordered  await echo   cont tele  cont a/c for paf

## 2018-08-29 NOTE — PROGRESS NOTE ADULT - SUBJECTIVE AND OBJECTIVE BOX
[facility] - [facility]     PROGRESS NOTE    CHIEF COMPLAINT: parainfluenza viral infection; COPD exacerbation; pneumonia; emphysema; lung nodule    INTERVAL HISTORY: walking in his room without difficulty; no further arrhythmias - remains in NSR; resolved fatigue, malaise and weakness; sitting in the chair; no fevers, chills or sweats; no cough, sputum production, chest congestion or wheeze; eating well    REVIEW OF SYSTEMS:  Constitutional: As per interval history  HEENT: Within normal limits  CV: As per interval history  Resp: As per interval history  GI: Within normal limits   : Within normal limits  Musculoskeletal: Within normal limits  Skin: Within normal limits  Neurological: Within normal limits  Psychiatric: Within normal limits  Endocrine: Within normal limits  Hematologic/Lymphatic: Within normal limits  Allergic/Immunologic: Within normal limits    MEDICATIONS:     Pulmonary "  buDESOnide   0.5 milliGRAM(s) Respule 0.5 milliGRAM(s) Inhalation every 12 hours  ipratropium    for Nebulization 500 MICROGram(s) Nebulizer every 6 hours PRN  levalbuterol Inhalation 0.63 milliGRAM(s) Inhalation every 6 hours PRN      Anti-microbials:  azithromycin   Tablet 500 milliGRAM(s) Oral every 24 hours  cefTRIAXone   IVPB 1 Gram(s) IV Intermittent every 24 hours      Cardiovascular:  diltiazem    milliGRAM(s) Oral daily  metoprolol tartrate 25 milliGRAM(s) Oral two times a day      Other:  ALPRAZolam 0.25 milliGRAM(s) Oral daily PRN  atorvastatin 40 milliGRAM(s) Oral daily  dextrose 40% Gel 15 Gram(s) Oral once PRN  dextrose 5%. 1000 milliLiter(s) IV Continuous <Continuous>  dextrose 50% Injectable 12.5 Gram(s) IV Push once  dextrose 50% Injectable 25 Gram(s) IV Push once  fluticasone propionate 50 MICROgram(s)/spray Nasal Spray 1 Spray(s) Both Nostrils two times a day  insulin glargine Injectable (LANTUS) 3 Unit(s) SubCutaneous at bedtime  insulin lispro (HumaLOG) corrective regimen sliding scale   SubCutaneous three times a day before meals  insulin lispro (HumaLOG) corrective regimen sliding scale   SubCutaneous at bedtime  insulin lispro Injectable (HumaLOG) 4 Unit(s) SubCutaneous three times a day before meals  lamoTRIgine 25 milliGRAM(s) Oral at bedtime  methimazole 2.5 milliGRAM(s) Oral daily  mirtazapine 15 milliGRAM(s) Oral at bedtime  pantoprazole    Tablet 40 milliGRAM(s) Oral before breakfast  polyethylene glycol 3350 17 Gram(s) Oral daily PRN  rivaroxaban 15 milliGRAM(s) Oral every 24 hours  sodium chloride 0.9%. 1000 milliLiter(s) IV Continuous <Continuous>        OBJECTIVE:    I&O's Detail    28 Aug 2018 07:01  -  29 Aug 2018 07:00  --------------------------------------------------------  IN:    Oral Fluid: 540 mL    Solution: 50 mL  Total IN: 590 mL    OUT:    Voided: 2500 mL  Total OUT: 2500 mL    Total NET: -1910 mL      29 Aug 2018 07:01  -  29 Aug 2018 14:06  --------------------------------------------------------  IN:    Oral Fluid: 100 mL  Total IN: 100 mL    OUT:  Total OUT: 0 mL    Total NET: 100 mL    Daily Weight in k.5 (29 Aug 2018 07:23)    POCT Blood Glucose.: 209 mg/dL (29 Aug 2018 11:15)  POCT Blood Glucose.: 123 mg/dL (29 Aug 2018 07:12)  POCT Blood Glucose.: 188 mg/dL (28 Aug 2018 21:13)  POCT Blood Glucose.: 362 mg/dL (28 Aug 2018 16:54)      PHYSICAL EXAM:       ICU Vital Signs Last 24 Hrs  T(C): 36.2 (29 Aug 2018 05:02), Max: 36.8 (28 Aug 2018 21:28)  T(F): 97.2 (29 Aug 2018 05:02), Max: 98.2 (28 Aug 2018 21:28)  HR: 86 (29 Aug 2018 12:38) (83 - 94)  BP: 157/78 (29 Aug 2018 12:38) (154/76 - 174/80)  BP(mean): --  ABP: --  ABP(mean): --  RR: 18 (29 Aug 2018 12:38) (18 - 18)  SpO2: 92% (29 Aug 2018 12:38) (92% - 96%) on room air     General: Awake. Alert. Cooperative. Poor memory. No distress. Appears stated age 	  HEENT:   Atraumatic. Bitemporal wasting. Anicteric. Normal oral mucosa. PERRL. EOMI. Edentulous  Neck: Supple. Trachea midline. Thyroid without enlargement/tenderness/nodules. No carotid bruit. No JVD. Loss of bilateral supraclavicular fat pads  Cardiovascular: Regular rate and rhythm. S1 S2 normal. No murmurs, rubs or gallops.  Respiratory: Respirations unlabored. Clear to auscultation and percussion. Kyphosis.  Abdomen: Soft. Non-tender. Non-distended. No organomegaly. No masses. Normal bowel sounds.   Extremities: Warm to touch. No clubbing or cyanosis. No pedal edema.  Pulses: 2+ peripheral pulses all extremities.	  Skin: Multiple seborrheic keratoses on back  Lymph Nodes: Cervical, supraclavicular and axillary nodes normal  Neurological: Motor and sensory examination equal and normal. A and O x 3  Psychiatry: Appropriate mood and affect.    LABS:                        9.8    10.82 )-----------( 558      ( 29 Aug 2018 07:51 )             30.7                         8.5    10.35 )-----------( 518      ( 28 Aug 2018 07:31 )             27.8         142  |  106  |  29<H>  ----------------------------<  128<H>  4.4   |  26  |  1.19        141  |  108  |  30<H>  ----------------------------<  120<H>  4.8   |  21<L>  |  1.37<H>    Ca      9.4          Ca      8.9          Phos    3.6         Phos    2.3           Mg       2.2         Mg       1.7         TPro  8.2  /  Alb  4.3  /  TBili  0.2  /  DBili  x   /  AST  19  /  ALT  9<L>  /  AlkPhos  98  08-    ABG - ( 27 Aug 2018 00:30 )  pH: 7.42  /  pCO2: 35    /  pO2: 87    / HCO3: 22    / Base Excess: -1.6  /  SaO2: 96          ABG - ( 26 Aug 2018 20:28 )  pH: 7.41  /  pCO2: 39    /  pO2: 74    / HCO3: 24    / Base Excess: .0    /  SaO2: 96        Procalcitonin, Serum: 0.09 ng/mL ( @ 00:14)    Procalcitonin, Serum: 0.09 ng/mL ( @ 05:50)    CARDIAC MARKERS ( 27 Aug 2018 05:56 )  x     / x     / 64 U/L / x     / 3.7 ng/mL  CARDIAC MARKERS ( 27 Aug 2018 00:14 )  x     / x     / 75 U/L / x     / 3.9 ng/mL    < from: Transthoracic Echocardiogram (17 @ 15:01) >    Patient name: BASHIR BECKER  YOB: 1928   Age: 89 (M)   MR#: [id number]  Study Date: 2017  Location: [address] [place], [place] [id number]: Laly Portillo RDCS  Study quality: Technically difficult  Referring Physician: Delilah Katz MD  Blood Pressure: 124/54 mmHg  Height: 183 cm  Weight: 70 kg  BSA: 1.9 m2  ------------------------------------------------------------------------  PROCEDURE: Transthoracic echocardiogram with 2-D, M-Mode  and complete spectral and color flow Doppler.  INDICATION: Unspecified atrial fibrillation (I48.91)  ------------------------------------------------------------------------  Dimensions:    Normal Values:  LA:     3.9    2.0 - 4.0 cm  Ao:     3.6    2.0 - 3.8 cm  SEPTUM: 1.1    0.6 - 1.2 cm  PWT:    1.0   0.6 - 1.1 cm  LVIDd:  4.7    3.0 - 5.6 cm  LVIDs:  3.1    1.8 - 4.0 cm  Derived variables:  LVMI: 93 g/m2  RWT: 0.42  Fractional short: 34 %  EF (Teicholtz): 75 %  Doppler Peak Velocity (m/sec): AoV=2.0  ------------------------------------------------------------------------  Observations:  Mitral Valve: Mitral annular calcification and calcified  mitral leaflets with normal diastolic opening.  Mild-moderate mitral regurgitation.  Aortic Valve/Aorta: Calcified trileaflet aortic valve with  normal opening. Peak transaortic valve gradient equals 17  mm Hg, mean transaortic valve gradient equals 9 mm Hg,  estimated aortic valve area equals 1.6 sqcm (by continuity  equation), aortic valve velocity time integral equals 40  cm, consistent with mild aortic stenosis. Peak left  ventricular outflow tract gradient equals 4 mm Hg, mean  gradient is equal to 2 mm Hg, LVOT velocity time integral  equals 21 cm.  Aortic Root: 3.6 cm.  Left Atrium: Mildly dilated left atrium.  LA volume index =  36 cc/m2.  Left Ventricle: Hyperdynamic left ventricular systolic  function. Increased relative wall thickness with normal  left ventricular mass index, consistent with concentric  left ventricular remodeling. Indeterminate diastolic  function.  Right Heart: Normal right atrium. Normal right ventricular  size and function. Normal tricuspid valve. Mild tricuspid  regurgitation. Normal pulmonic valve.  Pericardium/Pleura: Normal pericardium with trace  pericardial effusion.  Hemodynamic: Estimated rightatrial pressure is 8 mm Hg.  Estimated right ventricular systolic pressure equals 38 mm  Hg, assuming right atrial pressure equals 8 mm Hg,  consistent with borderline pulmonary hypertension.  ------------------------------------------------------------------------  Conclusions:  1. Mitral annular calcification and calcified mitral  leaflets with normal diastolic opening.  2. Calcified trileaflet aortic valve with normal opening.  Peak transaortic valve gradient equals 17 mm Hg, mean  transaorticvalve gradient equals 9 mm Hg, estimated aortic  valve area equals 1.6 sqcm (by continuity equation), aortic  valve velocity time integral equals 40 cm, consistent with  mild aortic stenosis.  3. Mildly dilated left atrium.  LA volume index = 36 cc/m2.  4. Increased relative wall thickness with normal left  ventricular mass index, consistent with concentric left  ventricular remodeling.  5. Hyperdynamic left ventricular systolic function.  6. Normal right ventricular size and function.  7. Estimated right ventricular systolic pressure equals 38  mm Hg, assuming right atrial pressure equals 8 mm Hg,  consistent with borderline pulmonary hypertension.  ------------------------------------------------------------------------  Confirmed on  2017 - 16:56:34 by Gunjan Perez M.D.  ------------------------------------------------------------------------    < end of copied text >  ---------------------------------------------------------------------------------------------------------      MICROBIOLOGY:     Urinalysis Basic - ( 26 Aug 2018 02:52 )    Color: Yellow / Appearance: Clear / S.017 / pH: x  Gluc: x / Ketone: Negative  / Bili: Negative / Urobili: Negative   Blood: x / Protein: 30 mg/dL / Nitrite: Negative   Leuk Esterase: Small / RBC: 0-2 /HPF / WBC 10-25 /HPF   Sq Epi: x / Non Sq Epi: Occasional /HPF / Bacteria: x    Culture - Urine (18 @ 05:32)    Specimen Source: .Urine Clean Catch (Midstream)    Culture Results:   No growth    Culture - Blood (18 @ 09:30)    Specimen Source: .Blood Blood-Peripheral    Culture Results:   No growth to date.    Culture - Blood (18 @ 09:30)    Specimen Source: .Blood Blood-Peripheral    Culture Results:   No growth to date.    Culture - Blood in AM (18 @ 08:24)    -  Coagulase negative Staphylococcus: Detec    Gram Stain:   Growth in aerobic bottle: Gram Positive Cocci in Clusters    Specimen Source: .Blood Blood-Peripheral    Organism: Blood Culture PCR    Culture Results:   Growth in aerobic bottle: Gram Positive Cocci in Clusters  "Due to technical problems, Proteus sp. will Not be reported as part of  the BCID panel until further notice"  ***Blood Panel PCR results on this specimen are available  approximately 3 hours after the Gram stain result.***  Gram stain, PCR, and/or culture results may not always  correspond due to difference in methodologies.  ************************************************************    Rapid Respiratory Viral Panel (18 @ 23:39)    Rapid RVP Result: Detected: The [employer] RVP Rapid uses polymerase chain reaction (PCR) and melt  curve analysis to screen for adenovirus; coronavirus HKU1, NL63, 229E,  OC43; human metapneumovirus (hMPV); human enterovirus/rhinovirus  (Entero/RV); influenza A; influenza A/H1;influenza A/H3; influenza  A/H1-2009; influenza B; parainfluenza viruses 1, 2, 3, 4; respiratory  syncytial virus; Bordetella pertussis; Mycoplasma pneumoniae; and  Chlamydophila pneumoniae.    Parainfluenza 4 (RapRVP): Detected      RADIOLOGY:  [x] Chest radiographs reviewed and interpreted by me    < from: Xray Chest 1 View- PORTABLE-Urgent (18 @ 20:11) >    EXAM:  XR CHEST PORTABLE URGENT 1V                          PROCEDURE DATE:  2018      INTERPRETATION:  EXAMINATION: XR CHEST PORTABLE URGENT 1V    CLINICAL INDICATION: cough, hypoglycemia    TECHNIQUE: Single portable view of the chest was obtained.    COMPARISON: Chest radiograph from 2018 and CT chest from 2018.    FINDINGS:     The heart is normal in size. Aorta is atherosclerotic. There is   emphysema. There is stable right pleural thickening with calcifications.   There are no focal pulmonary consolidation.    IMPRESSION:   No focal consolidation.    MARY LOU KHAN M.D., RADIOLOGY RESIDENT  This document has been electronically signed.  POOJA MARTINEZ M.D., ATTENDING RADIOLOGIST  This document has been electronically signed. Aug 26 2018  3:52PM      < end of copied text >  ---------------------------------------------------------------------------------------------------------  < from: CT Chest No Cont (18 @ 23:46) >    EXAM:  CT CHEST                          PROCEDURE DATE:  2018      INTERPRETATION:  CLINICAL INFORMATION: COPD, hypoglycemia, evaluate for   pneumonia.    COMPARISON: CT chest 2018. CT 2018. CT 2016.    PROCEDURE:   CT of the Chest was performed without intravenous contrast.  Sagittal and coronal reformats were performed.      FINDINGS:    CHEST:     LUNGS AND LARGE AIRWAYS: Patent central airways.  Emphysema. Bibasilar   subsegmental atelectasis. A 5 mm right lower lobe nodule (2:60). Tiny   tree-in-bud opacities visualized peripherally at the superior segment of   the right lower lobe posteriorly. Few tiny tree-in-bud opacities   visualized at the periphery of the left lower lobe.  PLEURA: Calcified right pleural plaques.  VESSELS: Atherosclerotic changes aorta and branches.  HEART: Heart size is normal. Trace pericardial effusion. Coronary   calcifications.  MEDIASTINUM AND ALFRED: Stable subcentimeter mediastinal lymph nodes.  CHEST WALL AND LOWER NECK: Unchanged hypodense right thyroid nodule.  VISUALIZED UPPER ABDOMEN: Cholelithiasis.  BONES: Generative changes. Redemonstrated L1 compression deformity.    IMPRESSION:   Tree-in-bud opacities at both lower lobes peripherally indicative of   mucoid impacted distal airways. Infection cannot be excluded. Recommend   follow-up.    Emphysema.    MARIELOS MINOR M.D., RADIOLOGY RESIDENT  This document has been electronically signed.  LORENZO MAYES M.D., ATTENDING RADIOLOGIST  This document has been electronically signed. Aug 26 2018 12:07PM    < end of copied text >  ---------------------------------------------------------------------------------------------------------    SPIROMETRY:    FEV1 0.81 liters - 30% predicted  FVC 1.88 liters - 48% predicted  FEV1 - 43    c/w severe obstructive lung disease

## 2018-08-29 NOTE — PROGRESS NOTE ADULT - SUBJECTIVE AND OBJECTIVE BOX
Patient is a 89y old  Male who presents with a chief complaint of FTT, hallucinations, AMS (28 Aug 2018 22:47)      SUBJECTIVE / OVERNIGHT EVENTS: Comfortable without new complaints. Wants to go home.  Review of Systems  chest pain no  palpitations no  sob no  nausea no  headache no    MEDICATIONS  (STANDING):  atorvastatin 40 milliGRAM(s) Oral daily  azithromycin   Tablet 500 milliGRAM(s) Oral every 24 hours  buDESOnide   0.5 milliGRAM(s) Respule 0.5 milliGRAM(s) Inhalation every 12 hours  cefTRIAXone   IVPB 1 Gram(s) IV Intermittent every 24 hours  dextrose 5%. 1000 milliLiter(s) (50 mL/Hr) IV Continuous <Continuous>  dextrose 50% Injectable 12.5 Gram(s) IV Push once  dextrose 50% Injectable 25 Gram(s) IV Push once  diltiazem    milliGRAM(s) Oral daily  fluticasone propionate 50 MICROgram(s)/spray Nasal Spray 1 Spray(s) Both Nostrils two times a day  insulin glargine Injectable (LANTUS) 3 Unit(s) SubCutaneous at bedtime  insulin lispro (HumaLOG) corrective regimen sliding scale   SubCutaneous three times a day before meals  insulin lispro (HumaLOG) corrective regimen sliding scale   SubCutaneous at bedtime  insulin lispro Injectable (HumaLOG) 4 Unit(s) SubCutaneous three times a day before meals  lamoTRIgine 25 milliGRAM(s) Oral at bedtime  methimazole 2.5 milliGRAM(s) Oral daily  metoprolol tartrate 25 milliGRAM(s) Oral two times a day  mirtazapine 15 milliGRAM(s) Oral at bedtime  pantoprazole    Tablet 40 milliGRAM(s) Oral before breakfast  rivaroxaban 15 milliGRAM(s) Oral every 24 hours  sodium chloride 0.9%. 1000 milliLiter(s) (50 mL/Hr) IV Continuous <Continuous>    MEDICATIONS  (PRN):  ALPRAZolam 0.25 milliGRAM(s) Oral daily PRN anxiety  dextrose 40% Gel 15 Gram(s) Oral once PRN Blood Glucose LESS THAN 70 milliGRAM(s)/deciliter  ipratropium    for Nebulization 500 MICROGram(s) Nebulizer every 6 hours PRN Shortness of Breath and/or Wheezing  levalbuterol Inhalation 0.63 milliGRAM(s) Inhalation every 6 hours PRN SOB/Wheezing  polyethylene glycol 3350 17 Gram(s) Oral daily PRN Constipation      Vital Signs Last 24 Hrs  T(C): 36.2 (29 Aug 2018 05:02), Max: 36.8 (28 Aug 2018 21:28)  T(F): 97.2 (29 Aug 2018 05:02), Max: 98.2 (28 Aug 2018 21:28)  HR: 86 (29 Aug 2018 12:38) (83 - 94)  BP: 157/78 (29 Aug 2018 12:38) (154/76 - 174/80)  BP(mean): --  RR: 18 (29 Aug 2018 12:38) (18 - 18)  SpO2: 92% (29 Aug 2018 12:38) (92% - 96%)    PHYSICAL EXAM:  GENERAL: NAD, well-developed  HEAD:  Atraumatic, Normocephalic  EYES: EOMI, PERRLA, conjunctiva and sclera clear  NECK: Supple, No JVD  CHEST/LUNG: Clear to auscultation bilaterally; No wheeze  HEART: Regular rate and rhythm; No murmurs, rubs, or gallops  ABDOMEN: Soft, Nontender, Nondistended; Bowel sounds present  EXTREMITIES:  2+ Peripheral Pulses, No clubbing, cyanosis, or edema  PSYCH: AAOx3  NEUROLOGY: non-focal  SKIN: No rashes or lesions    LABS:                        9.8    10.82 )-----------( 558      ( 29 Aug 2018 07:51 )             30.7     08-29    142  |  106  |  29<H>  ----------------------------<  128<H>  4.4   |  26  |  1.19    Ca    9.4      29 Aug 2018 06:39                Culture - Blood (collected 27 Aug 2018 09:30)  Source: .Blood Blood-Peripheral  Preliminary Report (28 Aug 2018 10:01):    No growth to date.    Culture - Blood (collected 27 Aug 2018 09:30)  Source: .Blood Blood-Peripheral  Preliminary Report (28 Aug 2018 10:01):    No growth to date.        RADIOLOGY & ADDITIONAL TESTS:    Imaging Personally Reviewed:    Consultant(s) Notes Reviewed:      Care Discussed with Consultants/Other Providers:

## 2018-08-30 LAB — TSI ACT/NOR SER: <0.1 IU/L — SIGNIFICANT CHANGE UP (ref 0–0.55)

## 2018-09-03 NOTE — CHART NOTE - NSCHARTNOTEFT_GEN_A_CORE
patient with sepsis present on admission from viral infection possible pneumonia. Mental status change/hallucinations from metabolic encephalopathy and possible hypoglycemia.

## 2019-01-01 NOTE — ED PROCEDURE NOTE - CPROC ED COMPLICATIONS1
no You can access the FollowMyHealth Patient Portal offered by [facility] by registering at the following website: [email or web address]. By joining [employer]’s FollowMyHealth portal, you will also be able to view your health information using other applications (apps) compatible with our system.

## 2019-06-12 NOTE — BEHAVIORAL HEALTH ASSESSMENT NOTE - ATTENTION / CONCENTRATION
Chief complaint:   Chief Complaint   Patient presents with   • Convey Results       Vitals:  Visit Vitals  /74 (BP Location: [facility], Patient Position: Sitting, Cuff Size: Regular)   Pulse 70   Ht 5' (1.524 m)   Wt 56.4 kg   SpO2 98%   BMI 24.28 kg/m²       HISTORY OF PRESENT ILLNESS     HPI    Other significant problems:  Patient Active Problem List    Diagnosis Date Noted   • Other chronic pain 06/12/2019     Priority: Low   • Fatty liver      Priority: Low   • Low back pain      Priority: Low   • Other long term (current) drug therapy      Priority: Low   • Primary generalized (osteo)arthritis      Priority: Low   • Vitamin D deficiency      Priority: Low   • Odynophagia 09/11/2018     Priority: Low   • Sore throat and laryngitis 09/11/2018     Priority: Low   • Cough variant asthma 08/02/2018     Priority: Low   • Psoriatic arthritis (CMS/[facility]) 06/22/2018     Priority: Low   • Chronic left hip pain 03/30/2018     Priority: Low   • History of psoriasis 03/30/2018     Priority: Low   • Fatty infiltration of liver 06/28/2017     Priority: Low   • Renal cyst 06/28/2017     Priority: Low   • Hematuria 06/28/2017     Priority: Low   • Allergic rhinitis 09/22/2015     Priority: Low   • Chronic obstructive asthma, unspecified 07/14/2014     Priority: Low   • Cough 07/14/2014     Priority: Low   • Vaginal dryness, menopausal 07/18/2012     Priority: Low   • Hearing disorder      Priority: Low   • COPD (chronic obstructive pulmonary disease) (CMS/[facility]) 01/06/2012     Priority: Low   • Eczema 01/06/2012     Priority: Low   • Emphysema 01/06/2012     Priority: Low   • GERD (gastroesophageal reflux disease) 01/06/2012     Priority: Low   • Hypertension 01/06/2012     Priority: Low   • MVP (mitral valve prolapse) 01/06/2012     Priority: Low   • Osteopenia 07/09/2010     Priority: Low     Mild         PAST MEDICAL, FAMILY AND SOCIAL HISTORY     Medications:  Current Outpatient Medications   Medication   • HUMIRA PEN 40  MG/0.4ML citrate free   • predniSONE (DELTASONE) 5 MG tablet   • cyclobenzaprine (FLEXERIL) 10 MG tablet   • Cobalamine Combinations (MTX SUPPORT) Tab   • valsartan (DIOVAN) 80 MG tablet   • nortriptyline (PAMELOR) 10 MG capsule   • dilTIAZem (CARDIZEM) 60 MG tablet   • pantoprazole (PROTONIX) 40 MG tablet   • losartan (COZAAR) 50 MG tablet   • OTEZLA 30 MG Tab   • VITAMIN D, CHOLECALCIFEROL, PO   • budesonide/formoterol (SYMBICORT) 80-4.5 MCG/ACT inhaler   • PREMARIN vaginal cream   • dicyclomine (BENTYL) 10 MG capsule   • Coenzyme Q10 100 MG CAPS   • escitalopram (LEXAPRO) 5 MG tablet   • amoxicillin (AMOXIL) 500 MG capsule   • predniSONE (DELTASONE) 5 MG tablet     No current facility-administered medications for this visit.        Allergies:  ALLERGIES:   Allergen Reactions   • Augmentin [Amoxicillin-Pot Clavulanate] RASH     Petechiae   • Hydrocodone PRURITUS   • Levaquin      Sunburn reaction     • Lisinopril Cough     Cough       Past Medical  History/Surgeries:  Past Medical History:   Diagnosis Date   • Allergic rhinitis    • Asthma     Followed by Dr. Jeremias [phone] [facility]   • Bilateral ovarian cysts 2003    bilat. oophorectomy 2003   • Bronchitis     Followed by Dr. Jeremias [phone] [facility]   • COPD (chronic obstructive pulmonary disease) (CMS/HCC)     Followed by Dr. Jeremias [phone] [facility]   • Cough     Followed by Dr. Jeremias [phone] [facility]   • Dyspareunia    • Eczema    • Enthesopathy of left hip region    • Fatty liver    • GERD (gastroesophageal reflux disease)     Followed by Dr. Jeremias [phone] [facility]   • Hearing disorder    • Heart disease    • Hypertension    • Low back pain    • MVP (mitral valve prolapse)    • Osteopenia 07/09/10    Mild   • Other long term (current)  drug therapy    • Other psoriatic arthropathy (CMS/HCC)    • PNA (pneumonia)     Followed by Dr. Jeremias [phone] [facility]   • PONV (postoperative nausea and vomiting)    • Primary generalized (osteo)arthritis    • Psoriasis vulgaris    • Tubular adenoma of colon 07/28/2017    Dr. Bro/ 5 recall TA       Past Surgical History:   Procedure Laterality Date   • Colonoscopy Left 07/28/2017    Dr. Bro TA recall 5 yrs   • Colonoscopy diagnostic  10/20/2006    normal   • D and c     • Dexa bone density axial skeleton  07/19/10   • Esophagogastroduodenoscopy transoral flex w/bx single or mult  6/26/2014    Dr Bro   • Eye surgery  1996    Dacryocystorhinostomy right eye   • Incise tear duct opening     • Past surgical history      PSH of Arthroplasty Lt. Hand   • Removal gallbladder     • Removal of ovary(s)      Oophorectomy 2003 bilateral       Family History:  Family History   Problem Relation Age of Onset   • Diabetes Father         type II   • Respiratory Father    • Skin Father         Psoriasis   • Cancer Father 80        lung/liver   • Osteoporosis Father    • Cancer Mother 70        lung/brain    • Respiratory Mother    • Psoriasis Brother    • Cancer Sister 66        soft tissue sarcoma   • Cancer Other 33        Niece-brain tumor       Social History:  Social History     Tobacco Use   • Smoking status: Former Smoker   • Smokeless tobacco: Never Used   • Tobacco comment: quit 20 years:1993   Substance Use Topics   • Alcohol use: Yes     Alcohol/week: 0.0 oz     Comment: 3-4 drinks weekends       REVIEW OF SYSTEMS     Review of Systems    PHYSICAL EXAM     Physical Exam    ASSESSMENT/PLAN     This office note has been dictated.     Impaired

## 2019-10-09 NOTE — PHYSICAL THERAPY INITIAL EVALUATION ADULT - CRITERIA FOR SKILLED THERAPEUTIC INTERVENTIONS
impairments found Complex Repair And Split-Thickness Skin Graft Text: The defect edges were debeveled with a #15 scalpel blade.  The primary defect was closed partially with a complex linear closure.  Given the location of the defect, shape of the defect and the proximity to free margins a split thickness skin graft was deemed most appropriate to repair the remaining defect.  The graft was trimmed to fit the size of the remaining defect.  The graft was then placed in the primary defect, oriented appropriately, and sutured into place.

## 2019-10-15 ENCOUNTER — APPOINTMENT (OUTPATIENT)
Dept: SURGICAL ONCOLOGY | Facility: CLINIC | Age: 84
End: 2019-10-15
Payer: MEDICARE

## 2019-10-15 VITALS
SYSTOLIC BLOOD PRESSURE: 166 MMHG | HEIGHT: 75 IN | DIASTOLIC BLOOD PRESSURE: 66 MMHG | BODY MASS INDEX: 16.91 KG/M2 | HEART RATE: 78 BPM | RESPIRATION RATE: 14 BRPM | WEIGHT: 136 LBS | OXYGEN SATURATION: 98 %

## 2019-10-15 PROCEDURE — 99205 OFFICE O/P NEW HI 60 MIN: CPT

## 2019-10-22 ENCOUNTER — APPOINTMENT (OUTPATIENT)
Dept: SURGICAL ONCOLOGY | Facility: CLINIC | Age: 84
End: 2019-10-22
Payer: MEDICARE

## 2019-10-22 ENCOUNTER — LABORATORY RESULT (OUTPATIENT)
Age: 84
End: 2019-10-22

## 2019-10-22 PROCEDURE — 11622 EXC S/N/H/F/G MAL+MRG 1.1-2: CPT

## 2019-10-23 NOTE — PHYSICAL EXAM
[FreeTextEntry1] : Left cheek: well healed advancement flap without evidence of recurrent skin cancer.\par Right cheek: 8 mm raised/ulcerated cutaneous lesion of right cheek above and just lateral to upper lip. [Normal] : supple, no neck mass and thyroid not enlarged [Normal Neck Lymph Nodes] : normal neck lymph nodes  [Normal Supraclavicular Lymph Nodes] : normal supraclavicular lymph nodes [Normal Groin Lymph Nodes] : normal groin lymph nodes [Normal Axillary Lymph Nodes] : normal axillary lymph nodes [Normal] : oriented to person, place and time, with appropriate affect

## 2019-10-23 NOTE — HISTORY OF PRESENT ILLNESS
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  Underwent wide excision and local flap coverage 04/2010.  Received adjuvant radiation therapy with Dr. Tolbert.  Patient did well and is without recurrence since.  Developed left upper lip BCCa and left lateral face SCCa also treated with radiation therapy to complete response.  Last office visit is 01/2014.  Patient reports he has not been to dermatologist in close to 2 years.  Denies pain or swelling at operative site.\par \par 10/22/2019: Patient presents for excision of right cheek cutaneous lesion, suspicious for SCCA.  He denies new symptoms.

## 2019-10-23 NOTE — ASSESSMENT
[FreeTextEntry1] : New right cheek lesion, suspicious for malignancy.\par \par Plan: Discussed options of punch biopsy/excisional biopsy.  Patient wishes excision.  Will schedule next week as office procedure as patient needs to hold anticoagulation.

## 2019-10-23 NOTE — PROCEDURE
[FreeTextEntry1] : Risks/benefits explained.\par Informed consent obtained.\par Area prepped with Betadine.\par Local anesthesia infiltrated.\par Excisional biopsy of right cheek malignant lesion performed.\par Lesion removed completely, specimen measured 1.2 cm x 0.8 cm.\par Primary skin closure with 4-0 Monocryl.\par Patient tolerated procedure well.\par No complications.

## 2019-10-23 NOTE — HISTORY OF PRESENT ILLNESS
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  Underwent wide excision and local flap coverage 04/2010.  Received adjuvant radiation therapy with Dr. Tolbert.  Patient did well and is without recurrence since.  Developed left upper lip BCCa and left lateral face SCCa also treated with radiation therapy to complete response.  Last office visit is 01/2014.  Patient reports he has not been to dermatologist in close to 2 years.  Denies pain or swelling at operative site.\par \par 10/15/2019: Patient was last seen 05/31/2016.  He now presents with raised lesion in the inner right cheek, above and lateral to right upper lip.  He denies pain, but reports occasional bleeding.  He has not had recent dermatology follow up.\par \par 10/22/2019: Patient presents for excision of right cheek cutaneous lesion, suspicious for SCCA.  He denies new symptoms.

## 2019-10-23 NOTE — PHYSICAL EXAM
[FreeTextEntry1] : 8 mm raised/ulcerated cutaneous lesion of right cheek above and just lateral to upper lip.

## 2019-10-30 ENCOUNTER — APPOINTMENT (OUTPATIENT)
Dept: SURGICAL ONCOLOGY | Facility: CLINIC | Age: 84
End: 2019-10-30
Payer: MEDICARE

## 2019-10-30 VITALS
TEMPERATURE: 97.5 F | WEIGHT: 133 LBS | RESPIRATION RATE: 16 BRPM | HEIGHT: 75 IN | HEART RATE: 67 BPM | OXYGEN SATURATION: 95 % | SYSTOLIC BLOOD PRESSURE: 147 MMHG | DIASTOLIC BLOOD PRESSURE: 62 MMHG | BODY MASS INDEX: 16.54 KG/M2

## 2019-10-30 DIAGNOSIS — C44.329 SQUAMOUS CELL CARCINOMA OF SKIN OF OTHER PARTS OF FACE: ICD-10-CM

## 2019-10-30 PROCEDURE — 99024 POSTOP FOLLOW-UP VISIT: CPT

## 2019-10-31 NOTE — HISTORY OF PRESENT ILLNESS
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  Underwent wide excision and local flap coverage 04/2010.  Received adjuvant radiation therapy with Dr. Tolbert.  Patient did well and is without recurrence since.  Developed left upper lip BCCa and left lateral face SCCa also treated with radiation therapy to complete response.  Last office visit is 01/2014.  Patient reports he has not been to dermatologist in close to 2 years.  Denies pain or swelling at operative site.\par \par 10/15/2019: Patient was last seen 05/31/2016.  He now presents with raised lesion in the inner right cheek, above and lateral to right upper lip.  He denies pain, but reports occasional bleeding.  He has not had recent dermatology follow up.\par \par 10/22/2019: Patient presents for excision of right cheek cutaneous lesion, suspicious for SCCA.  He denies new symptoms.\par \par 10/30/2019: Doing well after office excision of right cheek lesion.  Denies pain.\par Pathology: squamous cell cancer extending to margin.

## 2019-10-31 NOTE — ASSESSMENT
[FreeTextEntry1] : S/p excision of right cheek squamous cell cancer.\par Multiple medical comorbidity.\par \par Plan: Options of re-excision and observation discussed.  Patient and daughter wish to observe and consider excision only if the is clinical evidence of symptomatic recurrence.  Follow up exam in 3 months.

## 2019-12-01 NOTE — DISCHARGE NOTE ADULT - KEEP YOUR INTAKE OF VITAMIN K REGULAR. THE HIGHEST AMOUNT OF VITAMIN K IS FOUND IN GREEN AND LEAFY VEGETABLES LIKE BROCCOLI, LETTUCES, CABBAGE, AND SPINACH. YOU CAN EAT THESE FOODS BUT KEEP THE PORTION
Pt c/o congestiong, would like to start flonase tonight rather than wait for AM dose. MD Garth on call for gen surg notified. Order for now dose.    Statement Selected

## 2019-12-30 NOTE — BEHAVIORAL HEALTH ASSESSMENT NOTE - EYE CONTACT
I have personally seen and examined this patient.  I have fully participated in the care of this patient. I have reviewed all pertinent clinical information, including history, physical exam, plan and the Resident’s note and agree except as noted.
Fair

## 2020-02-06 ENCOUNTER — APPOINTMENT (OUTPATIENT)
Dept: SURGICAL ONCOLOGY | Facility: CLINIC | Age: 85
End: 2020-02-06

## 2020-10-31 NOTE — BEHAVIORAL HEALTH ASSESSMENT NOTE - NS ED [facility] DEMOGRAPHICS MEDICAL RECORD REVIEWED YES RECORDS
Patient Name: Talon Martinez                                                    Primary Physician: Sukhjinder Rivera MD  Admitting Dx: Acute respiratory failure [facility]) [J96.00]  Acute respiratory failure ([address] [J96.00]  Acute respiratory failure with hypoxia [facility]) [J96.01]      Nephrology Hospital Progress Note  [facility]  [email or web address] [employer]                                       Interval Plan:     · Na now 148 . Not taking adequate fluids. · Worsening creatinine is up to 2.8  · COVID +ve . · Continue nifedipine XL to 60 mg.  · Urine output is 550 mL. · Hold any further diuresis. · Start IVF and if no improvement may consider dialysis. Assessment:     Hypernatremia  · Likely fluid access related now on IVF NS and monitor for improvement in Na. · No need for aggressive workup at this time. · Na is only 148 on this admission and not nearly as high as in the past > 165.     · Previously from poor oral intake in the face of lithium use. Lithium can cause polyuria. Monitor renal daily  · Appears to be of Lithium and now on HCTZ which has been held as well likely contributing. Hx of DEBBY  ·  Baseline creatinine is 0.7 and now 0.9  · Hematuria: resolved  Resp Failure / COVID +ve  · On Vanc, Remdesivir, Deadron, and Cefepime. NO Zosyn / Vanc combination   · Now on 15 L NC  Hypokalemia  · ok at 3.9 and yesterday given 40 meq      Please call our office at [phone] or [employer] with any questions or contact me directly. Subjective:     CC / Reason for Consult:    Hypernatremia    HPI/PMH:.  Talon Martinez is a 46 y.o. male admitted for Resp Failure / COVID +ve with PMHx of traumatic brain injury with encephalopathy and seizure disorder, schizophrenia/bipolar disorder. Previously seen for hypernatremia as well and not taking in fluids or eating and admitted at [facility] with similar issues with hypernatremia, DEBBY and pneumonia. Previously sodium was 167 and creatinine was 4.8 with BUN of 62.
He was also hypotensive and receive IVF. Now Na is 148 and SCr is 0.9. Patient is not responsive and unable to provide history. ROS / Interval Hx: Patient seen in room with no changes. Denies difficulty breathing. Difficult to follow. Objective:     Minimal exam was performed as patient is COVID-19 positive. It is to minimize exposure and preserve PPE. Vitals:     BP (!) 155/82   Pulse 103   Temp 100.9 °F (38.3 °C) (Axillary)   Resp (!) 40   Ht 6' 0.01\" (1.829 m)   Wt 198 lb (89.8 kg)   SpO2 (!) 86%   BMI 26.85 kg/m²      I/Os: Reviewed    Meds: Reviewed. Please see plan for changes made.     Labs:    Lab Results   Component Value Date    WBC 19.1 10/31/2020    HGB 10.2 10/31/2020    HCT 32.2 10/31/2020    MCV 88.3 10/31/2020     10/31/2020      Lab Results   Component Value Date    CREATININE 2.8 10/31/2020    BUN 68 10/31/2020     10/31/2020    K 3.6 10/31/2020    K 4.3 10/21/2020     10/31/2020    CO2 23 10/31/2020     No components found for: GLU   Lab Results   Component Value Date    CALCIUM 8.5 10/31/2020    CAION 1.21 04/29/2015    PHOS 3.1 05/16/2015      No results found for: [id number]   Lab Results   Component Value Date    IRON 46 05/01/2015    TIBC 158 05/01/2015    FERRITIN 1,688.0 10/28/2020      No results found for: Polly Raines
Hospital chart
ADMIT

## 2020-11-25 NOTE — ED ADULT NURSE NOTE - NSSISCREENINGQ3_ED_A_ED
Patient ambulated 100 feet with walker.  Steady, tolerated well.  Denies back pain with minor back spasms.  
Patient has met post-sedation monitoring discharge criteria. VSS. Tolerated diet. No complaints of pain. Dressing CDI. IV discontinued. AVS reviewed with patient an family. All questions answered at this time. Patient ambulating at baseline and left department with family member.    
No

## 2021-01-01 ENCOUNTER — TRANSCRIPTION ENCOUNTER (OUTPATIENT)
Age: 86
End: 2021-01-01

## 2021-01-01 ENCOUNTER — INPATIENT (INPATIENT)
Facility: HOSPITAL | Age: 86
LOS: 8 days | DRG: 177 | End: 2021-04-04
Attending: INTERNAL MEDICINE | Admitting: INTERNAL MEDICINE
Payer: MEDICARE

## 2021-01-01 ENCOUNTER — INPATIENT (INPATIENT)
Facility: HOSPITAL | Age: 86
LOS: 4 days | Discharge: HOME HEALTH | DRG: 193 | End: 2021-02-22
Attending: INTERNAL MEDICINE | Admitting: INTERNAL MEDICINE
Payer: MEDICARE

## 2021-01-01 VITALS
TEMPERATURE: 98 F | RESPIRATION RATE: 17 BRPM | SYSTOLIC BLOOD PRESSURE: 101 MMHG | HEIGHT: 71 IN | DIASTOLIC BLOOD PRESSURE: 49 MMHG | OXYGEN SATURATION: 100 % | HEART RATE: 68 BPM

## 2021-01-01 VITALS
OXYGEN SATURATION: 95 % | TEMPERATURE: 98 F | HEART RATE: 100 BPM | DIASTOLIC BLOOD PRESSURE: 57 MMHG | SYSTOLIC BLOOD PRESSURE: 102 MMHG | RESPIRATION RATE: 18 BRPM

## 2021-01-01 VITALS
DIASTOLIC BLOOD PRESSURE: 49 MMHG | HEIGHT: 70 IN | RESPIRATION RATE: 20 BRPM | OXYGEN SATURATION: 100 % | WEIGHT: 134.92 LBS | TEMPERATURE: 99 F | HEART RATE: 88 BPM | SYSTOLIC BLOOD PRESSURE: 116 MMHG

## 2021-01-01 VITALS — SYSTOLIC BLOOD PRESSURE: 112 MMHG | RESPIRATION RATE: 17 BRPM | DIASTOLIC BLOOD PRESSURE: 63 MMHG | HEART RATE: 72 BPM

## 2021-01-01 DIAGNOSIS — J44.1 CHRONIC OBSTRUCTIVE PULMONARY DISEASE WITH (ACUTE) EXACERBATION: ICD-10-CM

## 2021-01-01 DIAGNOSIS — F03.91 UNSPECIFIED DEMENTIA WITH BEHAVIORAL DISTURBANCE: ICD-10-CM

## 2021-01-01 DIAGNOSIS — Z96.641 PRESENCE OF RIGHT ARTIFICIAL HIP JOINT: Chronic | ICD-10-CM

## 2021-01-01 DIAGNOSIS — F31.70 BIPOLAR DISORDER, CURRENTLY IN REMISSION, MOST RECENT EPISODE UNSPECIFIED: ICD-10-CM

## 2021-01-01 DIAGNOSIS — F05 DELIRIUM DUE TO KNOWN PHYSIOLOGICAL CONDITION: ICD-10-CM

## 2021-01-01 DIAGNOSIS — R06.02 SHORTNESS OF BREATH: ICD-10-CM

## 2021-01-01 LAB
% ALBUMIN: 50.2 % — SIGNIFICANT CHANGE UP
% ALPHA 1: 6.1 % — SIGNIFICANT CHANGE UP
% ALPHA 2: 12.5 % — SIGNIFICANT CHANGE UP
% BETA: 9.8 % — SIGNIFICANT CHANGE UP
% GAMMA: 21.4 % — SIGNIFICANT CHANGE UP
% M SPIKE: 12.6 % — SIGNIFICANT CHANGE UP
A1C WITH ESTIMATED AVERAGE GLUCOSE RESULT: 6.7 % — HIGH (ref 4–5.6)
ALBUMIN SERPL ELPH-MCNC: 3.3 G/DL — LOW (ref 3.6–5.5)
ALBUMIN SERPL ELPH-MCNC: 3.4 G/DL — SIGNIFICANT CHANGE UP (ref 3.3–5)
ALBUMIN SERPL ELPH-MCNC: 3.6 G/DL — SIGNIFICANT CHANGE UP (ref 3.3–5)
ALBUMIN/GLOB SERPL ELPH: 1 RATIO — SIGNIFICANT CHANGE UP
ALP SERPL-CCNC: 116 U/L — SIGNIFICANT CHANGE UP (ref 40–120)
ALP SERPL-CCNC: 87 U/L — SIGNIFICANT CHANGE UP (ref 40–120)
ALPHA1 GLOB SERPL ELPH-MCNC: 0.4 G/DL — SIGNIFICANT CHANGE UP (ref 0.1–0.4)
ALPHA2 GLOB SERPL ELPH-MCNC: 0.8 G/DL — SIGNIFICANT CHANGE UP (ref 0.5–1)
ALT FLD-CCNC: 7 U/L — LOW (ref 10–45)
ALT FLD-CCNC: <5 U/L — LOW (ref 10–45)
ANION GAP SERPL CALC-SCNC: 10 MMOL/L — SIGNIFICANT CHANGE UP (ref 5–17)
ANION GAP SERPL CALC-SCNC: 11 MMOL/L — SIGNIFICANT CHANGE UP (ref 5–17)
ANION GAP SERPL CALC-SCNC: 12 MMOL/L — SIGNIFICANT CHANGE UP (ref 5–17)
ANION GAP SERPL CALC-SCNC: 12 MMOL/L — SIGNIFICANT CHANGE UP (ref 5–17)
ANION GAP SERPL CALC-SCNC: 14 MMOL/L — SIGNIFICANT CHANGE UP (ref 5–17)
ANION GAP SERPL CALC-SCNC: 15 MMOL/L — SIGNIFICANT CHANGE UP (ref 5–17)
ANION GAP SERPL CALC-SCNC: 7 MMOL/L — SIGNIFICANT CHANGE UP (ref 5–17)
ANION GAP SERPL CALC-SCNC: 8 MMOL/L — SIGNIFICANT CHANGE UP (ref 5–17)
ANION GAP SERPL CALC-SCNC: 8 MMOL/L — SIGNIFICANT CHANGE UP (ref 5–17)
ANION GAP SERPL CALC-SCNC: 9 MMOL/L — SIGNIFICANT CHANGE UP (ref 5–17)
ANION GAP SERPL CALC-SCNC: 9 MMOL/L — SIGNIFICANT CHANGE UP (ref 5–17)
ANISOCYTOSIS BLD QL: SLIGHT — SIGNIFICANT CHANGE UP
APPEARANCE UR: ABNORMAL
APPEARANCE UR: ABNORMAL
APTT BLD: 55.7 SEC — HIGH (ref 27.5–35.5)
APTT BLD: 63.6 SEC — HIGH (ref 27.5–35.5)
AST SERPL-CCNC: 10 U/L — SIGNIFICANT CHANGE UP (ref 10–40)
AST SERPL-CCNC: 8 U/L — LOW (ref 10–40)
B-GLOBULIN SERPL ELPH-MCNC: 0.6 G/DL — SIGNIFICANT CHANGE UP (ref 0.5–1)
BACTERIA # UR AUTO: NEGATIVE — SIGNIFICANT CHANGE UP
BACTERIA # UR AUTO: NEGATIVE — SIGNIFICANT CHANGE UP
BASE EXCESS BLDV CALC-SCNC: 4.4 MMOL/L — HIGH (ref -2–2)
BASE EXCESS BLDV CALC-SCNC: 7.7 MMOL/L — HIGH (ref -2–2)
BASOPHILS # BLD AUTO: 0 K/UL — SIGNIFICANT CHANGE UP (ref 0–0.2)
BASOPHILS # BLD AUTO: 0.02 K/UL — SIGNIFICANT CHANGE UP (ref 0–0.2)
BASOPHILS # BLD AUTO: 0.03 K/UL — SIGNIFICANT CHANGE UP (ref 0–0.2)
BASOPHILS # BLD AUTO: 0.03 K/UL — SIGNIFICANT CHANGE UP (ref 0–0.2)
BASOPHILS NFR BLD AUTO: 0 % — SIGNIFICANT CHANGE UP (ref 0–2)
BASOPHILS NFR BLD AUTO: 0.2 % — SIGNIFICANT CHANGE UP (ref 0–2)
BASOPHILS NFR BLD AUTO: 0.2 % — SIGNIFICANT CHANGE UP (ref 0–2)
BASOPHILS NFR BLD AUTO: 0.4 % — SIGNIFICANT CHANGE UP (ref 0–2)
BILIRUB SERPL-MCNC: 0.2 MG/DL — SIGNIFICANT CHANGE UP (ref 0.2–1.2)
BILIRUB SERPL-MCNC: 0.3 MG/DL — SIGNIFICANT CHANGE UP (ref 0.2–1.2)
BILIRUB UR-MCNC: NEGATIVE — SIGNIFICANT CHANGE UP
BILIRUB UR-MCNC: NEGATIVE — SIGNIFICANT CHANGE UP
BLD GP AB SCN SERPL QL: NEGATIVE — SIGNIFICANT CHANGE UP
BUN SERPL-MCNC: 22 MG/DL — SIGNIFICANT CHANGE UP (ref 7–23)
BUN SERPL-MCNC: 23 MG/DL — SIGNIFICANT CHANGE UP (ref 7–23)
BUN SERPL-MCNC: 25 MG/DL — HIGH (ref 7–23)
BUN SERPL-MCNC: 31 MG/DL — HIGH (ref 7–23)
BUN SERPL-MCNC: 31 MG/DL — HIGH (ref 7–23)
BUN SERPL-MCNC: 33 MG/DL — HIGH (ref 7–23)
BUN SERPL-MCNC: 38 MG/DL — HIGH (ref 7–23)
BUN SERPL-MCNC: 38 MG/DL — HIGH (ref 7–23)
BUN SERPL-MCNC: 39 MG/DL — HIGH (ref 7–23)
BUN SERPL-MCNC: 40 MG/DL — HIGH (ref 7–23)
BUN SERPL-MCNC: 41 MG/DL — HIGH (ref 7–23)
BUN SERPL-MCNC: 41 MG/DL — HIGH (ref 7–23)
BUN SERPL-MCNC: 44 MG/DL — HIGH (ref 7–23)
BUN SERPL-MCNC: 49 MG/DL — HIGH (ref 7–23)
BUN SERPL-MCNC: 50 MG/DL — HIGH (ref 7–23)
BUN SERPL-MCNC: 51 MG/DL — HIGH (ref 7–23)
BUN SERPL-MCNC: 55 MG/DL — HIGH (ref 7–23)
BUN SERPL-MCNC: 55 MG/DL — HIGH (ref 7–23)
CA-I SERPL-SCNC: 1.21 MMOL/L — SIGNIFICANT CHANGE UP (ref 1.12–1.3)
CALCIUM SERPL-MCNC: 8.5 MG/DL — SIGNIFICANT CHANGE UP (ref 8.4–10.5)
CALCIUM SERPL-MCNC: 8.5 MG/DL — SIGNIFICANT CHANGE UP (ref 8.4–10.5)
CALCIUM SERPL-MCNC: 8.6 MG/DL — SIGNIFICANT CHANGE UP (ref 8.4–10.5)
CALCIUM SERPL-MCNC: 8.7 MG/DL — SIGNIFICANT CHANGE UP (ref 8.4–10.5)
CALCIUM SERPL-MCNC: 8.8 MG/DL — SIGNIFICANT CHANGE UP (ref 8.4–10.5)
CALCIUM SERPL-MCNC: 9 MG/DL — SIGNIFICANT CHANGE UP (ref 8.4–10.5)
CALCIUM SERPL-MCNC: 9.1 MG/DL — SIGNIFICANT CHANGE UP (ref 8.4–10.5)
CALCIUM SERPL-MCNC: 9.1 MG/DL — SIGNIFICANT CHANGE UP (ref 8.4–10.5)
CALCIUM SERPL-MCNC: 9.2 MG/DL — SIGNIFICANT CHANGE UP (ref 8.4–10.5)
CALCIUM SERPL-MCNC: 9.2 MG/DL — SIGNIFICANT CHANGE UP (ref 8.4–10.5)
CALCIUM SERPL-MCNC: 9.4 MG/DL — SIGNIFICANT CHANGE UP (ref 8.4–10.5)
CALCIUM SERPL-MCNC: 9.5 MG/DL — SIGNIFICANT CHANGE UP (ref 8.4–10.5)
CALCIUM SERPL-MCNC: 9.5 MG/DL — SIGNIFICANT CHANGE UP (ref 8.4–10.5)
CHLORIDE BLDV-SCNC: 104 MMOL/L — SIGNIFICANT CHANGE UP (ref 96–108)
CHLORIDE SERPL-SCNC: 101 MMOL/L — SIGNIFICANT CHANGE UP (ref 96–108)
CHLORIDE SERPL-SCNC: 102 MMOL/L — SIGNIFICANT CHANGE UP (ref 96–108)
CHLORIDE SERPL-SCNC: 103 MMOL/L — SIGNIFICANT CHANGE UP (ref 96–108)
CHLORIDE SERPL-SCNC: 104 MMOL/L — SIGNIFICANT CHANGE UP (ref 96–108)
CHLORIDE SERPL-SCNC: 105 MMOL/L — SIGNIFICANT CHANGE UP (ref 96–108)
CHLORIDE SERPL-SCNC: 106 MMOL/L — SIGNIFICANT CHANGE UP (ref 96–108)
CHLORIDE SERPL-SCNC: 108 MMOL/L — SIGNIFICANT CHANGE UP (ref 96–108)
CHLORIDE SERPL-SCNC: 99 MMOL/L — SIGNIFICANT CHANGE UP (ref 96–108)
CO2 BLDV-SCNC: 33 MMOL/L — HIGH (ref 22–30)
CO2 BLDV-SCNC: 38 MMOL/L — HIGH (ref 22–30)
CO2 SERPL-SCNC: 22 MMOL/L — SIGNIFICANT CHANGE UP (ref 22–31)
CO2 SERPL-SCNC: 23 MMOL/L — SIGNIFICANT CHANGE UP (ref 22–31)
CO2 SERPL-SCNC: 24 MMOL/L — SIGNIFICANT CHANGE UP (ref 22–31)
CO2 SERPL-SCNC: 24 MMOL/L — SIGNIFICANT CHANGE UP (ref 22–31)
CO2 SERPL-SCNC: 25 MMOL/L — SIGNIFICANT CHANGE UP (ref 22–31)
CO2 SERPL-SCNC: 26 MMOL/L — SIGNIFICANT CHANGE UP (ref 22–31)
CO2 SERPL-SCNC: 27 MMOL/L — SIGNIFICANT CHANGE UP (ref 22–31)
CO2 SERPL-SCNC: 28 MMOL/L — SIGNIFICANT CHANGE UP (ref 22–31)
CO2 SERPL-SCNC: 28 MMOL/L — SIGNIFICANT CHANGE UP (ref 22–31)
CO2 SERPL-SCNC: 29 MMOL/L — SIGNIFICANT CHANGE UP (ref 22–31)
CO2 SERPL-SCNC: 29 MMOL/L — SIGNIFICANT CHANGE UP (ref 22–31)
CO2 SERPL-SCNC: 30 MMOL/L — SIGNIFICANT CHANGE UP (ref 22–31)
CO2 SERPL-SCNC: 31 MMOL/L — SIGNIFICANT CHANGE UP (ref 22–31)
COLOR SPEC: YELLOW — SIGNIFICANT CHANGE UP
COLOR SPEC: YELLOW — SIGNIFICANT CHANGE UP
COMMENT - URINE: SIGNIFICANT CHANGE UP
COVID-19 SPIKE DOMAIN AB INTERP: NEGATIVE — SIGNIFICANT CHANGE UP
COVID-19 SPIKE DOMAIN ANTIBODY RESULT: 0.4 U/ML — SIGNIFICANT CHANGE UP
CREAT ?TM UR-MCNC: 105 MG/DL — SIGNIFICANT CHANGE UP
CREAT SERPL-MCNC: 1.49 MG/DL — HIGH (ref 0.5–1.3)
CREAT SERPL-MCNC: 1.59 MG/DL — HIGH (ref 0.5–1.3)
CREAT SERPL-MCNC: 1.63 MG/DL — HIGH (ref 0.5–1.3)
CREAT SERPL-MCNC: 1.66 MG/DL — HIGH (ref 0.5–1.3)
CREAT SERPL-MCNC: 1.7 MG/DL — HIGH (ref 0.5–1.3)
CREAT SERPL-MCNC: 1.7 MG/DL — HIGH (ref 0.5–1.3)
CREAT SERPL-MCNC: 1.72 MG/DL — HIGH (ref 0.5–1.3)
CREAT SERPL-MCNC: 1.73 MG/DL — HIGH (ref 0.5–1.3)
CREAT SERPL-MCNC: 1.75 MG/DL — HIGH (ref 0.5–1.3)
CREAT SERPL-MCNC: 1.78 MG/DL — HIGH (ref 0.5–1.3)
CREAT SERPL-MCNC: 1.87 MG/DL — HIGH (ref 0.5–1.3)
CREAT SERPL-MCNC: 1.93 MG/DL — HIGH (ref 0.5–1.3)
CREAT SERPL-MCNC: 1.94 MG/DL — HIGH (ref 0.5–1.3)
CREAT SERPL-MCNC: 2.04 MG/DL — HIGH (ref 0.5–1.3)
CREAT SERPL-MCNC: 2.07 MG/DL — HIGH (ref 0.5–1.3)
CREAT SERPL-MCNC: 2.12 MG/DL — HIGH (ref 0.5–1.3)
CREAT SERPL-MCNC: 2.29 MG/DL — HIGH (ref 0.5–1.3)
CREAT SERPL-MCNC: 2.53 MG/DL — HIGH (ref 0.5–1.3)
CULTURE RESULTS: SIGNIFICANT CHANGE UP
DIFF PNL FLD: ABNORMAL
DIFF PNL FLD: NEGATIVE — SIGNIFICANT CHANGE UP
DIGOXIN SERPL-MCNC: 1.3 NG/ML — SIGNIFICANT CHANGE UP (ref 0.8–2)
ELLIPTOCYTES BLD QL SMEAR: SLIGHT — SIGNIFICANT CHANGE UP
EOSINOPHIL # BLD AUTO: 0 K/UL — SIGNIFICANT CHANGE UP (ref 0–0.5)
EOSINOPHIL # BLD AUTO: 0.01 K/UL — SIGNIFICANT CHANGE UP (ref 0–0.5)
EOSINOPHIL # BLD AUTO: 0.02 K/UL — SIGNIFICANT CHANGE UP (ref 0–0.5)
EOSINOPHIL # BLD AUTO: 0.06 K/UL — SIGNIFICANT CHANGE UP (ref 0–0.5)
EOSINOPHIL NFR BLD AUTO: 0 % — SIGNIFICANT CHANGE UP (ref 0–6)
EOSINOPHIL NFR BLD AUTO: 0.1 % — SIGNIFICANT CHANGE UP (ref 0–6)
EOSINOPHIL NFR BLD AUTO: 0.1 % — SIGNIFICANT CHANGE UP (ref 0–6)
EOSINOPHIL NFR BLD AUTO: 0.7 % — SIGNIFICANT CHANGE UP (ref 0–6)
EPI CELLS # UR: 1 /HPF — SIGNIFICANT CHANGE UP
EPI CELLS # UR: 2 /HPF — SIGNIFICANT CHANGE UP
ESTIMATED AVERAGE GLUCOSE: 146 MG/DL — HIGH (ref 68–114)
FERRITIN SERPL-MCNC: 156 NG/ML — SIGNIFICANT CHANGE UP (ref 30–400)
FERRITIN SERPL-MCNC: 176 NG/ML — SIGNIFICANT CHANGE UP (ref 30–400)
FOLATE SERPL-MCNC: 12.5 NG/ML — SIGNIFICANT CHANGE UP
GAMMA GLOBULIN: 1.4 G/DL — SIGNIFICANT CHANGE UP (ref 0.6–1.6)
GAS PNL BLDV: 138 MMOL/L — SIGNIFICANT CHANGE UP (ref 135–145)
GAS PNL BLDV: SIGNIFICANT CHANGE UP
GLUCOSE BLDC GLUCOMTR-MCNC: 106 MG/DL — HIGH (ref 70–99)
GLUCOSE BLDC GLUCOMTR-MCNC: 133 MG/DL — HIGH (ref 70–99)
GLUCOSE BLDC GLUCOMTR-MCNC: 144 MG/DL — HIGH (ref 70–99)
GLUCOSE BLDC GLUCOMTR-MCNC: 147 MG/DL — HIGH (ref 70–99)
GLUCOSE BLDC GLUCOMTR-MCNC: 150 MG/DL — HIGH (ref 70–99)
GLUCOSE BLDC GLUCOMTR-MCNC: 150 MG/DL — HIGH (ref 70–99)
GLUCOSE BLDC GLUCOMTR-MCNC: 153 MG/DL — HIGH (ref 70–99)
GLUCOSE BLDC GLUCOMTR-MCNC: 153 MG/DL — HIGH (ref 70–99)
GLUCOSE BLDC GLUCOMTR-MCNC: 154 MG/DL — HIGH (ref 70–99)
GLUCOSE BLDC GLUCOMTR-MCNC: 155 MG/DL — HIGH (ref 70–99)
GLUCOSE BLDC GLUCOMTR-MCNC: 159 MG/DL — HIGH (ref 70–99)
GLUCOSE BLDC GLUCOMTR-MCNC: 161 MG/DL — HIGH (ref 70–99)
GLUCOSE BLDC GLUCOMTR-MCNC: 163 MG/DL — HIGH (ref 70–99)
GLUCOSE BLDC GLUCOMTR-MCNC: 168 MG/DL — HIGH (ref 70–99)
GLUCOSE BLDC GLUCOMTR-MCNC: 169 MG/DL — HIGH (ref 70–99)
GLUCOSE BLDC GLUCOMTR-MCNC: 171 MG/DL — HIGH (ref 70–99)
GLUCOSE BLDC GLUCOMTR-MCNC: 172 MG/DL — HIGH (ref 70–99)
GLUCOSE BLDC GLUCOMTR-MCNC: 175 MG/DL — HIGH (ref 70–99)
GLUCOSE BLDC GLUCOMTR-MCNC: 175 MG/DL — HIGH (ref 70–99)
GLUCOSE BLDC GLUCOMTR-MCNC: 176 MG/DL — HIGH (ref 70–99)
GLUCOSE BLDC GLUCOMTR-MCNC: 177 MG/DL — HIGH (ref 70–99)
GLUCOSE BLDC GLUCOMTR-MCNC: 177 MG/DL — HIGH (ref 70–99)
GLUCOSE BLDC GLUCOMTR-MCNC: 178 MG/DL — HIGH (ref 70–99)
GLUCOSE BLDC GLUCOMTR-MCNC: 182 MG/DL — HIGH (ref 70–99)
GLUCOSE BLDC GLUCOMTR-MCNC: 183 MG/DL — HIGH (ref 70–99)
GLUCOSE BLDC GLUCOMTR-MCNC: 184 MG/DL — HIGH (ref 70–99)
GLUCOSE BLDC GLUCOMTR-MCNC: 185 MG/DL — HIGH (ref 70–99)
GLUCOSE BLDC GLUCOMTR-MCNC: 185 MG/DL — HIGH (ref 70–99)
GLUCOSE BLDC GLUCOMTR-MCNC: 187 MG/DL — HIGH (ref 70–99)
GLUCOSE BLDC GLUCOMTR-MCNC: 190 MG/DL — HIGH (ref 70–99)
GLUCOSE BLDC GLUCOMTR-MCNC: 192 MG/DL — HIGH (ref 70–99)
GLUCOSE BLDC GLUCOMTR-MCNC: 192 MG/DL — HIGH (ref 70–99)
GLUCOSE BLDC GLUCOMTR-MCNC: 193 MG/DL — HIGH (ref 70–99)
GLUCOSE BLDC GLUCOMTR-MCNC: 196 MG/DL — HIGH (ref 70–99)
GLUCOSE BLDC GLUCOMTR-MCNC: 199 MG/DL — HIGH (ref 70–99)
GLUCOSE BLDC GLUCOMTR-MCNC: 204 MG/DL — HIGH (ref 70–99)
GLUCOSE BLDC GLUCOMTR-MCNC: 205 MG/DL — HIGH (ref 70–99)
GLUCOSE BLDC GLUCOMTR-MCNC: 208 MG/DL — HIGH (ref 70–99)
GLUCOSE BLDC GLUCOMTR-MCNC: 208 MG/DL — HIGH (ref 70–99)
GLUCOSE BLDC GLUCOMTR-MCNC: 213 MG/DL — HIGH (ref 70–99)
GLUCOSE BLDC GLUCOMTR-MCNC: 216 MG/DL — HIGH (ref 70–99)
GLUCOSE BLDC GLUCOMTR-MCNC: 221 MG/DL — HIGH (ref 70–99)
GLUCOSE BLDC GLUCOMTR-MCNC: 229 MG/DL — HIGH (ref 70–99)
GLUCOSE BLDC GLUCOMTR-MCNC: 231 MG/DL — HIGH (ref 70–99)
GLUCOSE BLDC GLUCOMTR-MCNC: 236 MG/DL — HIGH (ref 70–99)
GLUCOSE BLDC GLUCOMTR-MCNC: 246 MG/DL — HIGH (ref 70–99)
GLUCOSE BLDC GLUCOMTR-MCNC: 257 MG/DL — HIGH (ref 70–99)
GLUCOSE BLDC GLUCOMTR-MCNC: 268 MG/DL — HIGH (ref 70–99)
GLUCOSE BLDC GLUCOMTR-MCNC: 270 MG/DL — HIGH (ref 70–99)
GLUCOSE BLDC GLUCOMTR-MCNC: 280 MG/DL — HIGH (ref 70–99)
GLUCOSE BLDC GLUCOMTR-MCNC: 290 MG/DL — HIGH (ref 70–99)
GLUCOSE BLDC GLUCOMTR-MCNC: 343 MG/DL — HIGH (ref 70–99)
GLUCOSE BLDC GLUCOMTR-MCNC: 92 MG/DL — SIGNIFICANT CHANGE UP (ref 70–99)
GLUCOSE BLDV-MCNC: 185 MG/DL — HIGH (ref 70–99)
GLUCOSE SERPL-MCNC: 141 MG/DL — HIGH (ref 70–99)
GLUCOSE SERPL-MCNC: 146 MG/DL — HIGH (ref 70–99)
GLUCOSE SERPL-MCNC: 155 MG/DL — HIGH (ref 70–99)
GLUCOSE SERPL-MCNC: 156 MG/DL — HIGH (ref 70–99)
GLUCOSE SERPL-MCNC: 157 MG/DL — HIGH (ref 70–99)
GLUCOSE SERPL-MCNC: 164 MG/DL — HIGH (ref 70–99)
GLUCOSE SERPL-MCNC: 166 MG/DL — HIGH (ref 70–99)
GLUCOSE SERPL-MCNC: 168 MG/DL — HIGH (ref 70–99)
GLUCOSE SERPL-MCNC: 172 MG/DL — HIGH (ref 70–99)
GLUCOSE SERPL-MCNC: 181 MG/DL — HIGH (ref 70–99)
GLUCOSE SERPL-MCNC: 184 MG/DL — HIGH (ref 70–99)
GLUCOSE SERPL-MCNC: 185 MG/DL — HIGH (ref 70–99)
GLUCOSE SERPL-MCNC: 197 MG/DL — HIGH (ref 70–99)
GLUCOSE SERPL-MCNC: 198 MG/DL — HIGH (ref 70–99)
GLUCOSE SERPL-MCNC: 225 MG/DL — HIGH (ref 70–99)
GLUCOSE SERPL-MCNC: 232 MG/DL — HIGH (ref 70–99)
GLUCOSE SERPL-MCNC: 272 MG/DL — HIGH (ref 70–99)
GLUCOSE SERPL-MCNC: 276 MG/DL — HIGH (ref 70–99)
GLUCOSE UR QL: NEGATIVE — SIGNIFICANT CHANGE UP
GLUCOSE UR QL: NEGATIVE — SIGNIFICANT CHANGE UP
HCO3 BLDV-SCNC: 31 MMOL/L — HIGH (ref 21–29)
HCO3 BLDV-SCNC: 35 MMOL/L — HIGH (ref 21–29)
HCT VFR BLD CALC: 21.4 % — LOW (ref 39–50)
HCT VFR BLD CALC: 23.5 % — LOW (ref 39–50)
HCT VFR BLD CALC: 23.5 % — LOW (ref 39–50)
HCT VFR BLD CALC: 25 % — LOW (ref 39–50)
HCT VFR BLD CALC: 26 % — LOW (ref 39–50)
HCT VFR BLD CALC: 26.2 % — LOW (ref 39–50)
HCT VFR BLD CALC: 26.2 % — LOW (ref 39–50)
HCT VFR BLD CALC: 26.7 % — LOW (ref 39–50)
HCT VFR BLD CALC: 27 % — LOW (ref 39–50)
HCT VFR BLD CALC: 27.3 % — LOW (ref 39–50)
HCT VFR BLD CALC: 27.3 % — LOW (ref 39–50)
HCT VFR BLD CALC: 27.4 % — LOW (ref 39–50)
HCT VFR BLD CALC: 27.7 % — LOW (ref 39–50)
HCT VFR BLD CALC: 27.9 % — LOW (ref 39–50)
HCT VFR BLD CALC: 28 % — LOW (ref 39–50)
HCT VFR BLD CALC: 28.7 % — LOW (ref 39–50)
HCT VFR BLD CALC: 29.4 % — LOW (ref 39–50)
HCT VFR BLD CALC: 31.6 % — LOW (ref 39–50)
HCT VFR BLDA CALC: 24 % — LOW (ref 39–50)
HGB BLD CALC-MCNC: 7.6 G/DL — LOW (ref 13–17)
HGB BLD-MCNC: 6.1 G/DL — CRITICAL LOW (ref 13–17)
HGB BLD-MCNC: 6.8 G/DL — CRITICAL LOW (ref 13–17)
HGB BLD-MCNC: 6.8 G/DL — CRITICAL LOW (ref 13–17)
HGB BLD-MCNC: 7.2 G/DL — LOW (ref 13–17)
HGB BLD-MCNC: 7.4 G/DL — LOW (ref 13–17)
HGB BLD-MCNC: 7.7 G/DL — LOW (ref 13–17)
HGB BLD-MCNC: 7.8 G/DL — LOW (ref 13–17)
HGB BLD-MCNC: 7.8 G/DL — LOW (ref 13–17)
HGB BLD-MCNC: 7.9 G/DL — LOW (ref 13–17)
HGB BLD-MCNC: 7.9 G/DL — LOW (ref 13–17)
HGB BLD-MCNC: 8 G/DL — LOW (ref 13–17)
HGB BLD-MCNC: 8.1 G/DL — LOW (ref 13–17)
HGB BLD-MCNC: 8.1 G/DL — LOW (ref 13–17)
HGB BLD-MCNC: 8.2 G/DL — LOW (ref 13–17)
HGB BLD-MCNC: 8.3 G/DL — LOW (ref 13–17)
HGB BLD-MCNC: 8.4 G/DL — LOW (ref 13–17)
HGB BLD-MCNC: 8.4 G/DL — LOW (ref 13–17)
HGB BLD-MCNC: 9.4 G/DL — LOW (ref 13–17)
HOROWITZ INDEX BLDV+IHG-RTO: SIGNIFICANT CHANGE UP
HYALINE CASTS # UR AUTO: 2 /LPF — SIGNIFICANT CHANGE UP (ref 0–2)
HYALINE CASTS # UR AUTO: 6 /LPF — HIGH (ref 0–2)
IGA FLD-MCNC: 65 MG/DL — LOW (ref 84–499)
IGG FLD-MCNC: 828 MG/DL — SIGNIFICANT CHANGE UP (ref 610–1660)
IGM SERPL-MCNC: 1235 MG/DL — HIGH (ref 35–242)
IMM GRANULOCYTES NFR BLD AUTO: 0.4 % — SIGNIFICANT CHANGE UP (ref 0–1.5)
INR BLD: 2.24 RATIO — HIGH (ref 0.88–1.16)
INR BLD: 2.38 RATIO — HIGH (ref 0.88–1.16)
INTERPRETATION SERPL IFE-IMP: SIGNIFICANT CHANGE UP
IRON SATN MFR SERPL: 16 UG/DL — LOW (ref 45–165)
IRON SATN MFR SERPL: 28 % — SIGNIFICANT CHANGE UP (ref 16–55)
IRON SATN MFR SERPL: 50 UG/DL — SIGNIFICANT CHANGE UP (ref 45–165)
IRON SATN MFR SERPL: 8 % — LOW (ref 16–55)
KAPPA LC SER QL IFE: 56.6 MG/DL — HIGH (ref 0.33–1.94)
KAPPA LC SER QL IFE: 56.6 MG/DL — HIGH (ref 0.33–1.94)
KAPPA/LAMBDA FREE LIGHT CHAIN RATIO, SERUM: 27.21 RATIO — HIGH (ref 0.26–1.65)
KAPPA/LAMBDA FREE LIGHT CHAIN RATIO, SERUM: 27.21 RATIO — HIGH (ref 0.26–1.65)
KETONES UR-MCNC: NEGATIVE — SIGNIFICANT CHANGE UP
KETONES UR-MCNC: NEGATIVE — SIGNIFICANT CHANGE UP
LACTATE BLDV-MCNC: 1.7 MMOL/L — SIGNIFICANT CHANGE UP (ref 0.7–2)
LAMBDA LC SER QL IFE: 2.08 MG/DL — SIGNIFICANT CHANGE UP (ref 0.57–2.63)
LAMBDA LC SER QL IFE: 2.08 MG/DL — SIGNIFICANT CHANGE UP (ref 0.57–2.63)
LEGIONELLA AG UR QL: NEGATIVE — SIGNIFICANT CHANGE UP
LEUKOCYTE ESTERASE UR-ACNC: ABNORMAL
LEUKOCYTE ESTERASE UR-ACNC: NEGATIVE — SIGNIFICANT CHANGE UP
LYMPHOCYTES # BLD AUTO: 0.29 K/UL — LOW (ref 1–3.3)
LYMPHOCYTES # BLD AUTO: 0.77 K/UL — LOW (ref 1–3.3)
LYMPHOCYTES # BLD AUTO: 0.83 K/UL — LOW (ref 1–3.3)
LYMPHOCYTES # BLD AUTO: 1.11 K/UL — SIGNIFICANT CHANGE UP (ref 1–3.3)
LYMPHOCYTES # BLD AUTO: 2.7 % — LOW (ref 13–44)
LYMPHOCYTES # BLD AUTO: 4.7 % — LOW (ref 13–44)
LYMPHOCYTES # BLD AUTO: 9 % — LOW (ref 13–44)
LYMPHOCYTES # BLD AUTO: 9.1 % — LOW (ref 13–44)
M-SPIKE: 0.8 G/DL — HIGH (ref 0–0)
MACROCYTES BLD QL: SLIGHT — SIGNIFICANT CHANGE UP
MAGNESIUM SERPL-MCNC: 2.2 MG/DL — SIGNIFICANT CHANGE UP (ref 1.6–2.6)
MAGNESIUM SERPL-MCNC: 2.2 MG/DL — SIGNIFICANT CHANGE UP (ref 1.6–2.6)
MAGNESIUM SERPL-MCNC: 2.3 MG/DL — SIGNIFICANT CHANGE UP (ref 1.6–2.6)
MAGNESIUM SERPL-MCNC: 2.3 MG/DL — SIGNIFICANT CHANGE UP (ref 1.6–2.6)
MANUAL SMEAR VERIFICATION: SIGNIFICANT CHANGE UP
MCHC RBC-ENTMCNC: 27.9 GM/DL — LOW (ref 32–36)
MCHC RBC-ENTMCNC: 28.2 GM/DL — LOW (ref 32–36)
MCHC RBC-ENTMCNC: 28.5 GM/DL — LOW (ref 32–36)
MCHC RBC-ENTMCNC: 28.5 PG — SIGNIFICANT CHANGE UP (ref 27–34)
MCHC RBC-ENTMCNC: 28.6 GM/DL — LOW (ref 32–36)
MCHC RBC-ENTMCNC: 28.6 GM/DL — LOW (ref 32–36)
MCHC RBC-ENTMCNC: 28.8 GM/DL — LOW (ref 32–36)
MCHC RBC-ENTMCNC: 28.9 GM/DL — LOW (ref 32–36)
MCHC RBC-ENTMCNC: 28.9 PG — SIGNIFICANT CHANGE UP (ref 27–34)
MCHC RBC-ENTMCNC: 29.1 PG — SIGNIFICANT CHANGE UP (ref 27–34)
MCHC RBC-ENTMCNC: 29.2 PG — SIGNIFICANT CHANGE UP (ref 27–34)
MCHC RBC-ENTMCNC: 29.4 GM/DL — LOW (ref 32–36)
MCHC RBC-ENTMCNC: 29.5 PG — SIGNIFICANT CHANGE UP (ref 27–34)
MCHC RBC-ENTMCNC: 29.6 GM/DL — LOW (ref 32–36)
MCHC RBC-ENTMCNC: 29.6 PG — SIGNIFICANT CHANGE UP (ref 27–34)
MCHC RBC-ENTMCNC: 29.7 GM/DL — LOW (ref 32–36)
MCHC RBC-ENTMCNC: 29.7 PG — SIGNIFICANT CHANGE UP (ref 27–34)
MCHC RBC-ENTMCNC: 29.7 PG — SIGNIFICANT CHANGE UP (ref 27–34)
MCHC RBC-ENTMCNC: 29.8 PG — SIGNIFICANT CHANGE UP (ref 27–34)
MCHC RBC-ENTMCNC: 29.9 PG — SIGNIFICANT CHANGE UP (ref 27–34)
MCHC RBC-ENTMCNC: 29.9 PG — SIGNIFICANT CHANGE UP (ref 27–34)
MCHC RBC-ENTMCNC: 30 GM/DL — LOW (ref 32–36)
MCHC RBC-ENTMCNC: 30 PG — SIGNIFICANT CHANGE UP (ref 27–34)
MCHC RBC-ENTMCNC: 30 PG — SIGNIFICANT CHANGE UP (ref 27–34)
MCHC RBC-ENTMCNC: 30.1 GM/DL — LOW (ref 32–36)
MCHC RBC-ENTMCNC: 30.3 PG — SIGNIFICANT CHANGE UP (ref 27–34)
MCHC RBC-ENTMCNC: 30.8 GM/DL — LOW (ref 32–36)
MCV RBC AUTO: 100 FL — SIGNIFICANT CHANGE UP (ref 80–100)
MCV RBC AUTO: 100 FL — SIGNIFICANT CHANGE UP (ref 80–100)
MCV RBC AUTO: 100.3 FL — HIGH (ref 80–100)
MCV RBC AUTO: 100.8 FL — HIGH (ref 80–100)
MCV RBC AUTO: 101.9 FL — HIGH (ref 80–100)
MCV RBC AUTO: 102.5 FL — HIGH (ref 80–100)
MCV RBC AUTO: 103.9 FL — HIGH (ref 80–100)
MCV RBC AUTO: 104.6 FL — HIGH (ref 80–100)
MCV RBC AUTO: 104.9 FL — HIGH (ref 80–100)
MCV RBC AUTO: 105 FL — HIGH (ref 80–100)
MCV RBC AUTO: 105.6 FL — HIGH (ref 80–100)
MCV RBC AUTO: 107.3 FL — HIGH (ref 80–100)
MCV RBC AUTO: 96.7 FL — SIGNIFICANT CHANGE UP (ref 80–100)
MCV RBC AUTO: 97.5 FL — SIGNIFICANT CHANGE UP (ref 80–100)
MCV RBC AUTO: 97.9 FL — SIGNIFICANT CHANGE UP (ref 80–100)
MCV RBC AUTO: 97.9 FL — SIGNIFICANT CHANGE UP (ref 80–100)
MCV RBC AUTO: 98.2 FL — SIGNIFICANT CHANGE UP (ref 80–100)
MCV RBC AUTO: 98.8 FL — SIGNIFICANT CHANGE UP (ref 80–100)
MICROCYTES BLD QL: SLIGHT — SIGNIFICANT CHANGE UP
MONOCYTES # BLD AUTO: 0.55 K/UL — SIGNIFICANT CHANGE UP (ref 0–0.9)
MONOCYTES # BLD AUTO: 0.74 K/UL — SIGNIFICANT CHANGE UP (ref 0–0.9)
MONOCYTES # BLD AUTO: 0.81 K/UL — SIGNIFICANT CHANGE UP (ref 0–0.9)
MONOCYTES # BLD AUTO: 1.02 K/UL — HIGH (ref 0–0.9)
MONOCYTES NFR BLD AUTO: 5.7 % — SIGNIFICANT CHANGE UP (ref 2–14)
MONOCYTES NFR BLD AUTO: 6 % — SIGNIFICANT CHANGE UP (ref 2–14)
MONOCYTES NFR BLD AUTO: 6.5 % — SIGNIFICANT CHANGE UP (ref 2–14)
MONOCYTES NFR BLD AUTO: 7.7 % — SIGNIFICANT CHANGE UP (ref 2–14)
NEUTROPHILS # BLD AUTO: 10.46 K/UL — HIGH (ref 1.8–7.4)
NEUTROPHILS # BLD AUTO: 15.85 K/UL — HIGH (ref 1.8–7.4)
NEUTROPHILS # BLD AUTO: 7 K/UL — SIGNIFICANT CHANGE UP (ref 1.8–7.4)
NEUTROPHILS # BLD AUTO: 9.39 K/UL — HIGH (ref 1.8–7.4)
NEUTROPHILS NFR BLD AUTO: 82.9 % — HIGH (ref 43–77)
NEUTROPHILS NFR BLD AUTO: 84 % — HIGH (ref 43–77)
NEUTROPHILS NFR BLD AUTO: 88.9 % — HIGH (ref 43–77)
NEUTROPHILS NFR BLD AUTO: 88.9 % — HIGH (ref 43–77)
NEUTS BAND # BLD: 1 % — SIGNIFICANT CHANGE UP (ref 0–8)
NITRITE UR-MCNC: NEGATIVE — SIGNIFICANT CHANGE UP
NITRITE UR-MCNC: NEGATIVE — SIGNIFICANT CHANGE UP
NRBC # BLD: 0 /100 WBCS — SIGNIFICANT CHANGE UP (ref 0–0)
NRBC # BLD: 0 /100 — SIGNIFICANT CHANGE UP (ref 0–0)
NT-PROBNP SERPL-SCNC: 3093 PG/ML — HIGH (ref 0–300)
OB PNL STL: POSITIVE
PCO2 BLDV: 67 MMHG — HIGH (ref 35–50)
PCO2 BLDV: 81 MMHG — HIGH (ref 42–55)
PH BLDV: 7.26 — LOW (ref 7.35–7.45)
PH BLDV: 7.29 — LOW (ref 7.35–7.45)
PH UR: 5.5 — SIGNIFICANT CHANGE UP (ref 5–8)
PH UR: 6 — SIGNIFICANT CHANGE UP (ref 5–8)
PHOSPHATE SERPL-MCNC: 3 MG/DL — SIGNIFICANT CHANGE UP (ref 2.5–4.5)
PHOSPHATE SERPL-MCNC: 3.2 MG/DL — SIGNIFICANT CHANGE UP (ref 2.5–4.5)
PHOSPHATE SERPL-MCNC: 4.1 MG/DL — SIGNIFICANT CHANGE UP (ref 2.5–4.5)
PLAT MORPH BLD: NORMAL — SIGNIFICANT CHANGE UP
PLATELET # BLD AUTO: 375 K/UL — SIGNIFICANT CHANGE UP (ref 150–400)
PLATELET # BLD AUTO: 389 K/UL — SIGNIFICANT CHANGE UP (ref 150–400)
PLATELET # BLD AUTO: 393 K/UL — SIGNIFICANT CHANGE UP (ref 150–400)
PLATELET # BLD AUTO: 428 K/UL — HIGH (ref 150–400)
PLATELET # BLD AUTO: 448 K/UL — HIGH (ref 150–400)
PLATELET # BLD AUTO: 464 K/UL — HIGH (ref 150–400)
PLATELET # BLD AUTO: 471 K/UL — HIGH (ref 150–400)
PLATELET # BLD AUTO: 477 K/UL — HIGH (ref 150–400)
PLATELET # BLD AUTO: 506 K/UL — HIGH (ref 150–400)
PLATELET # BLD AUTO: 525 K/UL — HIGH (ref 150–400)
PLATELET # BLD AUTO: 528 K/UL — HIGH (ref 150–400)
PLATELET # BLD AUTO: 532 K/UL — HIGH (ref 150–400)
PLATELET # BLD AUTO: 540 K/UL — HIGH (ref 150–400)
PLATELET # BLD AUTO: 546 K/UL — HIGH (ref 150–400)
PLATELET # BLD AUTO: 553 K/UL — HIGH (ref 150–400)
PLATELET # BLD AUTO: 579 K/UL — HIGH (ref 150–400)
PLATELET # BLD AUTO: 609 K/UL — HIGH (ref 150–400)
PLATELET # BLD AUTO: 693 K/UL — HIGH (ref 150–400)
PO2 BLDV: 20 MMHG — LOW (ref 25–45)
PO2 BLDV: 33 MMHG — SIGNIFICANT CHANGE UP (ref 25–45)
POIKILOCYTOSIS BLD QL AUTO: SLIGHT — SIGNIFICANT CHANGE UP
POLYCHROMASIA BLD QL SMEAR: SLIGHT — SIGNIFICANT CHANGE UP
POTASSIUM BLDV-SCNC: 4.6 MMOL/L — SIGNIFICANT CHANGE UP (ref 3.5–5.3)
POTASSIUM SERPL-MCNC: 4.4 MMOL/L — SIGNIFICANT CHANGE UP (ref 3.5–5.3)
POTASSIUM SERPL-MCNC: 4.6 MMOL/L — SIGNIFICANT CHANGE UP (ref 3.5–5.3)
POTASSIUM SERPL-MCNC: 4.7 MMOL/L — SIGNIFICANT CHANGE UP (ref 3.5–5.3)
POTASSIUM SERPL-MCNC: 4.8 MMOL/L — SIGNIFICANT CHANGE UP (ref 3.5–5.3)
POTASSIUM SERPL-MCNC: 4.9 MMOL/L — SIGNIFICANT CHANGE UP (ref 3.5–5.3)
POTASSIUM SERPL-MCNC: 4.9 MMOL/L — SIGNIFICANT CHANGE UP (ref 3.5–5.3)
POTASSIUM SERPL-MCNC: 5 MMOL/L — SIGNIFICANT CHANGE UP (ref 3.5–5.3)
POTASSIUM SERPL-MCNC: 5 MMOL/L — SIGNIFICANT CHANGE UP (ref 3.5–5.3)
POTASSIUM SERPL-MCNC: 5.1 MMOL/L — SIGNIFICANT CHANGE UP (ref 3.5–5.3)
POTASSIUM SERPL-MCNC: 5.3 MMOL/L — SIGNIFICANT CHANGE UP (ref 3.5–5.3)
POTASSIUM SERPL-MCNC: 5.4 MMOL/L — HIGH (ref 3.5–5.3)
POTASSIUM SERPL-MCNC: 5.6 MMOL/L — HIGH (ref 3.5–5.3)
POTASSIUM SERPL-MCNC: 6 MMOL/L — HIGH (ref 3.5–5.3)
POTASSIUM SERPL-MCNC: 6 MMOL/L — HIGH (ref 3.5–5.3)
POTASSIUM SERPL-SCNC: 4.4 MMOL/L — SIGNIFICANT CHANGE UP (ref 3.5–5.3)
POTASSIUM SERPL-SCNC: 4.6 MMOL/L — SIGNIFICANT CHANGE UP (ref 3.5–5.3)
POTASSIUM SERPL-SCNC: 4.7 MMOL/L — SIGNIFICANT CHANGE UP (ref 3.5–5.3)
POTASSIUM SERPL-SCNC: 4.8 MMOL/L — SIGNIFICANT CHANGE UP (ref 3.5–5.3)
POTASSIUM SERPL-SCNC: 4.9 MMOL/L — SIGNIFICANT CHANGE UP (ref 3.5–5.3)
POTASSIUM SERPL-SCNC: 4.9 MMOL/L — SIGNIFICANT CHANGE UP (ref 3.5–5.3)
POTASSIUM SERPL-SCNC: 5 MMOL/L — SIGNIFICANT CHANGE UP (ref 3.5–5.3)
POTASSIUM SERPL-SCNC: 5 MMOL/L — SIGNIFICANT CHANGE UP (ref 3.5–5.3)
POTASSIUM SERPL-SCNC: 5.1 MMOL/L — SIGNIFICANT CHANGE UP (ref 3.5–5.3)
POTASSIUM SERPL-SCNC: 5.3 MMOL/L — SIGNIFICANT CHANGE UP (ref 3.5–5.3)
POTASSIUM SERPL-SCNC: 5.4 MMOL/L — HIGH (ref 3.5–5.3)
POTASSIUM SERPL-SCNC: 5.6 MMOL/L — HIGH (ref 3.5–5.3)
POTASSIUM SERPL-SCNC: 6 MMOL/L — HIGH (ref 3.5–5.3)
POTASSIUM SERPL-SCNC: 6 MMOL/L — HIGH (ref 3.5–5.3)
PROCALCITONIN SERPL-MCNC: 0.18 NG/ML — HIGH (ref 0.02–0.1)
PROT PATTERN SERPL ELPH-IMP: SIGNIFICANT CHANGE UP
PROT SERPL-MCNC: 6.5 G/DL — SIGNIFICANT CHANGE UP (ref 6–8.3)
PROT SERPL-MCNC: 6.5 G/DL — SIGNIFICANT CHANGE UP (ref 6–8.3)
PROT SERPL-MCNC: 6.9 G/DL — SIGNIFICANT CHANGE UP (ref 6–8.3)
PROT SERPL-MCNC: 7.2 G/DL — SIGNIFICANT CHANGE UP (ref 6–8.3)
PROT UR-MCNC: ABNORMAL
PROT UR-MCNC: ABNORMAL
PROTHROM AB SERPL-ACNC: 25.9 SEC — HIGH (ref 10.6–13.6)
PROTHROM AB SERPL-ACNC: 27.4 SEC — HIGH (ref 10.6–13.6)
RAPID RVP RESULT: SIGNIFICANT CHANGE UP
RAPID RVP RESULT: SIGNIFICANT CHANGE UP
RBC # BLD: 2.04 M/UL — LOW (ref 4.2–5.8)
RBC # BLD: 2.35 M/UL — LOW (ref 4.2–5.8)
RBC # BLD: 2.35 M/UL — LOW (ref 4.2–5.8)
RBC # BLD: 2.48 M/UL — LOW (ref 4.2–5.8)
RBC # BLD: 2.53 M/UL — LOW (ref 4.2–5.8)
RBC # BLD: 2.57 M/UL — LOW (ref 4.2–5.8)
RBC # BLD: 2.6 M/UL — LOW (ref 4.2–5.8)
RBC # BLD: 2.61 M/UL — LOW (ref 4.2–5.8)
RBC # BLD: 2.61 M/UL — LOW (ref 4.2–5.8)
RBC # BLD: 2.65 M/UL — LOW (ref 4.2–5.8)
RBC # BLD: 2.69 M/UL — LOW (ref 4.2–5.8)
RBC # BLD: 2.77 M/UL — LOW (ref 4.2–5.8)
RBC # BLD: 2.8 M/UL — LOW (ref 4.2–5.8)
RBC # BLD: 2.8 M/UL — LOW (ref 4.2–5.8)
RBC # BLD: 2.82 M/UL — LOW (ref 4.2–5.8)
RBC # BLD: 2.83 M/UL — LOW (ref 4.2–5.8)
RBC # BLD: 2.85 M/UL — LOW (ref 4.2–5.8)
RBC # BLD: 3.15 M/UL — LOW (ref 4.2–5.8)
RBC # FLD: 15.4 % — HIGH (ref 10.3–14.5)
RBC # FLD: 15.5 % — HIGH (ref 10.3–14.5)
RBC # FLD: 15.7 % — HIGH (ref 10.3–14.5)
RBC # FLD: 15.7 % — HIGH (ref 10.3–14.5)
RBC # FLD: 15.8 % — HIGH (ref 10.3–14.5)
RBC # FLD: 15.9 % — HIGH (ref 10.3–14.5)
RBC # FLD: 16.1 % — HIGH (ref 10.3–14.5)
RBC # FLD: 16.3 % — HIGH (ref 10.3–14.5)
RBC # FLD: 16.3 % — HIGH (ref 10.3–14.5)
RBC # FLD: 16.5 % — HIGH (ref 10.3–14.5)
RBC # FLD: 16.5 % — HIGH (ref 10.3–14.5)
RBC # FLD: 16.9 % — HIGH (ref 10.3–14.5)
RBC # FLD: 17.4 % — HIGH (ref 10.3–14.5)
RBC BLD AUTO: ABNORMAL
RBC CASTS # UR COMP ASSIST: 1 /HPF — SIGNIFICANT CHANGE UP (ref 0–4)
RBC CASTS # UR COMP ASSIST: 15 /HPF — HIGH (ref 0–4)
RH IG SCN BLD-IMP: POSITIVE — SIGNIFICANT CHANGE UP
RH IG SCN BLD-IMP: POSITIVE — SIGNIFICANT CHANGE UP
SAO2 % BLDV: 20 % — LOW (ref 67–88)
SAO2 % BLDV: 46 % — LOW (ref 67–88)
SARS-COV-2 IGG SERPL QL IA: NEGATIVE — SIGNIFICANT CHANGE UP
SARS-COV-2 IGG+IGM SERPL QL IA: 0.4 U/ML — SIGNIFICANT CHANGE UP
SARS-COV-2 IGG+IGM SERPL QL IA: NEGATIVE — SIGNIFICANT CHANGE UP
SARS-COV-2 IGM SERPL IA-ACNC: 0.06 INDEX — SIGNIFICANT CHANGE UP
SARS-COV-2 RNA SPEC QL NAA+PROBE: SIGNIFICANT CHANGE UP
SODIUM SERPL-SCNC: 137 MMOL/L — SIGNIFICANT CHANGE UP (ref 135–145)
SODIUM SERPL-SCNC: 138 MMOL/L — SIGNIFICANT CHANGE UP (ref 135–145)
SODIUM SERPL-SCNC: 139 MMOL/L — SIGNIFICANT CHANGE UP (ref 135–145)
SODIUM SERPL-SCNC: 140 MMOL/L — SIGNIFICANT CHANGE UP (ref 135–145)
SODIUM SERPL-SCNC: 140 MMOL/L — SIGNIFICANT CHANGE UP (ref 135–145)
SODIUM SERPL-SCNC: 141 MMOL/L — SIGNIFICANT CHANGE UP (ref 135–145)
SODIUM SERPL-SCNC: 141 MMOL/L — SIGNIFICANT CHANGE UP (ref 135–145)
SODIUM SERPL-SCNC: 143 MMOL/L — SIGNIFICANT CHANGE UP (ref 135–145)
SODIUM SERPL-SCNC: 144 MMOL/L — SIGNIFICANT CHANGE UP (ref 135–145)
SODIUM SERPL-SCNC: 144 MMOL/L — SIGNIFICANT CHANGE UP (ref 135–145)
SODIUM UR-SCNC: 20 MMOL/L — SIGNIFICANT CHANGE UP
SP GR SPEC: 1.02 — SIGNIFICANT CHANGE UP (ref 1.01–1.02)
SP GR SPEC: 1.02 — SIGNIFICANT CHANGE UP (ref 1.01–1.02)
SPECIMEN SOURCE: SIGNIFICANT CHANGE UP
TIBC SERPL-MCNC: 177 UG/DL — LOW (ref 220–430)
TIBC SERPL-MCNC: 208 UG/DL — LOW (ref 220–430)
TRANSFERRIN SERPL-MCNC: 161 MG/DL — LOW (ref 200–360)
TROPONIN T, HIGH SENSITIVITY RESULT: 87 NG/L — HIGH (ref 0–51)
TSH SERPL-MCNC: 0.45 UIU/ML — SIGNIFICANT CHANGE UP (ref 0.27–4.2)
TSH SERPL-MCNC: 0.78 UIU/ML — SIGNIFICANT CHANGE UP (ref 0.27–4.2)
TSH SERPL-MCNC: 0.86 UIU/ML — SIGNIFICANT CHANGE UP (ref 0.27–4.2)
UIBC SERPL-MCNC: 127 UG/DL — SIGNIFICANT CHANGE UP (ref 110–370)
UIBC SERPL-MCNC: 192 UG/DL — SIGNIFICANT CHANGE UP (ref 110–370)
UROBILINOGEN FLD QL: NEGATIVE — SIGNIFICANT CHANGE UP
UROBILINOGEN FLD QL: NEGATIVE — SIGNIFICANT CHANGE UP
VIT B12 SERPL-MCNC: 428 PG/ML — SIGNIFICANT CHANGE UP (ref 232–1245)
VIT B12 SERPL-MCNC: 461 PG/ML — SIGNIFICANT CHANGE UP (ref 232–1245)
WBC # BLD: 10.01 K/UL — SIGNIFICANT CHANGE UP (ref 3.8–10.5)
WBC # BLD: 10.56 K/UL — HIGH (ref 3.8–10.5)
WBC # BLD: 10.93 K/UL — HIGH (ref 3.8–10.5)
WBC # BLD: 11.16 K/UL — HIGH (ref 3.8–10.5)
WBC # BLD: 11.25 K/UL — HIGH (ref 3.8–10.5)
WBC # BLD: 11.47 K/UL — HIGH (ref 3.8–10.5)
WBC # BLD: 12.3 K/UL — HIGH (ref 3.8–10.5)
WBC # BLD: 12.5 K/UL — HIGH (ref 3.8–10.5)
WBC # BLD: 12.96 K/UL — HIGH (ref 3.8–10.5)
WBC # BLD: 14.65 K/UL — HIGH (ref 3.8–10.5)
WBC # BLD: 15.83 K/UL — HIGH (ref 3.8–10.5)
WBC # BLD: 17.83 K/UL — HIGH (ref 3.8–10.5)
WBC # BLD: 7.06 K/UL — SIGNIFICANT CHANGE UP (ref 3.8–10.5)
WBC # BLD: 7.08 K/UL — SIGNIFICANT CHANGE UP (ref 3.8–10.5)
WBC # BLD: 8.44 K/UL — SIGNIFICANT CHANGE UP (ref 3.8–10.5)
WBC # BLD: 9.22 K/UL — SIGNIFICANT CHANGE UP (ref 3.8–10.5)
WBC # BLD: 9.49 K/UL — SIGNIFICANT CHANGE UP (ref 3.8–10.5)
WBC # BLD: 9.95 K/UL — SIGNIFICANT CHANGE UP (ref 3.8–10.5)
WBC # FLD AUTO: 10.01 K/UL — SIGNIFICANT CHANGE UP (ref 3.8–10.5)
WBC # FLD AUTO: 10.56 K/UL — HIGH (ref 3.8–10.5)
WBC # FLD AUTO: 10.93 K/UL — HIGH (ref 3.8–10.5)
WBC # FLD AUTO: 11.16 K/UL — HIGH (ref 3.8–10.5)
WBC # FLD AUTO: 11.25 K/UL — HIGH (ref 3.8–10.5)
WBC # FLD AUTO: 11.47 K/UL — HIGH (ref 3.8–10.5)
WBC # FLD AUTO: 12.3 K/UL — HIGH (ref 3.8–10.5)
WBC # FLD AUTO: 12.5 K/UL — HIGH (ref 3.8–10.5)
WBC # FLD AUTO: 12.96 K/UL — HIGH (ref 3.8–10.5)
WBC # FLD AUTO: 14.65 K/UL — HIGH (ref 3.8–10.5)
WBC # FLD AUTO: 15.83 K/UL — HIGH (ref 3.8–10.5)
WBC # FLD AUTO: 17.83 K/UL — HIGH (ref 3.8–10.5)
WBC # FLD AUTO: 7.06 K/UL — SIGNIFICANT CHANGE UP (ref 3.8–10.5)
WBC # FLD AUTO: 7.08 K/UL — SIGNIFICANT CHANGE UP (ref 3.8–10.5)
WBC # FLD AUTO: 8.44 K/UL — SIGNIFICANT CHANGE UP (ref 3.8–10.5)
WBC # FLD AUTO: 9.22 K/UL — SIGNIFICANT CHANGE UP (ref 3.8–10.5)
WBC # FLD AUTO: 9.49 K/UL — SIGNIFICANT CHANGE UP (ref 3.8–10.5)
WBC # FLD AUTO: 9.95 K/UL — SIGNIFICANT CHANGE UP (ref 3.8–10.5)
WBC UR QL: 10 /HPF — HIGH (ref 0–5)
WBC UR QL: 5 /HPF — SIGNIFICANT CHANGE UP (ref 0–5)

## 2021-01-01 PROCEDURE — 83880 ASSAY OF NATRIURETIC PEPTIDE: CPT

## 2021-01-01 PROCEDURE — 84165 PROTEIN E-PHORESIS SERUM: CPT

## 2021-01-01 PROCEDURE — 99232 SBSQ HOSP IP/OBS MODERATE 35: CPT

## 2021-01-01 PROCEDURE — 99285 EMERGENCY DEPT VISIT HI MDM: CPT | Mod: GC

## 2021-01-01 PROCEDURE — 71250 CT THORAX DX C-: CPT | Mod: 26

## 2021-01-01 PROCEDURE — U0003: CPT

## 2021-01-01 PROCEDURE — 84443 ASSAY THYROID STIM HORMONE: CPT

## 2021-01-01 PROCEDURE — 99223 1ST HOSP IP/OBS HIGH 75: CPT

## 2021-01-01 PROCEDURE — U0005: CPT

## 2021-01-01 PROCEDURE — 83550 IRON BINDING TEST: CPT

## 2021-01-01 PROCEDURE — 83605 ASSAY OF LACTIC ACID: CPT

## 2021-01-01 PROCEDURE — 85025 COMPLETE CBC W/AUTO DIFF WBC: CPT

## 2021-01-01 PROCEDURE — 84300 ASSAY OF URINE SODIUM: CPT

## 2021-01-01 PROCEDURE — 97530 THERAPEUTIC ACTIVITIES: CPT

## 2021-01-01 PROCEDURE — P9040: CPT

## 2021-01-01 PROCEDURE — 84155 ASSAY OF PROTEIN SERUM: CPT

## 2021-01-01 PROCEDURE — 80048 BASIC METABOLIC PNL TOTAL CA: CPT

## 2021-01-01 PROCEDURE — 97161 PT EVAL LOW COMPLEX 20 MIN: CPT

## 2021-01-01 PROCEDURE — 80053 COMPREHEN METABOLIC PANEL: CPT

## 2021-01-01 PROCEDURE — 87449 NOS EACH ORGANISM AG IA: CPT

## 2021-01-01 PROCEDURE — 83521 IG LIGHT CHAINS FREE EACH: CPT

## 2021-01-01 PROCEDURE — 86923 COMPATIBILITY TEST ELECTRIC: CPT

## 2021-01-01 PROCEDURE — 93010 ELECTROCARDIOGRAM REPORT: CPT

## 2021-01-01 PROCEDURE — 70450 CT HEAD/BRAIN W/O DYE: CPT | Mod: 26,MA

## 2021-01-01 PROCEDURE — 87040 BLOOD CULTURE FOR BACTERIA: CPT

## 2021-01-01 PROCEDURE — 84466 ASSAY OF TRANSFERRIN: CPT

## 2021-01-01 PROCEDURE — 93010 ELECTROCARDIOGRAM REPORT: CPT | Mod: 76

## 2021-01-01 PROCEDURE — 93010 ELECTROCARDIOGRAM REPORT: CPT | Mod: GC

## 2021-01-01 PROCEDURE — 71045 X-RAY EXAM CHEST 1 VIEW: CPT

## 2021-01-01 PROCEDURE — 85610 PROTHROMBIN TIME: CPT

## 2021-01-01 PROCEDURE — 36430 TRANSFUSION BLD/BLD COMPNT: CPT

## 2021-01-01 PROCEDURE — 93306 TTE W/DOPPLER COMPLETE: CPT | Mod: 26

## 2021-01-01 PROCEDURE — 85379 FIBRIN DEGRADATION QUANT: CPT

## 2021-01-01 PROCEDURE — 71275 CT ANGIOGRAPHY CHEST: CPT

## 2021-01-01 PROCEDURE — 82570 ASSAY OF URINE CREATININE: CPT

## 2021-01-01 PROCEDURE — 93005 ELECTROCARDIOGRAM TRACING: CPT

## 2021-01-01 PROCEDURE — 70450 CT HEAD/BRAIN W/O DYE: CPT

## 2021-01-01 PROCEDURE — 93306 TTE W/DOPPLER COMPLETE: CPT

## 2021-01-01 PROCEDURE — 99285 EMERGENCY DEPT VISIT HI MDM: CPT

## 2021-01-01 PROCEDURE — 85014 HEMATOCRIT: CPT

## 2021-01-01 PROCEDURE — 71045 X-RAY EXAM CHEST 1 VIEW: CPT | Mod: 26

## 2021-01-01 PROCEDURE — 80162 ASSAY OF DIGOXIN TOTAL: CPT

## 2021-01-01 PROCEDURE — 87637 SARSCOV2&INF A&B&RSV AMP PRB: CPT

## 2021-01-01 PROCEDURE — 84100 ASSAY OF PHOSPHORUS: CPT

## 2021-01-01 PROCEDURE — 87086 URINE CULTURE/COLONY COUNT: CPT

## 2021-01-01 PROCEDURE — 76770 US EXAM ABDO BACK WALL COMP: CPT

## 2021-01-01 PROCEDURE — 71275 CT ANGIOGRAPHY CHEST: CPT | Mod: 26,MA

## 2021-01-01 PROCEDURE — 86901 BLOOD TYPING SEROLOGIC RH(D): CPT

## 2021-01-01 PROCEDURE — 84145 PROCALCITONIN (PCT): CPT

## 2021-01-01 PROCEDURE — 71250 CT THORAX DX C-: CPT

## 2021-01-01 PROCEDURE — 94640 AIRWAY INHALATION TREATMENT: CPT

## 2021-01-01 PROCEDURE — 93970 EXTREMITY STUDY: CPT | Mod: 26

## 2021-01-01 PROCEDURE — 82947 ASSAY GLUCOSE BLOOD QUANT: CPT

## 2021-01-01 PROCEDURE — 86850 RBC ANTIBODY SCREEN: CPT

## 2021-01-01 PROCEDURE — 83735 ASSAY OF MAGNESIUM: CPT

## 2021-01-01 PROCEDURE — 85027 COMPLETE CBC AUTOMATED: CPT

## 2021-01-01 PROCEDURE — 82962 GLUCOSE BLOOD TEST: CPT

## 2021-01-01 PROCEDURE — 76770 US EXAM ABDO BACK WALL COMP: CPT | Mod: 26

## 2021-01-01 PROCEDURE — 82330 ASSAY OF CALCIUM: CPT

## 2021-01-01 PROCEDURE — 82803 BLOOD GASES ANY COMBINATION: CPT

## 2021-01-01 PROCEDURE — 84132 ASSAY OF SERUM POTASSIUM: CPT

## 2021-01-01 PROCEDURE — 99233 SBSQ HOSP IP/OBS HIGH 50: CPT

## 2021-01-01 PROCEDURE — 97116 GAIT TRAINING THERAPY: CPT

## 2021-01-01 PROCEDURE — 92610 EVALUATE SWALLOWING FUNCTION: CPT

## 2021-01-01 PROCEDURE — 85730 THROMBOPLASTIN TIME PARTIAL: CPT

## 2021-01-01 PROCEDURE — 86769 SARS-COV-2 COVID-19 ANTIBODY: CPT

## 2021-01-01 PROCEDURE — 82784 ASSAY IGA/IGD/IGG/IGM EACH: CPT

## 2021-01-01 PROCEDURE — 82272 OCCULT BLD FECES 1-3 TESTS: CPT

## 2021-01-01 PROCEDURE — 86334 IMMUNOFIX E-PHORESIS SERUM: CPT

## 2021-01-01 PROCEDURE — 96365 THER/PROPH/DIAG IV INF INIT: CPT

## 2021-01-01 PROCEDURE — 81001 URINALYSIS AUTO W/SCOPE: CPT

## 2021-01-01 PROCEDURE — 99291 CRITICAL CARE FIRST HOUR: CPT | Mod: CS,GC

## 2021-01-01 PROCEDURE — 0225U NFCT DS DNA&RNA 21 SARSCOV2: CPT

## 2021-01-01 PROCEDURE — 86900 BLOOD TYPING SEROLOGIC ABO: CPT

## 2021-01-01 PROCEDURE — 93970 EXTREMITY STUDY: CPT

## 2021-01-01 PROCEDURE — 92526 ORAL FUNCTION THERAPY: CPT

## 2021-01-01 PROCEDURE — 82728 ASSAY OF FERRITIN: CPT

## 2021-01-01 PROCEDURE — 82435 ASSAY OF BLOOD CHLORIDE: CPT

## 2021-01-01 PROCEDURE — 83036 HEMOGLOBIN GLYCOSYLATED A1C: CPT

## 2021-01-01 PROCEDURE — 84295 ASSAY OF SERUM SODIUM: CPT

## 2021-01-01 PROCEDURE — 82607 VITAMIN B-12: CPT

## 2021-01-01 PROCEDURE — 82746 ASSAY OF FOLIC ACID SERUM: CPT

## 2021-01-01 PROCEDURE — 97162 PT EVAL MOD COMPLEX 30 MIN: CPT

## 2021-01-01 PROCEDURE — 93010 ELECTROCARDIOGRAM REPORT: CPT | Mod: 77

## 2021-01-01 PROCEDURE — 85018 HEMOGLOBIN: CPT

## 2021-01-01 PROCEDURE — 97110 THERAPEUTIC EXERCISES: CPT

## 2021-01-01 PROCEDURE — 92950 HEART/LUNG RESUSCITATION CPR: CPT

## 2021-01-01 PROCEDURE — 84484 ASSAY OF TROPONIN QUANT: CPT

## 2021-01-01 PROCEDURE — 70450 CT HEAD/BRAIN W/O DYE: CPT | Mod: 26

## 2021-01-01 PROCEDURE — 83540 ASSAY OF IRON: CPT

## 2021-01-01 RX ORDER — METOPROLOL TARTRATE 50 MG
1 TABLET ORAL
Qty: 60 | Refills: 0

## 2021-01-01 RX ORDER — ACETAMINOPHEN 500 MG
0 TABLET ORAL
Qty: 0 | Refills: 0 | DISCHARGE

## 2021-01-01 RX ORDER — POLYETHYLENE GLYCOL 3350 17 G/17G
17 POWDER, FOR SOLUTION ORAL DAILY
Refills: 0 | Status: DISCONTINUED | OUTPATIENT
Start: 2021-01-01 | End: 2021-01-01

## 2021-01-01 RX ORDER — METOPROLOL TARTRATE 50 MG
5 TABLET ORAL EVERY 6 HOURS
Refills: 0 | Status: DISCONTINUED | OUTPATIENT
Start: 2021-01-01 | End: 2021-01-01

## 2021-01-01 RX ORDER — LANOLIN ALCOHOL/MO/W.PET/CERES
3 CREAM (GRAM) TOPICAL AT BEDTIME
Refills: 0 | Status: DISCONTINUED | OUTPATIENT
Start: 2021-01-01 | End: 2021-01-01

## 2021-01-01 RX ORDER — DILTIAZEM HCL 120 MG
5 CAPSULE, EXT RELEASE 24 HR ORAL ONCE
Refills: 0 | Status: COMPLETED | OUTPATIENT
Start: 2021-01-01 | End: 2021-01-01

## 2021-01-01 RX ORDER — PANTOPRAZOLE SODIUM 20 MG/1
1 TABLET, DELAYED RELEASE ORAL
Qty: 0 | Refills: 0 | DISCHARGE

## 2021-01-01 RX ORDER — ACETAMINOPHEN 500 MG
2 TABLET ORAL
Qty: 0 | Refills: 0 | DISCHARGE
Start: 2021-01-01

## 2021-01-01 RX ORDER — METOPROLOL TARTRATE 50 MG
5 TABLET ORAL ONCE
Refills: 0 | Status: COMPLETED | OUTPATIENT
Start: 2021-01-01 | End: 2021-01-01

## 2021-01-01 RX ORDER — MIRTAZAPINE 45 MG/1
15 TABLET, ORALLY DISINTEGRATING ORAL
Refills: 0 | Status: DISCONTINUED | OUTPATIENT
Start: 2021-01-01 | End: 2021-01-01

## 2021-01-01 RX ORDER — FLUTICASONE PROPIONATE 50 MCG
2 SPRAY, SUSPENSION NASAL
Refills: 0 | Status: DISCONTINUED | OUTPATIENT
Start: 2021-01-01 | End: 2021-01-01

## 2021-01-01 RX ORDER — DEXTROSE 50 % IN WATER 50 %
25 SYRINGE (ML) INTRAVENOUS ONCE
Refills: 0 | Status: DISCONTINUED | OUTPATIENT
Start: 2021-01-01 | End: 2021-01-01

## 2021-01-01 RX ORDER — MIDODRINE HYDROCHLORIDE 2.5 MG/1
5 TABLET ORAL THREE TIMES A DAY
Refills: 0 | Status: DISCONTINUED | OUTPATIENT
Start: 2021-01-01 | End: 2021-01-01

## 2021-01-01 RX ORDER — METFORMIN HYDROCHLORIDE 850 MG/1
2 TABLET ORAL
Qty: 0 | Refills: 0 | DISCHARGE

## 2021-01-01 RX ORDER — SODIUM CHLORIDE 9 MG/ML
250 INJECTION INTRAMUSCULAR; INTRAVENOUS; SUBCUTANEOUS ONCE
Refills: 0 | Status: COMPLETED | OUTPATIENT
Start: 2021-01-01 | End: 2021-01-01

## 2021-01-01 RX ORDER — CLONAZEPAM 1 MG
0.25 TABLET ORAL ONCE
Refills: 0 | Status: DISCONTINUED | OUTPATIENT
Start: 2021-01-01 | End: 2021-01-01

## 2021-01-01 RX ORDER — DILTIAZEM HCL 120 MG
180 CAPSULE, EXT RELEASE 24 HR ORAL DAILY
Refills: 0 | Status: DISCONTINUED | OUTPATIENT
Start: 2021-01-01 | End: 2021-01-01

## 2021-01-01 RX ORDER — SODIUM ZIRCONIUM CYCLOSILICATE 10 G/10G
10 POWDER, FOR SUSPENSION ORAL ONCE
Refills: 0 | Status: COMPLETED | OUTPATIENT
Start: 2021-01-01 | End: 2021-01-01

## 2021-01-01 RX ORDER — LAMOTRIGINE 25 MG/1
25 TABLET, ORALLY DISINTEGRATING ORAL AT BEDTIME
Refills: 0 | Status: DISCONTINUED | OUTPATIENT
Start: 2021-01-01 | End: 2021-01-01

## 2021-01-01 RX ORDER — FONDAPARINUX SODIUM 2.5 MG/.5ML
1 INJECTION, SOLUTION SUBCUTANEOUS
Qty: 0 | Refills: 0 | DISCHARGE

## 2021-01-01 RX ORDER — SENNA PLUS 8.6 MG/1
2 TABLET ORAL AT BEDTIME
Refills: 0 | Status: DISCONTINUED | OUTPATIENT
Start: 2021-01-01 | End: 2021-01-01

## 2021-01-01 RX ORDER — MIRTAZAPINE 45 MG/1
1 TABLET, ORALLY DISINTEGRATING ORAL
Qty: 0 | Refills: 0 | DISCHARGE

## 2021-01-01 RX ORDER — DIGOXIN 250 MCG
250 TABLET ORAL ONCE
Refills: 0 | Status: COMPLETED | OUTPATIENT
Start: 2021-01-01 | End: 2021-01-01

## 2021-01-01 RX ORDER — RIVAROXABAN 15 MG-20MG
15 KIT ORAL
Refills: 0 | Status: DISCONTINUED | OUTPATIENT
Start: 2021-01-01 | End: 2021-01-01

## 2021-01-01 RX ORDER — CEFTRIAXONE 500 MG/1
1000 INJECTION, POWDER, FOR SOLUTION INTRAMUSCULAR; INTRAVENOUS EVERY 24 HOURS
Refills: 0 | Status: DISCONTINUED | OUTPATIENT
Start: 2021-01-01 | End: 2021-01-01

## 2021-01-01 RX ORDER — LAMOTRIGINE 25 MG/1
1 TABLET, ORALLY DISINTEGRATING ORAL
Qty: 0 | Refills: 0 | DISCHARGE

## 2021-01-01 RX ORDER — ACETAMINOPHEN 500 MG
650 TABLET ORAL EVERY 6 HOURS
Refills: 0 | Status: DISCONTINUED | OUTPATIENT
Start: 2021-01-01 | End: 2021-01-01

## 2021-01-01 RX ORDER — AZITHROMYCIN 500 MG/1
500 TABLET, FILM COATED ORAL ONCE
Refills: 0 | Status: COMPLETED | OUTPATIENT
Start: 2021-01-01 | End: 2021-01-01

## 2021-01-01 RX ORDER — DEXTROSE 50 % IN WATER 50 %
15 SYRINGE (ML) INTRAVENOUS ONCE
Refills: 0 | Status: DISCONTINUED | OUTPATIENT
Start: 2021-01-01 | End: 2021-01-01

## 2021-01-01 RX ORDER — SODIUM CHLORIDE 9 MG/ML
1000 INJECTION, SOLUTION INTRAVENOUS
Refills: 0 | Status: DISCONTINUED | OUTPATIENT
Start: 2021-01-01 | End: 2021-01-01

## 2021-01-01 RX ORDER — CLONAZEPAM 1 MG
0.25 TABLET ORAL
Refills: 0 | Status: DISCONTINUED | OUTPATIENT
Start: 2021-01-01 | End: 2021-01-01

## 2021-01-01 RX ORDER — BUDESONIDE, MICRONIZED 100 %
0.5 POWDER (GRAM) MISCELLANEOUS EVERY 12 HOURS
Refills: 0 | Status: DISCONTINUED | OUTPATIENT
Start: 2021-01-01 | End: 2021-01-01

## 2021-01-01 RX ORDER — IPRATROPIUM/ALBUTEROL SULFATE 18-103MCG
3 AEROSOL WITH ADAPTER (GRAM) INHALATION EVERY 6 HOURS
Refills: 0 | Status: DISCONTINUED | OUTPATIENT
Start: 2021-01-01 | End: 2021-01-01

## 2021-01-01 RX ORDER — LAMOTRIGINE 25 MG/1
25 TABLET, ORALLY DISINTEGRATING ORAL
Refills: 0 | Status: DISCONTINUED | OUTPATIENT
Start: 2021-01-01 | End: 2021-01-01

## 2021-01-01 RX ORDER — DEXTROSE 50 % IN WATER 50 %
12.5 SYRINGE (ML) INTRAVENOUS ONCE
Refills: 0 | Status: DISCONTINUED | OUTPATIENT
Start: 2021-01-01 | End: 2021-01-01

## 2021-01-01 RX ORDER — CEFUROXIME AXETIL 250 MG
1 TABLET ORAL
Qty: 1 | Refills: 0
Start: 2021-01-01 | End: 2021-01-01

## 2021-01-01 RX ORDER — PANTOPRAZOLE SODIUM 20 MG/1
40 TABLET, DELAYED RELEASE ORAL
Refills: 0 | Status: DISCONTINUED | OUTPATIENT
Start: 2021-01-01 | End: 2021-01-01

## 2021-01-01 RX ORDER — DILTIAZEM HCL 120 MG
1 CAPSULE, EXT RELEASE 24 HR ORAL
Qty: 0 | Refills: 0 | DISCHARGE

## 2021-01-01 RX ORDER — HALOPERIDOL DECANOATE 100 MG/ML
0.5 INJECTION INTRAMUSCULAR EVERY 6 HOURS
Refills: 0 | Status: DISCONTINUED | OUTPATIENT
Start: 2021-01-01 | End: 2021-01-01

## 2021-01-01 RX ORDER — CEFTRIAXONE 500 MG/1
1000 INJECTION, POWDER, FOR SOLUTION INTRAMUSCULAR; INTRAVENOUS ONCE
Refills: 0 | Status: COMPLETED | OUTPATIENT
Start: 2021-01-01 | End: 2021-01-01

## 2021-01-01 RX ORDER — METOPROLOL TARTRATE 50 MG
25 TABLET ORAL ONCE
Refills: 0 | Status: COMPLETED | OUTPATIENT
Start: 2021-01-01 | End: 2021-01-01

## 2021-01-01 RX ORDER — LANOLIN ALCOHOL/MO/W.PET/CERES
3 CREAM (GRAM) TOPICAL ONCE
Refills: 0 | Status: COMPLETED | OUTPATIENT
Start: 2021-01-01 | End: 2021-01-01

## 2021-01-01 RX ORDER — HALOPERIDOL DECANOATE 100 MG/ML
1 INJECTION INTRAMUSCULAR ONCE
Refills: 0 | Status: COMPLETED | OUTPATIENT
Start: 2021-01-01 | End: 2021-01-01

## 2021-01-01 RX ORDER — VANCOMYCIN HCL 1 G
VIAL (EA) INTRAVENOUS
Refills: 0 | Status: DISCONTINUED | OUTPATIENT
Start: 2021-01-01 | End: 2021-01-01

## 2021-01-01 RX ORDER — CEFUROXIME AXETIL 250 MG
500 TABLET ORAL EVERY 24 HOURS
Refills: 0 | Status: DISCONTINUED | OUTPATIENT
Start: 2021-01-01 | End: 2021-01-01

## 2021-01-01 RX ORDER — METOPROLOL TARTRATE 50 MG
25 TABLET ORAL
Refills: 0 | Status: DISCONTINUED | OUTPATIENT
Start: 2021-01-01 | End: 2021-01-01

## 2021-01-01 RX ORDER — POLYETHYLENE GLYCOL 3350 17 G/17G
17 POWDER, FOR SOLUTION ORAL ONCE
Refills: 0 | Status: COMPLETED | OUTPATIENT
Start: 2021-01-01 | End: 2021-01-01

## 2021-01-01 RX ORDER — DIGOXIN 250 MCG
125 TABLET ORAL DAILY
Refills: 0 | Status: DISCONTINUED | OUTPATIENT
Start: 2021-01-01 | End: 2021-01-01

## 2021-01-01 RX ORDER — SODIUM CHLORIDE 9 MG/ML
1000 INJECTION INTRAMUSCULAR; INTRAVENOUS; SUBCUTANEOUS
Refills: 0 | Status: DISCONTINUED | OUTPATIENT
Start: 2021-01-01 | End: 2021-01-01

## 2021-01-01 RX ORDER — ALBUTEROL 90 UG/1
2 AEROSOL, METERED ORAL
Qty: 1 | Refills: 0

## 2021-01-01 RX ORDER — IPRATROPIUM/ALBUTEROL SULFATE 18-103MCG
3 AEROSOL WITH ADAPTER (GRAM) INHALATION ONCE
Refills: 0 | Status: COMPLETED | OUTPATIENT
Start: 2021-01-01 | End: 2021-01-01

## 2021-01-01 RX ORDER — GLUCAGON INJECTION, SOLUTION 0.5 MG/.1ML
1 INJECTION, SOLUTION SUBCUTANEOUS ONCE
Refills: 0 | Status: DISCONTINUED | OUTPATIENT
Start: 2021-01-01 | End: 2021-01-01

## 2021-01-01 RX ORDER — INSULIN LISPRO 100/ML
VIAL (ML) SUBCUTANEOUS
Refills: 0 | Status: DISCONTINUED | OUTPATIENT
Start: 2021-01-01 | End: 2021-01-01

## 2021-01-01 RX ORDER — HALOPERIDOL DECANOATE 100 MG/ML
0.5 INJECTION INTRAMUSCULAR ONCE
Refills: 0 | Status: COMPLETED | OUTPATIENT
Start: 2021-01-01 | End: 2021-01-01

## 2021-01-01 RX ORDER — METOPROLOL TARTRATE 50 MG
50 TABLET ORAL
Refills: 0 | Status: DISCONTINUED | OUTPATIENT
Start: 2021-01-01 | End: 2021-01-01

## 2021-01-01 RX ORDER — IRON SUCROSE 20 MG/ML
200 INJECTION, SOLUTION INTRAVENOUS EVERY 24 HOURS
Refills: 0 | Status: DISCONTINUED | OUTPATIENT
Start: 2021-01-01 | End: 2021-01-01

## 2021-01-01 RX ORDER — METOPROLOL TARTRATE 50 MG
2.5 TABLET ORAL ONCE
Refills: 0 | Status: COMPLETED | OUTPATIENT
Start: 2021-01-01 | End: 2021-01-01

## 2021-01-01 RX ORDER — IRON SUCROSE 20 MG/ML
200 INJECTION, SOLUTION INTRAVENOUS EVERY 24 HOURS
Refills: 0 | Status: COMPLETED | OUTPATIENT
Start: 2021-01-01 | End: 2021-01-01

## 2021-01-01 RX ORDER — PIPERACILLIN AND TAZOBACTAM 4; .5 G/20ML; G/20ML
3.38 INJECTION, POWDER, LYOPHILIZED, FOR SOLUTION INTRAVENOUS ONCE
Refills: 0 | Status: DISCONTINUED | OUTPATIENT
Start: 2021-01-01 | End: 2021-01-01

## 2021-01-01 RX ORDER — SODIUM CHLORIDE 9 MG/ML
250 INJECTION INTRAMUSCULAR; INTRAVENOUS; SUBCUTANEOUS ONCE
Refills: 0 | Status: DISCONTINUED | OUTPATIENT
Start: 2021-01-01 | End: 2021-01-01

## 2021-01-01 RX ORDER — METHIMAZOLE 10 MG/1
0.5 TABLET ORAL
Qty: 15 | Refills: 0

## 2021-01-01 RX ORDER — FLUTICASONE PROPIONATE 50 MCG
1 SPRAY, SUSPENSION NASAL
Qty: 0 | Refills: 0 | DISCHARGE

## 2021-01-01 RX ORDER — INSULIN LISPRO 100/ML
VIAL (ML) SUBCUTANEOUS AT BEDTIME
Refills: 0 | Status: DISCONTINUED | OUTPATIENT
Start: 2021-01-01 | End: 2021-01-01

## 2021-01-01 RX ORDER — LAMOTRIGINE 25 MG/1
50 TABLET, ORALLY DISINTEGRATING ORAL AT BEDTIME
Refills: 0 | Status: DISCONTINUED | OUTPATIENT
Start: 2021-01-01 | End: 2021-01-01

## 2021-01-01 RX ORDER — SODIUM CHLORIDE 9 MG/ML
500 INJECTION INTRAMUSCULAR; INTRAVENOUS; SUBCUTANEOUS ONCE
Refills: 0 | Status: COMPLETED | OUTPATIENT
Start: 2021-01-01 | End: 2021-01-01

## 2021-01-01 RX ORDER — POLYETHYLENE GLYCOL 3350 17 G/17G
17 POWDER, FOR SOLUTION ORAL
Qty: 0 | Refills: 0 | DISCHARGE

## 2021-01-01 RX ORDER — FLUTICASONE FUROATE, UMECLIDINIUM BROMIDE AND VILANTEROL TRIFENATATE 200; 62.5; 25 UG/1; UG/1; UG/1
1 POWDER RESPIRATORY (INHALATION)
Qty: 0 | Refills: 0 | DISCHARGE

## 2021-01-01 RX ORDER — ALBUTEROL 90 UG/1
2 AEROSOL, METERED ORAL EVERY 6 HOURS
Refills: 0 | Status: DISCONTINUED | OUTPATIENT
Start: 2021-01-01 | End: 2021-01-01

## 2021-01-01 RX ORDER — CEFTRIAXONE 500 MG/1
1000 INJECTION, POWDER, FOR SOLUTION INTRAMUSCULAR; INTRAVENOUS EVERY 24 HOURS
Refills: 0 | Status: COMPLETED | OUTPATIENT
Start: 2021-01-01 | End: 2021-01-01

## 2021-01-01 RX ORDER — CEFUROXIME AXETIL 250 MG
500 TABLET ORAL EVERY 12 HOURS
Refills: 0 | Status: DISCONTINUED | OUTPATIENT
Start: 2021-01-01 | End: 2021-01-01

## 2021-01-01 RX ORDER — TIOTROPIUM BROMIDE 18 UG/1
1 CAPSULE ORAL; RESPIRATORY (INHALATION) DAILY
Refills: 0 | Status: DISCONTINUED | OUTPATIENT
Start: 2021-01-01 | End: 2021-01-01

## 2021-01-01 RX ORDER — SODIUM ZIRCONIUM CYCLOSILICATE 10 G/10G
5 POWDER, FOR SUSPENSION ORAL ONCE
Refills: 0 | Status: DISCONTINUED | OUTPATIENT
Start: 2021-01-01 | End: 2021-01-01

## 2021-01-01 RX ADMIN — TIOTROPIUM BROMIDE 1 CAPSULE(S): 18 CAPSULE ORAL; RESPIRATORY (INHALATION) at 11:46

## 2021-01-01 RX ADMIN — Medication 1: at 08:52

## 2021-01-01 RX ADMIN — Medication 2 SPRAY(S): at 05:49

## 2021-01-01 RX ADMIN — LAMOTRIGINE 25 MILLIGRAM(S): 25 TABLET, ORALLY DISINTEGRATING ORAL at 22:24

## 2021-01-01 RX ADMIN — Medication 1: at 13:36

## 2021-01-01 RX ADMIN — MIDODRINE HYDROCHLORIDE 5 MILLIGRAM(S): 2.5 TABLET ORAL at 12:01

## 2021-01-01 RX ADMIN — ALBUTEROL 2 PUFF(S): 90 AEROSOL, METERED ORAL at 11:43

## 2021-01-01 RX ADMIN — Medication 180 MILLIGRAM(S): at 05:15

## 2021-01-01 RX ADMIN — Medication 1: at 09:00

## 2021-01-01 RX ADMIN — Medication 3 MILLILITER(S): at 05:19

## 2021-01-01 RX ADMIN — Medication 3 MILLILITER(S): at 12:51

## 2021-01-01 RX ADMIN — CEFTRIAXONE 1000 MILLIGRAM(S): 500 INJECTION, POWDER, FOR SOLUTION INTRAMUSCULAR; INTRAVENOUS at 19:15

## 2021-01-01 RX ADMIN — Medication 0.5 MILLIGRAM(S): at 06:01

## 2021-01-01 RX ADMIN — Medication 0.5 MILLIGRAM(S): at 22:47

## 2021-01-01 RX ADMIN — Medication 25 MILLIGRAM(S): at 09:06

## 2021-01-01 RX ADMIN — MIRTAZAPINE 15 MILLIGRAM(S): 45 TABLET, ORALLY DISINTEGRATING ORAL at 21:06

## 2021-01-01 RX ADMIN — Medication 25 MILLIGRAM(S): at 21:06

## 2021-01-01 RX ADMIN — Medication 1: at 18:14

## 2021-01-01 RX ADMIN — Medication 25 MILLIGRAM(S): at 10:30

## 2021-01-01 RX ADMIN — MIDODRINE HYDROCHLORIDE 5 MILLIGRAM(S): 2.5 TABLET ORAL at 18:09

## 2021-01-01 RX ADMIN — Medication 25 MILLIGRAM(S): at 05:45

## 2021-01-01 RX ADMIN — PANTOPRAZOLE SODIUM 40 MILLIGRAM(S): 20 TABLET, DELAYED RELEASE ORAL at 05:44

## 2021-01-01 RX ADMIN — Medication 3 MILLILITER(S): at 06:01

## 2021-01-01 RX ADMIN — Medication 0.5 MILLIGRAM(S): at 04:35

## 2021-01-01 RX ADMIN — Medication 100 MILLIGRAM(S): at 08:40

## 2021-01-01 RX ADMIN — Medication 3 MILLILITER(S): at 17:52

## 2021-01-01 RX ADMIN — Medication 3 MILLILITER(S): at 12:35

## 2021-01-01 RX ADMIN — CEFTRIAXONE 100 MILLIGRAM(S): 500 INJECTION, POWDER, FOR SOLUTION INTRAMUSCULAR; INTRAVENOUS at 18:13

## 2021-01-01 RX ADMIN — Medication 5 MILLIGRAM(S): at 10:30

## 2021-01-01 RX ADMIN — Medication 25 MILLIGRAM(S): at 06:56

## 2021-01-01 RX ADMIN — Medication 3: at 11:35

## 2021-01-01 RX ADMIN — Medication 3 MILLILITER(S): at 05:44

## 2021-01-01 RX ADMIN — RIVAROXABAN 15 MILLIGRAM(S): KIT at 21:44

## 2021-01-01 RX ADMIN — Medication 100 MILLIGRAM(S): at 05:18

## 2021-01-01 RX ADMIN — Medication 180 MILLIGRAM(S): at 08:33

## 2021-01-01 RX ADMIN — IRON SUCROSE 110 MILLIGRAM(S): 20 INJECTION, SOLUTION INTRAVENOUS at 22:24

## 2021-01-01 RX ADMIN — MIDODRINE HYDROCHLORIDE 5 MILLIGRAM(S): 2.5 TABLET ORAL at 12:35

## 2021-01-01 RX ADMIN — Medication 3 MILLILITER(S): at 12:41

## 2021-01-01 RX ADMIN — PANTOPRAZOLE SODIUM 40 MILLIGRAM(S): 20 TABLET, DELAYED RELEASE ORAL at 17:36

## 2021-01-01 RX ADMIN — Medication 3 MILLILITER(S): at 12:04

## 2021-01-01 RX ADMIN — Medication 1: at 17:29

## 2021-01-01 RX ADMIN — Medication 25 MILLIGRAM(S): at 18:39

## 2021-01-01 RX ADMIN — PANTOPRAZOLE SODIUM 40 MILLIGRAM(S): 20 TABLET, DELAYED RELEASE ORAL at 17:29

## 2021-01-01 RX ADMIN — TIOTROPIUM BROMIDE 1 CAPSULE(S): 18 CAPSULE ORAL; RESPIRATORY (INHALATION) at 13:35

## 2021-01-01 RX ADMIN — Medication 3 MILLILITER(S): at 11:41

## 2021-01-01 RX ADMIN — Medication 5 MILLIGRAM(S): at 12:04

## 2021-01-01 RX ADMIN — LAMOTRIGINE 25 MILLIGRAM(S): 25 TABLET, ORALLY DISINTEGRATING ORAL at 23:05

## 2021-01-01 RX ADMIN — Medication 2 SPRAY(S): at 17:37

## 2021-01-01 RX ADMIN — Medication 5 MILLIGRAM(S): at 23:04

## 2021-01-01 RX ADMIN — Medication 5 MILLIGRAM(S): at 00:21

## 2021-01-01 RX ADMIN — MIRTAZAPINE 15 MILLIGRAM(S): 45 TABLET, ORALLY DISINTEGRATING ORAL at 06:55

## 2021-01-01 RX ADMIN — Medication 25 MILLIGRAM(S): at 17:49

## 2021-01-01 RX ADMIN — Medication 3 MILLILITER(S): at 23:16

## 2021-01-01 RX ADMIN — SODIUM CHLORIDE 250 MILLILITER(S): 9 INJECTION INTRAMUSCULAR; INTRAVENOUS; SUBCUTANEOUS at 21:56

## 2021-01-01 RX ADMIN — ALBUTEROL 2 PUFF(S): 90 AEROSOL, METERED ORAL at 23:28

## 2021-01-01 RX ADMIN — HALOPERIDOL DECANOATE 0.5 MILLIGRAM(S): 100 INJECTION INTRAMUSCULAR at 08:06

## 2021-01-01 RX ADMIN — MIRTAZAPINE 15 MILLIGRAM(S): 45 TABLET, ORALLY DISINTEGRATING ORAL at 21:43

## 2021-01-01 RX ADMIN — Medication 1: at 12:51

## 2021-01-01 RX ADMIN — PANTOPRAZOLE SODIUM 40 MILLIGRAM(S): 20 TABLET, DELAYED RELEASE ORAL at 06:00

## 2021-01-01 RX ADMIN — Medication 3 MILLILITER(S): at 18:15

## 2021-01-01 RX ADMIN — Medication 0.5 MILLIGRAM(S): at 05:55

## 2021-01-01 RX ADMIN — Medication 2.5 MILLIGRAM(S): at 23:43

## 2021-01-01 RX ADMIN — Medication 500 MILLIGRAM(S): at 08:40

## 2021-01-01 RX ADMIN — Medication 1: at 08:40

## 2021-01-01 RX ADMIN — Medication 25 MILLIGRAM(S): at 05:28

## 2021-01-01 RX ADMIN — PANTOPRAZOLE SODIUM 40 MILLIGRAM(S): 20 TABLET, DELAYED RELEASE ORAL at 05:15

## 2021-01-01 RX ADMIN — Medication 2 SPRAY(S): at 17:29

## 2021-01-01 RX ADMIN — PANTOPRAZOLE SODIUM 40 MILLIGRAM(S): 20 TABLET, DELAYED RELEASE ORAL at 17:23

## 2021-01-01 RX ADMIN — Medication 2: at 11:59

## 2021-01-01 RX ADMIN — Medication 180 MILLIGRAM(S): at 05:49

## 2021-01-01 RX ADMIN — Medication 100 MILLIGRAM(S): at 09:06

## 2021-01-01 RX ADMIN — Medication 2.5 MILLIGRAM(S): at 00:04

## 2021-01-01 RX ADMIN — SENNA PLUS 2 TABLET(S): 8.6 TABLET ORAL at 21:43

## 2021-01-01 RX ADMIN — Medication 25 MILLIGRAM(S): at 07:57

## 2021-01-01 RX ADMIN — Medication 3 MILLILITER(S): at 23:03

## 2021-01-01 RX ADMIN — LAMOTRIGINE 25 MILLIGRAM(S): 25 TABLET, ORALLY DISINTEGRATING ORAL at 22:45

## 2021-01-01 RX ADMIN — Medication 5 MILLIGRAM(S): at 06:59

## 2021-01-01 RX ADMIN — Medication 2 SPRAY(S): at 17:43

## 2021-01-01 RX ADMIN — AZITHROMYCIN 250 MILLIGRAM(S): 500 TABLET, FILM COATED ORAL at 15:42

## 2021-01-01 RX ADMIN — Medication 2 SPRAY(S): at 19:00

## 2021-01-01 RX ADMIN — MIRTAZAPINE 15 MILLIGRAM(S): 45 TABLET, ORALLY DISINTEGRATING ORAL at 05:45

## 2021-01-01 RX ADMIN — Medication 0.5 MILLIGRAM(S): at 18:24

## 2021-01-01 RX ADMIN — Medication 2 SPRAY(S): at 05:45

## 2021-01-01 RX ADMIN — ALBUTEROL 2 PUFF(S): 90 AEROSOL, METERED ORAL at 01:47

## 2021-01-01 RX ADMIN — Medication 5 MILLIGRAM(S): at 05:20

## 2021-01-01 RX ADMIN — LAMOTRIGINE 25 MILLIGRAM(S): 25 TABLET, ORALLY DISINTEGRATING ORAL at 21:55

## 2021-01-01 RX ADMIN — ALBUTEROL 2 PUFF(S): 90 AEROSOL, METERED ORAL at 05:27

## 2021-01-01 RX ADMIN — Medication 2: at 13:08

## 2021-01-01 RX ADMIN — Medication 500 MILLIGRAM(S): at 21:43

## 2021-01-01 RX ADMIN — Medication 180 MILLIGRAM(S): at 08:40

## 2021-01-01 RX ADMIN — Medication 5 MILLIGRAM(S): at 05:46

## 2021-01-01 RX ADMIN — Medication 5 MILLIGRAM(S): at 02:10

## 2021-01-01 RX ADMIN — MIRTAZAPINE 15 MILLIGRAM(S): 45 TABLET, ORALLY DISINTEGRATING ORAL at 17:51

## 2021-01-01 RX ADMIN — Medication 650 MILLIGRAM(S): at 22:00

## 2021-01-01 RX ADMIN — Medication 3 MILLILITER(S): at 18:07

## 2021-01-01 RX ADMIN — Medication 650 MILLIGRAM(S): at 05:14

## 2021-01-01 RX ADMIN — Medication 1: at 18:11

## 2021-01-01 RX ADMIN — IRON SUCROSE 110 MILLIGRAM(S): 20 INJECTION, SOLUTION INTRAVENOUS at 12:45

## 2021-01-01 RX ADMIN — RIVAROXABAN 15 MILLIGRAM(S): KIT at 17:44

## 2021-01-01 RX ADMIN — MIDODRINE HYDROCHLORIDE 5 MILLIGRAM(S): 2.5 TABLET ORAL at 04:34

## 2021-01-01 RX ADMIN — Medication 3 MILLILITER(S): at 05:55

## 2021-01-01 RX ADMIN — Medication 1: at 13:23

## 2021-01-01 RX ADMIN — HALOPERIDOL DECANOATE 0.5 MILLIGRAM(S): 100 INJECTION INTRAMUSCULAR at 08:33

## 2021-01-01 RX ADMIN — MIDODRINE HYDROCHLORIDE 5 MILLIGRAM(S): 2.5 TABLET ORAL at 17:25

## 2021-01-01 RX ADMIN — PANTOPRAZOLE SODIUM 40 MILLIGRAM(S): 20 TABLET, DELAYED RELEASE ORAL at 17:47

## 2021-01-01 RX ADMIN — Medication 100 MILLIGRAM(S): at 08:33

## 2021-01-01 RX ADMIN — Medication 2: at 12:37

## 2021-01-01 RX ADMIN — Medication 5 MILLIGRAM(S): at 21:56

## 2021-01-01 RX ADMIN — Medication 1 TABLET(S): at 12:04

## 2021-01-01 RX ADMIN — RIVAROXABAN 15 MILLIGRAM(S): KIT at 17:14

## 2021-01-01 RX ADMIN — Medication 250 MICROGRAM(S): at 00:06

## 2021-01-01 RX ADMIN — Medication 3 MILLIGRAM(S): at 23:16

## 2021-01-01 RX ADMIN — Medication 180 MILLIGRAM(S): at 05:18

## 2021-01-01 RX ADMIN — Medication 25 MILLIGRAM(S): at 17:28

## 2021-01-01 RX ADMIN — Medication 3 MILLILITER(S): at 12:02

## 2021-01-01 RX ADMIN — LAMOTRIGINE 25 MILLIGRAM(S): 25 TABLET, ORALLY DISINTEGRATING ORAL at 21:05

## 2021-01-01 RX ADMIN — Medication 1: at 18:25

## 2021-01-01 RX ADMIN — Medication 100 MILLIGRAM(S): at 21:43

## 2021-01-01 RX ADMIN — Medication 1 TABLET(S): at 11:41

## 2021-01-01 RX ADMIN — CEFTRIAXONE 100 MILLIGRAM(S): 500 INJECTION, POWDER, FOR SOLUTION INTRAMUSCULAR; INTRAVENOUS at 17:17

## 2021-01-01 RX ADMIN — Medication 1: at 18:00

## 2021-01-01 RX ADMIN — Medication 650 MILLIGRAM(S): at 05:45

## 2021-01-01 RX ADMIN — Medication 100 MILLIGRAM(S): at 17:49

## 2021-01-01 RX ADMIN — Medication 3: at 18:24

## 2021-01-01 RX ADMIN — MIRTAZAPINE 15 MILLIGRAM(S): 45 TABLET, ORALLY DISINTEGRATING ORAL at 21:44

## 2021-01-01 RX ADMIN — Medication 5 MILLIGRAM(S): at 17:23

## 2021-01-01 RX ADMIN — SODIUM CHLORIDE 500 MILLILITER(S): 9 INJECTION INTRAMUSCULAR; INTRAVENOUS; SUBCUTANEOUS at 23:05

## 2021-01-01 RX ADMIN — Medication 0.5 MILLIGRAM(S): at 17:25

## 2021-01-01 RX ADMIN — PANTOPRAZOLE SODIUM 40 MILLIGRAM(S): 20 TABLET, DELAYED RELEASE ORAL at 05:18

## 2021-01-01 RX ADMIN — Medication 2 SPRAY(S): at 18:25

## 2021-01-01 RX ADMIN — LAMOTRIGINE 25 MILLIGRAM(S): 25 TABLET, ORALLY DISINTEGRATING ORAL at 21:44

## 2021-01-01 RX ADMIN — Medication 3 MILLILITER(S): at 11:54

## 2021-01-01 RX ADMIN — CEFTRIAXONE 100 MILLIGRAM(S): 500 INJECTION, POWDER, FOR SOLUTION INTRAMUSCULAR; INTRAVENOUS at 17:31

## 2021-01-01 RX ADMIN — Medication 180 MILLIGRAM(S): at 10:43

## 2021-01-01 RX ADMIN — Medication 3 MILLILITER(S): at 15:29

## 2021-01-01 RX ADMIN — Medication 1: at 12:50

## 2021-01-01 RX ADMIN — Medication 2 SPRAY(S): at 17:25

## 2021-01-01 RX ADMIN — Medication 5 MILLIGRAM(S): at 12:01

## 2021-01-01 RX ADMIN — Medication 3 MILLILITER(S): at 17:43

## 2021-01-01 RX ADMIN — Medication 0.5 MILLIGRAM(S): at 17:23

## 2021-01-01 RX ADMIN — SODIUM ZIRCONIUM CYCLOSILICATE 10 GRAM(S): 10 POWDER, FOR SUSPENSION ORAL at 16:47

## 2021-01-01 RX ADMIN — Medication 1 TABLET(S): at 12:40

## 2021-01-01 RX ADMIN — Medication 5 MILLIGRAM(S): at 01:18

## 2021-01-01 RX ADMIN — Medication 1: at 13:04

## 2021-01-01 RX ADMIN — Medication 2 SPRAY(S): at 17:23

## 2021-01-01 RX ADMIN — Medication 5 MILLIGRAM(S): at 18:25

## 2021-01-01 RX ADMIN — Medication 2 SPRAY(S): at 05:23

## 2021-01-01 RX ADMIN — Medication 100 MILLIGRAM(S): at 05:28

## 2021-01-01 RX ADMIN — Medication 1: at 17:59

## 2021-01-01 RX ADMIN — Medication 0.5 MILLIGRAM(S): at 18:15

## 2021-01-01 RX ADMIN — Medication 100 MILLIGRAM(S): at 17:29

## 2021-01-01 RX ADMIN — PANTOPRAZOLE SODIUM 40 MILLIGRAM(S): 20 TABLET, DELAYED RELEASE ORAL at 05:46

## 2021-01-01 RX ADMIN — Medication 25 MILLIGRAM(S): at 17:51

## 2021-01-01 RX ADMIN — Medication 2 SPRAY(S): at 05:18

## 2021-01-01 RX ADMIN — Medication 100 MILLIGRAM(S): at 18:00

## 2021-01-01 RX ADMIN — Medication 3 MILLILITER(S): at 13:03

## 2021-01-01 RX ADMIN — Medication 25 MILLIGRAM(S): at 08:33

## 2021-01-01 RX ADMIN — ALBUTEROL 2 PUFF(S): 90 AEROSOL, METERED ORAL at 17:35

## 2021-01-01 RX ADMIN — PANTOPRAZOLE SODIUM 40 MILLIGRAM(S): 20 TABLET, DELAYED RELEASE ORAL at 18:13

## 2021-01-01 RX ADMIN — Medication 0.25 MILLIGRAM(S): at 15:09

## 2021-01-01 RX ADMIN — PANTOPRAZOLE SODIUM 40 MILLIGRAM(S): 20 TABLET, DELAYED RELEASE ORAL at 18:07

## 2021-01-01 RX ADMIN — CEFTRIAXONE 100 MILLIGRAM(S): 500 INJECTION, POWDER, FOR SOLUTION INTRAMUSCULAR; INTRAVENOUS at 18:06

## 2021-01-01 RX ADMIN — Medication 2: at 08:11

## 2021-01-01 RX ADMIN — Medication 180 MILLIGRAM(S): at 05:45

## 2021-01-01 RX ADMIN — Medication 2 SPRAY(S): at 05:50

## 2021-01-01 RX ADMIN — ALBUTEROL 2 PUFF(S): 90 AEROSOL, METERED ORAL at 12:58

## 2021-01-01 RX ADMIN — ALBUTEROL 2 PUFF(S): 90 AEROSOL, METERED ORAL at 05:45

## 2021-01-01 RX ADMIN — Medication 0.25 MILLIGRAM(S): at 22:52

## 2021-01-01 RX ADMIN — Medication 650 MILLIGRAM(S): at 22:35

## 2021-01-01 RX ADMIN — MIRTAZAPINE 15 MILLIGRAM(S): 45 TABLET, ORALLY DISINTEGRATING ORAL at 09:06

## 2021-01-01 RX ADMIN — Medication 100 MILLIGRAM(S): at 17:35

## 2021-01-01 RX ADMIN — Medication 25 MILLIGRAM(S): at 05:48

## 2021-01-01 RX ADMIN — Medication 3 MILLILITER(S): at 17:14

## 2021-01-01 RX ADMIN — Medication 5 MILLIGRAM(S): at 05:10

## 2021-01-01 RX ADMIN — Medication 0.5 MILLIGRAM(S): at 17:38

## 2021-01-01 RX ADMIN — Medication 2 SPRAY(S): at 17:15

## 2021-01-01 RX ADMIN — Medication 180 MILLIGRAM(S): at 05:46

## 2021-01-01 RX ADMIN — PANTOPRAZOLE SODIUM 40 MILLIGRAM(S): 20 TABLET, DELAYED RELEASE ORAL at 05:45

## 2021-01-01 RX ADMIN — LAMOTRIGINE 25 MILLIGRAM(S): 25 TABLET, ORALLY DISINTEGRATING ORAL at 21:49

## 2021-01-01 RX ADMIN — HALOPERIDOL DECANOATE 0.5 MILLIGRAM(S): 100 INJECTION INTRAMUSCULAR at 22:00

## 2021-01-01 RX ADMIN — Medication 3 MILLILITER(S): at 06:58

## 2021-01-01 RX ADMIN — Medication 3 MILLILITER(S): at 22:22

## 2021-01-01 RX ADMIN — Medication 5 MILLIGRAM(S): at 04:34

## 2021-01-01 RX ADMIN — Medication 2: at 17:26

## 2021-01-01 RX ADMIN — MIDODRINE HYDROCHLORIDE 5 MILLIGRAM(S): 2.5 TABLET ORAL at 12:04

## 2021-01-01 RX ADMIN — SODIUM CHLORIDE 500 MILLILITER(S): 9 INJECTION INTRAMUSCULAR; INTRAVENOUS; SUBCUTANEOUS at 20:53

## 2021-01-01 RX ADMIN — MIRTAZAPINE 15 MILLIGRAM(S): 45 TABLET, ORALLY DISINTEGRATING ORAL at 08:40

## 2021-01-01 RX ADMIN — Medication 500 MILLIGRAM(S): at 17:43

## 2021-01-01 RX ADMIN — Medication 25 MILLIGRAM(S): at 06:03

## 2021-01-01 RX ADMIN — Medication 5 MILLIGRAM(S): at 12:41

## 2021-01-01 RX ADMIN — Medication 100 MILLIGRAM(S): at 05:46

## 2021-01-01 RX ADMIN — Medication 100 MILLIGRAM(S): at 17:55

## 2021-01-01 RX ADMIN — Medication 100 MILLIGRAM(S): at 17:13

## 2021-01-01 RX ADMIN — Medication 3 MILLIGRAM(S): at 21:55

## 2021-01-01 RX ADMIN — Medication 100 MILLIGRAM(S): at 21:06

## 2021-01-01 RX ADMIN — Medication 3 MILLIGRAM(S): at 21:49

## 2021-01-01 RX ADMIN — Medication 180 MILLIGRAM(S): at 06:00

## 2021-01-01 RX ADMIN — Medication 3 MILLILITER(S): at 17:23

## 2021-01-01 RX ADMIN — PANTOPRAZOLE SODIUM 40 MILLIGRAM(S): 20 TABLET, DELAYED RELEASE ORAL at 05:23

## 2021-01-01 RX ADMIN — LAMOTRIGINE 25 MILLIGRAM(S): 25 TABLET, ORALLY DISINTEGRATING ORAL at 21:00

## 2021-01-01 RX ADMIN — Medication 1 TABLET(S): at 17:58

## 2021-01-01 RX ADMIN — Medication 2 SPRAY(S): at 11:50

## 2021-01-01 RX ADMIN — MIDODRINE HYDROCHLORIDE 5 MILLIGRAM(S): 2.5 TABLET ORAL at 05:18

## 2021-01-01 RX ADMIN — Medication 3: at 09:06

## 2021-01-01 RX ADMIN — Medication 1 TABLET(S): at 12:35

## 2021-01-01 RX ADMIN — ALBUTEROL 2 PUFF(S): 90 AEROSOL, METERED ORAL at 15:04

## 2021-01-01 RX ADMIN — Medication 3 MILLILITER(S): at 21:43

## 2021-01-01 RX ADMIN — Medication 25 MILLIGRAM(S): at 21:43

## 2021-01-01 RX ADMIN — Medication 1: at 12:24

## 2021-01-01 RX ADMIN — Medication 40 MILLIGRAM(S): at 15:42

## 2021-01-01 RX ADMIN — Medication 100 MILLIGRAM(S): at 18:07

## 2021-01-01 RX ADMIN — SODIUM CHLORIDE 500 MILLILITER(S): 9 INJECTION INTRAMUSCULAR; INTRAVENOUS; SUBCUTANEOUS at 00:29

## 2021-01-01 RX ADMIN — SODIUM CHLORIDE 500 MILLILITER(S): 9 INJECTION INTRAMUSCULAR; INTRAVENOUS; SUBCUTANEOUS at 04:20

## 2021-01-01 RX ADMIN — PANTOPRAZOLE SODIUM 40 MILLIGRAM(S): 20 TABLET, DELAYED RELEASE ORAL at 06:56

## 2021-01-01 RX ADMIN — SODIUM CHLORIDE 75 MILLILITER(S): 9 INJECTION INTRAMUSCULAR; INTRAVENOUS; SUBCUTANEOUS at 05:23

## 2021-01-01 RX ADMIN — Medication 3 MILLILITER(S): at 00:50

## 2021-01-01 RX ADMIN — Medication 2 SPRAY(S): at 20:14

## 2021-01-01 RX ADMIN — Medication 5 MILLIGRAM(S): at 18:06

## 2021-01-01 RX ADMIN — Medication 25 MILLIGRAM(S): at 03:33

## 2021-01-01 RX ADMIN — HALOPERIDOL DECANOATE 0.5 MILLIGRAM(S): 100 INJECTION INTRAMUSCULAR at 10:30

## 2021-01-01 RX ADMIN — Medication 2: at 08:52

## 2021-01-01 RX ADMIN — HALOPERIDOL DECANOATE 0.5 MILLIGRAM(S): 100 INJECTION INTRAMUSCULAR at 15:35

## 2021-01-01 RX ADMIN — LAMOTRIGINE 25 MILLIGRAM(S): 25 TABLET, ORALLY DISINTEGRATING ORAL at 22:09

## 2021-01-01 RX ADMIN — Medication 100 MILLIGRAM(S): at 17:51

## 2021-01-01 RX ADMIN — Medication 100 MILLIGRAM(S): at 06:55

## 2021-01-01 RX ADMIN — Medication 180 MILLIGRAM(S): at 05:30

## 2021-01-01 RX ADMIN — POLYETHYLENE GLYCOL 3350 17 GRAM(S): 17 POWDER, FOR SOLUTION ORAL at 17:04

## 2021-01-01 RX ADMIN — LAMOTRIGINE 25 MILLIGRAM(S): 25 TABLET, ORALLY DISINTEGRATING ORAL at 21:13

## 2021-01-01 RX ADMIN — PANTOPRAZOLE SODIUM 40 MILLIGRAM(S): 20 TABLET, DELAYED RELEASE ORAL at 05:28

## 2021-01-01 RX ADMIN — MIDODRINE HYDROCHLORIDE 5 MILLIGRAM(S): 2.5 TABLET ORAL at 06:00

## 2021-01-01 RX ADMIN — Medication 3 MILLILITER(S): at 11:40

## 2021-01-01 RX ADMIN — Medication 500 MILLIGRAM(S): at 05:23

## 2021-01-01 RX ADMIN — Medication 25 MILLIGRAM(S): at 17:12

## 2021-01-01 RX ADMIN — Medication 25 MILLIGRAM(S): at 18:14

## 2021-01-01 RX ADMIN — Medication 3 MILLILITER(S): at 17:25

## 2021-01-01 RX ADMIN — Medication 180 MILLIGRAM(S): at 07:03

## 2021-01-01 RX ADMIN — CEFTRIAXONE 100 MILLIGRAM(S): 500 INJECTION, POWDER, FOR SOLUTION INTRAMUSCULAR; INTRAVENOUS at 17:56

## 2021-01-01 RX ADMIN — Medication 1: at 09:02

## 2021-01-01 RX ADMIN — Medication 2: at 08:39

## 2021-01-01 RX ADMIN — Medication 2 SPRAY(S): at 04:34

## 2021-01-01 RX ADMIN — POLYETHYLENE GLYCOL 3350 17 GRAM(S): 17 POWDER, FOR SOLUTION ORAL at 13:34

## 2021-01-01 RX ADMIN — Medication 3 MILLILITER(S): at 18:24

## 2021-01-01 RX ADMIN — Medication 5 MILLIGRAM(S): at 22:34

## 2021-01-01 RX ADMIN — RIVAROXABAN 15 MILLIGRAM(S): KIT at 17:12

## 2021-01-01 RX ADMIN — MIDODRINE HYDROCHLORIDE 5 MILLIGRAM(S): 2.5 TABLET ORAL at 05:46

## 2021-01-01 RX ADMIN — Medication 1: at 13:22

## 2021-01-01 RX ADMIN — Medication 25 MILLIGRAM(S): at 08:40

## 2021-01-01 RX ADMIN — MIDODRINE HYDROCHLORIDE 5 MILLIGRAM(S): 2.5 TABLET ORAL at 17:23

## 2021-01-01 RX ADMIN — ALBUTEROL 2 PUFF(S): 90 AEROSOL, METERED ORAL at 17:58

## 2021-01-01 RX ADMIN — Medication 1 TABLET(S): at 11:54

## 2021-01-01 RX ADMIN — Medication 180 MILLIGRAM(S): at 04:34

## 2021-01-01 RX ADMIN — Medication 1 TABLET(S): at 12:01

## 2021-01-01 RX ADMIN — Medication 0.5 MILLIGRAM(S): at 18:08

## 2021-01-01 RX ADMIN — HALOPERIDOL DECANOATE 1 MILLIGRAM(S): 100 INJECTION INTRAMUSCULAR at 22:19

## 2021-01-01 RX ADMIN — Medication 1: at 09:20

## 2021-01-01 RX ADMIN — Medication 100 MILLIGRAM(S): at 05:58

## 2021-01-01 RX ADMIN — CEFTRIAXONE 100 MILLIGRAM(S): 500 INJECTION, POWDER, FOR SOLUTION INTRAMUSCULAR; INTRAVENOUS at 16:47

## 2021-01-01 RX ADMIN — Medication 5 MILLIGRAM(S): at 03:32

## 2021-01-01 RX ADMIN — Medication 250 MICROGRAM(S): at 23:33

## 2021-01-01 RX ADMIN — ALBUTEROL 2 PUFF(S): 90 AEROSOL, METERED ORAL at 05:14

## 2021-01-01 RX ADMIN — Medication 1: at 08:33

## 2021-01-01 RX ADMIN — LAMOTRIGINE 25 MILLIGRAM(S): 25 TABLET, ORALLY DISINTEGRATING ORAL at 23:27

## 2021-01-01 RX ADMIN — Medication 3: at 18:05

## 2021-01-01 RX ADMIN — Medication 2 SPRAY(S): at 05:16

## 2021-01-01 RX ADMIN — MIDODRINE HYDROCHLORIDE 5 MILLIGRAM(S): 2.5 TABLET ORAL at 18:14

## 2021-01-01 RX ADMIN — RIVAROXABAN 15 MILLIGRAM(S): KIT at 17:52

## 2021-01-01 RX ADMIN — Medication 2 SPRAY(S): at 18:07

## 2021-01-01 RX ADMIN — Medication 180 MILLIGRAM(S): at 09:06

## 2021-01-01 RX ADMIN — Medication 5 MILLIGRAM(S): at 06:15

## 2021-01-01 RX ADMIN — SENNA PLUS 2 TABLET(S): 8.6 TABLET ORAL at 21:05

## 2021-01-01 RX ADMIN — Medication 3: at 09:15

## 2021-01-01 RX ADMIN — MIRTAZAPINE 15 MILLIGRAM(S): 45 TABLET, ORALLY DISINTEGRATING ORAL at 08:33

## 2021-01-01 RX ADMIN — MIDODRINE HYDROCHLORIDE 5 MILLIGRAM(S): 2.5 TABLET ORAL at 12:40

## 2021-01-01 RX ADMIN — Medication 2 SPRAY(S): at 17:58

## 2021-01-01 RX ADMIN — CEFTRIAXONE 100 MILLIGRAM(S): 500 INJECTION, POWDER, FOR SOLUTION INTRAMUSCULAR; INTRAVENOUS at 17:54

## 2021-01-01 RX ADMIN — Medication 3 MILLILITER(S): at 04:35

## 2021-01-01 RX ADMIN — MIDODRINE HYDROCHLORIDE 5 MILLIGRAM(S): 2.5 TABLET ORAL at 05:48

## 2021-01-01 RX ADMIN — Medication 1: at 12:07

## 2021-01-01 RX ADMIN — PANTOPRAZOLE SODIUM 40 MILLIGRAM(S): 20 TABLET, DELAYED RELEASE ORAL at 17:25

## 2021-01-01 RX ADMIN — ALBUTEROL 2 PUFF(S): 90 AEROSOL, METERED ORAL at 00:30

## 2021-01-01 RX ADMIN — CEFTRIAXONE 100 MILLIGRAM(S): 500 INJECTION, POWDER, FOR SOLUTION INTRAMUSCULAR; INTRAVENOUS at 17:47

## 2021-01-01 RX ADMIN — Medication 1: at 17:21

## 2021-01-01 RX ADMIN — Medication 3 MILLILITER(S): at 17:37

## 2021-01-01 RX ADMIN — Medication 25 MILLIGRAM(S): at 17:26

## 2021-01-01 RX ADMIN — MIDODRINE HYDROCHLORIDE 5 MILLIGRAM(S): 2.5 TABLET ORAL at 13:28

## 2021-01-01 RX ADMIN — PANTOPRAZOLE SODIUM 40 MILLIGRAM(S): 20 TABLET, DELAYED RELEASE ORAL at 18:24

## 2021-01-01 RX ADMIN — Medication 2: at 23:37

## 2021-01-01 RX ADMIN — Medication 1: at 08:20

## 2021-01-01 RX ADMIN — Medication 2 SPRAY(S): at 06:00

## 2021-01-01 RX ADMIN — Medication 2: at 12:36

## 2021-01-01 RX ADMIN — Medication 3 MILLILITER(S): at 22:00

## 2021-01-01 RX ADMIN — Medication 2 SPRAY(S): at 17:52

## 2021-01-01 RX ADMIN — Medication 100 MILLIGRAM(S): at 05:44

## 2021-01-01 RX ADMIN — Medication 3 MILLIGRAM(S): at 21:13

## 2021-01-01 RX ADMIN — LAMOTRIGINE 25 MILLIGRAM(S): 25 TABLET, ORALLY DISINTEGRATING ORAL at 21:43

## 2021-01-01 RX ADMIN — MIRTAZAPINE 15 MILLIGRAM(S): 45 TABLET, ORALLY DISINTEGRATING ORAL at 17:14

## 2021-01-01 RX ADMIN — PANTOPRAZOLE SODIUM 40 MILLIGRAM(S): 20 TABLET, DELAYED RELEASE ORAL at 04:34

## 2021-02-17 NOTE — H&P ADULT - HISTORY OF PRESENT ILLNESS
89yo M pmhx COPD, bipolar, monoclonal gammopathy, anemia with history transfusions and iron infusions, afib on eliquis, DM (no longer on meds) presents with SOB intermittent coughing and hallucinations at night. Pt. is currently AOx3, states he was seeing people drawing on the walls last night but had a feeling it was a hallucinating. Spoke with daughter who reported she could hear him wheezing over the phone. Daughter also stated that his home health aid noticed he has been much weaker than usual, excessively tired and not eating much. No fever chills cp N/V/D abd pain.

## 2021-02-17 NOTE — ED PROVIDER NOTE - ATTENDING CONTRIBUTION TO CARE
PMD Tiesha Link Last admit Dr Fontenot.  89y male pmh SCC, Monoclonal gamapathy, COPD/ashtma, Depression. Comes to ed c/o shortness of breath, wheeze, and visual hallucinations at night. PE WDWN Gen cachectic eldelry male awake alert normocephalic atraumatic neck supple chest scant wheeze, cv no rgm abd soft +bs no mass guarding, rebound, neuro gcs 15 moves all extr  Virgil Yu MD, Facep

## 2021-02-17 NOTE — H&P ADULT - NSHPSOCIALHISTORY_GEN_ALL_CORE
Social History:    Marital Status:  (   )    ( x  ) Single    (   )    (  )   Occupation:   Lives with: (x  ) alone  (  ) children   (  ) spouse   (  ) parents  (  ) other    Substance Use (street drugs): (x  ) never used  (  ) other:  Tobacco Usage:  (   ) never smoked   (  x ) former smoker   (   ) current smoker  (     ) pack years  (        ) last cigarette date  Alcohol Usage: denies    (     ) Advanced Directives: (     ) None    (      ) DNR    (     ) DNI    (     ) Health Care Proxy:

## 2021-02-17 NOTE — ED ADULT NURSE NOTE - ED STAT RN HANDOFF DETAILS 2
Bedside report given to holding nurse MITCH Crowe RN. Understands pmh, medications given, and plan of care for patient. Patient in stable condition, vital signs updated, and patient has no complaints at this time and has been updated on care plan. Explained to patient that new nurse is taking over, pt verbalized understanding.

## 2021-02-17 NOTE — ED PROVIDER NOTE - SHIFT CHANGE DETAILS
Attending MD Rios: 89F with PMH/PSH including COPD, here with shortness of breath, hypoxia (90 on RA), failure to thrive, cachectic, wheezing, ordered for nebs, pending labs, UA, CTH   TBA Abrudescu

## 2021-02-17 NOTE — H&P ADULT - NSHPREVIEWOFSYSTEMS_GEN_ALL_CORE
REVIEW OF SYSTEMS:    CONSTITUTIONAL: + weakness, + fevers + chills  EYES/ENT: No visual changes;  No vertigo or throat pain   NECK: No pain or stiffness  RESPIRATORY: + cough, no wheezing, no hemoptysis; + shortness of breath  CARDIOVASCULAR: No chest pain or palpitations  GASTROINTESTINAL: No abdominal or epigastric pain. No nausea, vomiting, or hematemesis; No diarrhea or constipation. No melena or hematochezia.  GENITOURINARY: No dysuria, frequency or hematuria  NEUROLOGICAL: No numbness or weakness  SKIN: No itching, burning, rashes, or lesions   All other review of systems is negative unless indicated above.

## 2021-02-17 NOTE — ED ADULT NURSE NOTE - CAS EDN DISCHARGE INTERVENTIONS
11/05/20 1540   Child Life   Location Med/Surg   Intervention Initial Assessment;Family Support;Supportive Check In   Preparation Comment CFL introduced self and services and provided supportive check in with patient and patient's mother and father. Per patient's family, patient not feeling well yet after surgery. Patient resting during check in.   Outcomes/Follow Up Continue to Follow/Support      Arm band on/IV intact/Admission wristband placed

## 2021-02-17 NOTE — ED PROVIDER NOTE - CLINICAL SUMMARY MEDICAL DECISION MAKING FREE TEXT BOX
89yo M pmhx COPD, bipolar, monoclonal gammopathy, anemia with history transfusions and iron infusions, afib on eliquis, DM (no longer on meds) presents with SOB intermittent coughing and hallucinations at night, will start infectious and cardiac workup, pt. O2 sat 90 ORA will send dimer consider CTA, trial albuterol although not in respiratory distress, get ekg trop proBNP, ekg monitor on tele and reassess

## 2021-02-17 NOTE — H&P ADULT - NSHPPHYSICALEXAM_GEN_ALL_CORE
PHYSICAL EXAMINATION:  Vital Signs Last 24 Hrs  T(C): 36.7 (17 Feb 2021 13:40), Max: 36.7 (17 Feb 2021 13:40)  T(F): 98 (17 Feb 2021 13:40), Max: 98 (17 Feb 2021 13:40)  HR: 77 (17 Feb 2021 18:14) (68 - 77)  BP: 109/71 (17 Feb 2021 18:14) (101/49 - 111/53)  BP(mean): --  RR: 18 (17 Feb 2021 18:14) (17 - 18)  SpO2: 99% (17 Feb 2021 18:14) (95% - 100%)  CAPILLARY BLOOD GLUCOSE          GENERAL: NAD, cachectic   HEAD:  atraumatic, normocephalic  EYES: sclera anicteric  ENMT: mucous membranes moist  NECK: supple, No JVD  CHEST/LUNG: few crackles to auscultation bilaterally; no rales, rhonchi, or wheezing b/l  HEART: normal S1, S2  ABDOMEN: BS+, soft, ND, NT   EXTREMITIES:  pulses palpable; no clubbing, cyanosis, or edema b/l LEs  NEURO: awake, alert, interactive; moves all extremities  SKIN: no rashes or lesions

## 2021-02-17 NOTE — ED ADULT NURSE REASSESSMENT NOTE - NS ED NURSE REASSESS COMMENT FT1
Pt returned from CT, IV not working and removed. New IV placed 18G left AC. Pt returned from CT, IV not working and removed. New IV placed 18G left AC. Pt not complaining of any pain at site at this time.

## 2021-02-17 NOTE — H&P ADULT - NSICDXPASTMEDICALHX_GEN_ALL_CORE_FT
PAST MEDICAL HISTORY:  Atrial fibrillation     COPD (chronic obstructive pulmonary disease)     Diabetes type 2, controlled     Goiter

## 2021-02-17 NOTE — H&P ADULT - NSHPLABSRESULTS_GEN_ALL_CORE
7.2    17.83 )-----------( 693      ( 2021 15:16 )             25.0       -    141  |  102  |  22  ----------------------------<  184<H>  4.9   |  27  |  1.75<H>    Ca    9.4      2021 15:16  Phos  3.2       Mg     2.2         TPro  6.9  /  Alb  3.4  /  TBili  0.3  /  DBili  x   /  AST  10  /  ALT  7<L>  /  AlkPhos  116  -              Urinalysis Basic - ( 2021 18:25 )    Color: Yellow / Appearance: Slightly Turbid / S.024 / pH: x  Gluc: x / Ketone: Negative  / Bili: Negative / Urobili: Negative   Blood: x / Protein: 30 mg/dL / Nitrite: Negative   Leuk Esterase: Small / RBC: 15 /hpf / WBC 10 /HPF   Sq Epi: x / Non Sq Epi: 1 /hpf / Bacteria: Negative        PT/INR - ( 2021 15:19 )   PT: 25.9 sec;   INR: 2.24 ratio         PTT - ( 2021 15:19 )  PTT:55.7 sec    < from: CT Angio Chest w/ IV Cont (21 @ 17:14) >      FINDINGS:    LUNGS AND AIRWAYS: Patent central airways.  Emphysema. There is a new 1.3 cm right upper lobe spiculated pulmonary nodule (5:38). 1.1 cm left upper lobe spiculated nodule (5:45). Additional 9 mm slightly spiculated right upper lobe nodule (5:46). Numerous additional scattered tree-in-bud pulmonary nodules, increased from prior CT chest. Bilateral lower lobe reticular opacities with bronchiectasis, increased from prior.  PLEURA: Small to moderate left pleural effusion. Similar-appearing calcified right pleural plaques.  MEDIASTINUM ANDHILA: No lymphadenopathy.  VESSELS: No pulmonary embolism. 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 Atherosclerotic changes of the aorta and its visualized branches.  HEART: Heart size is normal. Small pericardial effusion. Coronary artery calcifications.  CHEST WALL AND LOWER NECK: Unchanged heterogenous thyroid gland with coarse calcifications.  VISUALIZED UPPER ABDOMEN: Bilateral renal cysts. Cholelithiasis. 5 mm pancreatic body and 4 mm pancreatic tail hypodense lesions are unchanged from prior CT abdomen pelvis dated 2018.  BONES: Unchanged L1 compression deformity. Mild compression deformity of T11.    IMPRESSION:  No pulmonary embolism.    Emphysema. Multiple new bilateral spiculated pulmonary nodules are indeterminate; recommend 3 month follow-up.    Multiple tree-in-bud nodules consistent with small airway inflammation/infection.    Small to moderate leftpleural effusion.    < end of copied text >

## 2021-02-17 NOTE — ED ADULT NURSE NOTE - OBJECTIVE STATEMENT
89y Male BIBA for SOB and hallucinations. Per EMS, pt's daughter was concerned because last night the patient was having a hallucination of writing on the wall. Pt endorses seeing the hallucinations, denies any auditory hallucinations. Per EMS, pt is also known to be anemic was supposed to be started on iron today, but did not receive it. Pt AOx3, spontaneous/unlabored respirations, rales heard b/l, SpO2 100 2L NC. Pt desat to high 80s-low 90s on RA. Pt resting in bed, side rails up, bed in lowest position.

## 2021-02-17 NOTE — H&P ADULT - ASSESSMENT
89 m with    Pneumonia  - panculture  - antibiotics  - ID evaluation  - Pulmonary evaluation  - nebs    Atrial fibrillation   - rate control  - AC    COPD (chronic obstructive pulmonary disease)   - nebs    Diabetes type 2, controlled   - LISA quiroga  - BS control    Goiter   - continue Rx  - TSH    Further action as per clinical course     Osito Devlin MD pager [phone]

## 2021-02-17 NOTE — ED ADULT NURSE REASSESSMENT NOTE - NS ED NURSE REASSESS COMMENT FT1
Received report this PM from Yessica RODGERS. PT observed resting comfortably in bed. Pt denies any needs at this time. Awaiting bed assignment. Plan of care reviewed with pt. Pt educated on call bell system and provided call bell. Bed in lowest position, wheels locked, and patient placed in position of comfort. FALL RISK PRECAUTIONS IN PLACE.

## 2021-02-18 NOTE — SWALLOW BEDSIDE ASSESSMENT ADULT - SWALLOW EVAL: DIAGNOSIS
89 y/o M with PMH of COPD, bipolar, monoclonal gammopathy, anemia (h/o transfusions and iron infusions), afib on eliquis, DM (no longer on meds), p/w SOB, intermittent coughing, and hallucinations at night, found to have PNA. 89 y/o M with PMH of COPD, bipolar, monoclonal gammopathy, anemia (h/o transfusions and iron infusions), afib on eliquis, DM (no longer on meds), p/w SOB, intermittent coughing, and hallucinations at night, found to have PNA. Pt seen for bedside swallow evalaution which revealed oral dysphagia and suspected pharyngeal dysphagia. The swallow sequence was marked by prolonged mastication of soft solid (edentulous), reduced AP bolus transit, suspected delayed trigger of pharyngal swallow, and overt s/s of aspiration or penetration (throat clearing and wet vocal quality with nectar-thick and thin liquids and throat clearing post intake of soft solid). Discussed dysphagia diet recommendation with pt who stated, "I don't want thickener. I'd rather drown and die." Risks of aspiration (choking, respiratory failure, PNA, SEPSIS, death) reviewed in detail to which pt verbalized understanding and reiterated his desire to continue regular diet with thin liquids.

## 2021-02-18 NOTE — CONSULT NOTE ADULT - SUBJECTIVE AND OBJECTIVE BOX
HPI:   Patient is a 89y male with dm, copd,monoclonal gammopathy, anemia afib, right thr who reports feeling sob, coughing , losing weight over the past week. He has no fever reported, no vomiting, has not been choking on his food, no known sick contacts, no recent hospital stays or travel, no chest pain or hemoptysis. He has normal bm, urinates fine. He is a retired , was in the [place] otherwise lifelong [place] resident. No animals.     REVIEW OF SYSTEMS:  All other review of systems negative (Comprehensive ROS)    PAST MEDICAL & SURGICAL HISTORY:  Goiter    Diabetes type 2, controlled    COPD (chronic obstructive pulmonary disease)    Atrial fibrillation    History of total hip arthroplasty, right        Allergies    No Known Allergies    Intolerances        Antimicrobials Day #  :    Other Medications:      FAMILY HISTORY:  No pertinent family history in first degree relatives        SOCIAL HISTORY:  Smoking: [ ]Yes [ x]No  ETOH: [ ]Yes x[ ]No  Drug Use: [ ]Yes [ x]No   [ ]x Single[ ]    T(F): 98 (02-17-21 @ 13:40), Max: 98 (02-17-21 @ 13:40)  HR: 68 (02-17-21 @ 13:40)  BP: 101/49 (02-17-21 @ 13:40)  RR: 17 (02-17-21 @ 13:40)  SpO2: 100% (02-17-21 @ 13:40)  Wt(kg): --    PHYSICAL EXAM:  General: alert, no acute distress, edentulous  Eyes:  anicteric, no conjunctival injection, no discharge  Oropharynx: no lesions or injection 	  Neck: supple, without adenopathy  Lungs: bilateral basilar rales to auscultation  Heart: iregular rate and rhythm; 2/6 sys m  Abdomen: soft, nondistended, nontender, without mass or organomegaly  Skin: no lesions  Extremities: no clubbing, cyanosis, or edema  Neurologic: alert, oriented, moves all extremities    LAB RESULTS:                        7.2    17.83 )-----------( 693      ( 17 Feb 2021 15:16 )             25.0     02-17    141  |  102  |  22  ----------------------------<  184<H>  4.9   |  27  |  1.75<H>    Ca    9.4      17 Feb 2021 15:16  Phos  3.2     02-17  Mg     2.2     02-17    TPro  6.9  /  Alb  3.4  /  TBili  0.3  /  DBili  x   /  AST  10  /  ALT  7<L>  /  AlkPhos  116  02-17    LIVER FUNCTIONS - ( 17 Feb 2021 15:16 )  Alb: 3.4 g/dL / Pro: 6.9 g/dL / ALK PHOS: 116 U/L / ALT: 7 U/L / AST: 10 U/L / GGT: x               MICROBIOLOGY:  RECENT CULTURES:        RADIOLOGY REVIEWED:    < from: Xray Chest 1 View- PORTABLE-Urgent (Xray Chest 1 View- PORTABLE-Urgent .) (02.17.21 @ 14:26) >  EXAM:  XR CHEST PORTABLE URGENT 1V                            PROCEDURE DATE:  02/17/2021            INTERPRETATION:  CLINICAL INFORMATION: Shortness of breath    EXAM: Frontal radiograph of the chest.    COMPARISON: Chest radiograph from 8/25/2018.    FINDINGS:  Left lower lung opacity.  No pneumothorax.  The heart size is normal.  The visualized osseous structures demonstrate no acute pathology.    IMPRESSION:  Left lower lung opacity, differential includes atelectasis, effusion, and/or pneumonia.    < end of copied text >        Impression:  Elderly man with dm, afib, monoclonal gammopathy, anemia who presented with cough, sob, weight loss for a week, has rales at the bases, has lll infiltrate on cxr, suspect infection with community acquired pneumonia. At risk for encapsulated organisms given monoclonal gammopathy and also is quite elderly and edentulous so aspiration risk too. Per notes hallucinating at night which could be some manifestation of toxic met enceph despite lack of fever. He does have leukocytosis suggestive of infection but baseline not clear, may have mpd with high plt and anemia too.         Recommendations:  culture\  rvp, legionella, covid  start ceftriaxone and doxycycline  await ct chest  speech and swallow  echo
[facility] - [facility] - CONSULT NOTE    HPI: 89 year old gentleman, former smoker, with history of COPD/emphysema and asbestos exposure with right sided pleural plaques on home oxygen as needed. The patient also has a history of atrial fibrillation on xarelto, diabetes, recurrent UTIs, anemia and hypothyroidism. The patient developed a cough productive of clear sputum several days ago followed by the onset of shortness of breath, chest congestion and wheeze. His daughter reports that he had visual hallucinations. He has not had fever, chills or sweats. No chest pain/pressure or palpitations. His appetite is chronically poor but no worse than usual. He has been started on antibiotics for possible pneumonia with improvement in his cough. Asked to evaluate.    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 1000 milliLiter(s) IV Continuous <Continuous>  dextrose 5%. 1000 milliLiter(s) IV Continuous <Continuous>  dextrose 50% Injectable 25 Gram(s) IV Push once  dextrose 50% Injectable 12.5 Gram(s) IV Push once  dextrose 50% Injectable 25 Gram(s) IV Push once  fluticasone propionate 50 MICROgram(s)/spray Nasal Spray 2 Spray(s) Both Nostrils two times a day  glucagon  Injectable 1 milliGRAM(s) IntraMuscular once  insulin lispro (ADMELOG) corrective regimen sliding scale   SubCutaneous three times a day before meals  insulin lispro (ADMELOG) corrective regimen sliding scale   SubCutaneous at bedtime  lamoTRIgine 25 milliGRAM(s) Oral at bedtime  methimazole 2.5 milliGRAM(s) Oral daily  mirtazapine 15 milliGRAM(s) Oral two times a day  pantoprazole    Tablet 40 milliGRAM(s) Oral before breakfast  rivaroxaban 15 milliGRAM(s) Oral with dinner      Prn:  MEDICATIONS  (PRN):  acetaminophen   Tablet .. 650 milliGRAM(s) Oral every 6 hours PRN Temp greater or equal to 38.5C (101.3F), Mild Pain (1 - 3)  polyethylene glycol 3350 17 Gram(s) Oral daily PRN Constipation      Allergies    No Known Allergies    HOME MEDICATIONS: see  H & P    REVIEW OF SYSTEMS:  Constitutional: As per HPI  HEENT: Within normal limits  CV: As per HPI  Resp: As per HPI  GI: Within normal limits   : Within normal limits  Musculoskeletal: Within normal limits  Skin: Within normal limits  Neurological: Within normal limits  Psychiatric: Within normal limits  Endocrine: Within normal limits  Hematologic/Lymphatic: Within normal limits  Allergic/Immunologic: Within normal limits    [x] All other systems negative    OBJECTIVE:    I&O's Detail    2021 07:01  -  2021 13:23  --------------------------------------------------------  IN:    Oral Fluid: 240 mL  Total IN: 240 mL    OUT:  Total OUT: 0 mL    Total NET: 240 mL        Daily Height in cm: 180.34 (2021 13:40)      POCT Blood Glucose.: 168 mg/dL (2021 12:45)  POCT Blood Glucose.: 163 mg/dL (2021 08:19)  POCT Blood Glucose.: 172 mg/dL (2021 23:33)  POCT Blood Glucose.: 192 mg/dL (2021 21:39)      PHYSICAL EXAM:  ICU Vital Signs Last 24 Hrs  T(C): 36.4 (2021 12:40), Max: 37.1 (2021 23:15)  T(F): 97.6 (2021 12:40), Max: 98.8 (2021 23:15)  HR: 67 (2021 12:40) (67 - 82)  BP: 92/50 (2021 12:40) (92/50 - 113/56)  BP(mean): 69 (2021 20:38) (69 - 69)  ABP: --  ABP(mean): --  RR: 18 (2021 12:40) (17 - 20)  SpO2: 100% (2021 12:40) (95% - 100%) on 2lpm nasal canula    General: Awake. Alert. Cooperative. No distress. Appears stated age 	  HEENT: Atraumatic. Bitemporal wasting. Anicteric. Normal oral mucosa. PERRL. EOMI.  Neck: Supple. Trachea midline. Thyroid without enlargement/tenderness/nodules. No carotid bruit. No JVD. Loss of bilateral supraclavicular fat pads.  Cardiovascular: Regular rate and rhythm. S1 S2 normal. No murmurs, rubs or gallops.  Respiratory: Respirations unlabored. Scattered rales. No curvature.  Abdomen: Soft. Non-tender. Non-distended. No organomegaly. No masses. Normal bowel sounds.  Extremities: Warm to touch. No clubbing or cyanosis. No pedal edema.  Pulses: 2+ peripheral pulses all extremities.	  Skin: Normal skin color. No rashes or lesions. No ecchymoses. No cyanosis. Warm to touch.  Lymph Nodes: Cervical, supraclavicular and axillary nodes normal  Neurological: Motor and sensory examination equal and normal. A and O x 3  Psychiatry: Appropriate mood and affect.      LABS:                          6.8    12.50 )-----------( 579      ( 2021 10:38 )             23.5     CBC    WBC  12.50 <==, 14.65 <==, 17.83 <==    Hemoglobin  6.8 <<==, 6.8 <<==, 7.2 <<==    Hematocrit  23.5 <==, 23.5 <==, 25.0 <==    Platelets  579 <==, 609 <==, 693 <==      137  |  101  |  23  ----------------------------<  156<H>    02-18  5.6<H>   |  29  |  1.70<H>    LYTES    sodium  137 <==, 139 <==, 141 <==    potassium   5.6 <==, 4.9 <==, 4.9 <==    chloride  101 <==, 103 <==, 102 <==    carbon dioxide  29 <==, 27 <==, 27 <==    =============================================================================================  RENAL FUNCTION:    Creatinine:   1.70  <<==, 1.87  <<==, 1.75  <<==    BUN:   23 <==, 25 <==, 22 <==    ============================================================================================    calcium   9.2 <==, 9.1 <==, 9.4 <==    phos   3.2 <==    mag   2.2 <==    ============================================================================================  LFTs    AST:   10 <==     ALT:  7  <==     AP:  116  <=    Bili:  0.3  <=    PT/INR - ( 2021 15:19 )   PT: 25.9 sec;   INR: 2.24 ratio       PTT - ( 2021 15:19 )  PTT:55.7 sec    Venous Blood Gas:   @ 14:22  7.29/67/20/31/20  VBG Lactate: 1.7    Serum Pro-Brain Natriuretic Peptide: 3093 pg/mL ( @ 15:16)    D-Dimer Assay, Quantitative: 351 ng/mL DDU ( @ 15:19)    CARDIAC MARKERS ( 2021 15:16 )  CPK x     /CKMB x     /CKMB Units x        troponin 87 ng/L    MICROBIOLOGY:     Respiratory Viral Panel with COVID-19 by [facility] (21 @ 18:20)   [facility]   SARS-CoV-2: NotDete: This Respiratory Panel uses polymerase chain reaction (PCR) to detect for   adenovirus; coronavirus (HKU1, NL63, 229E, OC43); human metapneumovirus   (hMPV); human enterovirus/rhinovirus (Entero/RV); influenza A; influenza   A/H1; influenza A/H3; influenza A/H1-2009; influenza B; parainfluenza   viruses 1, 2, 3, 4; respiratory syncytial virus; Mycoplasma pneumoniae;   Chlamydophila pneumoniae; and SARS-CoV-2.     Urinalysis Basic - ( 2021 18:25 )    Color: Yellow / Appearance: Slightly Turbid / S.024 / pH: x  Gluc: x / Ketone: Negative  / Bili: Negative / Urobili: Negative   Blood: x / Protein: 30 mg/dL / Nitrite: Negative   Leuk Esterase: Small / RBC: 15 /hpf / WBC 10 /HPF   Sq Epi: x / Non Sq Epi: 1 /hpf / Bacteria: Negative        RADIOLOGY:  [x ] Chest radiographs reviewed and interpreted by me    < from: Xray Chest 1 View- PORTABLE-Urgent (Xray Chest 1 View- PORTABLE-Urgent .) (21 @ 14:26) >    EXAM:  XR CHEST PORTABLE URGENT 1V                          PROCEDURE DATE:  2021      INTERPRETATION:  CLINICAL INFORMATION: Shortness of breath    EXAM: Frontal radiograph of the chest.    COMPARISON: Chest radiograph from 2018.    FINDINGS:  Left lower lung opacity.  No pneumothorax.  The heart size is normal.  The visualized osseous structures demonstrate no acute pathology.    IMPRESSION:  Left lower lung opacity, differential includes atelectasis, effusion, and/or pneumonia.    HALLE CORCORAN MD; Resident Radiology  This document has been electronically signed.  REDDY ISSA MD; Attending Radiologist  This document has been electronically signed. 2021  3:53PM    < end of copied text   ---------------------------------------------------------------------------------------------------------------  < from: CT Angio Chest w/ IV Cont (21 @ 17:14) >    EXAM:  CT ANGIO CHEST (W)AW IC                            PROCEDURE DATE:  2021      INTERPRETATION:  CLINICAL INFORMATION: Shortness of breath, hypoxia, COPD    COMPARISON: Chest CT from 2018, CT abdomen pelvis from 2018    PROCEDURE:  CT Angiography of the Chest.  90 ml of Omnipaque 350 was injected intravenously. 10 ml were discarded.  Sagittal and coronal reformats were performed as well as 3D (MIP) reconstructions.    FINDINGS:    LUNGS AND AIRWAYS: Patent central airways.  Emphysema. There is a new 1.3 cm right upper lobe spiculated pulmonary nodule (5:38). 1.1 cm left upper lobe spiculated nodule (5:45). Additional 9 mm slightly spiculated right upper lobe nodule (5:46). Numerous additional scattered tree-in-bud pulmonary nodules, increased from prior CT chest. Bilateral lower lobe reticular opacities with bronchiectasis, increased from prior.  PLEURA: Small to moderate left pleural effusion. Similar-appearing calcified right pleural plaques.  MEDIASTINUM ANDHILA: No lymphadenopathy.  VESSELS: No pulmonary embolism. 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 Atherosclerotic changes of the aorta and its visualized branches.  HEART: Heart size is normal. Small pericardial effusion. Coronary artery calcifications.  CHEST WALL AND LOWER NECK: Unchanged heterogenous thyroid gland with coarse calcifications.  VISUALIZED UPPER ABDOMEN: Bilateral renal cysts. Cholelithiasis. 5 mm pancreatic body and 4 mm pancreatic tail hypodense lesions are unchanged from prior CT abdomen pelvis dated 2018.  BONES: Unchanged L1 compression deformity. Mild compression deformity of T11.    IMPRESSION:  No pulmonary embolism.    Emphysema. Multiple new bilateral spiculated pulmonary nodules are indeterminate; recommend 3 month follow-up.    Multiple tree-in-bud nodules consistent with small airway inflammation/infection.    Small to moderate leftpleural effusion.    RYANN PAUL MD; Resident Interventional Radiology  This document has been electronically signed.  ADONAY ESCOBAR MD; Attending Radiologist  This document has been electronically signed. 2021  6:03PM    < end of copied text >  ---------------------------------------------------------------------------------------------------------------

## 2021-02-18 NOTE — PHYSICAL THERAPY INITIAL EVALUATION ADULT - PRECAUTIONS/LIMITATIONS, REHAB EVAL
fall precautions aspiration precautions/fall precautions/oxygen therapy device and L/min/swallowing precautions

## 2021-02-18 NOTE — SWALLOW BEDSIDE ASSESSMENT ADULT - SWALLOW EVAL: THERAPY FREQUENCY
This service will f/u pending [facility] discussion re: nutrition/hydration with pt/family/medical team.

## 2021-02-18 NOTE — PHYSICAL THERAPY INITIAL EVALUATION ADULT - PERTINENT HX OF CURRENT PROBLEM, REHAB EVAL
Pt is a 89 y/o M with PMHx of COPD, bipolar, monoclonal gammopathy, anemia with history transfusions and iron infusions, AFib on xarelto, DM2 (no longer on meds) presents with SOB intermittent coughing and hallucinations at night. +CXR: Left lower lung opacity, differential includes atelectasis, effusion, and/or pneumonia. CTH neg for acute event.

## 2021-02-18 NOTE — SWALLOW BEDSIDE ASSESSMENT ADULT - COMMENTS
IMAGING:  CT CHEST: 2/17/21  IMPRESSION:  No pulmonary embolism. Emphysema. Multiple new bilateral spiculated pulmonary nodules are indeterminate; recommend 3 month follow-up. Multiple tree-in-bud nodules consistent with small airway inflammation/infection. Small to moderate left pleural effusion.    CT HEAD: 2/17/21  IMPRESSION:  Volume loss with microvascular disease, no acute hemorrhage or midline shift. If symptoms persist consider follow-up head CT or MR if no contraindications.    CXR: 2/17/21  IMPRESSION:  Left lower lung opacity, differential includes atelectasis, effusion, and/or pneumonia. IMAGING:  CT CHEST: 2/17/21  IMPRESSION:  No pulmonary embolism. Emphysema. Multiple new bilateral spiculated pulmonary nodules are indeterminate; recommend 3 month follow-up. Multiple tree-in-bud nodules consistent with small airway inflammation/infection. Small to moderate left pleural effusion.    CT HEAD: 2/17/21  IMPRESSION:  Volume loss with microvascular disease, no acute hemorrhage or midline shift. If symptoms persist consider follow-up head CT or MR if no contraindications.    CXR: 2/17/21  IMPRESSION:  Left lower lung opacity, differential includes atelectasis, effusion, and/or pneumonia.    Pt is known to this service from bedside swallow evaluation completed 1/19/18. At that time, pt presented with a baseline cough that made behavioral analysis of swallow function difficult to a degree. However, he presented with evidence of oropharyngeal dysphagia notable for mildly reduced oral management for chewables, suspected mild delay in pharyngeal trigger and cough that appeared more consistent than observed baseline cough after trials of thin liquids. Recommendations at that time: Dysphagia 2 with Nectar-thick liquids and MBS. Pt and daughter state that it is their preference to have exam performed as outpatient so that pt may be dc'd that day.

## 2021-02-18 NOTE — PHYSICAL THERAPY INITIAL EVALUATION ADULT - ADDITIONAL COMMENTS
PTA pt lived in apt alone, 5 steps to enter +HR, no steps inside, ambulated with RW, has HHA 10 hours/7 days, PTA pt lived in apt alone, 5 steps to enter +HR, no steps inside, ambulated with RW, has HHA 10 hours/7 days.

## 2021-02-18 NOTE — SWALLOW BEDSIDE ASSESSMENT ADULT - SWALLOW EVAL: PATIENT/FAMILY GOALS STATEMENT
"I know I have trouble swallowing. I cough when I drink. I don't want thickener though. I'd rather drown and die."

## 2021-02-18 NOTE — SWALLOW BEDSIDE ASSESSMENT ADULT - NS ASR SWALLOW FINDINGS DISCUS
pt, RN, Caryn, and NP, Antionette. This clinician asked pt if his daughter could be contacted to discuss results and recommendations of evaluation. Pt stated, "No don't call her. I don't want her involved. She's not the one who has to drink the thickener."

## 2021-02-18 NOTE — CONSULT NOTE ADULT - ASSESSMENT
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  2) bilateral lower lobe reticular opacities with bronchiectasis (pulmonary fibrosis)  3) emphysema  4) scattered bilateral spiculated pulmonary nodules concerning for metastatic disease  5) small to moderate left pleural effusion  6) calcified right sided pleural plaques c/w prior asbestos exposure  7) no evidence of pulmonary embolism    PLAN/RECOMMENDATIONS:    oxygen supplementation via a nasal canula to keep saturation greater than 92%  ceftriaxone/doxycycline  blood and urine cultures  urine for Legionella antigen  albuterol/atrovent nebs q6h  hold off with thoracentesis unless oxygenation remains an issue  xarelto/lopressor/cardizem CD  lamictal/remeron  glucose control  DVT prophylaxis  blood product support as indicated   follow-up CT in ~ 3 months    Thank you for the courtesy of this referral. Plan of care discussed with the patient at bedside and Dr. Clovis Cormier MD, [facility]  [phone]  Pulmonary Medicine

## 2021-02-18 NOTE — PHYSICAL THERAPY INITIAL EVALUATION ADULT - GAIT DEVIATIONS NOTED, PT EVAL
crouch, shuffling/decreased rodolfo/decreased step length/decreased stride length/decreased weight-shifting ability

## 2021-02-18 NOTE — SWALLOW BEDSIDE ASSESSMENT ADULT - PHARYNGEAL PHASE
Delayed pharyngeal swallow Delayed pharyngeal swallow/Wet vocal quality post oral intake/Throat clear post oral intake Delayed pharyngeal swallow/Throat clear post oral intake

## 2021-02-18 NOTE — SWALLOW BEDSIDE ASSESSMENT ADULT - ORAL PREPARATORY PHASE
Within functional limits timely mastication of mechanical soft texture/Within functional limits prolonged mastication

## 2021-02-18 NOTE — SWALLOW BEDSIDE ASSESSMENT ADULT - SLP PERTINENT HISTORY OF CURRENT PROBLEM
H&P: 89yo M pmhx COPD, bipolar, monoclonal gammopathy, anemia with history transfusions and iron infusions, afib on eliquis, DM (no longer on meds) presents with SOB intermittent coughing and hallucinations at night. Pt. is currently AOx3, states he was seeing people drawing on the walls last night but had a feeling it was a hallucinating. Spoke with daughter who reported she could hear him wheezing over the phone. Daughter also stated that his home health aid noticed he has been much weaker than usual, excessively tired and not eating much. No fever chills cp N/V/D abd pain. CXR revealed Left lower lung opacity, differential includes atelectasis, effusion, and/or pneumonia. Pt admitted for PNA. Started on abx and ID and Pulmonary consulted.

## 2021-02-18 NOTE — SWALLOW BEDSIDE ASSESSMENT ADULT - SPECIFY REASON(S)
to clinically evaluate pt's oropharyngeal swallow mechanism and r/o dysphagia. to clinically evaluate pt's oropharyngeal swallow mechanism and r/o dysphagia. Per Brii RODGERS, pt coughing during breakfast this morning.

## 2021-02-18 NOTE — CHART NOTE - NSCHARTNOTEFT_GEN_A_CORE
Medicine NP note     89yo M pmhx COPD, bipolar, monoclonal gammopathy, anemia, hx. transfusions and iron infusions, afib on eliquis, DM (not on meds) p/w SOB & cough. Pt. is currently AOx3, "hallucinating" at home. Daughter stating home health aid noticed he has been much weaker, not eating much and wheezing. No fever chills cp N/V/D abd pain. CXR revealed Left lower lung opacity, differential includes atelectasis, effusion, and/or pneumonia. Admitted for PNA & started on abx. and ID and Pulmonary consulted.     Vital Signs Last 24 Hrs  T(C): 36.4 (18 Feb 2021 12:40), Max: 37.1 (17 Feb 2021 23:15)  T(F): 97.6 (18 Feb 2021 12:40), Max: 98.8 (17 Feb 2021 23:15)  HR: 67 (18 Feb 2021 12:40) (67 - 82)  BP: 92/50 (18 Feb 2021 12:40) (92/50 - 113/56)  BP(mean): 69 (17 Feb 2021 20:38) (69 - 69)  RR: 18 (18 Feb 2021 12:40) (18 - 20)  SpO2: 100% (18 Feb 2021 12:40) (96% - 100%)    Pt. known to this service from bedside swallow evaluation completed 1/19/18. At that time, pt presented with a baseline cough that made behavioral analysis of swallow function difficult to a degree. However, he presented with evidence of oropharyngeal dysphagia notable for mildly reduced oral management for chewables, suspected mild delay in pharyngeal trigger and cough that appeared more consistent than observed baseline cough after trials of thin liquids. Recommendations at that time: Dysphagia 2 with Nectar-thick liquids and MBS. Pt and daughter state that it is their preference to have exam performed as outpatient so that pt may be dc'd Medicine NP note     89yo M pmhx COPD, bipolar, monoclonal gammopathy, anemia, hx. transfusions and iron infusions, afib on eliquis, DM (not on meds) p/w SOB & cough. Pt. is currently AOx3, "hallucinating" at home. Daughter stating home health aid noticed he has been much weaker, not eating much and wheezing. No fever chills cp N/V/D abd pain. CXR revealed Left lower lung opacity, differential includes atelectasis, effusion, and/or pneumonia. Admitted for PNA & started on abx. and ID and Pulmonary consulted.     Vital Signs Last 24 Hrs  T(C): 36.4 (18 Feb 2021 12:40), Max: 37.1 (17 Feb 2021 23:15)  T(F): 97.6 (18 Feb 2021 12:40), Max: 98.8 (17 Feb 2021 23:15)  HR: 67 (18 Feb 2021 12:40) (67 - 82)  BP: 92/50 (18 Feb 2021 12:40) (92/50 - 113/56)  BP(mean): 69 (17 Feb 2021 20:38) (69 - 69)  RR: 18 (18 Feb 2021 12:40) (18 - 20)  SpO2: 100% (18 Feb 2021 12:40) (96% - 100%)    Pt. seen by speech n swallow & evaluation completed 1/19/18. As per sp/ sw he presented with evidence of oropharyngeal dysphagia with suspected mild delay in pharyngeal trigger and cough appearing after trials of thin liquids. The recs were for Dysphagia 2 with Nectar-thick liquids and MBS. Pt and daughter state that it is their preference to have exam performed as outpatient so that pt may be dc'd. Today the pt. refused dysphagia recommendations and stated to sp/sw that he does not want his daughter called regarding diet. Pt. on aspiration precautions and risks of aspiration were detailed to pt. with ability to state he understands risks and still refuses dysphagia diet. Discussed with Dr. Devlin. Will hold off on palliative consult for now.    TONI Galvez, SOLITARIO   [phone]

## 2021-02-20 NOTE — DIETITIAN INITIAL EVALUATION ADULT. - OTHER INFO
Patient found sitting on edge of bed, speaking with daughter.  RDN spoke with daughter via cell phone. Patient with obvious muscle and fat wasting.  Patient reports that he vomited this morning.  Belching and uncomfortable.  Patient refused breakfast tray stating that he does not want to eat. patient states, "just leave me alone".  Spoke with RN to obtain weight.  Upon review of medical records, Highest weight was 205 pounds in 2016, 164 in 2017, 135 pounds in 2018.  Patient presents cachectic and underweight.  Aic 6.7% and diabetes under good control in man with advanced age and likely due to inadequate po intake.   SLP saw Patient and recommended at Dysphagia 2 with honey thick liquids and Patient firmly refused.  Now on regular texture and Patient is edentulated.  Daughter will try to bring in dentures.

## 2021-02-20 NOTE — DIETITIAN INITIAL EVALUATION ADULT. - PERTINENT MEDS FT
MEDICATIONS  (STANDING):  albuterol/ipratropium for Nebulization 3 milliLiter(s) Nebulizer every 6 hours  cefuroxime   Tablet 500 milliGRAM(s) Oral every 12 hours  dextrose 40% Gel 15 Gram(s) Oral once  dextrose 5%. 1000 milliLiter(s) (50 mL/Hr) IV Continuous <Continuous>  dextrose 5%. 1000 milliLiter(s) (100 mL/Hr) IV Continuous <Continuous>  dextrose 50% Injectable 25 Gram(s) IV Push once  dextrose 50% Injectable 12.5 Gram(s) IV Push once  dextrose 50% Injectable 25 Gram(s) IV Push once  diltiazem    milliGRAM(s) Oral daily  doxycycline hyclate Capsule 100 milliGRAM(s) Oral every 12 hours  fluticasone propionate 50 MICROgram(s)/spray Nasal Spray 2 Spray(s) Both Nostrils two times a day  glucagon  Injectable 1 milliGRAM(s) IntraMuscular once  insulin lispro (ADMELOG) corrective regimen sliding scale   SubCutaneous three times a day before meals  insulin lispro (ADMELOG) corrective regimen sliding scale   SubCutaneous at bedtime  lamoTRIgine 25 milliGRAM(s) Oral at bedtime  methimazole 2.5 milliGRAM(s) Oral daily  metoprolol tartrate 25 milliGRAM(s) Oral two times a day  mirtazapine 15 milliGRAM(s) Oral two times a day  pantoprazole    Tablet 40 milliGRAM(s) Oral before breakfast  rivaroxaban 15 milliGRAM(s) Oral with dinner  senna 2 Tablet(s) Oral at bedtime

## 2021-02-20 NOTE — DIETITIAN INITIAL EVALUATION ADULT. - PERTINENT LABORATORY DATA
Na 139, K+ 4.8, BUN 38, Cr 1.73, , Phos --, Alk Phos --, AST --, ALT --, Mg --, Ca 9.0, HbA1c --6.7%

## 2021-02-20 NOTE — DIETITIAN INITIAL EVALUATION ADULT. - PHYSCIAL ASSESSMENT
underweight/emaciated Unable to complete nutrition focused exam but Patient noted visually with severe muscle wasting to temporals as well as clavicles with evident skin on bone.  Extremities very thin with wasting.  Buccal fold sunken in.

## 2021-02-20 NOTE — DIETITIAN NUTRITION RISK NOTIFICATION - TREATMENT: THE FOLLOWING DIET HAS BEEN RECOMMENDED
Diet, Regular:   Consistent Carbohydrate {Evening Snack} (CSTCHOSN) (02-17-21 @ 20:13) [Active]

## 2021-02-20 NOTE — DIETITIAN INITIAL EVALUATION ADULT. - ETIOLOGY
catabolic with inadequate protein energy intake, dysphagia Increased demand for nutrients for wound healing and repair

## 2021-02-20 NOTE — DIETITIAN INITIAL EVALUATION ADULT. - SIGNS/SYMPTOMS
Stage 3 to sacrum and stage 1 to scrotum BMI <19, eating <50%x >1 week, obvious muscle and fat wasting with impaired skin.

## 2021-02-20 NOTE — DIETITIAN INITIAL EVALUATION ADULT. - ADD RECOMMEND
Consistent CHO diet with snack, consider liberal regular, soft, consider glucerna 8 ounces x2, RDN to ve ey thi Consistent CHO diet with snack, consider liberal regular, soft, consider glucerna 8 ounces x2, suggest VitC and multivitamin, RDN to provide [place] Consistency Mighty Shake (200 calories, 6 Gm protein) with meals.

## 2021-02-22 NOTE — PROGRESS NOTE ADULT - SUBJECTIVE AND OBJECTIVE BOX
[facility] - [facility] - PROGRESS NOTE    CHIEF COMPLAINT: COPD/emphysema; pleural plaques; pneumonia    INTERVAL HISTORY: feels well and is eager to go home; uncomfortable bed and chair; no cough, sputum production, chest congestion or wheeze; no fevers, chills or sweats; no chest pain/pressure or palpitations; fair appetite    REVIEW OF SYSTEMS:  Constitutional: As per interval history  HEENT: Within normal limits  CV: As per interval history  Resp: As per interval history  GI: Within normal limits   : Within normal limits  Musculoskeletal: Within normal limits  Skin: Within normal limits  Neurological: Within normal limits  Psychiatric: Within normal limits  Endocrine: Within normal limits  Hematologic/Lymphatic: anemia  Allergic/Immunologic: Within normal limits      MEDICATIONS:     Pulmonary "  albuterol/ipratropium for Nebulization 3 milliLiter(s) Nebulizer every 6 hours    Anti-microbials:  cefuroxime   Tablet 500 milliGRAM(s) Oral every 12 hours  doxycycline hyclate Capsule 100 milliGRAM(s) Oral every 12 hours    Cardiovascular:  diltiazem    milliGRAM(s) Oral daily  metoprolol tartrate 25 milliGRAM(s) Oral two times a day    Other:  dextrose 40% Gel 15 Gram(s) Oral once  dextrose 5%. 1000 milliLiter(s) IV Continuous <Continuous>  dextrose 5%. 1000 milliLiter(s) IV Continuous <Continuous>  dextrose 50% Injectable 25 Gram(s) IV Push once  dextrose 50% Injectable 12.5 Gram(s) IV Push once  dextrose 50% Injectable 25 Gram(s) IV Push once  fluticasone propionate 50 MICROgram(s)/spray Nasal Spray 2 Spray(s) Both Nostrils two times a day  glucagon  Injectable 1 milliGRAM(s) IntraMuscular once  insulin lispro (ADMELOG) corrective regimen sliding scale   SubCutaneous three times a day before meals  insulin lispro (ADMELOG) corrective regimen sliding scale   SubCutaneous at bedtime  lamoTRIgine 25 milliGRAM(s) Oral at bedtime  methimazole 2.5 milliGRAM(s) Oral daily  mirtazapine 15 milliGRAM(s) Oral two times a day  pantoprazole    Tablet 40 milliGRAM(s) Oral before breakfast  rivaroxaban 15 milliGRAM(s) Oral with dinner  senna 2 Tablet(s) Oral at bedtime    MEDICATIONS  (PRN):  acetaminophen   Tablet .. 650 milliGRAM(s) Oral every 6 hours PRN Temp greater or equal to 38.5C (101.3F), Mild Pain (1 - 3)  polyethylene glycol 3350 17 Gram(s) Oral daily PRN Constipation        OBJECTIVE:    I&O's Detail    2021 07:01  -  2021 07:00  --------------------------------------------------------  IN:    Oral Fluid: 836 mL  Total IN: 836 mL    OUT:    Voided (mL): 600 mL  Total OUT: 600 mL    Total NET: 236 mL      2021 07:01  -  2021 11:58  --------------------------------------------------------  IN:    Oral Fluid: 120 mL  Total IN: 120 mL    OUT:  Total OUT: 0 mL    Total NET: 120 mL       Daily Weight in k (2021 10:51)    POCT Blood Glucose.: 176 mg/dL (2021 08:22)  POCT Blood Glucose.: 183 mg/dL (2021 21:32)  POCT Blood Glucose.: 171 mg/dL (2021 17:17)  POCT Blood Glucose.: 190 mg/dL (2021 12:22)      PHYSICAL EXAM:       ICU Vital Signs Last 24 Hrs  T(C): 36.4 (2021 11:47), Max: 36.8 (2021 17:20)  T(F): 97.6 (2021 11:47), Max: 98.3 (2021 17:20)  HR: 78 (2021 11:47) (75 - 83)  BP: 120/67 (2021 11:47) (110/53 - 134/63)  BP(mean): --  ABP: --  ABP(mean): --  RR: 18 (2021 11:47) (18 - 18)  SpO2: 92% (2021 11:47) (87% - 98%) on room air     General: Awake. Alert. Cooperative. No distress. Appears stated age 	  HEENT: Atraumatic. Bitemporal wasting. Anicteric. Normal oral mucosa. PERRL. EOMI.  Neck: Supple. Trachea midline. Thyroid without enlargement/tenderness/nodules. No carotid bruit. No JVD. Loss of bilateral supraclavicular fat pads.  Cardiovascular: Regular rate and rhythm. S1 S2 normal. No murmurs, rubs or gallops.  Respiratory: Respirations unlabored. Scattered rales (improved). No curvature.  Abdomen: Soft. Non-tender. Non-distended. No organomegaly. No masses. Normal bowel sounds.  Extremities: Warm to touch. No clubbing or cyanosis. No pedal edema.  Pulses: 2+ peripheral pulses all extremities.	  Skin: Normal skin color. No rashes or lesions. No ecchymoses. No cyanosis. Warm to touch.  Lymph Nodes: Cervical, supraclavicular and axillary nodes normal  Neurological: Motor and sensory examination equal and normal. A and O x 3  Psychiatry: Appropriate mood and affect.    LABS:                          8.2    11.16 )-----------( 528      ( 2021 07:06 )             27.7     CBC    WBC  11.16 <==, 11.25 <==, 10.93 <==, 12.50 <==, 14.65 <==, 17.83 <==    Hemoglobin  8.2 <<==, 8.1 <<==, 8.4 <<==, 6.8 <<==, 6.8 <<==, 7.2 <<==    Hematocrit  27.7 <==, 27.4 <==, 27.9 <==, 23.5 <==, 23.5 <==, 25.0 <==    Platelets  528 <==, 553 <==, 546 <==, 579 <==, 609 <==, 693 <==      139  |  103  |  38<H>  ----------------------------<  155<H>    02-20  4.8   |  26  |  1.73<H>      LYTES    sodium  139 <==, 138 <==, 137 <==, 139 <==, 141 <==    potassium   4.8 <==, 5.4 <==, 5.6 <==, 4.9 <==, 4.9 <==    chloride  103 <==, 103 <==, 101 <==, 103 <==, 102 <==    carbon dioxide  26 <==, 27 <==, 29 <==, 27 <==, 27 <==    =============================================================================================  RENAL FUNCTION:    Creatinine:   1.73  <<==, 1.94  <<==, 1.70  <<==, 1.87  <<==, 1.75  <<==    BUN:   38 <==, 31 <==, 23 <==, 25 <==, 22 <==    ============================================================================================    calcium   9.0 <==, 8.7 <==, 9.2 <==, 9.1 <==, 9.4 <==    phos   3.2 <==    mag   2.2 <==    ============================================================================================  LFTs    AST:   10 <==     ALT:  7  <==     AP:  116  <=    Bili:  0.3  <=      PT/INR - ( 2021 15:19 )   PT: 25.9 sec;   INR: 2.24 ratio      Venous Blood Gas:   @ 14:22  7.29/67/20/31/20  VBG Lactate: 1.7    Serum Pro-Brain Natriuretic Peptide: 3093 pg/mL ( @ 15:16)    CARDIAC MARKERS ( 2021 15:16 )  CPK x     /CKMB x     /CKMB Units x        troponin 87 ng/L      MICROBIOLOGY:     Respiratory Viral Panel with COVID-19 by [facility] (21 @ 18:20)   [facility]   SARS-CoV-2: NotDete: This Respiratory Panel uses polymerase chain reaction (PCR) to detect for   adenovirus; coronavirus (HKU1, NL63, 229E, OC43); human metapneumovirus   (hMPV); human enterovirus/rhinovirus (Entero/RV); influenza A; influenza   A/H1; influenza A/H3; influenza A/H1-2009; influenza B; parainfluenza   viruses 1, 2, 3, 4; respiratory syncytial virus; Mycoplasma pneumoniae;   Chlamydophila pneumoniae; and SARS-CoV-2.    Legionella pneumophila Antigen, Urine (21 @ 03:59)   Legionella Antigen, Urine: Negative: Negative       Urinalysis Basic - ( 2021 18:25 )    Color: Yellow / Appearance: Slightly Turbid / S.024 / pH: x  Gluc: x / Ketone: Negative  / Bili: Negative / Urobili: Negative   Blood: x / Protein: 30 mg/dL / Nitrite: Negative   Leuk Esterase: Small / RBC: 15 /hpf / WBC 10 /HPF   Sq Epi: x / Non Sq Epi: 1 /hpf / Bacteria: Negative    Culture - Urine (21 @ 23:16)   Specimen Source: .Urine Clean Catch (Midstream)   Culture Results:   <10,000 CFU/mL Normal Urogenital Jayleen     Culture - Blood (21 @ 23:11)   Specimen Source: .Blood Blood-Peripheral   Culture Results:   No growth to date.      Culture - Blood (21 @ 23:11)   Specimen Source: .Blood Blood-Peripheral   Culture Results:   No growth to date.     RADIOLOGY:  [x] Chest radiographs reviewed and interpreted by me    < from: Xray Chest 1 View- PORTABLE-Urgent (Xray Chest 1 View- PORTABLE-Urgent .) (21 @ 14:26) >    EXAM:  XR CHEST PORTABLE URGENT 1V                          PROCEDURE DATE:  2021      INTERPRETATION:  CLINICAL INFORMATION: Shortness of breath    EXAM: Frontal radiograph of the chest.    COMPARISON: Chest radiograph from 2018.    FINDINGS:  Left lower lung opacity.  No pneumothorax.  The heart size is normal.  The visualized osseous structures demonstrate no acute pathology.    IMPRESSION:  Left lower lung opacity, differential includes atelectasis, effusion, and/or pneumonia.    HALLE CORCORAN MD; Resident Radiology  This document has been electronically signed.  REDDY ISSA MD; Attending Radiologist  This document has been electronically signed. 2021  3:53PM    < end of copied text   ---------------------------------------------------------------------------------------------------------------  < from: CT Angio Chest w/ IV Cont (21 @ 17:14) >    EXAM:  CT ANGIO CHEST (W)AW IC                            PROCEDURE DATE:  2021      INTERPRETATION:  CLINICAL INFORMATION: Shortness of breath, hypoxia, COPD    COMPARISON: Chest CT from 2018, CT abdomen pelvis from 2018    PROCEDURE:  CT Angiography of the Chest.  90 ml of Omnipaque 350 was injected intravenously. 10 ml were discarded.  Sagittal and coronal reformats were performed as well as 3D (MIP) reconstructions.    FINDINGS:    LUNGS AND AIRWAYS: Patent central airways.  Emphysema. There is a new 1.3 cm right upper lobe spiculated pulmonary nodule (5:38). 1.1 cm left upper lobe spiculated nodule (5:45). Additional 9 mm slightly spiculated right upper lobe nodule (5:46). Numerous additional scattered tree-in-bud pulmonary nodules, increased from prior CT chest. Bilateral lower lobe reticular opacities with bronchiectasis, increased from prior.  PLEURA: Small to moderate left pleural effusion. Similar-appearing calcified right pleural plaques.  MEDIASTINUM ANDHILA: No lymphadenopathy.  VESSELS: No pulmonary embolism. 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 Atherosclerotic changes of the aorta and its visualized branches.  HEART: Heart size is normal. Small pericardial effusion. Coronary artery calcifications.  CHEST WALL AND LOWER NECK: Unchanged heterogenous thyroid gland with coarse calcifications.  VISUALIZED UPPER ABDOMEN: Bilateral renal cysts. Cholelithiasis. 5 mm pancreatic body and 4 mm pancreatic tail hypodense lesions are unchanged from prior CT abdomen pelvis dated 2018.  BONES: Unchanged L1 compression deformity. Mild compression deformity of T11.    IMPRESSION:  No pulmonary embolism.    Emphysema. Multiple new bilateral spiculated pulmonary nodules are indeterminate; recommend 3 month follow-up.    Multiple tree-in-bud nodules consistent with small airway inflammation/infection.    Small to moderate leftpleural effusion.    RYANN PAUL MD; Resident Interventional Radiology  This document has been electronically signed.  ADONAY ESCOBAR MD; Attending Radiologist  This document has been electronically signed. 2021  6:03PM    < end of copied text >  ---------------------------------------------------------------------------------------------------------------          
[facility] - [facility] - PROGRESS NOTE    CHIEF COMPLAINT: COPD/emphysema; pleural plaques; pneumonia    INTERVAL HISTORY: feels well and is eager to go home; sitting on the side of the bed; no cough, sputum production, chest congestion or wheeze; no fevers, chills or sweats; no chest pain/pressure or palpitations; fair appetite    REVIEW OF SYSTEMS:  Constitutional: As per interval history  HEENT: Within normal limits  CV: As per interval history  Resp: As per interval history  GI: Within normal limits   : Within normal limits  Musculoskeletal: Within normal limits  Skin: Within normal limits  Neurological: Within normal limits  Psychiatric: Within normal limits  Endocrine: Within normal limits  Hematologic/Lymphatic: anemia  Allergic/Immunologic: Within normal limits    MEDICATIONS:     Pulmonary "  albuterol/ipratropium for Nebulization 3 milliLiter(s) Nebulizer every 6 hours    Anti-microbials:  cefuroxime   Tablet 500 milliGRAM(s) Oral every 12 hours  doxycycline hyclate Capsule 100 milliGRAM(s) Oral every 12 hours    Cardiovascular:  diltiazem    milliGRAM(s) Oral daily  metoprolol tartrate 25 milliGRAM(s) Oral two times a day    Other:  dextrose 40% Gel 15 Gram(s) Oral once  dextrose 5%. 1000 milliLiter(s) IV Continuous <Continuous>  dextrose 5%. 1000 milliLiter(s) IV Continuous <Continuous>  dextrose 50% Injectable 25 Gram(s) IV Push once  dextrose 50% Injectable 12.5 Gram(s) IV Push once  dextrose 50% Injectable 25 Gram(s) IV Push once  fluticasone propionate 50 MICROgram(s)/spray Nasal Spray 2 Spray(s) Both Nostrils two times a day  glucagon  Injectable 1 milliGRAM(s) IntraMuscular once  insulin lispro (ADMELOG) corrective regimen sliding scale   SubCutaneous three times a day before meals  insulin lispro (ADMELOG) corrective regimen sliding scale   SubCutaneous at bedtime  lamoTRIgine 25 milliGRAM(s) Oral at bedtime  methimazole 2.5 milliGRAM(s) Oral daily  mirtazapine 15 milliGRAM(s) Oral two times a day  pantoprazole    Tablet 40 milliGRAM(s) Oral before breakfast  rivaroxaban 15 milliGRAM(s) Oral with dinner  senna 2 Tablet(s) Oral at bedtime    MEDICATIONS  (PRN):  acetaminophen   Tablet .. 650 milliGRAM(s) Oral every 6 hours PRN Temp greater or equal to 38.5C (101.3F), Mild Pain (1 - 3)  polyethylene glycol 3350 17 Gram(s) Oral daily PRN Constipation        OBJECTIVE:    I&O's Detail    2021 07:01  -  2021 07:00  --------------------------------------------------------  IN:    Oral Fluid: 1076 mL  Total IN: 1076 mL    OUT:    Voided (mL): 900 mL  Total OUT: 900 mL    Total NET: 176 mL    POCT Blood Glucose.: 153 mg/dL (2021 08:31)  POCT Blood Glucose.: 196 mg/dL (2021 22:01)  POCT Blood Glucose.: 92 mg/dL (2021 17:29)  POCT Blood Glucose.: 185 mg/dL (2021 12:47)      PHYSICAL EXAM:       ICU Vital Signs Last 24 Hrs  T(C): 36.3 (2021 04:16), Max: 36.4 (2021 11:47)  T(F): 97.4 (2021 04:16), Max: 97.6 (2021 11:47)  HR: 73 (2021 04:16) (73 - 99)  BP: 109/57 (2021 04:16) (109/57 - 129/67)  BP(mean): --  ABP: --  ABP(mean): --  RR: 18 (2021 04:16) (17 - 18)  SpO2: 92% (2021 04:16) (92% - 94%) on room air     General: Awake. Alert. Cooperative. No distress. Appears stated age 	  HEENT: Atraumatic. Bitemporal wasting. Anicteric. Normal oral mucosa. PERRL. EOMI.  Neck: Supple. Trachea midline. Thyroid without enlargement/tenderness/nodules. No carotid bruit. No JVD. Loss of bilateral supraclavicular fat pads.  Cardiovascular: Regular rate and rhythm. S1 S2 normal. No murmurs, rubs or gallops.  Respiratory: Respirations unlabored. Scattered rales (improved). No curvature.  Abdomen: Soft. Non-tender. Non-distended. No organomegaly. No masses. Normal bowel sounds.  Extremities: Warm to touch. No clubbing or cyanosis. No pedal edema.  Pulses: 2+ peripheral pulses all extremities.	  Skin: Normal skin color. No rashes or lesions. No ecchymoses. No cyanosis. Warm to touch.  Lymph Nodes: Cervical, supraclavicular and axillary nodes normal  Neurological: Motor and sensory examination equal and normal. A and O x 3  Psychiatry: Appropriate mood and affect.    LABS:                          8.0    7.08  )-----------( 506      ( 2021 09:00 )             26.0     CBC    WBC  7.08 <==, 11.16 <==, 11.25 <==, 10.93 <==, 12.50 <==, 14.65 <==, 17.83 <==    Hemoglobin  8.0 <<==, 8.2 <<==, 8.1 <<==, 8.4 <<==, 6.8 <<==, 6.8 <<==, 7.2 <<==    Hematocrit  26.0 <==, 27.7 <==, 27.4 <==, 27.9 <==, 23.5 <==, 23.5 <==, 25.0 <==    Platelets  506 <==, 528 <==, 553 <==, 546 <==, 579 <==, 609 <==, 693 <==      143  |  108  |  33<H>  ----------------------------<  141<H>    02-21  4.8   |  28  |  1.49<H>      LYTES    sodium  143 <==, 139 <==, 138 <==, 137 <==, 139 <==, 141 <==    potassium   4.8 <==, 4.8 <==, 5.4 <==, 5.6 <==, 4.9 <==, 4.9 <==    chloride  108 <==, 103 <==, 103 <==, 101 <==, 103 <==, 102 <==    carbon dioxide  28 <==, 26 <==, 27 <==, 29 <==, 27 <==, 27 <==    =============================================================================================  RENAL FUNCTION:    Creatinine:   1.49  <<==, 1.73  <<==, 1.94  <<==, 1.70  <<==, 1.87  <<==, 1.75  <<==    BUN:   33 <==, 38 <==, 31 <==, 23 <==, 25 <==, 22 <==    ============================================================================================    calcium   9.0 <==, 9.0 <==, 8.7 <==, 9.2 <==, 9.1 <==, 9.4 <==    phos   3.2 <==    mag   2.2 <==    ============================================================================================  LFTs    AST:   10 <==     ALT:  7  <==     AP:  116  <=    Bili:  0.3  <=    PT/INR - ( 2021 15:19 )   PT: 25.9 sec;   INR: 2.24 ratio      Venous Blood Gas:   @ 14:22  7.29/67/20/31/20  VBG Lactate: 1.7    Serum Pro-Brain Natriuretic Peptide: 3093 pg/mL ( @ 15:16)    CARDIAC MARKERS ( 2021 15:16 )  CPK x     /CKMB x     /CKMB Units x        troponin 87 ng/L      MICROBIOLOGY:     Respiratory Viral Panel with COVID-19 by [facility] (21 @ 18:20)   [facility]   SARS-CoV-2: NotDete: This Respiratory Panel uses polymerase chain reaction (PCR) to detect for   adenovirus; coronavirus (HKU1, NL63, 229E, OC43); human metapneumovirus   (hMPV); human enterovirus/rhinovirus (Entero/RV); influenza A; influenza   A/H1; influenza A/H3; influenza A/H1-2009; influenza B; parainfluenza   viruses 1, 2, 3, 4; respiratory syncytial virus; Mycoplasma pneumoniae;   Chlamydophila pneumoniae; and SARS-CoV-2.    Legionella pneumophila Antigen, Urine (21 @ 03:59)   Legionella Antigen, Urine: Negative: Negative       Urinalysis Basic - ( 2021 18:25 )    Color: Yellow / Appearance: Slightly Turbid / S.024 / pH: x  Gluc: x / Ketone: Negative  / Bili: Negative / Urobili: Negative   Blood: x / Protein: 30 mg/dL / Nitrite: Negative   Leuk Esterase: Small / RBC: 15 /hpf / WBC 10 /HPF   Sq Epi: x / Non Sq Epi: 1 /hpf / Bacteria: Negative    Culture - Urine (21 @ 23:16)   Specimen Source: .Urine Clean Catch (Midstream)   Culture Results:   <10,000 CFU/mL Normal Urogenital Jayleen     Culture - Blood (21 @ 23:11)   Specimen Source: .Blood Blood-Peripheral   Culture Results:   No growth to date.      Culture - Blood (21 @ 23:11)   Specimen Source: .Blood Blood-Peripheral   Culture Results:   No growth to date.     RADIOLOGY:  [x] Chest radiographs reviewed and interpreted by me    < from: Xray Chest 1 View- PORTABLE-Urgent (Xray Chest 1 View- PORTABLE-Urgent .) (21 @ 14:26) >    EXAM:  XR CHEST PORTABLE URGENT 1V                          PROCEDURE DATE:  2021      INTERPRETATION:  CLINICAL INFORMATION: Shortness of breath    EXAM: Frontal radiograph of the chest.    COMPARISON: Chest radiograph from 2018.    FINDINGS:  Left lower lung opacity.  No pneumothorax.  The heart size is normal.  The visualized osseous structures demonstrate no acute pathology.    IMPRESSION:  Left lower lung opacity, differential includes atelectasis, effusion, and/or pneumonia.    HALLE CORCORAN MD; Resident Radiology  This document has been electronically signed.  REDDY ISSA MD; Attending Radiologist  This document has been electronically signed. 2021  3:53PM    < end of copied text   ---------------------------------------------------------------------------------------------------------------  < from: CT Angio Chest w/ IV Cont (21 @ 17:14) >    EXAM:  CT ANGIO CHEST (W)AW IC                            PROCEDURE DATE:  2021      INTERPRETATION:  CLINICAL INFORMATION: Shortness of breath, hypoxia, COPD    COMPARISON: Chest CT from 2018, CT abdomen pelvis from 2018    PROCEDURE:  CT Angiography of the Chest.  90 ml of Omnipaque 350 was injected intravenously. 10 ml were discarded.  Sagittal and coronal reformats were performed as well as 3D (MIP) reconstructions.    FINDINGS:    LUNGS AND AIRWAYS: Patent central airways.  Emphysema. There is a new 1.3 cm right upper lobe spiculated pulmonary nodule (5:38). 1.1 cm left upper lobe spiculated nodule (5:45). Additional 9 mm slightly spiculated right upper lobe nodule (5:46). Numerous additional scattered tree-in-bud pulmonary nodules, increased from prior CT chest. Bilateral lower lobe reticular opacities with bronchiectasis, increased from prior.  PLEURA: Small to moderate left pleural effusion. Similar-appearing calcified right pleural plaques.  MEDIASTINUM ANDHILA: No lymphadenopathy.  VESSELS: No pulmonary embolism. 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 Atherosclerotic changes of the aorta and its visualized branches.  HEART: Heart size is normal. Small pericardial effusion. Coronary artery calcifications.  CHEST WALL AND LOWER NECK: Unchanged heterogenous thyroid gland with coarse calcifications.  VISUALIZED UPPER ABDOMEN: Bilateral renal cysts. Cholelithiasis. 5 mm pancreatic body and 4 mm pancreatic tail hypodense lesions are unchanged from prior CT abdomen pelvis dated 2018.  BONES: Unchanged L1 compression deformity. Mild compression deformity of T11.    IMPRESSION:  No pulmonary embolism.    Emphysema. Multiple new bilateral spiculated pulmonary nodules are indeterminate; recommend 3 month follow-up.    Multiple tree-in-bud nodules consistent with small airway inflammation/infection.    Small to moderate leftpleural effusion.    RYANN PAUL MD; Resident Interventional Radiology  This document has been electronically signed.  ADONAY ESCOBAR MD; Attending Radiologist  This document has been electronically signed. 2021  6:03PM    < end of copied text >  ---------------------------------------------------------------------------------------------------------------          
CC: f/u for pneumonia    Patient reports: he is comfortable on RA    REVIEW OF SYSTEMS:  All other review of systems negative (Comprehensive ROS)    Antimicrobials Day #  :day 5/7  cefuroxime   Tablet 500 milliGRAM(s) Oral every 12 hours  doxycycline hyclate Capsule 100 milliGRAM(s) Oral every 12 hours    Other Medications Reviewed    T(F): 97.4 (02-21-21 @ 04:16), Max: 97.6 (02-20-21 @ 11:47)  HR: 73 (02-21-21 @ 04:16)  BP: 109/57 (02-21-21 @ 04:16)  RR: 18 (02-21-21 @ 04:16)  SpO2: 92% (02-21-21 @ 04:16)  Wt(kg): --    PHYSICAL EXAM:  General: alert, no acute distress, chronically ill appearing  Eyes:  anicteric, no conjunctival injection, no discharge  Oropharynx: no lesions or injection 	  Neck: supple, without adenopathy  Lungs: clear to auscultation  Heart: irregular rate and rhythm; no murmur  Abdomen: soft, nondistended, nontender, without mass or organomegaly  Skin: no lesions  Extremities: no clubbing, cyanosis, or edema  Neurologic: alert, oriented, moves all extremities    LAB RESULTS:                        8.2    11.16 )-----------( 528      ( 20 Feb 2021 07:06 )             27.7     02-20    139  |  103  |  38<H>  ----------------------------<  155<H>  4.8   |  26  |  1.73<H>    Ca    9.0      20 Feb 2021 07:05          MICROBIOLOGY:  RECENT CULTURES:  02-17 @ 23:16 .Urine Clean Catch (Midstream)     <10,000 CFU/mL Normal Urogenital Jayleen      02-17 @ 23:11 .Blood Blood-Peripheral     No growth to date.          RADIOLOGY REVIEWED:    
CC: f/u for possible pneumonia    Patient reports: no complaints, he is comfortable on RA    REVIEW OF SYSTEMS:  All other review of systems negative (Comprehensive ROS)    Antimicrobials Day #  :day 4/7  cefTRIAXone   IVPB 1000 milliGRAM(s) IV Intermittent every 24 hours  doxycycline hyclate Capsule 100 milliGRAM(s) Oral every 12 hours    Other Medications Reviewed    T(F): 97.7 (02-20-21 @ 05:42), Max: 98.3 (02-19-21 @ 17:20)  HR: 83 (02-20-21 @ 05:42)  BP: 134/63 (02-20-21 @ 05:42)  RR: 18 (02-20-21 @ 05:42)  SpO2: 90% (02-20-21 @ 05:42)  Wt(kg): --    PHYSICAL EXAM:  General: alert, no acute distress, thin, chronically ill appearing  Eyes:  anicteric, no conjunctival injection, no discharge  Oropharynx: no lesions or injection 	  Neck: supple, without adenopathy  Lungs: clear to auscultation  Heart: irregular rate and rhythm; no murmur, rubs or gallops  Abdomen: soft, nondistended, nontender, without mass or organomegaly  Skin: no lesions  Extremities: no clubbing, cyanosis, or edema  Neurologic: alert, oriented, moves all extremities    LAB RESULTS:                        8.1    11.25 )-----------( 553      ( 19 Feb 2021 09:08 )             27.4     02-19    138  |  103  |  31<H>  ----------------------------<  164<H>  5.4<H>   |  27  |  1.94<H>    Ca    8.7      19 Feb 2021 09:08          MICROBIOLOGY:  RECENT CULTURES:  02-17 @ 23:16 .Urine Clean Catch (Midstream)     <10,000 CFU/mL Normal Urogenital Jayleen      02-17 @ 23:11 .Blood Blood-Peripheral     No growth to date.          RADIOLOGY REVIEWED:    
Patient is a 89y old  Male who presents with a chief complaint of shortness of breath (21 Feb 2021 05:59)      SUBJECTIVE / OVERNIGHT EVENTS: Comfortable without new complaints.   Review of Systems  chest pain no  palpitations no  sob no  nausea no  headache no    MEDICATIONS  (STANDING):  dextrose 40% Gel 15 Gram(s) Oral once  dextrose 5%. 1000 milliLiter(s) (50 mL/Hr) IV Continuous <Continuous>  dextrose 5%. 1000 milliLiter(s) (100 mL/Hr) IV Continuous <Continuous>  dextrose 50% Injectable 25 Gram(s) IV Push once  dextrose 50% Injectable 12.5 Gram(s) IV Push once  dextrose 50% Injectable 25 Gram(s) IV Push once  diltiazem    milliGRAM(s) Oral daily  doxycycline hyclate Capsule 100 milliGRAM(s) Oral every 12 hours  fluticasone propionate 50 MICROgram(s)/spray Nasal Spray 2 Spray(s) Both Nostrils two times a day  glucagon  Injectable 1 milliGRAM(s) IntraMuscular once  insulin lispro (ADMELOG) corrective regimen sliding scale   SubCutaneous three times a day before meals  insulin lispro (ADMELOG) corrective regimen sliding scale   SubCutaneous at bedtime  lamoTRIgine 25 milliGRAM(s) Oral at bedtime  methimazole 2.5 milliGRAM(s) Oral daily  metoprolol tartrate 25 milliGRAM(s) Oral two times a day  mirtazapine 15 milliGRAM(s) Oral two times a day  pantoprazole    Tablet 40 milliGRAM(s) Oral before breakfast  rivaroxaban 15 milliGRAM(s) Oral with dinner  senna 2 Tablet(s) Oral at bedtime    MEDICATIONS  (PRN):  acetaminophen   Tablet .. 650 milliGRAM(s) Oral every 6 hours PRN Temp greater or equal to 38.5C (101.3F), Mild Pain (1 - 3)  polyethylene glycol 3350 17 Gram(s) Oral daily PRN Constipation      Vital Signs Last 24 Hrs  T(C): 36.5 (22 Feb 2021 04:50), Max: 36.5 (22 Feb 2021 04:50)  T(F): 97.7 (22 Feb 2021 04:50), Max: 97.7 (22 Feb 2021 04:50)  HR: 100 (22 Feb 2021 04:50) (99 - 100)  BP: 102/57 (22 Feb 2021 04:50) (102/57 - 132/66)  BP(mean): --  RR: 18 (22 Feb 2021 04:50) (18 - 18)  SpO2: 95% (22 Feb 2021 04:50) (95% - 98%)    PHYSICAL EXAM:  GENERAL: NAD, cachectic   HEAD:  Atraumatic, Normocephalic  EYES: EOMI, PERRLA, conjunctiva and sclera clear  NECK: Supple, No JVD  CHEST/LUNG: Clear to auscultation bilaterally; No wheeze  HEART: Regular rate and rhythm; No murmurs, rubs, or gallops  ABDOMEN: Soft, Nontender, Nondistended; Bowel sounds present  EXTREMITIES:  2+ Peripheral Pulses, No clubbing, cyanosis, or edema  PSYCH: AAOx3  NEUROLOGY: non-focal  SKIN: No rashes or lesions    LABS:                        8.1    8.44  )-----------( 540      ( 22 Feb 2021 07:13 )             27.0     02-22    140  |  104  |  41<H>  ----------------------------<  168<H>  4.8   |  26  |  1.72<H>    Ca    9.5      22 Feb 2021 07:11                  RADIOLOGY & ADDITIONAL TESTS:    Imaging Personally Reviewed:    Consultant(s) Notes Reviewed:      Care Discussed with Consultants/Other Providers:  
CC: f/u for pneumonia    Patient is awake and alert in bed, he reports he is feeling good today, he wants know when he can go home today  REVIEW OF SYSTEMS:  All other review of systems negative (Comprehensive ROS)    Antimicrobials Day #    cefuroxime   Tablet 500 milliGRAM(s) Oral every 12 hours  doxycycline hyclate Capsule 100 milliGRAM(s) Oral every 12 hours    Other Medications Reviewed    Vital Signs Last 24 Hrs  T(C): 36.5 (22 Feb 2021 04:50), Max: 36.5 (22 Feb 2021 04:50)  T(F): 97.7 (22 Feb 2021 04:50), Max: 97.7 (22 Feb 2021 04:50)  HR: 100 (22 Feb 2021 04:50) (99 - 100)  BP: 102/57 (22 Feb 2021 04:50) (102/57 - 132/66)  BP(mean): --  RR: 18 (22 Feb 2021 04:50) (18 - 18)  SpO2: 95% (22 Feb 2021 04:50) (95% - 98%)): --    PHYSICAL EXAM:  General: alert, no acute distress, chronically ill appearing  Eyes:  anicteric, no conjunctival injection, no discharge  Oropharynx: no lesions or injection 	  Lungs: clear to auscultation  Heart: irregular rate and rhythm; no murmur  Abdomen: soft, nondistended, nontender, without mass or organomegaly  Skin: no lesions  Extremities: no clubbing, cyanosis, or edema  Neurologic: alert, oriented, moves all extremities    LAB RESULTS:                                   8.1    8.44  )-----------( 540      ( 22 Feb 2021 07:13 )             27.0     02-22    140  |  104  |  41<H>  ----------------------------<  168<H>  4.8   |  26  |  1.72<H>    Ca    9.5      22 Feb 2021 07:11              MICROBIOLOGY:  RECENT CULTURES:  02-17 @ 23:16 .Urine Clean Catch (Midstream)     <10,000 CFU/mL Normal Urogenital Jayleen      02-17 @ 23:11 .Blood Blood-Peripheral     No growth to date.          RADIOLOGY REVIEWED:    
CC: f/u for pneumonia    Patient reports  he is fine  REVIEW OF SYSTEMS:  All other review of systems negative (Comprehensive ROS)    Antimicrobials Day #  :2  cefTRIAXone   IVPB 1000 milliGRAM(s) IV Intermittent every 24 hours  doxycycline hyclate Capsule 100 milliGRAM(s) Oral every 12 hours    Other Medications Reviewed    T(F): 97.6 (21 @ 12:40), Max: 98.8 (21 @ 23:15)  HR: 67 (21 @ 12:40)  BP: 92/50 (21 @ 12:40)  RR: 18 (21 @ 12:40)  SpO2: 100% (21 @ 12:40)  Wt(kg): --    PHYSICAL EXAM:  General: alert, no acute distress, wasted  Eyes:  anicteric, no conjunctival injection, no discharge  Oropharynx: no lesions or injection 	  Neck: supple, without adenopathy  Lungs: rhonchi and rales to auscultation  Heart: regular rate and rhythm; no murmur, rubs or gallops  Abdomen: soft, nondistended, nontender, without mass or organomegaly  Skin: no lesions  Extremities: no clubbing, cyanosis, or edema  Neurologic: alert, oriented, moves all extremities    LAB RESULTS:                        6.8    12.50 )-----------( 579      ( 2021 10:38 )             23.5     02-18    137  |  101  |  23  ----------------------------<  156<H>  5.6<H>   |  29  |  1.70<H>    Ca    9.2      2021 10:38  Phos  3.2     -  Mg     2.2     -    TPro  6.9  /  Alb  3.4  /  TBili  0.3  /  DBili  x   /  AST  10  /  ALT  7<L>  /  AlkPhos  116      LIVER FUNCTIONS - ( 2021 15:16 )  Alb: 3.4 g/dL / Pro: 6.9 g/dL / ALK PHOS: 116 U/L / ALT: 7 U/L / AST: 10 U/L / GGT: x           Urinalysis Basic - ( 2021 18:25 )    Color: Yellow / Appearance: Slightly Turbid / S.024 / pH: x  Gluc: x / Ketone: Negative  / Bili: Negative / Urobili: Negative   Blood: x / Protein: 30 mg/dL / Nitrite: Negative   Leuk Esterase: Small / RBC: 15 /hpf / WBC 10 /HPF   Sq Epi: x / Non Sq Epi: 1 /hpf / Bacteria: Negative      MICROBIOLOGY:  RECENT CULTURES:      RADIOLOGY REVIEWED:    < from: CT Angio Chest w/ IV Cont (21 @ 17:14) >  XAM:  CT ANGIO CHEST (W)AW IC                            PROCEDURE DATE:  2021            INTERPRETATION:  CLINICAL INFORMATION: Shortness of breath, hypoxia, COPD    COMPARISON: Chest CT from 2018, CT abdomen pelvis from 2018    PROCEDURE:  CT Angiography of the Chest.  90 ml of Omnipaque 350 was injected intravenously. 10 ml were discarded.  Sagittal and coronal reformats were performed as well as 3D (MIP) reconstructions.    FINDINGS:    LUNGS AND AIRWAYS: Patent central airways.  Emphysema. There is a new 1.3 cm right upper lobe spiculated pulmonary nodule (5:38). 1.1 cm left upper lobe spiculated nodule (5:45). Additional 9 mm slightly spiculated right upper lobe nodule (5:46). Numerous additional scattered tree-in-bud pulmonary nodules, increased from prior CT chest. Bilateral lower lobe reticular opacities with bronchiectasis, increased from prior.  PLEURA: Small to moderate left pleural effusion. Similar-appearing calcified right pleural plaques.  MEDIASTINUM ANDHILA: No lymphadenopathy.  VESSELS: No pulmonary embolism. 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 Atherosclerotic changes of the aorta and its visualized branches.  HEART: Heart size is normal. Small pericardial effusion. Coronary artery calcifications.  CHEST WALL AND LOWER NECK: Unchanged heterogenous thyroid gland with coarse calcifications.  VISUALIZED UPPER ABDOMEN: Bilateral renal cysts. Cholelithiasis. 5 mm pancreatic body and 4 mm pancreatic tail hypodense lesions are unchanged from prior CT abdomen pelvis dated 2018.  BONES: Unchanged L1 compression deformity. Mild compression deformity of T11.    IMPRESSION:  No pulmonary embolism.    Emphysema. Multiple new bilateral spiculated pulmonary nodules are indeterminate; recommend 3 month follow-up.    Multiple tree-in-bud nodules consistent with small airway inflammation/infection.    Small to moderate leftpleural effusion.      < end of copied text >            Assessment:  Elderly man with afib, monoclonal gammopathy admitted with worsened cough , wheeze, weight loss. ct now known to show some spiculated nodules and tree in bud, swallow eval with some aspiration features. Patient could have some component of aspiration pneumonia, could also have cancer and zari in differential. Dont think he would tolerate tx for cancer or zari so will continue antibiotics and aspiration precautions as accepted by patient.     Plan:  continue ctx and doxy, anticipate 5-7 days total  aspiration precautions  discuss realistic goc
[facility] - [facility] - PROGRESS NOTE    CHIEF COMPLAINT: COPD/emphysema; pleural plaques; pneumonia    INTERVAL HISTORY: feels well and is eager to go home; uncomfortable bed and chair; no cough, sputum production, chest congestion or wheeze; no fevers, chills or sweats; no chest pain/pressure or palpitations;     REVIEW OF SYSTEMS:  Constitutional: As per interval history  HEENT: Within normal limits  CV: As per interval history  Resp: As per interval history  GI: Within normal limits   : Within normal limits  Musculoskeletal: Within normal limits  Skin: Within normal limits  Neurological: Within normal limits  Psychiatric: Within normal limits  Endocrine: Within normal limits  Hematologic/Lymphatic: anemia  Allergic/Immunologic: Within normal limits      MEDICATIONS:     Pulmonary "  albuterol/ipratropium for Nebulization 3 milliLiter(s) Nebulizer every 6 hours    Anti-microbials:  cefTRIAXone   IVPB 1000 milliGRAM(s) IV Intermittent every 24 hours  doxycycline hyclate Capsule 100 milliGRAM(s) Oral every 12 hours    Cardiovascular:  diltiazem    milliGRAM(s) Oral daily  metoprolol tartrate 25 milliGRAM(s) Oral two times a day    Other:  dextrose 40% Gel 15 Gram(s) Oral once  dextrose 5%. 1000 milliLiter(s) IV Continuous <Continuous>  dextrose 5%. 1000 milliLiter(s) IV Continuous <Continuous>  dextrose 50% Injectable 25 Gram(s) IV Push once  dextrose 50% Injectable 12.5 Gram(s) IV Push once  dextrose 50% Injectable 25 Gram(s) IV Push once  fluticasone propionate 50 MICROgram(s)/spray Nasal Spray 2 Spray(s) Both Nostrils two times a day  glucagon  Injectable 1 milliGRAM(s) IntraMuscular once  insulin lispro (ADMELOG) corrective regimen sliding scale   SubCutaneous three times a day before meals  insulin lispro (ADMELOG) corrective regimen sliding scale   SubCutaneous at bedtime  lamoTRIgine 25 milliGRAM(s) Oral at bedtime  methimazole 2.5 milliGRAM(s) Oral daily  mirtazapine 15 milliGRAM(s) Oral two times a day  pantoprazole    Tablet 40 milliGRAM(s) Oral before breakfast  polyethylene glycol 3350 17 Gram(s) Oral once  rivaroxaban 15 milliGRAM(s) Oral with dinner    MEDICATIONS  (PRN):  acetaminophen   Tablet .. 650 milliGRAM(s) Oral every 6 hours PRN Temp greater or equal to 38.5C (101.3F), Mild Pain (1 - 3)  polyethylene glycol 3350 17 Gram(s) Oral daily PRN Constipation        OBJECTIVE:    I&O's Detail    2021 07:01  -  2021 07:00  --------------------------------------------------------  IN:    Oral Fluid: 900 mL  Total IN: 900 mL    OUT:    Voided (mL): 625 mL  Total OUT: 625 mL    Total NET: 275 mL      2021 07:01  -  2021 14:04  --------------------------------------------------------  IN:    Oral Fluid: 360 mL  Total IN: 360 mL    OUT:  Total OUT: 0 mL    Total NET: 360 mL    POCT Blood Glucose.: 190 mg/dL (2021 12:22)  POCT Blood Glucose.: 155 mg/dL (2021 08:40)  POCT Blood Glucose.: 185 mg/dL (2021 21:27)  POCT Blood Glucose.: 199 mg/dL (2021 17:58)      PHYSICAL EXAM:       ICU Vital Signs Last 24 Hrs  T(C): 36.4 (2021 12:06), Max: 36.6 (2021 05:04)  T(F): 97.5 (2021 12:06), Max: 97.8 (2021 05:04)  HR: 75 (2021 12:06) (75 - 82)  BP: 125/55 (2021 12:06) (105/58 - 125/55)  BP(mean): --  ABP: --  ABP(mean): --  RR: 18 (2021 12:06) (18 - 18)  SpO2: 98% (2021 12:06) (98% - 99%) on 2lpm nasal canula     General: Awake. Alert. Cooperative. No distress. Appears stated age 	  HEENT: Atraumatic. Bitemporal wasting. Anicteric. Normal oral mucosa. PERRL. EOMI.  Neck: Supple. Trachea midline. Thyroid without enlargement/tenderness/nodules. No carotid bruit. No JVD. Loss of bilateral supraclavicular fat pads.  Cardiovascular: Regular rate and rhythm. S1 S2 normal. No murmurs, rubs or gallops.  Respiratory: Respirations unlabored. Scattered rales (improved). No curvature.  Abdomen: Soft. Non-tender. Non-distended. No organomegaly. No masses. Normal bowel sounds.  Extremities: Warm to touch. No clubbing or cyanosis. No pedal edema.  Pulses: 2+ peripheral pulses all extremities.	  Skin: Normal skin color. No rashes or lesions. No ecchymoses. No cyanosis. Warm to touch.  Lymph Nodes: Cervical, supraclavicular and axillary nodes normal  Neurological: Motor and sensory examination equal and normal. A and O x 3  Psychiatry: Appropriate mood and affect.    LABS:                          8.1    11.25 )-----------( 553      ( 2021 09:08 )             27.4     CBC    WBC  11.25 <==, 10.93 <==, 12.50 <==, 14.65 <==, 17.83 <==    Hemoglobin  8.1 <<==, 8.4 <<==, 6.8 <<==, 6.8 <<==, 7.2 <<==    Hematocrit  27.4 <==, 27.9 <==, 23.5 <==, 23.5 <==, 25.0 <==    Platelets  553 <==, 546 <==, 579 <==, 609 <==, 693 <==      138  |  103  |  31<H>  ----------------------------<  164<H>    02-19  5.4<H>   |  27  |  1.94<H>      LYTES    sodium  138 <==, 137 <==, 139 <==, 141 <==    potassium   5.4 <==, 5.6 <==, 4.9 <==, 4.9 <==    chloride  103 <==, 101 <==, 103 <==, 102 <==    carbon dioxide  27 <==, 29 <==, 27 <==, 27 <==    =============================================================================================  RENAL FUNCTION:    Creatinine:   1.94  <<==, 1.70  <<==, 1.87  <<==, 1.75  <<==    BUN:   31 <==, 23 <==, 25 <==, 22 <==    ============================================================================================    calcium   8.7 <==, 9.2 <==, 9.1 <==, 9.4 <==    phos   3.2 <==    mag   2.2 <==    ============================================================================================  LFTs    AST:   10 <==     ALT:  7  <==     AP:  116  <=    Bili:  0.3  <=      PT/INR - ( 2021 15:19 )   PT: 25.9 sec;   INR: 2.24 ratio         PTT - ( 2021 15:19 )  PTT:55.7 sec    Venous Blood Gas:   @ 14:22  7.29/67/20/31/20  VBG Lactate: 1.7    Serum Pro-Brain Natriuretic Peptide: 3093 pg/mL ( @ 15:16)    CARDIAC MARKERS ( 2021 15:16 )  CPK x     /CKMB x     /CKMB Units x        troponin 87 ng/L      MICROBIOLOGY:     Respiratory Viral Panel with COVID-19 by [facility] (21 @ 18:20)   [facility]   SARS-CoV-2: NotDete: This Respiratory Panel uses polymerase chain reaction (PCR) to detect for   adenovirus; coronavirus (HKU1, NL63, 229E, OC43); human metapneumovirus   (hMPV); human enterovirus/rhinovirus (Entero/RV); influenza A; influenza   A/H1; influenza A/H3; influenza A/H1-2009; influenza B; parainfluenza   viruses 1, 2, 3, 4; respiratory syncytial virus; Mycoplasma pneumoniae;   Chlamydophila pneumoniae; and SARS-CoV-2.    Legionella pneumophila Antigen, Urine (21 @ 03:59)   Legionella Antigen, Urine: Negative: Negative       Urinalysis Basic - ( 2021 18:25 )    Color: Yellow / Appearance: Slightly Turbid / S.024 / pH: x  Gluc: x / Ketone: Negative  / Bili: Negative / Urobili: Negative   Blood: x / Protein: 30 mg/dL / Nitrite: Negative   Leuk Esterase: Small / RBC: 15 /hpf / WBC 10 /HPF   Sq Epi: x / Non Sq Epi: 1 /hpf / Bacteria: Negative    Culture - Urine (21 @ 23:16)   Specimen Source: .Urine Clean Catch (Midstream)   Culture Results:   <10,000 CFU/mL Normal Urogenital Jayleen     Culture - Blood (21 @ 23:11)   Specimen Source: .Blood Blood-Peripheral   Culture Results:   No growth to date.      Culture - Blood (21 @ 23:11)   Specimen Source: .Blood Blood-Peripheral   Culture Results:   No growth to date.     RADIOLOGY:  [x] Chest radiographs reviewed and interpreted by me    < from: Xray Chest 1 View- PORTABLE-Urgent (Xray Chest 1 View- PORTABLE-Urgent .) (21 @ 14:26) >    EXAM:  XR CHEST PORTABLE URGENT 1V                          PROCEDURE DATE:  2021      INTERPRETATION:  CLINICAL INFORMATION: Shortness of breath    EXAM: Frontal radiograph of the chest.    COMPARISON: Chest radiograph from 2018.    FINDINGS:  Left lower lung opacity.  No pneumothorax.  The heart size is normal.  The visualized osseous structures demonstrate no acute pathology.    IMPRESSION:  Left lower lung opacity, differential includes atelectasis, effusion, and/or pneumonia.    HALLE CORCORAN MD; Resident Radiology  This document has been electronically signed.  REDDY ISSA MD; Attending Radiologist  This document has been electronically signed. 2021  3:53PM    < end of copied text   ---------------------------------------------------------------------------------------------------------------  < from: CT Angio Chest w/ IV Cont (21 @ 17:14) >    EXAM:  CT ANGIO CHEST (W)AW IC                            PROCEDURE DATE:  2021      INTERPRETATION:  CLINICAL INFORMATION: Shortness of breath, hypoxia, COPD    COMPARISON: Chest CT from 2018, CT abdomen pelvis from 2018    PROCEDURE:  CT Angiography of the Chest.  90 ml of Omnipaque 350 was injected intravenously. 10 ml were discarded.  Sagittal and coronal reformats were performed as well as 3D (MIP) reconstructions.    FINDINGS:    LUNGS AND AIRWAYS: Patent central airways.  Emphysema. There is a new 1.3 cm right upper lobe spiculated pulmonary nodule (5:38). 1.1 cm left upper lobe spiculated nodule (5:45). Additional 9 mm slightly spiculated right upper lobe nodule (5:46). Numerous additional scattered tree-in-bud pulmonary nodules, increased from prior CT chest. Bilateral lower lobe reticular opacities with bronchiectasis, increased from prior.  PLEURA: Small to moderate left pleural effusion. Similar-appearing calcified right pleural plaques.  MEDIASTINUM ANDHILA: No lymphadenopathy.  VESSELS: No pulmonary embolism. 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 Atherosclerotic changes of the aorta and its visualized branches.  HEART: Heart size is normal. Small pericardial effusion. Coronary artery calcifications.  CHEST WALL AND LOWER NECK: Unchanged heterogenous thyroid gland with coarse calcifications.  VISUALIZED UPPER ABDOMEN: Bilateral renal cysts. Cholelithiasis. 5 mm pancreatic body and 4 mm pancreatic tail hypodense lesions are unchanged from prior CT abdomen pelvis dated 2018.  BONES: Unchanged L1 compression deformity. Mild compression deformity of T11.    IMPRESSION:  No pulmonary embolism.    Emphysema. Multiple new bilateral spiculated pulmonary nodules are indeterminate; recommend 3 month follow-up.    Multiple tree-in-bud nodules consistent with small airway inflammation/infection.    Small to moderate leftpleural effusion.    RYANN PAUL MD; Resident Interventional Radiology  This document has been electronically signed.  ADONAY ESCOBAR MD; Attending Radiologist  This document has been electronically signed. 2021  6:03PM    < end of copied text >  ---------------------------------------------------------------------------------------------------------------    
[facility] - PROGRESS NOTE    CHIEF COMPLAINT: COPD/emphysema; pleural plaques; pneumonia    INTERVAL HISTORY: feels well and is eager to go home; sitting on the side of the bed; no cough or sputum production; mild chest congestion and wheeze off bronchodilators; no fevers, chills or sweats; no chest pain/pressure or palpitations; fair appetite    REVIEW OF SYSTEMS:  Constitutional: As per interval history  HEENT: Within normal limits  CV: As per interval history  Resp: As per interval history  GI: Within normal limits   : Within normal limits  Musculoskeletal: Within normal limits  Skin: Within normal limits  Neurological: Within normal limits  Psychiatric: Within normal limits  Endocrine: Within normal limits  Hematologic/Lymphatic: anemia  Allergic/Immunologic: Within normal limits    MEDICATIONS:     Pulmonary "    Anti-microbials:    Cardiovascular:  diltiazem    milliGRAM(s) Oral daily  metoprolol tartrate 25 milliGRAM(s) Oral two times a day    Other:  dextrose 40% Gel 15 Gram(s) Oral once  dextrose 5%. 1000 milliLiter(s) IV Continuous <Continuous>  dextrose 5%. 1000 milliLiter(s) IV Continuous <Continuous>  dextrose 50% Injectable 25 Gram(s) IV Push once  dextrose 50% Injectable 12.5 Gram(s) IV Push once  dextrose 50% Injectable 25 Gram(s) IV Push once  fluticasone propionate 50 MICROgram(s)/spray Nasal Spray 2 Spray(s) Both Nostrils two times a day  glucagon  Injectable 1 milliGRAM(s) IntraMuscular once  insulin lispro (ADMELOG) corrective regimen sliding scale   SubCutaneous three times a day before meals  insulin lispro (ADMELOG) corrective regimen sliding scale   SubCutaneous at bedtime  lamoTRIgine 25 milliGRAM(s) Oral at bedtime  methimazole 2.5 milliGRAM(s) Oral daily  mirtazapine 15 milliGRAM(s) Oral two times a day  pantoprazole    Tablet 40 milliGRAM(s) Oral before breakfast  rivaroxaban 15 milliGRAM(s) Oral with dinner  senna 2 Tablet(s) Oral at bedtime    MEDICATIONS  (PRN):  acetaminophen   Tablet .. 650 milliGRAM(s) Oral every 6 hours PRN Temp greater or equal to 38.5C (101.3F), Mild Pain (1 - 3)  polyethylene glycol 3350 17 Gram(s) Oral daily PRN Constipation        OBJECTIVE:    I&O's Detail    2021 07:01  -  2021 07:00  --------------------------------------------------------  IN:    Oral Fluid: 855 mL  Total IN: 855 mL    OUT:    Voided (mL): 850 mL  Total OUT: 850 mL    Total NET: 5 mL      2021 07:01  -  2021 10:43  --------------------------------------------------------  IN:    Oral Fluid: 240 mL  Total IN: 240 mL    OUT:  Total OUT: 0 mL    Total NET: 240 mL    POCT Blood Glucose.: 270 mg/dL (2021 08:39)  POCT Blood Glucose.: 159 mg/dL (2021 22:23)  POCT Blood Glucose.: 106 mg/dL (2021 16:53)  POCT Blood Glucose.: 236 mg/dL (2021 12:59)      PHYSICAL EXAM:       ICU Vital Signs Last 24 Hrs  T(C): 36.5 (2021 04:50), Max: 36.5 (2021 04:50)  T(F): 97.7 (2021 04:50), Max: 97.7 (2021 04:50)  HR: 100 (2021 04:50) (76 - 100)  BP: 102/57 (2021 04:50) (102/57 - 132/66)  BP(mean): --  ABP: --  ABP(mean): --  RR: 18 (2021 04:50) (18 - 18)  SpO2: 95% (2021 04:50) (94% - 98%) on room air     General: Awake. Alert. Cooperative. No distress. Appears stated age 	  HEENT: Atraumatic. Bitemporal wasting. Anicteric. Normal oral mucosa. PERRL. EOMI.  Neck: Supple. Trachea midline. Thyroid without enlargement/tenderness/nodules. No carotid bruit. No JVD. Loss of bilateral supraclavicular fat pads.  Cardiovascular: Regular rate and rhythm. S1 S2 normal. No murmurs, rubs or gallops.  Respiratory: Respirations unlabored. Scattered rales (improved). Mild wheeze. No curvature.  Abdomen: Soft. Non-tender. Non-distended. No organomegaly. No masses. Normal bowel sounds.  Extremities: Warm to touch. No clubbing or cyanosis. No pedal edema.  Pulses: 2+ peripheral pulses all extremities.	  Skin: Normal skin color. No rashes or lesions. No ecchymoses. No cyanosis. Warm to touch.  Lymph Nodes: Cervical, supraclavicular and axillary nodes normal  Neurological: Motor and sensory examination equal and normal. A and O x 3  Psychiatry: Appropriate mood and affect.    LABS:                          8.1    8.44  )-----------( 540      ( 2021 07:13 )             27.0     CBC    WBC  8.44 <==, 7.08 <==, 11.16 <==, 11.25 <==, 10.93 <==, 12.50 <==, 14.65 <==    Hemoglobin  8.1 <<==, 8.0 <<==, 8.2 <<==, 8.1 <<==, 8.4 <<==, 6.8 <<==, 6.8 <<==    Hematocrit  27.0 <==, 26.0 <==, 27.7 <==, 27.4 <==, 27.9 <==, 23.5 <==, 23.5 <==    Platelets  540 <==, 506 <==, 528 <==, 553 <==, 546 <==, 579 <==, 609 <==      140  |  104  |  41<H>  ----------------------------<  168<H>    02-  4.8   |  26  |  1.72<H>      LYTES    sodium  140 <==, 143 <==, 139 <==, 138 <==, 137 <==, 139 <==, 141 <==    potassium   4.8 <==, 4.8 <==, 4.8 <==, 5.4 <==, 5.6 <==, 4.9 <==, 4.9 <==    chloride  104 <==, 108 <==, 103 <==, 103 <==, 101 <==, 103 <==, 102 <==    carbon dioxide  26 <==, 28 <==, 26 <==, 27 <==, 29 <==, 27 <==, 27 <==    =============================================================================================  RENAL FUNCTION:    Creatinine:   1.72  <<==, 1.49  <<==, 1.73  <<==, 1.94  <<==, 1.70  <<==, 1.87  <<==, 1.75  <<==    BUN:   41 <==, 33 <==, 38 <==, 31 <==, 23 <==, 25 <==, 22 <==    ============================================================================================    calcium   9.5 <==, 9.0 <==, 9.0 <==, 8.7 <==, 9.2 <==, 9.1 <==, 9.4 <==    phos   3.2 <==    mag   2.2 <==    ============================================================================================  LFTs    AST:   10 <==     ALT:  7  <==     AP:  116  <=    Bili:  0.3  <=      PT/INR - ( 2021 15:19 )   PT: 25.9 sec;   INR: 2.24 ratio      Venous Blood Gas:   @ 14:22  7.29/67/20/31/20  VBG Lactate: 1.7       Serum Pro-Brain Natriuretic Peptide: 3093 pg/mL ( @ 15:16)    CARDIAC MARKERS ( 2021 15:16 )  CPK x     /CKMB x     /CKMB Units x        troponin 87 ng/L    MICROBIOLOGY:     Respiratory Viral Panel with COVID-19 by [facility] (21 @ 18:20)   Rapid RVP Result: [facility]   SARS-CoV-2: NotDete: This Respiratory Panel uses polymerase chain reaction (PCR) to detect for   adenovirus; coronavirus (HKU1, NL63, 229E, OC43); human metapneumovirus   (hMPV); human enterovirus/rhinovirus (Entero/RV); influenza A; influenza   A/H1; influenza A/H3; influenza A/H1-2009; influenza B; parainfluenza   viruses 1, 2, 3, 4; respiratory syncytial virus; Mycoplasma pneumoniae;   Chlamydophila pneumoniae; and SARS-CoV-2.    Legionella pneumophila Antigen, Urine (21 @ 03:59)   Legionella Antigen, Urine: Negative: Negative       Urinalysis Basic - ( 2021 18:25 )    Color: Yellow / Appearance: Slightly Turbid / S.024 / pH: x  Gluc: x / Ketone: Negative  / Bili: Negative / Urobili: Negative   Blood: x / Protein: 30 mg/dL / Nitrite: Negative   Leuk Esterase: Small / RBC: 15 /hpf / WBC 10 /HPF   Sq Epi: x / Non Sq Epi: 1 /hpf / Bacteria: Negative    Culture - Urine (21 @ 23:16)   Specimen Source: .Urine Clean Catch (Midstream)   Culture Results:   <10,000 CFU/mL Normal Urogenital Jayleen     Culture - Blood (21 @ 23:11)   Specimen Source: .Blood Blood-Peripheral   Culture Results:   No growth to date.    Culture - Blood (21 @ 23:11)   Specimen Source: .Blood Blood-Peripheral   Culture Results:   No growth to date.     RADIOLOGY:  [x] Chest radiographs reviewed and interpreted by me    < from: Xray Chest 1 View- PORTABLE-Urgent (Xray Chest 1 View- PORTABLE-Urgent .) (21 @ 14:26) >    EXAM:  XR CHEST PORTABLE URGENT 1V                          PROCEDURE DATE:  2021      INTERPRETATION:  CLINICAL INFORMATION: Shortness of breath    EXAM: Frontal radiograph of the chest.    COMPARISON: Chest radiograph from 2018.    FINDINGS:  Left lower lung opacity.  No pneumothorax.  The heart size is normal.  The visualized osseous structures demonstrate no acute pathology.    IMPRESSION:  Left lower lung opacity, differential includes atelectasis, effusion, and/or pneumonia.    HALLE CORCORAN MD; Resident Radiology  This document has been electronically signed.  REDDY ISSA MD; Attending Radiologist  This document has been electronically signed. 2021  3:53PM    < end of copied text   ---------------------------------------------------------------------------------------------------------------  < from: CT Angio Chest w/ IV Cont (21 @ 17:14) >    EXAM:  CT ANGIO CHEST (W)AW IC                            PROCEDURE DATE:  2021      INTERPRETATION:  CLINICAL INFORMATION: Shortness of breath, hypoxia, COPD    COMPARISON: Chest CT from 2018, CT abdomen pelvis from 2018    PROCEDURE:  CT Angiography of the Chest.  90 ml of Omnipaque 350 was injected intravenously. 10 ml were discarded.  Sagittal and coronal reformats were performed as well as 3D (MIP) reconstructions.    FINDINGS:    LUNGS AND AIRWAYS: Patent central airways.  Emphysema. There is a new 1.3 cm right upper lobe spiculated pulmonary nodule (5:38). 1.1 cm left upper lobe spiculated nodule (5:45). Additional 9 mm slightly spiculated right upper lobe nodule (5:46). Numerous additional scattered tree-in-bud pulmonary nodules, increased from prior CT chest. Bilateral lower lobe reticular opacities with bronchiectasis, increased from prior.  PLEURA: Small to moderate left pleural effusion. Similar-appearing calcified right pleural plaques.  MEDIASTINUM ANDHILA: No lymphadenopathy.  VESSELS: No pulmonary embolism. 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 Atherosclerotic changes of the aorta and its visualized branches.  HEART: Heart size is normal. Small pericardial effusion. Coronary artery calcifications.  CHEST WALL AND LOWER NECK: Unchanged heterogenous thyroid gland with coarse calcifications.  VISUALIZED UPPER ABDOMEN: Bilateral renal cysts. Cholelithiasis. 5 mm pancreatic body and 4 mm pancreatic tail hypodense lesions are unchanged from prior CT abdomen pelvis dated 2018.  BONES: Unchanged L1 compression deformity. Mild compression deformity of T11.    IMPRESSION:  No pulmonary embolism.    Emphysema. Multiple new bilateral spiculated pulmonary nodules are indeterminate; recommend 3 month follow-up.    Multiple tree-in-bud nodules consistent with small airway inflammation/infection.    Small to moderate leftpleural effusion.    RYANN PAUL MD; Resident Interventional Radiology  This document has been electronically signed.  ADONAY ESCOBAR MD; Attending Radiologist  This document has been electronically signed. 2021  6:03PM    < end of copied text >  ---------------------------------------------------------------------------------------------------------------      
Patient is a 89y old  Male who presents with a chief complaint of shortness of breath, wt loss (20 Feb 2021 10:08)      SUBJECTIVE / OVERNIGHT EVENTS: feels better.  Review of Systems  chest pain no  palpitations no  sob no  nausea no  headache no    MEDICATIONS  (STANDING):  albuterol/ipratropium for Nebulization 3 milliLiter(s) Nebulizer every 6 hours  cefuroxime   Tablet 500 milliGRAM(s) Oral every 12 hours  dextrose 40% Gel 15 Gram(s) Oral once  dextrose 5%. 1000 milliLiter(s) (50 mL/Hr) IV Continuous <Continuous>  dextrose 5%. 1000 milliLiter(s) (100 mL/Hr) IV Continuous <Continuous>  dextrose 50% Injectable 25 Gram(s) IV Push once  dextrose 50% Injectable 12.5 Gram(s) IV Push once  dextrose 50% Injectable 25 Gram(s) IV Push once  diltiazem    milliGRAM(s) Oral daily  doxycycline hyclate Capsule 100 milliGRAM(s) Oral every 12 hours  fluticasone propionate 50 MICROgram(s)/spray Nasal Spray 2 Spray(s) Both Nostrils two times a day  glucagon  Injectable 1 milliGRAM(s) IntraMuscular once  insulin lispro (ADMELOG) corrective regimen sliding scale   SubCutaneous three times a day before meals  insulin lispro (ADMELOG) corrective regimen sliding scale   SubCutaneous at bedtime  lamoTRIgine 25 milliGRAM(s) Oral at bedtime  methimazole 2.5 milliGRAM(s) Oral daily  metoprolol tartrate 25 milliGRAM(s) Oral two times a day  mirtazapine 15 milliGRAM(s) Oral two times a day  pantoprazole    Tablet 40 milliGRAM(s) Oral before breakfast  rivaroxaban 15 milliGRAM(s) Oral with dinner  senna 2 Tablet(s) Oral at bedtime    MEDICATIONS  (PRN):  acetaminophen   Tablet .. 650 milliGRAM(s) Oral every 6 hours PRN Temp greater or equal to 38.5C (101.3F), Mild Pain (1 - 3)  polyethylene glycol 3350 17 Gram(s) Oral daily PRN Constipation      Vital Signs Last 24 Hrs  T(C): 36.4 (20 Feb 2021 11:47), Max: 36.8 (19 Feb 2021 17:20)  T(F): 97.6 (20 Feb 2021 11:47), Max: 98.3 (19 Feb 2021 17:20)  HR: 78 (20 Feb 2021 11:47) (78 - 83)  BP: 120/67 (20 Feb 2021 11:47) (110/53 - 134/63)  BP(mean): --  RR: 18 (20 Feb 2021 11:47) (18 - 18)  SpO2: 92% (20 Feb 2021 11:47) (90% - 92%)    PHYSICAL EXAM:  GENERAL: NAD, well-developed  HEAD:  Atraumatic, Normocephalic  EYES: EOMI, PERRLA, conjunctiva and sclera clear  NECK: Supple, No JVD  CHEST/LUNG: Clear to auscultation bilaterally; No wheeze  HEART: Regular rate and rhythm; No murmurs, rubs, or gallops  ABDOMEN: Soft, Nontender, Nondistended; Bowel sounds present  EXTREMITIES:  2+ Peripheral Pulses, No clubbing, cyanosis, or edema  PSYCH: AAOx3  NEUROLOGY: non-focal  SKIN: No rashes or lesions    LABS:                        8.2    11.16 )-----------( 528      ( 20 Feb 2021 07:06 )             27.7     02-20    139  |  103  |  38<H>  ----------------------------<  155<H>  4.8   |  26  |  1.73<H>    Ca    9.0      20 Feb 2021 07:05                Culture - Urine (collected 17 Feb 2021 23:16)  Source: .Urine Clean Catch (Midstream)  Final Report (19 Feb 2021 01:18):    <10,000 CFU/mL Normal Urogenital Jayleen    Culture - Blood (collected 17 Feb 2021 23:11)  Source: .Blood Blood-Peripheral  Preliminary Report (19 Feb 2021 01:02):    No growth to date.    Culture - Blood (collected 17 Feb 2021 23:11)  Source: .Blood Blood-Peripheral  Preliminary Report (19 Feb 2021 01:02):    No growth to date.        RADIOLOGY & ADDITIONAL TESTS:    Imaging Personally Reviewed:    Consultant(s) Notes Reviewed:      Care Discussed with Consultants/Other Providers:  
CC: f/u for pneumonia    Patient reports: no complaints, desire to be discharged    REVIEW OF SYSTEMS:  All other review of systems negative (Comprehensive ROS)    Antimicrobials Day #  :day 3  cefTRIAXone   IVPB 1000 milliGRAM(s) IV Intermittent every 24 hours  doxycycline hyclate Capsule 100 milliGRAM(s) Oral every 12 hours    Other Medications Reviewed  MEDICATIONS  (STANDING):  albuterol/ipratropium for Nebulization 3 milliLiter(s) Nebulizer every 6 hours  cefTRIAXone   IVPB 1000 milliGRAM(s) IV Intermittent every 24 hours  dextrose 40% Gel 15 Gram(s) Oral once  dextrose 5%. 1000 milliLiter(s) (50 mL/Hr) IV Continuous <Continuous>  dextrose 5%. 1000 milliLiter(s) (100 mL/Hr) IV Continuous <Continuous>  dextrose 50% Injectable 25 Gram(s) IV Push once  dextrose 50% Injectable 12.5 Gram(s) IV Push once  dextrose 50% Injectable 25 Gram(s) IV Push once  diltiazem    milliGRAM(s) Oral daily  doxycycline hyclate Capsule 100 milliGRAM(s) Oral every 12 hours  fluticasone propionate 50 MICROgram(s)/spray Nasal Spray 2 Spray(s) Both Nostrils two times a day  glucagon  Injectable 1 milliGRAM(s) IntraMuscular once  insulin lispro (ADMELOG) corrective regimen sliding scale   SubCutaneous three times a day before meals  insulin lispro (ADMELOG) corrective regimen sliding scale   SubCutaneous at bedtime  lamoTRIgine 25 milliGRAM(s) Oral at bedtime  methimazole 2.5 milliGRAM(s) Oral daily  metoprolol tartrate 25 milliGRAM(s) Oral two times a day  mirtazapine 15 milliGRAM(s) Oral two times a day  pantoprazole    Tablet 40 milliGRAM(s) Oral before breakfast  rivaroxaban 15 milliGRAM(s) Oral with dinner    T(F): 97.8 (21 @ 05:04), Max: 97.8 (21 @ 05:04)  HR: 82 (21 @ 08:43)  BP: 105/58 (21 @ 08:43)  RR: 18 (21 @ 05:04)  SpO2: 99% (21 @ 08:43)  Wt(kg): --    PHYSICAL EXAM:  General: alert, no acute distress, chronically ill appearing  Eyes:  anicteric, no conjunctival injection, no discharge  Oropharynx: no lesions or injection 	  Neck: supple, without adenopathy  Lungs: clear to auscultation, distant BS  Heart: irregular rate and rhythm; no murmur, rubs or gallops  Abdomen: soft, nondistended, nontender, without mass or organomegaly  Skin: no lesions  Extremities: no clubbing, cyanosis, or edema  Neurologic: alert, oriented, moves all extremities    LAB RESULTS:                        8.1     )-----------( 553      ( 2021 09:08 )             27.4         138  |  103  |  31<H>  ----------------------------<  164<H>  5.4<H>   |  27  |  1.94<H>    Ca    8.7      2021 09:08  Phos  3.2       Mg     2.2         TPro  6.9  /  Alb  3.4  /  TBili  0.3  /  DBili  x   /  AST  10  /  ALT  7<L>  /  AlkPhos  116      LIVER FUNCTIONS - ( 2021 15:16 )  Alb: 3.4 g/dL / Pro: 6.9 g/dL / ALK PHOS: 116 U/L / ALT: 7 U/L / AST: 10 U/L / GGT: x           Urinalysis Basic - ( 2021 18:25 )    Color: Yellow / Appearance: Slightly Turbid / S.024 / pH: x  Gluc: x / Ketone: Negative  / Bili: Negative / Urobili: Negative   Blood: x / Protein: 30 mg/dL / Nitrite: Negative   Leuk Esterase: Small / RBC: 15 /hpf / WBC 10 /HPF   Sq Epi: x / Non Sq Epi: 1 /hpf / Bacteria: Negative      MICROBIOLOGY:  RECENT CULTURES:   @ 23:16 .Urine Clean Catch (Midstream)     <10,000 CFU/mL Normal Urogenital Jyaleen       @ 23:11 .Blood Blood-Peripheral     No growth to date.          RADIOLOGY REVIEWED:    < from: CT Angio Chest w/ IV Cont (21 @ 17:14) >  IMPRESSION:  No pulmonary embolism.    Emphysema. Multiple new bilateral spiculated pulmonary nodules are indeterminate; recommend 3 month follow-up.    Multiple tree-in-bud nodules consistent with small airway inflammation/infection.    Small to moderate leftpleural effusion.    < end of copied text >  
Patient is a 89y old  Male who presents with a chief complaint of pneumonia (19 Feb 2021 09:47)      SUBJECTIVE / OVERNIGHT EVENTS: Comfortable without new complaints. Feels better  Review of Systems  chest pain no  palpitations no  sob improving   nausea no  headache no    MEDICATIONS  (STANDING):  albuterol/ipratropium for Nebulization 3 milliLiter(s) Nebulizer every 6 hours  cefTRIAXone   IVPB 1000 milliGRAM(s) IV Intermittent every 24 hours  dextrose 40% Gel 15 Gram(s) Oral once  dextrose 5%. 1000 milliLiter(s) (50 mL/Hr) IV Continuous <Continuous>  dextrose 5%. 1000 milliLiter(s) (100 mL/Hr) IV Continuous <Continuous>  dextrose 50% Injectable 25 Gram(s) IV Push once  dextrose 50% Injectable 12.5 Gram(s) IV Push once  dextrose 50% Injectable 25 Gram(s) IV Push once  diltiazem    milliGRAM(s) Oral daily  doxycycline hyclate Capsule 100 milliGRAM(s) Oral every 12 hours  fluticasone propionate 50 MICROgram(s)/spray Nasal Spray 2 Spray(s) Both Nostrils two times a day  glucagon  Injectable 1 milliGRAM(s) IntraMuscular once  insulin lispro (ADMELOG) corrective regimen sliding scale   SubCutaneous three times a day before meals  insulin lispro (ADMELOG) corrective regimen sliding scale   SubCutaneous at bedtime  lamoTRIgine 25 milliGRAM(s) Oral at bedtime  methimazole 2.5 milliGRAM(s) Oral daily  metoprolol tartrate 25 milliGRAM(s) Oral two times a day  mirtazapine 15 milliGRAM(s) Oral two times a day  pantoprazole    Tablet 40 milliGRAM(s) Oral before breakfast  rivaroxaban 15 milliGRAM(s) Oral with dinner  senna 2 Tablet(s) Oral at bedtime    MEDICATIONS  (PRN):  acetaminophen   Tablet .. 650 milliGRAM(s) Oral every 6 hours PRN Temp greater or equal to 38.5C (101.3F), Mild Pain (1 - 3)  polyethylene glycol 3350 17 Gram(s) Oral daily PRN Constipation      Vital Signs Last 24 Hrs  T(C): 36.8 (19 Feb 2021 17:20), Max: 36.8 (19 Feb 2021 17:20)  T(F): 98.3 (19 Feb 2021 17:20), Max: 98.3 (19 Feb 2021 17:20)  HR: 82 (19 Feb 2021 17:20) (75 - 82)  BP: 110/53 (19 Feb 2021 17:20) (105/58 - 125/55)  BP(mean): --  RR: 18 (19 Feb 2021 17:20) (18 - 18)  SpO2: 91% (19 Feb 2021 17:20) (87% - 99%)    PHYSICAL EXAM:  GENERAL: NAD, cachectic   HEAD:  Atraumatic, Normocephalic  EYES: EOMI, PERRLA, conjunctiva and sclera clear  NECK: Supple, No JVD  CHEST/LUNG: Clear to auscultation bilaterally; No wheeze  HEART: Regular rate and rhythm; No murmurs, rubs, or gallops  ABDOMEN: Soft, Nontender, Nondistended; Bowel sounds present  EXTREMITIES:  2+ Peripheral Pulses, No clubbing, cyanosis, or edema  PSYCH: AAOx3  NEUROLOGY: non-focal  SKIN: No rashes or lesions    LABS:                        8.1    11.25 )-----------( 553      ( 19 Feb 2021 09:08 )             27.4     02-19    138  |  103  |  31<H>  ----------------------------<  164<H>  5.4<H>   |  27  |  1.94<H>    Ca    8.7      19 Feb 2021 09:08                Culture - Urine (collected 17 Feb 2021 23:16)  Source: .Urine Clean Catch (Midstream)  Final Report (19 Feb 2021 01:18):    <10,000 CFU/mL Normal Urogenital Jayleen    Culture - Blood (collected 17 Feb 2021 23:11)  Source: .Blood Blood-Peripheral  Preliminary Report (19 Feb 2021 01:02):    No growth to date.    Culture - Blood (collected 17 Feb 2021 23:11)  Source: .Blood Blood-Peripheral  Preliminary Report (19 Feb 2021 01:02):    No growth to date.        RADIOLOGY & ADDITIONAL TESTS:    Imaging Personally Reviewed:    Consultant(s) Notes Reviewed:      Care Discussed with Consultants/Other Providers:  
Patient is a 89y old  Male who presents with a chief complaint of shortness of breath (21 Feb 2021 05:59)      SUBJECTIVE / OVERNIGHT EVENTS: Comfortable without new complaints.   Review of Systems  chest pain no  palpitations no  sob no  nausea no  headache no    MEDICATIONS  (STANDING):  cefuroxime   Tablet 500 milliGRAM(s) Oral every 12 hours  dextrose 40% Gel 15 Gram(s) Oral once  dextrose 5%. 1000 milliLiter(s) (50 mL/Hr) IV Continuous <Continuous>  dextrose 5%. 1000 milliLiter(s) (100 mL/Hr) IV Continuous <Continuous>  dextrose 50% Injectable 25 Gram(s) IV Push once  dextrose 50% Injectable 12.5 Gram(s) IV Push once  dextrose 50% Injectable 25 Gram(s) IV Push once  diltiazem    milliGRAM(s) Oral daily  doxycycline hyclate Capsule 100 milliGRAM(s) Oral every 12 hours  fluticasone propionate 50 MICROgram(s)/spray Nasal Spray 2 Spray(s) Both Nostrils two times a day  glucagon  Injectable 1 milliGRAM(s) IntraMuscular once  insulin lispro (ADMELOG) corrective regimen sliding scale   SubCutaneous three times a day before meals  insulin lispro (ADMELOG) corrective regimen sliding scale   SubCutaneous at bedtime  lamoTRIgine 25 milliGRAM(s) Oral at bedtime  methimazole 2.5 milliGRAM(s) Oral daily  metoprolol tartrate 25 milliGRAM(s) Oral two times a day  mirtazapine 15 milliGRAM(s) Oral two times a day  pantoprazole    Tablet 40 milliGRAM(s) Oral before breakfast  rivaroxaban 15 milliGRAM(s) Oral with dinner  senna 2 Tablet(s) Oral at bedtime    MEDICATIONS  (PRN):  acetaminophen   Tablet .. 650 milliGRAM(s) Oral every 6 hours PRN Temp greater or equal to 38.5C (101.3F), Mild Pain (1 - 3)  polyethylene glycol 3350 17 Gram(s) Oral daily PRN Constipation      Vital Signs Last 24 Hrs  T(C): 36.4 (21 Feb 2021 12:27), Max: 36.4 (21 Feb 2021 12:27)  T(F): 97.5 (21 Feb 2021 12:27), Max: 97.5 (21 Feb 2021 12:27)  HR: 76 (21 Feb 2021 12:27) (73 - 99)  BP: 111/61 (21 Feb 2021 12:27) (109/57 - 129/67)  BP(mean): --  RR: 18 (21 Feb 2021 12:27) (17 - 18)  SpO2: 94% (21 Feb 2021 12:27) (92% - 94%)    PHYSICAL EXAM:  GENERAL: NAD, cachectic   HEAD:  Atraumatic, Normocephalic  EYES: EOMI, PERRLA, conjunctiva and sclera clear  NECK: Supple, No JVD  CHEST/LUNG: Clear to auscultation bilaterally; No wheeze  HEART: Regular rate and rhythm; No murmurs, rubs, or gallops  ABDOMEN: Soft, Nontender, Nondistended; Bowel sounds present  EXTREMITIES:  2+ Peripheral Pulses, No clubbing, cyanosis, or edema  PSYCH: AAOx3  NEUROLOGY: non-focal  SKIN: No rashes or lesions    LABS:                        8.0    7.08  )-----------( 506      ( 21 Feb 2021 09:00 )             26.0     02-21    143  |  108  |  33<H>  ----------------------------<  141<H>  4.8   |  28  |  1.49<H>    Ca    9.0      21 Feb 2021 09:00                  RADIOLOGY & ADDITIONAL TESTS:    Imaging Personally Reviewed:    Consultant(s) Notes Reviewed:      Care Discussed with Consultants/Other Providers:  
Patient is a 89y old  Male who presents with a chief complaint of sob (2021 17:01)      SUBJECTIVE / OVERNIGHT EVENTS: Comfortable without new complaints. Weak.  Review of Systems  chest pain no  palpitations no  sob no  nausea no  headache no    MEDICATIONS  (STANDING):  albuterol/ipratropium for Nebulization 3 milliLiter(s) Nebulizer every 6 hours  cefTRIAXone   IVPB 1000 milliGRAM(s) IV Intermittent every 24 hours  dextrose 40% Gel 15 Gram(s) Oral once  dextrose 5%. 1000 milliLiter(s) (50 mL/Hr) IV Continuous <Continuous>  dextrose 5%. 1000 milliLiter(s) (100 mL/Hr) IV Continuous <Continuous>  dextrose 50% Injectable 25 Gram(s) IV Push once  dextrose 50% Injectable 12.5 Gram(s) IV Push once  dextrose 50% Injectable 25 Gram(s) IV Push once  diltiazem    milliGRAM(s) Oral daily  doxycycline hyclate Capsule 100 milliGRAM(s) Oral every 12 hours  fluticasone propionate 50 MICROgram(s)/spray Nasal Spray 2 Spray(s) Both Nostrils two times a day  glucagon  Injectable 1 milliGRAM(s) IntraMuscular once  insulin lispro (ADMELOG) corrective regimen sliding scale   SubCutaneous three times a day before meals  insulin lispro (ADMELOG) corrective regimen sliding scale   SubCutaneous at bedtime  lamoTRIgine 25 milliGRAM(s) Oral at bedtime  methimazole 2.5 milliGRAM(s) Oral daily  metoprolol tartrate 25 milliGRAM(s) Oral two times a day  mirtazapine 15 milliGRAM(s) Oral two times a day  pantoprazole    Tablet 40 milliGRAM(s) Oral before breakfast  rivaroxaban 15 milliGRAM(s) Oral with dinner    MEDICATIONS  (PRN):  acetaminophen   Tablet .. 650 milliGRAM(s) Oral every 6 hours PRN Temp greater or equal to 38.5C (101.3F), Mild Pain (1 - 3)  polyethylene glycol 3350 17 Gram(s) Oral daily PRN Constipation      Vital Signs Last 24 Hrs  T(C): 36.4 (2021 12:40), Max: 37.1 (2021 23:15)  T(F): 97.6 (2021 12:40), Max: 98.8 (2021 23:15)  HR: 67 (2021 12:40) (67 - 82)  BP: 92/50 (2021 12:40) (92/50 - 113/56)  BP(mean): 69 (2021 20:38) (69 - 69)  RR: 18 (2021 12:40) (18 - 20)  SpO2: 100% (2021 12:40) (96% - 100%)    PHYSICAL EXAM:  GENERAL: NAD, cachectic   HEAD:  Atraumatic, Normocephalic  EYES: EOMI, PERRLA, conjunctiva and sclera clear  NECK: Supple, No JVD  CHEST/LUNG: Clear to auscultation bilaterally; No wheeze  HEART: Regular rate and rhythm; No murmurs, rubs, or gallops  ABDOMEN: Soft, Nontender, Nondistended; Bowel sounds present  EXTREMITIES:  2+ Peripheral Pulses, No clubbing, cyanosis, or edema  PSYCH: AAOx3  NEUROLOGY: non-focal  SKIN: No rashes or lesions    LABS:                        6.8    12.50 )-----------( 579      ( 2021 10:38 )             23.5     02-18    137  |  101  |  23  ----------------------------<  156<H>  5.6<H>   |  29  |  1.70<H>    Ca    9.2      2021 10:38  Phos  3.2     02-17  Mg     2.2     02-17    TPro  6.9  /  Alb  3.4  /  TBili  0.3  /  DBili  x   /  AST  10  /  ALT  7<L>  /  AlkPhos  116  02-17    PT/INR - ( 2021 15:19 )   PT: 25.9 sec;   INR: 2.24 ratio         PTT - ( 2021 15:19 )  PTT:55.7 sec      Urinalysis Basic - ( 2021 18:25 )    Color: Yellow / Appearance: Slightly Turbid / S.024 / pH: x  Gluc: x / Ketone: Negative  / Bili: Negative / Urobili: Negative   Blood: x / Protein: 30 mg/dL / Nitrite: Negative   Leuk Esterase: Small / RBC: 15 /hpf / WBC 10 /HPF   Sq Epi: x / Non Sq Epi: 1 /hpf / Bacteria: Negative          RADIOLOGY & ADDITIONAL TESTS:    Imaging Personally Reviewed:    Consultant(s) Notes Reviewed:      Care Discussed with Consultants/Other Providers:

## 2021-02-22 NOTE — DISCHARGE NOTE PROVIDER - DETAILS OF MALNUTRITION DIAGNOSIS/DIAGNOSES
This patient has been assessed with a concern for Malnutrition and was treated during this hospitalization for the following Nutrition diagnosis/diagnoses:     -  02/20/2021: Severe protein-calorie malnutrition   -  02/20/2021: Underweight (BMI < 19)   This patient has been assessed with a concern for Malnutrition and was treated during this hospitalization for the following Nutrition diagnosis/diagnoses:     -  02/20/2021: Severe protein-calorie malnutrition   -  02/20/2021: Underweight (BMI < 19)    This patient has been assessed with a concern for Malnutrition and was treated during this hospitalization for the following Nutrition diagnosis/diagnoses:     -  02/20/2021: Severe protein-calorie malnutrition   -  02/20/2021: Underweight (BMI < 19)   This patient has been assessed with a concern for Malnutrition and was treated during this hospitalization for the following Nutrition diagnosis/diagnoses:     -  02/20/2021: Severe protein-calorie malnutrition   -  02/20/2021: Underweight (BMI < 19)    This patient has been assessed with a concern for Malnutrition and was treated during this hospitalization for the following Nutrition diagnosis/diagnoses:     -  02/20/2021: Severe protein-calorie malnutrition   -  02/20/2021: Underweight (BMI < 19)    This patient has been assessed with a concern for Malnutrition and was treated during this hospitalization for the following Nutrition diagnosis/diagnoses:     -  02/20/2021: Severe protein-calorie malnutrition   -  02/20/2021: Underweight (BMI < 19)   This patient has been assessed with a concern for Malnutrition and was treated during this hospitalization for the following Nutrition diagnosis/diagnoses:     -  02/20/2021: Severe protein-calorie malnutrition   -  02/20/2021: Underweight (BMI < 19)    This patient has been assessed with a concern for Malnutrition and was treated during this hospitalization for the following Nutrition diagnosis/diagnoses:     -  02/20/2021: Severe protein-calorie malnutrition   -  02/20/2021: Underweight (BMI < 19)    This patient has been assessed with a concern for Malnutrition and was treated during this hospitalization for the following Nutrition diagnosis/diagnoses:     -  02/20/2021: Severe protein-calorie malnutrition   -  02/20/2021: Underweight (BMI < 19)    This patient has been assessed with a concern for Malnutrition and was treated during this hospitalization for the following Nutrition diagnosis/diagnoses:     -  02/20/2021: Severe protein-calorie malnutrition   -  02/20/2021: Underweight (BMI < 19)   This patient has been assessed with a concern for Malnutrition and was treated during this hospitalization for the following Nutrition diagnosis/diagnoses:     -  02/20/2021: Severe protein-calorie malnutrition   -  02/20/2021: Underweight (BMI < 19)    This patient has been assessed with a concern for Malnutrition and was treated during this hospitalization for the following Nutrition diagnosis/diagnoses:     -  02/20/2021: Severe protein-calorie malnutrition   -  02/20/2021: Underweight (BMI < 19)    This patient has been assessed with a concern for Malnutrition and was treated during this hospitalization for the following Nutrition diagnosis/diagnoses:     -  02/20/2021: Severe protein-calorie malnutrition   -  02/20/2021: Underweight (BMI < 19)    This patient has been assessed with a concern for Malnutrition and was treated during this hospitalization for the following Nutrition diagnosis/diagnoses:     -  02/20/2021: Severe protein-calorie malnutrition   -  02/20/2021: Underweight (BMI < 19)    This patient has been assessed with a concern for Malnutrition and was treated during this hospitalization for the following Nutrition diagnosis/diagnoses:     -  02/20/2021: Severe protein-calorie malnutrition   -  02/20/2021: Underweight (BMI < 19)   This patient has been assessed with a concern for Malnutrition and was treated during this hospitalization for the following Nutrition diagnosis/diagnoses:     -  02/20/2021: Severe protein-calorie malnutrition   -  02/20/2021: Underweight (BMI < 19)    This patient has been assessed with a concern for Malnutrition and was treated during this hospitalization for the following Nutrition diagnosis/diagnoses:     -  02/20/2021: Severe protein-calorie malnutrition   -  02/20/2021: Underweight (BMI < 19)    This patient has been assessed with a concern for Malnutrition and was treated during this hospitalization for the following Nutrition diagnosis/diagnoses:     -  02/20/2021: Severe protein-calorie malnutrition   -  02/20/2021: Underweight (BMI < 19)    This patient has been assessed with a concern for Malnutrition and was treated during this hospitalization for the following Nutrition diagnosis/diagnoses:     -  02/20/2021: Severe protein-calorie malnutrition   -  02/20/2021: Underweight (BMI < 19)    This patient has been assessed with a concern for Malnutrition and was treated during this hospitalization for the following Nutrition diagnosis/diagnoses:     -  02/20/2021: Severe protein-calorie malnutrition   -  02/20/2021: Underweight (BMI < 19)    This patient has been assessed with a concern for Malnutrition and was treated during this hospitalization for the following Nutrition diagnosis/diagnoses:     -  02/20/2021: Severe protein-calorie malnutrition   -  02/20/2021: Underweight (BMI < 19)   This patient has been assessed with a concern for Malnutrition and was treated during this hospitalization for the following Nutrition diagnosis/diagnoses:     -  02/20/2021: Severe protein-calorie malnutrition   -  02/20/2021: Underweight (BMI < 19)    This patient has been assessed with a concern for Malnutrition and was treated during this hospitalization for the following Nutrition diagnosis/diagnoses:     -  02/20/2021: Severe protein-calorie malnutrition   -  02/20/2021: Underweight (BMI < 19)    This patient has been assessed with a concern for Malnutrition and was treated during this hospitalization for the following Nutrition diagnosis/diagnoses:     -  02/20/2021: Severe protein-calorie malnutrition   -  02/20/2021: Underweight (BMI < 19)    This patient has been assessed with a concern for Malnutrition and was treated during this hospitalization for the following Nutrition diagnosis/diagnoses:     -  02/20/2021: Severe protein-calorie malnutrition   -  02/20/2021: Underweight (BMI < 19)    This patient has been assessed with a concern for Malnutrition and was treated during this hospitalization for the following Nutrition diagnosis/diagnoses:     -  02/20/2021: Severe protein-calorie malnutrition   -  02/20/2021: Underweight (BMI < 19)    This patient has been assessed with a concern for Malnutrition and was treated during this hospitalization for the following Nutrition diagnosis/diagnoses:     -  02/20/2021: Severe protein-calorie malnutrition   -  02/20/2021: Underweight (BMI < 19)    This patient has been assessed with a concern for Malnutrition and was treated during this hospitalization for the following Nutrition diagnosis/diagnoses:     -  02/20/2021: Severe protein-calorie malnutrition   -  02/20/2021: Underweight (BMI < 19)

## 2021-02-22 NOTE — PROGRESS NOTE ADULT - PROVIDER SPECIALTY LIST ADULT
Infectious Disease
Infectious Disease
Internal Medicine
Infectious Disease
Infectious Disease
Internal Medicine
Internal Medicine
Pulmonology
Infectious Disease
Internal Medicine
Pulmonology
Internal Medicine

## 2021-02-22 NOTE — PROGRESS NOTE ADULT - ASSESSMENT
89 yo male with DM, A fib,monoclonal gammapathy,and failure to thrive admitted with cough, shortness of breath, and wt loss .  He is being treated for a CAP with CTX and doxy.  Blood cultures are negative, RVP negative, and legionella urine antigen is negative.  His leukocytosis is moderating.  Continue present regime although we could switch to ceftiin/doxy to complete a 7 day course if discharge is planned.
89 yo male with DM, A fib,monoclonal gammapathy,and failure to thrive admitted with cough, shortness of breath, and wt loss .  He is being treated for a CAP with CTX and doxy.  Blood cultures are negative, RVP negative, and legionella urine antigen is negative.  His leukocytosis is moderating.  He appears clinically stable  Switched to oral antibiotics on 2/20  clinically he reports he is feeling better   he is afebrile  wbc is wnl   sats are 98% RA    Suggest:  1.Continue ceftin/doxy last day planned for tomorrow as per previous notes   2.Day 6/7 of antibiotics   3.Discharge planning per primary service      
89 m with    Pneumonia  - antibiotics  - ID follow  - Pulmonary follow  - nebs    Atrial fibrillation   - rate control  - AC    Anemia  - Tx support  - Guaiac    COPD (chronic obstructive pulmonary disease)   - nebs    Diabetes type 2, controlled   - ADA diet   - BS control    Goiter   - continue Rx  - TSH    Osito Devlin MD pager [phone]   
89 m with    Pneumonia improving   - antibiotics  - ID follow  - Pulmonary follow  - nebs    Atrial fibrillation   - rate control  - AC    Anemia  - Tx support  - Guaiac    COPD (chronic obstructive pulmonary disease)   - nebs    Diabetes type 2, controlled   - ADA diet   - BS control    Goiter   - continue Rx  - TSH    PT    DVT prophylaxis    DC home. Home care arrangements in place. Follow with PMD in 3-4 days.     Osito Devlin MD pager [phone]   
89 m with    Pneumonia improving   - antibiotics  - ID follow  - Pulmonary follow  - nebs    Atrial fibrillation   - rate control  - AC    Anemia  - Tx support  - Guaiac    COPD (chronic obstructive pulmonary disease)   - nebs    Diabetes type 2, controlled   - ADA diet   - BS control    Goiter   - continue Rx  - TSH    PT    DVT prophylaxis    DCP in progress. Needs home care arrangements.     Osito Devlin MD pager [phone]   
ASSESSMENT:    abnormal chest CT in the setting of a new cough, shortness of breath, bronchospasm, leukocytosis and hallucinations all suggestive of infection - RVP and COVID negative - blood and urine cultures are negative - urine Legionella antigen is negative - leukocytosis has resolved - patient is clinically well but with mild bronchospasm today    1) scattered tree in bud pulmonary nodules suggestive of inflammation/infection (pneumonia) - possible MAC infection  2) bilateral lower lobe reticular opacities with bronchiectasis (pulmonary fibrosis)  3) emphysema  4) scattered bilateral spiculated pulmonary nodules concerning for metastatic disease  5) small to moderate left pleural effusion  6) 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 Plan of care discussed with the patient at bedside. No pulmonary objection to discharge. Will arrange follow-up in our office in ~ 1 month.    Scott Cormier MD, [facility]  [phone]  Pulmonary Medicine      
ASSESSMENT:    abnormal chest CT in the setting of a new cough, shortness of breath, bronchospasm, leukocytosis and hallucinations all suggestive of infection - RVP and COVID negative - blood and urine cultures are negative - urine Legionella antigen is negative - leukocytosis is moderating    1) scattered tree in bud pulmonary nodules suggestive of inflammation/infection (pneumonia) - possible MAC infection  2) bilateral lower lobe reticular opacities with bronchiectasis (pulmonary fibrosis)  3) emphysema  4) scattered bilateral spiculated pulmonary nodules concerning for metastatic disease  5) small to moderate left pleural effusion  6) 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 Plan of care discussed with the patient and his RN at bedside    Scott Cormier MD, [facility]  [phone]  Pulmonary Medicine      
ASSESSMENT:    abnormal chest CT in the setting of a new cough, shortness of breath, bronchospasm, leukocytosis and hallucinations all suggestive of infection - RVP and COVID negative - blood and urine cultures are negative - urine Legionella antigen is negative - leukocytosis has resolved - patient is clinically well    1) scattered tree in bud pulmonary nodules suggestive of inflammation/infection (pneumonia) - possible MAC infection  2) bilateral lower lobe reticular opacities with bronchiectasis (pulmonary fibrosis)  3) emphysema  4) scattered bilateral spiculated pulmonary nodules concerning for metastatic disease  5) small to moderate left pleural effusion  6) 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 Plan of care discussed with the patient at bedside. Discharge may need to be delayed to set up home aides.    Scott Cormier MD, [facility]  [phone]  Pulmonary Medicine      
89 m with    Pneumonia  - antibiotics  - ID follow  - Pulmonary follow  - nebs    Atrial fibrillation   - rate control  - AC    Anemia  - Tx support  - Guaiac    COPD (chronic obstructive pulmonary disease)   - nebs    Diabetes type 2, controlled   - ADA diet   - BS control    Goiter   - continue Rx  - TSH    PT    DVT prophylaxis    DCP in progress    Osito Devlin MD pager [phone]   
89 m with    Pneumonia improving   - antibiotics  - ID follow  - Pulmonary follow  - nebs    Atrial fibrillation   - rate control  - AC    Anemia  - Tx support  - Guaiac    COPD (chronic obstructive pulmonary disease)   - nebs    Diabetes type 2, controlled   - ADA diet   - BS control    Goiter   - continue Rx  - TSH    PT    DVT prophylaxis    DCP in progress. Needs home care arrangements.     Osito Devlin MD pager [phone]   
ASSESSMENT:    abnormal chest CT in the setting of a new cough, shortness of breath, bronchospasm, leukocytosis and hallucinations all suggestive of infection - RVP and COVID negative - blood and urine cultures are negative - urine Legionella antigen is negative - leukocytosis is moderating - patient is clinically well    1) scattered tree in bud pulmonary nodules suggestive of inflammation/infection (pneumonia) - possible MAC infection  2) bilateral lower lobe reticular opacities with bronchiectasis (pulmonary fibrosis)  3) emphysema  4) scattered bilateral spiculated pulmonary nodules concerning for metastatic disease  5) small to moderate left pleural effusion  6) 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 Plan of care discussed with the patient and the dedicated floor NP. Discharge may need to be delayed to set up home aides.    Scott Cormier MD, [facility]  [phone]  Pulmonary Medicine      
89 yo male with DM, A fib,monoclonal gammapathy,and failure to thrive admitted with cough, shortness of breath, and wt loss .  He is being treated for a CAP with CTX and doxy.  Blood cultures are negative, RVP negative, and legionella urine antigen is negative.  His leukocytosis is moderating.  He appears clinically stable  Suggest:  1.will switch to ceftin/doxy , both po  2.Day 4/7  3.Discharge planning per primary service      
89 yo male with DM, A fib,monoclonal gammapathy,and failure to thrive admitted with cough, shortness of breath, and wt loss .  He is being treated for a CAP with CTX and doxy.  Blood cultures are negative, RVP negative, and legionella urine antigen is negative.  His leukocytosis is moderating.  He appears clinically stable  Switched to oral antibiotics on 2/20  Suggest:  1.Continue ceftin/doxy , both po  2.Day 5/7  3.Discharge planning per primary service

## 2021-02-22 NOTE — DISCHARGE NOTE PROVIDER - NSDCFUADDAPPT_GEN_ALL_CORE_FT
You will need to follow up with your primary medical doctor within 3-4 days of discharge -please call to make an appointment.  At this appointment, you will need a CBC and BMP drawn to monitor your hemoglobin/hematocrit and your creatinine level.    You will need to follow up with your pulmonologist within one week of discharge - please call to make an appointment. You will need to follow up with your primary medical doctor within 3-4 days of discharge -please call to make an appointment.  At this appointment, you will need a CBC and BMP drawn to monitor your hemoglobin/hematocrit and your creatinine level.    You will need to follow up with your pulmonologist within one week of discharge - please call to make an appointment.  At this appointment, you can schedule your repeat CT chest.

## 2021-02-22 NOTE — DISCHARGE NOTE PROVIDER - HOSPITAL COURSE
89 year old male with pmhx COPD, bipolar, monoclonal gammopathy, anemia with history transfusions and iron infusions, Afib on eliquis, DM (no longer on meds) presents with SOB, intermittent coughing, and hallucinations at night.  Chest Xray shows left lower lung opacity.  CTA chest shows no pulmonary embolism, emphysema, multiple new bilateral spiculated pulmonary nodules are indeterminate, multiple tree-in-bud nodules consistent with small airway inflammation/infection, and small to moderate left pleural effusion.  Pulmonary and Infectious Disease consulted.  IV antibioitics started.  CT Head is negative.  Speech and swallow evaluated     89 year old male with pmhx COPD, bipolar, monoclonal gammopathy, anemia with history transfusions and iron infusions, Afib on eliquis, DM (no longer on meds) presents with SOB, intermittent coughing, and hallucinations at night.  Chest Xray shows left lower lung opacity.  CTA chest shows no pulmonary embolism, emphysema, multiple new bilateral spiculated pulmonary nodules are indeterminate, multiple tree-in-bud nodules consistent with small airway inflammation/infection, and small to moderate left pleural effusion.  Pulmonary and Infectious Disease consulted.  IV antibioitics started.  s/p one unit of PRBCs secondary to anemia.  CT Head with no acute findings.  CBC monitored and stable.  Speech and swallow evaluated     89 year old male with pmhx COPD, bipolar, monoclonal gammopathy, anemia with history transfusions and iron infusions, Afib on eliquis, DM (no longer on meds) presents with SOB, intermittent coughing, and hallucinations at night.  Chest Xray shows left lower lung opacity.  CTA chest shows no pulmonary embolism, emphysema, multiple new bilateral spiculated pulmonary nodules are indeterminate, multiple tree-in-bud nodules consistent with small airway inflammation/infection, and small to moderate left pleural effusion.  Pulmonary and Infectious Disease consulted.  IV antibioitics started.  s/p one unit of PRBCs secondary to anemia.  CT Head with no acute findings.  CBC monitored and stable.  Speech and swallow evaluated and recommends Dysphagia 2 with Nectar-thick liquids - patient refusing at this time, attending aware - monitoring on regular diet.  Aspiration precautions in place.  Antibiotics changed to po. 89 year old male with pmhx COPD, bipolar, monoclonal gammopathy, anemia with history transfusions and iron infusions, Afib on eliquis, DM (no longer on meds) presents with SOB, intermittent coughing, and hallucinations at night.  Chest Xray shows left lower lung opacity.  CTA chest shows no pulmonary embolism, emphysema, multiple new bilateral spiculated pulmonary nodules are indeterminate, multiple tree-in-bud nodules consistent with small airway inflammation/infection, and small to moderate left pleural effusion.  Pulmonary and Infectious Disease consulted.  IV antibioitics started.  s/p one unit of PRBCs secondary to anemia.  CT Head with no acute findings.  CBC monitored and stable.  Speech and swallow evaluated and recommends Dysphagia 2 with Nectar-thick liquids - patient refusing at this time, attending aware - monitoring on regular diet.  Aspiration precautions in place.  Antibiotics changed to po.  Patient cleared for discharge by Dr. Devlin, with PMD follow up. 89 year old male with pmhx COPD, bipolar, monoclonal gammopathy, anemia with history transfusions and iron infusions, Afib on eliquis, DM (no longer on meds) presents with SOB, intermittent coughing, and hallucinations at night.  Chest Xray shows left lower lung opacity.  CTA chest shows no pulmonary embolism, emphysema, multiple new bilateral spiculated pulmonary nodules are indeterminate, multiple tree-in-bud nodules consistent with small airway inflammation/infection, and small to moderate left pleural effusion.  Pulmonary and Infectious Disease consulted.  IV antibioitics started.  s/p one unit of PRBCs secondary to anemia.  CT Head with no acute findings.  CBC monitored and stable.  Speech and swallow evaluated and recommends Dysphagia 2 with Nectar-thick liquids - patient refusing at this time, attending aware - monitoring on regular diet.  Aspiration precautions in place.  Antibiotics changed to po.  Patient cleared for discharge by Dr. Devlin, with PMD and Pulmonary follow up.

## 2021-02-22 NOTE — DISCHARGE NOTE PROVIDER - NSDCHC_MEDRECSTATUS_GEN_ALL_CORE
The pt is returning a nurse's call, and states that a detailed v/m can be left at [phone]. Please advise. Admission Reconciliation is Completed  Discharge Reconciliation is Not Complete Admission Reconciliation is Completed  Discharge Reconciliation is Completed

## 2021-02-22 NOTE — DISCHARGE NOTE PROVIDER - NSDCCPCAREPLAN_GEN_ALL_CORE_FT
PRINCIPAL DISCHARGE DIAGNOSIS  Diagnosis: Shortness of breath  Assessment and Plan of Treatment: Improved with antibiotics      SECONDARY DISCHARGE DIAGNOSES  Diagnosis: Anemia  Assessment and Plan of Treatment: You will need to follow up with your primary medical doctor within 3-4 days of discharge - at this appointment, you will need a CBC drawn to have your hemoglobin/hematocrit monitored.    Diagnosis: Afib  Assessment and Plan of Treatment: Atrial fibrillation is the most common heart rhythm problem.  The condition puts you at risk for has stroke and heart attack  It helps if you control your blood pressure, not drink more than 1-2 alcohol drinks per day, cut down on caffeine, getting treatment for over active thyroid gland, and get regular exercise  Call your doctor if you feel your heart racing or beating unusually, chest tightness or pain, lightheaded, faint, shortness of breath especially with exercise  It is important to take your heart medication as prescribed  You may be on anticoagulation which is very important to take as directed - you may need blood work to monitor drug levels      Diagnosis: CAP (community acquired pneumonia)  Assessment and Plan of Treatment: Pneumonia is a lung infection that can cause a fever, cough, and trouble breathing.  Continue all antibiotics as ordered until complete.  Nutrition is important, eat small frequent meals.  Get lots of rest and drink fluids.  Call your health care provider upon arrival home from hospital and make a follow up appointment for one week.  If your cough worsens, you develop fever greater than 101', you have shaking chills, a fast heartbeat, trouble breathing and/or feel your are breathing much faster than usual, call your healthcare provider.  Make sure you wash your hands frequently.      Diagnosis: COPD without exacerbation  Assessment and Plan of Treatment:      PRINCIPAL DISCHARGE DIAGNOSIS  Diagnosis: Shortness of breath  Assessment and Plan of Treatment: Improved with antibiotics      SECONDARY DISCHARGE DIAGNOSES  Diagnosis: Anemia  Assessment and Plan of Treatment: You will need to follow up with your primary medical doctor within 3-4 days of discharge - at this appointment, you will need a CBC drawn to have your hemoglobin/hematocrit monitored.    Diagnosis: Chronic kidney disease (CKD)  Assessment and Plan of Treatment: When you follow up with your primary medical doctor, you will need to have your creatinine level drawn to monitor.  Avoid taking (NSAIDs) - (ex: Ibuprofen, Advil, Celebrex, Naprosyn)  Avoid taking any nephrotoxic agents (can harm kidneys) - Intravenous contrast for diagnostic testing, combination cold medications.  Have all medications adjusted for your renal function by your Health Care Provider.  Blood pressure control is important.  Take all medication as prescribed.      Diagnosis: Afib  Assessment and Plan of Treatment: Atrial fibrillation is the most common heart rhythm problem.  The condition puts you at risk for has stroke and heart attack  It helps if you control your blood pressure, not drink more than 1-2 alcohol drinks per day, cut down on caffeine, getting treatment for over active thyroid gland, and get regular exercise  Call your doctor if you feel your heart racing or beating unusually, chest tightness or pain, lightheaded, faint, shortness of breath especially with exercise  It is important to take your heart medication as prescribed  You may be on anticoagulation which is very important to take as directed - you may need blood work to monitor drug levels      Diagnosis: CAP (community acquired pneumonia)  Assessment and Plan of Treatment: Pneumonia is a lung infection that can cause a fever, cough, and trouble breathing.  Continue all antibiotics as ordered until complete.  Nutrition is important, eat small frequent meals.  Get lots of rest and drink fluids.  Call your health care provider upon arrival home from hospital and make a follow up appointment for one week.  If your cough worsens, you develop fever greater than 101', you have shaking chills, a fast heartbeat, trouble breathing and/or feel your are breathing much faster than usual, call your healthcare provider.  Make sure you wash your hands frequently.      Diagnosis: COPD without exacerbation  Assessment and Plan of Treatment: You will need to follow up with your pulmonologist within one week of discharge - please call to make an appointment.     PRINCIPAL DISCHARGE DIAGNOSIS  Diagnosis: Shortness of breath  Assessment and Plan of Treatment: Improved with antibiotics      SECONDARY DISCHARGE DIAGNOSES  Diagnosis: Dysphagia  Assessment and Plan of Treatment: It is recommended that you maintain a Dysphagia 2 with nectar thickened liquids to avoid aspiration.  You will need an [facility] scheduled outpatient - follow up with your PMD to schedule.    Diagnosis: Pulmonary nodules  Assessment and Plan of Treatment: You will need a follow up CT chest within 3 months to re-evaluate.  Your pulmonologist can schedule this CT scan.    Diagnosis: Anemia  Assessment and Plan of Treatment: You will need to follow up with your primary medical doctor within 3-4 days of discharge - at this appointment, you will need a CBC drawn to have your hemoglobin/hematocrit monitored.    Diagnosis: Chronic kidney disease (CKD)  Assessment and Plan of Treatment: When you follow up with your primary medical doctor, you will need to have your creatinine level drawn to monitor.  Avoid taking (NSAIDs) - (ex: Ibuprofen, Advil, Celebrex, Naprosyn)  Avoid taking any nephrotoxic agents (can harm kidneys) - Intravenous contrast for diagnostic testing, combination cold medications.  Have all medications adjusted for your renal function by your Health Care Provider.  Blood pressure control is important.  Take all medication as prescribed.      Diagnosis: Afib  Assessment and Plan of Treatment: Atrial fibrillation is the most common heart rhythm problem.  The condition puts you at risk for has stroke and heart attack  It helps if you control your blood pressure, not drink more than 1-2 alcohol drinks per day, cut down on caffeine, getting treatment for over active thyroid gland, and get regular exercise  Call your doctor if you feel your heart racing or beating unusually, chest tightness or pain, lightheaded, faint, shortness of breath especially with exercise  It is important to take your heart medication as prescribed  You may be on anticoagulation which is very important to take as directed - you may need blood work to monitor drug levels      Diagnosis: CAP (community acquired pneumonia)  Assessment and Plan of Treatment: Pneumonia is a lung infection that can cause a fever, cough, and trouble breathing.  Continue all antibiotics as ordered until complete.  Nutrition is important, eat small frequent meals.  Get lots of rest and drink fluids.  Call your health care provider upon arrival home from hospital and make a follow up appointment for one week.  If your cough worsens, you develop fever greater than 101', you have shaking chills, a fast heartbeat, trouble breathing and/or feel your are breathing much faster than usual, call your healthcare provider.  Make sure you wash your hands frequently.      Diagnosis: COPD without exacerbation  Assessment and Plan of Treatment: You will need to follow up with your pulmonologist within one week of discharge - please call to make an appointment.     PRINCIPAL DISCHARGE DIAGNOSIS  Diagnosis: Shortness of breath  Assessment and Plan of Treatment: Improved with antibiotics      SECONDARY DISCHARGE DIAGNOSES  Diagnosis: Dysphagia  Assessment and Plan of Treatment: It is recommended that you maintain a Dysphagia 2 (mechanical soft) with honey thickened liquids to avoid aspiration.  You will need an [facility] scheduled outpatient - follow up with your PMD to schedule.    Diagnosis: Pulmonary nodules  Assessment and Plan of Treatment: You will need a follow up CT chest within 3 months to re-evaluate.  Your pulmonologist can schedule this CT scan.    Diagnosis: Anemia  Assessment and Plan of Treatment: You will need to follow up with your primary medical doctor within 3-4 days of discharge - at this appointment, you will need a CBC drawn to have your hemoglobin/hematocrit monitored.    Diagnosis: Chronic kidney disease (CKD)  Assessment and Plan of Treatment: When you follow up with your primary medical doctor, you will need to have your creatinine level drawn to monitor.  Avoid taking (NSAIDs) - (ex: Ibuprofen, Advil, Celebrex, Naprosyn)  Avoid taking any nephrotoxic agents (can harm kidneys) - Intravenous contrast for diagnostic testing, combination cold medications.  Have all medications adjusted for your renal function by your Health Care Provider.  Blood pressure control is important.  Take all medication as prescribed.      Diagnosis: Afib  Assessment and Plan of Treatment: Atrial fibrillation is the most common heart rhythm problem.  The condition puts you at risk for has stroke and heart attack  It helps if you control your blood pressure, not drink more than 1-2 alcohol drinks per day, cut down on caffeine, getting treatment for over active thyroid gland, and get regular exercise  Call your doctor if you feel your heart racing or beating unusually, chest tightness or pain, lightheaded, faint, shortness of breath especially with exercise  It is important to take your heart medication as prescribed  You may be on anticoagulation which is very important to take as directed - you may need blood work to monitor drug levels      Diagnosis: CAP (community acquired pneumonia)  Assessment and Plan of Treatment: Pneumonia is a lung infection that can cause a fever, cough, and trouble breathing.  Continue all antibiotics as ordered until complete.  Nutrition is important, eat small frequent meals.  Get lots of rest and drink fluids.  Call your health care provider upon arrival home from hospital and make a follow up appointment for one week.  If your cough worsens, you develop fever greater than 101', you have shaking chills, a fast heartbeat, trouble breathing and/or feel your are breathing much faster than usual, call your healthcare provider.  Make sure you wash your hands frequently.      Diagnosis: COPD without exacerbation  Assessment and Plan of Treatment: You will need to follow up with your pulmonologist within one week of discharge - please call to make an appointment.

## 2021-02-22 NOTE — CHART NOTE - NSCHARTNOTEFT_GEN_A_CORE
Request from Dr. Devlin to facilitate patient discharge.  Medication reconciliation reviewed, revised, and resolved with Dr. Devlin, who has medically cleared patient for discharge with follow up as advised.  Please refer to discharge note for detailed hospital course.

## 2021-02-22 NOTE — PROGRESS NOTE ADULT - NUTRITIONAL ASSESSMENT
This patient has been assessed with a concern for Malnutrition and has been determined to have a diagnosis/diagnoses of Severe protein-calorie malnutrition and Underweight (BMI < 19).    This patient is being managed with:   Diet Regular-  Consistent Carbohydrate {Evening Snack} (CSTCHOSN)  Entered: Feb 17 2021  8:10PM    

## 2021-02-22 NOTE — PHARMACOTHERAPY INTERVENTION NOTE - COMMENTS
89 yo M currently on ceftin 500mg PO Q12H and doxycycline 100mg PO Q12H for PNA x 7 days. Today is day 6 of 7. SCr 1.72; Crcl 21.7. Would recommend renal dosing ceftin to 500mg PO Q24H x 1 tomrrow (already received dose for today).    Bucky Claros, PharmD, BCPS  [phone]

## 2021-02-22 NOTE — DISCHARGE NOTE PROVIDER - NSDCMRMEDTOKEN_GEN_ALL_CORE_FT
albuterol 90 mcg/inh inhalation aerosol: 2 puff(s) inhaled 4 times a day, As Needed  dilTIAZem 180 mg/24 hours oral capsule, extended release: 1 cap(s) orally once a day  Flonase 50 mcg/inh nasal spray: 1 spray(s) in each nasal, As directed  lamoTRIgine 25 mg oral tablet: 1 tab(s) orally once a day (at bedtime)  methIMAzole 5 mg oral tablet: 0.5 tab(s) orally once a day  metoprolol tartrate 25 mg oral tablet: 1 tab(s) orally 2 times a day  mirtazapine 15 mg oral tablet: 1 tab(s) orally in the morning and 2 tab(s) at bedtime  pantoprazole 40 mg oral delayed release tablet: 1 tab(s) orally once a day  polyethylene glycol 3350 oral powder for reconstitution: 17 gram(s) orally once a day, As Needed  Trelegy Ellipta: 1 puff(s) inhaled once a day  Tylenol: prn  Xarelto 15 mg oral tablet: 1 tab(s) orally once a day (in the evening)   acetaminophen 325 mg oral tablet: 2 tab(s) orally every 6 hours, As needed, Temp greater or equal to 38.5C (101.3F), Mild Pain (1 - 3)  albuterol 90 mcg/inh inhalation aerosol: 2 puff(s) inhaled 4 times a day, As Needed  cefuroxime 500 mg oral tablet: 1 tab(s) orally every 24 hours  Take on 2/23/2021 and then discontinue  dilTIAZem 180 mg/24 hours oral capsule, extended release: 1 cap(s) orally once a day  Flonase 50 mcg/inh nasal spray: 1 spray(s) in each nasal, As directed  lamoTRIgine 25 mg oral tablet: 1 tab(s) orally once a day (at bedtime)  methIMAzole 5 mg oral tablet: 0.5 tab(s) orally once a day  metoprolol tartrate 25 mg oral tablet: 1 tab(s) orally 2 times a day  mirtazapine 15 mg oral tablet: 1 tab(s) orally in the morning and 2 tab(s) at bedtime  pantoprazole 40 mg oral delayed release tablet: 1 tab(s) orally once a day  polyethylene glycol 3350 oral powder for reconstitution: 17 gram(s) orally once a day, As Needed - for constipation  Trelegy Ellipta: 1 puff(s) inhaled once a day  Xarelto 15 mg oral tablet: 1 tab(s) orally once a day (in the evening)

## 2021-02-22 NOTE — DISCHARGE NOTE NURSING/CASE MANAGEMENT/SOCIAL WORK - PATIENT PORTAL LINK FT
You can access the FollowMyHealth Patient Portal offered by [facility] by registering at the following website: [email or web address]. By joining [employer]’s FollowMyHealth portal, you will also be able to view your health information using other applications (apps) compatible with our system.

## 2021-02-22 NOTE — DISCHARGE NOTE PROVIDER - INSTRUCTIONS
Dysphagia 2 with nectar thickened liquids  Aspiration precautions Dysphagia 2 (mechanical soft) with honey thickened liquids  Aspiration precautions

## 2021-02-22 NOTE — DISCHARGE NOTE PROVIDER - CARE PROVIDER_API CALL
[employer] Osito Devlin  INTERNAL MEDICINE  [address] [place] [address]  Phone: [phone]  Fax: [phone]  Follow Up Time:

## 2021-02-22 NOTE — DISCHARGE NOTE NURSING/CASE MANAGEMENT/SOCIAL WORK - NSDCFUADDAPPT_GEN_ALL_CORE_FT
You will need to follow up with your primary medical doctor within 3-4 days of discharge -please call to make an appointment.  At this appointment, you will need a CBC and BMP drawn to monitor your hemoglobin/hematocrit and your creatinine level.    You will need to follow up with your pulmonologist within one week of discharge - please call to make an appointment.  At this appointment, you can schedule your repeat CT chest.

## 2021-03-19 NOTE — ED ADULT NURSE NOTE - FALLEN IN THE PAST
Bed: 05  Expected date: 3/19/21  Expected time: 11:07 AM  Means of arrival: [facility]  Comments:   no

## 2021-03-26 NOTE — CONSULT NOTE ADULT - SUBJECTIVE AND OBJECTIVE BOX
Chief Complaint:  Patient is a 89y old  Male who presents with a chief complaint of gi bleed anemia 89yo M pmhx COPD, bipolar, monoclonal gammopathy, anemia with history transfusions and iron infusions, afib on eliquis, DM (no longer on meds) presents with hypoxia. Pt's visiting nurse was checking vitals of pt, found to be hypoxic to 70's. Pt at that time not complaining of sob, dyspnia, was not doing anything active at that time. Per daughter, pt was confused this morning, stating he had a friend who was visiting for 3 hrs, calling daughter about aid not arriving at early in AM. not on oxygen at home, uses walker at home. PT denies chest pain, sob, abd pain, nausea, vomitting. currently aox2      Review of Systems:  Review of Systems: REVIEW OF SYSTEMS:    CONSTITUTIONAL: + weakness, no fevers or chills  EYES/ENT: No visual changes;  No vertigo or throat pain   NECK: No pain or stiffness  RESPIRATORY: No cough, wheezing, hemoptysis; + shortness of breath  CARDIOVASCULAR: No chest pain or palpitations  GASTROINTESTINAL: No abdominal or epigastric pain. No nausea, vomiting, or hematemesis; No diarrhea or constipation. No melena or hematochezia.  GENITOURINARY: No dysuria, frequency or hematuria  NEUROLOGICAL: No numbness or weakness  SKIN: No itching, burning, rashes, or lesions   All other review of systems is negative unless indicated above.  has had a scope in the past    Allergies:  Tylenol with Codeine #3 (Other)      Medications:  acetaminophen   Tablet .. 650 milliGRAM(s) Oral every 6 hours PRN  ALBUTerol    90 MICROgram(s) HFA Inhaler 2 Puff(s) Inhalation every 6 hours  dextrose 40% Gel 15 Gram(s) Oral once  dextrose 5%. 1000 milliLiter(s) IV Continuous <Continuous>  dextrose 5%. 1000 milliLiter(s) IV Continuous <Continuous>  dextrose 50% Injectable 25 Gram(s) IV Push once  dextrose 50% Injectable 12.5 Gram(s) IV Push once  dextrose 50% Injectable 25 Gram(s) IV Push once  diltiazem    milliGRAM(s) Oral daily  fluticasone propionate 50 MICROgram(s)/spray Nasal Spray 2 Spray(s) Both Nostrils two times a day  glucagon  Injectable 1 milliGRAM(s) IntraMuscular once  insulin lispro (ADMELOG) corrective regimen sliding scale   SubCutaneous three times a day before meals  insulin lispro (ADMELOG) corrective regimen sliding scale   SubCutaneous at bedtime  lamoTRIgine 25 milliGRAM(s) Oral at bedtime  methimazole 2.5 milliGRAM(s) Oral daily  metoprolol tartrate 25 milliGRAM(s) Oral two times a day  pantoprazole    Tablet 40 milliGRAM(s) Oral two times a day  polyethylene glycol 3350 17 Gram(s) Oral daily PRN  tiotropium 18 MICROgram(s) Capsule 1 Capsule(s) Inhalation daily      PMHX/PSHX:  Goiter    Diabetes type 2, controlled    COPD (chronic obstructive pulmonary disease)    Diabetes    Atrial fibrillation    History of total hip arthroplasty, right        Family history:  No pertinent family history in first degree relatives        Social History: no etoh no cigs no ivda    ROS:     General:  No wt loss, fevers, chills, night sweats, fatigue,   Eyes:  Good vision, no reported pain  ENT:  No sore throat, pain, runny nose, dysphagia  CV:  No pain, palpitations, hypo/hypertension  Resp:  No dyspnea, cough, tachypnea, wheezing  GI:  No pain, No nausea, No vomiting, No diarrhea, No constipation, No weight loss, No fever, No pruritis, No rectal bleeding, No tarry stools, No dysphagia,  :  No pain, bleeding, incontinence, nocturia  Muscle:  No pain, weakness  Neuro:  No weakness, tingling, memory problems  Psych:  No fatigue, insomnia, mood problems, depression  Endocrine:  No polyuria, polydipsia, cold/heat intolerance  Heme:  No petechiae, ecchymosis, easy bruisability  Skin:  No rash, tattoos, scars, edema      PHYSICAL EXAM:   Vital Signs:  Vital Signs Last 24 Hrs  T(C): 36.7 (26 Mar 2021 14:01), Max: 37 (26 Mar 2021 13:18)  T(F): 98 (26 Mar 2021 14:01), Max: 98.6 (26 Mar 2021 13:18)  HR: 93 (26 Mar 2021 17:19) (69 - 93)  BP: 103/57 (26 Mar 2021 17:19) (103/57 - 123/54)  BP(mean): --  RR: 20 (26 Mar 2021 17:19) (20 - 26)  SpO2: 98% (26 Mar 2021 17:19) (85% - 100%)  Daily Height in cm: 177.8 (26 Mar 2021 13:18)    Daily     GENERAL:  Appears stated age, well-groomed, well-nourished, no distress  HEENT:  NC/AT,  conjunctivae clear and pink, no thyromegaly, nodules, adenopathy, no JVD, sclera -anicteric  CHEST:  Full & symmetric excursion, no increased effort, breath sounds clear  HEART:  Regular rhythm, S1, S2, no murmur/rub/S3/S4, no abdominal bruit, no edema  ABDOMEN:  Soft, non-tender, non-distended, normoactive bowel sounds,  no masses ,no hepato-splenomegaly, no signs of chronic liver disease  EXTEREMITIES:  no cyanosis,clubbing or edema  SKIN:  No rash/erythema/ecchymoses/petechiae/wounds/abscess/warm/dry  NEURO:  Alert, oriented, no asterixis, no tremor, no encephalopathy    LABS:                        7.4    12.30 )-----------( 471      ( 26 Mar 2021 14:34 )             26.2     03-26    144  |  104  |  31<H>  ----------------------------<  181<H>  5.0   |  31  |  1.59<H>    Ca    9.0      26 Mar 2021 14:34  Phos  3.0     03-26  Mg     2.2     03-26    TPro  7.2  /  Alb  3.6  /  TBili  0.2  /  DBili  x   /  AST  8<L>  /  ALT  <5<L>  /  AlkPhos  87  03-26    LIVER FUNCTIONS - ( 26 Mar 2021 14:34 )  Alb: 3.6 g/dL / Pro: 7.2 g/dL / ALK PHOS: 87 U/L / ALT: <5 U/L / AST: 8 U/L / GGT: x           PT/INR - ( 26 Mar 2021 14:51 )   PT: 27.4 sec;   INR: 2.38 ratio         PTT - ( 26 Mar 2021 14:51 )  PTT:63.6 sec        Imaging:

## 2021-03-26 NOTE — H&P ADULT - NSHPPHYSICALEXAM_GEN_ALL_CORE
PHYSICAL EXAMINATION:  Vital Signs Last 24 Hrs  T(C): 36.7 (26 Mar 2021 14:01), Max: 37 (26 Mar 2021 13:18)  T(F): 98 (26 Mar 2021 14:01), Max: 98.6 (26 Mar 2021 13:18)  HR: 93 (26 Mar 2021 17:19) (69 - 93)  BP: 103/57 (26 Mar 2021 17:19) (103/57 - 123/54)  BP(mean): --  RR: 20 (26 Mar 2021 17:19) (20 - 26)  SpO2: 98% (26 Mar 2021 17:19) (85% - 100%)  CAPILLARY BLOOD GLUCOSE          GENERAL: NAD, cachectic  HEAD:  atraumatic, normocephalic  EYES: sclera anicteric  ENMT: mucous membranes moist  NECK: supple, No JVD  CHEST/LUNG: few crackles to auscultation bilaterally   HEART: normal S1, S2  ABDOMEN: BS+, soft, ND, NT   EXTREMITIES:  pulses palpable; no clubbing, cyanosis, or edema b/l LEs  NEURO: awake, alert, interactive; moves all extremities  SKIN: no rashes or lesions

## 2021-03-26 NOTE — H&P ADULT - NSHPSOCIALHISTORY_GEN_ALL_CORE
Social History:    Marital Status:  (   )    ( x ) Single    (   )    (  )   Occupation:   Lives with: (x  ) alone  (  ) children   (  ) spouse   (  ) parents  (  ) other    Substance Use (street drugs): ( x ) never used  (  ) other:  Tobacco Usage:  (  x ) never smoked   (   ) former smoker   (   ) current smoker  (     ) pack years  (        ) last cigarette date  Alcohol Usage: denies    (     ) Advanced Directives: (     ) None    (      ) DNR    (     ) DNI    (     ) Health Care Proxy:

## 2021-03-26 NOTE — H&P ADULT - NSHPREVIEWOFSYSTEMS_GEN_ALL_CORE
REVIEW OF SYSTEMS:    CONSTITUTIONAL: + weakness, no fevers or chills  EYES/ENT: No visual changes;  No vertigo or throat pain   NECK: No pain or stiffness  RESPIRATORY: No cough, wheezing, hemoptysis; + shortness of breath  CARDIOVASCULAR: No chest pain or palpitations  GASTROINTESTINAL: No abdominal or epigastric pain. No nausea, vomiting, or hematemesis; No diarrhea or constipation. No melena or hematochezia.  GENITOURINARY: No dysuria, frequency or hematuria  NEUROLOGICAL: No numbness or weakness  SKIN: No itching, burning, rashes, or lesions   All other review of systems is negative unless indicated above.

## 2021-03-26 NOTE — ED PROVIDER NOTE - CARE PLAN
Principal Discharge DX:	COPD exacerbation  Secondary Diagnosis:	Hypoxemia requiring supplemental oxygen  Secondary Diagnosis:	Anemia

## 2021-03-26 NOTE — CONSULT NOTE ADULT - ASSESSMENT
monoclonal gammopathy  anemia  gi bleed    plan  may need scope  daily cbc   transfuse prn   consider hematology eval  check iron studies   ppi once a day  check stool occult blood  further recommendations pending above

## 2021-03-26 NOTE — H&P ADULT - HISTORY OF PRESENT ILLNESS
89yo M pmhx COPD, bipolar, monoclonal gammopathy, anemia with history transfusions and iron infusions, afib on eliquis, DM (no longer on meds) presents with hypoxia. Pt's visiting nurse was checking vitals of pt, found to be hypoxic to 70's. Pt at that time not complaining of sob, dyspnia, was not doing anything active at that time. Per daughter, pt was confused this morning, stating he had a friend who was visiting for 3 hrs, calling daughter about aid not arriving at early in AM. not on oxygen at home, uses walker at home. PT denies chest pain, sob, abd pain, nausea, vomitting. currently aox2

## 2021-03-26 NOTE — ED PROVIDER NOTE - ATTENDING CONTRIBUTION TO CARE
Pt with new hypoxia noted by home nurse 70s%, associated with slight dyspnea.  Pt appears well, nontoxic, distant bs b/l, no edema.

## 2021-03-26 NOTE — ED ADULT NURSE NOTE - NSIMPLEMENTINTERV_GEN_ALL_ED
Implemented All Fall with Harm Risk Interventions:  [place] to call system. Call bell, personal items and telephone within reach. Instruct patient to call for assistance. Room bathroom lighting operational. Non-slip footwear when patient is off stretcher. Physically safe environment: no spills, clutter or unnecessary equipment. Stretcher in lowest position, wheels locked, appropriate side rails in place. Provide visual cue, wrist band, yellow gown, etc. Monitor gait and stability. Monitor for mental status changes and reorient to person, place, and time. Review medications for side effects contributing to fall risk. Reinforce activity limits and safety measures with patient and family. Provide visual clues: red socks.

## 2021-03-26 NOTE — ED ADULT NURSE NOTE - OBJECTIVE STATEMENT
89y Male AOx3 with PMH of COPD BIBA from home for low oxygen. Per EMS, the patient has a visiting nurse service who checks on him regularly, today when she took his pulse ox, "she found it to be low". Pt placed on 1.5L O2 by EMS, SPO2 100% at this time. At baseline pt not on O2. Pt denies chest pain, abdominal pain, SOB, fevers. Spontaneous/unlabored respirations, speaking in full sentences. Side rails up, bed in lowest position, safety maintained.

## 2021-03-26 NOTE — ED PROVIDER NOTE - OBJECTIVE STATEMENT
89yo M pmhx COPD, bipolar, monoclonal gammopathy, anemia with history transfusions and iron infusions, afib on eliquis, DM (no longer on meds) presents with hypoxia. Pt's visiting nurse was checking vitals of pt, found to be hypoxic to 70's. Pt at that time not complaining of sob, dyspnia, was not doing anything active at that time. Per daughter, pt was confused this morning, stating he had a friend who was visiting for 3 hrs, calling daughter about aid not arriving at early in AM. not on oxygen at home, uses walker at home. PT denies chest pain, sob, abd pain, nausea, vomitting. 89yo M pmhx COPD, bipolar, monoclonal gammopathy, anemia with history transfusions and iron infusions, afib on eliquis, DM (no longer on meds) presents with hypoxia. Pt's visiting nurse was checking vitals of pt, found to be hypoxic to 70's. Pt at that time not complaining of sob, dyspnia, was not doing anything active at that time. Per daughter, pt was confused this morning, stating he had a friend who was visiting for 3 hrs, calling daughter about aid not arriving at early in AM. not on oxygen at home, uses walker at home. PT denies chest pain, sob, abd pain, nausea, vomitting. currently aox2

## 2021-03-26 NOTE — ED PROVIDER NOTE - PROGRESS NOTE DETAILS
Dr. Westfall Note: pt requiring supplemental oxygen, suspect copd exacerbation, consider home O2, difficult to establish from ED, also pt with downtrending hemoglobin --> check hemoccult and would admit rather than outpatient, serial H&H

## 2021-03-26 NOTE — H&P ADULT - ASSESSMENT
89 m with    GI bleed  - follow Hct  - transfusion support  - hold AC   - gastroenterology evaluation Dr. Velazquez  - PPI    Confusion  - CT head  - B12, TSH    Pneumonia possible  - antibiotics  - Pulmonary evaluation Dr. Cormier  - jamshid    Atrial fibrillation   - rate control  - AC on hold  - cardiology evaluation dr. Serrano    Anemia  - Tx support    COPD (chronic obstructive pulmonary disease)   - nebs    Diabetes type 2, controlled   - ADA diet   - BS control    Goiter   - continue Rx  - TSH    Malnutrition protein caloric  - Nutrition eval    PT    DVT prophylaxis  - PAS    Further action as per clinical course     Osito Devlin MD pager [phone]

## 2021-03-26 NOTE — H&P ADULT - NSHPLABSRESULTS_GEN_ALL_CORE
7.4    12.30 )-----------( 471      ( 26 Mar 2021 14:34 )             26.2       03-26    144  |  104  |  31<H>  ----------------------------<  181<H>  5.0   |  31  |  1.59<H>    Ca    9.0      26 Mar 2021 14:34  Phos  3.0     03-26  Mg     2.2     03-26    TPro  7.2  /  Alb  3.6  /  TBili  0.2  /  DBili  x   /  AST  8<L>  /  ALT  <5<L>  /  AlkPhos  87  03-26                  PT/INR - ( 26 Mar 2021 14:51 )   PT: 27.4 sec;   INR: 2.38 ratio         PTT - ( 26 Mar 2021 14:51 )  PTT:63.6 sec    FINDINGS /  IMPRESSION: The cardiac silhouette is normal in size. There are small bilateral pleural effusions with associated atelectasis. There is mild pulmonary edema. An underlying viral pneumonia cannot be excluded.      < end of copied text >

## 2021-03-26 NOTE — ED PROVIDER NOTE - CLINICAL SUMMARY MEDICAL DECISION MAKING FREE TEXT BOX
Dr. Westfall Note: hypoxia in elderly with COPD not on home O2, check CXR r/o pneumonia, consider COPD likely, less likely CHF, combivent, steroids, determine need for inpt. 3yo M pmhx COPD, bipolar, monoclonal gammopathy, anemia with history transfusions and iron infusions, afib on eliquis, DM (no longer on meds) reuqring admission for hypoxia and copd exacerbation vs possible anemia.   Dr. Westfall Note: hypoxia in elderly with COPD not on home O2, check CXR r/o pneumonia, consider COPD likely, less likely CHF, combivent, steroids, determine need for inpt.

## 2021-03-27 NOTE — PHYSICAL THERAPY INITIAL EVALUATION ADULT - PERTINENT HX OF CURRENT PROBLEM, REHAB EVAL
89 y/o M with h/o COPD, bipolar, monoclonal gammopathy, anemia with h/o transfusions and iron infusions, Afib on eliquis, DM, p/w hypoxia and confusion. CHEST XRAY: small b/l pleural effusions with associated atelectasis. Mild pulmonary edema. CT HEAD (-)

## 2021-03-27 NOTE — PHYSICAL THERAPY INITIAL EVALUATION ADULT - GENERAL OBSERVATIONS, REHAB EVAL
pt ruben bedside evaluation. pt rec'd in bed, 2L NC, NAD. pt confused, forgetful, A&Ox2, agreed to evaluation. PT reviewed therex in bed.

## 2021-03-27 NOTE — PHYSICAL THERAPY INITIAL EVALUATION ADULT - ADDITIONAL COMMENTS
per previous PT eval 2/2021, PTA pt lived in apt alone, 5 steps to enter +HR, no steps inside, ambulated with RW, has HHA 10 hours/7 days

## 2021-03-27 NOTE — CONSULT NOTE ADULT - SUBJECTIVE AND OBJECTIVE BOX
[facility] - [facility]  CONSULT NOTE    REASON FOR CONSULT:    Asked to see this    HPI:    PMHX:  Goiter    Diabetes type 2, controlled    COPD (chronic obstructive pulmonary disease)    Diabetes    Atrial fibrillation        PSHX:  History of total hip arthroplasty, right        FAMILY HISTORY:  No pertinent family history in first degree relatives        SOCIAL HISTORY:    Pulmonary Medications:   ALBUTerol    90 MICROgram(s) HFA Inhaler 2 Puff(s) Inhalation every 6 hours  tiotropium 18 MICROgram(s) Capsule 1 Capsule(s) Inhalation daily      Antimicrobials:      Cardiology:  diltiazem    milliGRAM(s) Oral daily  metoprolol tartrate 25 milliGRAM(s) Oral two times a day      Other:  acetaminophen   Tablet .. 650 milliGRAM(s) Oral every 6 hours PRN  dextrose 40% Gel 15 Gram(s) Oral once  dextrose 5%. 1000 milliLiter(s) IV Continuous <Continuous>  dextrose 5%. 1000 milliLiter(s) IV Continuous <Continuous>  dextrose 50% Injectable 25 Gram(s) IV Push once  dextrose 50% Injectable 12.5 Gram(s) IV Push once  dextrose 50% Injectable 25 Gram(s) IV Push once  fluticasone propionate 50 MICROgram(s)/spray Nasal Spray 2 Spray(s) Both Nostrils two times a day  glucagon  Injectable 1 milliGRAM(s) IntraMuscular once  insulin lispro (ADMELOG) corrective regimen sliding scale   SubCutaneous three times a day before meals  insulin lispro (ADMELOG) corrective regimen sliding scale   SubCutaneous at bedtime  lamoTRIgine 25 milliGRAM(s) Oral at bedtime  methimazole 2.5 milliGRAM(s) Oral daily  pantoprazole    Tablet 40 milliGRAM(s) Oral two times a day  polyethylene glycol 3350 17 Gram(s) Oral daily PRN  sodium zirconium cyclosilicate 5 Gram(s) Oral once      Allergies    Tylenol with Codeine #3 (Other)    Intolerances        HOME MEDICATIONS:    REVIEW OF SYSTEMS:      Constitutional: Within normal limits  HEENT: Within normal limits  Neck: Within normal limits  Resp: No dyspnea at rest, dyspnea on exertion, chest congestion/wheeze, cough. stridor, sputum production, chest pain with respiration, hemoptysis  CV: No chest pain, chest pressure, chest discomfort, palpitations, lightheadedness/dizziness, syncope, lower extremity edema, inability to lay flat to sleep, awakening from sleep unable to sleep, claudication.  Extremities: Within normal limits  GI: Within normal limits  : Within normal limits   Psych/Neurological: Within normal limits  Musculoskeletal: Within normal limits  Hematologic: Within normal limits  Endocrinologic: Within normal limits   Skin: Within normal limits                                                                                                                                                                                [ ]Unable to obtain    OBJECTIVE:      ICU Vital Signs Last 24 Hrs  T(C): 36.3 (27 Mar 2021 05:42), Max: 37 (26 Mar 2021 13:18)  T(F): 97.4 (27 Mar 2021 05:42), Max: 98.6 (26 Mar 2021 13:18)  HR: 65 (27 Mar 2021 11:44) (65 - 93)  BP: 105/49 (27 Mar 2021 11:44) (98/64 - 153/71)  BP(mean): --  ABP: --  ABP(mean): --  RR: 18 (27 Mar 2021 05:42) (18 - 26)  SpO2: 92% (27 Mar 2021 11:44) (85% - 100%)      I&O's Detail    26 Mar 2021 07:01  -  27 Mar 2021 07:00  --------------------------------------------------------  IN:    Oral Fluid: 60 mL  Total IN: 60 mL    OUT:    Voided (mL): 200 mL  Total OUT: 200 mL    Total NET: -140 mL      27 Mar 2021 07:01  -  27 Mar 2021 12:43  --------------------------------------------------------  IN:  Total IN: 0 mL    OUT:    Voided (mL): 250 mL  Total OUT: 250 mL    Total NET: -250 mL        Daily Height in cm: 177.8 (26 Mar 2021 13:18)    Daily   CAPILLARY BLOOD GLUCOSE      POCT Blood Glucose.: 257 mg/dL (27 Mar 2021 08:34)      PHYSICAL EXAM:  General: Awake, alert, cooperative, no distress, appears stated age   Head: Atraumatic, normocephalic  Eyes: Anicteric, conjunctiva/corneas clear, EOM's intact, PERRL, both eyes               Ears: External examination WNL, both ears                Nose: Nares normal, septum midline, mucosa normal, no drainage or sinus tenderness  Throat: Mallampati Grade:     No tonsillar or pharyngeal exudates. Lips, mucosa and tongue WNL; teeth and gums WNL  Neck: Supple, symmetric, trachea midline; thyroid without enlargement/tenderness/nodules; no carotid bruit; no JVD  Back: Symmetric, no curvature, range of motion normal, no CVA tenderness  Chest wall: No tenderness or deformity  Respiratory: Respirations unlabored; Clear to auscultation and percussion bilaterally  Cardiovascular: Regular rate and rhythm, S1 S2 normal. No murmurs, rubs or gallops.   Abdomen: Soft, non-tender, non-distended. No organomegaly. No masses. Normal bowel sounds  Extremities: Warm to touch. No clubbing or cyanosis. No pedal edema.  Pulses: 2+ peripheral pulses all extremities  Skin: Normal skin color, texture and turgor. No rashes or lesions  Lymph Nodes: Cervical, supraclavicular and axillary nodes normal  Neurological: Motor and sensory examination equal and normal. A and O x 3  Psychiatry: Appropriate mood and affect.    LABS:                            7.8    7.06  )-----------( 525      ( 27 Mar 2021 07:27 )             28.0     03-27    144  |  103  |  40<H>  ----------------------------<  276<H>  6.0<H>   |  29  |  1.93<H>    Ca    9.0      27 Mar 2021 07:27  Phos  3.0     03-26  Mg     2.2     03-26    TPro  7.2  /  Alb  3.6  /  TBili  0.2  /  DBili  x   /  AST  8<L>  /  ALT  <5<L>  /  AlkPhos  87  03-26    PT/INR - ( 26 Mar 2021 14:51 )   PT: 27.4 sec;   INR: 2.38 ratio         PTT - ( 26 Mar 2021 14:51 )  PTT:63.6 sec    Venous Blood Gas:  03-26 @ 15:57  7.26/81/33/35/46  VBG Lactate: --        MICROBIOLOGY:     RADIOLOGY:  [ ] Reviewed and interpreted by me    CXR:      CT Scan:     [facility] - [facility]  CONSULT NOTE    REASON FOR CONSULT:  Hypoxemia    HPI:  Asked to see this  89 year old male admitted after being found to be hypoxemic by a visiting nurse yesterday.  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 The patient also has a history of atrial fibrillation on xarelto, diabetes (apparently not requiring medications currently, recurrent UTIs, anemia and hypothyroidism.  The patient apparently did not have O2 available at home.  He had been confused yesterday morning, prior to admission, and though he was hypoxemic, he did not complain of worsening dyspnea.  He does report a cough productive of yellow sputum.  The patient was put on O2 and he reports that he now feels better.  Currently, he his not complaining of chest pain, abd pain, no report of nausea or vomiting.  Of note, he had been hospitalized in February of this year, he had a CT scan of the chest which showed multiple nodules and     PMHX:  Goiter    Diabetes type 2, controlled    COPD (chronic obstructive pulmonary disease)    Diabetes    Atrial fibrillation    Bipolar disorder    anemia (requiring transfusions and iron infusion)    Asbestos exposure    Hypothyroidism    Recurrent UTIs        PSHX:  History of total hip arthroplasty, right        FAMILY HISTORY:  No pertinent family history in first degree relatives        SOCIAL HISTORY:  Ex smoker    Pulmonary Medications:   ALBUTerol    90 MICROgram(s) HFA Inhaler 2 Puff(s) Inhalation every 6 hours  tiotropium 18 MICROgram(s) Capsule 1 Capsule(s) Inhalation daily      Antimicrobials:      Cardiology:  diltiazem    milliGRAM(s) Oral daily  metoprolol tartrate 25 milliGRAM(s) Oral two times a day      Other:  acetaminophen   Tablet .. 650 milliGRAM(s) Oral every 6 hours PRN  dextrose 40% Gel 15 Gram(s) Oral once  dextrose 5%. 1000 milliLiter(s) IV Continuous <Continuous>  dextrose 5%. 1000 milliLiter(s) IV Continuous <Continuous>  dextrose 50% Injectable 25 Gram(s) IV Push once  dextrose 50% Injectable 12.5 Gram(s) IV Push once  dextrose 50% Injectable 25 Gram(s) IV Push once  fluticasone propionate 50 MICROgram(s)/spray Nasal Spray 2 Spray(s) Both Nostrils two times a day  glucagon  Injectable 1 milliGRAM(s) IntraMuscular once  insulin lispro (ADMELOG) corrective regimen sliding scale   SubCutaneous three times a day before meals  insulin lispro (ADMELOG) corrective regimen sliding scale   SubCutaneous at bedtime  lamoTRIgine 25 milliGRAM(s) Oral at bedtime  methimazole 2.5 milliGRAM(s) Oral daily  pantoprazole    Tablet 40 milliGRAM(s) Oral two times a day  polyethylene glycol 3350 17 Gram(s) Oral daily PRN  sodium zirconium cyclosilicate 5 Gram(s) Oral once      Allergies    Tylenol with Codeine #3 (Other)    Intolerances        HOME MEDICATIONS:    REVIEW OF SYSTEMS:      Constitutional: Within normal limits  HEENT: Within normal limits  Neck: Within normal limits  Resp: No dyspnea at rest, dyspnea on exertion, chest congestion/wheeze, cough. stridor, sputum production, chest pain with respiration, hemoptysis  CV: No chest pain, chest pressure, chest discomfort, palpitations, lightheadedness/dizziness, syncope, lower extremity edema, inability to lay flat to sleep, awakening from sleep unable to sleep, claudication.  Extremities: Within normal limits  GI: Within normal limits  : Within normal limits   Psych/Neurological: Within normal limits  Musculoskeletal: Within normal limits  Hematologic: Within normal limits  Endocrinologic: Within normal limits   Skin: Within normal limits                                                                                                                                                                                [ ]Unable to obtain    OBJECTIVE:      ICU Vital Signs Last 24 Hrs  T(C): 36.3 (27 Mar 2021 05:42), Max: 37 (26 Mar 2021 13:18)  T(F): 97.4 (27 Mar 2021 05:42), Max: 98.6 (26 Mar 2021 13:18)  HR: 65 (27 Mar 2021 11:44) (65 - 93)  BP: 105/49 (27 Mar 2021 11:44) (98/64 - 153/71)  BP(mean): --  ABP: --  ABP(mean): --  RR: 18 (27 Mar 2021 05:42) (18 - 26)  SpO2: 92% (27 Mar 2021 11:44) (85% - 100%)      I&O's Detail    26 Mar 2021 07:01  -  27 Mar 2021 07:00  --------------------------------------------------------  IN:    Oral Fluid: 60 mL  Total IN: 60 mL    OUT:    Voided (mL): 200 mL  Total OUT: 200 mL    Total NET: -140 mL      27 Mar 2021 07:01  -  27 Mar 2021 12:43  --------------------------------------------------------  IN:  Total IN: 0 mL    OUT:    Voided (mL): 250 mL  Total OUT: 250 mL    Total NET: -250 mL        Daily Height in cm: 177.8 (26 Mar 2021 13:18)    Daily   CAPILLARY BLOOD GLUCOSE      POCT Blood Glucose.: 257 mg/dL (27 Mar 2021 08:34)      PHYSICAL EXAM:  General: Awake, alert, cooperative, no distress, appears stated age   Head: Atraumatic, normocephalic  Eyes: Anicteric, conjunctiva/corneas clear, EOM's intact, PERRL, both eyes               Ears: External examination WNL, both ears                Nose: Nares normal, septum midline, mucosa normal, no drainage or sinus tenderness  Throat: Mallampati Grade:     No tonsillar or pharyngeal exudates. Lips, mucosa and tongue WNL; teeth and gums WNL  Neck: Supple, symmetric, trachea midline; thyroid without enlargement/tenderness/nodules; no carotid bruit; no JVD  Back: Symmetric, no curvature, range of motion normal, no CVA tenderness  Chest wall: No tenderness or deformity  Respiratory: Respirations unlabored; Clear to auscultation and percussion bilaterally  Cardiovascular: Regular rate and rhythm, S1 S2 normal. No murmurs, rubs or gallops.   Abdomen: Soft, non-tender, non-distended. No organomegaly. No masses. Normal bowel sounds  Extremities: Warm to touch. No clubbing or cyanosis. No pedal edema.  Pulses: 2+ peripheral pulses all extremities  Skin: Normal skin color, texture and turgor. No rashes or lesions  Lymph Nodes: Cervical, supraclavicular and axillary nodes normal  Neurological: Motor and sensory examination equal and normal. A and O x 3  Psychiatry: Appropriate mood and affect.    LABS:                            7.8    7.06  )-----------( 525      ( 27 Mar 2021 07:27 )             28.0     03-27    144  |  103  |  40<H>  ----------------------------<  276<H>  6.0<H>   |  29  |  1.93<H>    Ca    9.0      27 Mar 2021 07:27  Phos  3.0     03-26  Mg     2.2     03-26    TPro  7.2  /  Alb  3.6  /  TBili  0.2  /  DBili  x   /  AST  8<L>  /  ALT  <5<L>  /  AlkPhos  87  03-26    PT/INR - ( 26 Mar 2021 14:51 )   PT: 27.4 sec;   INR: 2.38 ratio         PTT - ( 26 Mar 2021 14:51 )  PTT:63.6 sec    Venous Blood Gas:  03-26 @ 15:57  7.26/81/33/35/46  VBG Lactate: --        MICROBIOLOGY:     RADIOLOGY:  [ ] Reviewed and interpreted by me    CXR:      CT Scan:     [facility] - [facility]  CONSULT NOTE    REASON FOR CONSULT:  Hypoxemia    HPI:  Asked to see this  89 year old male admitted after being found to be hypoxemic by a visiting nurse yesterday.  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 The patient also has a history of atrial fibrillation on xarelto, diabetes (apparently not requiring medications currently, recurrent UTIs, anemia and hypothyroidism.  The patient apparently did not have O2 available at home.  He had been confused yesterday morning, prior to admission, and though he was hypoxemic, he did not complain of worsening dyspnea.  He does report a cough productive of yellow sputum.  The patient was put on O2 and he reports that he now feels better.  Currently, he his not complaining of chest pain, abd pain, no report of nausea or vomiting.  Of note, he had been hospitalized in February of this year, he had a CT scan of the chest which showed multiple nodules and he was treated for aspiration pneumonia.  A 3 month follow up CT was recommended.    PMHX:  Goiter    Diabetes type 2, controlled    COPD (chronic obstructive pulmonary disease)    Diabetes    Atrial fibrillation    Bipolar disorder    anemia (requiring transfusions and iron infusion)    Monoclonal gammopathy    Asbestos exposure    Hypothyroidism    Recurrent UTIs        PSHX:  History of total hip arthroplasty, right        FAMILY HISTORY:  No pertinent family history in first degree relatives        SOCIAL HISTORY:  Ex smoker quit 30 yrs ago (?more than 30 pk years), worked security in a [employer]  Walks with a cane; has a home health aide    Pulmonary Medications:   ALBUTerol    90 MICROgram(s) HFA Inhaler 2 Puff(s) Inhalation every 6 hours  tiotropium 18 MICROgram(s) Capsule 1 Capsule(s) Inhalation daily      Antimicrobials:      Cardiology:  diltiazem    milliGRAM(s) Oral daily  metoprolol tartrate 25 milliGRAM(s) Oral two times a day      Other:  acetaminophen   Tablet .. 650 milliGRAM(s) Oral every 6 hours PRN  dextrose 40% Gel 15 Gram(s) Oral once  dextrose 5%. 1000 milliLiter(s) IV Continuous <Continuous>  dextrose 5%. 1000 milliLiter(s) IV Continuous <Continuous>  dextrose 50% Injectable 25 Gram(s) IV Push once  dextrose 50% Injectable 12.5 Gram(s) IV Push once  dextrose 50% Injectable 25 Gram(s) IV Push once  fluticasone propionate 50 MICROgram(s)/spray Nasal Spray 2 Spray(s) Both Nostrils two times a day  glucagon  Injectable 1 milliGRAM(s) IntraMuscular once  insulin lispro (ADMELOG) corrective regimen sliding scale   SubCutaneous three times a day before meals  insulin lispro (ADMELOG) corrective regimen sliding scale   SubCutaneous at bedtime  lamoTRIgine 25 milliGRAM(s) Oral at bedtime  methimazole 2.5 milliGRAM(s) Oral daily  pantoprazole    Tablet 40 milliGRAM(s) Oral two times a day  polyethylene glycol 3350 17 Gram(s) Oral daily PRN  sodium zirconium cyclosilicate 5 Gram(s) Oral once      Allergies    Tylenol with Codeine #3 (Other)    Intolerances        HOME MEDICATIONS:    REVIEW OF SYSTEMS:      Constitutional: Within normal limits  HEENT: Within normal limits  Neck: Within normal limits  Resp:  Baseline dyspnea on exertion, cough with occasional yellow sputum production;  no report of stridor, chest pain with respiration, hemoptysis  CV: No chest pain, chest pressure, chest discomfort, palpitations, lightheadedness/dizziness, syncope, lower extremity edema, inability to lay flat to sleep, awakening from sleep unable to sleep, claudication.  Extremities: No report of recent increased pedal edema  GI: Within normal limits  : Within normal limits   Psych/Neurological:  intermittently confused  Musculoskeletal:  arthritic pains  Hematologic:  See HPI  Endocrinologic: Within normal limits   Skin: unremarkable                                                                                                                                                                                [ ]Unable to obtain    OBJECTIVE:      ICU Vital Signs Last 24 Hrs  T(C): 36.3 (27 Mar 2021 05:42), Max: 37 (26 Mar 2021 13:18)  T(F): 97.4 (27 Mar 2021 05:42), Max: 98.6 (26 Mar 2021 13:18)  HR: 65 (27 Mar 2021 11:44) (65 - 93)  BP: 105/49 (27 Mar 2021 11:44) (98/64 - 153/71)  BP(mean): --  ABP: --  ABP(mean): --  RR: 18 (27 Mar 2021 05:42) (18 - 26)  SpO2: 92% (27 Mar 2021 11:44) (85% - 100%)      I&O's Detail    26 Mar 2021 07:01  -  27 Mar 2021 07:00  --------------------------------------------------------  IN:    Oral Fluid: 60 mL  Total IN: 60 mL    OUT:    Voided (mL): 200 mL  Total OUT: 200 mL    Total NET: -140 mL      27 Mar 2021 07:01  -  27 Mar 2021 12:43  --------------------------------------------------------  IN:  Total IN: 0 mL    OUT:    Voided (mL): 250 mL  Total OUT: 250 mL    Total NET: -250 mL        Daily Height in cm: 177.8 (26 Mar 2021 13:18)    Daily   CAPILLARY BLOOD GLUCOSE      POCT Blood Glucose.: 257 mg/dL (27 Mar 2021 08:34)      PHYSICAL EXAM:  General: Awake, alert, cooperative, no distress, appears stated age   Head: Atraumatic, normocephalic  Eyes: unremarkable           Ears: External examination WNL, both ears                Nose: Nares normal, septum midline, mucosa normal, no drainage or sinus tenderness  Throat:   No rep of tonsillar or pharyngeal exudates. Lips, unremarkable  Neck: Supple, symmetric, trachea midline; thyroid without enlargement/tenderness/nodules; no carotid bruit; no JVD  Back:  Kyphoscoliosis  Chest wall: No tenderness or deformity  Respiratory: Respirations unlabored;  a few scattered crackles  Cardiovascular: Regular rate and rhythm,  without obvious rub or gallop.   Abdomen: Soft, non-tender, non-distended. No organomegaly. No masses. Normal bowel sounds  Extremities: Warm to touch. No clubbing or cyanosis. No significant pedal edema.  Arthritic changes  Pulses:  unremarkable  Skin: Normal skin color, texture and turgor. No rashes or lesions  Lymph Nodes:  unremarkable  Neurological:  grossly intact, appropriately responsive to simple questions, confused at times  Psychiatry: Appropriate mood and affect.    LABS:                            7.8    7.06  )-----------( 525      ( 27 Mar 2021 07:27 )             28.0     03-27    144  |  103  |  40<H>  ----------------------------<  276<H>  6.0<H>   |  29  |  1.93<H>    Ca    9.0      27 Mar 2021 07:27  Phos  3.0     03-26  Mg     2.2     03-26    TPro  7.2  /  Alb  3.6  /  TBili  0.2  /  DBili  x   /  AST  8<L>  /  ALT  <5<L>  /  AlkPhos  87  03-26    PT/INR - ( 26 Mar 2021 14:51 )   PT: 27.4 sec;   INR: 2.38 ratio         PTT - ( 26 Mar 2021 14:51 )  PTT:63.6 sec    Venous Blood Gas:  03-26 @ 15:57  7.26/81/33/35/46  VBG Lactate: --        MICROBIOLOGY:     RADIOLOGY:  [ ] Reviewed and interpreted by me    CXR:  EXAM:  XR CHEST PORTABLE URGENT 1V                            PROCEDURE DATE:  03/26/2021            INTERPRETATION:  Indication: Hypoxia.    TECHNIQUE: Single portable view of the chest.    COMPARISON: 2/17/2021    FINDINGS /  IMPRESSION: The cardiac silhouette is normal in size. There are small bilateral pleural effusions with associated atelectasis. There is mild pulmonary edema. An underlying viral pneumonia cannot be excluded.                JJ PEACOCK MD; Attending Radiologist  This document has been electronically signed. Mar 26 2021  2:29PM      CT Scan:    EXAM:  CT ANGIO CHEST (W)AW IC                            PROCEDURE DATE:  02/17/2021            INTERPRETATION:  CLINICAL INFORMATION: Shortness of breath, hypoxia, COPD    COMPARISON: Chest CT from 8/25/2018, CT abdomen pelvis from 4/8/2018    PROCEDURE:  CT Angiography of the Chest.  90 ml of Omnipaque 350 was injected intravenously. 10 ml were discarded.  Sagittal and coronal reformats were performed as well as 3D (MIP) reconstructions.    FINDINGS:    LUNGS AND AIRWAYS: Patent central airways.  Emphysema. There is a new 1.3 cm right upper lobe spiculated pulmonary nodule (5:38). 1.1 cm left upper lobe spiculated nodule (5:45). Additional 9 mm slightly spiculated right upper lobe nodule (5:46). Numerous additional scattered tree-in-bud pulmonary nodules, increased from prior CT chest. Bilateral lower lobe reticular opacities with bronchiectasis, increased from prior.  PLEURA: Small to moderate left pleural effusion. Similar-appearing calcified right pleural plaques.  MEDIASTINUM ANDHILA: No lymphadenopathy.  VESSELS: No pulmonary embolism. 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 Atherosclerotic changes of the aorta and its visualized branches.  HEART: Heart size is normal. Small pericardial effusion. Coronary artery calcifications.  CHEST WALL AND LOWER NECK: Unchanged heterogenous thyroid gland with coarse calcifications.  VISUALIZED UPPER ABDOMEN: Bilateral renal cysts. Cholelithiasis. 5 mm pancreatic body and 4 mm pancreatic tail hypodense lesions are unchanged from prior CT abdomen pelvis dated 4/8/2018.  BONES: Unchanged L1 compression deformity. Mild compression deformity of T11.    IMPRESSION:  No pulmonary embolism.    Emphysema. Multiple new bilateral spiculated pulmonary nodules are indeterminate; recommend 3 month follow-up.    Multiple tree-in-bud nodules consistent with small airway inflammation/infection.    Small to moderate leftpleural effusion.              RYANN PAUL MD; Resident Interventional Radiology  This document has been electronically signed.  ADONAY ESCOBAR MD; Attending Radiologist  This document has been electronically signed. Feb 17 2021  6:03PM

## 2021-03-27 NOTE — PROGRESS NOTE ADULT - SUBJECTIVE AND OBJECTIVE BOX
Patient is a 89y old  Male who presents with a chief complaint of Dyspnea (27 Mar 2021 12:42)      SUBJECTIVE / OVERNIGHT EVENTS: weak.  Review of Systems  chest pain no  palpitations no  sob no  nausea no  headache no    MEDICATIONS  (STANDING):  ALBUTerol    90 MICROgram(s) HFA Inhaler 2 Puff(s) Inhalation every 6 hours  cefTRIAXone   IVPB 1000 milliGRAM(s) IV Intermittent every 24 hours  dextrose 40% Gel 15 Gram(s) Oral once  dextrose 5%. 1000 milliLiter(s) (50 mL/Hr) IV Continuous <Continuous>  dextrose 5%. 1000 milliLiter(s) (100 mL/Hr) IV Continuous <Continuous>  dextrose 50% Injectable 25 Gram(s) IV Push once  dextrose 50% Injectable 12.5 Gram(s) IV Push once  dextrose 50% Injectable 25 Gram(s) IV Push once  diltiazem    milliGRAM(s) Oral daily  doxycycline hyclate Capsule 100 milliGRAM(s) Oral every 12 hours  fluticasone propionate 50 MICROgram(s)/spray Nasal Spray 2 Spray(s) Both Nostrils two times a day  glucagon  Injectable 1 milliGRAM(s) IntraMuscular once  insulin lispro (ADMELOG) corrective regimen sliding scale   SubCutaneous three times a day before meals  insulin lispro (ADMELOG) corrective regimen sliding scale   SubCutaneous at bedtime  lamoTRIgine 25 milliGRAM(s) Oral at bedtime  methimazole 2.5 milliGRAM(s) Oral daily  metoprolol tartrate 25 milliGRAM(s) Oral two times a day  pantoprazole    Tablet 40 milliGRAM(s) Oral two times a day  tiotropium 18 MICROgram(s) Capsule 1 Capsule(s) Inhalation daily    MEDICATIONS  (PRN):  acetaminophen   Tablet .. 650 milliGRAM(s) Oral every 6 hours PRN Temp greater or equal to 38.5C (101.3F), Mild Pain (1 - 3)  polyethylene glycol 3350 17 Gram(s) Oral daily PRN for constipation      Vital Signs Last 24 Hrs  T(C): 36.4 (27 Mar 2021 13:17), Max: 36.9 (26 Mar 2021 19:30)  T(F): 97.5 (27 Mar 2021 13:17), Max: 98.4 (26 Mar 2021 19:30)  HR: 60 (27 Mar 2021 13:17) (60 - 93)  BP: 128/63 (27 Mar 2021 13:17) (98/64 - 153/71)  BP(mean): --  RR: 18 (27 Mar 2021 13:17) (18 - 20)  SpO2: 96% (27 Mar 2021 13:17) (92% - 100%)    PHYSICAL EXAM:  GENERAL: NAD, cachectic   HEAD:  Atraumatic, Normocephalic  EYES: EOMI, PERRLA, conjunctiva and sclera clear  NECK: Supple, No JVD  CHEST/LUNG: Clear to auscultation bilaterally; No wheeze  HEART: Regular rate and rhythm; No murmurs, rubs, or gallops  ABDOMEN: Soft, Nontender, Nondistended; Bowel sounds present  EXTREMITIES:  2+ Peripheral Pulses, No clubbing, cyanosis, or edema  PSYCH: AAOx3  NEUROLOGY: non-focal  SKIN: No rashes or lesions    LABS:                        7.8    7.06  )-----------( 525      ( 27 Mar 2021 07:27 )             28.0     03-27    144  |  103  |  40<H>  ----------------------------<  276<H>  6.0<H>   |  29  |  1.93<H>    Ca    9.0      27 Mar 2021 07:27  Phos  3.0     03-26  Mg     2.2     03-26    TPro  7.2  /  Alb  3.6  /  TBili  0.2  /  DBili  x   /  AST  8<L>  /  ALT  <5<L>  /  AlkPhos  87  03-26    PT/INR - ( 26 Mar 2021 14:51 )   PT: 27.4 sec;   INR: 2.38 ratio         PTT - ( 26 Mar 2021 14:51 )  PTT:63.6 sec            RADIOLOGY & ADDITIONAL TESTS:    Imaging Personally Reviewed:    Consultant(s) Notes Reviewed:      Care Discussed with Consultants/Other Providers:

## 2021-03-27 NOTE — PROGRESS NOTE ADULT - ASSESSMENT
anemia  gi bleed      daily cbc   transfuse prn   consider hematology eval  check iron studies   ppi once a day  check stool occult blood   upper gastrointestinal endoscopy if bleeding persists   continue supportive care  further recommendations pending above   anemia  gi bleed      daily cbc   transfuse prn   consider hematology eval  check iron studies   ppi once a day  check stool occult blood   upper gastrointestinal endoscopy if bleeding persists   continue supportive care  further recommendations pending above      Advanced care planning was discussed with patient and family.  Advanced care planning forms were reviewed and discussed.  Risks, benefits and alternatives of gastroenterologic procedures were discussed in detail and all questions were answered.    30 minutes spent.

## 2021-03-27 NOTE — PROGRESS NOTE ADULT - ASSESSMENT
89 m with    GI bleed  - follow Hct  - transfusion support  - hold AC   - gastroenterology evaluation noted  - PPI    Confusion improving   - CT head with no acute event  - B12, TSH pending     Pneumonia possible  - antibiotics  - Pulmonary evaluation noted  - nebs    Atrial fibrillation   - rate control  - AC on hold  - cardiology evaluation dr. Serrano    Anemia  - Tx support    COPD (chronic obstructive pulmonary disease)   - nebs    Diabetes type 2, controlled   - ADA diet   - BS control    Goiter   - continue Rx  - TSH    Malnutrition protein caloric  - Nutrition eval    PT    DVT prophylaxis  - PAS    Osito Devlin MD pager [phone]

## 2021-03-27 NOTE — PROGRESS NOTE ADULT - SUBJECTIVE AND OBJECTIVE BOX
INTERVAL HPI/OVERNIGHT EVENTS:  No new overnight event.  No N/V/D.  Tolerating diet.    Allergies    Tylenol with Codeine #3 (Other)    Intolerances          General:  No wt loss, fevers, chills, night sweats, fatigue,   Eyes:  Good vision, no reported pain  ENT:  No sore throat, pain, runny nose, dysphagia  CV:  No pain, palpitations, hypo/hypertension  Resp:  No dyspnea, cough, tachypnea, wheezing  GI:  No pain, No nausea, No vomiting, No diarrhea, No constipation, No weight loss, No fever, No pruritis, No rectal bleeding, No tarry stools, No dysphagia,  :  No pain, bleeding, incontinence, nocturia  Muscle:  No pain, weakness  Neuro:  No weakness, tingling, memory problems  Psych:  No fatigue, insomnia, mood problems, depression  Endocrine:  No polyuria, polydipsia, cold/heat intolerance  Heme:  No petechiae, ecchymosis, easy bruisability  Skin:  No rash, tattoos, scars, edema      PHYSICAL EXAM:   Vital Signs:  Vital Signs Last 24 Hrs  T(C): 36.4 (27 Mar 2021 13:17), Max: 36.9 (26 Mar 2021 19:30)  T(F): 97.5 (27 Mar 2021 13:17), Max: 98.4 (26 Mar 2021 19:30)  HR: 60 (27 Mar 2021 13:17) (60 - 85)  BP: 128/63 (27 Mar 2021 13:17) (98/64 - 153/71)  BP(mean): --  RR: 18 (27 Mar 2021 13:17) (18 - 18)  SpO2: 96% (27 Mar 2021 13:17) (92% - 100%)  Daily     Daily I&O's Summary    26 Mar 2021 07:01  -  27 Mar 2021 07:00  --------------------------------------------------------  IN: 60 mL / OUT: 200 mL / NET: -140 mL    27 Mar 2021 07:01  -  27 Mar 2021 17:35  --------------------------------------------------------  IN: 480 mL / OUT: 1050 mL / NET: -570 mL        GENERAL:  Appears stated age, well-groomed, well-nourished, no distress  HEENT:  NC/AT,  conjunctivae clear and pink, no thyromegaly, nodules, adenopathy, no JVD, sclera -anicteric  CHEST:  Full & symmetric excursion, no increased effort, breath sounds clear  HEART:  Regular rhythm, S1, S2, no murmur/rub/S3/S4, no abdominal bruit, no edema  ABDOMEN:  Soft, non-tender, non-distended, normoactive bowel sounds,  no masses ,no hepato-splenomegaly, no signs of chronic liver disease  EXTEREMITIES:  no cyanosis,clubbing or edema  SKIN:  No rash/erythema/ecchymoses/petechiae/wounds/abscess/warm/dry  NEURO:  Alert, oriented, no asterixis, no tremor, no encephalopathy      LABS:                        7.8    7.06  )-----------( 525      ( 27 Mar 2021 07:27 )             28.0     03-27    144  |  103  |  40<H>  ----------------------------<  276<H>  6.0<H>   |  29  |  1.93<H>    Ca    9.0      27 Mar 2021 07:27  Phos  3.0     03-26  Mg     2.2     03-26    TPro  7.2  /  Alb  3.6  /  TBili  0.2  /  DBili  x   /  AST  8<L>  /  ALT  <5<L>  /  AlkPhos  87  03-26    PT/INR - ( 26 Mar 2021 14:51 )   PT: 27.4 sec;   INR: 2.38 ratio         PTT - ( 26 Mar 2021 14:51 )  PTT:63.6 sec    amylase   lipase  RADIOLOGY & ADDITIONAL TESTS:

## 2021-03-27 NOTE — CONSULT NOTE ADULT - SUBJECTIVE AND OBJECTIVE BOX
CARDIOLOGY CONSULT NOTE - DR. MORGAN    HPI:  89yo M pmhx COPD, bipolar, monoclonal gammopathy, anemia with history transfusions and iron infusions, afib on eliquis, DM (no longer on meds) presents with hypoxia. Pt's visiting nurse was checking vitals of pt, found to be hypoxic to 70's. Pt at that time not complaining of sob, dyspnia, was not doing anything active at that time. Per daughter, pt was confused this morning, stating he had a friend who was visiting for 3 hrs, calling daughter about aid not arriving at early in AM. not on oxygen at home, uses walker at home. PT denies chest pain, sob, abd pain, nausea, vomitting. currently aox2  (26 Mar 2021 18:24)      PAST MEDICAL & SURGICAL HISTORY:  Goiter    Diabetes type 2, controlled    COPD (chronic obstructive pulmonary disease)    Atrial fibrillation    History of total hip arthroplasty, right          PREVIOUS DIAGNOSTIC TESTING:    [ ] Echocardiogram:  [ ]  Catheterization:  [ ] Stress Test:  	    MEDICATIONS:    Home Medications:  acetaminophen 325 mg oral tablet: 2 tab(s) orally every 6 hours, As needed, Temp greater or equal to 38.5C (101.3F), Mild Pain (1 - 3) (26 Mar 2021 16:54)  dilTIAZem 180 mg/24 hours oral capsule, extended release: 1 cap(s) orally once a day (26 Mar 2021 16:54)  Flonase 50 mcg/inh nasal spray: 1 spray(s) nasal once a day, As Needed (26 Mar 2021 16:54)  lamoTRIgine 25 mg oral tablet: 1 tab(s) orally once a day (at bedtime) (26 Mar 2021 16:52)  mirtazapine 15 mg oral tablet: 1 tab(s) orally in the morning and 2 tab(s) at bedtime (26 Mar 2021 16:52)  pantoprazole 40 mg oral delayed release tablet: 1 tab(s) orally once a day (26 Mar 2021 16:54)  polyethylene glycol 3350 oral powder for reconstitution: 17 gram(s) orally once a day, As Needed - for constipation (26 Mar 2021 16:54)  Trelegy Ellipta: 1 puff(s) inhaled once a day (26 Mar 2021 16:54)  Xarelto 15 mg oral tablet: 1 tab(s) orally once a day (in the evening) (26 Mar 2021 16:54)      MEDICATIONS  (STANDING):  ALBUTerol    90 MICROgram(s) HFA Inhaler 2 Puff(s) Inhalation every 6 hours  cefTRIAXone   IVPB 1000 milliGRAM(s) IV Intermittent every 24 hours  dextrose 40% Gel 15 Gram(s) Oral once  dextrose 5%. 1000 milliLiter(s) (50 mL/Hr) IV Continuous <Continuous>  dextrose 5%. 1000 milliLiter(s) (100 mL/Hr) IV Continuous <Continuous>  dextrose 50% Injectable 25 Gram(s) IV Push once  dextrose 50% Injectable 12.5 Gram(s) IV Push once  dextrose 50% Injectable 25 Gram(s) IV Push once  diltiazem    milliGRAM(s) Oral daily  doxycycline hyclate Capsule 100 milliGRAM(s) Oral every 12 hours  fluticasone propionate 50 MICROgram(s)/spray Nasal Spray 2 Spray(s) Both Nostrils two times a day  glucagon  Injectable 1 milliGRAM(s) IntraMuscular once  insulin lispro (ADMELOG) corrective regimen sliding scale   SubCutaneous three times a day before meals  insulin lispro (ADMELOG) corrective regimen sliding scale   SubCutaneous at bedtime  lamoTRIgine 25 milliGRAM(s) Oral at bedtime  methimazole 2.5 milliGRAM(s) Oral daily  metoprolol tartrate 25 milliGRAM(s) Oral two times a day  pantoprazole    Tablet 40 milliGRAM(s) Oral two times a day  sodium zirconium cyclosilicate 10 Gram(s) Oral once  tiotropium 18 MICROgram(s) Capsule 1 Capsule(s) Inhalation daily      FAMILY HISTORY:  No pertinent family history in first degree relatives        SOCIAL HISTORY:    [x ] Non-smoker  [ ] Smoker  [ ] Alcohol    Allergies    Tylenol with Codeine #3 (Other)    Intolerances    	    REVIEW OF SYSTEMS:  CONSTITUTIONAL: No fever, weight loss, or fatigue  EYES: No eye pain, visual disturbances, or discharge  ENMT:  No difficulty hearing, tinnitus, vertigo; No sinus or throat pain  NECK: No pain or stiffness  RESPIRATORY: No cough, wheezing, chills or hemoptysis; No Shortness of Breath  CARDIOVASCULAR: as HPI  GASTROINTESTINAL: No abdominal or epigastric pain. No nausea, vomiting, or hematemesis; No diarrhea or constipation. No melena or hematochezia.  GENITOURINARY: No dysuria, frequency, hematuria, or incontinence  NEUROLOGICAL: No headaches, memory loss, loss of strength, numbness, or tremors  SKIN: No itching, burning, rashes, or lesions   	  [ ] All others negative	  [ ] Unable to obtain    PHYSICAL EXAM:    T(C): 36.4 (03-27-21 @ 13:17), Max: 36.9 (03-26-21 @ 19:30)  HR: 60 (03-27-21 @ 13:17) (60 - 93)  BP: 128/63 (03-27-21 @ 13:17) (98/64 - 153/71)  RR: 18 (03-27-21 @ 13:17) (18 - 26)  SpO2: 96% (03-27-21 @ 13:17) (85% - 100%)  Wt(kg): --  I&O's Summary    26 Mar 2021 07:01  -  27 Mar 2021 07:00  --------------------------------------------------------  IN: 60 mL / OUT: 200 mL / NET: -140 mL    27 Mar 2021 07:01  -  27 Mar 2021 14:13  --------------------------------------------------------  IN: 0 mL / OUT: 250 mL / NET: -250 mL      Daily     Daily     Appearance: Normal	  Psychiatry: A & O x 1-2, Mood & affect appropriate  HEENT:   Normal oral mucosa, PERRL, EOMI	  Lymphatic: No lymphadenopathy  Cardiovascular: Normal S1 S2,RRR, sm  Respiratory: Lungs clear to auscultation dec bs bases	  Gastrointestinal:  Soft, Non-tender, + BS	  Skin: No rashes, No ecchymoses, No cyanosis	  Neurologic: Non-focal  Extremities: Normal range of motion, No clubbing, cyanosis or edema  Vascular: Peripheral pulses palpable 2+ bilaterally    TELEMETRY: 	    ECG:  	no acute ischemic abnl, sr  RADIOLOGY:  OTHER: 	  	  LABS:	 	    CARDIAC MARKERS:        proBNP:     Lipid Profile:   HgA1c:   TSH: Thyroid Stimulating Hormone, Serum: 0.45 uIU/mL (03-27-21 @ 10:03)                            7.8    7.06  )-----------( 525      ( 27 Mar 2021 07:27 )             28.0     03-27    144  |  103  |  40<H>  ----------------------------<  276<H>  6.0<H>   |  29  |  1.93<H>    Ca    9.0      27 Mar 2021 07:27  Phos  3.0     03-26  Mg     2.2     03-26    TPro  7.2  /  Alb  3.6  /  TBili  0.2  /  DBili  x   /  AST  8<L>  /  ALT  <5<L>  /  AlkPhos  87  03-26    PT/INR - ( 26 Mar 2021 14:51 )   PT: 27.4 sec;   INR: 2.38 ratio         PTT - ( 26 Mar 2021 14:51 )  PTT:63.6 sec    Creatinine, Serum: 1.93 mg/dL (03-27-21 @ 07:27)  Creatinine, Serum: 1.59 mg/dL (03-26-21 @ 14:34)        ASSESSMENT/PLAN: 	    ACP- Advanced Care Planning  -Advanced care planning discussed with patient. Advanced care planning forms discussed with patient and/or family.  Risks, benefits, and alternatives of medical/cardiac procedures were discussed in detail with all questions answered.  30 minutes were spent addressing advance care planning.

## 2021-03-27 NOTE — CONSULT NOTE ADULT - ASSESSMENT
89 m with    GI bleed  - follow Hct  - transfusion support  - hold AC   - gastroenterology evaluation Dr. Velazquez  - PPI    Confusion  - CT head  - B12, TSH    Pneumonia possible  - antibiotics  - Pulmonary evaluation Dr. Cormier  - jamshid    Atrial fibrillation   - rate control  - AC on hold  - cardiology evaluation dr. Serrano    Anemia  - Tx support    COPD (chronic obstructive pulmonary disease)   - nebs    Diabetes type 2, controlled   - ADA diet   - BS control    Goiter   - continue Rx  - TSH    Malnutrition protein caloric  - Nutrition eval    PT    DVT prophylaxis  - PAS    Further action as per clinical course     Osito Devlin MD pager [phone]  89 m with hypoxemia, history of pulmonary nodules and tree in bud type opacities on recent CT, aspiration pneumonia previously.  Currently aspiration cannot be excluded.      Will start ceftriaxone and doxycycline for now  Continue albuterol  Supplemental O2 to maintain sat 92% or above watching for evidence of CO2 retention  Following CxR  Eventual follow up CT,   89 m with hypoxemia, history of pulmonary nodules and tree in bud type opacities on recent CT, aspiration pneumonia previously.  Currently aspiration cannot be excluded.  Other causes cannot be excluded, though a pulmonary vascular process appears to be unlikely as the patient is on anticoagulation chronically. Certainly a component of increased bronchial hyperreactivity/COPD exacerbation and a component of cardiac dysfunction cannot be excluded.     Will start ceftriaxone and doxycycline for now, given possible aspiration  Will order a procalcitonin level  Continue albuterol  Supplemental O2 to maintain sat 92% or above watching for evidence of CO2 retention  Aspiration precautions  Venous dopplers of the LEs.  Elevate HOB  Incentive spirometry as able  Encouraging secretion clearance, pulmonary toilet prn  Following CxR  Follow up CT post current rx-pt with multiple pulmonary nodules on prev CT as above along with tree in bud opacifies, with likely aspiration at that time; certainly   other inflammatory processes, premalignant changes and other processes could not be excluded  Judicious CV/fluid management-cardiology is consulting.  Continue current management.  Routine prophylactic measures    Thank you for this consult.  Will follow. 89 m with hypoxemia, history of pulmonary nodules and tree in bud type opacities on recent CT, aspiration pneumonia previously.  Currently aspiration cannot be excluded.  Other causes cannot be excluded, though a pulmonary vascular process appears to be unlikely as the patient is on anticoagulation chronically. Certainly a component of increased bronchial hyperreactivity/COPD exacerbation and a component of cardiac dysfunction cannot be excluded.     Will start ceftriaxone and doxycycline for now, given possible aspiration  Will order a procalcitonin level  Continue albuterol  Supplemental O2 to maintain sat 92% or above watching for evidence of CO2 retention  Aspiration precautions  Venous dopplers of the LEs.  Elevate HOB  Incentive spirometry as able  Encouraging secretion clearance, pulmonary toilet prn  Following CxR  Follow up CT post current rx-pt with multiple pulmonary nodules on prev CT as above along with tree in bud opacifies, with likely aspiration at that time; certainly   other inflammatory processes, premalignant changes and other processes could not be excluded.  Judicious CV/fluid management-cardiology is consulting.  Management of hyperkalemia per the primary service.  Continue current management.  Routine prophylactic measures    Thank you for this consult.  Will follow.

## 2021-03-27 NOTE — CONSULT NOTE ADULT - ASSESSMENT
89 year old man with history of COPD, bipolar, monoclonal gammopathy, anemia with history transfusions and iron infusions, afib on a/c, DM, admitted with hypoxia    1. hypoxic respiratory failure  -? secondary to aspiration vs PNa  -not clinically overloaded, no decomp HF  -abx per medicine, pulmonary  -aspirations precautions  -ct imaging     2. PAF history  -stable, cont dilt as ordered  -off a/c for now, gi eval   -restart if no concern for acute gi bleed    dvt ppx      70 minutes spent on total encounter; more than 50% of the visit was spent counseling and/or coordinating care by the attending physician.

## 2021-03-27 NOTE — PHYSICAL THERAPY INITIAL EVALUATION ADULT - DISCHARGE DISPOSITION, PT EVAL
TBD pending further functional evaluation Plan for home with home PT services, assist/supervision for ADLs and functional mobility. pt owns RW for ambulation. note left with CM (currently on rounds). ANA MARIA loo.

## 2021-03-28 NOTE — DIETITIAN INITIAL EVALUATION ADULT. - REASON INDICATOR FOR ASSESSMENT
Nutrition Consult for Assessment  Source: patient, medical record, nurse; per flow sheets, pt confused but able to participate in RD interview at this time

## 2021-03-28 NOTE — PROGRESS NOTE ADULT - ASSESSMENT
anemia  gi bleed      daily cbc   transfuse prn   consider hematology eval  check iron studies   ppi once a day  check stool occult blood   upper gastrointestinal endoscopy if bleeding persists   continue supportive care  further recommendations pending above      Advanced care planning was discussed with patient and family.  Advanced care planning forms were reviewed and discussed.  Risks, benefits and alternatives of gastroenterologic procedures were discussed in detail and all questions were answered.    30 minutes spent.

## 2021-03-28 NOTE — SWALLOW BEDSIDE ASSESSMENT ADULT - NS SPL SWALLOW CLINIC TRIAL FT
No overt s/sx of aspiration throughout.   Provided solids chopped and moistened ; [place] toast with syrup.   Pt adamantly expressed that he does not want anything to be modified with regards to his diet. This was communicated to team.

## 2021-03-28 NOTE — PROGRESS NOTE ADULT - ASSESSMENT
89 year old man with history of COPD, bipolar, monoclonal gammopathy, anemia with history transfusions and iron infusions, afib on a/c, DM, admitted with hypoxia    1. hypoxic respiratory failure  -? secondary to aspiration vs PNa  -not clinically overloaded, no decomp HF  -abx per medicine, pulmonary  -aspirations precautions  -ct imaging     2. PAF history  -stable, cont dilt as ordered  -cont to hold a/c for now, gi f/u    3. Anemia, r/o GI bleed   -heme 6 today  -PRBC's  -trend serial heme  -GI workup    dvt ppx      35 minutes spent on total encounter; more than 50% of the visit was spent counseling and/or coordinating care by the attending physician.

## 2021-03-28 NOTE — SWALLOW BEDSIDE ASSESSMENT ADULT - ORAL PHASE
prolonged manipulation 2/2 edentulous state however eventually effective. no oral pocketing or holding.

## 2021-03-28 NOTE — DIETITIAN INITIAL EVALUATION ADULT. - PERTINENT MEDS FT
MEDICATIONS  (STANDING):  ALBUTerol    90 MICROgram(s) HFA Inhaler 2 Puff(s) Inhalation every 6 hours  cefTRIAXone   IVPB 1000 milliGRAM(s) IV Intermittent every 24 hours  dextrose 40% Gel 15 Gram(s) Oral once  dextrose 5%. 1000 milliLiter(s) (50 mL/Hr) IV Continuous <Continuous>  dextrose 5%. 1000 milliLiter(s) (100 mL/Hr) IV Continuous <Continuous>  dextrose 50% Injectable 25 Gram(s) IV Push once  dextrose 50% Injectable 12.5 Gram(s) IV Push once  dextrose 50% Injectable 25 Gram(s) IV Push once  diltiazem    milliGRAM(s) Oral daily  doxycycline hyclate Capsule 100 milliGRAM(s) Oral every 12 hours  fluticasone propionate 50 MICROgram(s)/spray Nasal Spray 2 Spray(s) Both Nostrils two times a day  glucagon  Injectable 1 milliGRAM(s) IntraMuscular once  insulin lispro (ADMELOG) corrective regimen sliding scale   SubCutaneous three times a day before meals  insulin lispro (ADMELOG) corrective regimen sliding scale   SubCutaneous at bedtime  lamoTRIgine 25 milliGRAM(s) Oral at bedtime  methimazole 2.5 milliGRAM(s) Oral daily  metoprolol tartrate 25 milliGRAM(s) Oral two times a day  pantoprazole    Tablet 40 milliGRAM(s) Oral two times a day  sodium chloride 0.9%. 1000 milliLiter(s) (75 mL/Hr) IV Continuous <Continuous>  tiotropium 18 MICROgram(s) Capsule 1 Capsule(s) Inhalation daily    MEDICATIONS  (PRN):  acetaminophen   Tablet .. 650 milliGRAM(s) Oral every 6 hours PRN Temp greater or equal to 38.5C (101.3F), Mild Pain (1 - 3)  polyethylene glycol 3350 17 Gram(s) Oral daily PRN for constipation

## 2021-03-28 NOTE — PROGRESS NOTE ADULT - SUBJECTIVE AND OBJECTIVE BOX
CARDIOLOGY FOLLOW UP NOTE - DR. MORGAN    Patient Name: BASHIR BECKER  Date of Service: 03-28-21     Subjective:    denies chest pain, dyspnea, palpitations, dizziness  ROS: otherwise negative   overnight events:  heme 6!    PHYSICAL EXAM:  T(C): 36.7 (03-28-21 @ 05:01), Max: 36.8 (03-27-21 @ 20:45)  HR: 73 (03-28-21 @ 05:01) (60 - 77)  BP: 118/56 (03-28-21 @ 05:01) (90/43 - 128/63)  RR: 17 (03-28-21 @ 05:01) (17 - 18)  SpO2: 97% (03-28-21 @ 05:01) (92% - 98%)  Wt(kg): --  I&O's Summary    27 Mar 2021 07:01  -  28 Mar 2021 07:00  --------------------------------------------------------  IN: 480 mL / OUT: 1500 mL / NET: -1020 mL      Daily     Daily     Appearance: Normal	  Cardiovascular: Normal S1 S2,RRR, sm  Respiratory: Lungs clear to auscultation	  Gastrointestinal:  Soft, Non-tender, + BS	  Extremities: Normal range of motion, No clubbing, cyanosis or edema      Home Medications:  acetaminophen 325 mg oral tablet: 2 tab(s) orally every 6 hours, As needed, Temp greater or equal to 38.5C (101.3F), Mild Pain (1 - 3) (26 Mar 2021 16:54)  dilTIAZem 180 mg/24 hours oral capsule, extended release: 1 cap(s) orally once a day (26 Mar 2021 16:54)  Flonase 50 mcg/inh nasal spray: 1 spray(s) nasal once a day, As Needed (26 Mar 2021 16:54)  lamoTRIgine 25 mg oral tablet: 1 tab(s) orally once a day (at bedtime) (26 Mar 2021 16:52)  mirtazapine 15 mg oral tablet: 1 tab(s) orally in the morning and 2 tab(s) at bedtime (26 Mar 2021 16:52)  pantoprazole 40 mg oral delayed release tablet: 1 tab(s) orally once a day (26 Mar 2021 16:54)  polyethylene glycol 3350 oral powder for reconstitution: 17 gram(s) orally once a day, As Needed - for constipation (26 Mar 2021 16:54)  Trelegy Ellipta: 1 puff(s) inhaled once a day (26 Mar 2021 16:54)  Xarelto 15 mg oral tablet: 1 tab(s) orally once a day (in the evening) (26 Mar 2021 16:54)      MEDICATIONS  (STANDING):  ALBUTerol    90 MICROgram(s) HFA Inhaler 2 Puff(s) Inhalation every 6 hours  cefTRIAXone   IVPB 1000 milliGRAM(s) IV Intermittent every 24 hours  dextrose 40% Gel 15 Gram(s) Oral once  dextrose 5%. 1000 milliLiter(s) (50 mL/Hr) IV Continuous <Continuous>  dextrose 5%. 1000 milliLiter(s) (100 mL/Hr) IV Continuous <Continuous>  dextrose 50% Injectable 25 Gram(s) IV Push once  dextrose 50% Injectable 12.5 Gram(s) IV Push once  dextrose 50% Injectable 25 Gram(s) IV Push once  diltiazem    milliGRAM(s) Oral daily  doxycycline hyclate Capsule 100 milliGRAM(s) Oral every 12 hours  fluticasone propionate 50 MICROgram(s)/spray Nasal Spray 2 Spray(s) Both Nostrils two times a day  glucagon  Injectable 1 milliGRAM(s) IntraMuscular once  insulin lispro (ADMELOG) corrective regimen sliding scale   SubCutaneous three times a day before meals  insulin lispro (ADMELOG) corrective regimen sliding scale   SubCutaneous at bedtime  lamoTRIgine 25 milliGRAM(s) Oral at bedtime  methimazole 2.5 milliGRAM(s) Oral daily  metoprolol tartrate 25 milliGRAM(s) Oral two times a day  pantoprazole    Tablet 40 milliGRAM(s) Oral two times a day  sodium chloride 0.9%. 1000 milliLiter(s) (75 mL/Hr) IV Continuous <Continuous>  tiotropium 18 MICROgram(s) Capsule 1 Capsule(s) Inhalation daily      TELEMETRY: 	    ECG:  	  RADIOLOGY:   DIAGNOSTIC TESTING:  [ ] Echocardiogram:  [ ] Catheterization:  [ ] Stress Test:    OTHER: 	    LABS:	 	    CARDIAC MARKERS:                                6.1    9.49  )-----------( 477      ( 28 Mar 2021 07:14 )             21.4     03-28    143  |  105  |  41<H>  ----------------------------<  172<H>  5.0   |  28  |  1.70<H>    Ca    8.6      28 Mar 2021 07:14  Phos  3.0     03-26  Mg     2.2     03-26    TPro  7.2  /  Alb  3.6  /  TBili  0.2  /  DBili  x   /  AST  8<L>  /  ALT  <5<L>  /  AlkPhos  87  03-26    proBNP:   PT/INR - ( 26 Mar 2021 14:51 )   PT: 27.4 sec;   INR: 2.38 ratio         PTT - ( 26 Mar 2021 14:51 )  PTT:63.6 sec  Lipid Profile:   HgA1c:     Creatinine, Serum: 1.70 mg/dL (03-28-21 @ 07:14)  Creatinine, Serum: 1.78 mg/dL (03-27-21 @ 21:43)  Creatinine, Serum: 1.93 mg/dL (03-27-21 @ 07:27)  Creatinine, Serum: 1.59 mg/dL (03-26-21 @ 14:34)

## 2021-03-28 NOTE — DIETITIAN INITIAL EVALUATION ADULT. - PERTINENT LABORATORY DATA
3/28: H/H: 6.1/21.4 L --> being transfused as needed per GI note  POCT Gluc: 169, 150 H  BUN: 41 H, Cr: 1.70 H, Gluc: 172 H, eGFR: 34 L  2/8/21: HbA1c: 6.7%

## 2021-03-28 NOTE — PROGRESS NOTE ADULT - SUBJECTIVE AND OBJECTIVE BOX
Follow-up Pulm Progress Note    The patient was seen and examined. Notes reviewed and discussed with staff/team as applicable      No new respiratory events overnight.      Denies: SOB, Chest pain, increased cough, colored phlegm, hemoptysis, N/V/D, neck stiffness, dysuria  ROS otherwise within normal limits    Vital Signs Last 24 Hrs  T(C): 36.6 (28 Mar 2021 10:06), Max: 36.8 (27 Mar 2021 20:45)  T(F): 97.8 (28 Mar 2021 10:06), Max: 98.3 (27 Mar 2021 20:45)  HR: 76 (28 Mar 2021 10:06) (69 - 77)  BP: 103/44 (28 Mar 2021 10:06) (90/43 - 118/56)  BP(mean): --  RR: 18 (28 Mar 2021 10:06) (17 - 18)  SpO2: 95% (28 Mar 2021 10:06) (95% - 98%)          03-27 @ 07:01  -  03-28 @ 07:00  --------------------------------------------------------  IN: 480 mL / OUT: 1500 mL / NET: -1020 mL          Medications:  MEDICATIONS  (STANDING):  ALBUTerol    90 MICROgram(s) HFA Inhaler 2 Puff(s) Inhalation every 6 hours  cefTRIAXone   IVPB 1000 milliGRAM(s) IV Intermittent every 24 hours  dextrose 40% Gel 15 Gram(s) Oral once  dextrose 5%. 1000 milliLiter(s) (50 mL/Hr) IV Continuous <Continuous>  dextrose 5%. 1000 milliLiter(s) (100 mL/Hr) IV Continuous <Continuous>  dextrose 50% Injectable 25 Gram(s) IV Push once  dextrose 50% Injectable 12.5 Gram(s) IV Push once  dextrose 50% Injectable 25 Gram(s) IV Push once  diltiazem    milliGRAM(s) Oral daily  doxycycline hyclate Capsule 100 milliGRAM(s) Oral every 12 hours  fluticasone propionate 50 MICROgram(s)/spray Nasal Spray 2 Spray(s) Both Nostrils two times a day  glucagon  Injectable 1 milliGRAM(s) IntraMuscular once  insulin lispro (ADMELOG) corrective regimen sliding scale   SubCutaneous three times a day before meals  insulin lispro (ADMELOG) corrective regimen sliding scale   SubCutaneous at bedtime  lamoTRIgine 25 milliGRAM(s) Oral at bedtime  methimazole 2.5 milliGRAM(s) Oral daily  metoprolol tartrate 25 milliGRAM(s) Oral two times a day  pantoprazole    Tablet 40 milliGRAM(s) Oral two times a day  sodium chloride 0.9%. 1000 milliLiter(s) (75 mL/Hr) IV Continuous <Continuous>  tiotropium 18 MICROgram(s) Capsule 1 Capsule(s) Inhalation daily    MEDICATIONS  (PRN):  acetaminophen   Tablet .. 650 milliGRAM(s) Oral every 6 hours PRN Temp greater or equal to 38.5C (101.3F), Mild Pain (1 - 3)  polyethylene glycol 3350 17 Gram(s) Oral daily PRN for constipation      Allergies    Tylenol with Codeine #3 (Other)    Intolerances        Vent settings (if applicable)        Physical Examination:    Pleasant  Neck: no JVD, LAD, accessory muscle use  PULM: Clear to auscultation bilaterally, no wheezes, rales, rhonchi  CVS: Regular rate and rhythm, S1S2, no murmurs, rubs, or gallops  Abdomen:  Extremities:  Neuro:      LABS:                        6.1    9.49  )-----------( 477      ( 28 Mar 2021 07:14 )             21.4     03-28    143  |  105  |  41<H>  ----------------------------<  172<H>  5.0   |  28  |  1.70<H>    Ca    8.6      28 Mar 2021 07:14  Phos  3.0     03-26  Mg     2.2     03-26    TPro  7.2  /  Alb  3.6  /  TBili  0.2  /  DBili  x   /  AST  8<L>  /  ALT  <5<L>  /  AlkPhos  87  03-26          CAPILLARY BLOOD GLUCOSE      POCT Blood Glucose.: 153 mg/dL (28 Mar 2021 12:56)    PT/INR - ( 26 Mar 2021 14:51 )   PT: 27.4 sec;   INR: 2.38 ratio         PTT - ( 26 Mar 2021 14:51 )  PTT:63.6 sec    Procalcitonin, Serum: 0.18 ng/mL (03-27-21 @ 21:43)        CULTURES:        RADIOLOGY REVIEWED    CXR:      CT chest:      Other:   Follow-up Pulm Progress Note    The patient was seen and examined. Notes reviewed and discussed with staff/team as applicable      Appears to be breathing comfortably.  Occasional cough.    No repeat of increased SOB, Chest pain, hemoptysis, N/V/D, neck stiffness, dysuria  ROS otherwise non-contributory    Vital Signs Last 24 Hrs  T(C): 36.6 (28 Mar 2021 10:06), Max: 36.8 (27 Mar 2021 20:45)  T(F): 97.8 (28 Mar 2021 10:06), Max: 98.3 (27 Mar 2021 20:45)  HR: 76 (28 Mar 2021 10:06) (69 - 77)  BP: 103/44 (28 Mar 2021 10:06) (90/43 - 118/56)  BP(mean): --  RR: 18 (28 Mar 2021 10:06) (17 - 18)  SpO2: 95% (28 Mar 2021 10:06) (95% - 98%)          03-27 @ 07:01  -  03-28 @ 07:00  --------------------------------------------------------  IN: 480 mL / OUT: 1500 mL / NET: -1020 mL          Medications:  MEDICATIONS  (STANDING):  ALBUTerol    90 MICROgram(s) HFA Inhaler 2 Puff(s) Inhalation every 6 hours  cefTRIAXone   IVPB 1000 milliGRAM(s) IV Intermittent every 24 hours  dextrose 40% Gel 15 Gram(s) Oral once  dextrose 5%. 1000 milliLiter(s) (50 mL/Hr) IV Continuous <Continuous>  dextrose 5%. 1000 milliLiter(s) (100 mL/Hr) IV Continuous <Continuous>  dextrose 50% Injectable 25 Gram(s) IV Push once  dextrose 50% Injectable 12.5 Gram(s) IV Push once  dextrose 50% Injectable 25 Gram(s) IV Push once  diltiazem    milliGRAM(s) Oral daily  doxycycline hyclate Capsule 100 milliGRAM(s) Oral every 12 hours  fluticasone propionate 50 MICROgram(s)/spray Nasal Spray 2 Spray(s) Both Nostrils two times a day  glucagon  Injectable 1 milliGRAM(s) IntraMuscular once  insulin lispro (ADMELOG) corrective regimen sliding scale   SubCutaneous three times a day before meals  insulin lispro (ADMELOG) corrective regimen sliding scale   SubCutaneous at bedtime  lamoTRIgine 25 milliGRAM(s) Oral at bedtime  methimazole 2.5 milliGRAM(s) Oral daily  metoprolol tartrate 25 milliGRAM(s) Oral two times a day  pantoprazole    Tablet 40 milliGRAM(s) Oral two times a day  sodium chloride 0.9%. 1000 milliLiter(s) (75 mL/Hr) IV Continuous <Continuous>  tiotropium 18 MICROgram(s) Capsule 1 Capsule(s) Inhalation daily    MEDICATIONS  (PRN):  acetaminophen   Tablet .. 650 milliGRAM(s) Oral every 6 hours PRN Temp greater or equal to 38.5C (101.3F), Mild Pain (1 - 3)  polyethylene glycol 3350 17 Gram(s) Oral daily PRN for constipation      Allergies    Tylenol with Codeine #3 (Other)    Intolerances        Vent settings (if applicable)        Physical Examination:    Pleasant  Neck: no JVD, LAD, accessory muscle use  PULM: prolonged expiratory phase, decreased breath sounds at the bases  CVS: Regular rate and rhythm, S1S2, no murmurs, rubs, or gallops  Abdomen: soft, positive bowel sounds  Extremities: arthritic changes without increased edema or tenderness  Neuro: Alert, appropriately responsive to questions      LABS:                        6.1    9.49  )-----------( 477      ( 28 Mar 2021 07:14 )             21.4     03-28    143  |  105  |  41<H>  ----------------------------<  172<H>  5.0   |  28  |  1.70<H>    Ca    8.6      28 Mar 2021 07:14  Phos  3.0     03-26  Mg     2.2     03-26    TPro  7.2  /  Alb  3.6  /  TBili  0.2  /  DBili  x   /  AST  8<L>  /  ALT  <5<L>  /  AlkPhos  87  03-26          CAPILLARY BLOOD GLUCOSE      POCT Blood Glucose.: 153 mg/dL (28 Mar 2021 12:56)    PT/INR - ( 26 Mar 2021 14:51 )   PT: 27.4 sec;   INR: 2.38 ratio         PTT - ( 26 Mar 2021 14:51 )  PTT:63.6 sec    Procalcitonin, Serum: 0.18 ng/mL (03-27-21 @ 21:43)        CULTURES:        RADIOLOGY REVIEWED    CXR:      CT chest:      Other:

## 2021-03-28 NOTE — SWALLOW BEDSIDE ASSESSMENT ADULT - COMMENTS
Continued history: Of note, he had been hospitalized in February of this year, he had a CT scan of the chest which showed multiple nodules and he was treated for aspiration pneumonia.  A 3 month follow up CT was recommended.  Chest x-ray:   The cardiac silhouette is normal in size. There are small bilateral pleural effusions with associated atelectasis. There is mild pulmonary edema. An underlying viral pneumonia cannot be excluded.      Speech/Swallow history: Pt is known to this service from previous admission in 2018 w/ recommendations for dysphagia solids 2 and nectar thick liquids. Most recently seen in February of this year/2021 with recommendations for dysphagia 2 and honey thick liquids however pt refusing thickened liquids. An instrumental was deferred by patient and his family at that time with possible plan to perform in outpatient setting. Pt reported that he wants to just 'enjoy' what he can. He will eat eggs in the morning, does not eat lunch and will order out for dinner; pasta and sausage typically as per pt.

## 2021-03-28 NOTE — SWALLOW BEDSIDE ASSESSMENT ADULT - ASR SWALLOW DENTITION
pt reports that his dentures are at home and are a little loose fitting but ' do the job'/edentulous, does not have dentures

## 2021-03-28 NOTE — PROGRESS NOTE ADULT - ASSESSMENT
89 m with hypoxemia, history of pulmonary nodules and tree in bud type opacities on recent CT, aspiration pneumonia previously.  Currently aspiration cannot be excluded.  Other causes cannot be excluded, though a pulmonary vascular process appears to be unlikely as the patient is on anticoagulation chronically. Certainly a component of increased bronchial hyperreactivity/COPD exacerbation and a component of cardiac dysfunction cannot be excluded.     Will start ceftriaxone and doxycycline for now, given possible aspiration  Will order a procalcitonin level  Continue albuterol  Supplemental O2 to maintain sat 92% or above watching for evidence of CO2 retention  Aspiration precautions  Venous dopplers of the LEs.  Elevate HOB  Incentive spirometry as able  Encouraging secretion clearance, pulmonary toilet prn  Following CxR  Follow up CT post current rx-pt with multiple pulmonary nodules on prev CT as above along with tree in bud opacifies, with likely aspiration at that time; certainly   other inflammatory processes, premalignant changes and other processes could not be excluded  Judicious CV/fluid management-cardiology is consulting.  Continue current management.  Routine prophylactic measures    Thank you for this consult.  Will follow. 89 m with hypoxemia, history of pulmonary nodules and tree in bud type opacities on recent CT, aspiration pneumonia previously.  Currently aspiration cannot be excluded.  Other causes cannot be excluded, though a pulmonary vascular process appears to be unlikely as the patient is on anticoagulation chronically. Certainly a component of increased bronchial hyperreactivity/COPD exacerbation and a component of cardiac dysfunction cannot be excluded.     Maintaining ceftriaxone and doxycycline for now, given possible aspiration  Continue albuterol  Supplemental O2 to maintain sat 92% or above watching for evidence of CO2 retention  Aspiration precautions  Await venous dopplers of the LEs.  Elevate HOB  Incentive spirometry as able  Encouraging secretion clearance, pulmonary toilet prn  Following CxR  Follow up CT post current rx-pt with multiple pulmonary nodules on prev CT as above along with tree in bud opacifies, with likely aspiration at that time; certainly   other inflammatory processes, premalignant changes and other processes could not be excluded  Judicious CV/fluid management-cardiology is consulting.  Management of anemia and K (previously with hyperkalemai) per the primary service  Continue current management.  Routine prophylactic measures    Discussed with the patient and the [facility] staff.

## 2021-03-28 NOTE — DIETITIAN NUTRITION RISK NOTIFICATION - TREATMENT: THE FOLLOWING DIET HAS BEEN RECOMMENDED
Diet, Regular:   Consistent Carbohydrate {Evening Snack} (CSTCHOSN) (03-26-21 @ 18:54) [Active]

## 2021-03-28 NOTE — SWALLOW BEDSIDE ASSESSMENT ADULT - SWALLOW EVAL: RECOMMENDED DIET
Liberalized to regular solids/ thin liquids; pt does not want his food/liquids to be modified. Please provide assistance with meals such as chopping up solids, no straw, moisten solids, choose softer items from menu, and provide medication/pills with applesauce not with water. Liberalized to regular solids/ thin liquids; pt does not want his food/liquids to be modified. Please provide assistance with meals such as chopping up solids, no straw, moisten solids, choose softer items from menu, and provide medication/pills with applesauce not with water. Will remain on consult for any additional support.

## 2021-03-28 NOTE — SWALLOW BEDSIDE ASSESSMENT ADULT - SWALLOW EVAL: DIAGNOSIS
Pt p/w hypoxia; As per chest x-ray read; An underlying viral pneumonia cannot be excluded. Pt with an oral and suspected pharyngeal dysphagia c/b prolonged manipulation 2/2 edentulous state and suspected latency in swallow trigger. No overt s/s of aspiration however can not r/o silent aspiration.

## 2021-03-28 NOTE — DIETITIAN INITIAL EVALUATION ADULT. - PHYSCIAL ASSESSMENT
emaciated 81% IBW  Skin: bilateral stage 1 pressure injury of buttocks and stage 1 thoracic spine pressure injury per flow sheets

## 2021-03-28 NOTE — PROGRESS NOTE ADULT - ASSESSMENT
89 m with    GI bleed  - follow Hct  - transfusion support 1 PRBC  - hold AC   - gastroenterology evaluation noted  - PPI    Confusion improving   - CT head with no acute event  - B12, TSH pending     Pneumonia possible  - antibiotics  - Pulmonary evaluation noted  - nebs    Atrial fibrillation   - rate control  - AC on hold  - cardiology follow dr. Serrano    Anemia  - Tx support    COPD (chronic obstructive pulmonary disease)   - nebs    Diabetes type 2, controlled   - ADA diet   - BS control    Goiter   - continue Rx  - TSH    Malnutrition protein caloric  - Nutrition eval    PT    DVT prophylaxis  - PAS    Osito Devlin MD pager [phone]

## 2021-03-28 NOTE — DIETITIAN INITIAL EVALUATION ADULT. - ENTER TO (CAL/KG)
Sander contacted [facility] regarding infectious Disease appointment [facility] provided contact number of [phone] regarding follow up appointment with Dr. Sanders.  Once scheduled, [facility] instructed to contact me so that I can call office and fax any required labs, consults, etc. For review and disposition.    Sander verbalized understanding of same.   [facility] Interventions    Collaboration/Communication:  Yes         Other, Specialty care Team         Interventions Narrative:  Coordination of outpatient appointment for infectious disease        35

## 2021-03-28 NOTE — PROGRESS NOTE ADULT - SUBJECTIVE AND OBJECTIVE BOX
Patient is a 89y old  Male who presents with a chief complaint of Hypoxemia (28 Mar 2021 13:36)      SUBJECTIVE / OVERNIGHT EVENTS: No new complaints.   Review of Systems  chest pain no  palpitations no  sob no  nausea no  headache no    MEDICATIONS  (STANDING):  ALBUTerol    90 MICROgram(s) HFA Inhaler 2 Puff(s) Inhalation every 6 hours  cefTRIAXone   IVPB 1000 milliGRAM(s) IV Intermittent every 24 hours  dextrose 40% Gel 15 Gram(s) Oral once  dextrose 5%. 1000 milliLiter(s) (50 mL/Hr) IV Continuous <Continuous>  dextrose 5%. 1000 milliLiter(s) (100 mL/Hr) IV Continuous <Continuous>  dextrose 50% Injectable 25 Gram(s) IV Push once  dextrose 50% Injectable 12.5 Gram(s) IV Push once  dextrose 50% Injectable 25 Gram(s) IV Push once  diltiazem    milliGRAM(s) Oral daily  doxycycline hyclate Capsule 100 milliGRAM(s) Oral every 12 hours  fluticasone propionate 50 MICROgram(s)/spray Nasal Spray 2 Spray(s) Both Nostrils two times a day  glucagon  Injectable 1 milliGRAM(s) IntraMuscular once  insulin lispro (ADMELOG) corrective regimen sliding scale   SubCutaneous three times a day before meals  insulin lispro (ADMELOG) corrective regimen sliding scale   SubCutaneous at bedtime  lamoTRIgine 25 milliGRAM(s) Oral at bedtime  methimazole 2.5 milliGRAM(s) Oral daily  metoprolol tartrate 25 milliGRAM(s) Oral two times a day  pantoprazole    Tablet 40 milliGRAM(s) Oral two times a day  sodium chloride 0.9%. 1000 milliLiter(s) (75 mL/Hr) IV Continuous <Continuous>  tiotropium 18 MICROgram(s) Capsule 1 Capsule(s) Inhalation daily    MEDICATIONS  (PRN):  acetaminophen   Tablet .. 650 milliGRAM(s) Oral every 6 hours PRN Temp greater or equal to 38.5C (101.3F), Mild Pain (1 - 3)  polyethylene glycol 3350 17 Gram(s) Oral daily PRN for constipation      Vital Signs Last 24 Hrs  T(C): 36.6 (28 Mar 2021 10:06), Max: 36.8 (27 Mar 2021 20:45)  T(F): 97.8 (28 Mar 2021 10:06), Max: 98.3 (27 Mar 2021 20:45)  HR: 76 (28 Mar 2021 10:06) (69 - 77)  BP: 103/44 (28 Mar 2021 10:06) (90/43 - 118/56)  BP(mean): --  RR: 18 (28 Mar 2021 10:06) (17 - 18)  SpO2: 95% (28 Mar 2021 10:06) (95% - 98%)    PHYSICAL EXAM:  GENERAL: NAD , cachectic   HEAD:  Atraumatic, Normocephalic  EYES: EOMI, PERRLA, conjunctiva and sclera clear  NECK: Supple, No JVD  CHEST/LUNG: Clear to auscultation bilaterally; No wheeze  HEART: Regular rate and rhythm; No murmurs, rubs, or gallops  ABDOMEN: Soft, Nontender, Nondistended; Bowel sounds present  EXTREMITIES:  2+ Peripheral Pulses, No clubbing, cyanosis, or edema  PSYCH: AAOx3  NEUROLOGY: non-focal  SKIN: No rashes or lesions    LABS:                        6.1    9.49  )-----------( 477      ( 28 Mar 2021 07:14 )             21.4     03-28    143  |  105  |  41<H>  ----------------------------<  172<H>  5.0   |  28  |  1.70<H>    Ca    8.6      28 Mar 2021 07:14                  RADIOLOGY & ADDITIONAL TESTS:    Imaging Personally Reviewed:    Consultant(s) Notes Reviewed:      Care Discussed with Consultants/Other Providers:

## 2021-03-28 NOTE — DIETITIAN INITIAL EVALUATION ADULT. - OTHER INFO
Upon RD visit, pt reports his appetite has been fair since inpatient. Pt reports consuming 100% of breakfast this morning which is consistent with nurse reports as well. Pt denies nausea, vomiting, constipation or diarrhea at this time.  Noted pt on bowel regimen which he states as well.  Last bowel movement 3/26 per flow sheets.    Pt reports he thinks he weighs ~130 pounds or so and reports a lot of weight loss over time, Per Willis PARR, pt weighed 164 pounds in 2016, 149 pounds in 2017, 130 pounds in 2018, 136 pounds in 2019 and 2020, 123 pounds (2/20/21), 132 lbs (3/26/21)- dosing weight. Pt's weight seems to be somewhat stable over past few months but overall weight loss over last 5 years.    Noted pt with HbA1c of 6.7% indicating adequate glycemic control for age. Pt blood glucose being managed with admelog while inpatient.    Noted per RD note from 2/20/21, speech therapist has recommended a dysphagia 2 diet upon last admit but family refused and pt remained on regular diet. Pt is edentulous upon interview but does not reports difficulty chewing/swallowing foods at this time.    RD discussed continued importance of adequate intake with focus on protein foods first at meals; consuming main entree first and then sides for adequate calorie and protein provision. Pt refuses oral nutrition supplementation at this time; RD obtained food preferences; to be honored as able.

## 2021-03-28 NOTE — PROGRESS NOTE ADULT - SUBJECTIVE AND OBJECTIVE BOX
INTERVAL HPI/OVERNIGHT EVENTS:  No new overnight event.  No N/V/D.  Tolerating diet.  no melena    Allergies    Tylenol with Codeine #3 (Other)    Intolerances          General:  No wt loss, fevers, chills, night sweats, fatigue,   Eyes:  Good vision, no reported pain  ENT:  No sore throat, pain, runny nose, dysphagia  CV:  No pain, palpitations, hypo/hypertension  Resp:  No dyspnea, cough, tachypnea, wheezing  GI:  No pain, No nausea, No vomiting, No diarrhea, No constipation, No weight loss, No fever, No pruritis, No rectal bleeding, No tarry stools, No dysphagia,  :  No pain, bleeding, incontinence, nocturia  Muscle:  No pain, weakness  Neuro:  No weakness, tingling, memory problems  Psych:  No fatigue, insomnia, mood problems, depression  Endocrine:  No polyuria, polydipsia, cold/heat intolerance  Heme:  No petechiae, ecchymosis, easy bruisability  Skin:  No rash, tattoos, scars, edema      PHYSICAL EXAM:   Vital Signs:  Vital Signs Last 24 Hrs  T(C): 36.5 (28 Mar 2021 17:50), Max: 36.8 (27 Mar 2021 20:45)  T(F): 97.7 (28 Mar 2021 17:50), Max: 98.3 (27 Mar 2021 20:45)  HR: 91 (28 Mar 2021 17:50) (69 - 91)  BP: 139/48 (28 Mar 2021 17:50) (90/43 - 139/48)  BP(mean): --  RR: 18 (28 Mar 2021 10:06) (17 - 18)  SpO2: 98% (28 Mar 2021 17:50) (95% - 98%)  Daily     Daily I&O's Summary    27 Mar 2021 07:01  -  28 Mar 2021 07:00  --------------------------------------------------------  IN: 480 mL / OUT: 1500 mL / NET: -1020 mL    28 Mar 2021 07:01  -  28 Mar 2021 18:39  --------------------------------------------------------  IN: 375 mL / OUT: 0 mL / NET: 375 mL        GENERAL:  Appears stated age, well-groomed, well-nourished, no distress  HEENT:  NC/AT,  conjunctivae clear and pink, no thyromegaly, nodules, adenopathy, no JVD, sclera -anicteric  CHEST:  Full & symmetric excursion, no increased effort, breath sounds clear  HEART:  Regular rhythm, S1, S2, no murmur/rub/S3/S4, no abdominal bruit, no edema  ABDOMEN:  Soft, non-tender, non-distended, normoactive bowel sounds,  no masses ,no hepato-splenomegaly, no signs of chronic liver disease  EXTEREMITIES:  no cyanosis,clubbing or edema  SKIN:  No rash/erythema/ecchymoses/petechiae/wounds/abscess/warm/dry  NEURO:  Alert, oriented, no asterixis, no tremor, no encephalopathy      LABS:                        6.1    9.49  )-----------( 477      ( 28 Mar 2021 07:14 )             21.4     03-28    143  |  105  |  41<H>  ----------------------------<  172<H>  5.0   |  28  |  1.70<H>    Ca    8.6      28 Mar 2021 07:14          amylase   lipase  RADIOLOGY & ADDITIONAL TESTS:

## 2021-03-28 NOTE — DIETITIAN INITIAL EVALUATION ADULT. - ORAL INTAKE PTA/DIET HISTORY
Pt reports poor appetite PTA; reports he does not know why but he is just not hungry. Pt reports living at home with an aide who prepares meals but does not state what he usually eats. Confirms no known food allergies consistent with chart review. Noted pt edentulous, has dentures (unclear if has them in hospital). Denies micronutrient or nutrient supplement use.

## 2021-03-28 NOTE — DIETITIAN INITIAL EVALUATION ADULT. - ADD RECOMMEND
1. Monitor diet, PO intake, GI status, weight, labs, skin integrity 2. Continue with most appropriate diet as medically indicated 3. Honor pt food preferences to promote adequate oral intake while in-house 4. Recommend trial of mighty shakes for now at meals 5. Consider adding multivitamin daily for stage 1 pressure injury 6. Malnutrition notification placed in chart

## 2021-03-28 NOTE — SWALLOW BEDSIDE ASSESSMENT ADULT - SLP PERTINENT HISTORY OF CURRENT PROBLEM
89yo M pmhx COPD, bipolar, monoclonal gammopathy, anemia with history transfusions and iron infusions, afib on eliquis, DM (no longer on meds) presents with hypoxia. Pt's visiting nurse was checking vitals of pt, found to be hypoxic to 70's. Pt at that time not complaining of sob, dyspnia, was not doing anything active at that time. Per daughter, pt was confused this morning, stating he had a friend who was visiting for 3 hrs, calling daughter about aid not arriving at early in AM. not on oxygen at home, uses walker at home. PT denies chest pain, sob, abd pain, nausea, vomitting. currently aox2

## 2021-03-28 NOTE — DIETITIAN INITIAL EVALUATION ADULT. - CHIEF COMPLAINT
"89yo M pmhx COPD, bipolar, monoclonal gammopathy, anemia with history transfusions and iron infusions, afib on eliquis, DM (no longer on meds) presents with hypoxia" "89yo M pmhx COPD, bipolar, monoclonal gammopathy, anemia with history transfusions and iron infusions, afib on eliquis, DM (no longer on meds) presents with hypoxia, GI bleed." Per 3/27 GI note, pt ordered for "PPI daily and upper gastrointestinal endoscopy if bleeding persists"

## 2021-03-28 NOTE — CHART NOTE - NSCHARTNOTEFT_GEN_A_CORE
Notified by RN about patient being severely agitated  Patient is A&O x 2 at baseline, but tends to sundown. Patient is currently A&Ox1, and is unable to be re-oriented.   Patient poses a risk to both themselves and staff.  EKG (3/26/21) shows QTc: 419  Haldol 1mg IVP x 1 STAT given  Fall precautions  Bed alarms  Discussed with RN  Will endorse to AM team      Sammie Conte PA-C  #[id number]

## 2021-03-28 NOTE — SWALLOW BEDSIDE ASSESSMENT ADULT - ORAL PREPARATORY PHASE
able to complete labial seal; trace anterior spillage with thin liquids however functional; able to strip the bolus from the tsp

## 2021-03-29 NOTE — PROGRESS NOTE ADULT - ASSESSMENT
anemia  gi bleed      daily cbc   transfuse prn    upper gastrointestinal endoscopy discussed with patient, he is unwilling  will discuss with daughter, if agreeable then  upper gastrointestinal endoscopy on wednesday      Advanced care planning was discussed with patient and family.  Advanced care planning forms were reviewed and discussed.  Risks, benefits and alternatives of gastroenterologic procedures were discussed in detail and all questions were answered.    30 minutes spent.

## 2021-03-29 NOTE — PROGRESS NOTE ADULT - ASSESSMENT
89 m with    GI bleed  - follow Hct  - transfusion support 1 PRBC  - hold AC   - gastroenterology follow  - EGD if bleeding persist  - PPI    Confusion improving   - CT head with no acute event  - B12, TSH pending     Pneumonia possible  - antibiotics  - Pulmonary evaluation noted  - nebs    Atrial fibrillation   - rate control  - AC on hold  - cardiology follow     Anemia  - Tx support  - Venofer  - Hematology evaluation noted    Monoclonal gammopathy  - SPEP  - Monitor    COPD (chronic obstructive pulmonary disease)   - nebs    Diabetes type 2, controlled   - ADA diet   - BS control    Goiter   - continue Rx  - TSH    Malnutrition protein caloric  - Nutrition eval    PT    DVT prophylaxis  - PAS    Osito Devlin MD pager [phone]

## 2021-03-29 NOTE — CONSULT NOTE ADULT - SUBJECTIVE AND OBJECTIVE BOX
Pt known to me. Briefly, 89yo M pmhx COPD, bipolar, monoclonal gammopathy, anemia with history transfusions and iron infusions, afib on eliquis, DM (no longer on meds) presents with hypoxia. Pt's visiting nurse was checking vitals of pt, found to be hypoxic to 70's. Pt at that time not complaining of sob, dyspnia, was not doing anything active at that time. Per daughter, pt was confused. He was then noted to have GIB.     Pt received venofer in my office in 10/2020. He also has an IgM kappa monoclonal gammopathy, being monitored. Pt limited historian, hx obtained from records and daughter by phone.       PAST MEDICAL & SURGICAL HISTORY:  Goiter    Diabetes type 2, controlled    COPD (chronic obstructive pulmonary disease)    Atrial fibrillation    History of total hip arthroplasty, right        FAMILY HISTORY:  No pertinent family history in first degree relatives        Alochol: Denied  Smoking: Nonsmoker  Drug Use: Denied  Marital Status:         Allergies    Tylenol with Codeine #3 (Other)    Intolerances        MEDICATIONS  (STANDING):  albuterol/ipratropium for Nebulization 3 milliLiter(s) Nebulizer every 6 hours  buDESOnide    Inhalation Suspension 0.5 milliGRAM(s) Inhalation every 12 hours  cefTRIAXone   IVPB 1000 milliGRAM(s) IV Intermittent every 24 hours  dextrose 40% Gel 15 Gram(s) Oral once  dextrose 5%. 1000 milliLiter(s) (50 mL/Hr) IV Continuous <Continuous>  dextrose 5%. 1000 milliLiter(s) (100 mL/Hr) IV Continuous <Continuous>  dextrose 50% Injectable 25 Gram(s) IV Push once  dextrose 50% Injectable 12.5 Gram(s) IV Push once  dextrose 50% Injectable 25 Gram(s) IV Push once  diltiazem    milliGRAM(s) Oral daily  doxycycline hyclate Capsule 100 milliGRAM(s) Oral every 12 hours  fluticasone propionate 50 MICROgram(s)/spray Nasal Spray 2 Spray(s) Both Nostrils two times a day  glucagon  Injectable 1 milliGRAM(s) IntraMuscular once  insulin lispro (ADMELOG) corrective regimen sliding scale   SubCutaneous three times a day before meals  insulin lispro (ADMELOG) corrective regimen sliding scale   SubCutaneous at bedtime  lamoTRIgine 25 milliGRAM(s) Oral at bedtime  methimazole 2.5 milliGRAM(s) Oral daily  metoprolol tartrate 25 milliGRAM(s) Oral two times a day  multivitamin 1 Tablet(s) Oral daily  pantoprazole    Tablet 40 milliGRAM(s) Oral two times a day  sodium chloride 0.9%. 1000 milliLiter(s) (75 mL/Hr) IV Continuous <Continuous>    MEDICATIONS  (PRN):  acetaminophen   Tablet .. 650 milliGRAM(s) Oral every 6 hours PRN Temp greater or equal to 38.5C (101.3F), Mild Pain (1 - 3)  polyethylene glycol 3350 17 Gram(s) Oral daily PRN for constipation      ROS  no pain, limited 2/2 participation    T(C): 36.5 (03-29-21 @ 12:34), Max: 37.1 (03-28-21 @ 20:55)  HR: 87 (03-29-21 @ 04:35) (84 - 91)  BP: 101/51 (03-29-21 @ 12:34) (101/51 - 146/71)  RR: 18 (03-29-21 @ 12:34) (18 - 18)  SpO2: 97% (03-29-21 @ 14:13) (92% - 98%)  Wt(kg): --    PE  NAD  Awake, alert  confused  cachectic                          7.9    9.22  )-----------( 448      ( 29 Mar 2021 06:53 )             26.7       03-29    143  |  106  |  39<H>  ----------------------------<  185<H>  4.6   |  30  |  1.63<H>    Ca    8.5      29 Mar 2021 06:53

## 2021-03-29 NOTE — PROGRESS NOTE ADULT - ASSESSMENT
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    PLAN/RECOMMENDATIONS:    oxygen supplementation to keep saturation greater than 92%  chest CT to better evaluate the lung parenchyma   continue ceftriaxone/doxycyline  await blood cultures, urine Legionella antigen, MSSA/MRSA nasal swab  albuterol/atrovent nebs q6h  pulmicort 0.5mg nebs q12h - give after duoneb - rinse mouth after use  no need for systemic steroids at this point  head of bed elevated greater than 45 degrees at all times  full assistance with meals  incentive spirometry  diurese as tolerated by renal function and hemodynamics  GI follow-up - daily CBC - transfuse as needed - PPI - possible EGD  hematology follow-up    Will follow with you. Plan of care discussed with the patient at bedside and the dedicated floor NP.    Scott Cormier MD, [facility]  [phone]  Pulmonary Medicine

## 2021-03-29 NOTE — PROGRESS NOTE ADULT - SUBJECTIVE AND OBJECTIVE BOX
CARDIOLOGY FOLLOW UP - Dr. Serrano    CC: denies cp, sob, and palpitations       PHYSICAL EXAM:  T(C): 36.9 (03-29-21 @ 04:35), Max: 37.1 (03-28-21 @ 20:55)  HR: 87 (03-29-21 @ 04:35) (84 - 91)  BP: 108/56 (03-29-21 @ 04:35) (108/56 - 146/71)  RR: 18 (03-29-21 @ 04:35) (18 - 18)  SpO2: 94% (03-29-21 @ 04:35) (92% - 98%)  Wt(kg): --  I&O's Summary    28 Mar 2021 07:01  -  29 Mar 2021 07:00  --------------------------------------------------------  IN: 615 mL / OUT: 700 mL / NET: -85 mL        Appearance: Normal	  Cardiovascular: Normal S1 S2,RRR, No JVD, No murmurs  Respiratory: Lungs clear to auscultation	  Gastrointestinal:  Soft, Non-tender, + BS	  Extremities: Normal range of motion, No clubbing, cyanosis or edema      Home Medications:  acetaminophen 325 mg oral tablet: 2 tab(s) orally every 6 hours, As needed, Temp greater or equal to 38.5C (101.3F), Mild Pain (1 - 3) (26 Mar 2021 16:54)  dilTIAZem 180 mg/24 hours oral capsule, extended release: 1 cap(s) orally once a day (26 Mar 2021 16:54)  Flonase 50 mcg/inh nasal spray: 1 spray(s) nasal once a day, As Needed (26 Mar 2021 16:54)  lamoTRIgine 25 mg oral tablet: 1 tab(s) orally once a day (at bedtime) (26 Mar 2021 16:52)  mirtazapine 15 mg oral tablet: 1 tab(s) orally in the morning and 2 tab(s) at bedtime (26 Mar 2021 16:52)  pantoprazole 40 mg oral delayed release tablet: 1 tab(s) orally once a day (26 Mar 2021 16:54)  polyethylene glycol 3350 oral powder for reconstitution: 17 gram(s) orally once a day, As Needed - for constipation (26 Mar 2021 16:54)  Trelegy Ellipta: 1 puff(s) inhaled once a day (26 Mar 2021 16:54)  Xarelto 15 mg oral tablet: 1 tab(s) orally once a day (in the evening) (26 Mar 2021 16:54)      MEDICATIONS  (STANDING):  albuterol/ipratropium for Nebulization 3 milliLiter(s) Nebulizer every 6 hours  buDESOnide    Inhalation Suspension 0.5 milliGRAM(s) Inhalation every 12 hours  cefTRIAXone   IVPB 1000 milliGRAM(s) IV Intermittent every 24 hours  dextrose 40% Gel 15 Gram(s) Oral once  dextrose 5%. 1000 milliLiter(s) (50 mL/Hr) IV Continuous <Continuous>  dextrose 5%. 1000 milliLiter(s) (100 mL/Hr) IV Continuous <Continuous>  dextrose 50% Injectable 25 Gram(s) IV Push once  dextrose 50% Injectable 12.5 Gram(s) IV Push once  dextrose 50% Injectable 25 Gram(s) IV Push once  diltiazem    milliGRAM(s) Oral daily  doxycycline hyclate Capsule 100 milliGRAM(s) Oral every 12 hours  fluticasone propionate 50 MICROgram(s)/spray Nasal Spray 2 Spray(s) Both Nostrils two times a day  glucagon  Injectable 1 milliGRAM(s) IntraMuscular once  insulin lispro (ADMELOG) corrective regimen sliding scale   SubCutaneous three times a day before meals  insulin lispro (ADMELOG) corrective regimen sliding scale   SubCutaneous at bedtime  lamoTRIgine 25 milliGRAM(s) Oral at bedtime  methimazole 2.5 milliGRAM(s) Oral daily  metoprolol tartrate 25 milliGRAM(s) Oral two times a day  multivitamin 1 Tablet(s) Oral daily  pantoprazole    Tablet 40 milliGRAM(s) Oral two times a day  sodium chloride 0.9%. 1000 milliLiter(s) (75 mL/Hr) IV Continuous <Continuous>      TELEMETRY: 	    ECG:  	  RADIOLOGY:   VA Duplex Lower Ext Vein Scan, Bilat (03.29.21 @ 09:59)     FINDINGS:    RIGHT:  Normal compressibility of the RIGHT common femoral, femoral and popliteal veins.  Doppler examination shows normal spontaneous and phasic flow.  No RIGHT calf vein thrombosis is detected.    LEFT:  Normal compressibility of the LEFT common femoral, femoral and popliteal veins.  Doppler examination shows normal spontaneous and phasic flow.  No LEFT calf vein thrombosis is detected.    IMPRESSION:  No evidence of deep venous thrombosis in either lower extremity.    CT Head No Cont (03.26.21 @ 19:47)     FINDINGS:  The CT examination demonstrates extensive generalized volume loss as manifested by the enlargement of the ventricles, cisternal spaces, and cortical sulci throughout.    There is no acute intracranial hemorrhage, mass effect, midline shift or extra axial collections.    There is moderate periventricular white matter lucency, likely the sequela of small vessel ischemic disease.    Ossifications of the bilateral basal ganglia is again delineated.    The bony windows demonstrates no fractures.    The included paranasal sinuses and mastoid air cells are predominantly clear.    IMPRESSION:  There is no acute intracranial hemorrhage, mass effect, midline shift or extra axial collections. Age-appropriate involutional and ischemic gliotic changes without change from 2/17/2021.        DIAGNOSTIC TESTING:  [ ] Echocardiogram:  [ ]  Catheterization:  [ ] Stress Test:    OTHER: 	    LABS:	 	                            7.9    9.22  )-----------( 448      ( 29 Mar 2021 06:53 )             26.7     03-29    143  |  106  |  39<H>  ----------------------------<  185<H>  4.6   |  30  |  1.63<H>    Ca    8.5      29 Mar 2021 06:53

## 2021-03-29 NOTE — PROGRESS NOTE ADULT - ASSESSMENT
a/p  89 year old man with history of COPD, bipolar, monoclonal gammopathy, anemia with history transfusions and iron infusions, afib on a/c, DM, admitted with hypoxia    1. Hypoxic respiratory failure  -? secondary to aspiration vs PNA  -not clinically overloaded, no decomp HF  -abx per medicine, pulmonary  -aspirations precautions  -pending ct imaging   -LE doppler neg for DVT   -f/u pulm/med     2. PAF history  -stable, cont dilt as ordered  -cont to hold a/c for now, gi f/u    3. Anemia, r/o GI bleed   -h/h improved s/p PRBC  -trend serial heme  -check stool occult blood  -upper gastrointestinal endoscopy if bleeding persists   -GI workup    dvt ppx

## 2021-03-29 NOTE — CHART NOTE - NSCHARTNOTEFT_GEN_A_CORE
CC: Tachycardia    HPI: Called by RN to evaluate patient for . Patient seen and examined at the bedside. Denies fevers/chills, weakness, diaphoresis, headaches, dizziness, syncope, paresthesias, chest pain, palpitations, dyspnea, abdominal pain, N/V/D.     Vital Signs Last 24 Hrs  T(C): 36.6 (29 Mar 2021 21:26), Max: 36.9 (29 Mar 2021 04:35)  T(F): 97.8 (29 Mar 2021 21:26), Max: 98.5 (29 Mar 2021 21:17)  HR: 136 (29 Mar 2021 21:26) (87 - 136)  BP: 105/62 (29 Mar 2021 21:26) (101/51 - 109/61)  BP(mean): --  RR: 18 (29 Mar 2021 21:26) (18 - 18)  SpO2: 97% (29 Mar 2021 21:26) (94% - 97%)    PHYSICAL EXAM:  General: NAD, A&Ox3  Respiratory: Lungs clear to auscultation bilaterally. No wheezes, rales, rhonchi. Normal respiratory effort.   Cardiovascular: S1, S2 present. Regular rate and rhythm. No murmurs, rubs, or gallops  Gastrointestinal: BS x4 normoactive. Soft, non-tender, non-distended.   Extremities: 2+ peripheral pulses. Warm to touch. No edema, cyanosis.    A/P  HPI:   89yo M pmhx COPD, bipolar, monoclonal gammopathy, anemia with history transfusions and iron infusions, afib on eliquis, DM (no longer on meds) presents with hypoxia. Pt's visiting nurse was checking vitals of pt, found to be hypoxic to 70's. Pt at that time not complaining of sob, dyspnia, was not doing anything active at that time. Per daughter, pt was confused this morning, stating he had a friend who was visiting for 3 hrs, calling daughter about aid not arriving at early in AM. not on oxygen at home, uses walker at home. PT denies chest pain, sob, abd pain, nausea, vomitting. currently aox2  (26 Mar 2021 18:24)      #Tachycardia, SVT  -Repeat vitals were stable  -STAT EKG with SVT (HR: 132).   -Discussed with Dr. Serrano: 250cc NS bolus x 1 to be given followed by IV lopressor 2.5 x 1. Will give a second dose of IV lopressor 2.5 if SVT does not resolve  -Discussed with RN  -Will continue to monitor  -Will endorse to AM team      Sammie Conte PA-C  #[id number] CC: Tachycardia    HPI: Called by RN to evaluate patient for tachycardia (HR: 136). Patient seen and examined at the bedside. Patient is baseline confused (A&O x 2-3), but appears asymptomatic. Denies any diaphoresis, headaches, dizziness, paresthesias, chest pain, palpitations, dyspnea, abdominal pain, N/V/D.     Vital Signs Last 24 Hrs  T(C): 36.6 (29 Mar 2021 21:26), Max: 36.9 (29 Mar 2021 04:35)  T(F): 97.8 (29 Mar 2021 21:26), Max: 98.5 (29 Mar 2021 21:17)  HR: 136 (29 Mar 2021 21:26) (87 - 136)  BP: 105/62 (29 Mar 2021 21:26) (101/51 - 109/61)  RR: 18 (29 Mar 2021 21:26) (18 - 18)  SpO2: 97% (29 Mar 2021 21:26) (94% - 97%)    PHYSICAL EXAM:  General: NAD, A&Ox3  Respiratory: Lungs clear to auscultation bilaterally. No wheezes, rales, rhonchi. Normal respiratory effort.   Cardiovascular: S1, S2 present. Regular rate and rhythm. No murmurs, rubs, or gallops  Gastrointestinal: BS x4 normoactive. Soft, non-tender, non-distended.   Extremities: 2+ peripheral pulses. Warm to touch. No edema, cyanosis.    A/P  89yo M pmhx COPD, bipolar, monoclonal gammopathy, anemia with history transfusions and iron infusions, afib on eliquis, DM (no longer on meds) presents with hypoxia.   Patient is baseline confused (A&O x 2-3), but appears asymptomatic. Denies any diaphoresis, headaches, dizziness, paresthesias, chest pain, palpitations, dyspnea, abdominal pain, N/V/D.       #Tachycardia, SVT  -Repeat vitals were stable  -STAT EKG with SVT (HR: 132).   -Discussed with Dr. Serrano: 250cc NS bolus x 1 to be given followed by IV lopressor 2.5 x 1. Will give a 2nd dose of IV lopressor 2.5 if SVT does not resolve. Transfer patient to tele when HR better controlled  -Discussed with RN  -Will continue to monitor  -Will endorse to AM team      Sammie Conte PA-C  #[id number]        **ADDENDUM @ 01:10**  -HR is now better controlled at 80s-110s s/p IV lopressor 2.5 x 2   -STAT EKG with A-fib (HR: 100), which patient does have a history of   -Patient to be transferred to tele now  -Cardio to follow in AM  -Will notify Dr. Devlin in AM  -Discussed with RN      Sammie Conte PA-C  #[id number] CC: Tachycardia    HPI: Called by RN to evaluate patient for tachycardia (HR: 136). Patient seen and examined at the bedside. Patient is baseline confused (A&O x 2-3), but appears asymptomatic. Denies any diaphoresis, headaches, dizziness, paresthesias, chest pain, palpitations, dyspnea, abdominal pain, N/V/D.     Vital Signs Last 24 Hrs  T(C): 36.6 (29 Mar 2021 21:26), Max: 36.9 (29 Mar 2021 04:35)  T(F): 97.8 (29 Mar 2021 21:26), Max: 98.5 (29 Mar 2021 21:17)  HR: 136 (29 Mar 2021 21:26) (87 - 136)  BP: 105/62 (29 Mar 2021 21:26) (101/51 - 109/61)  RR: 18 (29 Mar 2021 21:26) (18 - 18)  SpO2: 97% (29 Mar 2021 21:26) (94% - 97%)    PHYSICAL EXAM:  General: NAD, A&Ox3  Respiratory: Lungs clear to auscultation bilaterally. No wheezes, rales, rhonchi. Normal respiratory effort.   Cardiovascular: S1, S2 present. Regular rate and rhythm. No murmurs, rubs, or gallops  Gastrointestinal: BS x4 normoactive. Soft, non-tender, non-distended.   Extremities: 2+ peripheral pulses. Warm to touch. No edema, cyanosis.    A/P  89yo M pmhx COPD, bipolar, monoclonal gammopathy, anemia with history transfusions and iron infusions, afib on eliquis, DM (no longer on meds) presents with hypoxia.   Patient is baseline confused (A&O x 2-3), but appears asymptomatic. Denies any diaphoresis, headaches, dizziness, paresthesias, chest pain, palpitations, dyspnea, abdominal pain, N/V/D.       #Tachycardia, SVT  -Rest of vitals were stable  -STAT EKG with SVT (HR: 132).   -Discussed with Dr. Serrano: 250cc NS bolus x 1 to be given followed by IV lopressor 2.5 x 1. Will give a 2nd dose of IV lopressor 2.5 if SVT does not resolve. Transfer patient to tele when HR better controlled  -Discussed with RN  -Will continue to monitor  -Will endorse to AM team      Sammie Conte PA-C  #[id number]        **ADDENDUM @ 01:10**  -HR is now better controlled at 80s-110s s/p IV lopressor 2.5 x 2   -STAT EKG with A-fib (HR: 100), which patient does have a history of   -Patient to be transferred to tele now  -Cardio to follow in AM  -Will notify Dr. Devlin in AM  -Discussed with RN      Sammie Conte PA-C  #[id number] CC: Tachycardia    HPI: Called by RN to evaluate patient for tachycardia (HR: 136). Patient seen and examined at the bedside. Patient is baseline confused (A&O x 2-3), but appears asymptomatic. Denies any diaphoresis, headaches, dizziness, paresthesias, chest pain, palpitations, dyspnea, abdominal pain, N/V/D.     Vital Signs Last 24 Hrs  T(C): 36.6 (29 Mar 2021 21:26), Max: 36.9 (29 Mar 2021 04:35)  T(F): 97.8 (29 Mar 2021 21:26), Max: 98.5 (29 Mar 2021 21:17)  HR: 136 (29 Mar 2021 21:26) (87 - 136)  BP: 105/62 (29 Mar 2021 21:26) (101/51 - 109/61)  RR: 18 (29 Mar 2021 21:26) (18 - 18)  SpO2: 97% (29 Mar 2021 21:26) (94% - 97%)    PHYSICAL EXAM:  General: NAD, A&Ox3  Respiratory: Lungs clear to auscultation bilaterally. No wheezes, rales, rhonchi. Normal respiratory effort.   Cardiovascular: S1, S2 present. Regular rate and rhythm. No murmurs, rubs, or gallops  Gastrointestinal: BS x4 normoactive. Soft, non-tender, non-distended.   Extremities: 2+ peripheral pulses. Warm to touch. No edema, cyanosis.    A/P  89yo M pmhx COPD, bipolar, monoclonal gammopathy, anemia with history transfusions and iron infusions, afib on eliquis, DM (no longer on meds) presents with hypoxia.   Patient is baseline confused (A&O x 2-3), but appears asymptomatic. Denies any diaphoresis, headaches, dizziness, paresthesias, chest pain, palpitations, dyspnea, abdominal pain, N/V/D.       #Tachycardia, SVT  -Rest of vitals were stable  -STAT EKG with SVT (HR: 132).   -Discussed with Dr. Serrano: 250cc NS bolus x 1 to be given followed by IV lopressor 2.5 x 1. Will give a 2nd dose of IV lopressor 2.5 if SVT does not resolve. Transfer patient to tele when HR better controlled  -Discussed with RN  -Will continue to monitor  -Will endorse to AM team      Sammie Conte PA-C  #[id number]        **ADDENDUM @ 02:00**  -HR is now better controlled at 80s-110s s/p IV lopressor 2.5 x 2 and IV cardizem 5 x 1  -STAT EKG with A-fib (HR: 100), which patient does have a history of  -Additional 250cc NS bolus given for a total of 500cc NS bolus -> BP stable @ 99/58 prior to transport   -Patient to be transferred to tele now  -Cardio to follow in AM  -Will notify Dr. Devlin in AM  -Discussed with RN  -Sign-out given to covering 2D provider      Sammie Cotne PA-C  #[id number] CC: Tachycardia    HPI: Called by RN to evaluate patient for tachycardia (HR: 136). Patient seen and examined at the bedside. Patient is baseline confused (A&O x 2-3), but appears asymptomatic. Denies any diaphoresis, headaches, dizziness, paresthesias, chest pain, palpitations, dyspnea, abdominal pain, N/V/D.     Vital Signs Last 24 Hrs  T(C): 36.6 (29 Mar 2021 21:26), Max: 36.9 (29 Mar 2021 04:35)  T(F): 97.8 (29 Mar 2021 21:26), Max: 98.5 (29 Mar 2021 21:17)  HR: 136 (29 Mar 2021 21:26) (87 - 136)  BP: 105/62 (29 Mar 2021 21:26) (101/51 - 109/61)  RR: 18 (29 Mar 2021 21:26) (18 - 18)  SpO2: 97% (29 Mar 2021 21:26) (94% - 97%)    PHYSICAL EXAM:  General: NAD, A&Ox3  Respiratory: Lungs clear to auscultation bilaterally. No wheezes, rales, rhonchi. Normal respiratory effort.   Cardiovascular: S1, S2 present. Regular rate and rhythm. No murmurs, rubs, or gallops  Gastrointestinal: BS x4 normoactive. Soft, non-tender, non-distended.   Extremities: 2+ peripheral pulses. Warm to touch. No edema, cyanosis.    A/P  89yo M pmhx COPD, bipolar, monoclonal gammopathy, anemia with history transfusions and iron infusions, afib on eliquis, DM (no longer on meds) presents with hypoxia.   Patient is baseline confused (A&O x 2-3), but appears asymptomatic. Denies any diaphoresis, headaches, dizziness, paresthesias, chest pain, palpitations, dyspnea, abdominal pain, N/V/D.       #Tachycardia, SVT  -Rest of vitals were stable  -STAT EKG with SVT (HR: 132).   -Discussed with Dr. Serrano: 250cc NS bolus x 1 to be given followed by IV lopressor 2.5 x 1. Will give a 2nd dose of IV lopressor 2.5 if SVT does not resolve. Transfer patient to tele when HR better controlled for further monitoring  -Discussed with RN  -Will continue to monitor  -Will endorse to AM team      Sammie Conte PA-C  #[id number]        **ADDENDUM @ 02:00**  -HR is now better controlled at 80s-110s s/p IV lopressor 2.5 x 2 and IV cardizem 5 x 1  -STAT EKG with A-fib (HR: 100), which patient does have a history of  -Additional 250cc NS bolus given for a total of 500cc NS bolus -> BP stable @ 99/58 prior to transport   -Patient to be transferred to tele now  -Cardio to follow in AM  -Will notify Dr. Devlin in AM  -Discussed with RN  -Sign-out given to covering 2D provider      Sammie Conte PA-C  #[id number]

## 2021-03-29 NOTE — PROGRESS NOTE ADULT - SUBJECTIVE AND OBJECTIVE BOX
INTERVAL HPI/OVERNIGHT EVENTS:    "i dont want any tests"    Allergies    Tylenol with Codeine #3 (Other)    Intolerances          General:  No wt loss, fevers, chills, night sweats, fatigue,   Eyes:  Good vision, no reported pain  ENT:  No sore throat, pain, runny nose, dysphagia  CV:  No pain, palpitations, hypo/hypertension  Resp:  No dyspnea, cough, tachypnea, wheezing  GI:  No pain, No nausea, No vomiting, No diarrhea, No constipation, No weight loss, No fever, No pruritis, No rectal bleeding, No tarry stools, No dysphagia,  :  No pain, bleeding, incontinence, nocturia  Muscle:  No pain, weakness  Neuro:  No weakness, tingling, memory problems  Psych:  No fatigue, insomnia, mood problems, depression  Endocrine:  No polyuria, polydipsia, cold/heat intolerance  Heme:  No petechiae, ecchymosis, easy bruisability  Skin:  No rash, tattoos, scars, edema      PHYSICAL EXAM:   Vital Signs:  Vital Signs Last 24 Hrs  T(C): 36.5 (28 Mar 2021 17:50), Max: 36.8 (27 Mar 2021 20:45)  T(F): 97.7 (28 Mar 2021 17:50), Max: 98.3 (27 Mar 2021 20:45)  HR: 91 (28 Mar 2021 17:50) (69 - 91)  BP: 139/48 (28 Mar 2021 17:50) (90/43 - 139/48)  BP(mean): --  RR: 18 (28 Mar 2021 10:06) (17 - 18)  SpO2: 98% (28 Mar 2021 17:50) (95% - 98%)  Daily     Daily I&O's Summary    27 Mar 2021 07:01  -  28 Mar 2021 07:00  --------------------------------------------------------  IN: 480 mL / OUT: 1500 mL / NET: -1020 mL    28 Mar 2021 07:01  -  28 Mar 2021 18:39  --------------------------------------------------------  IN: 375 mL / OUT: 0 mL / NET: 375 mL        GENERAL:  Appears stated age, well-groomed, well-nourished, no distress  HEENT:  NC/AT,  conjunctivae clear and pink, no thyromegaly, nodules, adenopathy, no JVD, sclera -anicteric  CHEST:  Full & symmetric excursion, no increased effort, breath sounds clear  HEART:  Regular rhythm, S1, S2, no murmur/rub/S3/S4, no abdominal bruit, no edema  ABDOMEN:  Soft, non-tender, non-distended, normoactive bowel sounds,  no masses ,no hepato-splenomegaly, no signs of chronic liver disease  EXTEREMITIES:  no cyanosis,clubbing or edema  SKIN:  No rash/erythema/ecchymoses/petechiae/wounds/abscess/warm/dry  NEURO:  Alert, oriented, no asterixis, no tremor, no encephalopathy      LABS:                        6.1    9.49  )-----------( 477      ( 28 Mar 2021 07:14 )             21.4     03-28    143  |  105  |  41<H>  ----------------------------<  172<H>  5.0   |  28  |  1.70<H>    Ca    8.6      28 Mar 2021 07:14          amylase   lipase  RADIOLOGY & ADDITIONAL TESTS:

## 2021-03-29 NOTE — PROGRESS NOTE ADULT - SUBJECTIVE AND OBJECTIVE BOX
[facility] - [facility] - PROGRESS NOTE    CHIEF COMPLAINT: dyspnea; hypoxemia; COPD exacerbation; pneumonia; pulmonary edema; anemia    INTERVAL HISTORY: poor historian; reports shortness of breath; no cough, sputum production, chest congestion or wheeze; no fevers, chills or sweats; no chest pain/pressure or palpitations;     REVIEW OF SYSTEMS:  Constitutional: As per interval history  HEENT: Within normal limits  CV: As per interval history  Resp: As per interval history  GI: Within normal limits   : Within normal limits  Musculoskeletal: Within normal limits  Skin: Within normal limits  Neurological: Within normal limits  Psychiatric: Within normal limits  Endocrine: Within normal limits  Hematologic/Lymphatic: Within normal limits  Allergic/Immunologic: Within normal limits    [x] Unable to assess ROS because of dementia    MEDICATIONS:     Pulmonary "  albuterol/ipratropium for Nebulization 3 milliLiter(s) Nebulizer every 6 hours  buDESOnide    Inhalation Suspension 0.5 milliGRAM(s) Inhalation every 12 hours    Anti-microbials:  cefTRIAXone   IVPB 1000 milliGRAM(s) IV Intermittent every 24 hours  doxycycline hyclate Capsule 100 milliGRAM(s) Oral every 12 hours    Cardiovascular:  diltiazem    milliGRAM(s) Oral daily  metoprolol tartrate 25 milliGRAM(s) Oral two times a day    Other:  dextrose 40% Gel 15 Gram(s) Oral once  dextrose 5%. 1000 milliLiter(s) IV Continuous <Continuous>  dextrose 5%. 1000 milliLiter(s) IV Continuous <Continuous>  dextrose 50% Injectable 25 Gram(s) IV Push once  dextrose 50% Injectable 12.5 Gram(s) IV Push once  dextrose 50% Injectable 25 Gram(s) IV Push once  fluticasone propionate 50 MICROgram(s)/spray Nasal Spray 2 Spray(s) Both Nostrils two times a day  glucagon  Injectable 1 milliGRAM(s) IntraMuscular once  insulin lispro (ADMELOG) corrective regimen sliding scale   SubCutaneous three times a day before meals  insulin lispro (ADMELOG) corrective regimen sliding scale   SubCutaneous at bedtime  lamoTRIgine 25 milliGRAM(s) Oral at bedtime  methimazole 2.5 milliGRAM(s) Oral daily  multivitamin 1 Tablet(s) Oral daily  pantoprazole    Tablet 40 milliGRAM(s) Oral two times a day  sodium chloride 0.9%. 1000 milliLiter(s) IV Continuous <Continuous>    MEDICATIONS  (PRN):  acetaminophen   Tablet .. 650 milliGRAM(s) Oral every 6 hours PRN Temp greater or equal to 38.5C (101.3F), Mild Pain (1 - 3)  polyethylene glycol 3350 17 Gram(s) Oral daily PRN for constipation      OBJECTIVE:    I&O's Detail    28 Mar 2021 07:01  -  29 Mar 2021 07:00  --------------------------------------------------------  IN:    Oral Fluid: 240 mL    sodium chloride 0.9%: 375 mL  Total IN: 615 mL    OUT:    Voided (mL): 700 mL  Total OUT: 700 mL    Total NET: -85 mL    POCT Blood Glucose.: 193 mg/dL (28 Mar 2021 18:13)  POCT Blood Glucose.: 153 mg/dL (28 Mar 2021 12:56)      PHYSICAL EXAM:       ICU Vital Signs Last 24 Hrs  T(C): 36.9 (29 Mar 2021 04:35), Max: 37.1 (28 Mar 2021 20:55)  T(F): 98.4 (29 Mar 2021 04:35), Max: 98.8 (28 Mar 2021 20:55)  HR: 87 (29 Mar 2021 04:35) (84 - 91)  BP: 108/56 (29 Mar 2021 04:35) (108/56 - 146/71)  BP(mean): --  ABP: --  ABP(mean): --  RR: 18 (29 Mar 2021 04:35) (18 - 18)  SpO2: 94% (29 Mar 2021 04:35) (92% - 98%) on 2lpm nasal canula     General: Awake. Alert. Cooperative. No distress. Appears stated age.	  HEENT: Atraumatic. Bitemporal wasting. Anicteric. Normal oral mucosa. PERRL. EOMI.  Neck: Supple. Trachea midline. Thyroid without enlargement/tenderness/nodules. No carotid bruit. No JVD. Loss of bilateral supraclavicular fat pads	  Cardiovascular: Regular rate and rhythm. S1 S2 normal. No murmurs, rubs or gallops.  Respiratory: Respirations unlabored. Bilateral wheeze. Decreased breath sounds left base with dullness to percussion. Kyphosis.  Abdomen: Soft. Non-tender. Non-distended. No organomegaly. No masses. Normal bowel sounds.  Extremities: Warm to touch. No clubbing or cyanosis. No pedal edema.  Pulses: 2+ peripheral pulses all extremities.	  Skin: Normal skin color. No rashes or lesions. No ecchymoses. No cyanosis. Warm to touch.  Lymph Nodes: Cervical, supraclavicular and axillary nodes normal  Neurological: Moving all estremitiesl. A and O x 1  Psychiatry: Calm    LABS:                          7.9    9.22  )-----------( 448      ( 29 Mar 2021 06:53 )             26.7     CBC    WBC  9.22 <==, 9.49 <==, 7.06 <==, 12.30 <==    Hemoglobin  7.9 <<==, 6.1 <<==, 7.8 <<==, 7.4 <<==    Hematocrit  26.7 <==, 21.4 <==, 28.0 <==, 26.2 <==    Platelets  448 <==, 477 <==, 525 <==, 471 <==      143  |  106  |  39<H>  ----------------------------<  185<H>    03-29  4.6   |  30  |  1.63<H>      LYTES    sodium  143 <==, 143 <==, 139 <==, 144 <==, 144 <==    potassium   4.6 <==, 5.0 <==, 5.3 <==, 6.0 <==, 5.0 <==    chloride  106 <==, 105 <==, 104 <==, 103 <==, 104 <==    carbon dioxide  30 <==, 28 <==, 27 <==, 29 <==, 31 <==    =============================================================================================  RENAL FUNCTION:    Creatinine:   1.63  <<==, 1.70  <<==, 1.78  <<==, 1.93  <<==, 1.59  <<==    BUN:   39 <==, 41 <==, 44 <==, 40 <==, 31 <==    ============================================================================================    calcium   8.5 <==, 8.6 <==, 8.5 <==, 9.0 <==, 9.0 <==    phos   3.0 <==    mag   2.2 <==    ============================================================================================  LFTs    AST:   8 <==     ALT:  <5  <==     AP:  87  <=    Bili:  0.2  <=      PT/INR - ( 26 Mar 2021 14:51 )   PT: 27.4 sec;   INR: 2.38 ratio      Venous Blood Gas:  03-26 @ 15:57  7.26/81/33/35/46  VBG Lactate: --    Procalcitonin, Serum: 0.18 ng/mL (03-27 @ 21:43)    MICROBIOLOGY:     COVID-19 PCR . (03.26.21 @ 20:44)   COVID-19 PCR: NotDetec:      RADIOLOGY:  [x] Chest radiographs reviewed and interpreted by me    < from: Xray Chest 1 View- PORTABLE-Urgent (Xray Chest 1 View- PORTABLE-Urgent .) (03.26.21 @ 14:18) >    EXAM:  XR CHEST PORTABLE URGENT 1V                          PROCEDURE DATE:  03/26/2021      INTERPRETATION:  Indication: Hypoxia.    TECHNIQUE: Single portable view of the chest.    COMPARISON: 2/17/2021    FINDINGS /  IMPRESSION: The cardiac silhouette is normal in size. There are small bilateral pleural effusions with associated atelectasis. There is mild pulmonary edema. An underlying viral pneumonia cannot be excluded.      JJ PEACOCK MD; Attending Radiologist  This document has been electronically signed. Mar 26 2021  2:29PM    < end of copied text >  ---------------------------------------------------------------------------------------------------------------  < from: [facility] Duplex Lower Ext Vein Scan, Bilat (03.29.21 @ 09:59) >    EXAM:  DUPLEX SCAN EXT VEINS LOWER BI                            PROCEDURE DATE:  03/29/2021      INTERPRETATION:  CLINICAL INFORMATION: Lower extremity swelling and pain, rule out DVT.    COMPARISON: None available.    TECHNIQUE: Duplex sonography of the BILATERAL LOWER extremity veins with color and spectral Doppler, with and without compression.    FINDINGS:    RIGHT:  Normal compressibility of the RIGHT common femoral, femoral and popliteal veins.  Doppler examination shows normal spontaneous and phasic flow.  No RIGHT calf vein thrombosis is detected.    LEFT:  Normal compressibility of the LEFT common femoral, femoral and popliteal veins.  Doppler examination shows normal spontaneous and phasic flow.  No LEFT calf vein thrombosis is detected.    IMPRESSION:  No evidence of deep venous thrombosis in either lower extremity.    ROSARIO SHARMA M.D., ATTENDING RADIOLOGIST  This document has been electronically signed. Mar 29 2021 10:12AM    < end of copied text >  ---------------------------------------------------------------------------------------------------------------  < from: CT Head No Cont (03.26.21 @ 19:47) >    EXAM:  CT BRAIN                          PROCEDURE DATE:  03/26/2021      INTERPRETATION:  PROCEDURE: CT head without contrast.    INDICATION: Confusion    TECHNIQUE: Multiple axial sections were obtained at 5 mm intervals. The images were reviewed in brain and bone windows. Imaging is performed using helical low-dose technique, and sagittal and coronal reformations are provided.    COMPARISON: Head CT 2/17/2021    FINDINGS:  The CT examination demonstrates extensive generalized volume loss as manifested by the enlargement of the ventricles, cisternal spaces, and cortical sulci throughout.    There is no acute intracranial hemorrhage, mass effect, midline shift or extra axial collections.    There is moderate periventricular white matter lucency, likely the sequela of small vessel ischemic disease.    Ossifications of the bilateral basal ganglia is again delineated.    The bony windows demonstrates no fractures.    The included paranasal sinuses and mastoid air cells are predominantly clear.    IMPRESSION:  There is no acute intracranial hemorrhage, mass effect, midline shift or extra axial collections. Age-appropriate involutional and ischemic gliotic changes without change from 2/17/2021.    MARIELOS MOREL MD; Resident Radiology  This document has been electronically signed.  BHAVANA CALDERA MD, ATTENDING RADIOLOGIST  This document has been electronically signed. Mar 27 2021  9:16AM    < end of copied text >  ---------------------------------------------------------------------------------------------------------------

## 2021-03-29 NOTE — PROGRESS NOTE ADULT - SUBJECTIVE AND OBJECTIVE BOX
Patient is a 89y old  Male who presents with a chief complaint of Hypoxemia (28 Mar 2021 13:36)      SUBJECTIVE / OVERNIGHT EVENTS: Comfortable without new complaints.   Review of Systems  chest pain no  palpitations no  sob no  nausea no  headache no    MEDICATIONS  (STANDING):  albuterol/ipratropium for Nebulization 3 milliLiter(s) Nebulizer every 6 hours  buDESOnide    Inhalation Suspension 0.5 milliGRAM(s) Inhalation every 12 hours  cefTRIAXone   IVPB 1000 milliGRAM(s) IV Intermittent every 24 hours  dextrose 40% Gel 15 Gram(s) Oral once  dextrose 5%. 1000 milliLiter(s) (50 mL/Hr) IV Continuous <Continuous>  dextrose 5%. 1000 milliLiter(s) (100 mL/Hr) IV Continuous <Continuous>  dextrose 50% Injectable 25 Gram(s) IV Push once  dextrose 50% Injectable 12.5 Gram(s) IV Push once  dextrose 50% Injectable 25 Gram(s) IV Push once  diltiazem    milliGRAM(s) Oral daily  doxycycline hyclate Capsule 100 milliGRAM(s) Oral every 12 hours  fluticasone propionate 50 MICROgram(s)/spray Nasal Spray 2 Spray(s) Both Nostrils two times a day  glucagon  Injectable 1 milliGRAM(s) IntraMuscular once  insulin lispro (ADMELOG) corrective regimen sliding scale   SubCutaneous three times a day before meals  insulin lispro (ADMELOG) corrective regimen sliding scale   SubCutaneous at bedtime  iron sucrose IVPB 200 milliGRAM(s) IV Intermittent every 24 hours  lamoTRIgine 25 milliGRAM(s) Oral at bedtime  methimazole 2.5 milliGRAM(s) Oral daily  metoprolol tartrate 25 milliGRAM(s) Oral two times a day  multivitamin 1 Tablet(s) Oral daily  pantoprazole    Tablet 40 milliGRAM(s) Oral two times a day  sodium chloride 0.9%. 1000 milliLiter(s) (75 mL/Hr) IV Continuous <Continuous>    MEDICATIONS  (PRN):  acetaminophen   Tablet .. 650 milliGRAM(s) Oral every 6 hours PRN Temp greater or equal to 38.5C (101.3F), Mild Pain (1 - 3)  polyethylene glycol 3350 17 Gram(s) Oral daily PRN for constipation      Vital Signs Last 24 Hrs  T(C): 36.5 (29 Mar 2021 12:34), Max: 37.1 (28 Mar 2021 20:55)  T(F): 97.7 (29 Mar 2021 12:34), Max: 98.8 (28 Mar 2021 20:55)  HR: 87 (29 Mar 2021 04:35) (84 - 87)  BP: 101/51 (29 Mar 2021 12:34) (101/51 - 146/71)  BP(mean): --  RR: 18 (29 Mar 2021 12:34) (18 - 18)  SpO2: 97% (29 Mar 2021 14:13) (92% - 97%)    PHYSICAL EXAM:  GENERAL: NAD, cachectic  HEAD:  Atraumatic, Normocephalic  EYES: EOMI, PERRLA, conjunctiva and sclera clear  NECK: Supple, No JVD  CHEST/LUNG: Clear to auscultation bilaterally; No wheeze  HEART: Regular rate and rhythm; No murmurs, rubs, or gallops  ABDOMEN: Soft, Nontender, Nondistended; Bowel sounds present  EXTREMITIES:  2+ Peripheral Pulses, No clubbing, cyanosis, or edema  PSYCH: AAOx3  NEUROLOGY: non-focal  SKIN: No rashes or lesions    LABS:                        7.9    9.22  )-----------( 448      ( 29 Mar 2021 06:53 )             26.7     03-29    143  |  106  |  39<H>  ----------------------------<  185<H>  4.6   |  30  |  1.63<H>    Ca    8.5      29 Mar 2021 06:53                  RADIOLOGY & ADDITIONAL TESTS:    Imaging Personally Reviewed:    Consultant(s) Notes Reviewed:      Care Discussed with Consultants/Other Providers:

## 2021-03-29 NOTE — CONSULT NOTE ADULT - ASSESSMENT
89yo M pmhx COPD, bipolar, monoclonal gammopathy, anemia with history transfusions and iron infusions, afib on eliquis, DM (no longer on meds) presents with hypoxia. Pt received venofer in my office in 10/2020. He also has an IgM kappa monoclonal gammopathy, being monitored. Pt limited historian, hx obtained from records and daughter by phone.     Anemia -- has ACD, had been with [facility], and also with monoclonal gammopathy. 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 Also with likely GIB, + FOBT  -- hgb improved with transfusion  -- hgb adequate today  -- transfuse for hgb <7  -- ferritin 156, s/p Venofer in 10/2020, would give venofer 200mg daily x 2 at this time  -- was to start aranesp as outpt, hold for now in the acute setting, can revisit after pt d/c. He was to start in the office but had been postponed repeatedly after pt had multiple falls    monoclonal gammopathy -- in 10/2020, IgM 1852, free kappa 1126, M spike 1.4. Was being monitored clinically since GOC was more supportive given his overall condition and comorbidities  -- repeat SPEP, SABINO, Ig, FLC at this time  -- monitor for now    GIB -- GI eval appreciated    hypoxia, PNA -- cards and pulm eval appreciated, aspiration PNA  -- abx per pulm    AMS -- multifactorial, monitor for now    D/w pt briefly, d/w daughter by phone, and d/w primary team, will follow, [phone]

## 2021-03-30 NOTE — PROGRESS NOTE ADULT - ASSESSMENT
89 m with    GI bleed  - follow Hct  - transfusion support 1 PRBC  - hold AC   - gastroenterology follow  - EGD if bleeding persist. Refusing now.  - PPI    Confusion improving   - CT head with no acute event  - B12, TSH pending     Pneumonia possible  - antibiotics  - Pulmonary follow  - nebs    Atrial fibrillation   - rate control  - AC on hold  - cardiology follow     Anemia  - Tx support  - Venofer  - Hematology evaluation noted    Monoclonal gammopathy  - SPEP  - Monitor  - Hematology follow    COPD (chronic obstructive pulmonary disease)   - nebs    Diabetes type 2, controlled   - ADA diet   - BS control    Goiter   - continue Rx  - TSH    Malnutrition protein caloric  - Nutrition eval    PT    DVT prophylaxis  - PAS    Osito Devlin MD pager [phone]

## 2021-03-30 NOTE — PROGRESS NOTE ADULT - ASSESSMENT
a/p  89 year old man with history of COPD, bipolar, monoclonal gammopathy, anemia with history transfusions and iron infusions, afib on a/c, DM, admitted with hypoxia    1. Hypoxic respiratory failure  -? secondary to aspiration vs PNA  -not clinically overloaded, no decomp HF  -abx per medicine, pulmonary  -aspirations precautions  -CT chest noted with Small to moderate size loculated left pleural effusion.  -LE doppler neg for DVT   -f/u pulm/med     2. Acute onset of P afib (hx)/ Sinus tachycardia   -rates elevated in setting of anemia, hypovolemia   -s/p IV lopressor 5mg x 2, IV Cardizem 5mg x 1  -pt initially refusing PO meds, now agreeable  -encourage compliance  -c/w Cardizem and BB as ordered for now  -if pt reusing PO, can give IV   -cont to hold a/c for now, gi f/u    3. Anemia, r/o GI bleed   -h/h improved s/p PRBC  -trend serial heme  -Positive occult noted   -pt ref EGD at this time   -GI workup    dvt ppx      plan discussed with ACP

## 2021-03-30 NOTE — PROGRESS NOTE ADULT - ASSESSMENT
ASSESSMENT:    dyspnea/hypoxemia - multifactorial - the patient also has evidence of hypercapnia on a VBG    1) likely recurrent aspiration pneumonia (although the procalcitonin level is not significantly elevated)  2) mild pulmonary edema with bilateral left > right loculated pleural effusions  3) blood loss from the GI tract with anemia -> decreasing oxygen carrying capacity - has received 1 unit PRBCs  4) unlikely to have a pulmonary embolism on full dose A/C and in the absence of lower extremity DVTs    h/o pulmonary nodules on recent chest CT with tree in bud opacities    atrial fibrillation with RVR    PLAN/RECOMMENDATIONS:    oxygen supplementation to keep saturation greater than 92%  chest CT as above  complete a 5 day course ceftriaxone/doxycyline  await blood cultures, urine Legionella antigen, MSSA/MRSA nasal swab  albuterol/atrovent nebs q6h  pulmicort 0.5mg nebs q12h - give after duoneb - rinse mouth after use  no need for systemic steroids at this point  head of bed elevated greater than 45 degrees at all times  full assistance with meals  cardiology follow-up noted - cardizem CD/lopressor - holding A/C although INR > 2  incentive spirometry  diurese as tolerated by renal function and hemodynamics  GI follow-up - daily CBC - transfuse as needed - PPI - refusing EGD  hematology follow-up    Will follow with you. Plan of care discussed with the patient at bedside and the dedicated floor NP.    Scott Cormier MD, [facility]  [phone]  Pulmonary Medicine

## 2021-03-30 NOTE — PROGRESS NOTE ADULT - SUBJECTIVE AND OBJECTIVE BOX
[facility] - PROGRESS NOTE    CHIEF COMPLAINT: dyspnea; hypoxemia; COPD exacerbation; pneumonia; pleural effusion; anemia    INTERVAL HISTORY: poor historian; transferred to telemetry due to AF/RVR; sitting on the side of the bed; no shortness of breath on room air; no cough, sputum production, chest congestion or wheeze; no fevers, chills or sweats; no chest pain/pressure or palpitations;       REVIEW OF SYSTEMS:  Constitutional: As per interval history  HEENT: Within normal limits  CV: As per interval history  Resp: As per interval history  GI: Within normal limits   : Within normal limits  Musculoskeletal: Within normal limits  Skin: Within normal limits  Neurological: dementia  Psychiatric: Within normal limits  Endocrine: Within normal limits  Hematologic/Lymphatic: Within normal limits  Allergic/Immunologic: Within normal limits       MEDICATIONS:     Pulmonary "  albuterol/ipratropium for Nebulization 3 milliLiter(s) Nebulizer every 6 hours  buDESOnide    Inhalation Suspension 0.5 milliGRAM(s) Inhalation every 12 hours    Anti-microbials:  cefTRIAXone   IVPB 1000 milliGRAM(s) IV Intermittent every 24 hours  doxycycline hyclate Capsule 100 milliGRAM(s) Oral every 12 hours    Cardiovascular:  diltiazem    milliGRAM(s) Oral daily  metoprolol tartrate 25 milliGRAM(s) Oral two times a day    Other:  dextrose 40% Gel 15 Gram(s) Oral once  dextrose 5%. 1000 milliLiter(s) IV Continuous <Continuous>  dextrose 5%. 1000 milliLiter(s) IV Continuous <Continuous>  dextrose 50% Injectable 25 Gram(s) IV Push once  dextrose 50% Injectable 12.5 Gram(s) IV Push once  dextrose 50% Injectable 25 Gram(s) IV Push once  fluticasone propionate 50 MICROgram(s)/spray Nasal Spray 2 Spray(s) Both Nostrils two times a day  glucagon  Injectable 1 milliGRAM(s) IntraMuscular once  insulin lispro (ADMELOG) corrective regimen sliding scale   SubCutaneous three times a day before meals  insulin lispro (ADMELOG) corrective regimen sliding scale   SubCutaneous at bedtime  iron sucrose IVPB 200 milliGRAM(s) IV Intermittent every 24 hours  lamoTRIgine 25 milliGRAM(s) Oral at bedtime  methimazole 2.5 milliGRAM(s) Oral daily  multivitamin 1 Tablet(s) Oral daily  pantoprazole    Tablet 40 milliGRAM(s) Oral two times a day  sodium chloride 0.9%. 1000 milliLiter(s) IV Continuous <Continuous>    MEDICATIONS  (PRN):  acetaminophen   Tablet .. 650 milliGRAM(s) Oral every 6 hours PRN Temp greater or equal to 38.5C (101.3F), Mild Pain (1 - 3)  polyethylene glycol 3350 17 Gram(s) Oral daily PRN for constipation        OBJECTIVE:    I&O's Detail    29 Mar 2021 07:01  -  30 Mar 2021 07:00  --------------------------------------------------------  IN:    Oral Fluid: 600 mL  Total IN: 600 mL    OUT:    Voided (mL): 450 mL  Total OUT: 450 mL    Total NET: 150 mL      30 Mar 2021 07:01  -  30 Mar 2021 16:15  --------------------------------------------------------  IN:    Oral Fluid: 240 mL  Total IN: 240 mL    OUT:    Voided (mL): 300 mL  Total OUT: 300 mL    Total NET: -60 mL  POCT Blood Glucose.: 290 mg/dL (30 Mar 2021 11:32)  POCT Blood Glucose.: 192 mg/dL (30 Mar 2021 07:19)  POCT Blood Glucose.: 229 mg/dL (29 Mar 2021 21:52)  POCT Blood Glucose.: 182 mg/dL (29 Mar 2021 17:16)      PHYSICAL EXAM:       ICU Vital Signs Last 24 Hrs  T(C): 36.7 (30 Mar 2021 04:55), Max: 36.9 (29 Mar 2021 21:17)  T(F): 98 (30 Mar 2021 04:55), Max: 98.5 (29 Mar 2021 21:17)  HR: 137 (30 Mar 2021 16:00) (80 - 137)  BP: 105/71 (30 Mar 2021 10:40) (91/61 - 114/59)  BP(mean): --  ABP: --  ABP(mean): --  RR: 18 (30 Mar 2021 04:55) (18 - 18)  SpO2: 91% (30 Mar 2021 16:00) (91% - 97%) on 2lpm nasal canula     General: Awake. Alert. Confused. Cooperative. No distress. Appears stated age.	  HEENT: Atraumatic. Bitemporal wasting. Anicteric. Normal oral mucosa. PERRL. EOMI.  Neck: Supple. Trachea midline. Thyroid without enlargement/tenderness/nodules. No carotid bruit. No JVD. Loss of bilateral supraclavicular fat pads	  Cardiovascular: Tachycardic irregularly irregular rate and rhythm. S1 S2 normal. No murmurs, rubs or gallops.  Respiratory: Respirations unlabored. Bilateral wheeze. Decreased breath sounds left base with dullness to percussion. Kyphosis.  Abdomen: Soft. Non-tender. Non-distended. No organomegaly. No masses. Normal bowel sounds.  Extremities: Warm to touch. No clubbing or cyanosis. No pedal edema.  Pulses: 2+ peripheral pulses all extremities.	  Skin: Normal skin color. No rashes or lesions. No ecchymoses. No cyanosis. Warm to touch.  Lymph Nodes: Cervical, supraclavicular and axillary nodes normal  Neurological: Moving all extremities. A and O x 1  Psychiatry: Calm    LABS:                          9.4    9.95  )-----------( 375      ( 30 Mar 2021 10:09 )             31.6     CBC    WBC  9.95 <==, 9.22 <==, 9.49 <==, 7.06 <==, 12.30 <==    Hemoglobin  9.4 <<==, 7.9 <<==, 6.1 <<==, 7.8 <<==, 7.4 <<==    Hematocrit  31.6 <==, 26.7 <==, 21.4 <==, 28.0 <==, 26.2 <==    Platelets  375 <==, 448 <==, 477 <==, 525 <==, 471 <==      138  |  99  |  38<H>  ----------------------------<  272<H>    03-30  4.7   |  24  |  1.66<H>      LYTES    sodium  138 <==, 143 <==, 143 <==, 139 <==, 144 <==, 144 <==    potassium   4.7 <==, 4.6 <==, 5.0 <==, 5.3 <==, 6.0 <==, 5.0 <==    chloride  99 <==, 106 <==, 105 <==, 104 <==, 103 <==, 104 <==    carbon dioxide  24 <==, 30 <==, 28 <==, 27 <==, 29 <==, 31 <==    =============================================================================================  RENAL FUNCTION:    Creatinine:   1.66  <<==, 1.63  <<==, 1.70  <<==, 1.78  <<==, 1.93  <<==, 1.59  <<==    BUN:   38 <==, 39 <==, 41 <==, 44 <==, 40 <==, 31 <==    ============================================================================================    calcium   9.1 <==, 8.5 <==, 8.6 <==, 8.5 <==, 9.0 <==, 9.0 <==    phos   3.0 <==    mag   2.2 <==    ============================================================================================  LFTs    AST:   8 <==     ALT:  <5  <==     AP:  87  <=    Bili:  0.2  <=      PT/INR - ( 26 Mar 2021 14:51 )   PT: 27.4 sec;   INR: 2.38 ratio      Procalcitonin, Serum: 0.18 ng/mL (03-27 @ 21:43)    MICROBIOLOGY:     COVID-19 PCR . (03.26.21 @ 20:44)   COVID-19 PCR: NotDetec:      RADIOLOGY:  [x] Chest radiographs reviewed and interpreted by me    < from: CT Chest No Cont (03.29.21 @ 19:21) >    EXAM:  CT CHEST                          PROCEDURE DATE:  03/29/2021      INTERPRETATION:  Clinical information: Dyspnea. Exam is compared to previous study of 2/17/2021.    CT scan of the chest was obtained without intravenous contrast.    Low-attenuation lesions and calcifications are noted within both lobes of the thyroid gland.    Few small lymph nodes are present in the anterior mediastinum, pretracheal space and the AP window.    Heart is normal in size. Calcification of the aortic valve and the coronary arteries is noted. Small pericardial effusion is noted.    No endobronchial lesions are noted. Emphysematous changes are present in both lungs. Tree-in-bud opacities representing mucoid impacted distal small airways are noted within the right lung. Compressive atelectasis is noted involving portions of the lingula and left lower lobe. This is secondary to small to moderate size loculated left pleural effusion. Small loculated right pleural effusion is also noted. Marked thickening of the right pleura is noted.    Below the diaphragm, visualized portions of the abdomen demonstrate ascites. Low-attenuation lesions in the right kidney are too small to be adequately characterized on this exam.    Degenerative changes of the spine are noted.    IMPRESSION: Small to moderate size loculated left pleural effusion.    LEONORA WALLER MD; Attending Radiologist  This document has been electronically signed. Mar 30 2021 10:32AM    < end of copied text >  ---------------------------------------------------------------------------------------------------------------  < from: Xray Chest 1 View- PORTABLE-Urgent (Xray Chest 1 View- PORTABLE-Urgent .) (03.26.21 @ 14:18) >    EXAM:  XR CHEST PORTABLE URGENT 1V                          PROCEDURE DATE:  03/26/2021      INTERPRETATION:  Indication: Hypoxia.    TECHNIQUE: Single portable view of the chest.    COMPARISON: 2/17/2021    FINDINGS /  IMPRESSION: The cardiac silhouette is normal in size. There are small bilateral pleural effusions with associated atelectasis. There is mild pulmonary edema. An underlying viral pneumonia cannot be excluded.      JJ PEACOCK MD; Attending Radiologist  This document has been electronically signed. Mar 26 2021  2:29PM    < end of copied text >  ---------------------------------------------------------------------------------------------------------------  < from: [facility] Duplex Lower Ext Vein Scan, [place] (03.29.21 @ 09:59) >    EXAM:  DUPLEX SCAN EXT VEINS LOWER BI                            PROCEDURE DATE:  03/29/2021      INTERPRETATION:  CLINICAL INFORMATION: Lower extremity swelling and pain, rule out DVT.    COMPARISON: None available.    TECHNIQUE: Duplex sonography of the BILATERAL LOWER extremity veins with color and spectral Doppler, with and without compression.    FINDINGS:    RIGHT:  Normal compressibility of the RIGHT common femoral, femoral and popliteal veins.  Doppler examination shows normal spontaneous and phasic flow.  No RIGHT calf vein thrombosis is detected.    LEFT:  Normal compressibility of the LEFT common femoral, femoral and popliteal veins.  Doppler examination shows normal spontaneous and phasic flow.  No LEFT calf vein thrombosis is detected.    IMPRESSION:  No evidence of deep venous thrombosis in either lower extremity.    ROSARIO SHARMA M.D., ATTENDING RADIOLOGIST  This document has been electronically signed. Mar 29 2021 10:12AM    < end of copied text >  ---------------------------------------------------------------------------------------------------------------  < from: CT Head No Cont (03.26.21 @ 19:47) >    EXAM:  CT BRAIN                          PROCEDURE DATE:  03/26/2021      INTERPRETATION:  PROCEDURE: CT head without contrast.    INDICATION: Confusion    TECHNIQUE: Multiple axial sections were obtained at 5 mm intervals. The images were reviewed in brain and bone windows. Imaging is performed using helical low-dose technique, and sagittal and coronal reformations are provided.    COMPARISON: Head CT 2/17/2021    FINDINGS:  The CT examination demonstrates extensive generalized volume loss as manifested by the enlargement of the ventricles, cisternal spaces, and cortical sulci throughout.    There is no acute intracranial hemorrhage, mass effect, midline shift or extra axial collections.    There is moderate periventricular white matter lucency, likely the sequela of small vessel ischemic disease.    Ossifications of the bilateral basal ganglia is again delineated.    The bony windows demonstrates no fractures.    The included paranasal sinuses and mastoid air cells are predominantly clear.    IMPRESSION:  There is no acute intracranial hemorrhage, mass effect, midline shift or extra axial collections. Age-appropriate involutional and ischemic gliotic changes without change from 2/17/2021.    MARIELOS MOREL MD; Resident Radiology  This document has been electronically signed.  BHAVANA CALDERA MD, ATTENDING RADIOLOGIST  This document has been electronically signed. Mar 27 2021  9:16AM    < end of copied text >  --------------------------------------------------------------------------------------------------

## 2021-03-30 NOTE — PROGRESS NOTE ADULT - SUBJECTIVE AND OBJECTIVE BOX
Patient is a 89y old  Male who presents with a chief complaint of Hypoxemia (28 Mar 2021 13:36)      SUBJECTIVE / OVERNIGHT EVENTS: Comfortable without new complaints. New A Fib with RVR. Refused Rx .  Review of Systems  chest pain no  palpitations no  sob no  nausea no  headache no    MEDICATIONS  (STANDING):  albuterol/ipratropium for Nebulization 3 milliLiter(s) Nebulizer every 6 hours  buDESOnide    Inhalation Suspension 0.5 milliGRAM(s) Inhalation every 12 hours  cefTRIAXone   IVPB 1000 milliGRAM(s) IV Intermittent every 24 hours  dextrose 40% Gel 15 Gram(s) Oral once  dextrose 5%. 1000 milliLiter(s) (50 mL/Hr) IV Continuous <Continuous>  dextrose 5%. 1000 milliLiter(s) (100 mL/Hr) IV Continuous <Continuous>  dextrose 50% Injectable 25 Gram(s) IV Push once  dextrose 50% Injectable 12.5 Gram(s) IV Push once  dextrose 50% Injectable 25 Gram(s) IV Push once  diltiazem    milliGRAM(s) Oral daily  doxycycline hyclate Capsule 100 milliGRAM(s) Oral every 12 hours  fluticasone propionate 50 MICROgram(s)/spray Nasal Spray 2 Spray(s) Both Nostrils two times a day  glucagon  Injectable 1 milliGRAM(s) IntraMuscular once  insulin lispro (ADMELOG) corrective regimen sliding scale   SubCutaneous three times a day before meals  insulin lispro (ADMELOG) corrective regimen sliding scale   SubCutaneous at bedtime  iron sucrose IVPB 200 milliGRAM(s) IV Intermittent every 24 hours  lamoTRIgine 25 milliGRAM(s) Oral at bedtime  methimazole 2.5 milliGRAM(s) Oral daily  metoprolol tartrate 25 milliGRAM(s) Oral two times a day  multivitamin 1 Tablet(s) Oral daily  pantoprazole    Tablet 40 milliGRAM(s) Oral two times a day  sodium chloride 0.9%. 1000 milliLiter(s) (75 mL/Hr) IV Continuous <Continuous>    MEDICATIONS  (PRN):  acetaminophen   Tablet .. 650 milliGRAM(s) Oral every 6 hours PRN Temp greater or equal to 38.5C (101.3F), Mild Pain (1 - 3)  polyethylene glycol 3350 17 Gram(s) Oral daily PRN for constipation      Vital Signs Last 24 Hrs  T(C): 36.7 (30 Mar 2021 04:55), Max: 36.9 (29 Mar 2021 21:17)  T(F): 98 (30 Mar 2021 04:55), Max: 98.5 (29 Mar 2021 21:17)  HR: 113 (30 Mar 2021 16:35) (80 - 137)  BP: 105/71 (30 Mar 2021 10:40) (91/61 - 114/59)  BP(mean): --  RR: 18 (30 Mar 2021 04:55) (18 - 18)  SpO2: 92% (30 Mar 2021 16:35) (91% - 97%)    PHYSICAL EXAM:  GENERAL: NAD  HEAD:  Atraumatic, Normocephalic  EYES: EOMI, PERRLA, conjunctiva and sclera clear  NECK: Supple, No JVD  CHEST/LUNG: Clear to auscultation bilaterally; No wheeze  HEART: Regular rate and rhythm; No murmurs, rubs, or gallops  ABDOMEN: Soft, Nontender, Nondistended; Bowel sounds present  EXTREMITIES:  2+ Peripheral Pulses, No clubbing, cyanosis, or edema  PSYCH: AAOx3  NEUROLOGY: non-focal  SKIN: No rashes or lesions    LABS:                        9.4    9.95  )-----------( 375      ( 30 Mar 2021 10:09 )             31.6     03-30    138  |  99  |  38<H>  ----------------------------<  272<H>  4.7   |  24  |  1.66<H>    Ca    9.1      30 Mar 2021 10:09    TPro  6.5  /  Alb  x   /  TBili  x   /  DBili  x   /  AST  x   /  ALT  x   /  AlkPhos  x   03-30                RADIOLOGY & ADDITIONAL TESTS:    Imaging Personally Reviewed:    Consultant(s) Notes Reviewed:      Care Discussed with Consultants/Other Providers:

## 2021-03-30 NOTE — PROGRESS NOTE ADULT - ASSESSMENT
anemia  gi bleed      daily cbc   transfuse prn   case d/w daughter  plans for endoscopic eval on hold given afib/rvr  once stable then will re-address  cont diet as ruben  hold a/c      Advanced care planning was discussed with patient and family.  Advanced care planning forms were reviewed and discussed.  Risks, benefits and alternatives of gastroenterologic procedures were discussed in detail and all questions were answered.    30 minutes spent.

## 2021-03-30 NOTE — PROGRESS NOTE ADULT - SUBJECTIVE AND OBJECTIVE BOX
INTERVAL HPI/OVERNIGHT EVENTS:    events noted    Allergies    Tylenol with Codeine #3 (Other)    Intolerances          General:  No wt loss, fevers, chills, night sweats, fatigue,   Eyes:  Good vision, no reported pain  ENT:  No sore throat, pain, runny nose, dysphagia  CV:  No pain, palpitations, hypo/hypertension  Resp:  No dyspnea, cough, tachypnea, wheezing  GI:  No pain, No nausea, No vomiting, No diarrhea, No constipation, No weight loss, No fever, No pruritis, No rectal bleeding, No tarry stools, No dysphagia,  :  No pain, bleeding, incontinence, nocturia  Muscle:  No pain, weakness  Neuro:  No weakness, tingling, memory problems  Psych:  No fatigue, insomnia, mood problems, depression  Endocrine:  No polyuria, polydipsia, cold/heat intolerance  Heme:  No petechiae, ecchymosis, easy bruisability  Skin:  No rash, tattoos, scars, edema      PHYSICAL EXAM:   Vital Signs:  Vital Signs Last 24 Hrs  T(C): 36.5 (28 Mar 2021 17:50), Max: 36.8 (27 Mar 2021 20:45)  T(F): 97.7 (28 Mar 2021 17:50), Max: 98.3 (27 Mar 2021 20:45)  HR: 91 (28 Mar 2021 17:50) (69 - 91)  BP: 139/48 (28 Mar 2021 17:50) (90/43 - 139/48)  BP(mean): --  RR: 18 (28 Mar 2021 10:06) (17 - 18)  SpO2: 98% (28 Mar 2021 17:50) (95% - 98%)  Daily     Daily I&O's Summary    27 Mar 2021 07:01  -  28 Mar 2021 07:00  --------------------------------------------------------  IN: 480 mL / OUT: 1500 mL / NET: -1020 mL    28 Mar 2021 07:01  -  28 Mar 2021 18:39  --------------------------------------------------------  IN: 375 mL / OUT: 0 mL / NET: 375 mL        GENERAL:  Appears stated age, well-groomed, well-nourished, no distress  HEENT:  NC/AT,  conjunctivae clear and pink, no thyromegaly, nodules, adenopathy, no JVD, sclera -anicteric  CHEST:  Full & symmetric excursion, no increased effort, breath sounds clear  HEART:  Regular rhythm, S1, S2, no murmur/rub/S3/S4, no abdominal bruit, no edema  ABDOMEN:  Soft, non-tender, non-distended, normoactive bowel sounds,  no masses ,no hepato-splenomegaly, no signs of chronic liver disease  EXTEREMITIES:  no cyanosis,clubbing or edema  SKIN:  No rash/erythema/ecchymoses/petechiae/wounds/abscess/warm/dry  NEURO:  Alert, oriented, no asterixis, no tremor, no encephalopathy      LABS:                        6.1    9.49  )-----------( 477      ( 28 Mar 2021 07:14 )             21.4     03-28    143  |  105  |  41<H>  ----------------------------<  172<H>  5.0   |  28  |  1.70<H>    Ca    8.6      28 Mar 2021 07:14          amylase   lipase  RADIOLOGY & ADDITIONAL TESTS:

## 2021-03-30 NOTE — CHART NOTE - NSCHARTNOTEFT_GEN_A_CORE
Informed by tele pt with afib on tele with HR in 130s. Pt received Lopressor IV 2.5 mg x2 and Cardizem IV 5mg x1 prior to being transferred to this floor overnight.   Examined pt at bedside, who is resting comfortably. Denies chest pain, SOB, fever, chills, leg pain, headache, dizziness.      Vital Signs Last 24 Hrs  T(C): 36.6 (30 Mar 2021 02:37), Max: 36.9 (29 Mar 2021 04:35)  T(F): 97.9 (30 Mar 2021 02:37), Max: 98.5 (29 Mar 2021 21:17)  HR: 94 (30 Mar 2021 04:04) (80 - 136)  BP: 96/60 (30 Mar 2021 04:05) (91/61 - 114/59)  BP(mean): --  RR: 18 (30 Mar 2021 02:37) (18 - 18)  SpO2: 95% (30 Mar 2021 02:37) (94% - 97%)    Physical Exam:  General: WN/WD NAD  Neurology: A&Ox3, nonfocal, PENG x 4  Head:  Normocephalic, atraumatic  Respiratory: CTA B/L  CV: irregular rate and rhythm, no murmur  MSK: , + peripheral pulses, FROM all 4 extremity      Labs:                          7.9    9.22  )-----------( 448      ( 29 Mar 2021 06:53 )             26.7     03-29    143  |  106  |  39<H>  ----------------------------<  185<H>  4.6   |  30  |  1.63<H>    Ca    8.5      29 Mar 2021 06:53              Radiology:          Assessment & Plan:  89 year old man with history of COPD, bipolar, monoclonal gammopathy, anemia with history transfusions and iron infusions, afib on a/c, DM, admitted with hypoxia    Pt now with atrial fibrillation in RVR (HR 130s).  > STAT EKG  > Pt's /60:  Administered Lopressor 5 mg IV, and gave AM PO Lopressor dose early  > Will continue to monitor on tele and monitor signs and symptoms  > Follow up with cardiology, follow up with Dr. Devlin  > Will endorse to day team    Yolanda Davis PA-C (Medicine PA)  #[id number]      Addendum:  Pt still with HR in 130s, in atrial fibrillation  >Manual BP 91/61 --> 250 cc bolus administered  > Will recheck BP after bolus, then give PO Cardizem AM dose Informed by tele pt with afib on tele with HR in 130s. Pt received Lopressor IV 2.5 mg x2 and Cardizem IV 5mg x1 prior to being transferred to this floor overnight.   Examined pt at bedside, who is resting comfortably. Denies chest pain, SOB, fever, chills, leg pain, headache, dizziness.      Vital Signs Last 24 Hrs  T(C): 36.6 (30 Mar 2021 02:37), Max: 36.9 (29 Mar 2021 04:35)  T(F): 97.9 (30 Mar 2021 02:37), Max: 98.5 (29 Mar 2021 21:17)  HR: 94 (30 Mar 2021 04:04) (80 - 136)  BP: 96/60 (30 Mar 2021 04:05) (91/61 - 114/59)  BP(mean): --  RR: 18 (30 Mar 2021 02:37) (18 - 18)  SpO2: 95% (30 Mar 2021 02:37) (94% - 97%)    Physical Exam:  General: WN/WD NAD  Neurology: A&Ox2-3, nonfocal, PENG x 4  Head:  Normocephalic, atraumatic  Respiratory: CTA B/L  CV: irregular rate and rhythm, no murmur  MSK: , + peripheral pulses, FROM all 4 extremity      Labs:                          7.9    9.22  )-----------( 448      ( 29 Mar 2021 06:53 )             26.7     03-29    143  |  106  |  39<H>  ----------------------------<  185<H>  4.6   |  30  |  1.63<H>    Ca    8.5      29 Mar 2021 06:53              Radiology:          Assessment & Plan:  89 year old man with history of COPD, bipolar, monoclonal gammopathy, anemia with history transfusions and iron infusions, afib on a/c, DM, admitted with hypoxia    Pt now with atrial fibrillation in RVR (HR 130s).  > STAT EKG  > Pt's /60:  Administered Lopressor 5 mg IV, and gave AM PO Lopressor dose early  > Will continue to monitor on tele and monitor signs and symptoms  > Follow up with cardiology, follow up with Dr. Abrudescu  > Will endorse to day team    Yolanda Davis PA-C (Medicine PA)  #[id number]      Addendum:  Pt still with HR in 130s, in atrial fibrillation  >Manual BP 91/61 --> 250 cc bolus administered  > Will recheck BP after bolus, then give PO Cardizem AM dose Informed by tele pt with afib on tele with HR in 130s. Pt received Lopressor IV 2.5 mg x2 and Cardizem IV 5mg x1 prior to being transferred to this floor overnight.   Examined pt at bedside, who is resting comfortably. Denies chest pain, SOB, fever, chills, leg pain, headache, dizziness.      Vital Signs Last 24 Hrs  T(C): 36.6 (30 Mar 2021 02:37), Max: 36.9 (29 Mar 2021 04:35)  T(F): 97.9 (30 Mar 2021 02:37), Max: 98.5 (29 Mar 2021 21:17)  HR: 94 (30 Mar 2021 04:04) (80 - 136)  BP: 96/60 (30 Mar 2021 04:05) (91/61 - 114/59)  BP(mean): --  RR: 18 (30 Mar 2021 02:37) (18 - 18)  SpO2: 95% (30 Mar 2021 02:37) (94% - 97%)    Physical Exam:  General: WN/WD NAD  Neurology: A&Ox2-3, nonfocal, PENG x 4  Head:  Normocephalic, atraumatic  Respiratory: CTA B/L  CV: irregular rate and rhythm, no murmur  MSK: , + peripheral pulses, FROM all 4 extremity      Labs:                          7.9    9.22  )-----------( 448      ( 29 Mar 2021 06:53 )             26.7     03-29    143  |  106  |  39<H>  ----------------------------<  185<H>  4.6   |  30  |  1.63<H>    Ca    8.5      29 Mar 2021 06:53              Radiology:          Assessment & Plan:  89 year old man with history of COPD, bipolar, monoclonal gammopathy, anemia with history transfusions and iron infusions, afib on a/c, DM, admitted with hypoxia    Pt now with atrial fibrillation in RVR (HR 130s).  > STAT EKG  > Pt's /60:  Administered Lopressor 5 mg IV, and gave AM PO Lopressor dose early  > AC currently on hold to due GI bleed workup  > Will continue to monitor on tele and monitor signs and symptoms  > Follow up with cardiology, follow up with Dr. Devlin  > Will endorse to day team    Yolanda Davis PA-C (Medicine PA)  #[id number]      Addendum:  Pt still with HR in 130s, in atrial fibrillation  >Manual BP 91/61 --> 250 cc bolus administered  > Will recheck BP after bolus, then give PO Cardizem AM dose

## 2021-03-30 NOTE — PROGRESS NOTE ADULT - SUBJECTIVE AND OBJECTIVE BOX
CARDIOLOGY FOLLOW UP - Dr. Serrano    CC: events noted - no SVT on EKG, Sinus Tachycardia noted, pt now afib w RVR, asymptotic       PHYSICAL EXAM:  T(C): 36.7 (03-30-21 @ 04:55), Max: 36.9 (03-29-21 @ 21:17)  HR: 137 (03-30-21 @ 10:40) (80 - 137)  BP: 105/71 (03-30-21 @ 10:40) (91/61 - 114/59)  RR: 18 (03-30-21 @ 04:55) (18 - 18)  SpO2: 95% (03-30-21 @ 10:40) (95% - 97%)  Wt(kg): --  I&O's Summary    29 Mar 2021 07:01  -  30 Mar 2021 07:00  --------------------------------------------------------  IN: 600 mL / OUT: 450 mL / NET: 150 mL    30 Mar 2021 07:01  -  30 Mar 2021 12:15  --------------------------------------------------------  IN: 240 mL / OUT: 300 mL / NET: -60 mL        Appearance: Normal	  Cardiovascular: Normal S1 S2,irregular , No JVD, No murmurs  Respiratory: Lungs clear to auscultation	  Gastrointestinal:  Soft, Non-tender, + BS	  Extremities: Normal range of motion, No clubbing, cyanosis or edema      Home Medications:  acetaminophen 325 mg oral tablet: 2 tab(s) orally every 6 hours, As needed, Temp greater or equal to 38.5C (101.3F), Mild Pain (1 - 3) (26 Mar 2021 16:54)  dilTIAZem 180 mg/24 hours oral capsule, extended release: 1 cap(s) orally once a day (26 Mar 2021 16:54)  Flonase 50 mcg/inh nasal spray: 1 spray(s) nasal once a day, As Needed (26 Mar 2021 16:54)  lamoTRIgine 25 mg oral tablet: 1 tab(s) orally once a day (at bedtime) (26 Mar 2021 16:52)  mirtazapine 15 mg oral tablet: 1 tab(s) orally in the morning and 2 tab(s) at bedtime (26 Mar 2021 16:52)  pantoprazole 40 mg oral delayed release tablet: 1 tab(s) orally once a day (26 Mar 2021 16:54)  polyethylene glycol 3350 oral powder for reconstitution: 17 gram(s) orally once a day, As Needed - for constipation (26 Mar 2021 16:54)  Trelegy Ellipta: 1 puff(s) inhaled once a day (26 Mar 2021 16:54)  Xarelto 15 mg oral tablet: 1 tab(s) orally once a day (in the evening) (26 Mar 2021 16:54)      MEDICATIONS  (STANDING):  albuterol/ipratropium for Nebulization 3 milliLiter(s) Nebulizer every 6 hours  buDESOnide    Inhalation Suspension 0.5 milliGRAM(s) Inhalation every 12 hours  cefTRIAXone   IVPB 1000 milliGRAM(s) IV Intermittent every 24 hours  dextrose 40% Gel 15 Gram(s) Oral once  dextrose 5%. 1000 milliLiter(s) (50 mL/Hr) IV Continuous <Continuous>  dextrose 5%. 1000 milliLiter(s) (100 mL/Hr) IV Continuous <Continuous>  dextrose 50% Injectable 25 Gram(s) IV Push once  dextrose 50% Injectable 12.5 Gram(s) IV Push once  dextrose 50% Injectable 25 Gram(s) IV Push once  diltiazem    milliGRAM(s) Oral daily  doxycycline hyclate Capsule 100 milliGRAM(s) Oral every 12 hours  fluticasone propionate 50 MICROgram(s)/spray Nasal Spray 2 Spray(s) Both Nostrils two times a day  glucagon  Injectable 1 milliGRAM(s) IntraMuscular once  insulin lispro (ADMELOG) corrective regimen sliding scale   SubCutaneous three times a day before meals  insulin lispro (ADMELOG) corrective regimen sliding scale   SubCutaneous at bedtime  iron sucrose IVPB 200 milliGRAM(s) IV Intermittent every 24 hours  lamoTRIgine 25 milliGRAM(s) Oral at bedtime  methimazole 2.5 milliGRAM(s) Oral daily  metoprolol tartrate 25 milliGRAM(s) Oral two times a day  multivitamin 1 Tablet(s) Oral daily  pantoprazole    Tablet 40 milliGRAM(s) Oral two times a day  sodium chloride 0.9%. 1000 milliLiter(s) (75 mL/Hr) IV Continuous <Continuous>      TELEMETRY: afib 90-130s	    ECG:  Sinus Tach 132 --> afib 100 - no ischemic changes noted 	  RADIOLOGY:   CT Chest No Cont (03.29.21 @ 19:21)   CT scan of the chest was obtained without intravenous contrast.    Low-attenuation lesions and calcifications are noted within both lobes of the thyroid gland.    Few small lymph nodes are present in the anterior mediastinum, pretracheal space and the AP window.    Heart is normal in size. Calcification of the aortic valve and the coronary arteries is noted. Small pericardial effusion is noted.    No endobronchial lesions are noted. Emphysematous changes are present in both lungs. Tree-in-bud opacities representing mucoid impacted distal small airways are noted within the right lung. Compressive atelectasis is noted involving portions of the lingula and left lower lobe. This is secondary to small to moderate size loculated left pleural effusion. Small loculated right pleural effusion is also noted. Marked thickening of the right pleura is noted.    Below the diaphragm, visualized portions of the abdomen demonstrate ascites. Low-attenuation lesions in the right kidney are too small to be adequately characterized on this exam.    Degenerative changes of the spine are noted.    IMPRESSION: Small to moderate size loculated left pleural effusion.    DIAGNOSTIC TESTING:  [ ] Echocardiogram:  [ ]  Catheterization:  [ ] Stress Test:    OTHER: 	    LABS:	 	                            9.4    9.95  )-----------( 375      ( 30 Mar 2021 10:09 )             31.6     03-30    138  |  99  |  38<H>  ----------------------------<  272<H>  4.7   |  24  |  1.66<H>    Ca    9.1      30 Mar 2021 10:09

## 2021-03-31 NOTE — PROGRESS NOTE ADULT - SUBJECTIVE AND OBJECTIVE BOX
INTERVAL HPI/OVERNIGHT EVENTS:    events noted  no labs today      Allergies    Tylenol with Codeine #3 (Other)    Intolerances          General:  No wt loss, fevers, chills, night sweats, fatigue,   Eyes:  Good vision, no reported pain  ENT:  No sore throat, pain, runny nose, dysphagia  CV:  No pain, palpitations, hypo/hypertension  Resp:  No dyspnea, cough, tachypnea, wheezing  GI:  No pain, No nausea, No vomiting, No diarrhea, No constipation, No weight loss, No fever, No pruritis, No rectal bleeding, No tarry stools, No dysphagia,  :  No pain, bleeding, incontinence, nocturia  Muscle:  No pain, weakness  Neuro:  No weakness, tingling, memory problems  Psych:  No fatigue, insomnia, mood problems, depression  Endocrine:  No polyuria, polydipsia, cold/heat intolerance  Heme:  No petechiae, ecchymosis, easy bruisability  Skin:  No rash, tattoos, scars, edema      PHYSICAL EXAM:   Vital Signs:  Vital Signs Last 24 Hrs  T(C): 36.5 (28 Mar 2021 17:50), Max: 36.8 (27 Mar 2021 20:45)  T(F): 97.7 (28 Mar 2021 17:50), Max: 98.3 (27 Mar 2021 20:45)  HR: 91 (28 Mar 2021 17:50) (69 - 91)  BP: 139/48 (28 Mar 2021 17:50) (90/43 - 139/48)  BP(mean): --  RR: 18 (28 Mar 2021 10:06) (17 - 18)  SpO2: 98% (28 Mar 2021 17:50) (95% - 98%)  Daily     Daily I&O's Summary    27 Mar 2021 07:01  -  28 Mar 2021 07:00  --------------------------------------------------------  IN: 480 mL / OUT: 1500 mL / NET: -1020 mL    28 Mar 2021 07:01  -  28 Mar 2021 18:39  --------------------------------------------------------  IN: 375 mL / OUT: 0 mL / NET: 375 mL        GENERAL:  Appears stated age, well-groomed, well-nourished, no distress  HEENT:  NC/AT,  conjunctivae clear and pink, no thyromegaly, nodules, adenopathy, no JVD, sclera -anicteric  CHEST:  Full & symmetric excursion, no increased effort, breath sounds clear  HEART:  Regular rhythm, S1, S2, no murmur/rub/S3/S4, no abdominal bruit, no edema  ABDOMEN:  Soft, non-tender, non-distended, normoactive bowel sounds,  no masses ,no hepato-splenomegaly, no signs of chronic liver disease  EXTEREMITIES:  no cyanosis,clubbing or edema  SKIN:  No rash/erythema/ecchymoses/petechiae/wounds/abscess/warm/dry  NEURO:  Alert, oriented, no asterixis, no tremor, no encephalopathy      LABS:                        6.1    9.49  )-----------( 477      ( 28 Mar 2021 07:14 )             21.4     03-28    143  |  105  |  41<H>  ----------------------------<  172<H>  5.0   |  28  |  1.70<H>    Ca    8.6      28 Mar 2021 07:14          amylase   lipase  RADIOLOGY & ADDITIONAL TESTS:

## 2021-03-31 NOTE — PROVIDER CONTACT NOTE (MEDICATION) - ACTION/TREATMENT ORDERED:
Explained to the pt the importance  of his medication pt stated that " I am a layer I know everything"

## 2021-03-31 NOTE — PROGRESS NOTE ADULT - ASSESSMENT
89 m with    GI bleed  - follow Hct  - s/p transfusion support 1 PRBC  - hold AC   - gastroenterology follow  - EGD if bleeding persist. Refusing now.  - PPI    Confusion improving   - CT head with no acute event  - Psych evaluation    Pneumonia possible  - antibiotics  - Pulmonary follow  - nebs    Atrial fibrillation   - rate control  - AC on hold  - cardiology follow     Anemia  - Tx support  - Venofer  - Hematology evaluation noted    Monoclonal gammopathy  - SPEP  - Monitor  - Hematology follow    COPD (chronic obstructive pulmonary disease)   - nebs    Diabetes type 2, controlled   - ADA diet   - BS control    Goiter   - continue Rx  - TSH    Malnutrition protein caloric  - Nutrition eval    PT    DVT prophylaxis  - PAS    Osito Devlin MD pager [phone]

## 2021-03-31 NOTE — CHART NOTE - NSCHARTNOTEFT_GEN_A_CORE
Nutrition Follow Up Note  Patient seen for: initial malnutrition follow up. Chart reviewed and events noted.     Pt is a "89 year old man with history of COPD, bipolar, monoclonal gammopathy, anemia with history transfusions and iron infusions, afib on a/c, DM, admitted with hypoxia."    Pt seen for swallow evaluation 3/28 with recommendation from SLP, "Liberalized to regular solids/ thin liquids; pt does not want his food/liquids to be modified. Please provide assistance with meals such as chopping up solids, no straw, moisten solids, choose softer items from menu, and provide medication/pills with applesauce not with water. Will remain on consult for any additional support."     Source:   Medical record, pt, and PCA    Diet :   Regular: Consistent Carbohydrate {Evening Snack}     Patient reports: that appetite and PO intake are very good. Pt observed eating lunch with good intake. Pt kept trying to talk to RD while eating, resulting in difficulty swallowing. RD encouraged pt to focus on food and not speak during mealtime. Pt with no known recent N/V, constipation, or diarrhea. Pt reports last BM earlier today, however per PCA, was very small. Last BM 3/26. RD observed pt with gelatin and pudding, pt endorses enjoying these foods. RD offered high-protein gelatin and Ensure Pudding, pt amenable to trialing. Pt has yet to try Diet Mighty Shakes; RD to provide as able/available. Pt made aware RD to remain available.      PO intake :  >75% meals      Source for PO intake:  Pt and RN flowsheet    Daily Weight in kG - bed: 54.9 (03-31)  Dosing wt 61.2 kG  Wt discrepancy between dosing wt and bed wt noted. Pt in chair, RD unable to obtain bed wt. RD will continue to trend as new wts available/able.     Pertinent Medications: MEDICATIONS  (STANDING):  albuterol/ipratropium for Nebulization 3 milliLiter(s) Nebulizer every 6 hours  buDESOnide    Inhalation Suspension 0.5 milliGRAM(s) Inhalation every 12 hours  cefTRIAXone   IVPB 1000 milliGRAM(s) IV Intermittent every 24 hours  dextrose 40% Gel 15 Gram(s) Oral once  dextrose 5%. 1000 milliLiter(s) (50 mL/Hr) IV Continuous <Continuous>  dextrose 5%. 1000 milliLiter(s) (100 mL/Hr) IV Continuous <Continuous>  dextrose 50% Injectable 25 Gram(s) IV Push once  dextrose 50% Injectable 12.5 Gram(s) IV Push once  dextrose 50% Injectable 25 Gram(s) IV Push once  diltiazem    milliGRAM(s) Oral daily  doxycycline hyclate Capsule 100 milliGRAM(s) Oral every 12 hours  fluticasone propionate 50 MICROgram(s)/spray Nasal Spray 2 Spray(s) Both Nostrils two times a day  glucagon  Injectable 1 milliGRAM(s) IntraMuscular once  insulin lispro (ADMELOG) corrective regimen sliding scale   SubCutaneous three times a day before meals  insulin lispro (ADMELOG) corrective regimen sliding scale   SubCutaneous at bedtime  lamoTRIgine 25 milliGRAM(s) Oral at bedtime  methimazole 2.5 milliGRAM(s) Oral daily  metoprolol tartrate Injectable 5 milliGRAM(s) IV Push every 6 hours  midodrine. 5 milliGRAM(s) Oral three times a day  multivitamin 1 Tablet(s) Oral daily  pantoprazole    Tablet 40 milliGRAM(s) Oral two times a day  sodium chloride 0.9%. 1000 milliLiter(s) (75 mL/Hr) IV Continuous <Continuous>    MEDICATIONS  (PRN):  acetaminophen   Tablet .. 650 milliGRAM(s) Oral every 6 hours PRN Temp greater or equal to 38.5C (101.3F), Mild Pain (1 - 3)  polyethylene glycol 3350 17 Gram(s) Oral daily PRN for constipation    Pertinent Labs:   Finger Sticks:  POCT Blood Glucose.: 204 mg/dL (03-31 @ 11:57)  POCT Blood Glucose.: 187 mg/dL (03-31 @ 08:05)  POCT Blood Glucose.: 150 mg/dL (03-30 @ 21:39)  POCT Blood Glucose.: 175 mg/dL (03-30 @ 17:52)    Skin per nursing documentation: Pressure injuries: Stage 1 to sacrum and Emir. buttocks   Edema per nursing documentation: None noted.     Estimated Needs:   [x] no change since previous assessment  Based on dosing weight of 61.2 kg  Estimated Energy Needs: (30-25 kcals/kG) 4127-0620 kcals  Estimated Protein Needs: (1.2-1.4 gm/kG) 73-86 gm  Estimated Fluid Needs: (30-25 cc/kG) 0314-1399 cc    Previous Nutrition Diagnosis: Malnutrition Severe, chronic.   Nutrition Diagnosis is: ongoing, care plan in place, being addressed with encouraged PO intake and recommended nutrition supplements.     New Nutrition Diagnosis: N/A    Recommend  1) Continue current Regular: Consistent Carbohydrate {Evening Snack} diet. Consistency deferred to SLP and provider. RD remains available for diet changes as needed/able.   2) Recommend Ensure Pudding x1 daily to optimize nutrient intake.   3) RD to provide high-protein gelatin x1 and Diet Mighty Shakes x2 daily.  4) Continue to provide micronutrients as medically feasible to optimize nutrient status.      Monitoring and Evaluation:   Continue to monitor Nutritional intake, Tolerance to diet prescription, weights, labs, skin integrity    RD remains available upon request and will follow up per protocol  Liz Carmen MS, RD, CDN Pager #[phone]

## 2021-03-31 NOTE — CHART NOTE - NSCHARTNOTEFT_GEN_A_CORE
Medicine NP ([id number])     0800: notified by RN pt. confused , refused AM meds, trying to get OOB. Tele. remains afib HR 96/min. VSS  unable to place orders in SCM; downtime written orders placed in chart RN aware  -constant observation for safety  1015; pt. agitated;  CARINA  -Lopressor 5mg IVx 1 dose stat   -Haldol 0.5mg IV stat x 1 dose  11am; d/w cardiology NP Iliana(Dr. Serrano)  -give Lopressor 5mg IV Q6hr while pt. not taking oral meds.

## 2021-03-31 NOTE — PROGRESS NOTE ADULT - SUBJECTIVE AND OBJECTIVE BOX
[facility] - PROGRESS NOTE    CHIEF COMPLAINT: dyspnea; hypoxemia; COPD exacerbation; pneumonia; pleural effusion; anemia    INTERVAL HISTORY: much more awake, alert and calm after receiving a dose of haldol; ongoing AF with RVR - patient now taking his medications; no shortness of breath on a nasal canula; no cough, sputum production, chest congestion or wheeze; no fevers, chills or sweats; no chest pain/pressure or palpitations; very weak and unable to walk    REVIEW OF SYSTEMS:  Constitutional: As per interval history  HEENT: Within normal limits  CV: As per interval history  Resp: As per interval history  GI: Within normal limits   : Within normal limits  Musculoskeletal: Within normal limits  Skin: Within normal limits  Neurological: dementia  Psychiatric: Within normal limits  Endocrine: Within normal limits  Hematologic/Lymphatic: Within normal limits  Allergic/Immunologic: Within normal limits    MEDICATIONS:     Pulmonary "  albuterol/ipratropium for Nebulization 3 milliLiter(s) Nebulizer every 6 hours  buDESOnide    Inhalation Suspension 0.5 milliGRAM(s) Inhalation every 12 hours    Anti-microbials:  cefTRIAXone   IVPB 1000 milliGRAM(s) IV Intermittent every 24 hours  doxycycline hyclate Capsule 100 milliGRAM(s) Oral every 12 hours    Cardiovascular:  diltiazem    milliGRAM(s) Oral daily  metoprolol tartrate Injectable 5 milliGRAM(s) IV Push every 6 hours  midodrine. 5 milliGRAM(s) Oral three times a day    Other:  dextrose 40% Gel 15 Gram(s) Oral once  dextrose 5%. 1000 milliLiter(s) IV Continuous <Continuous>  dextrose 5%. 1000 milliLiter(s) IV Continuous <Continuous>  dextrose 50% Injectable 25 Gram(s) IV Push once  dextrose 50% Injectable 12.5 Gram(s) IV Push once  dextrose 50% Injectable 25 Gram(s) IV Push once  fluticasone propionate 50 MICROgram(s)/spray Nasal Spray 2 Spray(s) Both Nostrils two times a day  glucagon  Injectable 1 milliGRAM(s) IntraMuscular once  insulin lispro (ADMELOG) corrective regimen sliding scale   SubCutaneous three times a day before meals  insulin lispro (ADMELOG) corrective regimen sliding scale   SubCutaneous at bedtime  lamoTRIgine 25 milliGRAM(s) Oral at bedtime  methimazole 2.5 milliGRAM(s) Oral daily  multivitamin 1 Tablet(s) Oral daily  pantoprazole    Tablet 40 milliGRAM(s) Oral two times a day  sodium chloride 0.9%. 1000 milliLiter(s) IV Continuous <Continuous>    MEDICATIONS  (PRN):  acetaminophen   Tablet .. 650 milliGRAM(s) Oral every 6 hours PRN Temp greater or equal to 38.5C (101.3F), Mild Pain (1 - 3)  polyethylene glycol 3350 17 Gram(s) Oral daily PRN for constipation        OBJECTIVE:    I&O's Detail    30 Mar 2021 07:01  -  31 Mar 2021 07:00  --------------------------------------------------------  IN:    Oral Fluid: 240 mL  Total IN: 240 mL    OUT:    Voided (mL): 1000 mL  Total OUT: 1000 mL    Total NET: -760 mL      31 Mar 2021 07:01  -  31 Mar 2021 12:14  --------------------------------------------------------  IN:    Oral Fluid: 120 mL  Total IN: 120 mL    OUT:  Total OUT: 0 mL    Total NET: 120 mL    Daily Weight in k.9 (31 Mar 2021 08:29)    POCT Blood Glucose.: 204 mg/dL (31 Mar 2021 11:57)  POCT Blood Glucose.: 187 mg/dL (31 Mar 2021 08:05)  POCT Blood Glucose.: 150 mg/dL (30 Mar 2021 21:39)  POCT Blood Glucose.: 175 mg/dL (30 Mar 2021 17:52)      PHYSICAL EXAM:       ICU Vital Signs Last 24 Hrs  T(C): 36.6 (31 Mar 2021 05:37), Max: 36.9 (30 Mar 2021 17:56)  T(F): 97.9 (31 Mar 2021 05:37), Max: 98.5 (30 Mar 2021 17:56)  HR: 96 (31 Mar 2021 08:07) (96 - 137)  BP: 121/69 (31 Mar 2021 08:07) (100/60 - 128/79)  BP(mean): --  ABP: --  ABP(mean): --  RR: 18 (31 Mar 2021 05:37) (18 - 18)  SpO2: 97% (31 Mar 2021 05:37) (91% - 100%) on 2lpm nasal canula     General: Awake. Alert. Poor memory. Cooperative. No distress. Appears stated age. Sitting in the chair.	  HEENT: Atraumatic. Bitemporal wasting. Anicteric. Normal oral mucosa. PERRL. EOMI.  Neck: Supple. Trachea midline. Thyroid without enlargement/tenderness/nodules. No carotid bruit. No JVD. Loss of bilateral supraclavicular fat pads	  Cardiovascular: Tachycardic irregularly irregular rate and rhythm. S1 S2 normal. No murmurs, rubs or gallops.  Respiratory: Respirations unlabored. Decreased breath sounds left base with dullness to percussion. Kyphosis.  Abdomen: Soft. Non-tender. Non-distended. No organomegaly. No masses. Normal bowel sounds.  Extremities: Warm to touch. No clubbing or cyanosis. No pedal edema.  Pulses: 2+ peripheral pulses all extremities.	  Skin: Normal skin color. No rashes or lesions. No ecchymoses. No cyanosis. Warm to touch.  Lymph Nodes: Cervical, supraclavicular and axillary nodes normal  Neurological: Moving all extremities. A and O x 1  Psychiatry: Calm    LABS:                          9.4    9.95  )-----------( 375      ( 30 Mar 2021 10:09 )             31.6     CBC    WBC  9.95 <==, 9.22 <==, 9.49 <==, 7.06 <==, 12.30 <==    Hemoglobin  9.4 <<==, 7.9 <<==, 6.1 <<==, 7.8 <<==, 7.4 <<==    Hematocrit  31.6 <==, 26.7 <==, 21.4 <==, 28.0 <==, 26.2 <==    Platelets  375 <==, 448 <==, 477 <==, 525 <==, 471 <==      138  |  99  |  38<H>  ----------------------------<  272<H>    03-30  4.7   |  24  |  1.66<H>      LYTES    sodium  138 <==, 143 <==, 143 <==, 139 <==, 144 <==, 144 <==    potassium   4.7 <==, 4.6 <==, 5.0 <==, 5.3 <==, 6.0 <==, 5.0 <==    chloride  99 <==, 106 <==, 105 <==, 104 <==, 103 <==, 104 <==    carbon dioxide  24 <==, 30 <==, 28 <==, 27 <==, 29 <==, 31 <==    =============================================================================================  RENAL FUNCTION:    Creatinine:   1.66  <<==, 1.63  <<==, 1.70  <<==, 1.78  <<==, 1.93  <<==, 1.59  <<==    BUN:   38 <==, 39 <==, 41 <==, 44 <==, 40 <==, 31 <==    ============================================================================================    calcium   9.1 <==, 8.5 <==, 8.6 <==, 8.5 <==, 9.0 <==, 9.0 <==    phos   3.0 <==    mag   2.2 <==    ============================================================================================  LFTs    AST:   8 <==     ALT:  <5  <==     AP:  87  <=    Bili:  0.2  <=    PT/INR - ( 26 Mar 2021 14:51 )   PT: 27.4 sec;   INR: 2.38 ratio      Procalcitonin, Serum: 0.18 ng/mL ( @ 21:43)    MICROBIOLOGY:     COVID-19 PCR . (21 @ 20:44)   COVID-19 PCR: NotDetec:      RADIOLOGY:  [x] Chest radiographs reviewed and interpreted by me    < from: CT Chest No Cont (21 @ 19:21) >    EXAM:  CT CHEST                          PROCEDURE DATE:  2021      INTERPRETATION:  Clinical information: Dyspnea. Exam is compared to previous study of 2021.    CT scan of the chest was obtained without intravenous contrast.    Low-attenuation lesions and calcifications are noted within both lobes of the thyroid gland.    Few small lymph nodes are present in the anterior mediastinum, pretracheal space and the AP window.    Heart is normal in size. Calcification of the aortic valve and the coronary arteries is noted. Small pericardial effusion is noted.    No endobronchial lesions are noted. Emphysematous changes are present in both lungs. Tree-in-bud opacities representing mucoid impacted distal small airways are noted within the right lung. Compressive atelectasis is noted involving portions of the lingula and left lower lobe. This is secondary to small to moderate size loculated left pleural effusion. Small loculated right pleural effusion is also noted. Marked thickening of the right pleura is noted.    Below the diaphragm, visualized portions of the abdomen demonstrate ascites. Low-attenuation lesions in the right kidney are too small to be adequately characterized on this exam.    Degenerative changes of the spine are noted.    IMPRESSION: Small to moderate size loculated left pleural effusion.    LEONORA WALLER MD; Attending Radiologist  This document has been electronically signed. Mar 30 2021 10:32AM    < end of copied text >  ---------------------------------------------------------------------------------------------------------------  < from: Xray Chest 1 View- PORTABLE-Urgent (Xray Chest 1 View- PORTABLE-Urgent .) (21 @ 14:18) >    EXAM:  XR CHEST PORTABLE URGENT 1V                          PROCEDURE DATE:  2021      INTERPRETATION:  Indication: Hypoxia.    TECHNIQUE: Single portable view of the chest.    COMPARISON: 2021    FINDINGS /  IMPRESSION: The cardiac silhouette is normal in size. There are small bilateral pleural effusions with associated atelectasis. There is mild pulmonary edema. An underlying viral pneumonia cannot be excluded.      JJ PEACOCK MD; Attending Radiologist  This document has been electronically signed. Mar 26 2021  2:29PM    < end of copied text >  ---------------------------------------------------------------------------------------------------------------  < from: [facility] Duplex Lower Ext Vein Scan, Bilat (21 @ 09:59) >    EXAM:  DUPLEX SCAN EXT VEINS LOWER BI                            PROCEDURE DATE:  2021      INTERPRETATION:  CLINICAL INFORMATION: Lower extremity swelling and pain, rule out DVT.    COMPARISON: None available.    TECHNIQUE: Duplex sonography of the BILATERAL LOWER extremity veins with color and spectral Doppler, with and without compression.    FINDINGS:    RIGHT:  Normal compressibility of the RIGHT common femoral, femoral and popliteal veins.  Doppler examination shows normal spontaneous and phasic flow.  No RIGHT calf vein thrombosis is detected.    LEFT:  Normal compressibility of the LEFT common femoral, femoral and popliteal veins.  Doppler examination shows normal spontaneous and phasic flow.  No LEFT calf vein thrombosis is detected.    IMPRESSION:  No evidence of deep venous thrombosis in either lower extremity.    ROSARIO SHARMA M.D., ATTENDING RADIOLOGIST  This document has been electronically signed. Mar 29 2021 10:12AM    < end of copied text >  ---------------------------------------------------------------------------------------------------------------  < from: CT Head No Cont (21 @ 19:47) >    EXAM:  CT BRAIN                          PROCEDURE DATE:  2021      INTERPRETATION:  PROCEDURE: CT head without contrast.    INDICATION: Confusion    TECHNIQUE: Multiple axial sections were obtained at 5 mm intervals. The images were reviewed in brain and bone windows. Imaging is performed using helical low-dose technique, and sagittal and coronal reformations are provided.    COMPARISON: Head CT 2021    FINDINGS:  The CT examination demonstrates extensive generalized volume loss as manifested by the enlargement of the ventricles, cisternal spaces, and cortical sulci throughout.    There is no acute intracranial hemorrhage, mass effect, midline shift or extra axial collections.    There is moderate periventricular white matter lucency, likely the sequela of small vessel ischemic disease.    Ossifications of the bilateral basal ganglia is again delineated.    The bony windows demonstrates no fractures.    The included paranasal sinuses and mastoid air cells are predominantly clear.    IMPRESSION:  There is no acute intracranial hemorrhage, mass effect, midline shift or extra axial collections. Age-appropriate involutional and ischemic gliotic changes without change from 2021.    MARIELOS MOREL MD; Resident Radiology  This document has been electronically signed.  BHAVANA CALDERA MD, ATTENDING RADIOLOGIST  This document has been electronically signed. Mar 27 2021  9:16AM    < end of copied text >  --------------------------------------------------------------------

## 2021-03-31 NOTE — PROGRESS NOTE ADULT - SUBJECTIVE AND OBJECTIVE BOX
CARDIOLOGY FOLLOW UP - Dr. Serrano    CC: pt confused, agigated, denies cp, sob       PHYSICAL EXAM:  T(C): 36.6 (03-31-21 @ 05:37), Max: 36.9 (03-30-21 @ 17:56)  HR: 96 (03-31-21 @ 08:07) (96 - 137)  BP: 121/69 (03-31-21 @ 08:07) (100/60 - 128/79)  RR: 18 (03-31-21 @ 05:37) (18 - 18)  SpO2: 97% (03-31-21 @ 05:37) (91% - 100%)  Wt(kg): --  I&O's Summary    30 Mar 2021 07:01  -  31 Mar 2021 07:00  --------------------------------------------------------  IN: 240 mL / OUT: 1000 mL / NET: -760 mL    31 Mar 2021 07:01  -  31 Mar 2021 12:05  --------------------------------------------------------  IN: 120 mL / OUT: 0 mL / NET: 120 mL        Appearance: Normal	  Cardiovascular: Normal S1 S2,irregular , No JVD, No murmurs  Respiratory: Lungs clear to auscultation	  Gastrointestinal:  Soft, Non-tender, + BS	  Extremities: Normal range of motion, No clubbing, cyanosis or edema      Home Medications:  acetaminophen 325 mg oral tablet: 2 tab(s) orally every 6 hours, As needed, Temp greater or equal to 38.5C (101.3F), Mild Pain (1 - 3) (26 Mar 2021 16:54)  dilTIAZem 180 mg/24 hours oral capsule, extended release: 1 cap(s) orally once a day (26 Mar 2021 16:54)  Flonase 50 mcg/inh nasal spray: 1 spray(s) nasal once a day, As Needed (26 Mar 2021 16:54)  lamoTRIgine 25 mg oral tablet: 1 tab(s) orally once a day (at bedtime) (26 Mar 2021 16:52)  mirtazapine 15 mg oral tablet: 1 tab(s) orally in the morning and 2 tab(s) at bedtime (26 Mar 2021 16:52)  pantoprazole 40 mg oral delayed release tablet: 1 tab(s) orally once a day (26 Mar 2021 16:54)  polyethylene glycol 3350 oral powder for reconstitution: 17 gram(s) orally once a day, As Needed - for constipation (26 Mar 2021 16:54)  Trelegy Ellipta: 1 puff(s) inhaled once a day (26 Mar 2021 16:54)  Xarelto 15 mg oral tablet: 1 tab(s) orally once a day (in the evening) (26 Mar 2021 16:54)      MEDICATIONS  (STANDING):  albuterol/ipratropium for Nebulization 3 milliLiter(s) Nebulizer every 6 hours  buDESOnide    Inhalation Suspension 0.5 milliGRAM(s) Inhalation every 12 hours  cefTRIAXone   IVPB 1000 milliGRAM(s) IV Intermittent every 24 hours  dextrose 40% Gel 15 Gram(s) Oral once  dextrose 5%. 1000 milliLiter(s) (50 mL/Hr) IV Continuous <Continuous>  dextrose 5%. 1000 milliLiter(s) (100 mL/Hr) IV Continuous <Continuous>  dextrose 50% Injectable 25 Gram(s) IV Push once  dextrose 50% Injectable 12.5 Gram(s) IV Push once  dextrose 50% Injectable 25 Gram(s) IV Push once  diltiazem    milliGRAM(s) Oral daily  doxycycline hyclate Capsule 100 milliGRAM(s) Oral every 12 hours  fluticasone propionate 50 MICROgram(s)/spray Nasal Spray 2 Spray(s) Both Nostrils two times a day  glucagon  Injectable 1 milliGRAM(s) IntraMuscular once  insulin lispro (ADMELOG) corrective regimen sliding scale   SubCutaneous three times a day before meals  insulin lispro (ADMELOG) corrective regimen sliding scale   SubCutaneous at bedtime  lamoTRIgine 25 milliGRAM(s) Oral at bedtime  methimazole 2.5 milliGRAM(s) Oral daily  metoprolol tartrate 25 milliGRAM(s) Oral two times a day  midodrine. 5 milliGRAM(s) Oral three times a day  multivitamin 1 Tablet(s) Oral daily  pantoprazole    Tablet 40 milliGRAM(s) Oral two times a day  sodium chloride 0.9%. 1000 milliLiter(s) (75 mL/Hr) IV Continuous <Continuous>      TELEMETRY: afib/aflutter 90-140s	    ECG:  	  RADIOLOGY:   DIAGNOSTIC TESTING:  [ ] Echocardiogram:  [ ]  Catheterization:  [ ] Stress Test:    OTHER: 	    LABS:	 	                            9.4    9.95  )-----------( 375      ( 30 Mar 2021 10:09 )             31.6     03-30    138  |  99  |  38<H>  ----------------------------<  272<H>  4.7   |  24  |  1.66<H>    Ca    9.1      30 Mar 2021 10:09    TPro  6.5  /  Alb  3.3<L>  /  TBili  x   /  DBili  x   /  AST  x   /  ALT  x   /  AlkPhos  x   03-30

## 2021-03-31 NOTE — CHART NOTE - NSCHARTNOTEFT_GEN_A_CORE
Pt seen for re-evaluation today as per nursing request.     Speech/Swallow history: Pt is known to this service from previous admission in 2018 w/ recommendations for dysphagia solids 2 and nectar thick liquids. Most recently seen in February of this year/2021 with recommendations for dysphagia 2 and honey thick liquids however pt refusing thickened liquids. An instrumental was deferred by patient and his family at that time with possible plan to perform in outpatient setting. Pt reported that he wants to just 'enjoy' what he can. He will eat eggs in the morning, does not eat lunch and will order out for dinner; pasta and sausage typically as per pt.    Re-evaluation completed. Pt seen for re-evaluation today as per nursing request.     Speech/Swallow history: Pt is known to this service from previous admission in 2018 w/ recommendations for dysphagia solids 2 and nectar thick liquids. Most recently seen in February of this year/2021 with recommendations for dysphagia 2 and honey thick liquids however pt refusing thickened liquids. An instrumental was deferred by patient and his family at that time with possible plan to perform in outpatient setting. Pt reported that he wants to just 'enjoy' what he can. He will eat eggs in the morning, does not eat lunch and will order out for dinner; pasta and sausage typically as per pt.    Re-evaluation completed this date. Encountered pt upright OOB in leo-chair self presenting pasta, soup, and coffee via cup. Pt remains edentulous and continues to report that he does not eat with his dentures. Discussion held w. the patient during his meal with regards to completion of MBSS. Pt seen for re-evaluation today as per nursing request.     Speech/Swallow history: Pt is known to this service from previous admission in 2018 w/ recommendations for dysphagia solids 2 and nectar thick liquids. Most recently seen in February of this year/2021 with recommendations for dysphagia 2 and honey thick liquids however pt refusing thickened liquids. An instrumental was deferred by patient and his family at that time with possible plan to perform in outpatient setting. Pt reported that he wants to just 'enjoy' what he can. He will eat eggs in the morning, does not eat lunch and will order out for dinner; pasta and sausage typically as per pt.    Re-evaluation completed this date. Encountered pt upright OOB in leo-chair self presenting pasta, soup, and coffee via cup. Pt remains edentulous and continues to report that he does not eat with his dentures. Discussion held w. the patient during his meal with regards to completion of MBSS. Pt indicated that he did not complete the study as it was 'annoying' and just wants to 'eat my pasta and go home'. Noted 1:1 had cup up patients pasta into smaller pieces. Adequate reception/orientation. Prolonged manipulation due to edentulous state; munching pattern. Suspect a mild delay in the swallow response. No s/sx of aspiration with thin liquids. Intermittent cough response during solid presentation most notably with mixed consistencies and with bigger pieces of solids, however this did not deter pt from continuing to eat. Pt appeared to tolerate softer solids and smaller pieces. Additional education and discussion held w/ the patient regarding MBSS and modified of solids. Pt continued to refuse to have his food changed. Discussion held w/ NP regarding pt verbal report. D/w RN and PCA as well.    At this time, service is no longer warranted as pt does not wish to complete instrumental assessment and wishes to continue to eat his normal food without modification. He is willing to have his food chopped up and provided with extra sauce and gravies so as to moisten the textures. He is willing to have softer solids be ordered such as salmon vs steak. Pt is requesting to have pasta for his dinner because that is what he normally does when at home; he will order out. He indicated that he does not eat lunch.     Recommendation:     -Liberalize regular solids with nursing / family to order/choose SOFTER TEXTURES.   -WILL NEED FOOD TO BE CHOPPED UP INTO SMALL PIECES   -Provide with extra sauces and gravies  -Will sign off     Sivan Lay MS CCC-SLP   Speech-Language Pathologist    pager [phone]

## 2021-03-31 NOTE — PROGRESS NOTE ADULT - ASSESSMENT
ASSESSMENT:    dyspnea/hypoxemia - multifactorial - the patient also has evidence of hypercapnia on a VBG -> improved    1) likely recurrent aspiration pneumonia (although the procalcitonin level is not significantly elevated)  2) mild pulmonary edema with bilateral left > right loculated pleural effusions  3) blood loss from the GI tract with anemia -> decreasing oxygen carrying capacity - has received 1 unit PRBCs  4) unlikely to have a pulmonary embolism on full dose A/C and in the absence of lower extremity DVTs    h/o pulmonary nodules on recent chest CT with tree in bud opacities    atrial fibrillation with RVR    PLAN/RECOMMENDATIONS:    oxygen supplementation to keep saturation greater than 92%  chest CT as above  completing a 5 day course ceftriaxone/doxycyline  blood cultures, urine Legionella antigen, MSSA/MRSA nasal swab  albuterol/atrovent nebs q6h  pulmicort 0.5mg nebs q12h - give after duoneb - rinse mouth after use  no need for systemic steroids at this point  head of bed elevated greater than 45 degrees at all times  holding off with a thoracentesis as the effusions are loculated and the respiratory status is stable  full assistance with meals  swallowing evaluation  cardiology follow-up noted - cardizem CD/lopressor/midodrine - holding A/C  incentive spirometry  diurese as tolerated by renal function and hemodynamics  GI follow-up - daily CBC -> H/H stable - transfuse as needed - PPI - refusing EGD   hematology follow-up    Will follow with you. Plan of care discussed with the patient at bedside and the dedicated floor NP.    Scott Cormier MD, [facility]  [phone]  Pulmonary Medicine

## 2021-03-31 NOTE — PROGRESS NOTE ADULT - SUBJECTIVE AND OBJECTIVE BOX
Patient is a 89y old  Male who presents with a chief complaint of Hypoxemia (28 Mar 2021 13:36)      SUBJECTIVE / OVERNIGHT EVENTS: No new complaints. Occasional confused. Daughter at bedside.   Review of Systems  chest pain no  palpitations no  sob no  nausea no  headache no    MEDICATIONS  (STANDING):  albuterol/ipratropium for Nebulization 3 milliLiter(s) Nebulizer every 6 hours  buDESOnide    Inhalation Suspension 0.5 milliGRAM(s) Inhalation every 12 hours  dextrose 40% Gel 15 Gram(s) Oral once  dextrose 5%. 1000 milliLiter(s) (50 mL/Hr) IV Continuous <Continuous>  dextrose 5%. 1000 milliLiter(s) (100 mL/Hr) IV Continuous <Continuous>  dextrose 50% Injectable 25 Gram(s) IV Push once  dextrose 50% Injectable 12.5 Gram(s) IV Push once  dextrose 50% Injectable 25 Gram(s) IV Push once  diltiazem    milliGRAM(s) Oral daily  doxycycline hyclate Capsule 100 milliGRAM(s) Oral every 12 hours  fluticasone propionate 50 MICROgram(s)/spray Nasal Spray 2 Spray(s) Both Nostrils two times a day  glucagon  Injectable 1 milliGRAM(s) IntraMuscular once  insulin lispro (ADMELOG) corrective regimen sliding scale   SubCutaneous three times a day before meals  insulin lispro (ADMELOG) corrective regimen sliding scale   SubCutaneous at bedtime  lamoTRIgine 25 milliGRAM(s) Oral at bedtime  methimazole 2.5 milliGRAM(s) Oral daily  metoprolol tartrate Injectable 5 milliGRAM(s) IV Push every 6 hours  midodrine. 5 milliGRAM(s) Oral three times a day  multivitamin 1 Tablet(s) Oral daily  pantoprazole    Tablet 40 milliGRAM(s) Oral two times a day  sodium chloride 0.9%. 1000 milliLiter(s) (75 mL/Hr) IV Continuous <Continuous>    MEDICATIONS  (PRN):  acetaminophen   Tablet .. 650 milliGRAM(s) Oral every 6 hours PRN Temp greater or equal to 38.5C (101.3F), Mild Pain (1 - 3)  haloperidol    Injectable 0.5 milliGRAM(s) IV Push every 6 hours PRN agitation  polyethylene glycol 3350 17 Gram(s) Oral daily PRN for constipation      Vital Signs Last 24 Hrs  T(C): 36.7 (31 Mar 2021 12:53), Max: 36.7 (31 Mar 2021 12:53)  T(F): 98.1 (31 Mar 2021 12:53), Max: 98.1 (31 Mar 2021 12:53)  HR: 95 (31 Mar 2021 12:53) (95 - 137)  BP: 94/56 (31 Mar 2021 12:53) (94/56 - 121/69)  BP(mean): --  RR: 18 (31 Mar 2021 12:53) (18 - 18)  SpO2: 100% (31 Mar 2021 12:53) (97% - 100%)    PHYSICAL EXAM:  GENERAL: NAD, cachectic   HEAD:  Atraumatic, Normocephalic  EYES: EOMI, PERRLA, conjunctiva and sclera clear  NECK: Supple, No JVD  CHEST/LUNG: Clear to auscultation bilaterally; No wheeze  HEART: Regular rate and rhythm; No murmurs, rubs, or gallops  ABDOMEN: Soft, Nontender, Nondistended; Bowel sounds present  EXTREMITIES:  2+ Peripheral Pulses, No clubbing, cyanosis, or edema  PSYCH: AAOx2   NEUROLOGY: non-focal  SKIN: No rashes or lesions    LABS:                        9.4    9.95  )-----------( 375      ( 30 Mar 2021 10:09 )             31.6     03-30    138  |  99  |  38<H>  ----------------------------<  272<H>  4.7   |  24  |  1.66<H>    Ca    9.1      30 Mar 2021 10:09    TPro  6.5  /  Alb  3.3<L>  /  TBili  x   /  DBili  x   /  AST  x   /  ALT  x   /  AlkPhos  x   03-30                RADIOLOGY & ADDITIONAL TESTS:    Imaging Personally Reviewed:    Consultant(s) Notes Reviewed:      Care Discussed with Consultants/Other Providers:

## 2021-03-31 NOTE — PROGRESS NOTE ADULT - ASSESSMENT
a/p  89 year old man with history of COPD, bipolar, monoclonal gammopathy, anemia with history transfusions and iron infusions, afib on a/c, DM, admitted with hypoxia    1. Hypoxic respiratory failure  -? secondary to aspiration vs PNA  -not clinically overloaded, no decomp HF  -abx per medicine, pulmonary  -aspirations precautions  -CT chest noted with Small to moderate size loculated left pleural effusion.  -LE doppler neg for DVT   -f/u pulm/med     2. Acute onset of P afib w RVR (hx)/ Sinus tachycardia   -rates elevated in setting of anemia, hypovolemia, agitation   -s/p IV lopressor 5mg x 2, IV Cardizem 5mg x 1  -rates between   -per NP pt did not receive am meds and is having dysphagia- pending S&S  -switch lopressor to IVP 5mg q6H for now   -started on mido 5mg TID yesterday to augment bp - however pt noted to have dysphagia   -cont to hold a/c for now, gi f/u  -cont to trend     3. Anemia, r/o GI bleed   -h/h improved s/p PRBC  -trend serial heme  -Positive occult noted   -pt ref EGD at this time, on hold currently given afib w rvr   -GI workup    dvt ppx      plan discussed with ACP

## 2021-03-31 NOTE — CHART NOTE - NSCHARTNOTEFT_GEN_A_CORE
CC:    HPI: Called by RN to evaluate patient for . Patient seen and examined at the bedside. Denies fevers/chills, weakness, diaphoresis, headaches, dizziness, syncope, paresthesias, chest pain, palpitations, dyspnea, abdominal pain, N/V/D.     Vital Signs Last 24 Hrs  T(C): 36.3 (31 Mar 2021 20:36), Max: 36.7 (31 Mar 2021 12:53)  T(F): 97.3 (31 Mar 2021 20:36), Max: 98.1 (31 Mar 2021 12:53)  HR: 139 (31 Mar 2021 21:40) (95 - 139)  BP: 90/55 (31 Mar 2021 21:40) (86/49 - 121/69)  BP(mean): --  RR: 19 (31 Mar 2021 20:36) (18 - 19)  SpO2: 99% (31 Mar 2021 20:36) (97% - 100%)    PHYSICAL EXAM:  General: NAD, A&Ox3  Respiratory: Lungs clear to auscultation bilaterally. No wheezes, rales, rhonchi. Normal respiratory effort.   Cardiovascular: S1, S2 present. Regular rate and rhythm. No murmurs, rubs, or gallops  Gastrointestinal: BS x4 normoactive. Soft, non-tender, non-distended.   Extremities: 2+ peripheral pulses. Warm to touch. No edema, cyanosis.    A/P  HPI:   89yo M pmhx COPD, bipolar, monoclonal gammopathy, anemia with history transfusions and iron infusions, afib on eliquis, DM (no longer on meds) presents with hypoxia. Pt's visiting nurse was checking vitals of pt, found to be hypoxic to 70's. Pt at that time not complaining of sob, dyspnia, was not doing anything active at that time. Per daughter, pt was confused this morning, stating he had a friend who was visiting for 3 hrs, calling daughter about aid not arriving at early in AM. not on oxygen at home, uses walker at home. PT denies chest pain, sob, abd pain, nausea, vomitting. currently aox2  (26 Mar 2021 18:24)      #  -Repeat vitals were stable  -Discussed with RN  -Will continue to monitor  -Will endorse to AM team      Sammie Conte PA-C  # CC: A-fib with RVR    HPI: Called by RN to evaluate patient for A-fib with RVR with HR sustaining in 130s. Patient has been intermittently going into A-fib with RVR for the last 2 days as patient gets confused and agitated, and refuses to take his PO lopressor and cardizem. Patient is currently on standing IV lopressor 5mg q6h, and last received IV lopressor 5mg @ ~18:00.  Patient seen and examined at the bedside. Patient is baseline confused (A&O x 2-3), but appears asymptomatic. Denies diaphoresis, headaches, dizziness, paresthesias, chest pain, palpitations, dyspnea, abdominal pain, N/V/D.     Vital Signs Last 24 Hrs  T(C): 36.3 (31 Mar 2021 20:36), Max: 36.7 (31 Mar 2021 12:53)  T(F): 97.3 (31 Mar 2021 20:36), Max: 98.1 (31 Mar 2021 12:53)  HR: 139 (31 Mar 2021 21:40) (95 - 139)  BP: 90/55 (31 Mar 2021 21:40) (86/49 - 121/69)  RR: 19 (31 Mar 2021 20:36) (18 - 19)  SpO2: 99% (31 Mar 2021 20:36) (97% - 100%)    PHYSICAL EXAM:  General: NAD, A&Ox3  Respiratory: Decreased breath sounds at b/l bases. No wheezes, rales, rhonchi. Normal respiratory effort.   Cardiovascular: S1, S2 present. Rapid rate and irregular rhythm. No murmurs, rubs, or gallops  Gastrointestinal: BS x4 normoactive. Soft, non-tender, non-distended.   Extremities: 2+ peripheral pulses. Warm to touch. No edema, cyanosis.    A/P  89yo M pmhx COPD, bipolar, monoclonal gammopathy, anemia with history transfusions and iron infusions, afib on eliquis, DM (no longer on meds) presents with hypoxia.   Patient is baseline confused (A&O x 2-3), but appears asymptomatic. Denies diaphoresis, headaches, dizziness, paresthesias, chest pain, palpitations, dyspnea, abdominal pain, N/V/D.     #A-fib with RVR  -Repeat vitals with mild hypotension (90/55)- 250cc NS bolus x 1 to be given  -Additional IV lopressor 5mg to be given for a total of IV lopressor 10mg   -Discussed with RN  -Will continue to monitor  -Will endorse to AM team      Sammie Conte PA-C  #[id number]        **ADDENDUM @ 23:12**  -Patient had briefly responded to IV cardizem 5mg x 1, but HR remains in the 130s  -Discussed with Dr. Serrano: IV dig load 0.25mg x 2 STAT to be given followed by maintenance PO dig 1.25mg qd  -Discussed with RN  -Will continue to monitor  -Will endorse to AM team      Sammie Conte PA-C  #[id number]        **ADDENDUM @ 06:36**  -Patient's HR has sustained in the 80s-100s since being dig loaded- c/w IV lopressor 5mg q6h, PO cardizem 180 qd, PO dig 1.25mg qd  -Discussed with RN  -Will continue to monitor  -Will endorse to AM team      Sammie Conte PA-C  #[id number]

## 2021-03-31 NOTE — PROVIDER CONTACT NOTE (MEDICATION) - SITUATION
pt refused all his am medications including IVP Cardizem ( Cardizem 2.5 mg IVP given from 5 mg )
pt agitated refused all his  due  medications including IV IRON

## 2021-04-01 NOTE — BEHAVIORAL HEALTH ASSESSMENT NOTE - NSBHCONSULTFOLLOW_PSY_A_CORE
Detail Level: Detailed Duration Of Freeze Thaw-Cycle (Seconds): 8 Consent: The patient's consent was obtained including but not limited to risks of crusting, scabbing, blistering, scarring, darker or lighter pigmentary change, recurrence, incomplete removal and infection. Render Note In Bullet Format When Appropriate: No Post-Care Instructions: I reviewed with the patient in detail post-care instructions. Patient is to wear sunprotection, and avoid picking at any of the treated lesions. Patient may apply Vaseline to crusted or scabbing areas. Render Post-Care Instructions In Note?: yes Number Of Freeze-Thaw Cycles: 2 freeze-thaw cycles yes

## 2021-04-01 NOTE — PROGRESS NOTE ADULT - ASSESSMENT
a/p    89 year old man with history of COPD, bipolar, monoclonal gammopathy, anemia with history transfusions and iron infusions, afib on a/c, DM, admitted with hypoxia    1. Hypoxic respiratory failure  -? secondary to aspiration vs PNA  -not clinically overloaded, no decomp HF  -abx per medicine, pulmonary  -aspirations precautions  -CT chest noted with Small to moderate size loculated left pleural effusion.  -LE doppler neg for DVT   -f/u pulm/med     2. Acute onset of P afib w RVR (hx)/ Sinus tachycardia   -rates elevated in setting of anemia, hypovolemia, agitation   -s/p IV lopressor 5mg, IV Cardizem 5mg, IV Digoxin 500 mcg   -rates improving   -pt starting to tolerate PO meds - c/w Cardizem PO when feasible   -c/w lopressor to IVP 5mg q6H for now   -c/w mido to augment bp when feasible   -if rates become elevated can give additional IVP lopressor 5mg   -cont to hold a/c for now, gi f/u  -cont to trend   -elevated cr noted, check dig level in am    3. Anemia, r/o GI bleed   -h/h improved s/p PRBC  -trend serial heme  -Positive occult noted   -pt ref EGD at this time, on hold currently given afib w rvr   -GI workup    dvt ppx      plan discussed with ACP    a/p    89 year old man with history of COPD, bipolar, monoclonal gammopathy, anemia with history transfusions and iron infusions, afib on a/c, DM, admitted with hypoxia    1. Hypoxic respiratory failure  -? secondary to aspiration vs PNA  -not clinically overloaded, no decomp HF  -abx per medicine, pulmonary  -aspirations precautions  -CT chest noted with Small to moderate size loculated left pleural effusion.  -LE doppler neg for DVT   -f/u pulm/med     2. Acute onset of P afib w RVR (hx)/ Sinus tachycardia   -rates elevated in setting of anemia, hypovolemia, agitation   -s/p IV lopressor 5mg, IV Cardizem 5mg, IV Digoxin 500 mcg   -rates improving   -pt starting to tolerate PO meds - c/w Cardizem PO when feasible   -c/w lopressor to IVP 5mg q6H for now - likely transition to oral tm if pt tolerating   -c/w mido to augment bp when feasible   -if rates become elevated can give additional IVP lopressor 5mg   -cont to hold a/c for now, gi f/u  -cont to trend   -elevated cr noted, check dig level in am    3. Anemia, r/o GI bleed   -h/h improved s/p PRBC  -trend serial heme  -Positive occult noted   -pt ref EGD at this time, on hold currently given afib w rvr   -GI workup    dvt ppx      plan discussed with ACP

## 2021-04-01 NOTE — BEHAVIORAL HEALTH ASSESSMENT NOTE - NSBHCHARTREVIEWLAB_PSY_A_CORE FT
8.3    11.47 )-----------( 464      ( 01 Apr 2021 05:49 )             28.7       04-01    138  |  103  |  50<H>  ----------------------------<  198<H>  4.7   |  25  |  2.53<H>    Ca    9.0      01 Apr 2021 05:49

## 2021-04-01 NOTE — PROGRESS NOTE ADULT - SUBJECTIVE AND OBJECTIVE BOX
CARDIOLOGY FOLLOW UP - Dr. Serrano    CC: resting comfortably, confused       PHYSICAL EXAM:  T(C): 36.3 (04-01-21 @ 11:19), Max: 36.8 (03-31-21 @ 23:12)  HR: 112 (04-01-21 @ 11:19) (95 - 142)  BP: 98/60 (04-01-21 @ 11:19) (86/49 - 123/65)  RR: 18 (04-01-21 @ 11:19) (16 - 19)  SpO2: 96% (04-01-21 @ 11:19) (96% - 100%)  Wt(kg): --  I&O's Summary    31 Mar 2021 07:01  -  01 Apr 2021 07:00  --------------------------------------------------------  IN: 360 mL / OUT: 200 mL / NET: 160 mL    01 Apr 2021 07:01  -  01 Apr 2021 11:46  --------------------------------------------------------  IN: 120 mL / OUT: 25 mL / NET: 95 mL        Appearance: Normal	  Cardiovascular: Normal S1 S2,irregular , No JVD, No murmurs  Respiratory: Lungs clear to auscultation	  Gastrointestinal:  Soft, Non-tender, + BS	  Extremities: Normal range of motion, No clubbing, cyanosis or edema      Home Medications:  acetaminophen 325 mg oral tablet: 2 tab(s) orally every 6 hours, As needed, Temp greater or equal to 38.5C (101.3F), Mild Pain (1 - 3) (26 Mar 2021 16:54)  dilTIAZem 180 mg/24 hours oral capsule, extended release: 1 cap(s) orally once a day (26 Mar 2021 16:54)  Flonase 50 mcg/inh nasal spray: 1 spray(s) nasal once a day, As Needed (26 Mar 2021 16:54)  lamoTRIgine 25 mg oral tablet: 1 tab(s) orally once a day (at bedtime) (26 Mar 2021 16:52)  mirtazapine 15 mg oral tablet: 1 tab(s) orally in the morning and 2 tab(s) at bedtime (26 Mar 2021 16:52)  pantoprazole 40 mg oral delayed release tablet: 1 tab(s) orally once a day (26 Mar 2021 16:54)  polyethylene glycol 3350 oral powder for reconstitution: 17 gram(s) orally once a day, As Needed - for constipation (26 Mar 2021 16:54)  Trelegy Ellipta: 1 puff(s) inhaled once a day (26 Mar 2021 16:54)  Xarelto 15 mg oral tablet: 1 tab(s) orally once a day (in the evening) (26 Mar 2021 16:54)      MEDICATIONS  (STANDING):  albuterol/ipratropium for Nebulization 3 milliLiter(s) Nebulizer every 6 hours  buDESOnide    Inhalation Suspension 0.5 milliGRAM(s) Inhalation every 12 hours  dextrose 40% Gel 15 Gram(s) Oral once  dextrose 5%. 1000 milliLiter(s) (50 mL/Hr) IV Continuous <Continuous>  dextrose 5%. 1000 milliLiter(s) (100 mL/Hr) IV Continuous <Continuous>  dextrose 50% Injectable 25 Gram(s) IV Push once  dextrose 50% Injectable 12.5 Gram(s) IV Push once  dextrose 50% Injectable 25 Gram(s) IV Push once  diltiazem    milliGRAM(s) Oral daily  doxycycline hyclate Capsule 100 milliGRAM(s) Oral every 12 hours  fluticasone propionate 50 MICROgram(s)/spray Nasal Spray 2 Spray(s) Both Nostrils two times a day  glucagon  Injectable 1 milliGRAM(s) IntraMuscular once  insulin lispro (ADMELOG) corrective regimen sliding scale   SubCutaneous three times a day before meals  insulin lispro (ADMELOG) corrective regimen sliding scale   SubCutaneous at bedtime  lamoTRIgine 25 milliGRAM(s) Oral at bedtime  methimazole 2.5 milliGRAM(s) Oral daily  metoprolol tartrate Injectable 5 milliGRAM(s) IV Push every 6 hours  midodrine. 5 milliGRAM(s) Oral three times a day  multivitamin 1 Tablet(s) Oral daily  pantoprazole    Tablet 40 milliGRAM(s) Oral two times a day  sodium chloride 0.9%. 1000 milliLiter(s) (75 mL/Hr) IV Continuous <Continuous>      TELEMETRY: 	afib 80-120s, brief 135    ECG:  	  RADIOLOGY:   DIAGNOSTIC TESTING:  [ ] Echocardiogram:  [ ]  Catheterization:  [ ] Stress Test:    OTHER: 	    LABS:	 	                            8.3    11.47 )-----------( 464      ( 01 Apr 2021 05:49 )             28.7     04-01    138  |  103  |  50<H>  ----------------------------<  198<H>  4.7   |  25  |  2.53<H>    Ca    9.0      01 Apr 2021 05:49    TPro  6.5  /  Alb  3.3<L>  /  TBili  x   /  DBili  x   /  AST  x   /  ALT  x   /  AlkPhos  x   03-30

## 2021-04-01 NOTE — PROGRESS NOTE ADULT - SUBJECTIVE AND OBJECTIVE BOX
[facility] - PROGRESS NOTE    CHIEF COMPLAINT: dyspnea; hypoxemia; COPD exacerbation; pneumonia; pleural effusion; anemia    INTERVAL HISTORY: agitated and combative earlier now sitting calmly in bed - awake and alert; ongoing atrial fibrillation now with better rate control; no shortness of breath on a nasal canula; no cough, sputum production, chest congestion or wheeze; no fevers, chills or sweats; no chest pain/pressure or palpitations; very weak and unable to walk; worsening renal function    REVIEW OF SYSTEMS:  Constitutional: As per interval history  HEENT: Within normal limits  CV: As per interval history  Resp: As per interval history  GI: Within normal limits   : Within normal limits  Musculoskeletal: Within normal limits  Skin: Within normal limits  Neurological: dementia  Psychiatric: Within normal limits  Endocrine: Within normal limits  Hematologic/Lymphatic: Within normal limits  Allergic/Immunologic: Within normal limits      MEDICATIONS:     Pulmonary "  albuterol/ipratropium for Nebulization 3 milliLiter(s) Nebulizer every 6 hours  buDESOnide    Inhalation Suspension 0.5 milliGRAM(s) Inhalation every 12 hours    Anti-microbials:  doxycycline hyclate Capsule 100 milliGRAM(s) Oral every 12 hours    Cardiovascular:  diltiazem    milliGRAM(s) Oral daily  metoprolol tartrate Injectable 5 milliGRAM(s) IV Push every 6 hours  midodrine. 5 milliGRAM(s) Oral three times a day    Other:  dextrose 40% Gel 15 Gram(s) Oral once  dextrose 5%. 1000 milliLiter(s) IV Continuous <Continuous>  dextrose 5%. 1000 milliLiter(s) IV Continuous <Continuous>  dextrose 50% Injectable 25 Gram(s) IV Push once  dextrose 50% Injectable 12.5 Gram(s) IV Push once  dextrose 50% Injectable 25 Gram(s) IV Push once  fluticasone propionate 50 MICROgram(s)/spray Nasal Spray 2 Spray(s) Both Nostrils two times a day  glucagon  Injectable 1 milliGRAM(s) IntraMuscular once  insulin lispro (ADMELOG) corrective regimen sliding scale   SubCutaneous three times a day before meals  insulin lispro (ADMELOG) corrective regimen sliding scale   SubCutaneous at bedtime  lamoTRIgine 25 milliGRAM(s) Oral at bedtime  methimazole 2.5 milliGRAM(s) Oral daily  multivitamin 1 Tablet(s) Oral daily  pantoprazole    Tablet 40 milliGRAM(s) Oral two times a day  sodium chloride 0.9%. 1000 milliLiter(s) IV Continuous <Continuous>    MEDICATIONS  (PRN):  acetaminophen   Tablet .. 650 milliGRAM(s) Oral every 6 hours PRN Temp greater or equal to 38.5C (101.3F), Mild Pain (1 - 3)  haloperidol    Injectable 0.5 milliGRAM(s) IV Push every 6 hours PRN agitation  polyethylene glycol 3350 17 Gram(s) Oral daily PRN for constipation        OBJECTIVE:    I&O's Detail    31 Mar 2021 07:01  -  01 Apr 2021 07:00  --------------------------------------------------------  IN:    Oral Fluid: 360 mL  Total IN: 360 mL    OUT:    Voided (mL): 200 mL  Total OUT: 200 mL    Total NET: 160 mL      01 Apr 2021 07:01  -  01 Apr 2021 13:24  --------------------------------------------------------  IN:    Oral Fluid: 360 mL  Total IN: 360 mL    OUT:    Voided (mL): 25 mL  Total OUT: 25 mL    Total NET: 335 mL    POCT Blood Glucose.: 208 mg/dL (01 Apr 2021 11:55)  POCT Blood Glucose.: 211 mg/dL (01 Apr 2021 07:52)  POCT Blood Glucose.: 213 mg/dL (31 Mar 2021 21:19)  POCT Blood Glucose.: 280 mg/dL (31 Mar 2021 17:06)      PHYSICAL EXAM:       ICU Vital Signs Last 24 Hrs  T(C): 36.3 (01 Apr 2021 11:19), Max: 36.8 (31 Mar 2021 23:12)  T(F): 97.4 (01 Apr 2021 11:19), Max: 98.3 (31 Mar 2021 23:12)  HR: 112 (01 Apr 2021 11:19) (112 - 142)  BP: 98/60 (01 Apr 2021 11:19) (86/49 - 123/65)  BP(mean): --  ABP: --  ABP(mean): --  RR: 18 (01 Apr 2021 11:19) (16 - 19)  SpO2: 96% (01 Apr 2021 11:19) (96% - 100%) on 2lpm nasal canula     General: Awake. Alert. Poor memory. Cooperative. No distress. Appears stated age.   HEENT: Atraumatic. Bitemporal wasting. Anicteric. Normal oral mucosa. PERRL. EOMI.  Neck: Supple. Trachea midline. Thyroid without enlargement/tenderness/nodules. No carotid bruit. No JVD. Loss of bilateral supraclavicular fat pads	  Cardiovascular: Irregularly irregular rate and rhythm. S1 S2 normal. No murmurs, rubs or gallops.  Respiratory: Respirations unlabored. Decreased breath sounds left hemithorax ~ 1/3 up with dullness to percussion. Kyphosis.  Abdomen: Soft. Non-tender. Non-distended. No organomegaly. No masses. Normal bowel sounds.  Extremities: Warm to touch. No clubbing or cyanosis. No pedal edema.  Pulses: 2+ peripheral pulses all extremities.	  Skin: Normal skin color. No rashes or lesions. No ecchymoses. No cyanosis. Warm to touch.  Lymph Nodes: Cervical, supraclavicular and axillary nodes normal  Neurological: Moving all extremities. A and O x 3  Psychiatry: Calm    LABS:                          8.3    11.47 )-----------( 464      ( 01 Apr 2021 05:49 )             28.7     CBC    WBC  11.47 <==, 9.95 <==, 9.22 <==, 9.49 <==, 7.06 <==, 12.30 <==    Hemoglobin  8.3 <<==, 9.4 <<==, 7.9 <<==, 6.1 <<==, 7.8 <<==, 7.4 <<==    Hematocrit  28.7 <==, 31.6 <==, 26.7 <==, 21.4 <==, 28.0 <==, 26.2 <==    Platelets  464 <==, 375 <==, 448 <==, 477 <==, 525 <==, 471 <==      138  |  103  |  50<H>  ----------------------------<  198<H>    04-01  4.7   |  25  |  2.53<H>      LYTES    sodium  138 <==, 138 <==, 143 <==, 143 <==, 139 <==, 144 <==, 144 <==    potassium   4.7 <==, 4.7 <==, 4.6 <==, 5.0 <==, 5.3 <==, 6.0 <==, 5.0 <==    chloride  103 <==, 99 <==, 106 <==, 105 <==, 104 <==, 103 <==, 104 <==    carbon dioxide  25 <==, 24 <==, 30 <==, 28 <==, 27 <==, 29 <==, 31 <==    =============================================================================================  RENAL FUNCTION:    Creatinine:   2.53  <<==, 1.66  <<==, 1.63  <<==, 1.70  <<==, 1.78  <<==, 1.93  <<==, 1.59  <<==    BUN:   50 <==, 38 <==, 39 <==, 41 <==, 44 <==, 40 <==, 31 <==    ============================================================================================    calcium   9.0 <==, 9.1 <==, 8.5 <==, 8.6 <==, 8.5 <==, 9.0 <==, 9.0 <==    phos   3.0 <==    mag   2.2 <==    ============================================================================================  LFTs    AST:   8 <==     ALT:  <5  <==     AP:  87  <=    Bili:  0.2  <=    Procalcitonin, Serum: 0.18 ng/mL (03-27 @ 21:43)    MICROBIOLOGY:     COVID-19 PCR . (03.26.21 @ 20:44)   COVID-19 PCR: NotDetec:      RADIOLOGY:  [x] Chest radiographs reviewed and interpreted by me    < from: CT Chest No Cont (03.29.21 @ 19:21) >    EXAM:  CT CHEST                          PROCEDURE DATE:  03/29/2021      INTERPRETATION:  Clinical information: Dyspnea. Exam is compared to previous study of 2/17/2021.    CT scan of the chest was obtained without intravenous contrast.    Low-attenuation lesions and calcifications are noted within both lobes of the thyroid gland.    Few small lymph nodes are present in the anterior mediastinum, pretracheal space and the AP window.    Heart is normal in size. Calcification of the aortic valve and the coronary arteries is noted. Small pericardial effusion is noted.    No endobronchial lesions are noted. Emphysematous changes are present in both lungs. Tree-in-bud opacities representing mucoid impacted distal small airways are noted within the right lung. Compressive atelectasis is noted involving portions of the lingula and left lower lobe. This is secondary to small to moderate size loculated left pleural effusion. Small loculated right pleural effusion is also noted. Marked thickening of the right pleura is noted.    Below the diaphragm, visualized portions of the abdomen demonstrate ascites. Low-attenuation lesions in the right kidney are too small to be adequately characterized on this exam.    Degenerative changes of the spine are noted.    IMPRESSION: Small to moderate size loculated left pleural effusion.    LEONORA WALLER MD; Attending Radiologist  This document has been electronically signed. Mar 30 2021 10:32AM    < end of copied text >  ---------------------------------------------------------------------------------------------------------------  < from: Xray Chest 1 View- PORTABLE-Urgent (Xray Chest 1 View- PORTABLE-Urgent .) (03.26.21 @ 14:18) >    EXAM:  XR CHEST PORTABLE URGENT 1V                          PROCEDURE DATE:  03/26/2021      INTERPRETATION:  Indication: Hypoxia.    TECHNIQUE: Single portable view of the chest.    COMPARISON: 2/17/2021    FINDINGS /  IMPRESSION: The cardiac silhouette is normal in size. There are small bilateral pleural effusions with associated atelectasis. There is mild pulmonary edema. An underlying viral pneumonia cannot be excluded.      JJ PEACOCK MD; Attending Radiologist  This document has been electronically signed. Mar 26 2021  2:29PM    < end of copied text >  ---------------------------------------------------------------------------------------------------------------  < from: [facility] Duplex Lower Ext Vein Scan, Bilat (03.29.21 @ 09:59) >    EXAM:  DUPLEX SCAN EXT VEINS LOWER BI                            PROCEDURE DATE:  03/29/2021      INTERPRETATION:  CLINICAL INFORMATION: Lower extremity swelling and pain, rule out DVT.    COMPARISON: None available.    TECHNIQUE: Duplex sonography of the BILATERAL LOWER extremity veins with color and spectral Doppler, with and without compression.    FINDINGS:    RIGHT:  Normal compressibility of the RIGHT common femoral, femoral and popliteal veins.  Doppler examination shows normal spontaneous and phasic flow.  No RIGHT calf vein thrombosis is detected.    LEFT:  Normal compressibility of the LEFT common femoral, femoral and popliteal veins.  Doppler examination shows normal spontaneous and phasic flow.  No LEFT calf vein thrombosis is detected.    IMPRESSION:  No evidence of deep venous thrombosis in either lower extremity.    ROSARIO SHARMA M.D., ATTENDING RADIOLOGIST  This document has been electronically signed. Mar 29 2021 10:12AM    < end of copied text >  ---------------------------------------------------------------------------------------------------------------  < from: CT Head No Cont (03.26.21 @ 19:47) >    EXAM:  CT BRAIN                          PROCEDURE DATE:  03/26/2021      INTERPRETATION:  PROCEDURE: CT head without contrast.    INDICATION: Confusion    TECHNIQUE: Multiple axial sections were obtained at 5 mm intervals. The images were reviewed in brain and bone windows. Imaging is performed using helical low-dose technique, and sagittal and coronal reformations are provided.    COMPARISON: Head CT 2/17/2021    FINDINGS:  The CT examination demonstrates extensive generalized volume loss as manifested by the enlargement of the ventricles, cisternal spaces, and cortical sulci throughout.    There is no acute intracranial hemorrhage, mass effect, midline shift or extra axial collections.    There is moderate periventricular white matter lucency, likely the sequela of small vessel ischemic disease.    Ossifications of the bilateral basal ganglia is again delineated.    The bony windows demonstrates no fractures.    The included paranasal sinuses and mastoid air cells are predominantly clear.    IMPRESSION:  There is no acute intracranial hemorrhage, mass effect, midline shift or extra axial collections. Age-appropriate involutional and ischemic gliotic changes without change from 2/17/2021.    MARIELOS MOREL MD; Resident Radiology  This document has been electronically signed.  BHAVANA CLADERA MD, ATTENDING RADIOLOGIST  This document has been electronically signed. Mar 27 2021  9:16AM    < end of copied text >  --------------------------------------------------------------------

## 2021-04-01 NOTE — BEHAVIORAL HEALTH ASSESSMENT NOTE - DIFFERENTIAL
delirium vs acute psychotic disorder vs delirium due to multiple etiologies delirium dementia bipolar d/o

## 2021-04-01 NOTE — PROGRESS NOTE ADULT - ASSESSMENT
ASSESSMENT:    dyspnea/hypoxemia - multifactorial - the patient also has evidence of hypercapnia on a VBG -> improved    1) likely recurrent aspiration pneumonia (although the procalcitonin level is not significantly elevated)  2) mild pulmonary edema with bilateral left > right loculated pleural effusions  3) blood loss from the GI tract with anemia -> decreasing oxygen carrying capacity - has received 1 unit PRBCs  4) unlikely to have a pulmonary embolism on full dose A/C and in the absence of lower extremity DVTs    h/o pulmonary nodules on recent chest CT with tree in bud opacities    atrial fibrillation with RVR    HARJEET    PLAN/RECOMMENDATIONS:    oxygen supplementation to keep saturation greater than 92%  chest CT as above  has completed a 5 day course ceftriaxone/doxycyline  blood cultures, urine Legionella antigen, MSSA/MRSA nasal swab-> not sent (unnecessary at this point)  albuterol/atrovent nebs q6h  pulmicort 0.5mg nebs q12h - give after duoneb - rinse mouth after use  head of bed elevated greater than 45 degrees at all times  full assistance with meals  incentive spirometry  holding off with a thoracentesis as the effusions are loculated and the respiratory status is stable  swallowing evaluation  cardiology follow-up noted - cardizem CD/lopressor/midodrine - holding A/C  would hold diuretics with HARJEET  GI follow-up - daily CBC -> H/H stable - transfuse as needed - PPI - refusing EGD   hematology follow-up    Will follow with you. Plan of care discussed with the patient at bedside and the dedicated floor NP.    Scott Cormier MD, [facility]  [phone]  Pulmonary Medicine

## 2021-04-01 NOTE — PROGRESS NOTE ADULT - ASSESSMENT
anemia  gi bleed      daily cbc   transfuse prn   plans for endoscopic eval on hold given afib/rvr  once stable then will re-address  cont diet as ruben  hold a/c      Advanced care planning was discussed with patient and family.  Advanced care planning forms were reviewed and discussed.  Risks, benefits and alternatives of gastroenterologic procedures were discussed in detail and all questions were answered.    30 minutes spent.

## 2021-04-01 NOTE — BEHAVIORAL HEALTH ASSESSMENT NOTE - RISK ASSESSMENT
Low Acute Suicide Risk see above pt ovearll low risk for suicide attempt, no si/hi, no hx attempts, future oriented, risk factors include med issues

## 2021-04-01 NOTE — PROGRESS NOTE ADULT - SUBJECTIVE AND OBJECTIVE BOX
INTERVAL HPI/OVERNIGHT EVENTS:    events noted  no melena  remains in rapid afib      Allergies    Tylenol with Codeine #3 (Other)    Intolerances          General:  No wt loss, fevers, chills, night sweats, fatigue,   Eyes:  Good vision, no reported pain  ENT:  No sore throat, pain, runny nose, dysphagia  CV:  No pain, palpitations, hypo/hypertension  Resp:  No dyspnea, cough, tachypnea, wheezing  GI:  No pain, No nausea, No vomiting, No diarrhea, No constipation, No weight loss, No fever, No pruritis, No rectal bleeding, No tarry stools, No dysphagia,  :  No pain, bleeding, incontinence, nocturia  Muscle:  No pain, weakness  Neuro:  No weakness, tingling, memory problems  Psych:  No fatigue, insomnia, mood problems, depression  Endocrine:  No polyuria, polydipsia, cold/heat intolerance  Heme:  No petechiae, ecchymosis, easy bruisability  Skin:  No rash, tattoos, scars, edema      PHYSICAL EXAM:   Vital Signs:  Vital Signs Last 24 Hrs  T(C): 36.5 (28 Mar 2021 17:50), Max: 36.8 (27 Mar 2021 20:45)  T(F): 97.7 (28 Mar 2021 17:50), Max: 98.3 (27 Mar 2021 20:45)  HR: 91 (28 Mar 2021 17:50) (69 - 91)  BP: 139/48 (28 Mar 2021 17:50) (90/43 - 139/48)  BP(mean): --  RR: 18 (28 Mar 2021 10:06) (17 - 18)  SpO2: 98% (28 Mar 2021 17:50) (95% - 98%)  Daily     Daily I&O's Summary    27 Mar 2021 07:01  -  28 Mar 2021 07:00  --------------------------------------------------------  IN: 480 mL / OUT: 1500 mL / NET: -1020 mL    28 Mar 2021 07:01  -  28 Mar 2021 18:39  --------------------------------------------------------  IN: 375 mL / OUT: 0 mL / NET: 375 mL        GENERAL:  Appears stated age, well-groomed, well-nourished, no distress  HEENT:  NC/AT,  conjunctivae clear and pink, no thyromegaly, nodules, adenopathy, no JVD, sclera -anicteric  CHEST:  Full & symmetric excursion, no increased effort, breath sounds clear  HEART:  Regular rhythm, S1, S2, no murmur/rub/S3/S4, no abdominal bruit, no edema  ABDOMEN:  Soft, non-tender, non-distended, normoactive bowel sounds,  no masses ,no hepato-splenomegaly, no signs of chronic liver disease  EXTEREMITIES:  no cyanosis,clubbing or edema  SKIN:  No rash/erythema/ecchymoses/petechiae/wounds/abscess/warm/dry  NEURO:  Alert, oriented, no asterixis, no tremor, no encephalopathy      LABS:                        6.1    9.49  )-----------( 477      ( 28 Mar 2021 07:14 )             21.4     03-28    143  |  105  |  41<H>  ----------------------------<  172<H>  5.0   |  28  |  1.70<H>    Ca    8.6      28 Mar 2021 07:14          amylase   lipase  RADIOLOGY & ADDITIONAL TESTS:

## 2021-04-01 NOTE — BEHAVIORAL HEALTH ASSESSMENT NOTE - NSBHHPIREASONCL_PSY_A_CORE
refusing to take medications/agitation refusing to take medications/delirium/agitation/med management

## 2021-04-01 NOTE — BEHAVIORAL HEALTH ASSESSMENT NOTE - SUICIDE PROTECTIVE FACTORS
Supportive social network of family or friends Identifies reasons for living/Supportive social network of family or friends

## 2021-04-01 NOTE — BEHAVIORAL HEALTH ASSESSMENT NOTE - SUMMARY
89 y.o. M, , 2 adult children, domiciled alone, has HHA, PPH of anxiety, bipolar disorder, PMH of COPD, monoclonal gammopathy, anemia with hx of transfusions, afib on eliquis, DM (no longer on meds) is admitted to [facility] for hypoxia on 03/26/21. Psychiatry consulted for agitation and pt refusing to make medications.     On evaluation, patient has moments of disorientation and confusion, tries to get out of bed occasionally. States he is feeling "unwell" when asked for the reason for his hospital admission but will not elaborate. He is able to identify himself and that he is in a "hospital" but unsure of additional details. 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 Reports he is uncomfortable but will not elaborate why. Not responding when asked why he is refusing medications. Patient is an unreliable historian. 89 y.o. M, , 2 adult children, domiciled alone, has HHA, PPH of anxiety, bipolar disorder, PMH of COPD, monoclonal gammopathy, anemia with hx of transfusions, afib on eliquis, DM (no longer on meds) is admitted to [facility] for hypoxia on 03/26/21. Psychiatry consulted for agitation and pt refusing to make medications.     On evaluation, patient has moments of disorientation and confusion, tries to get out of bed occasionally. States he is feeling "unwell" when asked for the reason for his hospital admission but will not elaborate. He is able to identify himself and that he is in a "hospital" but unsure of additional details. 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 Reports he is uncomfortable but will not elaborate why. Not responding when asked why he is refusing medications. Patient is an unreliable historian.pt received haldol prns earlier for agitation.

## 2021-04-01 NOTE — PROGRESS NOTE ADULT - SUBJECTIVE AND OBJECTIVE BOX
Patient is a 89y old  Male who presents with a chief complaint of Hypoxemia (28 Mar 2021 13:36)      SUBJECTIVE / OVERNIGHT EVENTS: c/o R flank discomfort.  Review of Systems  chest pain no  palpitations no  sob no  nausea no  headache no    MEDICATIONS  (STANDING):  albuterol/ipratropium for Nebulization 3 milliLiter(s) Nebulizer every 6 hours  buDESOnide    Inhalation Suspension 0.5 milliGRAM(s) Inhalation every 12 hours  dextrose 40% Gel 15 Gram(s) Oral once  dextrose 5%. 1000 milliLiter(s) (50 mL/Hr) IV Continuous <Continuous>  dextrose 5%. 1000 milliLiter(s) (100 mL/Hr) IV Continuous <Continuous>  dextrose 50% Injectable 25 Gram(s) IV Push once  dextrose 50% Injectable 12.5 Gram(s) IV Push once  dextrose 50% Injectable 25 Gram(s) IV Push once  diltiazem    milliGRAM(s) Oral daily  fluticasone propionate 50 MICROgram(s)/spray Nasal Spray 2 Spray(s) Both Nostrils two times a day  glucagon  Injectable 1 milliGRAM(s) IntraMuscular once  insulin lispro (ADMELOG) corrective regimen sliding scale   SubCutaneous three times a day before meals  insulin lispro (ADMELOG) corrective regimen sliding scale   SubCutaneous at bedtime  lamoTRIgine 25 milliGRAM(s) Oral at bedtime  methimazole 2.5 milliGRAM(s) Oral daily  metoprolol tartrate Injectable 5 milliGRAM(s) IV Push every 6 hours  midodrine. 5 milliGRAM(s) Oral three times a day  multivitamin 1 Tablet(s) Oral daily  pantoprazole    Tablet 40 milliGRAM(s) Oral two times a day  sodium chloride 0.9%. 1000 milliLiter(s) (75 mL/Hr) IV Continuous <Continuous>    MEDICATIONS  (PRN):  acetaminophen   Tablet .. 650 milliGRAM(s) Oral every 6 hours PRN Temp greater or equal to 38.5C (101.3F), Mild Pain (1 - 3)  haloperidol    Injectable 0.5 milliGRAM(s) IV Push every 6 hours PRN agitation  polyethylene glycol 3350 17 Gram(s) Oral daily PRN for constipation      Vital Signs Last 24 Hrs  T(C): 36.3 (01 Apr 2021 11:19), Max: 36.8 (31 Mar 2021 23:12)  T(F): 97.4 (01 Apr 2021 11:19), Max: 98.3 (31 Mar 2021 23:12)  HR: 112 (01 Apr 2021 11:19) (112 - 142)  BP: 98/60 (01 Apr 2021 11:19) (86/49 - 123/65)  BP(mean): --  RR: 18 (01 Apr 2021 11:19) (16 - 19)  SpO2: 96% (01 Apr 2021 11:19) (96% - 100%)    PHYSICAL EXAM:  GENERAL: NAD, well-developed  HEAD:  Atraumatic, Normocephalic  EYES: EOMI, PERRLA, conjunctiva and sclera clear  NECK: Supple, No JVD  CHEST/LUNG: Clear to auscultation bilaterally; No wheeze  HEART: Regular rate and rhythm; No murmurs, rubs, or gallops  ABDOMEN: Soft, Nontender, Nondistended; Bowel sounds present  EXTREMITIES:  2+ Peripheral Pulses, No clubbing, cyanosis, or edema  PSYCH: AAOx3  NEUROLOGY: non-focal  SKIN: No rashes or lesions    LABS:                        8.3    11.47 )-----------( 464      ( 01 Apr 2021 05:49 )             28.7     04-01    138  |  103  |  50<H>  ----------------------------<  198<H>  4.7   |  25  |  2.53<H>    Ca    9.0      01 Apr 2021 05:49                  RADIOLOGY & ADDITIONAL TESTS:    Imaging Personally Reviewed:    Consultant(s) Notes Reviewed:      Care Discussed with Consultants/Other Providers:

## 2021-04-01 NOTE — BEHAVIORAL HEALTH ASSESSMENT NOTE - NSBHCHARTREVIEWIMAGING_PSY_A_CORE FT
EXAM:  CT CHEST                        PROCEDURE DATE:  03/29/2021    IMPRESSION: Small to moderate size loculated left pleural effusion.    EXAM:  DUPLEX SCAN EXT VEINS LOWER BI                        PROCEDURE DATE:  03/29/2021    IMPRESSION:  No evidence of deep venous thrombosis in either lower extremity.    EXAM:  CT BRAIN                        PROCEDURE DATE:  03/26/2021    IMPRESSION:  There is no acute intracranial hemorrhage, mass effect, midline shift or extra axial collections. Age-appropriate involutional and ischemic gliotic changes without change from 2/17/2021.

## 2021-04-01 NOTE — PROGRESS NOTE ADULT - ASSESSMENT
89yo M pmhx COPD, bipolar, monoclonal gammopathy, anemia with history transfusions and iron infusions, afib on eliquis, DM (no longer on meds) presents with hypoxia. Pt received venofer in my office in 10/2020. He also has an IgM kappa monoclonal gammopathy, being monitored. Pt limited historian, hx obtained from records and daughter by phone.     Anemia -- has ACD, had been with VICTORIA, and also with monoclonal gammopathy. 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 Also with likely GIB, + FOBT  -- hgb improved with transfusion  -- hgb adequate   -- transfuse for hgb <7  -- ferritin 156, s/p Venofer in 10/2020, would give venofer 200mg daily x 2 at this time  -- was to start aranesp as outpt, hold for now in the acute setting, can revisit after pt d/c. He was to start in the office but had been postponed repeatedly after pt had multiple falls    monoclonal gammopathy -- in 10/2020, IgM 1852, free kappa 1126, M spike 1.4. Was being monitored clinically since GOC was more supportive given his overall condition and comorbidities  -- repeat SPEP, SABINO, Ig, FLC at this time  -- monitor for now    GIB -- GI eval appreciated    hypoxia, PNA -- cards and pulm eval appreciated, aspiration PNA  -- abx per pulm    AMS -- multifactorial, monitor for now    Shane Neff PA-C  Hematology/Oncology  [facility]   [phone] (cell)  [phone] (office)  Evenings and weekends please call MD on call or office

## 2021-04-01 NOTE — BEHAVIORAL HEALTH ASSESSMENT NOTE - NSBHCONSULTMEDS_PSY_A_CORE FT
Haldol 0.5mg PO/IV bid prn standing  Melatonin 3mg PO at bedtime standing   Continue Lamictal as indicated Haldol 0.5mg PO/IV bid for agitation, cont haldol 0.5mg po/iv q6h prn severe agitation.   Melatonin 3mg PO at bedtime standing   Continue Lamictal as indicated, hold off remeron given recent confusion

## 2021-04-01 NOTE — BEHAVIORAL HEALTH ASSESSMENT NOTE - NSBHCHARTREVIEWINVESTIGATE_PSY_A_CORE FT
< from: 12 Lead ECG (03.30.21 @ 01:12) >  Ventricular Rate 100 BPM  Atrial Rate 271 BPM  QRS Duration 84 ms  Q-T Interval 338 ms  QTC Calculation(Bazett) 436 ms  R Axis 4 degrees  T Axis 18 degrees  Diagnosis Line ATRIAL FLUTTER WITH VARIABLE A-V BLOCK  SEPTAL INFARCT (CITED ON OR BEFORE 30-MAR-2021)  ABNORMAL ECG  WHEN COMPARED WITH ECG OF 30-MAR-2021 01:12, (UNCONFIRMED)  SIGNIFICANT CHANGES HAVE OCCURRED  < end of copied text >

## 2021-04-01 NOTE — PROGRESS NOTE ADULT - ASSESSMENT
89 m with    GI bleed  - follow Hct  - s/p transfusion support 1 PRBC  - hold AC   - gastroenterology follow  - EGD if bleeding persist. Refusing now.  - PPI    Confusion improving   - CT head with no acute event  - Psych evaluation    Pneumonia possible  - finish antibiotics  - Pulmonary follow  - nebs    Atrial fibrillation   - rate control  - AC on hold  - cardiology follow     Anemia  - Tx support  - Venofer  - Hematology evaluation noted    Monoclonal gammopathy  - SPEP  - Monitor  - Hematology follow    HARJEET  - US kidney with no obstruction  - Nephrology evaluation Dr Amador     COPD (chronic obstructive pulmonary disease)   - nebs    Diabetes type 2, controlled   - ADA diet   - BS control    Goiter   - continue Rx  - TSH    Malnutrition protein caloric  - Nutrition eval    PT    DVT prophylaxis  - PAS    Osito Devlin MD pager [phone]

## 2021-04-01 NOTE — PROGRESS NOTE ADULT - SUBJECTIVE AND OBJECTIVE BOX
Patient is a 89y old  Male who presents with a chief complaint of Hypoxemia (28 Mar 2021 13:36)    Patient seen this morning. Sleeping now but had been agitated earlier.     MEDICATIONS  (STANDING):  albuterol/ipratropium for Nebulization 3 milliLiter(s) Nebulizer every 6 hours  buDESOnide    Inhalation Suspension 0.5 milliGRAM(s) Inhalation every 12 hours  dextrose 40% Gel 15 Gram(s) Oral once  dextrose 5%. 1000 milliLiter(s) (50 mL/Hr) IV Continuous <Continuous>  dextrose 5%. 1000 milliLiter(s) (100 mL/Hr) IV Continuous <Continuous>  dextrose 50% Injectable 25 Gram(s) IV Push once  dextrose 50% Injectable 12.5 Gram(s) IV Push once  dextrose 50% Injectable 25 Gram(s) IV Push once  diltiazem    milliGRAM(s) Oral daily  doxycycline hyclate Capsule 100 milliGRAM(s) Oral every 12 hours  fluticasone propionate 50 MICROgram(s)/spray Nasal Spray 2 Spray(s) Both Nostrils two times a day  glucagon  Injectable 1 milliGRAM(s) IntraMuscular once  insulin lispro (ADMELOG) corrective regimen sliding scale   SubCutaneous three times a day before meals  insulin lispro (ADMELOG) corrective regimen sliding scale   SubCutaneous at bedtime  lamoTRIgine 25 milliGRAM(s) Oral at bedtime  methimazole 2.5 milliGRAM(s) Oral daily  metoprolol tartrate Injectable 5 milliGRAM(s) IV Push every 6 hours  midodrine. 5 milliGRAM(s) Oral three times a day  multivitamin 1 Tablet(s) Oral daily  pantoprazole    Tablet 40 milliGRAM(s) Oral two times a day  sodium chloride 0.9%. 1000 milliLiter(s) (75 mL/Hr) IV Continuous <Continuous>    MEDICATIONS  (PRN):  acetaminophen   Tablet .. 650 milliGRAM(s) Oral every 6 hours PRN Temp greater or equal to 38.5C (101.3F), Mild Pain (1 - 3)  haloperidol    Injectable 0.5 milliGRAM(s) IV Push every 6 hours PRN agitation  polyethylene glycol 3350 17 Gram(s) Oral daily PRN for constipation        Vital Signs Last 24 Hrs  T(C): 36.3 (01 Apr 2021 11:19), Max: 36.8 (31 Mar 2021 23:12)  T(F): 97.4 (01 Apr 2021 11:19), Max: 98.3 (31 Mar 2021 23:12)  HR: 112 (01 Apr 2021 11:19) (112 - 142)  BP: 98/60 (01 Apr 2021 11:19) (86/49 - 123/65)  BP(mean): --  RR: 18 (01 Apr 2021 11:19) (16 - 19)  SpO2: 96% (01 Apr 2021 11:19) (96% - 100%)    PE  NAD  Sleeping  Anicteric  No rash grossly                            8.3    11.47 )-----------( 464      ( 01 Apr 2021 05:49 )             28.7       04-01    138  |  103  |  50<H>  ----------------------------<  198<H>  4.7   |  25  |  2.53<H>    Ca    9.0      01 Apr 2021 05:49

## 2021-04-01 NOTE — BEHAVIORAL HEALTH ASSESSMENT NOTE - HPI (INCLUDE ILLNESS QUALITY, SEVERITY, DURATION, TIMING, CONTEXT, MODIFYING FACTORS, ASSOCIATED SIGNS AND SYMPTOMS)
89 y.o. M, , 2 adult children, domiciled alone, has HHA, PPH of anxiety, bipolar disorder, PMH of COPD, monoclonal gammopathy, anemia with hx of transfusions, afib on eliquis, DM (no longer on meds) is admitted to [facility] for hypoxia on 21. Psychiatry consulted for agitation and pt refusing to make medications.     On evaluation, patient has moments of disorientation and confusion, tries to get out of bed occasionally. States he is feeling "unwell" when asked for the reason for his hospital admission but will not elaborate. He is able to identify himself and that he is in a "hospital" but unsure of additional details. 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 Reports he is uncomfortable but will not elaborate why. Not responding when asked why he is refusing medications. Patient is an unreliable historian.     Per 1:1, patient has been agitated, trying to get out of bed, kicking and punching staff until he was given haldol 0.5mg PO around 8am today. States he goes in and out of the state of confusion.     Collateral obtained from daughter Abner with patient's permission. Daughter reports patient has been having hallucinations and getting confused since last Friday. States patient has been seeing animals/people who speak to him but is unable to provide details about their conversation. States since being admitted, patient had tried to eat his phone and paper, thinks 2 hospital staff is trying to "cut" him into pieces and thought he  3 times yesterday. Daughter reports patient had previous episodes of hallucinations and brief states of confusion in the past including last year and after his hip surgery 4 years ago but it was never this severe. Reports that his noncompliance with medications, and eating objects and delusions regarding hospital staff trying to harm him is something new. States patient is currently on lamictal 25 mg and remeron 15 mg both of which he has been taking for many years. [facility] he needs his prescriptions refilled. States he has always been compliant with his medications. Reports she usually sees patient once a week when she buys groceries for him and sometimes takes him out. States he has a HHA who is at his house for 10hrs/day 7 times a week. Daughter is currently trying to get HHA for 24 hrs/day. Denies hx of depression, hx of SA/SIIP, HIIP, hx of violence, FH of psychiatric illnesses. Denies access to guns or weapons. 89 y.o. M, , 2 adult children, domiciled alone, has HHA, PPH of anxiety, bipolar disorder, in tx with a psychiatrist unknown, no prior psych admissions, PMH of COPD, monoclonal gammopathy, anemia with hx of transfusions, afib on eliquis, DM (no longer on meds) is admitted to [facility] for hypoxia on 21. Psychiatry consulted for agitation and pt refusing take medications.     On evaluation, patient has moments of disorientation and confusion, tries to get out of bed occasionally. States he is feeling "unwell" when asked for the reason for his hospital admission but will not elaborate. He is able to identify himself and that he is in a "hospital" but unsure of additional details. 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 Reports he is uncomfortable but will not elaborate why. Not responding when asked why he is refusing medications. Patient is an unreliable historian.     Per 1:1, patient has been agitated, trying to get out of bed, kicking and punching staff until he was given haldol 0.5mg PO around 8am today. States he goes in and out of the state of confusion.     Collateral obtained from daughter Abner with patient's permission. Daughter reports patient has been having hallucinations and getting confused since last Friday. States patient has been seeing animals/people who speak to him but is unable to provide details about their conversation. States since being admitted, patient had tried to eat his phone and paper, thinks 2 hospital staff is trying to "cut" him into pieces and thought he  3 times yesterday. Daughter reports patient had previous episodes of hallucinations and brief states of confusion in the past including last year and after his hip surgery 4 years ago but it was never this severe. Reports that his noncompliance with medications, and eating objects and delusions regarding hospital staff trying to harm him is something new. States patient is currently on lamictal 25 mg and remeron 15 mg both of which he has been taking for many years. States patient sees a psychiatrist in the [facility] via telepsych when he needs his prescriptions refilled. States he has always been compliant with his medications. Reports she usually sees patient once a week when she buys groceries for him and sometimes takes him out. States he has a HHA who is at his house for 10hrs/day 7 times a week. Daughter is currently trying to get HHA for 24 hrs/day. Denies hx of depression, hx of SA/SIIP, HIIP, hx of violence, FH of psychiatric illnesses. Denies access to guns or weapons.

## 2021-04-02 NOTE — PROGRESS NOTE ADULT - SUBJECTIVE AND OBJECTIVE BOX
Patient is a 89y old  Male who presents with a chief complaint of Hypoxemia (28 Mar 2021 13:36)      SUBJECTIVE / OVERNIGHT EVENTS: feels better. Daughter at bedside.  Review of Systems  chest pain no  palpitations no  sob improving   nausea no  headache no    MEDICATIONS  (STANDING):  albuterol/ipratropium for Nebulization 3 milliLiter(s) Nebulizer every 6 hours  buDESOnide    Inhalation Suspension 0.5 milliGRAM(s) Inhalation every 12 hours  dextrose 40% Gel 15 Gram(s) Oral once  dextrose 5%. 1000 milliLiter(s) (50 mL/Hr) IV Continuous <Continuous>  dextrose 5%. 1000 milliLiter(s) (100 mL/Hr) IV Continuous <Continuous>  dextrose 50% Injectable 25 Gram(s) IV Push once  dextrose 50% Injectable 12.5 Gram(s) IV Push once  dextrose 50% Injectable 25 Gram(s) IV Push once  diltiazem    milliGRAM(s) Oral daily  fluticasone propionate 50 MICROgram(s)/spray Nasal Spray 2 Spray(s) Both Nostrils two times a day  glucagon  Injectable 1 milliGRAM(s) IntraMuscular once  insulin lispro (ADMELOG) corrective regimen sliding scale   SubCutaneous three times a day before meals  insulin lispro (ADMELOG) corrective regimen sliding scale   SubCutaneous at bedtime  lamoTRIgine 25 milliGRAM(s) Oral at bedtime  melatonin 3 milliGRAM(s) Oral at bedtime  methimazole 2.5 milliGRAM(s) Oral daily  metoprolol tartrate Injectable 5 milliGRAM(s) IV Push every 6 hours  midodrine. 5 milliGRAM(s) Oral three times a day  multivitamin 1 Tablet(s) Oral daily  pantoprazole    Tablet 40 milliGRAM(s) Oral two times a day  sodium chloride 0.9%. 1000 milliLiter(s) (75 mL/Hr) IV Continuous <Continuous>    MEDICATIONS  (PRN):  acetaminophen   Tablet .. 650 milliGRAM(s) Oral every 6 hours PRN Temp greater or equal to 38.5C (101.3F), Mild Pain (1 - 3)  haloperidol    Injectable 0.5 milliGRAM(s) IV Push every 6 hours PRN agitation  polyethylene glycol 3350 17 Gram(s) Oral daily PRN for constipation      Vital Signs Last 24 Hrs  T(C): 36.8 (2021 12:09), Max: 36.8 (2021 12:09)  T(F): 98.2 (2021 12:09), Max: 98.2 (2021 12:09)  HR: 102 (2021 18:12) (81 - 103)  BP: 149/56 (2021 18:12) (105/57 - 149/56)  BP(mean): --  RR: 18 (2021 12:09) (17 - 18)  SpO2: 98% (2021 12:09) (94% - 98%)    PHYSICAL EXAM:  GENERAL: NAD, well-developed  HEAD:  Atraumatic, Normocephalic  EYES: EOMI, PERRLA, conjunctiva and sclera clear  NECK: Supple, No JVD  CHEST/LUNG: few crackles to auscultation bilaterally; No wheeze  HEART: Regular rate and rhythm; No murmurs, rubs, or gallops  ABDOMEN: Soft, Nontender, Nondistended; Bowel sounds present  EXTREMITIES:  2+ Peripheral Pulses, No clubbing, cyanosis, or edema   PSYCH: confused  NEUROLOGY: non-focal  SKIN: No rashes or lesions    LABS:                        7.9    10.56 )-----------( 393      ( 2021 12:53 )             27.3     04-02    140  |  106  |  55<H>  ----------------------------<  197<H>  4.4   |  24  |  2.29<H>    Ca    8.8      2021 05:20            Urinalysis Basic - ( 2021 05:22 )    Color: Yellow / Appearance: Slightly Turbid / S.017 / pH: x  Gluc: x / Ketone: Negative  / Bili: Negative / Urobili: Negative   Blood: x / Protein: 30 mg/dL / Nitrite: Negative   Leuk Esterase: Negative / RBC: 1 /hpf / WBC 5 /HPF   Sq Epi: x / Non Sq Epi: 2 /hpf / Bacteria: Negative          RADIOLOGY & ADDITIONAL TESTS:    Imaging Personally Reviewed:    Consultant(s) Notes Reviewed:      Care Discussed with Consultants/Other Providers:

## 2021-04-02 NOTE — CONSULT NOTE ADULT - ASSESSMENT
89yo M pmhx COPD, bipolar, monoclonal gammopathy, anemia with history transfusions and iron infusions, afib on eliquis, DM (no longer on meds) admitted with hypoxia.     HARJEET on CKD  Baseline Scr 1.6-1.7  h/o multiple HARJEET in the past  HARJEET likely sec to hemodynamic changes; likely from hypotension/ tachycardia  FeNa is consistent with pre-renal etiology  SCr improving today   consider additional 250cc NS IVF  Monitor I/O  US has no hydro  UA bland. Will check spot urine TP/Cr given known monoclonal gammopathy   MOnitor renal function    Anemia  Hb low  no evidence of iron def

## 2021-04-02 NOTE — PROGRESS NOTE ADULT - SUBJECTIVE AND OBJECTIVE BOX
[facility] - [facility] - PROGRESS NOTE    CHIEF COMPLAINT: dyspnea; hypoxemia; COPD exacerbation; pneumonia; pleural effusion; anemia    INTERVAL HISTORY:  agitated and combative earlier now sitting calmly in bed being assisted with breakfast - awake and alert; ongoing atrial fibrillation with RVR (had been in NSR for a time yesterday); no shortness of breath on a nasal canula; no cough, sputum production, chest congestion or wheeze; no fevers, chills or sweats; no chest pain/pressure or palpitations; very weak and unable to walk; worsening renal function      REVIEW OF SYSTEMS:  Constitutional: As per interval history  HEENT: Within normal limits  CV: As per interval history  Resp: As per interval history  GI: Within normal limits   : Within normal limits  Musculoskeletal: Within normal limits  Skin: Within normal limits  Neurological: dementia  Psychiatric: Within normal limits  Endocrine: Within normal limits  Hematologic/Lymphatic: Within normal limits  Allergic/Immunologic: Within normal limits    MEDICATIONS:     Pulmonary "  albuterol/ipratropium for Nebulization 3 milliLiter(s) Nebulizer every 6 hours  buDESOnide    Inhalation Suspension 0.5 milliGRAM(s) Inhalation every 12 hours    Anti-microbials:    Cardiovascular:  diltiazem    milliGRAM(s) Oral daily  metoprolol tartrate Injectable 5 milliGRAM(s) IV Push every 6 hours  midodrine. 5 milliGRAM(s) Oral three times a day    Other:  dextrose 40% Gel 15 Gram(s) Oral once  dextrose 5%. 1000 milliLiter(s) IV Continuous <Continuous>  dextrose 5%. 1000 milliLiter(s) IV Continuous <Continuous>  dextrose 50% Injectable 25 Gram(s) IV Push once  dextrose 50% Injectable 12.5 Gram(s) IV Push once  dextrose 50% Injectable 25 Gram(s) IV Push once  fluticasone propionate 50 MICROgram(s)/spray Nasal Spray 2 Spray(s) Both Nostrils two times a day  glucagon  Injectable 1 milliGRAM(s) IntraMuscular once  insulin lispro (ADMELOG) corrective regimen sliding scale   SubCutaneous three times a day before meals  insulin lispro (ADMELOG) corrective regimen sliding scale   SubCutaneous at bedtime  lamoTRIgine 25 milliGRAM(s) Oral at bedtime  melatonin 3 milliGRAM(s) Oral at bedtime  methimazole 2.5 milliGRAM(s) Oral daily  multivitamin 1 Tablet(s) Oral daily  pantoprazole    Tablet 40 milliGRAM(s) Oral two times a day  sodium chloride 0.9%. 1000 milliLiter(s) IV Continuous <Continuous>    MEDICATIONS  (PRN):  acetaminophen   Tablet .. 650 milliGRAM(s) Oral every 6 hours PRN Temp greater or equal to 38.5C (101.3F), Mild Pain (1 - 3)  haloperidol    Injectable 0.5 milliGRAM(s) IV Push every 6 hours PRN agitation  polyethylene glycol 3350 17 Gram(s) Oral daily PRN for constipation        OBJECTIVE:    I&O's Detail    2021 07:01  -  2021 07:00  --------------------------------------------------------  IN:    Oral Fluid: 360 mL  Total IN: 360 mL    OUT:    Voided (mL): 225 mL  Total OUT: 225 mL    Total NET: 135 mL    POCT Blood Glucose.: 205 mg/dL (2021 08:01)  POCT Blood Glucose.: 144 mg/dL (2021 21:17)  POCT Blood Glucose.: 178 mg/dL (2021 16:56)  POCT Blood Glucose.: 208 mg/dL (2021 11:55)      PHYSICAL EXAM:       ICU Vital Signs Last 24 Hrs  T(C): 36.4 (2021 04:30), Max: 36.4 (2021 04:30)  T(F): 97.5 (2021 04:30), Max: 97.5 (2021 04:30)  HR: 85 (2021 04:30) (85 - 112)  BP: 117/56 (2021 04:30) (98/60 - 117/56)  BP(mean): --  ABP: --  ABP(mean): --  RR: 18 (2021 04:30) (18 - 18)  SpO2: 94% (2021 04:30) (94% - 96%) on 2lpm nasal canula     General: Awake. Alert. Poor memory. Cooperative. No distress. Appears stated age.   HEENT: Atraumatic. Bitemporal wasting. Anicteric. Normal oral mucosa. PERRL. EOMI.  Neck: Supple. Trachea midline. Thyroid without enlargement/tenderness/nodules. No carotid bruit. No JVD. Loss of bilateral supraclavicular fat pads	  Cardiovascular: Tachycardic. Irregularly irregular rate and rhythm. S1 S2 normal. No murmurs, rubs or gallops.  Respiratory: Respirations unlabored. Decreased breath sounds left hemithorax ~ 1/3 up with dullness to percussion. Kyphosis.  Abdomen: Soft. Non-tender. Non-distended. No organomegaly. No masses. Normal bowel sounds.  Extremities: Warm to touch. No clubbing or cyanosis. No pedal edema.  Pulses: 2+ peripheral pulses all extremities.	  Skin: Normal skin color. No rashes or lesions. No ecchymoses. No cyanosis. Warm to touch.  Lymph Nodes: Cervical, supraclavicular and axillary nodes normal  Neurological: Moving all extremities. A and O x 2  Psychiatry: Calm    LABS:                          7.7    10.01 )-----------( 389      ( 2021 05:20 )             26.2     CBC    WBC  10.01 <==, 11.47 <==, 9.95 <==, 9.22 <==, 9.49 <==, 7.06 <==, 12.30 <==    Hemoglobin  7.7 <<==, 8.3 <<==, 9.4 <<==, 7.9 <<==, 6.1 <<==, 7.8 <<==, 7.4 <<==    Hematocrit  26.2 <==, 28.7 <==, 31.6 <==, 26.7 <==, 21.4 <==, 28.0 <==, 26.2 <==    Platelets  389 <==, 464 <==, 375 <==, 448 <==, 477 <==, 525 <==, 471 <==      140  |  106  |  55<H>  ----------------------------<  197<H>      4.4   |  24  |  2.29<H>      LYTES    sodium  140 <==, 138 <==, 138 <==, 143 <==, 143 <==, 139 <==, 144 <==    potassium   4.4 <==, 4.7 <==, 4.7 <==, 4.6 <==, 5.0 <==, 5.3 <==, 6.0 <==    chloride  106 <==, 103 <==, 99 <==, 106 <==, 105 <==, 104 <==, 103 <==    carbon dioxide  24 <==, 25 <==, 24 <==, 30 <==, 28 <==, 27 <==, 29 <==    =============================================================================================  RENAL FUNCTION:    Creatinine:   2.29  <<==, 2.53  <<==, 1.66  <<==, 1.63  <<==, 1.70  <<==, 1.78  <<==, 1.93  <<==    BUN:   55 <==, 50 <==, 38 <==, 39 <==, 41 <==, 44 <==, 40 <==    ============================================================================================    calcium   8.8 <==, 9.0 <==, 9.1 <==, 8.5 <==, 8.6 <==, 8.5 <==, 9.0 <==    phos   3.0 <==    mag   2.2 <==    ============================================================================================  LFTs    AST:   8 <==     ALT:  <5  <==     AP:  87  <=    Bili:  0.2  <=    Procalcitonin, Serum: 0.18 ng/mL ( @ 21:43)    MICROBIOLOGY:     COVID-19 PCR . (21 @ 20:44)   COVID-19 PCR: NotDetec:    Urinalysis Basic - ( 2021 05:22 )    Color: Yellow / Appearance: Slightly Turbid / S.017 / pH: x  Gluc: x / Ketone: Negative  / Bili: Negative / Urobili: Negative   Blood: x / Protein: 30 mg/dL / Nitrite: Negative   Leuk Esterase: Negative / RBC: 1 /hpf / WBC 5 /HPF   Sq Epi: x / Non Sq Epi: 2 /hpf / Bacteria: Negative    RADIOLOGY:  [x] Chest radiographs reviewed and interpreted by me    < from: CT Chest No Cont (21 @ 19:21) >    EXAM:  CT CHEST                          PROCEDURE DATE:  2021      INTERPRETATION:  Clinical information: Dyspnea. Exam is compared to previous study of 2021.    CT scan of the chest was obtained without intravenous contrast.    Low-attenuation lesions and calcifications are noted within both lobes of the thyroid gland.    Few small lymph nodes are present in the anterior mediastinum, pretracheal space and the AP window.    Heart is normal in size. Calcification of the aortic valve and the coronary arteries is noted. Small pericardial effusion is noted.    No endobronchial lesions are noted. Emphysematous changes are present in both lungs. Tree-in-bud opacities representing mucoid impacted distal small airways are noted within the right lung. Compressive atelectasis is noted involving portions of the lingula and left lower lobe. This is secondary to small to moderate size loculated left pleural effusion. Small loculated right pleural effusion is also noted. Marked thickening of the right pleura is noted.    Below the diaphragm, visualized portions of the abdomen demonstrate ascites. Low-attenuation lesions in the right kidney are too small to be adequately characterized on this exam.    Degenerative changes of the spine are noted.    IMPRESSION: Small to moderate size loculated left pleural effusion.    LEONORA WALLER MD; Attending Radiologist  This document has been electronically signed. Mar 30 2021 10:32AM    < end of copied text >  ---------------------------------------------------------------------------------------------------------------  < from: Xray Chest 1 View- PORTABLE-Urgent (Xray Chest 1 View- PORTABLE-Urgent .) (21 @ 14:18) >    EXAM:  XR CHEST PORTABLE URGENT 1V                          PROCEDURE DATE:  2021      INTERPRETATION:  Indication: Hypoxia.    TECHNIQUE: Single portable view of the chest.    COMPARISON: 2021    FINDINGS /  IMPRESSION: The cardiac silhouette is normal in size. There are small bilateral pleural effusions with associated atelectasis. There is mild pulmonary edema. An underlying viral pneumonia cannot be excluded.      JJ PEACOCK MD; Attending Radiologist  This document has been electronically signed. Mar 26 2021  2:29PM    < end of copied text >  ---------------------------------------------------------------------------------------------------------------  < from: [facility] Duplex Lower Ext Vein Scan, Bilat (21 @ 09:59) >    EXAM:  DUPLEX SCAN EXT VEINS LOWER BI                            PROCEDURE DATE:  2021      INTERPRETATION:  CLINICAL INFORMATION: Lower extremity swelling and pain, rule out DVT.    COMPARISON: None available.    TECHNIQUE: Duplex sonography of the BILATERAL LOWER extremity veins with color and spectral Doppler, with and without compression.    FINDINGS:    RIGHT:  Normal compressibility of the RIGHT common femoral, femoral and popliteal veins.  Doppler examination shows normal spontaneous and phasic flow.  No RIGHT calf vein thrombosis is detected.    LEFT:  Normal compressibility of the LEFT common femoral, femoral and popliteal veins.  Doppler examination shows normal spontaneous and phasic flow.  No LEFT calf vein thrombosis is detected.    IMPRESSION:  No evidence of deep venous thrombosis in either lower extremity.    ROSARIO SHARMA M.D., ATTENDING RADIOLOGIST  This document has been electronically signed. Mar 29 2021 10:12AM    < end of copied text >  ---------------------------------------------------------------------------------------------------------------  < from: CT Head No Cont (21 @ 19:47) >    EXAM:  CT BRAIN                          PROCEDURE DATE:  2021      INTERPRETATION:  PROCEDURE: CT head without contrast.    INDICATION: Confusion    TECHNIQUE: Multiple axial sections were obtained at 5 mm intervals. The images were reviewed in brain and bone windows. Imaging is performed using helical low-dose technique, and sagittal and coronal reformations are provided.    COMPARISON: Head CT 2021    FINDINGS:  The CT examination demonstrates extensive generalized volume loss as manifested by the enlargement of the ventricles, cisternal spaces, and cortical sulci throughout.    There is no acute intracranial hemorrhage, mass effect, midline shift or extra axial collections.    There is moderate periventricular white matter lucency, likely the sequela of small vessel ischemic disease.    Ossifications of the bilateral basal ganglia is again delineated.    The bony windows demonstrates no fractures.    The included paranasal sinuses and mastoid air cells are predominantly clear.    IMPRESSION:  There is no acute intracranial hemorrhage, mass effect, midline shift or extra axial collections. Age-appropriate involutional and ischemic gliotic changes without change from 2021.    MARIELOS MOREL MD; Resident Radiology  This document has been electronically signed.  BHAVANA CALDERA MD, ATTENDING RADIOLOGIST  This document has been electronically signed. Mar 27 2021  9:16AM    < end of copied text >  --------------------------------------------------------------------

## 2021-04-02 NOTE — PROGRESS NOTE ADULT - SUBJECTIVE AND OBJECTIVE BOX
INTERVAL HPI/OVERNIGHT EVENTS:    events noted  remains confused      Allergies    Tylenol with Codeine #3 (Other)    Intolerances          General:  No wt loss, fevers, chills, night sweats, fatigue,   Eyes:  Good vision, no reported pain  ENT:  No sore throat, pain, runny nose, dysphagia  CV:  No pain, palpitations, hypo/hypertension  Resp:  No dyspnea, cough, tachypnea, wheezing  GI:  No pain, No nausea, No vomiting, No diarrhea, No constipation, No weight loss, No fever, No pruritis, No rectal bleeding, No tarry stools, No dysphagia,  :  No pain, bleeding, incontinence, nocturia  Muscle:  No pain, weakness  Neuro:  No weakness, tingling, memory problems  Psych:  No fatigue, insomnia, mood problems, depression  Endocrine:  No polyuria, polydipsia, cold/heat intolerance  Heme:  No petechiae, ecchymosis, easy bruisability  Skin:  No rash, tattoos, scars, edema      PHYSICAL EXAM:   Vital Signs:  Vital Signs Last 24 Hrs  T(C): 36.5 (28 Mar 2021 17:50), Max: 36.8 (27 Mar 2021 20:45)  T(F): 97.7 (28 Mar 2021 17:50), Max: 98.3 (27 Mar 2021 20:45)  HR: 91 (28 Mar 2021 17:50) (69 - 91)  BP: 139/48 (28 Mar 2021 17:50) (90/43 - 139/48)  BP(mean): --  RR: 18 (28 Mar 2021 10:06) (17 - 18)  SpO2: 98% (28 Mar 2021 17:50) (95% - 98%)  Daily     Daily I&O's Summary    27 Mar 2021 07:01  -  28 Mar 2021 07:00  --------------------------------------------------------  IN: 480 mL / OUT: 1500 mL / NET: -1020 mL    28 Mar 2021 07:01  -  28 Mar 2021 18:39  --------------------------------------------------------  IN: 375 mL / OUT: 0 mL / NET: 375 mL        GENERAL:  Appears stated age, well-groomed, well-nourished, no distress  HEENT:  NC/AT,  conjunctivae clear and pink, no thyromegaly, nodules, adenopathy, no JVD, sclera -anicteric  CHEST:  Full & symmetric excursion, no increased effort, breath sounds clear  HEART:  Regular rhythm, S1, S2, no murmur/rub/S3/S4, no abdominal bruit, no edema  ABDOMEN:  Soft, non-tender, non-distended, normoactive bowel sounds,  no masses ,no hepato-splenomegaly, no signs of chronic liver disease  EXTEREMITIES:  no cyanosis,clubbing or edema  SKIN:  No rash/erythema/ecchymoses/petechiae/wounds/abscess/warm/dry  NEURO:  Alert, oriented, no asterixis, no tremor, no encephalopathy      LABS:                        6.1    9.49  )-----------( 477      ( 28 Mar 2021 07:14 )             21.4     03-28    143  |  105  |  41<H>  ----------------------------<  172<H>  5.0   |  28  |  1.70<H>    Ca    8.6      28 Mar 2021 07:14          amylase   lipase  RADIOLOGY & ADDITIONAL TESTS:

## 2021-04-02 NOTE — PROGRESS NOTE ADULT - ASSESSMENT
89yo M pmhx COPD, bipolar, monoclonal gammopathy, anemia with history transfusions and iron infusions, afib on eliquis, DM (no longer on meds) presents with hypoxia. Pt received venofer in my office in 10/2020. He also has an IgM kappa monoclonal gammopathy, being monitored. Pt limited historian, hx obtained from records and daughter by phone.     Anemia -- has ACD, had been with VICTORIA, and also with monoclonal gammopathy. 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 Also with likely GIB, + FOBT  -- hgb improved with transfusion  -- hgb adequate   -- transfuse for hgb <7  -- ferritin 156, s/p Venofer in 10/2020, S/P venofer 200mg daily x 2 3/29 & 3/30  -- was to start aranesp as outpt, hold for now in the acute setting, can revisit after pt d/c. He was to start in the office but had been postponed repeatedly after pt had multiple falls    monoclonal gammopathy -- in 10/2020, IgM 1852, free kappa 1126, M spike 1.4. Was being monitored clinically since GOC was more supportive given his overall condition and comorbidities  -- repeated SPEP, SABINO, Ig, FLC at this time Kappa FLC has improved to 56.60. Ratio is 27.21. M-Kavon is 0.8.  -- monitor for now    GIB -- GI eval appreciated    hypoxia, PNA -- cards and pulm eval appreciated, aspiration PNA  -- abx per pulm    AMS -- multifactorial, monitor for now    Shane Neff PA-C  Hematology/Oncology  [facility]   [phone] (cell)  [phone] (office)  Evenings and weekends please call MD on call or office

## 2021-04-02 NOTE — PROGRESS NOTE ADULT - SUBJECTIVE AND OBJECTIVE BOX
Patient is a 89y old  Male who presents with a chief complaint of Hypoxemia (28 Mar 2021 13:36)    Patient seen today - no new issues. Being followed by behavioral health, GI, Cardiology, Pulmonary    MEDICATIONS  (STANDING):  albuterol/ipratropium for Nebulization 3 milliLiter(s) Nebulizer every 6 hours  buDESOnide    Inhalation Suspension 0.5 milliGRAM(s) Inhalation every 12 hours  dextrose 40% Gel 15 Gram(s) Oral once  dextrose 5%. 1000 milliLiter(s) (50 mL/Hr) IV Continuous <Continuous>  dextrose 5%. 1000 milliLiter(s) (100 mL/Hr) IV Continuous <Continuous>  dextrose 50% Injectable 25 Gram(s) IV Push once  dextrose 50% Injectable 12.5 Gram(s) IV Push once  dextrose 50% Injectable 25 Gram(s) IV Push once  diltiazem    milliGRAM(s) Oral daily  fluticasone propionate 50 MICROgram(s)/spray Nasal Spray 2 Spray(s) Both Nostrils two times a day  glucagon  Injectable 1 milliGRAM(s) IntraMuscular once  insulin lispro (ADMELOG) corrective regimen sliding scale   SubCutaneous three times a day before meals  insulin lispro (ADMELOG) corrective regimen sliding scale   SubCutaneous at bedtime  iron sucrose IVPB 200 milliGRAM(s) IV Intermittent every 24 hours  lamoTRIgine 25 milliGRAM(s) Oral at bedtime  melatonin 3 milliGRAM(s) Oral at bedtime  methimazole 2.5 milliGRAM(s) Oral daily  metoprolol tartrate Injectable 5 milliGRAM(s) IV Push every 6 hours  midodrine. 5 milliGRAM(s) Oral three times a day  multivitamin 1 Tablet(s) Oral daily  pantoprazole    Tablet 40 milliGRAM(s) Oral two times a day  sodium chloride 0.9%. 1000 milliLiter(s) (75 mL/Hr) IV Continuous <Continuous>    MEDICATIONS  (PRN):  acetaminophen   Tablet .. 650 milliGRAM(s) Oral every 6 hours PRN Temp greater or equal to 38.5C (101.3F), Mild Pain (1 - 3)  haloperidol    Injectable 0.5 milliGRAM(s) IV Push every 6 hours PRN agitation  polyethylene glycol 3350 17 Gram(s) Oral daily PRN for constipation        Vital Signs Last 24 Hrs  T(C): 36.8 (02 Apr 2021 12:09), Max: 36.8 (02 Apr 2021 12:09)  T(F): 98.2 (02 Apr 2021 12:09), Max: 98.2 (02 Apr 2021 12:09)  HR: 82 (02 Apr 2021 12:09) (81 - 103)  BP: 118/69 (02 Apr 2021 12:09) (105/57 - 118/69)  BP(mean): --  RR: 18 (02 Apr 2021 12:09) (17 - 18)  SpO2: 98% (02 Apr 2021 12:09) (94% - 98%)    PE  NAD  Awake, alert  No rash grossly                            7.9    10.56 )-----------( 393      ( 02 Apr 2021 12:53 )             27.3       04-02    140  |  106  |  55<H>  ----------------------------<  197<H>  4.4   |  24  |  2.29<H>    Ca    8.8      02 Apr 2021 05:20

## 2021-04-02 NOTE — PROGRESS NOTE ADULT - SUBJECTIVE AND OBJECTIVE BOX
Full consult to follow    Chart reviewed:    HARJEET on CKD:  Baseline Scr 1.6-1.7  h/o multiple HARJEET in the past  HARJEET likely sec to hemodynamic changes  Has been running hypotensive/tachy  Cardiology notes reviewed to control HR, also started on Midodrine  Monitor I/O  US has no hydro  Get UA, Urine Na, Cr  MOnitor renal function

## 2021-04-02 NOTE — PROGRESS NOTE ADULT - SUBJECTIVE AND OBJECTIVE BOX
CARDIOLOGY FOLLOW UP - Dr. Serrano    CC no cp or sob   drop in h/h noted - awaiting repeat cbc        REVIEW OF SYSTEMS:  CONSTITUTIONAL: No fever, weight loss, or fatigue  RESPIRATORY: No cough, wheezing, chills or hemoptysis; No Shortness of Breath  CARDIOVASCULAR: No chest pain, palpitations, passing out, dizziness, or leg swelling  GASTROINTESTINAL: No abdominal or epigastric pain. No nausea, vomiting, or hematemesis; No diarrhea or constipation. No melena or hematochezia.  VASCULAR: No edema     PHYSICAL EXAM:  T(C): 36.8 (04-02-21 @ 12:09), Max: 36.8 (04-02-21 @ 12:09)  HR: 82 (04-02-21 @ 12:09) (81 - 103)  BP: 118/69 (04-02-21 @ 12:09) (105/57 - 118/69)  RR: 18 (04-02-21 @ 12:09) (17 - 18)  SpO2: 98% (04-02-21 @ 12:09) (94% - 98%)  Wt(kg): --  I&O's Summary    01 Apr 2021 07:01  -  02 Apr 2021 07:00  --------------------------------------------------------  IN: 360 mL / OUT: 225 mL / NET: 135 mL    02 Apr 2021 07:01  -  02 Apr 2021 12:48  --------------------------------------------------------  IN: 120 mL / OUT: 150 mL / NET: -30 mL        Appearance: Normal	  Cardiovascular: Normal S1 S2,RRR  Respiratory: Lungs clear to auscultation	  Gastrointestinal:  Soft, Non-tender, + BS	  Extremities: Normal range of motion, No clubbing, cyanosis or edema      Home Medications:  acetaminophen 325 mg oral tablet: 2 tab(s) orally every 6 hours, As needed, Temp greater or equal to 38.5C (101.3F), Mild Pain (1 - 3) (26 Mar 2021 16:54)  dilTIAZem 180 mg/24 hours oral capsule, extended release: 1 cap(s) orally once a day (26 Mar 2021 16:54)  Flonase 50 mcg/inh nasal spray: 1 spray(s) nasal once a day, As Needed (26 Mar 2021 16:54)  lamoTRIgine 25 mg oral tablet: 1 tab(s) orally once a day (at bedtime) (26 Mar 2021 16:52)  mirtazapine 15 mg oral tablet: 1 tab(s) orally in the morning and 2 tab(s) at bedtime (26 Mar 2021 16:52)  pantoprazole 40 mg oral delayed release tablet: 1 tab(s) orally once a day (26 Mar 2021 16:54)  polyethylene glycol 3350 oral powder for reconstitution: 17 gram(s) orally once a day, As Needed - for constipation (26 Mar 2021 16:54)  Trelegy Ellipta: 1 puff(s) inhaled once a day (26 Mar 2021 16:54)  Xarelto 15 mg oral tablet: 1 tab(s) orally once a day (in the evening) (26 Mar 2021 16:54)      MEDICATIONS  (STANDING):  albuterol/ipratropium for Nebulization 3 milliLiter(s) Nebulizer every 6 hours  buDESOnide    Inhalation Suspension 0.5 milliGRAM(s) Inhalation every 12 hours  dextrose 40% Gel 15 Gram(s) Oral once  dextrose 5%. 1000 milliLiter(s) (50 mL/Hr) IV Continuous <Continuous>  dextrose 5%. 1000 milliLiter(s) (100 mL/Hr) IV Continuous <Continuous>  dextrose 50% Injectable 25 Gram(s) IV Push once  dextrose 50% Injectable 12.5 Gram(s) IV Push once  dextrose 50% Injectable 25 Gram(s) IV Push once  diltiazem    milliGRAM(s) Oral daily  fluticasone propionate 50 MICROgram(s)/spray Nasal Spray 2 Spray(s) Both Nostrils two times a day  glucagon  Injectable 1 milliGRAM(s) IntraMuscular once  insulin lispro (ADMELOG) corrective regimen sliding scale   SubCutaneous three times a day before meals  insulin lispro (ADMELOG) corrective regimen sliding scale   SubCutaneous at bedtime  lamoTRIgine 25 milliGRAM(s) Oral at bedtime  melatonin 3 milliGRAM(s) Oral at bedtime  methimazole 2.5 milliGRAM(s) Oral daily  metoprolol tartrate Injectable 5 milliGRAM(s) IV Push every 6 hours  midodrine. 5 milliGRAM(s) Oral three times a day  multivitamin 1 Tablet(s) Oral daily  pantoprazole    Tablet 40 milliGRAM(s) Oral two times a day  sodium chloride 0.9%. 1000 milliLiter(s) (75 mL/Hr) IV Continuous <Continuous>      TELEMETRY: nsr/ PAfib hr 130 	    ECG:  	  RADIOLOGY:   DIAGNOSTIC TESTING:  [ ] Echocardiogram:  [ ]  Catheterization:  [ ] Stress Test:    OTHER: 	    LABS:	 	                            7.7    10.01 )-----------( 389      ( 02 Apr 2021 05:20 )             26.2     04-02    140  |  106  |  55<H>  ----------------------------<  197<H>  4.4   |  24  |  2.29<H>    Ca    8.8      02 Apr 2021 05:20

## 2021-04-02 NOTE — CONSULT NOTE ADULT - SUBJECTIVE AND OBJECTIVE BOX
[facility]   MD Philip tSein MD, D.O.  KEIRA Delacruz    From 7 AM - 5 PM:  OFFICE: [phone]  Dr. Amador cell: [phone]  Dr. Orellana Cell: [phone]  Dr. Johns cell: [phone]  KEIRA Quinn cell: [phone]    From 5 PM - 7 AM: Answering Service: [phone]  Date of service 04-02-21 @ 15:45    -- INITIAL RENAL CONSULT NOTE  --------------------------------------------------------------------------------  HPI:  89yo M pmhx COPD, bipolar, monoclonal gammopathy, anemia with history transfusions and iron infusions, afib on eliquis, DM (no longer on meds) presents with hypoxia. Pt's visiting nurse was checking vitals of pt, found to be hypoxic to 70's. Pt at that time not complaining of sob, dyspnia, was not doing anything active at that time. Per daughter, pt was confused this morning, stating he had a friend who was visiting for 3 hrs, calling daughter about aid not arriving at early in AM. not on oxygen at home, uses walker at home. PT denies chest pain, sob, abd pain, nausea, vomitting. currently aox2      PAST HISTORY  --------------------------------------------------------------------------------  PAST MEDICAL & SURGICAL HISTORY:  Atrial fibrillation    COPD (chronic obstructive pulmonary disease)    Diabetes type 2, controlled    Goiter    History of total hip arthroplasty, right      FAMILY HISTORY:  No pertinent family history in first degree relatives      PAST SOCIAL HISTORY:    ALLERGIES & MEDICATIONS  --------------------------------------------------------------------------------  Allergies    Tylenol with Codeine #3 (Other)    Intolerances      Standing Inpatient Medications  albuterol/ipratropium for Nebulization 3 milliLiter(s) Nebulizer every 6 hours  buDESOnide    Inhalation Suspension 0.5 milliGRAM(s) Inhalation every 12 hours  dextrose 40% Gel 15 Gram(s) Oral once  dextrose 5%. 1000 milliLiter(s) IV Continuous <Continuous>  dextrose 5%. 1000 milliLiter(s) IV Continuous <Continuous>  dextrose 50% Injectable 25 Gram(s) IV Push once  dextrose 50% Injectable 12.5 Gram(s) IV Push once  dextrose 50% Injectable 25 Gram(s) IV Push once  diltiazem    milliGRAM(s) Oral daily  fluticasone propionate 50 MICROgram(s)/spray Nasal Spray 2 Spray(s) Both Nostrils two times a day  glucagon  Injectable 1 milliGRAM(s) IntraMuscular once  insulin lispro (ADMELOG) corrective regimen sliding scale   SubCutaneous three times a day before meals  insulin lispro (ADMELOG) corrective regimen sliding scale   SubCutaneous at bedtime  iron sucrose IVPB 200 milliGRAM(s) IV Intermittent every 24 hours  lamoTRIgine 25 milliGRAM(s) Oral at bedtime  melatonin 3 milliGRAM(s) Oral at bedtime  methimazole 2.5 milliGRAM(s) Oral daily  metoprolol tartrate Injectable 5 milliGRAM(s) IV Push every 6 hours  midodrine. 5 milliGRAM(s) Oral three times a day  multivitamin 1 Tablet(s) Oral daily  pantoprazole    Tablet 40 milliGRAM(s) Oral two times a day  sodium chloride 0.9%. 1000 milliLiter(s) IV Continuous <Continuous>    PRN Inpatient Medications  acetaminophen   Tablet .. 650 milliGRAM(s) Oral every 6 hours PRN  haloperidol    Injectable 0.5 milliGRAM(s) IV Push every 6 hours PRN  polyethylene glycol 3350 17 Gram(s) Oral daily PRN      REVIEW OF SYSTEMS  --------------------------------------------------------------------------------  Gen: No fevers/chills  Skin: No rashes  Head/Eyes/Ears: Normal hearing,  Normal vision   Respiratory: No dyspnea, cough  CV: No chest pain  GI: No abdominal pain, diarrhea, constipation, nausea, vomiting  : No dysuria, hematuria  MSK: No  edema  Heme: No easy bruising or bleeding  Psych: No significant depression    All other systems were reviewed and are negative, except as noted.    VITALS/PHYSICAL EXAM  --------------------------------------------------------------------------------  T(C): 36.8 (04-02-21 @ 12:09), Max: 36.8 (04-02-21 @ 12:09)  HR: 82 (04-02-21 @ 12:09) (81 - 103)  BP: 118/69 (04-02-21 @ 12:09) (105/57 - 118/69)  RR: 18 (04-02-21 @ 12:09) (17 - 18)  SpO2: 98% (04-02-21 @ 12:09) (94% - 98%)  Wt(kg): --        04-01-21 @ 07:01  -  04-02-21 @ 07:00  --------------------------------------------------------  IN: 360 mL / OUT: 225 mL / NET: 135 mL    04-02-21 @ 07:01  -  04-02-21 @ 15:45  --------------------------------------------------------  IN: 240 mL / OUT: 400 mL / NET: -160 mL      Physical Exam:  	Gen: NAD  	HEENT: MMM  	Pulm: CTA B/L  	CV: S1S2  	Abd: Soft, +BS   	Ext: No LE edema B/L  	Neuro: Awake  	Skin: Warm and dry      LABS/STUDIES  --------------------------------------------------------------------------------              7.9    10.56 >-----------<  393      [04-02-21 @ 12:53]              27.3     140  |  106  |  55  ----------------------------<  197      [04-02-21 @ 05:20]  4.4   |  24  |  2.29        Ca     8.8     [04-02-21 @ 05:20]    Creatinine Trend:  SCr 2.29 [04-02 @ 05:20]  SCr 2.53 [04-01 @ 05:49]  SCr 1.66 [03-30 @ 10:09]  SCr 1.63 [03-29 @ 06:53]  SCr 1.70 [03-28 @ 07:14]    Urinalysis - [04-02-21 @ 05:22]      Color Yellow / Appearance Slightly Turbid / SG 1.017 / pH 5.5      Gluc Negative / Ketone Negative  / Bili Negative / Urobili Negative       Blood Negative / Protein 30 mg/dL / Leuk Est Negative / Nitrite Negative      RBC 1 / WBC 5 / Hyaline 2 / Gran  / Sq Epi  / Non Sq Epi 2 / Bacteria Negative    Urine Creatinine 105      [04-02-21 @ 05:22]  Urine Sodium 20      [04-02-21 @ 05:22]    Iron 50, TIBC 177, %sat 28      [03-29-21 @ 17:47]  Ferritin 156      [03-29-21 @ 08:36]  HbA1c 5.5      [08-26-18 @ 08:24]  TSH 0.45      [03-27-21 @ 10:03]      Free Light Chains: kappa 56.60, lambda 2.08, ratio = 27.21      [03-30 @ 12:05]  Immunofixation Serum:   IgM Kappa Band Identified    Reference Range: None Detected      [03-30-21 @ 12:05]  SPEP Interpretation: Gamma-Migrating Paraprotein Identified      [03-30-21 @ 12:05]

## 2021-04-02 NOTE — PROGRESS NOTE ADULT - ASSESSMENT
a/p    89 year old man with history of COPD, bipolar, monoclonal gammopathy, anemia with history transfusions and iron infusions, afib on a/c, DM, admitted with hypoxia    1. Hypoxic respiratory failure  -? secondary to aspiration vs PNA  -not clinically overloaded, no decomp HF  -s/p abx per medicine, pulmonary  -aspirations precautions  -CT chest noted with Small to moderate size loculated left pleural effusion.  -LE doppler neg for DVT   -f/u pulm/med     2. Acute onset of Pafib w RVR (hx)/ Sinus tachycardia   -rates elevated in setting of anemia, hypovolemia, agitation   -s/p IV lopressor 5mg, IV Cardizem 5mg, IV Digoxin 500 mcg   -rates improving   -pt starting to tolerate PO meds - c/w Cardizem PO when feasible   -c/w lopressor to IVP 5mg q6H for now - change to PO today 25 mg BID   -c/w mido to augment bp when feasible   -cont to hold a/c for now, gi f/u  -dig level wnl     3. Anemia, r/o GI bleed   -h/h dropped today- pending repeat CBC  -PRBC as needed   -Positive occult noted   -pt ref EGD at this time, on hold currently given afib w rvr   -GI workup    dvt ppx      plan discussed with ACP

## 2021-04-02 NOTE — PROGRESS NOTE ADULT - ASSESSMENT
anemia  gi bleed      daily cbc   transfuse prn   cont diet as tolerated  no overt gi bleeding  will follow       Advanced care planning was discussed with patient and family.  Advanced care planning forms were reviewed and discussed.  Risks, benefits and alternatives of gastroenterologic procedures were discussed in detail and all questions were answered.    30 minutes spent.

## 2021-04-02 NOTE — PROGRESS NOTE ADULT - ASSESSMENT
89 m with    GI bleed  - follow Hct  - s/p transfusion support 1 PRBC  - hold AC   - gastroenterology follow  - EGD if bleeding persist. Refusing now.  - PPI    Confusion improving   - CT head with no acute event  - Psych follow    Pneumonia  - off antibiotics  - Pulmonary follow  - nebs    Atrial fibrillation   - rate control  - AC on hold  - cardiology follow     Anemia  - Tx support  - Venofer  - Hematology follow     Monoclonal gammopathy  - SPEP  - Monitor  - Hematology follow    HARJEET on CKD  - US kidney with no obstruction  - Nephrology evaluation Dr Amador noted    COPD (chronic obstructive pulmonary disease)   - nebs    Diabetes type 2, controlled   - ADA diet   - BS control    Goiter   - continue Rx  - TSH    Malnutrition protein caloric  - Nutrition eval    PT    DVT prophylaxis  - PAS    d/w daughter at length. QA     DNR addressed.     Osito Devlin MD pager [phone]

## 2021-04-02 NOTE — PROGRESS NOTE ADULT - ASSESSMENT
ASSESSMENT:    dyspnea/hypoxemia - multifactorial - the patient also has evidence of hypercapnia on a VBG -> improved    1) likely recurrent aspiration pneumonia (although the procalcitonin level is not significantly elevated)  2) mild pulmonary edema with bilateral left > right loculated pleural effusions  3) blood loss from the GI tract with anemia -> decreasing oxygen carrying capacity - has received 1 unit PRBCs  4) unlikely to have a pulmonary embolism on full dose A/C and in the absence of lower extremity DVTs    h/o pulmonary nodules on recent chest CT with tree in bud opacities    atrial fibrillation with RVR    HARJEET    PLAN/RECOMMENDATIONS:    oxygen supplementation to keep saturation greater than 92%  chest CT as above  has completed a 5 day course ceftriaxone/doxycyline  blood cultures, urine Legionella antigen, MSSA/MRSA nasal swab-> not sent (unnecessary at this point)  albuterol/atrovent nebs q6h  pulmicort 0.5mg nebs q12h - give after duoneb - rinse mouth after use  head of bed elevated greater than 45 degrees at all times  incentive spirometry  holding off with a thoracentesis as the effusions are loculated and the respiratory status is stable  swallowing evaluation refused - patient is on a regular diet with full assistance with meals - food to be chopped into small pieces with extra sauces/gravy  cardiology follow-up noted - cardizem CD/lopressor/midodrine - holding A/C - needs better rate control  would hold diuretics with HARJEET - renal evaluation - renal function improved today  GI follow-up - daily CBC -> H/H stable - transfuse as needed - PPI - refusing EGD   hematology follow-up    Will follow with you. Plan of care discussed with the patient at bedside and the dedicated floor NP.    Scott Cormier MD, [facility]  [phone]  Pulmonary Medicine

## 2021-04-03 NOTE — CHART NOTE - NSCHARTNOTEFT_GEN_A_CORE
Informed by RN pt with HR in 150s when he stood up, and has been in atrial fibrillation with RVR (HR of 120s) since sitting back down in bed.  Pt examined at bedside, sleeping comfortably.     Vital Signs Last 24 Hrs  T(C): 36.3 (03 Apr 2021 04:30), Max: 36.9 (02 Apr 2021 21:43)  T(F): 97.4 (03 Apr 2021 04:30), Max: 98.5 (02 Apr 2021 21:43)  HR: 137 (03 Apr 2021 04:30) (81 - 137)  BP: 122/62 (03 Apr 2021 04:30) (118/69 - 149/64)  BP(mean): --  RR: 16 (03 Apr 2021 04:30) (16 - 18)  SpO2: 99% (03 Apr 2021 04:30) (95% - 99%)    Physical Exam:  General: WN/WD NAD  Neurology: nonfocal, PENG x 4  Head:  Normocephalic, atraumatic  Respiratory: CTA B/L  CV: irregular rhythm, S1S2, no murmur  MSK:  + peripheral pulses, FROM all 4 extremity      Labs:                          7.9    10.56 )-----------( 393      ( 02 Apr 2021 12:53 )             27.3     04-02    140  |  106  |  55<H>  ----------------------------<  197<H>  4.4   |  24  |  2.29<H>    Ca    8.8      02 Apr 2021 05:20            Assessment & Plan:  89yo M pmhx COPD, bipolar, monoclonal gammopathy, anemia with history transfusions and iron infusions, afib on eliquis, DM (no longer on meds) presents with hypoxia.   Patient is baseline confused (A&O x 2-3), but appears asymptomat    #A-fib with RVR  -Repeat vitals with mild hypotension (90/55)- 250cc NS bolus x 1 to be given  -Additional IV lopressor 5mg to be given for a total of IV lopressor 10mg   -Discussed with RN  -Will continue to monitor  -Will endorse to AM team      Sammie Conte PA-C  #[id number]    >  >  >    Yolanda Davis PA-C (Medicine PA)  # Informed by RN pt with HR in 150s when he stood up, and has been in atrial fibrillation with RVR (HR of 120s) since sitting back down in bed.  Pt examined at bedside, sleeping comfortably.     Vital Signs Last 24 Hrs  T(C): 36.3 (03 Apr 2021 04:30), Max: 36.9 (02 Apr 2021 21:43)  T(F): 97.4 (03 Apr 2021 04:30), Max: 98.5 (02 Apr 2021 21:43)  HR: 137 (03 Apr 2021 04:30) (81 - 137)  BP: 122/62 (03 Apr 2021 04:30) (118/69 - 149/64)  BP(mean): --  RR: 16 (03 Apr 2021 04:30) (16 - 18)  SpO2: 99% (03 Apr 2021 04:30) (95% - 99%)    Physical Exam:  General: WN/WD NAD  Neurology: nonfocal, PENG x 4  Head:  Normocephalic, atraumatic  Respiratory: CTA B/L  CV: irregular rhythm, S1S2, no murmur  MSK:  + peripheral pulses, FROM all 4 extremity      Labs:                          7.9    10.56 )-----------( 393      ( 02 Apr 2021 12:53 )             27.3     04-02    140  |  106  |  55<H>  ----------------------------<  197<H>  4.4   |  24  |  2.29<H>    Ca    8.8      02 Apr 2021 05:20            Assessment & Plan:  89 year old man with history of COPD, bipolar, monoclonal gammopathy, anemia with history transfusions and iron infusions, afib on a/c, DM, admitted with hypoxia,  now in atrial fibrillation with RVR    #A-fib with RVR  - Pt's AM IV Lopressor and PO Cardizem given now  - STAT BMP, Magnesium, Phosphorus, CBC  - STAT EKG: Afib with RVR ()  - Will continue to monitor on tele  - F/u with cards and attending in AM  - Holding anticoagulation due to +FOBT  - Will endorse to AM team      RUDDY GuzmanC  #[id number]

## 2021-04-03 NOTE — PROGRESS NOTE ADULT - SUBJECTIVE AND OBJECTIVE BOX
Pt seen, confused    MEDICATIONS  (STANDING):  albuterol/ipratropium for Nebulization 3 milliLiter(s) Nebulizer every 6 hours  buDESOnide    Inhalation Suspension 0.5 milliGRAM(s) Inhalation every 12 hours  dextrose 40% Gel 15 Gram(s) Oral once  dextrose 5%. 1000 milliLiter(s) (50 mL/Hr) IV Continuous <Continuous>  dextrose 5%. 1000 milliLiter(s) (100 mL/Hr) IV Continuous <Continuous>  dextrose 50% Injectable 25 Gram(s) IV Push once  dextrose 50% Injectable 12.5 Gram(s) IV Push once  dextrose 50% Injectable 25 Gram(s) IV Push once  diltiazem    milliGRAM(s) Oral daily  fluticasone propionate 50 MICROgram(s)/spray Nasal Spray 2 Spray(s) Both Nostrils two times a day  glucagon  Injectable 1 milliGRAM(s) IntraMuscular once  insulin lispro (ADMELOG) corrective regimen sliding scale   SubCutaneous three times a day before meals  insulin lispro (ADMELOG) corrective regimen sliding scale   SubCutaneous at bedtime  lamoTRIgine 25 milliGRAM(s) Oral at bedtime  melatonin 3 milliGRAM(s) Oral at bedtime  methimazole 2.5 milliGRAM(s) Oral daily  metoprolol tartrate 25 milliGRAM(s) Oral two times a day  midodrine. 5 milliGRAM(s) Oral three times a day  multivitamin 1 Tablet(s) Oral daily  pantoprazole    Tablet 40 milliGRAM(s) Oral two times a day  sodium chloride 0.9%. 1000 milliLiter(s) (75 mL/Hr) IV Continuous <Continuous>    MEDICATIONS  (PRN):  acetaminophen   Tablet .. 650 milliGRAM(s) Oral every 6 hours PRN Temp greater or equal to 38.5C (101.3F), Mild Pain (1 - 3)  polyethylene glycol 3350 17 Gram(s) Oral daily PRN for constipation      ROS  UTO 2/2 confusion    Vital Signs Last 24 Hrs  T(C): 36.8 (03 Apr 2021 13:37), Max: 36.9 (02 Apr 2021 21:43)  T(F): 98.2 (03 Apr 2021 13:37), Max: 98.5 (02 Apr 2021 21:43)  HR: 103 (03 Apr 2021 17:23) (88 - 137)  BP: 121/60 (03 Apr 2021 17:23) (100/63 - 132/62)  BP(mean): --  RR: 17 (03 Apr 2021 13:37) (16 - 18)  SpO2: 99% (03 Apr 2021 13:37) (98% - 99%)    PE  NAD  awake, confused                          7.8    12.96 )-----------( 428      ( 03 Apr 2021 06:53 )             27.3       04-03    139  |  103  |  51<H>  ----------------------------<  166<H>  4.7   |  26  |  2.07<H>    Ca    9.2      03 Apr 2021 10:43  Phos  4.1     04-03  Mg     2.3     04-03

## 2021-04-03 NOTE — PROGRESS NOTE ADULT - ASSESSMENT
a/p    89 year old man with history of COPD, bipolar, monoclonal gammopathy, anemia with history transfusions and iron infusions, afib on a/c, DM, admitted with hypoxia    1. Hypoxic respiratory failure  -? secondary to aspiration vs PNA  -not clinically overloaded, no decomp HF  -s/p abx per medicine, pulmonary  -aspirations precautions  -CT chest noted with Small to moderate size loculated left pleural effusion.  -LE doppler neg for DVT   -f/u pulm/med     2. Acute onset of Pafib w RVR (hx)/ Sinus tachycardia   -rates elevated in setting of anemia, hypovolemia, agitation   -s/p IV lopressor 5mg, IV Cardizem 5mg, IV Digoxin 500 mcg   -tachycardic earlier,now rates improved  -pt starting to tolerate PO meds - c/w Cardizem PO when feasible   -c/w lopressor to IVP 5mg q6H for now - change to PO today 25 mg BID   -c/w mido to augment bp when feasible   -cont to hold a/c for now, gi f/u  -dig level wnl     3. Anemia, r/o GI bleed   -h/h dropped today- pending repeat CBC  -PRBC as needed   -Positive occult noted   -pt ref EGD at this time, on hold currently given afib w rvr   -GI workup    dvt ppx      plan discussed with ACP

## 2021-04-03 NOTE — PROGRESS NOTE ADULT - ASSESSMENT
89 m with    GI bleed  - follow Hct  - s/p transfusion support 1 PRBC  - hold AC   - gastroenterology follow  - EGD if bleeding persist. Refusing now.  - PPI    Confusion   - CT head with no acute event  - Psych follow    Pneumonia  - off antibiotics  - Pulmonary follow  - nebs    Atrial fibrillation   - rate control  - AC on hold  - cardiology follow     Anemia  - Tx support  - Venofer  - Hematology follow     Monoclonal gammopathy  - SPEP  - Monitor  - Hematology follow    HARJEET on CKD  - US kidney with no obstruction  - Nephrology evaluation Dr Amador noted    COPD (chronic obstructive pulmonary disease)   - nebs    Diabetes type 2, controlled   - ADA diet   - BS control    Goiter   - continue Rx  - TSH    Malnutrition protein caloric  - Nutrition eval    PT    DVT prophylaxis  - PAS    DNR addressed.     Osito Devlin MD pager [phone]

## 2021-04-03 NOTE — PROGRESS NOTE ADULT - ASSESSMENT
89yo M pmhx COPD, bipolar, monoclonal gammopathy, anemia with history transfusions and iron infusions, afib on eliquis, DM (no longer on meds) admitted with hypoxia.     HARJEET on CKD  Baseline Scr 1.6-1.7  h/o multiple HARJEET in the past  HARJEET likely sec to hemodynamic changes; likely from hypotension/ tachycardia  FeNa is consistent with pre-renal etiology  SCr improving today   Monitor I/O  US has no hydro  UA bland. Will check spot urine TP/Cr given known monoclonal gammopathy   MOnitor renal function    Anemia  Hb low  no evidence of iron def    Hyperkalemia  hemodlyzed specimen   Check repeat serum K

## 2021-04-03 NOTE — PROGRESS NOTE ADULT - SUBJECTIVE AND OBJECTIVE BOX
Patient is a 89y old  Male who presents with a chief complaint of Hypoxemia (28 Mar 2021 13:36)      SUBJECTIVE / OVERNIGHT EVENTS: confused On 1:1 observation.  Review of Systems  chest pain no  palpitations no  sob no  nausea no  headache no    MEDICATIONS  (STANDING):  albuterol/ipratropium for Nebulization 3 milliLiter(s) Nebulizer every 6 hours  buDESOnide    Inhalation Suspension 0.5 milliGRAM(s) Inhalation every 12 hours  dextrose 40% Gel 15 Gram(s) Oral once  dextrose 5%. 1000 milliLiter(s) (50 mL/Hr) IV Continuous <Continuous>  dextrose 5%. 1000 milliLiter(s) (100 mL/Hr) IV Continuous <Continuous>  dextrose 50% Injectable 25 Gram(s) IV Push once  dextrose 50% Injectable 12.5 Gram(s) IV Push once  dextrose 50% Injectable 25 Gram(s) IV Push once  diltiazem    milliGRAM(s) Oral daily  fluticasone propionate 50 MICROgram(s)/spray Nasal Spray 2 Spray(s) Both Nostrils two times a day  glucagon  Injectable 1 milliGRAM(s) IntraMuscular once  insulin lispro (ADMELOG) corrective regimen sliding scale   SubCutaneous three times a day before meals  insulin lispro (ADMELOG) corrective regimen sliding scale   SubCutaneous at bedtime  lamoTRIgine 25 milliGRAM(s) Oral at bedtime  melatonin 3 milliGRAM(s) Oral at bedtime  methimazole 2.5 milliGRAM(s) Oral daily  metoprolol tartrate Injectable 5 milliGRAM(s) IV Push every 6 hours  midodrine. 5 milliGRAM(s) Oral three times a day  multivitamin 1 Tablet(s) Oral daily  pantoprazole    Tablet 40 milliGRAM(s) Oral two times a day  sodium chloride 0.9%. 1000 milliLiter(s) (75 mL/Hr) IV Continuous <Continuous>    MEDICATIONS  (PRN):  acetaminophen   Tablet .. 650 milliGRAM(s) Oral every 6 hours PRN Temp greater or equal to 38.5C (101.3F), Mild Pain (1 - 3)  polyethylene glycol 3350 17 Gram(s) Oral daily PRN for constipation      Vital Signs Last 24 Hrs  T(C): 36.8 (2021 13:37), Max: 36.9 (2021 21:43)  T(F): 98.2 (2021 13:37), Max: 98.5 (2021 21:43)  HR: 109 (2021 13:37) (88 - 137)  BP: 125/56 (2021 13:37) (100/63 - 149/64)  BP(mean): --  RR: 17 (2021 13:37) (16 - 18)  SpO2: 99% (2021 13:37) (98% - 99%)    PHYSICAL EXAM:  GENERAL: NAD, cachectic   HEAD:  Atraumatic, Normocephalic  EYES: EOMI, PERRLA, conjunctiva and sclera clear  NECK: Supple, No JVD  CHEST/LUNG: Clear to auscultation bilaterally; No wheeze  HEART: Regular rate and rhythm; No murmurs, rubs, or gallops  ABDOMEN: Soft, Nontender, Nondistended; Bowel sounds present  EXTREMITIES:  2+ Peripheral Pulses, No clubbing, cyanosis, or edema  PSYCH: confused  NEUROLOGY: non-focal  SKIN: No rashes or lesions    LABS:                        7.8    12.96 )-----------( 428      ( 2021 06:53 )             27.3     04-03    139  |  103  |  51<H>  ----------------------------<  166<H>  4.7   |  26  |  2.07<H>    Ca    9.2      2021 10:43  Phos  4.1     04-03  Mg     2.3     04-03            Urinalysis Basic - ( 2021 05:22 )    Color: Yellow / Appearance: Slightly Turbid / S.017 / pH: x  Gluc: x / Ketone: Negative  / Bili: Negative / Urobili: Negative   Blood: x / Protein: 30 mg/dL / Nitrite: Negative   Leuk Esterase: Negative / RBC: 1 /hpf / WBC 5 /HPF   Sq Epi: x / Non Sq Epi: 2 /hpf / Bacteria: Negative          RADIOLOGY & ADDITIONAL TESTS:    Imaging Personally Reviewed:    Consultant(s) Notes Reviewed:      Care Discussed with Consultants/Other Providers:

## 2021-04-03 NOTE — PROGRESS NOTE ADULT - SUBJECTIVE AND OBJECTIVE BOX
CC: afib rvr earlier, now rate controlled    TELEMETRY:     PHYSICAL EXAM:    T(C): 36.3 (04-03-21 @ 04:30), Max: 36.9 (04-02-21 @ 21:43)  HR: 112 (04-03-21 @ 06:11) (81 - 137)  BP: 114/50 (04-03-21 @ 06:45) (100/63 - 149/64)  RR: 16 (04-03-21 @ 04:30) (16 - 18)  SpO2: 99% (04-03-21 @ 04:30) (95% - 99%)  Wt(kg): --  I&O's Summary    02 Apr 2021 07:01  -  03 Apr 2021 07:00  --------------------------------------------------------  IN: 360 mL / OUT: 950 mL / NET: -590 mL        Appearance: Normal	  Cardiovascular: Normal S1 S2,RRR, No JVD, No murmurs  Respiratory: Lungs clear to auscultation	  Gastrointestinal:  Soft, Non-tender, + BS	  Extremities: Normal range of motion, No clubbing, cyanosis or edema  Vascular: Peripheral pulses palpable 2+ bilaterally     LABS:	 	                          7.8    12.96 )-----------( 428      ( 03 Apr 2021 06:53 )             27.3     04-02    140  |  106  |  55<H>  ----------------------------<  197<H>  4.4   |  24  |  2.29<H>    Ca    8.8      02 Apr 2021 05:20            CARDIAC MARKERS:    MEDICATIONS  (STANDING):  albuterol/ipratropium for Nebulization 3 milliLiter(s) Nebulizer every 6 hours  buDESOnide    Inhalation Suspension 0.5 milliGRAM(s) Inhalation every 12 hours  dextrose 40% Gel 15 Gram(s) Oral once  dextrose 5%. 1000 milliLiter(s) (50 mL/Hr) IV Continuous <Continuous>  dextrose 5%. 1000 milliLiter(s) (100 mL/Hr) IV Continuous <Continuous>  dextrose 50% Injectable 25 Gram(s) IV Push once  dextrose 50% Injectable 12.5 Gram(s) IV Push once  dextrose 50% Injectable 25 Gram(s) IV Push once  diltiazem    milliGRAM(s) Oral daily  fluticasone propionate 50 MICROgram(s)/spray Nasal Spray 2 Spray(s) Both Nostrils two times a day  glucagon  Injectable 1 milliGRAM(s) IntraMuscular once  insulin lispro (ADMELOG) corrective regimen sliding scale   SubCutaneous three times a day before meals  insulin lispro (ADMELOG) corrective regimen sliding scale   SubCutaneous at bedtime  lamoTRIgine 25 milliGRAM(s) Oral at bedtime  melatonin 3 milliGRAM(s) Oral at bedtime  methimazole 2.5 milliGRAM(s) Oral daily  metoprolol tartrate Injectable 5 milliGRAM(s) IV Push every 6 hours  midodrine. 5 milliGRAM(s) Oral three times a day  multivitamin 1 Tablet(s) Oral daily  pantoprazole    Tablet 40 milliGRAM(s) Oral two times a day  sodium chloride 0.9%. 1000 milliLiter(s) (75 mL/Hr) IV Continuous <Continuous>

## 2021-04-03 NOTE — PROGRESS NOTE ADULT - SUBJECTIVE AND OBJECTIVE BOX
[facility]   MD JONO YEE MD RUORU WONG, PA    TEL:  OFFICE: [phone]  DR TAM CELL: [phone]  GARY MACK CELL: [phone]  DR. ZHANG CELL: [phone]  DR. MCKAY CELL: [phone]    FROM 5 PM - 7 AM PLEASE CALL ANSWERING SERVICE: [phone]    RENAL FOLLOW UP NOTE--Date of Service 04-03-21 @ 09:15  --------------------------------------------------------------------------------  HPI:      Pt seen and examined at bedside.      PAST HISTORY  --------------------------------------------------------------------------------  No significant changes to PMH, PSH, FHx, SHx, unless otherwise noted    ALLERGIES & MEDICATIONS  --------------------------------------------------------------------------------  Allergies    Tylenol with Codeine #3 (Other)    Intolerances      Standing Inpatient Medications  albuterol/ipratropium for Nebulization 3 milliLiter(s) Nebulizer every 6 hours  buDESOnide    Inhalation Suspension 0.5 milliGRAM(s) Inhalation every 12 hours  dextrose 40% Gel 15 Gram(s) Oral once  dextrose 5%. 1000 milliLiter(s) IV Continuous <Continuous>  dextrose 5%. 1000 milliLiter(s) IV Continuous <Continuous>  dextrose 50% Injectable 25 Gram(s) IV Push once  dextrose 50% Injectable 12.5 Gram(s) IV Push once  dextrose 50% Injectable 25 Gram(s) IV Push once  diltiazem    milliGRAM(s) Oral daily  fluticasone propionate 50 MICROgram(s)/spray Nasal Spray 2 Spray(s) Both Nostrils two times a day  glucagon  Injectable 1 milliGRAM(s) IntraMuscular once  insulin lispro (ADMELOG) corrective regimen sliding scale   SubCutaneous three times a day before meals  insulin lispro (ADMELOG) corrective regimen sliding scale   SubCutaneous at bedtime  lamoTRIgine 25 milliGRAM(s) Oral at bedtime  melatonin 3 milliGRAM(s) Oral at bedtime  methimazole 2.5 milliGRAM(s) Oral daily  metoprolol tartrate Injectable 5 milliGRAM(s) IV Push every 6 hours  midodrine. 5 milliGRAM(s) Oral three times a day  multivitamin 1 Tablet(s) Oral daily  pantoprazole    Tablet 40 milliGRAM(s) Oral two times a day  sodium chloride 0.9%. 1000 milliLiter(s) IV Continuous <Continuous>    PRN Inpatient Medications  acetaminophen   Tablet .. 650 milliGRAM(s) Oral every 6 hours PRN  haloperidol    Injectable 0.5 milliGRAM(s) IV Push every 6 hours PRN  polyethylene glycol 3350 17 Gram(s) Oral daily PRN      REVIEW OF SYSTEMS  --------------------------------------------------------------------------------  General: no feve  MSK: no edema     VITALS/PHYSICAL EXAM  --------------------------------------------------------------------------------  T(C): 36.3 (04-03-21 @ 04:30), Max: 36.9 (04-02-21 @ 21:43)  HR: 112 (04-03-21 @ 06:11) (81 - 137)  BP: 114/50 (04-03-21 @ 06:45) (100/63 - 149/64)  RR: 16 (04-03-21 @ 04:30) (16 - 18)  SpO2: 99% (04-03-21 @ 04:30) (95% - 99%)  Wt(kg): --        04-02-21 @ 07:01  -  04-03-21 @ 07:00  --------------------------------------------------------  IN: 360 mL / OUT: 950 mL / NET: -590 mL    04-03-21 @ 07:01  -  04-03-21 @ 09:15  --------------------------------------------------------  IN: 0 mL / OUT: 50 mL / NET: -50 mL      Physical Exam:  	Gen: NAD  	HEENT: MMM  	Pulm: CTA B/L  	CV: S1S2  	Abd: Soft, +BS  	Ext: No LE edema B/L                      Neuro: Awake   	Skin: Warm and Dry   	Vascular access: no HD catheter           no jc  LABS/STUDIES  --------------------------------------------------------------------------------              7.8    12.96 >-----------<  428      [04-03-21 @ 06:53]              27.3     139  |  106  |  55  ----------------------------<  232      [04-03-21 @ 06:51]  6.0   |  22  |  2.04        Ca     9.0     [04-03-21 @ 06:51]      Mg     2.3     [04-03-21 @ 06:51]      Phos  4.1     [04-03-21 @ 06:51]            Creatinine Trend:  SCr 2.04 [04-03 @ 06:51]  SCr 2.29 [04-02 @ 05:20]  SCr 2.53 [04-01 @ 05:49]  SCr 1.66 [03-30 @ 10:09]  SCr 1.63 [03-29 @ 06:53]    Urinalysis - [04-02-21 @ 05:22]      Color Yellow / Appearance Slightly Turbid / SG 1.017 / pH 5.5      Gluc Negative / Ketone Negative  / Bili Negative / Urobili Negative       Blood Negative / Protein 30 mg/dL / Leuk Est Negative / Nitrite Negative      RBC 1 / WBC 5 / Hyaline 2 / Gran  / Sq Epi  / Non Sq Epi 2 / Bacteria Negative    Urine Creatinine 105      [04-02-21 @ 05:22]  Urine Sodium 20      [04-02-21 @ 05:22]    Iron 50, TIBC 177, %sat 28      [03-29-21 @ 17:47]  Ferritin 156      [03-29-21 @ 08:36]  HbA1c 5.5      [08-26-18 @ 08:24]  TSH 0.45      [03-27-21 @ 10:03]      Free Light Chains: kappa 56.60, lambda 2.08, ratio = 27.21      [03-30 @ 12:05]  Immunofixation Serum:   IgM Kappa Band Identified    Reference Range: None Detected      [03-30-21 @ 12:05]  SPEP Interpretation: Gamma-Migrating Paraprotein Identified      [03-30-21 @ 12:05]

## 2021-04-03 NOTE — PROGRESS NOTE ADULT - ASSESSMENT
89yo M pmhx COPD, bipolar, monoclonal gammopathy, anemia with history transfusions and iron infusions, afib on eliquis, DM (no longer on meds) presents with hypoxia. Pt received venofer in my office in 10/2020. He also has an IgM kappa monoclonal gammopathy, being monitored. Pt limited historian, hx obtained from records and daughter by phone.     Anemia -- has ACD, had been with VICTORIA, and also with monoclonal gammopathy. 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 Also with likely GIB, + FOBT  -- hgb improved with transfusion  -- hgb adequate   -- transfuse for hgb <7  -- ferritin 156, s/p Venofer in 10/2020, S/P venofer 200mg daily x 2 3/29 & 3/30  -- was to start [facility] as outpt, hold for now in the acute setting, can revisit after pt d/c. He was to start in the office but had been postponed repeatedly after pt had multiple falls    monoclonal gammopathy -- in 10/2020, IgM 1852, free kappa 1126, M spike 1.4. Was being monitored clinically since GOC was more supportive given his overall condition and comorbidities  -- repeated SPEP, SABINO, Ig, FLC at this time Kappa FLC has improved to 56.60. Ratio is 27.21. M-Kavon is 0.8.  -- monitor for now    GIB -- GI eval appreciated    hypoxia, PNA -- cards and pulm eval appreciated, aspiration PNA  -- abx per pulm    AMS -- multifactorial, monitor for now, per primary team    Will follow, [phone]

## 2021-04-04 NOTE — CHART NOTE - NSCHARTNOTEFT_GEN_A_CORE
RRT called initially for unresponsiveness. After intubation, patient bradied down and lost pulse. Code blue called at 8:53PM. Compressions started, epinephrine given per ACLS protocol. Initial rhythm appeared PEA/asystole. Patient was given 1 amp of bicarb, CaCl, and 2 additional epi. Pt was asystole on pulse/rhythm check. Family was called, decision made to stop CPR. Pt was pronounced at 9:01PM. Family notified.    Aditi Wang PGY3

## 2021-04-04 NOTE — PROGRESS NOTE BEHAVIORAL HEALTH - NSBHCONSULTOBSREASON_PSY_A_CORE FT
Continue 1:1 for patient and staff safety

## 2021-04-04 NOTE — PROGRESS NOTE BEHAVIORAL HEALTH - NSBHFUPINTERVALCCFT_PSY_A_CORE
pt mostly nonverbal
"I don't feel good. I feel very restless. I'm breathing ok. I want to walk."
"I don't feel good. I feel very restless. I'm breathing ok. I want to walk."

## 2021-04-04 NOTE — PROGRESS NOTE ADULT - NSICDXPILOT_GEN_ALL_CORE
[place]

## 2021-04-04 NOTE — PROGRESS NOTE ADULT - SUBJECTIVE AND OBJECTIVE BOX
Patient is a 89y old  Male who presents with a chief complaint of Hypoxemia (28 Mar 2021 13:36)      SUBJECTIVE / OVERNIGHT EVENTS: tired.   Review of Systems  chest pain no  palpitations no  sob no  nausea no  headache no    MEDICATIONS  (STANDING):  albuterol/ipratropium for Nebulization 3 milliLiter(s) Nebulizer every 6 hours  buDESOnide    Inhalation Suspension 0.5 milliGRAM(s) Inhalation every 12 hours  clonazePAM  Tablet 0.25 milliGRAM(s) Oral <User Schedule>  dextrose 40% Gel 15 Gram(s) Oral once  dextrose 5%. 1000 milliLiter(s) (50 mL/Hr) IV Continuous <Continuous>  dextrose 5%. 1000 milliLiter(s) (100 mL/Hr) IV Continuous <Continuous>  dextrose 50% Injectable 25 Gram(s) IV Push once  dextrose 50% Injectable 12.5 Gram(s) IV Push once  dextrose 50% Injectable 25 Gram(s) IV Push once  diltiazem    milliGRAM(s) Oral daily  fluticasone propionate 50 MICROgram(s)/spray Nasal Spray 2 Spray(s) Both Nostrils two times a day  glucagon  Injectable 1 milliGRAM(s) IntraMuscular once  insulin lispro (ADMELOG) corrective regimen sliding scale   SubCutaneous three times a day before meals  insulin lispro (ADMELOG) corrective regimen sliding scale   SubCutaneous at bedtime  lamoTRIgine 25 milliGRAM(s) Oral <User Schedule>  methimazole 2.5 milliGRAM(s) Oral daily  metoprolol tartrate 50 milliGRAM(s) Oral two times a day  midodrine. 5 milliGRAM(s) Oral three times a day  multivitamin 1 Tablet(s) Oral daily  pantoprazole    Tablet 40 milliGRAM(s) Oral two times a day  sodium chloride 0.9%. 1000 milliLiter(s) (75 mL/Hr) IV Continuous <Continuous>    MEDICATIONS  (PRN):  acetaminophen   Tablet .. 650 milliGRAM(s) Oral every 6 hours PRN Temp greater or equal to 38.5C (101.3F), Mild Pain (1 - 3)  melatonin 3 milliGRAM(s) Oral at bedtime PRN Insomnia  polyethylene glycol 3350 17 Gram(s) Oral daily PRN for constipation      Vital Signs Last 24 Hrs  T(C): 36.7 (04 Apr 2021 13:18), Max: 36.7 (04 Apr 2021 13:18)  T(F): 98.1 (04 Apr 2021 13:18), Max: 98.1 (04 Apr 2021 13:18)  HR: 87 (04 Apr 2021 13:18) (83 - 110)  BP: 134/76 (04 Apr 2021 13:18) (116/49 - 150/55)  BP(mean): --  RR: 18 (04 Apr 2021 13:18) (17 - 19)  SpO2: 97% (04 Apr 2021 13:18) (97% - 100%)    PHYSICAL EXAM:  GENERAL: NAD, cachectic   HEAD:  Atraumatic, Normocephalic  EYES: EOMI, PERRLA, conjunctiva and sclera clear  NECK: Supple, No JVD  CHEST/LUNG: Clear to auscultation bilaterally; No wheeze  HEART: Regular rate and rhythm; No murmurs, rubs, or gallops  ABDOMEN: Soft, Nontender, Nondistended; Bowel sounds present  EXTREMITIES:  2+ Peripheral Pulses, No clubbing, cyanosis, or edema  PSYCH: confused  NEUROLOGY: non-focal  SKIN: No rashes or lesions    LABS:                        8.4    15.83 )-----------( 532      ( 04 Apr 2021 06:03 )             29.4     04-04    141  |  104  |  49<H>  ----------------------------<  225<H>  5.1   |  23  |  2.12<H>    Ca    9.5      04 Apr 2021 06:03  Phos  4.1     04-03  Mg     2.3     04-04                  RADIOLOGY & ADDITIONAL TESTS:    Imaging Personally Reviewed:    Consultant(s) Notes Reviewed:      Care Discussed with Consultants/Other Providers:

## 2021-04-04 NOTE — PROGRESS NOTE BEHAVIORAL HEALTH - NSBHCONSULTFOLLOWAFTERCARE_PSY_A_CORE FT
pt can f/u at [facility]
pt can f/u at [facility]
pt can f/u at [facility]

## 2021-04-04 NOTE — PROGRESS NOTE BEHAVIORAL HEALTH - SUMMARY
89 y.o. M, , 2 adult children, domiciled alone, has HHA, PPH of anxiety, bipolar disorder, PMH of COPD, monoclonal gammopathy, anemia with hx of transfusions, afib on eliquis, DM (no longer on meds) is admitted to [facility] for hypoxia on 03/26/21. Psychiatry consulted for agitation and pt refusing to make medications.     On evaluation, patient has moments of disorientation and confusion, tries to get out of bed occasionally. States he is feeling "unwell" when asked for the reason for his hospital admission but will not elaborate. He is able to identify himself and that he is in a "hospital" but unsure of additional details. 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 Reports he is uncomfortable but will not elaborate why. Not responding when asked why he is refusing medications. Patient is an unreliable historian.pt received haldol prns earlier for agitation.
Pt is a 89 y.o. man with 2 adult children, domiciled alone, has HHA, PPH of anxiety, bipolar disorder, PMH of COPD, monoclonal gammopathy, anemia with hx of transfusions, afib on eliquis, DM (no longer on meds) is admitted to [facility] for hypoxia on 03/26/21. Psychiatry consulted for agitation and pt refusing to take medications.  on evaluations, pt is sitting up in bed on O2 via NC, and appears tremulous, and very uncomfortable, complaining that he wants to get out of bed because he is restless. He has received a few dose of Haldol 0.5 and 1mg over the past few days and now appears slightly dystonic, tremulous, and restless.  He may have developed akathisia secondary to the Haldol.  He denies any other acute psychiatric symptoms and is currently cooperative with the staff observing him.  Recommended a one time dose of Clonazepam 0.25mg now to see if this will relieve the restlessness and if this is effective, pt may take this at 6pm.  Would recommend a mood stabilizer for his hx of bipolar dx and for the delirium/dementia  consider increasing the Lamictal to 50mg at bedtime  Consider Ativan 0.5mg po/IV for acute restlessness but please check pulse ox since pt is on O2 and appears to be using accessory muscles when breathing today.
Pt is a 89 y.o. man with 2 adult children, domiciled alone, has HHA, PPH of anxiety, bipolar disorder, PMH of COPD, monoclonal gammopathy, anemia with hx of transfusions, afib on eliquis, DM (no longer on meds) is admitted to [facility] for hypoxia on 03/26/21. Psychiatry consulted for agitation and pt refusing to take medications.  on evaluations, pt is sitting up in bed on O2 via NC, and appears tremulous, and very uncomfortable, complaining that he wants to get out of bed because he is restless. He has received a few dose of Haldol 0.5 and 1mg over the past few days and now appears slightly dystonic, tremulous, and restless.  He may have developed akathisia secondary to the Haldol.  He denies any other acute psychiatric symptoms and is currently cooperative with the staff observing him.  Recommended a one time dose of Clonazepam 0.25mg now to see if this will relieve the restlessness and if this is effective, pt may take this at 6pm.  Would recommend a mood stabilizer for his hx of bipolar dx and for the delirium/dementia  consider increasing the Lamictal to 50mg at bedtime  Consider Ativan 0.5mg po/IV for acute restlessness but please check pulse ox since pt is on O2 and appears to be using accessory muscles when breathing today.

## 2021-04-04 NOTE — PROGRESS NOTE ADULT - ASSESSMENT
89 m with    GI bleed  - follow Hct  - s/p transfusion support 1 PRBC  - hold AC   - gastroenterology follow  - EGD if bleeding persist. Refusing now.  - PPI    Confusion   - CT head with no acute event  - Psych follow    Pneumonia  - off antibiotics  - Pulmonary follow  - nebs    Atrial fibrillation   - rate control  - AC on hold  - cardiology follow     Anemia  - Tx support  - Venofer  - Hematology follow     Monoclonal gammopathy  - SPEP  - Monitor  - Hematology follow    HARJEET on CKD  - US kidney with no obstruction  - Nephrology evaluation Dr Amador noted    COPD (chronic obstructive pulmonary disease)   - nebs    Diabetes type 2, controlled   - ADA diet   - BS control    Goiter   - continue Rx  - TSH    Malnutrition protein caloric  - Nutrition eval    PT    DVT prophylaxis  - PAS    DNR addressed.     Osito Devlin MD pager [phone]

## 2021-04-04 NOTE — PROGRESS NOTE ADULT - NUTRITIONAL ASSESSMENT
This patient has been assessed with a concern for Malnutrition and has been determined to have a diagnosis/diagnoses of Severe protein-calorie malnutrition.    This patient is being managed with:   Diet Regular-  Consistent Carbohydrate {Evening Snack} (CSTCHOSN)  Supplement Feeding Modality:  Oral  Ensure Pudding Cans or Servings Per Day:  1       Frequency:  Daily  Entered: Mar 31 2021  7:40PM    The following pending diet order is being considered for treatment of Severe protein-calorie malnutrition:  Diet Consistent Carbohydrate w/Evening Snack-  Supplement Feeding Modality:  Oral  Ensure Pudding Cans or Servings Per Day:  1       Frequency:  Daily  Entered: Mar 31 2021  4:27PM  
This patient has been assessed with a concern for Malnutrition and has been determined to have a diagnosis/diagnoses of Severe protein-calorie malnutrition.    This patient is being managed with:   Diet Regular-  Consistent Carbohydrate {Evening Snack} (CSTCHOSN)  Entered: Mar 26 2021  6:47PM    
This patient has been assessed with a concern for Malnutrition and has been determined to have a diagnosis/diagnoses of Severe protein-calorie malnutrition.    This patient is being managed with:   Diet Regular-  Consistent Carbohydrate {Evening Snack} (CSTCHOSN)  Entered: Mar 26 2021  6:47PM    The following pending diet order is being considered for treatment of Severe protein-calorie malnutrition:  Diet Consistent Carbohydrate w/Evening Snack-  Supplement Feeding Modality:  Oral  Ensure Pudding Cans or Servings Per Day:  1       Frequency:  Daily  Entered: Mar 31 2021  4:27PM  
This patient has been assessed with a concern for Malnutrition and has been determined to have a diagnosis/diagnoses of Severe protein-calorie malnutrition.    This patient is being managed with:   Diet Regular-  Consistent Carbohydrate {Evening Snack} (CSTCHOSN)  Supplement Feeding Modality:  Oral  Ensure Pudding Cans or Servings Per Day:  1       Frequency:  Daily  Entered: Mar 31 2021  7:40PM    The following pending diet order is being considered for treatment of Severe protein-calorie malnutrition:  Diet Consistent Carbohydrate w/Evening Snack-  Supplement Feeding Modality:  Oral  Ensure Pudding Cans or Servings Per Day:  1       Frequency:  Daily  Entered: Mar 31 2021  4:27PM  
This patient has been assessed with a concern for Malnutrition and has been determined to have a diagnosis/diagnoses of Severe protein-calorie malnutrition.    This patient is being managed with:   Diet Regular-  Consistent Carbohydrate {Evening Snack} (CSTCHOSN)  Entered: Mar 26 2021  6:47PM    
This patient has been assessed with a concern for Malnutrition and has been determined to have a diagnosis/diagnoses of Severe protein-calorie malnutrition.    This patient is being managed with:   Diet Regular-  Consistent Carbohydrate {Evening Snack} (CSTCHOSN)  Supplement Feeding Modality:  Oral  Ensure Pudding Cans or Servings Per Day:  1       Frequency:  Daily  Entered: Mar 31 2021  7:40PM    The following pending diet order is being considered for treatment of Severe protein-calorie malnutrition:  Diet Consistent Carbohydrate w/Evening Snack-  Supplement Feeding Modality:  Oral  Ensure Pudding Cans or Servings Per Day:  1       Frequency:  Daily  Entered: Mar 31 2021  4:27PM  

## 2021-04-04 NOTE — PROGRESS NOTE BEHAVIORAL HEALTH - THOUGHT PROCESS
Illogical/Impaired reasoning/Disorganized

## 2021-04-04 NOTE — CHART NOTE - NSCHARTNOTESELECT_GEN_ALL_CORE
Agitation/Event Note
CODE BLUE/Event Note
Event Note
afib with RVR/Event Note
A-fib with RVR/Event Note
Event Note
Event Note
Nutrition Services
Speech & Swallow
Tachycardia/Event Note
Transfer Note
atrial fibrillation/Event Note
confused, agitated/Event Note

## 2021-04-04 NOTE — PROGRESS NOTE BEHAVIORAL HEALTH - NSBHCHARTREVIEWLAB_PSY_A_CORE FT
7.7    10.01 )-----------( 389      ( 02 Apr 2021 05:20 )             26.2     04-02    140  |  106  |  55<H>  ----------------------------<  197<H>  4.4   |  24  |  2.29<H>    Ca    8.8      02 Apr 2021 05:20
7.8    12.96 )-----------( 428      ( 03 Apr 2021 06:53 )             27.3   04-03    139  |  103  |  51<H>  ----------------------------<  166<H>  4.7   |  26  |  2.07<H>    Ca    9.2      03 Apr 2021 10:43  Phos  4.1     04-03  Mg     2.3     04-03
7.8    12.96 )-----------( 428      ( 03 Apr 2021 06:53 )             27.3   04-03    139  |  103  |  51<H>  ----------------------------<  166<H>  4.7   |  26  |  2.07<H>    Ca    9.2      03 Apr 2021 10:43  Phos  4.1     04-03  Mg     2.3     04-03

## 2021-04-04 NOTE — PROVIDER CONTACT NOTE (OTHER) - ACTION/TREATMENT ORDERED:
Ativan IM ordered. Continue to monitor patient.
Continue to monitor patient.
250 Bolus given, recheck blood pressure then give Lopressor if SBP 90 or greater. Continue to monitor patient.
Cardizem IV push ordered. EKG, NP assessed. Labs ordered. Continue to monitor patient.
Give AM labs, Continue to monitor HR. Will follow up.
Continue to monitor patient and vitals.
Monitor on tele. Metoprolol 5 mg IV push given as ordered.  HR down to 90-110s. Continue to monitor on tele.
Provider ordered speech and swallow. Pt safety maintained. RN to continue to monitor pt.
Will follow up. Continue to monitor.
Continue to  monitor urine and patient symptoms.

## 2021-04-04 NOTE — PROVIDER CONTACT NOTE (OTHER) - DATE AND TIME:
04-Apr-2021 00:50
31-Mar-2021 20:16
03-Apr-2021 04:30
03-Apr-2021 06:00
03-Apr-2021 12:30
27-Mar-2021 06:00
03-Apr-2021 22:45
04-Apr-2021 02:41
31-Mar-2021 20:40
03-Apr-2021 20:56

## 2021-04-04 NOTE — PROVIDER CONTACT NOTE (OTHER) - SITUATION
Patient converted from atrial fibrillation to normal sinus rhythm.
Patient converted from atrial flutter to normal sinus rhythm.
Patient converted from normal sinus rhythm to atrial fibrillation
Patient heart rate sustaining 130s.
Patient agitated and combative with staff.
Patient experiencing hematuria
Patient converted from normal sinus rhythm to atrial fibrillation
Patient HR sustaining in the 130s.
Patient HR went up to 130s.
Pt unable to complete speech and swallow.

## 2021-04-04 NOTE — PROGRESS NOTE BEHAVIORAL HEALTH - SECONDARY DX1
Bipolar affective disorder in remission

## 2021-04-04 NOTE — PROGRESS NOTE BEHAVIORAL HEALTH - NSBHCONSULTMEDS_PSY_A_CORE FT
Recommended a one time dose of Clonazepam 0.25mg now to see if this will relieve the restlessness and if this is effective, pt may take this at 6pm.  Would recommend a mood stabilizer for his hx of bipolar dx and for the delirium/dementia  consider increasing the Lamictal to 50mg at bedtime  Consider Ativan 0.5mg po/IV for acute restlessness but please check pulse ox since pt is on O2
Recommended Clonazepam 0.25mg at 6pm  Lamictal 50mg po qhs  Consider Ativan 0.5mg po/IV for acute restlessness but please check pulse ox since pt is on O2
Haldol 0.5mg PO/IV bid for agitation, cont haldol 0.5mg po/iv q6h prn severe agitation.   Melatonin 3mg PO at bedtime standing   Continue Lamictal as indicated, hold off remeron given recent confusion

## 2021-04-04 NOTE — DISCHARGE NOTE FOR THE EXPIRED PATIENT - HOSPITAL COURSE
89 year old man with history of COPD, bipolar, monoclonal gammopathy, anemia with history transfusions and iron infusions, afib on a/c, DM, admitted with hypoxia 2/2 to COPD vs Aspiration PNA. Patient is s/p IV solumedrol and ceftriaxone/doxycyline. Course c/b by anemia 2/2 ?GIB, (+) FOBT requiring 1 unit PRBC and afib w/RVR requiring  89 year old man with history of COPD, bipolar, monoclonal gammopathy, anemia with history transfusions and iron infusions, afib on a/c, DM, admitted with hypoxia 2/2 to COPD vs Aspiration PNA. Patient is s/p IV solumedrol and ceftriaxone/doxycyline. Course c/b by anemia 2/2 ?GIB, (+) FOBT requiring 1 unit PRBC and afib w/RVR requiring cardizem, metorprolol and digoxin. Patient found to be unresponsive to stimuli, RRT called, patient hypoxic and bradycardic, thus intubated. After patient lost pulse, and CODE Blue began, CPR started. After discussion w/family decision (Abner)  to discontinue CPR was made.  Time of death at 9:01.   Dr. Armijo made aware.  89 year old man with history of COPD, bipolar, monoclonal gammopathy, anemia with history transfusions and iron infusions, afib on a/c, DM, admitted with hypoxia 2/2 to COPD vs Aspiration PNA. Patient is s/p IV solumedrol and ceftriaxone/doxycyline. Course c/b by anemia 2/2 ?GIB, (+) FOBT requiring 1 unit PRBC and afib w/RVR requiring cardizem, metorprolol and digoxin.   Patient found to be unresponsive to stimuli, RRT called, patient hypoxic and bradycardic, thus intubated. After patient lost pulse, and CODE Blue began, CPR started. After discussion w/family decision (Abner)  to discontinue CPR was made.  Time of death at 9:01.   Family (Abner) updated and made aware.   Dr. Armijo made aware.

## 2021-04-04 NOTE — PROGRESS NOTE BEHAVIORAL HEALTH - NSBHCONSULTMEDAGITATION_PSY_A_CORE FT
Consider Ativan 0.5mg po/IV for acute restlessness but please check pulse ox since pt is on O2
Consider Ativan 0.5mg po/IV for acute restlessness but please check pulse ox since pt is on O2

## 2021-04-04 NOTE — PROVIDER CONTACT NOTE (OTHER) - ASSESSMENT
Patient HR sustaining in the 130s on tele. VSS. Patient was standing with PCA when HR increased to the 150s and at rest 130s. Patient asymptomatic.
Patient converted from atrial fibrillation to normal sinus rhythm on tele.
Patient converted from normal sinus rhythm to atrial fibrillation. HR ranging from 115-140. Patient asymptomatic.
Pt denies chest pain/palpitations. VSS. 98.1F  /61 RR 16 99% on 2L nasal cannula.
Pt is AOx2-3, confused, 2L NC. Wet cough after spoonful of water. Pocketing med pill w applesauce. Missing dentures.
Patient agitated and combative with staff. Patient pulling curtains, kicking, swinging nasal cannula tubing around at staff. Tele, IV and gown removed.
Patient converted from atrial flutter to normal sinus rhythm. VSS. Patient asymptomatic.
Patient heart rate sustaining 130s. Blood pressure 86/49, other VSS. Patient asymptomatic.
Patient experiencing hematuria. Patient states "After I got up, I noticed blood in the greene." No other signs of bleeding. VSS. Patient asymptomatic.
Patient converted from normal sinus rhythm to atrial fibrillation. VSS except HR fluctuating from 110-130. Patient agitated.

## 2021-04-04 NOTE — CHART NOTE - NSCHARTNOTEFT_GEN_A_CORE
Medicine PA Episodic Note    Patient is a 89y old  Male who presents with a chief complaint of Hypoxemia (28 Mar 2021 13:36)    Notified by RN that patient is agitated and combative w/staff. Patient seen and assessed by me. Patient is A&Ox1. Patient removed gown, IV, and attempting to come out of bed.       Vital Signs Last 24 Hrs  T(C): 36.4 (03 Apr 2021 19:45), Max: 36.8 (03 Apr 2021 13:37)  T(F): 97.6 (03 Apr 2021 19:45), Max: 98.2 (03 Apr 2021 13:37)  HR: 83 (03 Apr 2021 20:55) (83 - 137)  BP: 116/49 (03 Apr 2021 20:55) (100/63 - 125/56)  BP(mean): --  RR: 17 (03 Apr 2021 20:55) (16 - 17)  SpO2: 98% (03 Apr 2021 20:55) (98% - 99%)      Labs:                          7.8    12.96 )-----------( 428      ( 03 Apr 2021 06:53 )             27.3     04-03    139  |  103  |  51<H>  ----------------------------<  166<H>  4.7   |  26  |  2.07<H>    Ca    9.2      03 Apr 2021 10:43  Phos  4.1     04-03  Mg     2.3     04-03    Radiology:    Physical Exam:  General: WN/WD NAD  Neurology: A&Ox3, nonfocal, PENG x 4  Head:  Normocephalic, atraumatic  Respiratory: CTA B/L  CV: RRR, S1S2, no murmur  Abdominal: Soft, NT, ND no palpable mass  MSK: No edema, + peripheral pulses, FROM all 4 extremity    Assessment & Plan:  HPI:   89yo M pmhx COPD, bipolar, monoclonal gammopathy, anemia with history transfusions and iron infusions, afib on eliquis, DM (no longer on meds) presents with hypoxia. Pt's visiting nurse was checking vitals of pt, found to be hypoxic to 70's. Pt at that time not complaining of sob, dyspnia, was not doing anything active at that time. Per daughter, pt was confused this morning, stating he had a friend who was visiting for 3 hrs, calling daughter about aid not arriving at early in AM. not on oxygen at home, uses walker at home. PT denies chest pain, sob, abd pain, nausea, vomitting. currently aox2  (26 Mar 2021 18:24)      Plan:  #Agitation  - Psych following, recommending Ativan IV/IM for acute restlessness.   - Ativan 0.25 IM ordered STAT. Monitor for any signs/symptoms of respiratory depression.    - If patient continues to be agitated, will give additional ativan.   - Reorient patient.   - Bed Alarm  - Replace IV.   - Plan D/w RN.      Endorse to AM team.   Follow up with Attending in AM.  Maurilio CROCKETT  Dept of Medicine  [id number]. Medicine PA Episodic Note    Patient is a 89y old  Male who presents with a chief complaint of Hypoxemia (28 Mar 2021 13:36)    Notified by RN that patient is agitated and combative w/staff. Patient seen and assessed by me. Patient is A&Ox1. Patient removed gown, IV, and attempting to come out of bed.       Vital Signs Last 24 Hrs  T(C): 36.4 (03 Apr 2021 19:45), Max: 36.8 (03 Apr 2021 13:37)  T(F): 97.6 (03 Apr 2021 19:45), Max: 98.2 (03 Apr 2021 13:37)  HR: 83 (03 Apr 2021 20:55) (83 - 137)  BP: 116/49 (03 Apr 2021 20:55) (100/63 - 125/56)  BP(mean): --  RR: 17 (03 Apr 2021 20:55) (16 - 17)  SpO2: 98% (03 Apr 2021 20:55) (98% - 99%)      Labs:                          7.8    12.96 )-----------( 428      ( 03 Apr 2021 06:53 )             27.3     04-03    139  |  103  |  51<H>  ----------------------------<  166<H>  4.7   |  26  |  2.07<H>    Ca    9.2      03 Apr 2021 10:43  Phos  4.1     04-03  Mg     2.3     04-03    Radiology:    Physical Exam:  General: WN/WD NAD  Neurology: A&Ox3, nonfocal, PENG x 4  Head:  Normocephalic, atraumatic  Respiratory: CTA B/L  CV: RRR, S1S2, no murmur  Abdominal: Soft, NT, ND no palpable mass  MSK: No edema, + peripheral pulses, FROM all 4 extremity    Assessment & Plan:  HPI:   89yo M pmhx COPD, bipolar, monoclonal gammopathy, anemia with history transfusions and iron infusions, afib on eliquis, DM (no longer on meds) presents with hypoxia. Pt's visiting nurse was checking vitals of pt, found to be hypoxic to 70's. Pt at that time not complaining of sob, dyspnia, was not doing anything active at that time. Per daughter, pt was confused this morning, stating he had a friend who was visiting for 3 hrs, calling daughter about aid not arriving at early in AM. not on oxygen at home, uses walker at home. PT denies chest pain, sob, abd pain, nausea, vomitting. currently aox2  (26 Mar 2021 18:24)      Plan:  #Agitation  - Psych following, recommending Ativan IV/IM for acute restlessness.   - Ativan 0.25 IM ordered STAT. Monitor for any signs/symptoms of respiratory depression.    - If patient continues to be agitated, will give additional ativan.  - Psych note appreciated. Psych f/u in AM.   - C/w constant observation.   - Reorient patient.   - Bed Alarm  - Replace IV.   - Plan D/w RN.      Endorse to AM team.   Follow up with Attending in AM.  Maurilio CROCKETT  Dept of Medicine  [id number]. Medicine PA Episodic Note    Patient is a 89y old  Male who presents with a chief complaint of Hypoxemia (28 Mar 2021 13:36)    Notified by RN that patient is agitated and combative w/staff. Patient seen and assessed by me. Patient is A&Ox2-3. Patient removed gown, IV, and attempting to come out of bed.       Vital Signs Last 24 Hrs  T(C): 36.4 (03 Apr 2021 19:45), Max: 36.8 (03 Apr 2021 13:37)  T(F): 97.6 (03 Apr 2021 19:45), Max: 98.2 (03 Apr 2021 13:37)  HR: 83 (03 Apr 2021 20:55) (83 - 137)  BP: 116/49 (03 Apr 2021 20:55) (100/63 - 125/56)  BP(mean): --  RR: 17 (03 Apr 2021 20:55) (16 - 17)  SpO2: 98% (03 Apr 2021 20:55) (98% - 99%)      Labs:                          7.8    12.96 )-----------( 428      ( 03 Apr 2021 06:53 )             27.3     04-03    139  |  103  |  51<H>  ----------------------------<  166<H>  4.7   |  26  |  2.07<H>    Ca    9.2      03 Apr 2021 10:43  Phos  4.1     04-03  Mg     2.3     04-03    Radiology:    Physical Exam:  General: WN/WD NAD  Neurology: A&Ox3, nonfocal, PENG x 4  Head:  Normocephalic, atraumatic  Respiratory: CTA B/L  CV: RRR, S1S2, no murmur  Abdominal: Soft, NT, ND no palpable mass  MSK: No edema, + peripheral pulses, FROM all 4 extremity    Assessment & Plan:  HPI:   89yo M pmhx COPD, bipolar, monoclonal gammopathy, anemia with history transfusions and iron infusions, afib on eliquis, DM (no longer on meds) presents with hypoxia. Pt's visiting nurse was checking vitals of pt, found to be hypoxic to 70's. Pt at that time not complaining of sob, dyspnia, was not doing anything active at that time. Per daughter, pt was confused this morning, stating he had a friend who was visiting for 3 hrs, calling daughter about aid not arriving at early in AM. not on oxygen at home, uses walker at home. PT denies chest pain, sob, abd pain, nausea, vomitting. currently aox2  (26 Mar 2021 18:24)      Plan:  #Agitation  - Psych following, recommending Ativan IV/IM for acute restlessness.   - Ativan 0.25 IM ordered STAT. Monitor for any signs/symptoms of respiratory depression.    - If patient continues to be agitated, will give additional ativan.  - Psych note appreciated. Psych f/u in AM.   - C/w constant observation.   - Reorient patient.   - Bed Alarm  - Replace IV.   - Plan D/w RN.      Endorse to AM team.   Follow up with Attending in AM.  Maurilio CROCKETT  Dept of Medicine  [id number].

## 2021-04-04 NOTE — CHART NOTE - NSCHARTNOTEFT_GEN_A_CORE
Medicine PA Episodic Note    Patient is a 89y old  Male who presents with a chief complaint of Hypoxemia (28 Mar 2021 13:36)    Notified by RN that patient converted back from NSR to Afib w/RVR.b Patient w/HR between 120 - 130s, sustaining.   Unable to obtain ros given patient's mental status.       Vital Signs Last 24 Hrs  T(C): 36.3 (04 Apr 2021 00:56), Max: 36.8 (03 Apr 2021 13:37)  T(F): 97.3 (04 Apr 2021 00:56), Max: 98.2 (03 Apr 2021 13:37)  HR: 110 (04 Apr 2021 00:56) (83 - 137)  BP: 132/79 (04 Apr 2021 00:56) (100/63 - 132/79)  BP(mean): --  RR: 17 (04 Apr 2021 00:56) (16 - 17)  SpO2: 98% (04 Apr 2021 00:56) (98% - 99%)      Labs:                          7.8    12.96 )-----------( 428      ( 03 Apr 2021 06:53 )             27.3     04-03    139  |  103  |  51<H>  ----------------------------<  166<H>  4.7   |  26  |  2.07<H>    Ca    9.2      03 Apr 2021 10:43  Phos  4.1     04-03  Mg     2.3     04-03      Radiology:      Physical Exam:  General: WN/WD NAD  Neurology: A&Ox3, nonfocal, PENG x 4  Head:  Normocephalic, atraumatic  Respiratory: CTA B/L  CV: RRR, S1S2, no murmur  Abdominal: Soft, NT, ND no palpable mass  MSK: No edema, + peripheral pulses, FROM all 4 extremity    Assessment & Plan:  HPI:   89yo M pmhx COPD, bipolar, monoclonal gammopathy, anemia with history transfusions and iron infusions, afib on eliquis, DM (no longer on meds) presents with hypoxia. Pt's visiting nurse was checking vitals of pt, found to be hypoxic to 70's. Pt at that time not complaining of sob, dyspnia, was not doing anything active at that time. Per daughter, pt was confused this morning, stating he had a friend who was visiting for 3 hrs, calling daughter about aid not arriving at early in AM. not on oxygen at home, uses walker at home. PT denies chest pain, sob, abd pain, nausea, vomitting. currently aox2  (26 Mar 2021 18:24)    Plan:  # Afib w/RVR  - Lopressor 5 mg IVP; patient now converted back to SR.  - Monitor for any signs/symptoms of hypotension.  - C/w cardizem and metoprolol as ordered.   - AC on hold.  - HF following.   - Cardiology c/s placed during day.   - Plan d/w RN    Endorse to AM team.   Follow up with Attending in AM.  Maurilio CROCKETT  Dept of Medicine  [id number] Medicine PA Episodic Note    Patient is a 89y old  Male who presents with a chief complaint of Hypoxemia (28 Mar 2021 13:36)    Notified by RN that patient converted back from NSR to Afib w/RVR.b Patient w/HR between 120 - 130s, sustaining.   Unable to obtain ros given patient's mental status.       Vital Signs Last 24 Hrs  T(C): 36.3 (04 Apr 2021 00:56), Max: 36.8 (03 Apr 2021 13:37)  T(F): 97.3 (04 Apr 2021 00:56), Max: 98.2 (03 Apr 2021 13:37)  HR: 110 (04 Apr 2021 00:56) (83 - 137)  BP: 132/79 (04 Apr 2021 00:56) (100/63 - 132/79)  BP(mean): --  RR: 17 (04 Apr 2021 00:56) (16 - 17)  SpO2: 98% (04 Apr 2021 00:56) (98% - 99%)      Labs:                          7.8    12.96 )-----------( 428      ( 03 Apr 2021 06:53 )             27.3     04-03    139  |  103  |  51<H>  ----------------------------<  166<H>  4.7   |  26  |  2.07<H>    Ca    9.2      03 Apr 2021 10:43  Phos  4.1     04-03  Mg     2.3     04-03      Radiology:      Physical Exam:  General: WN/WD NAD  Neurology: A&Ox3, nonfocal, PENG x 4  Head:  Normocephalic, atraumatic  Respiratory: CTA B/L  CV: RRR, S1S2, no murmur  Abdominal: Soft, NT, ND no palpable mass  MSK: No edema, + peripheral pulses, FROM all 4 extremity    Assessment & Plan:  HPI:   89yo M pmhx COPD, bipolar, monoclonal gammopathy, anemia with history transfusions and iron infusions, afib on eliquis, DM (no longer on meds) presents with hypoxia. Pt's visiting nurse was checking vitals of pt, found to be hypoxic to 70's. Pt at that time not complaining of sob, dyspnia, was not doing anything active at that time. Per daughter, pt was confused this morning, stating he had a friend who was visiting for 3 hrs, calling daughter about aid not arriving at early in AM. not on oxygen at home, uses walker at home. PT denies chest pain, sob, abd pain, nausea, vomitting. currently aox2  (26 Mar 2021 18:24)    Plan:  # Afib w/RVR  - Lopressor 5 mg IVP; patient now converted back to SR.  - Monitor for any signs/symptoms of hypotension.  - C/w cardizem and metoprolol as ordered.   - AC on hold.  - HF following.   - Cardiology c/s placed during day.   - Plan d/w RN  - C/w 1:1 for patient safety.     Endorse to AM team.   Follow up with Attending in AM.  Maurilio CROCKETT  Dept of Medicine  [id number] Medicine PA Episodic Note    Patient is a 89y old  Male who presents with a chief complaint of Hypoxemia (28 Mar 2021 13:36)    Notified by RN that patient converted back from NSR to Afib w/RVR.b Patient w/HR between 120 - 130s, sustaining.   Unable to obtain ros given patient's mental status.       Vital Signs Last 24 Hrs  T(C): 36.3 (04 Apr 2021 00:56), Max: 36.8 (03 Apr 2021 13:37)  T(F): 97.3 (04 Apr 2021 00:56), Max: 98.2 (03 Apr 2021 13:37)  HR: 110 (04 Apr 2021 00:56) (83 - 137)  BP: 132/79 (04 Apr 2021 00:56) (100/63 - 132/79)  BP(mean): --  RR: 17 (04 Apr 2021 00:56) (16 - 17)  SpO2: 98% (04 Apr 2021 00:56) (98% - 99%)      Labs:                          7.8    12.96 )-----------( 428      ( 03 Apr 2021 06:53 )             27.3     04-03    139  |  103  |  51<H>  ----------------------------<  166<H>  4.7   |  26  |  2.07<H>    Ca    9.2      03 Apr 2021 10:43  Phos  4.1     04-03  Mg     2.3     04-03      Radiology:      Physical Exam:  General: WN/WD NAD  Neurology: A&Ox3, nonfocal, PENG x 4  Head:  Normocephalic, atraumatic  Respiratory: CTA B/L  CV: RRR, S1S2, no murmur  Abdominal: Soft, NT, ND no palpable mass  MSK: No edema, + peripheral pulses, FROM all 4 extremity    Assessment & Plan:  HPI:   89yo M pmhx COPD, bipolar, monoclonal gammopathy, anemia with history transfusions and iron infusions, afib on eliquis, DM (no longer on meds) presents with hypoxia. Pt's visiting nurse was checking vitals of pt, found to be hypoxic to 70's. Pt at that time not complaining of sob, dyspnia, was not doing anything active at that time. Per daughter, pt was confused this morning, stating he had a friend who was visiting for 3 hrs, calling daughter about aid not arriving at early in AM. not on oxygen at home, uses walker at home. PT denies chest pain, sob, abd pain, nausea, vomitting. currently aox2  (26 Mar 2021 18:24)    Plan:  # Afib w/RVR  - Lopressor 5 mg IVP; patient now converted back to SR.  - Monitor for any signs/symptoms of hypotension.  - C/w cardizem 180 mg PO daily and metoprolol 25mg BID as ordered.   - AC on hold given hx of (+) FOBT.  - Cardiology f/u in AM.  - Plan d/w RN  - C/w 1:1 for patient safety.     Endorse to AM team.   Follow up with Attending in AM.  Maurilio CROCKETT  Dept of Medicine  [id number] Medicine PA Episodic Note    Patient is a 89y old  Male who presents with a chief complaint of Hypoxemia (28 Mar 2021 13:36)    Notified by RN that patient converted back from NSR to Afib w/RVR.b Patient w/HR between 120 - 130s, sustaining. Patient A&O 2-3.   Unable to obtain ros given patient's mental status, however appears to be asymptomatic.       Vital Signs Last 24 Hrs  T(C): 36.3 (04 Apr 2021 00:56), Max: 36.8 (03 Apr 2021 13:37)  T(F): 97.3 (04 Apr 2021 00:56), Max: 98.2 (03 Apr 2021 13:37)  HR: 110 (04 Apr 2021 00:56) (83 - 137)  BP: 132/79 (04 Apr 2021 00:56) (100/63 - 132/79)  BP(mean): --  RR: 17 (04 Apr 2021 00:56) (16 - 17)  SpO2: 98% (04 Apr 2021 00:56) (98% - 99%)      Labs:                          7.8    12.96 )-----------( 428      ( 03 Apr 2021 06:53 )             27.3     04-03    139  |  103  |  51<H>  ----------------------------<  166<H>  4.7   |  26  |  2.07<H>    Ca    9.2      03 Apr 2021 10:43  Phos  4.1     04-03  Mg     2.3     04-03      Radiology:      Physical Exam:  General: WN/WD NAD  Neurology: A&Ox3, nonfocal, PENG x 4  Head:  Normocephalic, atraumatic  Respiratory: CTA B/L  CV: RRR, S1S2, no murmur  Abdominal: Soft, NT, ND no palpable mass  MSK: No edema, + peripheral pulses, FROM all 4 extremity    Assessment & Plan:  HPI:   89yo M pmhx COPD, bipolar, monoclonal gammopathy, anemia with history transfusions and iron infusions, afib on eliquis, DM (no longer on meds) presents with hypoxia. Pt's visiting nurse was checking vitals of pt, found to be hypoxic to 70's. Pt at that time not complaining of sob, dyspnia, was not doing anything active at that time. Per daughter, pt was confused this morning, stating he had a friend who was visiting for 3 hrs, calling daughter about aid not arriving at early in AM. not on oxygen at home, uses walker at home. PT denies chest pain, sob, abd pain, nausea, vomitting. currently aox2  (26 Mar 2021 18:24)    Plan:  # Afib w/RVR  - Lopressor 5 mg IVP; patient now converted back to SR.  - Monitor for any signs/symptoms of hypotension.  - C/w cardizem 180 mg PO daily and metoprolol 25mg BID as ordered.   - AC on hold given hx of (+) FOBT.  - Cardiology f/u in AM.  - Plan d/w RN  - C/w 1:1 for patient safety.     Endorse to AM team.   Follow up with Attending in AM.  Maurilio CROCKETT  Dept of Medicine  [id number] Medicine PA Episodic Note    Patient is a 89y old  Male who presents with a chief complaint of Hypoxemia (28 Mar 2021 13:36)    Notified by RN that patient converted back from NSR to Afib w/RVR.b Patient w/HR between 120 - 130s, sustaining. Patient A&O 2-3.   Unable to obtain ros given patient's mental status, however appears to be asymptomatic.       Vital Signs Last 24 Hrs  T(C): 36.3 (04 Apr 2021 00:56), Max: 36.8 (03 Apr 2021 13:37)  T(F): 97.3 (04 Apr 2021 00:56), Max: 98.2 (03 Apr 2021 13:37)  HR: 110 (04 Apr 2021 00:56) (83 - 137)  BP: 132/79 (04 Apr 2021 00:56) (100/63 - 132/79)  BP(mean): --  RR: 17 (04 Apr 2021 00:56) (16 - 17)  SpO2: 98% (04 Apr 2021 00:56) (98% - 99%)      Labs:                          7.8    12.96 )-----------( 428      ( 03 Apr 2021 06:53 )             27.3     04-03    139  |  103  |  51<H>  ----------------------------<  166<H>  4.7   |  26  |  2.07<H>    Ca    9.2      03 Apr 2021 10:43  Phos  4.1     04-03  Mg     2.3     04-03      Radiology:  < from: CT Head No Cont (03.26.21 @ 19:47) >    IMPRESSION:  There is no acute intracranial hemorrhage, mass effect, midline shift or extra axial collections. Age-appropriate involutional and ischemic gliotic changes without change from 2/17/2021.    < end of copied text >      Physical Exam:  General: WN/WD NAD  Neurology: A&Ox3, nonfocal, PENG x 4  Head:  Normocephalic, atraumatic  Respiratory: CTA B/L  CV: RRR, S1S2, no murmur  Abdominal: Soft, NT, ND no palpable mass  MSK: No edema, + peripheral pulses, FROM all 4 extremity    Assessment & Plan:  HPI:   89yo M pmhx COPD, bipolar, monoclonal gammopathy, anemia with history transfusions and iron infusions, afib on eliquis, DM (no longer on meds) presents with hypoxia. Pt's visiting nurse was checking vitals of pt, found to be hypoxic to 70's. Pt at that time not complaining of sob, dyspnia, was not doing anything active at that time. Per daughter, pt was confused this morning, stating he had a friend who was visiting for 3 hrs, calling daughter about aid not arriving at early in AM. not on oxygen at home, uses walker at home. PT denies chest pain, sob, abd pain, nausea, vomitting. currently aox2  (26 Mar 2021 18:24)    Plan:  # Afib w/RVR (HR sustaining btw 120s - 130s)   - Lopressor 5 mg IVP; patient now converted back to SR.  - Monitor for any signs/symptoms of hypotension.  - C/w cardizem 180 mg PO daily and metoprolol 25mg BID as ordered.   - AC on hold given hx of (+) FOBT.  - Cardiology f/u in AM.  - Plan d/w RN  - C/w 1:1 for patient safety.     Endorse to AM team.   Follow up with Attending in AM.  Maurilio CROCKETT  Dept of Medicine  [id number]      --------------- ADDENDUM    Vital Signs Last 24 Hrs  T(C): 36.5 (04 Apr 2021 05:57), Max: 36.8 (03 Apr 2021 13:37)  T(F): 97.7 (04 Apr 2021 05:57), Max: 98.2 (03 Apr 2021 13:37)  HR: 99 (04 Apr 2021 05:57) (83 - 132)  BP: 150/55 (04 Apr 2021 05:57) (112/61 - 150/55)  BP(mean): --  RR: 19 (04 Apr 2021 05:57) (16 - 19)  SpO2: 100% (04 Apr 2021 05:57) (98% - 100%)      D/w Dr. Serrano (Cardiology); uptitrate Metoprolol 25 BID to Metoprolol 50 BID w/hold parameters.   Patient given Metoprolol 25 mg this AM; will order additional Metoprolol 25 mg for a total of Metoprolol 50 this AM.   Plan d/w RN.   Endorsed to AM team.   Attending to follow in AM.       Maurilio CROCKETT  Dept of Medicine [id number]

## 2021-04-04 NOTE — PROGRESS NOTE BEHAVIORAL HEALTH - NSBHCHARTREVIEWVS_PSY_A_CORE FT
Vital Signs Last 24 Hrs  T(C): 36.7 (04 Apr 2021 13:18), Max: 36.7 (04 Apr 2021 13:18)  T(F): 98.1 (04 Apr 2021 13:18), Max: 98.1 (04 Apr 2021 13:18)  HR: 87 (04 Apr 2021 13:18) (83 - 110)  BP: 134/76 (04 Apr 2021 13:18) (116/49 - 150/55)  BP(mean): --  RR: 18 (04 Apr 2021 13:18) (17 - 19)  SpO2: 97% (04 Apr 2021 13:18) (97% - 100%)
ICU Vital Signs Last 24 Hrs  T(C): 36.4 (02 Apr 2021 04:30), Max: 36.4 (02 Apr 2021 04:30)  T(F): 97.5 (02 Apr 2021 04:30), Max: 97.5 (02 Apr 2021 04:30)  HR: 85 (02 Apr 2021 04:30) (85 - 112)  BP: 117/56 (02 Apr 2021 04:30) (98/60 - 117/56)  BP(mean): --  ABP: --  ABP(mean): --  RR: 18 (02 Apr 2021 04:30) (18 - 18)  SpO2: 94% (02 Apr 2021 04:30) (94% - 96%)
Vital Signs Last 24 Hrs  T(C): 36.8 (03 Apr 2021 13:37), Max: 36.9 (02 Apr 2021 21:43)  T(F): 98.2 (03 Apr 2021 13:37), Max: 98.5 (02 Apr 2021 21:43)  HR: 109 (03 Apr 2021 13:37) (88 - 137)  BP: 125/56 (03 Apr 2021 13:37) (100/63 - 149/64)  BP(mean): --  RR: 17 (03 Apr 2021 13:37) (16 - 18)  SpO2: 99% (03 Apr 2021 13:37) (98% - 99%)

## 2021-04-04 NOTE — PROGRESS NOTE ADULT - SUBJECTIVE AND OBJECTIVE BOX
[facility]   MD JONO YEE MD RUORU WONG, PA    TEL:  OFFICE: [phone]  DR TAM CELL: [phone]  GARY MACK CELL: [phone]  DR. ZHANG CELL: [phone]  DR. MCKAY CELL: [phone]    FROM 5 PM - 7 AM PLEASE CALL ANSWERING SERVICE: [phone]    RENAL FOLLOW UP NOTE--Date of Service 04-04-21 @ 09:42  --------------------------------------------------------------------------------  HPI:      Pt seen and examined at bedside.     PAST HISTORY  --------------------------------------------------------------------------------  No significant changes to PMH, PSH, FHx, SHx, unless otherwise noted    ALLERGIES & MEDICATIONS  --------------------------------------------------------------------------------  Allergies    Tylenol with Codeine #3 (Other)    Intolerances      Standing Inpatient Medications  albuterol/ipratropium for Nebulization 3 milliLiter(s) Nebulizer every 6 hours  buDESOnide    Inhalation Suspension 0.5 milliGRAM(s) Inhalation every 12 hours  dextrose 40% Gel 15 Gram(s) Oral once  dextrose 5%. 1000 milliLiter(s) IV Continuous <Continuous>  dextrose 5%. 1000 milliLiter(s) IV Continuous <Continuous>  dextrose 50% Injectable 25 Gram(s) IV Push once  dextrose 50% Injectable 12.5 Gram(s) IV Push once  dextrose 50% Injectable 25 Gram(s) IV Push once  diltiazem    milliGRAM(s) Oral daily  fluticasone propionate 50 MICROgram(s)/spray Nasal Spray 2 Spray(s) Both Nostrils two times a day  glucagon  Injectable 1 milliGRAM(s) IntraMuscular once  insulin lispro (ADMELOG) corrective regimen sliding scale   SubCutaneous three times a day before meals  insulin lispro (ADMELOG) corrective regimen sliding scale   SubCutaneous at bedtime  lamoTRIgine 25 milliGRAM(s) Oral at bedtime  melatonin 3 milliGRAM(s) Oral at bedtime  methimazole 2.5 milliGRAM(s) Oral daily  metoprolol tartrate 50 milliGRAM(s) Oral two times a day  midodrine. 5 milliGRAM(s) Oral three times a day  multivitamin 1 Tablet(s) Oral daily  pantoprazole    Tablet 40 milliGRAM(s) Oral two times a day  sodium chloride 0.9%. 1000 milliLiter(s) IV Continuous <Continuous>    PRN Inpatient Medications  acetaminophen   Tablet .. 650 milliGRAM(s) Oral every 6 hours PRN  polyethylene glycol 3350 17 Gram(s) Oral daily PRN      REVIEW OF SYSTEMS  --------------------------------------------------------------------------------  General: no fever    MSK: no edema     VITALS/PHYSICAL EXAM  --------------------------------------------------------------------------------  T(C): 36.5 (04-04-21 @ 05:57), Max: 36.8 (04-03-21 @ 13:37)  HR: 99 (04-04-21 @ 05:57) (83 - 132)  BP: 150/55 (04-04-21 @ 05:57) (112/61 - 150/55)  RR: 19 (04-04-21 @ 05:57) (16 - 19)  SpO2: 100% (04-04-21 @ 05:57) (98% - 100%)  Wt(kg): --        04-03-21 @ 07:01  -  04-04-21 @ 07:00  --------------------------------------------------------  IN: 260 mL / OUT: 150 mL / NET: 110 mL      Physical Exam:  	Gen: NAD  	HEENT: MMM  	Pulm: CTA B/L  	CV: S1S2  	Abd: Soft, +BS  	Ext: No LE edema B/L                      Neuro: Awake   	Skin: Warm and Dry   	Vascular access: no HD catheter           no greene  LABS/STUDIES  --------------------------------------------------------------------------------              8.4    15.83 >-----------<  532      [04-04-21 @ 06:03]              29.4     141  |  104  |  49  ----------------------------<  225      [04-04-21 @ 06:03]  5.1   |  23  |  2.12        Ca     9.5     [04-04-21 @ 06:03]      Mg     2.3     [04-04-21 @ 06:03]      Phos  4.1     [04-03-21 @ 06:51]            Creatinine Trend:  SCr 2.12 [04-04 @ 06:03]  SCr 2.07 [04-03 @ 10:43]  SCr 2.04 [04-03 @ 06:51]  SCr 2.29 [04-02 @ 05:20]  SCr 2.53 [04-01 @ 05:49]    Urinalysis - [04-02-21 @ 05:22]      Color Yellow / Appearance Slightly Turbid / SG 1.017 / pH 5.5      Gluc Negative / Ketone Negative  / Bili Negative / Urobili Negative       Blood Negative / Protein 30 mg/dL / Leuk Est Negative / Nitrite Negative      RBC 1 / WBC 5 / Hyaline 2 / Gran  / Sq Epi  / Non Sq Epi 2 / Bacteria Negative    Urine Creatinine 105      [04-02-21 @ 05:22]  Urine Sodium 20      [04-02-21 @ 05:22]    Iron 50, TIBC 177, %sat 28      [03-29-21 @ 17:47]  Ferritin 156      [03-29-21 @ 08:36]  HbA1c 5.5      [08-26-18 @ 08:24]  TSH 0.45      [03-27-21 @ 10:03]      Free Light Chains: kappa 56.60, lambda 2.08, ratio = 27.21      [03-30 @ 12:05]  Immunofixation Serum:   IgM Kappa Band Identified    Reference Range: None Detected      [03-30-21 @ 12:05]  SPEP Interpretation: Gamma-Migrating Paraprotein Identified      [03-30-21 @ 12:05]

## 2021-04-04 NOTE — PROGRESS NOTE BEHAVIORAL HEALTH - PRIMARY DX
Dementia with behavioral disturbance, unspecified dementia type
Delirium due to another medical condition
Dementia with behavioral disturbance, unspecified dementia type

## 2021-04-04 NOTE — PROGRESS NOTE ADULT - ASSESSMENT
89yo M pmhx COPD, bipolar, monoclonal gammopathy, anemia with history transfusions and iron infusions, afib on eliquis, DM (no longer on meds) admitted with hypoxia.     HARJEET on CKD  Baseline Scr 1.6-1.7  h/o multiple HARJEET in the past  HARJEET likely sec to hemodynamic changes; likely from hypotension/ tachycardia  FeNa is consistent with pre-renal etiology  SCr fluctuating  Monitor I/O  US has no hydro  UA bland. Will check spot urine TP/Cr given known monoclonal gammopathy   MOnitor renal function    Anemia  Hb low  no evidence of iron def    Hyperkalemia  hemodlyzed specimen   Repeat serum K  WNL

## 2021-04-04 NOTE — CHART NOTE - NSCHARTNOTEFT_GEN_A_CORE
MICU Accept Note    CHIEF COMPLAINT: Aspiration PNA    HPI / INTERVAL HISTORY:    89y M PMHx COPD, bipolar, monoclonal gammopathy, anemia, afib (eliquis), Type 2 DM (A1c 6.7 2/21) p/w hypoxia. Pt's visiting nurse was checking vitals of pt, found to be hypoxic to 70's. Pt at that time not complaining of sob, dyspnia, was not doing anything active at that time. Per daughter, pt was confused this morning, stating he had a friend who was visiting for 3 hrs, calling daughter about aid not arriving at early in AM. not on oxygen at home, uses walker at home. PT denies chest pain, sob, abd pain, nausea, vomitting. currently aox2     PAST MEDICAL & SURGICAL HISTORY:  Atrial fibrillation  COPD (chronic obstructive pulmonary disease)  Diabetes type 2, controlled  Goiter  History of total hip arthroplasty, right    FAMILY HISTORY:  No pertinent family history in first degree relatives    SOCIAL HISTORY:    Allergies  Tylenol with Codeine #3 (Other)    Intolerances  Haldol (Other; Dystonic RXN)    REVIEW OF SYSTEMS:  Unable to obtain as intubated, sedated    OBJECTIVE:  ICU Vital Signs Last 24 Hrs  T(C): 36.7 (04 Apr 2021 13:18), Max: 36.7 (04 Apr 2021 13:18)  T(F): 98.1 (04 Apr 2021 13:18), Max: 98.1 (04 Apr 2021 13:18)  HR: 72 (04 Apr 2021 20:30) (71 - 110)  BP: 112/63 (04 Apr 2021 20:30) (112/63 - 150/55)  BP(mean): --  ABP: --  ABP(mean): --  RR: 17 (04 Apr 2021 20:30) (16 - 19)  SpO2: 97% (04 Apr 2021 17:36) (97% - 100%)    04-03 @ 07:01  -  04-04 @ 07:00  --------------------------------------------------------  IN: 260 mL / OUT: 150 mL / NET: 110 mL    CAPILLARY BLOOD GLUCOSE  POCT Blood Glucose.: 133 mg/dL (04 Apr 2021 20:32)    PHYSICAL EXAM:  CONSTITUTIONAL: No acute distress. Awake and alert.  HEAD: No evidence of trauma. Structures WNL.  EYES: +PERRL. +EOMI. No scleral icterus. No conjunctival injection.  ENT: Moist oral mucosa. No erythema. No pharyngeal exudates.   NECK: Supple. Appropriate ROM. No stiffness. No masses or lymphadenopathy.  RESPIRATORY: CTAB. No wheezes, rales, or rhonchi. No accessory muscle use. No apparent respiratory distress.  CARDIOVASCULAR: +S1/S2. No audible S3/S4. Regular rate and rhythm. No murmurs, rubs, or gallops. 2+ radial pulses x b/l UE; 2+ DP pulses x b/l LE.   GASTROINTESTINAL: Soft, nontender, nondistended. +BS. No rebound or guarding.   BACK: No spinal or paraspinal tenderness. No CVA tenderness.  EXTREMITY: No LE swelling or edema. EXTs warm to touch.  MUSCULOSKELETAL: Spontaneous movement in all extremities.  DERMATOLOGICAL: No abnormal rashes or lesions.  NEUROLOGICAL: CN 2-12 grossly intact. No focal deficits. Sensation intact x 4EXT. A&Ox3 (oriented to person, place, and time).  PSYCHIATRIC: Appropriate affect.    HOSPITAL MEDICATIONS:  MEDICATIONS  (STANDING):  albuterol/ipratropium for Nebulization 3 milliLiter(s) Nebulizer every 6 hours  buDESOnide    Inhalation Suspension 0.5 milliGRAM(s) Inhalation every 12 hours  clonazePAM  Tablet 0.25 milliGRAM(s) Oral <User Schedule>  dextrose 40% Gel 15 Gram(s) Oral once  dextrose 5%. 1000 milliLiter(s) (50 mL/Hr) IV Continuous <Continuous>  dextrose 5%. 1000 milliLiter(s) (100 mL/Hr) IV Continuous <Continuous>  dextrose 50% Injectable 25 Gram(s) IV Push once  dextrose 50% Injectable 12.5 Gram(s) IV Push once  dextrose 50% Injectable 25 Gram(s) IV Push once  diltiazem    milliGRAM(s) Oral daily  fluticasone propionate 50 MICROgram(s)/spray Nasal Spray 2 Spray(s) Both Nostrils two times a day  glucagon  Injectable 1 milliGRAM(s) IntraMuscular once  insulin lispro (ADMELOG) corrective regimen sliding scale   SubCutaneous three times a day before meals  insulin lispro (ADMELOG) corrective regimen sliding scale   SubCutaneous at bedtime  lamoTRIgine 50 milliGRAM(s) Oral at bedtime  methimazole 2.5 milliGRAM(s) Oral daily  metoprolol tartrate 50 milliGRAM(s) Oral two times a day  midodrine. 5 milliGRAM(s) Oral three times a day  multivitamin 1 Tablet(s) Oral daily  pantoprazole    Tablet 40 milliGRAM(s) Oral two times a day  piperacillin/tazobactam IVPB. 3.375 Gram(s) IV Intermittent once  sodium chloride 0.9%. 1000 milliLiter(s) (75 mL/Hr) IV Continuous <Continuous>  vancomycin  IVPB        MEDICATIONS  (PRN):  acetaminophen   Tablet .. 650 milliGRAM(s) Oral every 6 hours PRN Temp greater or equal to 38.5C (101.3F), Mild Pain (1 - 3)  melatonin 3 milliGRAM(s) Oral at bedtime PRN Insomnia  polyethylene glycol 3350 17 Gram(s) Oral daily PRN for constipation    LABS:                        8.4    15.83 )-----------( 532      ( 04 Apr 2021 06:03 )             29.4     Hgb Trend: 8.4<--, 7.8<--, 7.9<--, 7.7<--, 8.3<--  04-04    141  |  104  |  49<H>  ----------------------------<  225<H>  5.1   |  23  |  2.12<H>    Ca    9.5      04 Apr 2021 06:03  Phos  4.1     04-03  Mg     2.3     04-04    Creatinine Trend: 2.12<--, 2.07<--, 2.04<--, 2.29<--, 2.53<--, 1.66<--    MICROBIOLOGY:     RADIOLOGY & ADDITIONAL TESTS:

## 2021-04-04 NOTE — PROGRESS NOTE ADULT - PROVIDER SPECIALTY LIST ADULT
Gastroenterology
Heme/Onc
Heme/Onc
Internal Medicine
Cardiology
Gastroenterology
Internal Medicine
Internal Medicine
Nephrology
Nephrology
Pulmonology
Cardiology
Heme/Onc
Internal Medicine
Nephrology
Pulmonology
Gastroenterology
Pulmonology
Pulmonology
Cardiology
Internal Medicine
Cardiology

## 2021-04-04 NOTE — PROGRESS NOTE BEHAVIORAL HEALTH - NSBHFUPINTERVALHXFT_PSY_A_CORE
pt lethargic, mostly nonverbal, not answering most questions. pt confused, currently calm, lying in bed. as per staff, pt was agitated this morning, yelling, trying to get out bed. pt received haldol prns x3 last night.
Pt appeared drowsy but with tremors today.  He reportedly was more comfortable after Clonazepam 0.25mg yesterday afternoon.   Pt was sitting up in bed on O2 via NC, and appeared tremulous, and very uncomfortable, complaining that he wants to get out of bed because he is restless. He had received a few dose of Haldol 0.5 and 1mg over the past few days and appear slightly dystonic, tremulous, and restless.  He may have developed akathisia secondary to the Haldol.  He denies any other acute psychiatric symptoms and is currently cooperative with the staff observing him.
Pt is sitting up in bed on O2 via NC, and appears tremulous, and very uncomfortable, complaining that he wants to get out of bed because he is restless. He has received a few dose of Haldol 0.5 and 1mg over the past few days and appear slightly dystonic, tremulous, and restless.  He may have developed akathisia secondary to the Haldol.  He denies any other acute psychiatric symptoms and is currently cooperative with the staff observing him.

## 2021-04-04 NOTE — PROGRESS NOTE ADULT - ASSESSMENT
a/p    89 year old man with history of COPD, bipolar, monoclonal gammopathy, anemia with history transfusions and iron infusions, afib on a/c, DM, admitted with hypoxia    1. Hypoxic respiratory failure  -? secondary to aspiration vs PNA  -not clinically overloaded, no decomp HF  -s/p abx per medicine, pulmonary  -aspirations precautions  -CT chest noted with Small to moderate size loculated left pleural effusion.  -LE doppler neg for DVT   -f/u pulm/med     2. Pafib w RVR (hx)/ Sinus tachycardia   -rates elevated in setting of anemia, hypovolemia, agitation   -pt still w PAF  -increase BB today as tolerated   -c/w mido to augment bp when feasible   -cont to hold a/c for now, gi f/u  -dig level wnl     3. Anemia, r/o GI bleed   -PRBC as needed   -Positive occult noted   -pt ref EGD at this time,  -GI workup    dvt ppx      plan discussed with ACP

## 2021-04-04 NOTE — PROGRESS NOTE BEHAVIORAL HEALTH - RISK ASSESSMENT
pt overall low risk for suicide attempt, no si/hi, no hx attempts, future oriented, risk factors include med issues

## 2021-04-04 NOTE — PROGRESS NOTE ADULT - SUBJECTIVE AND OBJECTIVE BOX
CC: tele events noted, afib w RVR earlier, now in nsr    TELEMETRY:     PHYSICAL EXAM:    T(C): 36.5 (04-04-21 @ 05:57), Max: 36.8 (04-03-21 @ 13:37)  HR: 99 (04-04-21 @ 05:57) (83 - 132)  BP: 150/55 (04-04-21 @ 05:57) (112/61 - 150/55)  RR: 19 (04-04-21 @ 05:57) (16 - 19)  SpO2: 100% (04-04-21 @ 05:57) (98% - 100%)  Wt(kg): --  I&O's Summary    03 Apr 2021 07:01  -  04 Apr 2021 07:00  --------------------------------------------------------  IN: 260 mL / OUT: 150 mL / NET: 110 mL        Appearance: Normal	  Cardiovascular: Normal S1 S2,RRR, No JVD, No murmurs  Respiratory: Lungs clear to auscultation	  Gastrointestinal:  Soft, Non-tender, + BS	  Extremities: Normal range of motion, No clubbing, cyanosis or edema  Vascular: Peripheral pulses palpable 2+ bilaterally     LABS:	 	                          8.4    15.83 )-----------( 532      ( 04 Apr 2021 06:03 )             29.4     04-04    141  |  104  |  49<H>  ----------------------------<  225<H>  5.1   |  23  |  2.12<H>    Ca    9.5      04 Apr 2021 06:03  Phos  4.1     04-03  Mg     2.3     04-04            CARDIAC MARKERS:      MEDICATIONS  (STANDING):  albuterol/ipratropium for Nebulization 3 milliLiter(s) Nebulizer every 6 hours  buDESOnide    Inhalation Suspension 0.5 milliGRAM(s) Inhalation every 12 hours  dextrose 40% Gel 15 Gram(s) Oral once  dextrose 5%. 1000 milliLiter(s) (50 mL/Hr) IV Continuous <Continuous>  dextrose 5%. 1000 milliLiter(s) (100 mL/Hr) IV Continuous <Continuous>  dextrose 50% Injectable 25 Gram(s) IV Push once  dextrose 50% Injectable 12.5 Gram(s) IV Push once  dextrose 50% Injectable 25 Gram(s) IV Push once  diltiazem    milliGRAM(s) Oral daily  fluticasone propionate 50 MICROgram(s)/spray Nasal Spray 2 Spray(s) Both Nostrils two times a day  glucagon  Injectable 1 milliGRAM(s) IntraMuscular once  insulin lispro (ADMELOG) corrective regimen sliding scale   SubCutaneous three times a day before meals  insulin lispro (ADMELOG) corrective regimen sliding scale   SubCutaneous at bedtime  lamoTRIgine 25 milliGRAM(s) Oral at bedtime  melatonin 3 milliGRAM(s) Oral at bedtime  methimazole 2.5 milliGRAM(s) Oral daily  metoprolol tartrate 50 milliGRAM(s) Oral two times a day  midodrine. 5 milliGRAM(s) Oral three times a day  multivitamin 1 Tablet(s) Oral daily  pantoprazole    Tablet 40 milliGRAM(s) Oral two times a day  sodium chloride 0.9%. 1000 milliLiter(s) (75 mL/Hr) IV Continuous <Continuous>

## 2021-04-04 NOTE — PROGRESS NOTE ADULT - TIME BILLING
.
HARJEET, hyperkalemia  Anemia
.
Agree with above NP note.  cv stable  afib noted  resume BB/CCB  remains off a/c  eventual restart asa vs a/c pending heme, gi recs
Agree with above NP note.  cv stable  rates better controlled  cont rate control as ordered  remains off a/c due to drop in heme   med/gi f/u
Agree with above NP note.  cv stable  s/p 1 u prbc  trend heme  remains off a/c  eventual restart asa vs a/c pending heme, gi recs
Agree with above NP note.  cv stable  rates labile  pt cannot tolerate PO  cont IV bb  midodrine if tolerated  remains off a/c  guaiac +, refused GI workup, cont to hold ac and antiplatelet for now
HARJEET, hyperkalemia  Anemia
Agree with above NP note.  cv stable  v. rates improved   cont ccb, dig, iv bb as sbp tolerates  continue midodrine to augment SBP, can increased as needed  erik noted  hold diuretics
Attending Attestation (For Attendings USE Only)...

## 2021-04-04 NOTE — PROVIDER CONTACT NOTE (OTHER) - NAME OF MD/NP/PA/DO NOTIFIED:
Maurilio NP
Maurilio Santacruz NP
Sammie Conte
Maurilio Santacruz NP
Yolanda Davis NP
Yolanda JEREZ
Lara CROCKETT
NP Aleta  [place]
Maurilio Santacruz NP
Sammie Conte NP

## 2021-04-04 NOTE — PROVIDER CONTACT NOTE (OTHER) - BACKGROUND
Patient admitted with COPD.
Patient admitted with COPD.
Admitted for COPD exacerbation.
Pt came in for COPD exacerbation.
Patient admitted with COPD
Patient admitted with COPD.
Patient admitted with weakness.
Patient admitted with COPD

## 2021-04-04 NOTE — RAPID RESPONSE TEAM SUMMARY - NSSITUATIONBACKGROUNDRRT_GEN_ALL_CORE
89yo M pmhx COPD, bipolar, monoclonal gammopathy, anemia with history transfusions and iron infusions, afib on eliquis, DM (no longer on meds) admitted with hypoxic resp failure. RRT called tonight for hypoxia, agonal breathing.  Primary PA confirmed patient full code, anethesia stat paged overhead.  Prior to intubation, no working IV access. IO placed.  SBP 90s. Levo started at 0.5mcg/kg/min as SBP dropping prior to intubation.  Memo given per anesthesia. Despite Levo, unable to get BP, heart rate dropping to 30s. Levo rapidly titrated up to 2mcg/kg/min. Atropine x 1 given.  Unable to get BP. CPR initiated.  See code sheet/note.  Despite 3 rounds of CPR (Epi x 3, Bicarb, Calcium), patient remaning asystolic.  Primary team on the phone with family, asked for CPR to be stopped. CPR stopped as per family's wishes.  Pronounced at 2101 by Dr. Wang. Suspect cardiac arrest secondary to hypoxia. Family to come to hospital. Primary PA at bedside throughout.  89yo M pmhx COPD, bipolar, monoclonal gammopathy, anemia with history transfusions and iron infusions, afib on eliquis, DM (no longer on meds) admitted with hypoxic resp failure. RRT called tonight for hypoxia, agonal breathing.  Primary PA confirmed patient full code, anethesia stat paged overhead.  Prior to intubation, no working IV access. IO placed.  SBP 90s. Levo started at 0.5mcg/kg/min as SBP dropping prior to intubation.  Memo given per anesthesia. Despite Levo, unable to get BP, heart rate dropping to 30s. Levo rapidly titrated up to 2mcg/kg/min. Atropine x 1 given, Epi x 1.  Unable to get BP. CPR initiated.  See code sheet/note.  ACLS protocol intiated (Epi x 2, Bicarb, Calcium), patient remaining asystolic.  Primary team on the phone with family, asked for CPR to be stopped. CPR stopped as per family's wishes.  Pronounced at 2101 by Dr. Wang. Suspect cardiac arrest secondary to hypoxia. Family to come to hospital. Primary PA at bedside throughout.

## 2021-04-04 NOTE — PROVIDER CONTACT NOTE (OTHER) - REASON
Patient heart rate sustaining 130s.
Patient experiencing hematuria.
Patient converted from atrial fibrillation to normal sinus rhythm
Patient HR sustaining in 130s.
Failed dysphag2
Patient HR went up to 130s.
Patient agitated and combative with staff
Patient converted from atrial flutter to normal sinus rhythm
Patient converted from normal sinus rhythm to atrial fibrillation
Patient converted from normal sinus rhythm to atrial fibrillation

## 2021-04-04 NOTE — PROGRESS NOTE BEHAVIORAL HEALTH - LANGUAGE
Impaired naming/Impaired repetition

## 2021-04-04 NOTE — PROGRESS NOTE BEHAVIORAL HEALTH - AXIS III
COPD, monoclonal gammopathy, anemia with hx of transfusions, afib on eliquis, DM (no longer on meds) is admitted to [facility] for hypoxia
COPD, monoclonal gammopathy, anemia with hx of transfusions, afib on eliquis, DM (no longer on meds) is admitted to [facility] for hypoxia

## 2021-04-05 LAB
FLUAV AG NPH QL: SIGNIFICANT CHANGE UP
FLUBV AG NPH QL: SIGNIFICANT CHANGE UP
RSV RNA NPH QL NAA+NON-PROBE: SIGNIFICANT CHANGE UP
SARS-COV-2 RNA SPEC QL NAA+PROBE: DETECTED

## 2021-04-09 NOTE — ED PROCEDURE NOTE - PROCEDURE SUPERVISED BY
Asked pt how his belly is feeling, he states \"good\". I asked if he had any nausea and he stated \"no\". Pt given 8 oz cup of water.       Betty Espino, RN  04/09/21 [id number] irene salazar

## 2021-05-05 NOTE — ED PROVIDER NOTE - PRINCIPAL DIAGNOSIS
Consent: Written consent obtained. Risks include but not limited to lid/brow ptosis, bruising, swelling, diplopia, temporary effect, incomplete chemical denervation. Hypoglycemia

## 2021-06-09 NOTE — PHYSICAL THERAPY INITIAL EVALUATION ADULT - BALANCE DISTURBANCE, SYSTEM IMPAIRMENT CONTRIBUTE, REHAB EVAL
If you are a smoker, it is important for your health to stop smoking. Please be aware that second hand smoke is also harmful. cognitive/neuromuscular/musculoskeletal

## 2021-12-09 NOTE — ED ADULT NURSE REASSESSMENT NOTE - NS ED NURSE REASSESS COMMENT FT1
Patient heart rate 110, but patient is agitated and states he wants to sign out of the hospital since he has been waiting for a bed upstairs for a long time. Patient informed that there are no beds available at this time but he will still receive all treatment and meds while in the ER. Patient still very upset, Beatriz JEREZ is aware. n/a

## 2022-02-14 NOTE — PHYSICAL THERAPY INITIAL EVALUATION ADULT - WEIGHT-BEARING RESTRICTIONS: STAND/SIT, REHAB EVAL
Implemented All Universal Safety Interventions:  [place] to call system. Call bell, personal items and telephone within reach. Instruct patient to call for assistance. Room bathroom lighting operational. Non-slip footwear when patient is off stretcher. Physically safe environment: no spills, clutter or unnecessary equipment. Stretcher in lowest position, wheels locked, appropriate side rails in place. full weight-bearing

## 2022-08-02 NOTE — BEHAVIORAL HEALTH ASSESSMENT NOTE - NS ED [facility] MED ROS HEMATOLOGIC LYMPHATIC
No complaints Cheiloplasty (Complex) Text: A decision was made to reconstruct the defect with a  cheiloplasty.  The defect was undermined extensively.  Additional obicularis oris muscle was excised with a 15 blade scalpel.  The defect was converted into a full thickness wedge to facilite a better cosmetic result.  Small vessels were then tied off with 5-0 monocyrl. The obicularis oris, superficial fascia, adipose and dermis were then reapproximated.  After the deeper layers were approximated the epidermis was reapproximated with particular care given to realign the vermilion border.

## 2022-08-25 NOTE — BEHAVIORAL HEALTH ASSESSMENT NOTE - NSBHCHARTREVIEWVS_PSY_A_CORE FT
Vital Signs Last 24 Hrs  T(C): 36.3 (01 Apr 2021 11:19), Max: 36.8 (31 Mar 2021 23:12)  T(F): 97.4 (01 Apr 2021 11:19), Max: 98.3 (31 Mar 2021 23:12)  HR: 112 (01 Apr 2021 11:19) (112 - 142)  BP: 98/60 (01 Apr 2021 11:19) (86/49 - 123/65)  BP(mean): --  RR: 18 (01 Apr 2021 11:19) (16 - 19)  SpO2: 96% (01 Apr 2021 11:19) (96% - 100%) No

## 2022-09-09 NOTE — DISCHARGE NOTE FOR THE EXPIRED PATIENT - NS PATIENT DEATH CRITERIA
Patient is dead based on Cardiopulmonary criteria including absent breath sounds, pulselessness and/or asystole
5

## 2023-01-06 NOTE — ED ADULT NURSE REASSESSMENT NOTE - BREATH SOUNDS, LEFT
Impression: Vitreous degeneration, right eye: H43.811. Plan: Has retinoschisis with outer hole No full thickness tear on careful depressed examination Not symptomatic RBA's of barrier laser vs obs d/w patient in detail Patient elects barrier laser. Please see above. course crackles/wheezes/diminished

## 2023-01-20 NOTE — PHYSICAL THERAPY INITIAL EVALUATION ADULT - NS ASR RISK AREAS PT EVAL
RN Diabetes Education Progress Note    Reason for Visit: Post Program Diabetes Education     Previous Self-Management Goals:  1. Turn off sleep mode due to inconsistent sleep schedule, goal met  2. When entering carbs for a larger meal, dose entire meal, partially met   3. Wear a demo omnipod and notify [facility] Later if want to trial.Â does not want to trial at this time    Highlights of today's visit:  Krish English presented to clinic for follow up with Dr. Salas Smith for diagnosis of type 1 diabetes. Krish English was diagnosed with type 1 diabetes 9/16/2020 and is currently managing with the use of the Tslim insulin pump with control IQ technology and Dexcom CGM. Regis Posadas was last seen in clinic on 9/3022 with an A1c of 6.6%. 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 Krish English does not dose all carbs, states he is familiar with how much he needs for most of his foods. At this time he is eating a lot of fast food, bases his dosing on current glucose, food and activity. He was running a little higher the last few weeks due to illness but otherwise feels things are going very well. He does not want to change the way he currently doses to traditional carb counting, he feels this works well. Krish English does not use traditional dosing: he is familiar with how foods affect him and delivers insulin according to current CGM level, activity level and the food he is eating. He does not wish to change at this time. He does not dose all meals/snacks, runs higher on basal and adjusts as necessary. At this time he manages with an A1c at goal and without hypoglycemia in this manner. Current Medication and Medication Update/Changes:  tslim Insulin pump therapy with Humalog insulin.     Medication Adherence:  Very good    Insulin Pump Settings:  Tandem    time basal correction Carb ratio  target   MN 1.35 38 8 120   6AM 1.3 37 8 120   total 31.5 Â  Â  Â      Insulin Action Time:    Insulin Assessment:  Average bolus 30.18   Programmed basal 31.5   Total basal 42.63   Total Daily Dose 72.81   % basal 59   Units/kg/day 1.2   400 rule 5.5   1700 rule 23     Patient reports using exercise mode: No  Patient reports using extended bolus features:No  Family adjusts insulin doses based on activity and schedule: Yes  Family is comfortable making dose adjustments: Yes  Erick admits missing fast-acting insulin doses, Multiple times/week. Doses snacks: No  Premeal doses:  No  Calculates carbohydrates: Yes  Erick does not use traditional dosing: he is familiar with how foods affect him and delivers insulin according to current CGM level, activity level and the food he is eating. He does not wish to change at this time. He does not dose all meals/snacks, runs higher on basal and adjusts as necessary. At this time he manages with an A1c at goal and without hypoglycemia in this manner. Insulin Pump:  Erick uses a tslim insulin pump to manage his diabetes withcontrol IQ or smart technology (insulin pump + Dexcom G6 CGM + algorithm to maximize time in range). he is using the HCL feature 95% of the time. I reviewed hyperglycemia safety with insulin pumps, including checking ketones with prolonged hyperglycemia, giving an insulin injection with pen or syringe if ketones elevated, and changing infusion set. Reports show he is giving 1-2 meal boluses per day. Patient changes the pump site every 3 days. Site rotation includes:abdominal wall   Dexcom: abdomen    Assessment of Blood Sugars:       CGM Assessment:      Erick is using a Dexcomcontinuous glucose monitor.    Overall, the CGM Time-In-Range ( mg/dL) was 37%, Time Above Range (>180 mg/ dL) 63%, and Time Below Range (<70% mg/dL) was 0%    Diabetes Self-Management Education:  The patient was seen for Diabetes Self-Management Education in an individual setting for a diagnosis of E10.9 (ICD-10-CM) - Type 1 diabetes mellitus without "complication (CMS/[facility]). The patient was seen from  1330 to 1400 and billed for 30 minutes. Relevant medical history reviewed. The instruction was given to the patient. Material was presented using verbal, written, demonstration and return demonstration. Learning needs were assessed and the patient requires education in diabetes disease process/treatment options, medical nutrition therapy, physical activity, diabetes medications, insulin pump and goal setting. Screening History:  Glucagon use in the last year: no  DKA admissions in the last year: no  Last Eye Exam: within 6 months  Last Dental Visit: October 2022  Last Flu shot: October 2021  Covid vaccine: declines    Labs:  Hemoglobin A1C: target value and patient current value reviewed   Hemoglobin A1C, POC (%)   Date Value   09/30/2022 6.6 (A)     Hemoglobin A1C (%)   Date Value   12/22/2022 6.6 (H)     Lipid panel:  target value and patient current value reviewed   Cholesterol (mg/dL)   Date Value   12/22/2022 179 (H)     LDL (mg/dL)   Date Value   12/22/2022 87     HDL (mg/dL)   Date Value   12/22/2022 60     Triglycerides (mg/dL)   Date Value   12/22/2022 160 (H)     Microalbumin:  target value and patient current value reviewed   Microalbumin, Urine (mg/dL)   Date Value   12/22/2022 <0.50     Creatinine:  target value and patient current value reviewed   Creatinine (mg/dL)   Date Value   08/23/2022 0.89     Patient is up to date with labs    Anthropometric Measurements:  Estimated body mass index is 22.78 kg/mÂ² as calculated from the following:    Height as of 9/30/22: 5' 5.16"" (1.655 m). Weight as of 9/30/22: 62.4 kg (137 lb 9.1 oz). Wt Readings from Last 2 Encounters:   09/30/22 62.4 kg (137 lb 9.1 oz) (24 %, Z= -0.72)*   08/12/22 62.2 kg (137 lb 2 oz) (24 %, Z= -0.72)*     * Growth percentiles are based on CDC (Boys, 2-20 Years) data.      not addressed    Physical Activity - Incorporating Activity into Lifestyle :  not addressed    Food and " Nutrition Related History:   Meals: 2 per day and grazing in morning and at night    Print/Written Resources Provided:   After Visit Summary (AVS)    Plan:  Chart routed to referring Provider  Goal Setting to [address] [place] [address] for Daily Living was discussed. See DM Care Plan for goals and topics covered. See Patient Instructions for further information. Self-Management Goals:  1. Other: technology Upload pump data  2. Monitoring: Review time in range for 70% and contact educator or make an adjustment as needed    Order:  DSMT Order Expires: 9/25/23  Order located in EMR. Billing Provider Felix Pal MD  Referring Provider: Felix Pal MD  Service Provider: Marianna Stahl      Recommended Follow-up:  Pt to follow up with DM education by sending blood sugars via [address] [place] [address]   in a one on one visit. The patient was encouraged to call back with any questions or concerns.     Assessments :  Verify assessment form is up to date: due 1/20/23 fall

## 2023-01-26 NOTE — PROGRESS NOTE ADULT - PROBLEM SELECTOR PLAN 1
Drain output 50cc since thia am.  OK with Dr. Reyez to pull drain    lumbar incision dressing in place, removed no drainage, drain in place.  Drain pulled without complications, new dressing placed    Ortho stable for d/c home Drain output 50cc since thia am.  OK with Dr. Reyez to pull drain    lumbar incision dressing in place, removed no drainage, drain in place.  Drain pulled without complications, new dressing placed    Ortho stable for d/c home.    Re: the query a lumbar laminectomy is performed to address this pt lumbar stenosis.  Maintaining sinus   Off  amio now and  Cardizem 240 mg daily. HR stable   TTE reviewed    on coumadin  will need inpatient vs. outpatient stress test

## 2023-03-08 NOTE — DIETITIAN INITIAL EVALUATION ADULT. - NS AS NUTRI INTERV MEALS SNACK
Subjective   Patient ID: Andrade is a 37 year old male.    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 Now has two kids. No complaints. Due for labs      Andrade has no past medical history on file.  Andrade has Cervical radiculopathy; Obesity; Tinea barbae; Fatigue; Elevated blood pressure reading; Feeling stressed out; Chronic pain of right knee; Elevated serum creatinine; Epigastric pain; Colon cancer screening; Left lower quadrant abdominal pain; Preop examination; and Retinal tear of left eye on their problem list.  Andrade has a past surgical history that includes Anterior cruciate ligament repair; Knee surgery (Bilateral); and Eye surgery.  His family history includes Hypertension in his mother; Patient is unaware of any medical problems in his father.  Andrade reports that he has never smoked. He has never used smokeless tobacco. He reports that he does not drink alcohol and does not use drugs.  Andrade currently has no medications in their medication list.  Andrade   No current outpatient medications on file.     No current facility-administered medications for this visit.     Andrade has No Known Allergies.      Review of Systems   All other systems reviewed and are negative.    Objective   Physical Exam  Constitutional:       General: He is not in acute distress.     Appearance: He is obese. He is not ill-appearing, toxic-appearing or diaphoretic.   HENT:      Head: Normocephalic and atraumatic.   Eyes:      Conjunctiva/sclera: Conjunctivae normal.   Cardiovascular:      Rate and Rhythm: Normal rate and regular rhythm.   Pulmonary:      Effort: Pulmonary effort is normal.      Breath sounds: Normal breath sounds.   Neurological:      General: No focal deficit present.      Mental Status: He is alert.   Psychiatric:         Mood and Affect: Mood normal.         Behavior: Behavior normal.          Thought Content: Thought content normal.         Judgment: Judgment normal.       Assessment   Problem List Items Addressed This Visit        Cardiac and Vasculature    Elevated blood pressure reading     Upper edge of normal.lifestyle modifications            Eye    Retinal tear of left eye     Had laser repair.            Genitourinary and Reproductive    Elevated serum creatinine     likely due to large muscle mass            Musculoskeletal and Injuries    Chronic pain of right knee     Minor flare ups . Not limited        Other Visit Diagnoses     Annual physical exam    -  Primary    Relevant Orders    CBC WITH DIFFERENTIAL    COMPREHENSIVE METABOLIC PANEL    LIPID PANEL WITH REFLEX         Recommend continue consistent CHO diet order to promote glucose control in setting of hyperglycemia. Pt's food preferences obtained and honored on menu. Prunes added at breakfast to help promote bowel regularity. Pt declines need for softened diet.

## 2023-03-09 NOTE — PATIENT PROFILE ADULT. - FUNCTIONAL SCREEN CURRENT LEVEL: TRANSFERRING, MLM
Well controlled on lifestyle modifications alone  Patient initiated on Lisinopril in November 2022 which was dc'd after soft BP readings  Discussed exercise and diet modifications at length  (2) assistive person

## 2023-06-23 NOTE — ED ADULT TRIAGE NOTE - CADM TRG TX PRIOR TO ARRIVAL
Post-Op Assessment Note    CV Status:  Stable  Pain Score: 2    Pain management: adequate     Mental Status:  Alert and awake   Hydration Status:  Stable   PONV Controlled:  Controlled   Airway Patency:  Patent      Post Op Vitals Reviewed: Yes      Staff: Anesthesiologist, CRNA         No notable events documented      BP   119/75   TEMP 98 8   Pulse 68   Resp 18   SpO2 95 oxygen

## 2023-08-05 NOTE — SWALLOW BEDSIDE ASSESSMENT ADULT - SLP GENERAL OBSERVATIONS
Adult
Pt was received at bedside being fed lunch by PCA. Per RNCaryn, report pt coughing during breakfast this morning. +NC (2L). He was AAOx4, pleasant, and cooperative. Pt presents with dysphonia notable for hoarse/harsh vocal quality (baseline per previous BSE 2018).

## 2024-02-07 NOTE — PROGRESS NOTE ADULT - PROVIDER SPECIALTY LIST ADULT
Cardiology
Endocrinology
Internal Medicine
Neurology
Neurology
Please take prednisone as prescribed    Please follow up with your Doctor for new or worsening concerns  
Psychiatry
Pulmonology
Pulmonology
Neurology
Cardiology

## 2024-02-12 NOTE — DIETITIAN INITIAL EVALUATION ADULT. - PROBLEM SELECTOR PLAN 5
No -Cont. xarelto, monitor for renal function  -Cont. diltiazem Render Risk Assessment In Note?: no Additional Notes: See skin cancer log Detail Level: Simple

## 2024-09-29 NOTE — ED ADULT NURSE REASSESSMENT NOTE - NS ED NURSE REASSESS COMMENT FT1
pt placed in position of comfort. pt medicated as per admitting md ordered. blood drawn and sent to lab as per admitting md order. pt denies pain of any kind/sob/n/v/dizziness/weakness/chest pain/palpitations. pt given call bell. pt admitted, ready to move, waiting for bed upstairs. will continue to monitor. None

## 2024-11-11 NOTE — BEHAVIORAL HEALTH ASSESSMENT NOTE - DOMICILE TYPE
Detail Level: Simple Add 1585x Cpt? (Do Not Bill If You Billed For The Procedure Placing The Sutures. This Is An Add-On Code That Must Be Billed With An E/M Visit Code): No Wound Evaluated By: AMIRA Additional Comments: Continue wound care for 1 more week Private Residence
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
## 2025-06-03 NOTE — H&P ADULT - NSHPREVIEWOFSYSTEMS_GEN_ALL_CORE
REVIEW OF SYSTEMS:    CONSTITUTIONAL: + weakness, no fevers or chills  EYES/ENT: No visual changes;  No vertigo or throat pain   NECK: No pain or stiffness  RESPIRATORY: No cough, wheezing, hemoptysis; No shortness of breath  CARDIOVASCULAR: No chest pain or palpitations  GASTROINTESTINAL: No abdominal or epigastric pain. No nausea, vomiting, or hematemesis; No diarrhea or constipation. No melena or hematochezia.  GENITOURINARY: No dysuria, frequency or hematuria  NEUROLOGICAL: No numbness or weakness  SKIN: No itching, burning, rashes, or lesions   All other review of systems is negative unless indicated above.
2.25